# Patient Record
Sex: MALE | Race: WHITE | NOT HISPANIC OR LATINO | Employment: OTHER | URBAN - METROPOLITAN AREA
[De-identification: names, ages, dates, MRNs, and addresses within clinical notes are randomized per-mention and may not be internally consistent; named-entity substitution may affect disease eponyms.]

---

## 2017-01-03 ENCOUNTER — HOSPITAL ENCOUNTER (OUTPATIENT)
Dept: LAB | Facility: MEDICAL CENTER | Age: 67
End: 2017-01-03
Attending: NURSE PRACTITIONER
Payer: MEDICARE

## 2017-01-03 ENCOUNTER — OFFICE VISIT (OUTPATIENT)
Dept: MEDICAL GROUP | Facility: PHYSICIAN GROUP | Age: 67
End: 2017-01-03
Payer: MEDICARE

## 2017-01-03 VITALS
BODY MASS INDEX: 36.57 KG/M2 | RESPIRATION RATE: 16 BRPM | WEIGHT: 233 LBS | HEIGHT: 67 IN | TEMPERATURE: 99.3 F | HEART RATE: 60 BPM | OXYGEN SATURATION: 94 % | DIASTOLIC BLOOD PRESSURE: 78 MMHG | SYSTOLIC BLOOD PRESSURE: 132 MMHG

## 2017-01-03 DIAGNOSIS — C91.10 CLL (CHRONIC LYMPHOCYTIC LEUKEMIA) (HCC): ICD-10-CM

## 2017-01-03 DIAGNOSIS — E11.8 CONTROLLED TYPE 2 DIABETES MELLITUS WITH COMPLICATION, WITHOUT LONG-TERM CURRENT USE OF INSULIN (HCC): ICD-10-CM

## 2017-01-03 DIAGNOSIS — E87.6 HYPOKALEMIA: ICD-10-CM

## 2017-01-03 DIAGNOSIS — I15.2 HYPERTENSION SECONDARY TO ENDOCRINE DISORDERS: ICD-10-CM

## 2017-01-03 DIAGNOSIS — Z01.84 IMMUNITY STATUS TESTING: ICD-10-CM

## 2017-01-03 LAB — POTASSIUM SERPL-SCNC: 3.7 MMOL/L (ref 3.6–5.5)

## 2017-01-03 PROCEDURE — 99214 OFFICE O/P EST MOD 30 MIN: CPT | Performed by: NURSE PRACTITIONER

## 2017-01-03 PROCEDURE — 36415 COLL VENOUS BLD VENIPUNCTURE: CPT

## 2017-01-03 PROCEDURE — 86787 VARICELLA-ZOSTER ANTIBODY: CPT | Mod: 91

## 2017-01-03 PROCEDURE — 84132 ASSAY OF SERUM POTASSIUM: CPT

## 2017-01-03 RX ORDER — LOSARTAN POTASSIUM 25 MG/1
TABLET ORAL
Qty: 90 TAB | Refills: 1 | Status: SHIPPED | OUTPATIENT
Start: 2017-01-03 | End: 2017-06-28 | Stop reason: SDUPTHER

## 2017-01-03 NOTE — MR AVS SNAPSHOT
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   [384603]       Controlled type 2 diabetes mellitus with complication, without long-term current use of insulin (HCC)   [3485995]       CLL (chronic lymphocytic leukemia) (HCC)   [035201]       Immunity status testing   [781786]         Vital Signs     Blood Pressure Pulse Temperature Respirations Height Weight    132/78 mmHg 60 37.4 °C (99.3 °F) 16 1.702 m (5' 7.01\") 105.688 kg (233 lb)    Body Mass Index Oxygen Saturation Smoking Status             36.48 kg/m2 94% Never Smoker          Basic Information     Date Of Birth Sex Race Ethnicity Preferred Language    1950 Male White Non- English      Your appointments     2017 10:00 AM   ONCOLOGY EST PATIENT 30 MIN with Cris Glass M.D.   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 I15.2   2014 - Present    Iron overload E83.19   11/10/2014 - Present    Tobacco dependence in remission F17.201   2015 - Present    Skin lesion L98.9   2015 - Present    Seasonal allergies J30.2   2016 - Present    Hypokalemia E87.6   " 6/10/2016 - Present      Health Maintenance        Date Due Completion Dates    DIABETES MONOFILAMTENT / LE EXAM 5/26/1951 ---    RETINAL SCREENING 11/26/1968 ---    IMM ZOSTER VACCINE 11/26/2010 ---    IMM PNEUMOCOCCAL 65+ (ADULT) HIGH/HIGHEST RISK SERIES (2 of 2 - PPSV23) 12/9/2016 10/14/2016    A1C SCREENING 4/18/2017 10/18/2016, 4/22/2016, 11/23/2015, 9/10/2014    URINE ACR / MICROALBUMIN 4/22/2017 4/22/2016, 9/10/2014    FASTING LIPID PROFILE 10/18/2017 10/18/2016, 4/22/2016, 4/9/2014, 3/5/2014    SERUM CREATININE 10/18/2017 10/18/2016, 6/3/2016, 4/28/2016, 4/22/2016, 2/19/2016, 11/17/2015, 6/8/2015, 3/24/2015, 12/11/2014, 9/10/2014, 7/15/2014, 4/9/2014, 3/5/2014    COLONOSCOPY 9/23/2018 9/23/2008    IMM DTaP/Tdap/Td Vaccine (2 - Td) 1/28/2026 1/28/2016            Current Immunizations     13-VALENT PCV PREVNAR 10/14/2016    Influenza Vaccine Adult HD 10/14/2016    Influenza Vaccine Quad Inj (Preserved) 11/20/2015    Tdap Vaccine 1/28/2016      Below and/or attached are the medications your provider expects you to take. Review all of your home medications and newly ordered medications with your provider and/or pharmacist. Follow medication instructions as directed by your provider and/or pharmacist. Please keep your medication list with you and share with your provider. Update the information when medications are discontinued, doses are changed, or new medications (including over-the-counter products) are added; and carry medication information at all times in the event of emergency situations     Allergies:  No Known Allergies          Medications  Valid as of: January 03, 2017 - 10:44 AM    Generic Name Brand Name Tablet Size Instructions for use    AmLODIPine Besylate (Tab) NORVASC 5 MG TAKE ONE TABLET BY MOUTH DAILY        Cetirizine HCl   Take  by mouth.        Fluticasone Propionate (Suspension) FLONASE 50 MCG/ACT Spray 1 Spray in nose 2 times a day.        HydroCHLOROthiazide (Tab) HYDRODIURIL 12.5 MG  TAKE ONE TABLET BY MOUTH DAILY(GENERIC HYDRODIURIL)        Losartan Potassium (Tab) COZAAR 25 MG TAKE ONE TABLET BY MOUTH DAILY        MetFORMIN HCl (TABLET SR 24 HR) GLUCOPHAGE  MG TAKE TWO TABLETS BY MOUTH DAILY (GENERIC FOR GLUCOPHAGE XR)        Potassium Chloride (Tab CR) KLOR-CON 10 MEQ TAKE ONE TABLET BY MOUTH TWICE A DAY        .                 Medicines prescribed today were sent to:     [facility][id number] - [place] - [address] [place] [address]    Phone: [phone] Fax: [phone]    Open 24 Hours?: No      Medication refill instructions:       If your prescription bottle indicates you have medication refills left, it is not necessary to call your provider’s office. Please contact your pharmacy and they will refill your medication.    If your prescription bottle indicates you do not have any refills left, you may request refills at any time through one of the following ways: The online [employer] App system (except Urgent Care), by calling your provider’s office, or by asking your pharmacy to contact your provider’s office with a refill request. Medication refills are processed only during regular business hours and may not be available until the next business day. Your provider may request additional information or to have a follow-up visit with you prior to refilling your medication.   *Please Note: Medication refills are assigned a new Rx number when refilled electronically. Your pharmacy may indicate that no refills were authorized even though a new prescription for the same medication is available at the pharmacy. Please request the medicine by name with the pharmacy before contacting your provider for a refill.        Your To Do List     Future Labs/Procedures Complete By Expires    POTASSIUM SERUM (K)  As directed 1/4/2018    VARICELLA ZOSTER ANTIBODIES (IGG + IGM)  As directed 1/4/2018         [employer] App Access Code: Activation code not generated  Current [employer] App Status:  Active

## 2017-01-03 NOTE — PROGRESS NOTES
Subjective:     Chief Complaint   Patient presents with   • Hypertension   • Chronic Care Management        Titus Duvall is a 66 y.o. 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 BP decreasing with weight loss.  Is now monitoring at home - stopped Norvasc 5 mg after last visit. 140/82 then 128/79 pretty consistently   No symptoms low BP: light-headed, tunnel-vision, unusual fatigue.   No symptoms high BP: pounding headache, visual changes, palpitations, flushed face. No medicine adverse effects: unusual fatigue, slow heartbeat, cough.     Controlled diabetes mellitus type 2 with complications (HCC)  A1c 7 in April 2016 recently in October A1c down to 5.3. Has been limiting carbs and lost 16 pounds since June. Still walking 1/2 mile daily. Managed with metformin 500 mg xr bid with meals. Dinner and lunch, breakfast if he eats in the morning. Due have eye exam, requested.  Will decrease metformin to once daily now, has been taking one then two daily alternating.   FLP within range - LDL 57 no treatment necessary.      CLL (chronic lymphocytic leukemia) (HCC)  Diagnosed in June 2014, was treated by Dr. Lang now followed by Dr. Glass and has appointment scheduled for Monday. Sees her every 4 months.   Followed by oncology. Next appt early February 2017  No history of chicken pox in childhood, will check immunity status.        Hypokalemia  He has been stable on 10 mEq bid. Unfortunately his pharmacy declined to refill his prescription so he has been without supplement for the last two weeks. His previous potassium level was in range at 3.8. Review in Epic shows a six month supply was call to pharmacy on 10.3.16. He'll resume supplement and regular monitoring.            ROS  Denies HA, chest pain, shortness of breath, abdominal pain, bladder or bowel changes, lower extremity  "edema.    Current medicines (including changes today)  Current Outpatient Prescriptions   Medication Sig Dispense Refill   • losartan (COZAAR) 25 MG Tab TAKE ONE TABLET BY MOUTH DAILY 90 Tab 1   • potassium chloride ER (KLOR-CON) 10 MEQ tablet TAKE ONE TABLET BY MOUTH TWICE A  Tab 1   • hydrochlorothiazide (HYDRODIURIL) 12.5 MG tablet TAKE ONE TABLET BY MOUTH DAILY(GENERIC HYDRODIURIL) 90 Tab 1   • Cetirizine HCl (ZYRTEC ALLERGY PO) Take  by mouth.     • metformin ER (GLUCOPHAGE XR) 500 MG TABLET SR 24 HR TAKE TWO TABLETS BY MOUTH DAILY (GENERIC FOR GLUCOPHAGE XR) 180 Tab 1   • amlodipine (NORVASC) 5 MG Tab TAKE ONE TABLET BY MOUTH DAILY 30 Tab 2   • fluticasone (FLONASE ALLERGY RELIEF) 50 MCG/ACT nasal spray Spray 1 Spray in nose 2 times a day. 1 Bottle 0     No current facility-administered medications for this visit.       He  has a past medical history of Hypertension; Cancer ([facility]) (2004); CMV (cytomegalovirus infection) ([facility]) (1990); and CLL (chronic lymphocytic leukemia) ([facility]). He also has no past medical history of Hyperlipidemia, Heart attack ([facility]), Kidney disease, or Thyroid disease.    Allergies Review of patient's allergies indicates no known allergies.    Current medications, problem list, allergies, past medical history, and tobacco use history reviewed in [facility] today.    Health maintenance reviewed and updated. Declines pneumonia immunization today. He was told that he never had chickenpox in childhood and Therefore would not need shingles immunization. He plans to discuss this further with oncology at his next appointment.   Objective:   Blood pressure 132/78, pulse 60, temperature 37.4 °C (99.3 °F), resp. rate 16, height 1.702 m (5' 7.01\"), weight 105.688 kg (233 lb), SpO2 94 %. Body mass index is 36.48 kg/(m^2).     Physical Exam   Constitutional: Alert, no acute distress. Pleasant and cooperative with the examination.  Skin:   Warm, dry, no rashes in visible areas.    Eyes:   Pupils equal, " round. Conjunctiva and sclera clear,    Lids normal.  ENT:  Pinna normal.   Neck:   Supple, trachea midline.  Lungs:  Normal effort and respirations. Clear to auscultation bilaterally.  CV:  Regular rate and rhythm.  MS/Ext:  Steady gait, no edema.  Psych:  Eye contact is good, affect calm.    Assessment and Plan:   The following treatment plan was discussed    1. Hypokalemia  Currently untreated. He was advised to call this office whenever he has any difficulty obtaining prescription medications. We will follow up with pharmacy for more information. Meantime he'll resume treatment after visiting the lab today. Will continue to monitor.  - POTASSIUM SERUM (K); Future    2. 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 Continue current medications. We'll continue to monitor.    3. Controlled type 2 diabetes mellitus with complication, without long-term current use of insulin (HCC)  Chronic. He'll continue Metformin once daily. Will continue to monitor. Rated lifestyle modifications.    4. CLL (chronic lymphocytic leukemia) (HCC)  Chronic. Continue regular follow-up with oncology, Labs and other diagnostic testing is recommended.     - VARICELLA ZOSTER ANTIBODIES (IGG + IGM); Future    5. Immunity status testing  Well check for history of exposure today. He'll talk further with his oncologist regarding immunization as indicated.  - VARICELLA ZOSTER ANTIBODIES (IGG + IGM); Future    Followup: Return in about 3 months (around 4/3/2017).  As scheduled, sooner if symptoms don't resolve or with any new problems.    After visit: my assistant confirmed Potassium prescription is now available at the pharmacy, pharmacy stated patient requested refill too soon.           Reviewed side effects, risks, and benefits of medications prescribed today.  Advised to take all medications as instructed and report any side effects.   The patient voices understanding and agrees.  Report any new or worsening symptoms.  Have labs or  other diagnostic studies prior to follow up.  Keep all appointments for any referrals given.      Please note this dictation was created using voice recognition software. Every reasonable attempt has been made to correct obvious errors, however there may be errors of grammar and possibly content that were not discovered before finalizing the note.

## 2017-01-03 NOTE — ASSESSMENT & PLAN NOTE
Previously managed with hydrochlorothiazide 12.5 mg once a day, losartan 25 mg. BP decreasing with weight loss.  Is now monitoring at home - stopped Norvasc 5 mg after last visit. 140/82 then 128/79 pretty consistently   No symptoms low BP: light-headed, tunnel-vision, unusual fatigue.   No symptoms high BP: pounding headache, visual changes, palpitations, flushed face. No medicine adverse effects: unusual fatigue, slow heartbeat, cough.

## 2017-01-03 NOTE — ASSESSMENT & PLAN NOTE
Diagnosed in June 2014, was treated by Dr. Lang now followed by Dr. Glass and has appointment scheduled for Monday. Sees her every 4 months.   Followed by oncology. Next appt early February 2017  No history of chicken pox in childhood, will check immunity status.

## 2017-01-05 LAB
VZV IGG SER IA-ACNC: 1197 IV
VZV IGM SER IA-ACNC: 0 ISR

## 2017-01-06 NOTE — ASSESSMENT & PLAN NOTE
He has been stable on 10 mEq bid. Unfortunately his pharmacy declined to refill his prescription so he has been without supplement for the last two weeks. His previous potassium level was in range at 3.8. Review in Epic shows a six month supply was call to pharmacy on 10.3.16. He'll resume supplement and regular monitoring.

## 2017-02-21 ENCOUNTER — HOSPITAL ENCOUNTER (OUTPATIENT)
Dept: LAB | Facility: MEDICAL CENTER | Age: 67
End: 2017-02-21
Attending: INTERNAL MEDICINE
Payer: MEDICARE

## 2017-02-21 DIAGNOSIS — C91.10 CLL (CHRONIC LYMPHOCYTIC LEUKEMIA) (HCC): ICD-10-CM

## 2017-02-21 DIAGNOSIS — Z85.46 HISTORY OF PROSTATE CANCER: ICD-10-CM

## 2017-02-21 DIAGNOSIS — K70.30 ALCOHOLIC CIRRHOSIS OF LIVER WITHOUT ASCITES (HCC): ICD-10-CM

## 2017-02-21 LAB
ALBUMIN SERPL BCP-MCNC: 4.2 G/DL (ref 3.2–4.9)
ALBUMIN/GLOB SERPL: 1.4 G/DL
ALP SERPL-CCNC: 77 U/L (ref 30–99)
ALT SERPL-CCNC: 48 U/L (ref 2–50)
ANION GAP SERPL CALC-SCNC: 11 MMOL/L (ref 0–11.9)
ANISOCYTOSIS BLD QL SMEAR: ABNORMAL
AST SERPL-CCNC: 86 U/L (ref 12–45)
BASOPHILS # BLD AUTO: 0 K/UL (ref 0–0.12)
BASOPHILS NFR BLD AUTO: 0 % (ref 0–1.8)
BILIRUB SERPL-MCNC: 1.8 MG/DL (ref 0.1–1.5)
BUN SERPL-MCNC: 11 MG/DL (ref 8–22)
CALCIUM SERPL-MCNC: 9.8 MG/DL (ref 8.5–10.5)
CHLORIDE SERPL-SCNC: 103 MMOL/L (ref 96–112)
CO2 SERPL-SCNC: 25 MMOL/L (ref 20–33)
CREAT SERPL-MCNC: 0.63 MG/DL (ref 0.5–1.4)
EOSINOPHIL # BLD: 0 K/UL (ref 0–0.51)
EOSINOPHIL NFR BLD AUTO: 0 % (ref 0–6.9)
ERYTHROCYTE [DISTWIDTH] IN BLOOD BY AUTOMATED COUNT: 51.4 FL (ref 35.9–50)
GLOBULIN SER CALC-MCNC: 3 G/DL (ref 1.9–3.5)
GLUCOSE SERPL-MCNC: 119 MG/DL (ref 65–99)
HCT VFR BLD AUTO: 50 % (ref 42–52)
HGB BLD-MCNC: 16.7 G/DL (ref 14–18)
LDH SERPL L TO P-CCNC: 181 U/L (ref 107–266)
LYMPHOCYTES # BLD: 27.25 K/UL (ref 1–4.8)
LYMPHOCYTES NFR BLD AUTO: 95.6 % (ref 22–41)
MACROCYTES BLD QL SMEAR: ABNORMAL
MANUAL DIFF BLD: NORMAL
MCH RBC QN AUTO: 34.5 PG (ref 27–33)
MCHC RBC AUTO-ENTMCNC: 33.4 G/DL (ref 33.7–35.3)
MCV RBC AUTO: 103.3 FL (ref 81.4–97.8)
MONOCYTES # BLD: 0.26 K/UL (ref 0–0.85)
MONOCYTES NFR BLD AUTO: 0.9 % (ref 0–13.4)
MORPHOLOGY BLD-IMP: NORMAL
NEUTROPHILS # BLD: 1 K/UL (ref 1.82–7.42)
NEUTROPHILS NFR BLD AUTO: 3.5 % (ref 44–72)
NRBC # BLD AUTO: 0 K/UL
NRBC BLD-RTO: 0 /100 WBC
PLATELET # BLD AUTO: 86 K/UL (ref 164–446)
PLATELET BLD QL SMEAR: NORMAL
PMV BLD AUTO: 11 FL (ref 9–12.9)
POTASSIUM SERPL-SCNC: 4.1 MMOL/L (ref 3.6–5.5)
PROT SERPL-MCNC: 7.2 G/DL (ref 6–8.2)
PSA SERPL DL<=0.01 NG/ML-MCNC: 0.07 NG/ML (ref 0–4)
RBC # BLD AUTO: 4.84 M/UL (ref 4.7–6.1)
RBC BLD AUTO: PRESENT
SODIUM SERPL-SCNC: 139 MMOL/L (ref 135–145)
WBC # BLD AUTO: 28.5 K/UL (ref 4.8–10.8)

## 2017-02-21 PROCEDURE — 36415 COLL VENOUS BLD VENIPUNCTURE: CPT

## 2017-02-21 PROCEDURE — 83615 LACTATE (LD) (LDH) ENZYME: CPT

## 2017-02-21 PROCEDURE — 85027 COMPLETE CBC AUTOMATED: CPT

## 2017-02-21 PROCEDURE — 82105 ALPHA-FETOPROTEIN SERUM: CPT

## 2017-02-21 PROCEDURE — 80053 COMPREHEN METABOLIC PANEL: CPT

## 2017-02-21 PROCEDURE — 84153 ASSAY OF PSA TOTAL: CPT

## 2017-02-21 PROCEDURE — 85007 BL SMEAR W/DIFF WBC COUNT: CPT

## 2017-02-23 LAB — AFP-TM SERPL-MCNC: 9 NG/ML (ref 0–9)

## 2017-02-28 ENCOUNTER — OFFICE VISIT (OUTPATIENT)
Dept: HEMATOLOGY ONCOLOGY | Facility: MEDICAL CENTER | Age: 67
End: 2017-02-28
Payer: MEDICARE

## 2017-02-28 VITALS
HEART RATE: 69 BPM | SYSTOLIC BLOOD PRESSURE: 142 MMHG | TEMPERATURE: 99.1 F | OXYGEN SATURATION: 96 % | BODY MASS INDEX: 36.73 KG/M2 | WEIGHT: 234.6 LBS | RESPIRATION RATE: 15 BRPM | DIASTOLIC BLOOD PRESSURE: 98 MMHG

## 2017-02-28 DIAGNOSIS — C91.10 CLL (CHRONIC LYMPHOCYTIC LEUKEMIA) (HCC): ICD-10-CM

## 2017-02-28 PROCEDURE — 99214 OFFICE O/P EST MOD 30 MIN: CPT | Performed by: INTERNAL MEDICINE

## 2017-02-28 PROCEDURE — 0518F FALL PLAN OF CARE DOCD: CPT | Mod: 8P | Performed by: INTERNAL MEDICINE

## 2017-02-28 PROCEDURE — 3288F FALL RISK ASSESSMENT DOCD: CPT | Mod: 8P | Performed by: INTERNAL MEDICINE

## 2017-02-28 PROCEDURE — 1100F PTFALLS ASSESS-DOCD GE2>/YR: CPT | Performed by: INTERNAL MEDICINE

## 2017-02-28 PROCEDURE — 1036F TOBACCO NON-USER: CPT | Performed by: INTERNAL MEDICINE

## 2017-02-28 PROCEDURE — G8482 FLU IMMUNIZE ORDER/ADMIN: HCPCS | Performed by: INTERNAL MEDICINE

## 2017-02-28 PROCEDURE — G8432 DEP SCR NOT DOC, RNG: HCPCS | Performed by: INTERNAL MEDICINE

## 2017-02-28 PROCEDURE — G8419 CALC BMI OUT NRM PARAM NOF/U: HCPCS | Performed by: INTERNAL MEDICINE

## 2017-02-28 PROCEDURE — 3017F COLORECTAL CA SCREEN DOC REV: CPT | Performed by: INTERNAL MEDICINE

## 2017-02-28 PROCEDURE — 4040F PNEUMOC VAC/ADMIN/RCVD: CPT | Performed by: INTERNAL MEDICINE

## 2017-02-28 ASSESSMENT — PAIN SCALES - GENERAL: PAINLEVEL: NO PAIN

## 2017-02-28 NOTE — MR AVS SNAPSHOT
Titus Jadiel   2017 10:00 AM   Office Visit   MRN: [id number]    Department:  [facility]   Dept Phone:  [phone]    Description:  Male : 1950   Provider:  Cris Glass M.D.           Reason for Visit     Follow-Up 6m FV      Allergies as of 2017     No Known Allergies      You were diagnosed with     CLL (chronic lymphocytic leukemia) (CMS-HCC)   [944929]         Vital Signs     Blood Pressure Pulse Temperature Respirations Weight Oxygen Saturation    142/98 mmHg 69 37.3 °C (99.1 °F) 15 106.414 kg (234 lb 9.6 oz) 96%    Smoking Status                   Never Smoker            Basic Information     Date Of Birth Sex Race Ethnicity Preferred Language    1950 Male White Non- English      Your appointments     2017 10:15 AM   Established Patient with XAVI Segovia   [facility])    [address], [address]  [facility] [address] [phone]           You will be receiving a confirmation call a few days before your appointment from our automated call confirmation system.            Aug 29, 2017 10:20 AM   ONCOLOGY EST PATIENT 30 MIN with Cris Glass M.D.   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 I15.2   2014 - Present    Iron overload E83.19   11/10/2014 - Present    Tobacco dependence in remission F17.201   2015 - Present    Skin lesion L98.9   2015 - Present    Seasonal allergies  J30.2   5/4/2016 - Present    Hypokalemia E87.6   6/10/2016 - Present      Health Maintenance        Date Due Completion Dates    DIABETES MONOFILAMENT / LE EXAM 5/26/1951 ---    IMM ZOSTER VACCINE 11/26/2010 ---    IMM PNEUMOCOCCAL 65+ (ADULT) HIGH/HIGHEST RISK SERIES (2 of 2 - PPSV23) 12/9/2016 10/14/2016    A1C SCREENING 4/18/2017 10/18/2016, 4/22/2016, 11/23/2015, 9/10/2014    URINE ACR / MICROALBUMIN 4/22/2017 4/22/2016, 9/10/2014    FASTING LIPID PROFILE 10/18/2017 10/18/2016, 4/22/2016, 4/9/2014, 3/5/2014    RETINAL SCREENING 1/17/2018 1/17/2017, 1/17/2017    SERUM CREATININE 2/21/2018 2/21/2017, 10/18/2016, 6/3/2016, 4/28/2016, 4/22/2016, 2/19/2016, 11/17/2015, 6/8/2015, 3/24/2015, 12/11/2014, 9/10/2014, 7/15/2014, 4/9/2014, 3/5/2014    COLONOSCOPY 9/23/2018 9/23/2008    IMM DTaP/Tdap/Td Vaccine (2 - Td) 1/28/2026 1/28/2016            Current Immunizations     13-VALENT PCV PREVNAR 10/14/2016    Influenza Vaccine Adult HD 10/14/2016    Influenza Vaccine Quad Inj (Preserved) 11/20/2015    Tdap Vaccine 1/28/2016      Below and/or attached are the medications your provider expects you to take. Review all of your home medications and newly ordered medications with your provider and/or pharmacist. Follow medication instructions as directed by your provider and/or pharmacist. Please keep your medication list with you and share with your provider. Update the information when medications are discontinued, doses are changed, or new medications (including over-the-counter products) are added; and carry medication information at all times in the event of emergency situations     Allergies:  No Known Allergies          Medications  Valid as of: February 28, 2017 - 10:28 AM    Generic Name Brand Name Tablet Size Instructions for use    AmLODIPine Besylate (Tab) NORVASC 5 MG TAKE ONE TABLET BY MOUTH DAILY        Cetirizine HCl   Take  by mouth.        Fluticasone Propionate (Suspension) FLONASE 50 MCG/ACT Spray 1 Spray in  nose 2 times a day.        HydroCHLOROthiazide (Tab) HYDRODIURIL 12.5 MG TAKE ONE TABLET BY MOUTH DAILY(GENERIC HYDRODIURIL)        Losartan Potassium (Tab) COZAAR 25 MG TAKE ONE TABLET BY MOUTH DAILY        MetFORMIN HCl (TABLET SR 24 HR) GLUCOPHAGE  MG TAKE TWO TABLETS BY MOUTH DAILY (GENERIC FOR GLUCOPHAGE XR)        Potassium Chloride (Tab CR) KLOR-CON 10 MEQ TAKE ONE TABLET BY MOUTH TWICE A DAY        .                 Medicines prescribed today were sent to:     [facility] #[id number] - [place] [address] [place] [address]    Phone: [phone] Fax: [phone]    Open 24 Hours?: No      Medication refill instructions:       If your prescription bottle indicates you have medication refills left, it is not necessary to call your provider’s office. Please contact your pharmacy and they will refill your medication.    If your prescription bottle indicates you do not have any refills left, you may request refills at any time through one of the following ways: The online [employer] system (except Urgent Care), by calling your provider’s office, or by asking your pharmacy to contact your provider’s office with a refill request. Medication refills are processed only during regular business hours and may not be available until the next business day. Your provider may request additional information or to have a follow-up visit with you prior to refilling your medication.   *Please Note: Medication refills are assigned a new Rx number when refilled electronically. Your pharmacy may indicate that no refills were authorized even though a new prescription for the same medication is available at the pharmacy. Please request the medicine by name with the pharmacy before contacting your provider for a refill.        Your To Do List     Future Labs/Procedures Complete By Expires    [facility] COMPLETE SURVEY  As directed 2/28/2018    Standing Orders Interval Expires    CBC WITH DIFFERENTIAL  q 3 months  until 2/28/2018 2/28/2018    COMP METABOLIC PANEL  q 3 months until 2/28/2018 2/28/2018    LDH  q 3 months until 2/28/2018 2/28/2018      Referral     A referral request has been sent to our patient care coordination department. Please allow 3-5 business days for us to process this request and contact you either by phone or mail. If you do not hear from us by the 5th business day, please call us at [phone].           [employer] Access Code: Activation code not generated  Current [employer] Status: Active

## 2017-02-28 NOTE — PROGRESS NOTES
Follow Up Note: Oncology     Date: 2/28/17  Time: 10 am     Primary care physician: JOSE ANTONIO Garcias  Prior Oncologist: Dr. Lang   Radiation Oncology: Dr. Garcia  Urology: Dr. Ramírez    Diagnosis:   1. CLL  2. h/o prostate cancer     Chief compliant: He is here for follow up visit     History of presenting illness:  Mr. Duvall is a 66-year-old male with CLL and history of prostate cancer. 2004 he was diagnosed with prostate cancer secondary to abnormal exam and a PSA of 6. Prostate biopsy was positive for malignancy and he underwent brachytherapy. He had been on observation with low PSAs. Multiple at times were made to get records but records are still unavailable. He was diagnosed in CLL in 3/2014 due to elevated white count. A flow cytometry was consistent with CLL/SLL. He also had macrocytosis with elevated hemoglobin and check to mutation was negative. At diagnosis he had hepatosplenomegaly with enlarged lymph nodes in the subcarinal area in the right cardiophrenic angle. He also has cirrhosis and prolonged history of alcohol use. He also had thrombocytopenia which has been fairly stable. The hepatosplenomegaly and thrombocytopenia was felt to be secondary to liver changes. She had been continued on observation.    Interval History:   History of her follow-up visit and has been fairly stable since his last visit. She has stable energy, appetite and weight. He continues to drink approximately 3 drinks a day. He denies any headaches, dizziness, nausea, vomiting, abdominal pain, cough or shortness of breath. He denies any fevers, drenching night sweats.    Past Medical History:  1. CLL  2. 2004: Prostate cancer  3. H/o CMV infection 1990  4. Elevated hb  5. Elevated A1c  6. Cirrhosis   7. Elevated LFTs  8. ETOH abuse  9. Tobacco abuse       Allergies:  Review of patient's allergies indicates no known allergies.    Medications:  Current Outpatient Prescriptions on File Prior to Visit   Medication Sig  Dispense Refill   • losartan (COZAAR) 25 MG Tab TAKE ONE TABLET BY MOUTH DAILY 90 Tab 1   • potassium chloride ER (KLOR-CON) 10 MEQ tablet TAKE ONE TABLET BY MOUTH TWICE A  Tab 1   • hydrochlorothiazide (HYDRODIURIL) 12.5 MG tablet TAKE ONE TABLET BY MOUTH DAILY(GENERIC HYDRODIURIL) 90 Tab 1   • metformin ER (GLUCOPHAGE XR) 500 MG TABLET SR 24 HR TAKE TWO TABLETS BY MOUTH DAILY (GENERIC FOR GLUCOPHAGE XR) 180 Tab 1   • fluticasone (FLONASE ALLERGY RELIEF) 50 MCG/ACT nasal spray Spray 1 Spray in nose 2 times a day. 1 Bottle 0   • Cetirizine HCl (ZYRTEC ALLERGY PO) Take  by mouth.     • amlodipine (NORVASC) 5 MG Tab TAKE ONE TABLET BY MOUTH DAILY 30 Tab 2     No current facility-administered medications on file prior to visit.       Review of Systems:  All other review of systems are negative except what was mentioned above in the HPI.  Constitutional: Negative for fever and chills.  Negative for weight loss. Positive for mild malaise/fatigue stable.    HENT: Negative for ear pain and nosebleeds  Eyes: Negative for blurred vision.    Respiratory: Negative for cough, sputum production and shortness of breath.    Cardiovascular: Negative for chest pain and leg swelling.    Gastrointestinal: Negative for nausea, vomiting and abdominal pain.    Genitourinary: Negative for dysuria.     Musculoskeletal: Negative for myalgias.  Positive for chronic back pain which has been stable.    Skin: Negative for rash.    Neurological: Negative for dizziness, sensory change, and focal weakness.    Endo/Heme/Allergies: No bruise/bleed easily.    Psychiatric/Behavioral: Negative for depression and memory loss.      Physical Exam:  Vitals:     Filed Vitals:    02/28/17 0956   BP: 142/98   Pulse: 69   Temp: 37.3 °C (99.1 °F)   Resp: 15   Weight: 106.414 kg (234 lb 9.6 oz)   SpO2: 96%       General: Not in acute distress, alert and oriented x 3 and obese  HEENT: normalcephalic, atraumatic, extra ocular muscles intact, moist oral  mucus membranes, and oral cavity without any lesions.  Neck: Supple neck  Lymph nodes: No palpable bilateral cervical, supraclavicular, axillary and inguinal lymphadenopathy.    CVS: regular rate and rhythm  RESP: Clear to auscultate bilaterally.   ABD: Soft, non tender, positive bowel sounds, and obese abdomen    EXT: positive for lower extremity edema   CNS: Alert and oriented x3, cranial nerves grossly intact, and muscle strength grossly intact    Labs:   No visits with results within 1 Day(s) from this visit.  Latest known visit with results is:    Hospital Outpatient Visit on 02/21/2017   Component Date Value Ref Range Status   • Sodium 02/21/2017 139  135 - 145 mmol/L Final   • Potassium 02/21/2017 4.1  3.6 - 5.5 mmol/L Final   • Chloride 02/21/2017 103  96 - 112 mmol/L Final   • Co2 02/21/2017 25  20 - 33 mmol/L Final   • Anion Gap 02/21/2017 11.0  0.0 - 11.9 Final   • Glucose 02/21/2017 119* 65 - 99 mg/dL Final   • Bun 02/21/2017 11  8 - 22 mg/dL Final   • Creatinine 02/21/2017 0.63  0.50 - 1.40 mg/dL Final   • Calcium 02/21/2017 9.8  8.5 - 10.5 mg/dL Final   • AST(SGOT) 02/21/2017 86* 12 - 45 U/L Final   • ALT(SGPT) 02/21/2017 48  2 - 50 U/L Final   • Alkaline Phosphatase 02/21/2017 77  30 - 99 U/L Final   • Total Bilirubin 02/21/2017 1.8* 0.1 - 1.5 mg/dL Final   • Albumin 02/21/2017 4.2  3.2 - 4.9 g/dL Final   • Total Protein 02/21/2017 7.2  6.0 - 8.2 g/dL Final   • Globulin 02/21/2017 3.0  1.9 - 3.5 g/dL Final   • A-G Ratio 02/21/2017 1.4   Final   • Alpha Fetoprotein 02/21/2017 9  0 - 9 ng/mL Final    Comment: INTERPRETIVE INFORMATION: Alpha Fetoprotein Tumor Marker  The [employer] Access DxI AFP method is used. Results  obtained with different assay methods or kits cannot be used  interchangeably. AFP is a valuable aid in the management of  nonseminomatous testicular cancer patients when used in  conjunction with information available from the clinical  evaluation and other diagnostic procedures.  Increased AFP  concentrations have also been observed in ataxia telangiectasia,  hereditary tyrosinemia, primary hepatocellular carcinoma,  teratocarcinoma, gastrointestinal tract cancers with and without  liver metastases, and in benign hepatic conditions such as acute  viral hepatitis, chronic active hepatitis, and cirrhosis. The  result cannot be interpreted as absolute evidence of the presence  or absence of malignant disease. The result is not interpretable  as a tumor marker in pregnant females.  Access complete set of age- and/or gender-specific reference  intervals for this test in the                            [facility] Test Directory  ([email or web address]).  Performed by [facility],  [address] [place] [id number] [phone]  [email or web address], Abraham Munoz MD - Lab. Director     • Prostatic Specific Antigen Tot 02/21/2017 0.07  0.00 - 4.00 ng/mL Final    Comment: The Access Hybritech PSA assay is a paramagnetic particle,  chemiluminescent immunoassay for the quantitative determination  of total prostate specific antigen (PSA) levels using the  [employer]abera Immunoassay System. Values obtained with different  methods cannot be used interchangeably for patient monitoring.     • WBC 02/21/2017 28.5* 4.8 - 10.8 K/uL Final   • RBC 02/21/2017 4.84  4.70 - 6.10 M/uL Final   • Hemoglobin 02/21/2017 16.7  14.0 - 18.0 g/dL Final   • Hematocrit 02/21/2017 50.0  42.0 - 52.0 % Final   • MCV 02/21/2017 103.3* 81.4 - 97.8 fL Final   • MCH 02/21/2017 34.5* 27.0 - 33.0 pg Final   • MCHC 02/21/2017 33.4* 33.7 - 35.3 g/dL Final   • RDW 02/21/2017 51.4* 35.9 - 50.0 fL Final   • Platelet Count 02/21/2017 86* 164 - 446 K/uL Final   • MPV 02/21/2017 11.0  9.0 - 12.9 fL Final   • Nucleated RBC 02/21/2017 0.00   Final   • NRBC (Absolute) 02/21/2017 0.00   Final   • Neutrophils-Polys 02/21/2017 3.50* 44.00 - 72.00 % Final   • Lymphocytes 02/21/2017 95.60* 22.00 - 41.00 % Final   • Monocytes 02/21/2017 0.90  0.00 - 13.40 % Final    • Eosinophils 02/21/2017 0.00  0.00 - 6.90 % Final   • Basophils 02/21/2017 0.00  0.00 - 1.80 % Final   • Neutrophils (Absolute) 02/21/2017 1.00* 1.82 - 7.42 K/uL Final    Includes immature neutrophils, if present.   • Lymphs (Absolute) 02/21/2017 27.25* 1.00 - 4.80 K/uL Final   • Monos (Absolute) 02/21/2017 0.26  0.00 - 0.85 K/uL Final   • Eos (Absolute) 02/21/2017 0.00  0.00 - 0.51 K/uL Final   • Baso (Absolute) 02/21/2017 0.00  0.00 - 0.12 K/uL Final   • Anisocytosis 02/21/2017 1+   Final   • Macrocytosis 02/21/2017 1+   Final   • LDH Total 02/21/2017 181  107 - 266 U/L Final   • GFR If  02/21/2017 >60  >60 mL/min/1.73 m 2 Final   • GFR If Non  02/21/2017 >60  >60 mL/min/1.73 m 2 Final   • Manual Diff Status 02/21/2017 PERFORMED   Final   • Peripheral Smear Review 02/21/2017 see below   Final    Comment: Due to instrument suspect flags, further review of peripheral smear is  indicated on this patient sample. This review may or may not result in  abnormal findings.     • Plt Estimation 02/21/2017 Decreased   Final   • RBC Morphology 02/21/2017 Present   Final   ]    Assessment and Plan:  1. CLL Odom 0: Diagnosed in 3/2014. At diagnosis he had polycythemia, low platelets and an LDH of 197. He also has underlying cirrhosis hence the thrombocytopenia was attribute it to cirrhosis. He has hepatosplenomegaly and slightly enlarged subcarinal and right cardiophrenic angle lymph nodes which have been stable. Clinically he does not have any B symptoms. His repeat labs showed a slight elevation in his white count. He is continuing on observation.  2. History prostate cancer: Diagnosed in 2004 with a PSA of 6. Biopsy was positive for prostate malignancy and he underwent brachytherapy. He has been on observation. Repeat PSAs have been low.  3. Polycythemia: Macrocytic with elevated hemoglobin. LYLE 2 V617F mutation was negative.  He has elevated iron, percent saturation with normal iron binding  capacity and ferritin of 228. This was felt to be a secondary process.  He is continuing on observation.   4. Thrombocytopenia: Stable. He has significant alcohol use as well as cirrhosis. He has cut down on his alcohol use but continues to drink on a daily basis. He is strongly advised to discontinue alcohol use. Patient states that he is not ready to do that at this point.   5. Cirrhosis: Secondary to alcohol use. He has thrombocytopenia which has been stable. He continues to drink on a daily basis that has cut down to 3 drinks a day. His liver enzymes are persistently elevated but stable. His alpha-fetoprotein levels were low. He was recommended ultrasound every 6 months however has been refusing to do it on a regular basis. I will order an ultrasound of the abdomen which he states he will do prior to his next follow-up visit.  6. Oral tobacco use: Stopped since 2/2016 and continues to abstain from tobacco use.   7. He is to continue to get laboratory work up every 3 months. He is to follow-up again in 6 months or sooner as needed.    He agreed and verbalized his agreement and understanding with the current plan.  I answered all questions and concerns he has at this time and advised him to call at any time in the interim with questions or concerns in regards to his care.  Thank you for allowing me to participate in his care, I will continue to follow.    Please note that this dictation was created using voice recognition software. I have made every reasonable attempt to correct obvious errors, but I expect that there are errors of grammar and possibly content that I did not discover before finalizing the note.

## 2017-04-05 ENCOUNTER — OFFICE VISIT (OUTPATIENT)
Dept: MEDICAL GROUP | Facility: PHYSICIAN GROUP | Age: 67
End: 2017-04-05
Payer: MEDICARE

## 2017-04-05 VITALS
TEMPERATURE: 98.9 F | BODY MASS INDEX: 36.57 KG/M2 | SYSTOLIC BLOOD PRESSURE: 134 MMHG | WEIGHT: 233 LBS | HEIGHT: 67 IN | HEART RATE: 72 BPM | RESPIRATION RATE: 16 BRPM | DIASTOLIC BLOOD PRESSURE: 76 MMHG | OXYGEN SATURATION: 96 %

## 2017-04-05 DIAGNOSIS — E87.6 HYPOKALEMIA: ICD-10-CM

## 2017-04-05 DIAGNOSIS — L98.9 SKIN LESION: ICD-10-CM

## 2017-04-05 DIAGNOSIS — E11.8 CONTROLLED TYPE 2 DIABETES MELLITUS WITH COMPLICATION, WITHOUT LONG-TERM CURRENT USE OF INSULIN (HCC): ICD-10-CM

## 2017-04-05 DIAGNOSIS — I15.2 HYPERTENSION SECONDARY TO ENDOCRINE DISORDERS: ICD-10-CM

## 2017-04-05 DIAGNOSIS — C91.10 CLL (CHRONIC LYMPHOCYTIC LEUKEMIA) (HCC): ICD-10-CM

## 2017-04-05 LAB
HBA1C MFR BLD: 5.3 % (ref ?–5.8)
INT CON NEG: NEGATIVE
INT CON POS: POSITIVE

## 2017-04-05 PROCEDURE — 1036F TOBACCO NON-USER: CPT | Performed by: NURSE PRACTITIONER

## 2017-04-05 PROCEDURE — 3017F COLORECTAL CA SCREEN DOC REV: CPT | Performed by: NURSE PRACTITIONER

## 2017-04-05 PROCEDURE — 1100F PTFALLS ASSESS-DOCD GE2>/YR: CPT | Performed by: NURSE PRACTITIONER

## 2017-04-05 PROCEDURE — 4040F PNEUMOC VAC/ADMIN/RCVD: CPT | Performed by: NURSE PRACTITIONER

## 2017-04-05 PROCEDURE — 99214 OFFICE O/P EST MOD 30 MIN: CPT | Performed by: NURSE PRACTITIONER

## 2017-04-05 PROCEDURE — 0518F FALL PLAN OF CARE DOCD: CPT | Mod: 8P | Performed by: NURSE PRACTITIONER

## 2017-04-05 PROCEDURE — 3288F FALL RISK ASSESSMENT DOCD: CPT | Mod: 8P | Performed by: NURSE PRACTITIONER

## 2017-04-05 PROCEDURE — 3044F HG A1C LEVEL LT 7.0%: CPT | Performed by: NURSE PRACTITIONER

## 2017-04-05 PROCEDURE — G8417 CALC BMI ABV UP PARAM F/U: HCPCS | Performed by: NURSE PRACTITIONER

## 2017-04-05 PROCEDURE — 83036 HEMOGLOBIN GLYCOSYLATED A1C: CPT | Performed by: NURSE PRACTITIONER

## 2017-04-05 PROCEDURE — G8432 DEP SCR NOT DOC, RNG: HCPCS | Performed by: NURSE PRACTITIONER

## 2017-04-05 NOTE — MR AVS SNAPSHOT
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   [397297]       Hypokalemia   [903280]       Skin lesion   [239040]         Vital Signs     Blood Pressure Pulse Temperature Respirations Height Weight    134/76 mmHg 72 37.2 °C (98.9 °F) 16 1.702 m (5' 7.01\") 105.688 kg (233 lb)    Body Mass Index Oxygen Saturation Smoking Status             36.48 kg/m2 96% Never Smoker          Basic Information     Date Of Birth Sex Race Ethnicity Preferred Language    1950 Male White Non- English      Your appointments     2017 10:55 AM   Established Patient with XAVI Segovia   [facility]    [address], [address]  [place] [address] [phone]           You will be receiving a confirmation call a few days before your appointment from our automated call confirmation system.            Aug 29, 2017 10:20 AM   ONCOLOGY EST PATIENT 30 MIN with Cris Glass M.D.   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 I15.2   9/2/2014 - Present    Iron overload E83.19   11/10/2014 - Present    Tobacco dependence in remission F17.201   11/23/2015 - Present    Skin lesion L98.9   11/23/2015 - Present    Seasonal allergies J30.2   5/4/2016 - Present    Hypokalemia E87.6   6/10/2016 - Present      Health Maintenance        Date Due Completion Dates    DIABETES MONOFILAMENT / LE EXAM 5/26/1951 ---    IMM ZOSTER VACCINE 11/26/2010 ---    IMM PNEUMOCOCCAL 65+ (ADULT) HIGH/HIGHEST RISK SERIES (2 of 2 - PPSV23) 12/9/2016 10/14/2016    A1C SCREENING 4/18/2017 10/18/2016, 4/22/2016, 11/23/2015, 9/10/2014    URINE ACR / MICROALBUMIN 4/22/2017 4/22/2016, 9/10/2014    FASTING LIPID PROFILE 10/18/2017 10/18/2016, 4/22/2016, 4/9/2014, 3/5/2014    RETINAL SCREENING 1/17/2018 1/17/2017, 1/17/2017, 1/17/2017    SERUM CREATININE 2/21/2018 2/21/2017, 10/18/2016, 6/3/2016, 4/28/2016, 4/22/2016, 2/19/2016, 11/17/2015, 6/8/2015, 3/24/2015, 12/11/2014, 9/10/2014, 7/15/2014, 4/9/2014, 3/5/2014    COLONOSCOPY 9/23/2018 9/23/2008    IMM DTaP/Tdap/Td Vaccine (2 - Td) 1/28/2026 1/28/2016            Current Immunizations     13-VALENT PCV PREVNAR 10/14/2016    Influenza Vaccine Adult HD 10/14/2016    Influenza Vaccine Quad Inj (Preserved) 11/20/2015    Tdap Vaccine 1/28/2016      Below and/or attached are the medications your provider expects you to take. Review all of your home medications and newly ordered medications with your provider and/or pharmacist. Follow medication instructions as directed by your provider and/or pharmacist. Please keep your medication list with you and share with your provider. Update the information when medications are discontinued, doses are changed, or new medications (including over-the-counter products) are added; and carry medication information at all times in the event of emergency situations     Allergies:  No Known Allergies          Medications  Valid as of: April 05, 2017 -  11:22 AM    Generic Name Brand Name Tablet Size Instructions for use    Cetirizine HCl   Take  by mouth.        Fluticasone Propionate (Suspension) FLONASE 50 MCG/ACT Spray 1 Spray in nose 2 times a day.        HydroCHLOROthiazide (Tab) HYDRODIURIL 12.5 MG TAKE ONE TABLET BY MOUTH DAILY(GENERIC HYDRODIURIL)        Losartan Potassium (Tab) COZAAR 25 MG TAKE ONE TABLET BY MOUTH DAILY        MetFORMIN HCl (TABLET SR 24 HR) GLUCOPHAGE  MG TAKE TWO TABLETS BY MOUTH DAILY (GENERIC FOR GLUCOPHAGE XR)        Potassium Chloride (Tab CR) KLOR-CON 10 MEQ TAKE ONE TABLET BY MOUTH TWICE A DAY        .                 Medicines prescribed today were sent to:     [id number] - [place] - [address]    [address] [place] [address]    Phone: [phone] Fax: [phone]    Open 24 Hours?: No      Medication refill instructions:       If your prescription bottle indicates you have medication refills left, it is not necessary to call your provider’s office. Please contact your pharmacy and they will refill your medication.    If your prescription bottle indicates you do not have any refills left, you may request refills at any time through one of the following ways: The online [employer] system (except Urgent Care), by calling your provider’s office, or by asking your pharmacy to contact your provider’s office with a refill request. Medication refills are processed only during regular business hours and may not be available until the next business day. Your provider may request additional information or to have a follow-up visit with you prior to refilling your medication.   *Please Note: Medication refills are assigned a new Rx number when refilled electronically. Your pharmacy may indicate that no refills were authorized even though a new prescription for the same medication is available at the pharmacy. Please request the medicine by name with the pharmacy before contacting your provider for a refill.        Your  To Do List     Future Labs/Procedures Complete By Expires    POTASSIUM SERUM (K)  As directed 4/6/2018         [employer] Access Code: Activation code not generated  Current [employer]Sh[employer] Status: Active

## 2017-04-05 NOTE — Clinical Note
[facility]  JOSE ANTONIO Segovia.  [address]  [place] [address]  Fax: [phone]   Authorization for Release/Disclosure of   Protected Health Information   Name: VAUGHN KAYE : 1950 SSN: [id number]   Address: [address] [address] Phone:    [phone] (home)    I authorize the entity listed below to release/disclose the PHI below to:   [facility]/XAVI Segovia and XAVI Segovia   Provider or Entity Name:  Abiodun Howard M.D.       Address  [address], [place] [address]   Phone:      Fax:     Reason for request: continuity of care   Information to be released:    [  ] LAST COLONOSCOPY,  including any PATH REPORT and follow-up  [  ] LAST FIT/COLOGUARD RESULT [  ] LAST DEXA  [  ] LAST MAMMOGRAM  [  ] LAST PAP  [  ] LAST LABS [  ] RETINA EXAM REPORT  [  ] IMMUNIZATION RECORDS  [XXXX ] Release all info      [  ] Check here and initial the line next to each item to release ALL health information INCLUDING  _____ Care and treatment for drug and / or alcohol abuse  _____ HIV testing, infection status, or AIDS  _____ Genetic Testing    DATES OF SERVICE OR TIME PERIOD TO BE DISCLOSED: _____________  I understand and acknowledge that:  * This Authorization may be revoked at any time by you in writing, except if your health information has already been used or disclosed.  * Your health information that will be used or disclosed as a result of you signing this authorization could be re-disclosed by the recipient. If this occurs, your re-disclosed health information may no longer be protected by State or Federal laws.  * You may refuse to sign this Authorization. Your refusal will not affect your ability to obtain treatment.  * This Authorization becomes effective upon signing and will  on (date) __________.      If no date is indicated, this Authorization will  one (1) year from the signature date.     Name: Titus Duvall    Signature:   Date:     4/5/2017       PLEASE FAX REQUESTED RECORDS BACK TO: [phone]

## 2017-04-07 NOTE — ASSESSMENT & PLAN NOTE
A1c 7 in April 2016 recently in October A1c down to 5.3 and again today. Has been limiting carbs and lost 16 pounds since June. Still walking 1/2 mile daily. Managed with metformin 500 mg xr  with meals.

## 2017-04-07 NOTE — ASSESSMENT & PLAN NOTE
Followed by dermatology. Near left ear, treated with cryotherapy then topical (flouricil) . Yesterday had bx, basal cell sq cell  Rosacea treated with topical and oral clinda/doxy. Doxy PO continues indefinitely.

## 2017-04-07 NOTE — PROGRESS NOTES
Subjective:     Chief Complaint   Patient presents with   • Diabetes     follow up   • Hypertension        Titus Duvall is a 66 y.o. male here today for evaluation and management of:    Controlled diabetes mellitus type 2 with complications (CMS-HCC)  A1c 7 in April 2016 recently in October A1c down to 5.3 and again today. Has been limiting carbs and lost 16 pounds since June. Still walking 1/2 mile daily. Managed with metformin 500 mg xr  with meals.       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    Hypokalemia  He has been stable on 10 mEq bid.     CLL (chronic lymphocytic leukemia) (CMS-HCC)  Continues to follow with oncology.    Skin lesion  Followed by dermatology. Near left ear, treated with cryotherapy then topical (flouricil) . Yesterday had bx, basal cell sq cell  Rosacea treated with topical and oral clinda/doxy. Doxy PO continues indefinitely.           ROS   Denies HA, chest pain, shortness of breath, abdominal pain, bladder or bowel changes, lower extremity edema.    Current medicines (including changes today)  Current Outpatient Prescriptions   Medication Sig Dispense Refill   • losartan (COZAAR) 25 MG Tab TAKE ONE TABLET BY MOUTH DAILY 90 Tab 1   • potassium chloride ER (KLOR-CON) 10 MEQ tablet TAKE ONE TABLET BY MOUTH TWICE A  Tab 1   • hydrochlorothiazide (HYDRODIURIL) 12.5 MG tablet TAKE ONE TABLET BY MOUTH DAILY(GENERIC HYDRODIURIL) 90 Tab 1   • Cetirizine HCl (ZYRTEC ALLERGY PO) Take  by mouth.     • metformin ER (GLUCOPHAGE XR) 500 MG TABLET SR 24 HR TAKE TWO TABLETS BY MOUTH DAILY (GENERIC FOR GLUCOPHAGE XR) 180 Tab 1   • fluticasone (FLONASE ALLERGY RELIEF) 50 MCG/ACT nasal spray Spray 1 Spray in nose 2 times a day. 1 Bottle 0     No current facility-administered medications for this visit.       He  has a past medical history of Hypertension; Cancer (CMS-HCC) (2004); CMV (cytomegalovirus infection) (CMS-HCC) (1990); and CLL  "(chronic lymphocytic leukemia) (CMS-HCC). He also has no past medical history of Hyperlipidemia, Heart attack (CMS-HCC), Kidney disease, or Thyroid disease.    Allergies Review of patient's allergies indicates no known allergies.    Current medications, problem list, allergies, past medical history, and tobacco use history reviewed in [facility] today.    Health maintenance reviewed and updated.    Objective:   Blood pressure 134/76, pulse 72, temperature 37.2 °C (98.9 °F), resp. rate 16, height 1.702 m (5' 7.01\"), weight 105.688 kg (233 lb), SpO2 96 %. Body mass index is 36.48 kg/(m^2).     Physical Exam   Constitutional: Alert, no acute distress. Pleasant and cooperative with the examination.  Skin:   Warm, dry, no rashes in visible areas.    Eyes:   Pupils equal, round. Conjunctiva and sclera clear,    Lids normal.  ENT:  Pinna normal.   Neck:   Supple, trachea midline.  Lungs:  Normal effort and respirations. Clear to auscultation bilaterally.  CV:  Regular rate and rhythm.  MS/Ext:  Steady gait, no edema.  Psych:  Eye contact is good, affect calm.    Assessment and Plan:   The following treatment plan was discussed    1. Controlled type 2 diabetes mellitus with complication, without long-term current use of insulin (CMS-HCC)  Stable. Continue current medicines. Monitor labs regularly.      - POCT Hemoglobin A1C    2. 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 Continue current medicines. Monitor labs regularly.        3. Hypokalemia  Stable. Continue current medicines. Monitor labs regularly.      - POTASSIUM SERUM (K); Future    4. Skin lesion  Continue follow up with dermatology.    5. CLL  Continue follow up with oncology.      Followup: Return in about 3 months (around 7/5/2017).  As scheduled, sooner if symptoms don't resolve or with any new problems.       Reviewed side effects, risks, and benefits of medications prescribed today.  Advised to take all medications as instructed and report any side " effects.   The patient voices understanding and agrees.  Report any new or worsening symptoms.  Have labs or other diagnostic studies prior to follow up.  Keep all appointments for any referrals given.      Please note this dictation was created using voice recognition software. Every reasonable attempt has been made to correct obvious errors, however there may be errors of grammar and possibly content that were not discovered before finalizing the note.

## 2017-04-12 ENCOUNTER — HOSPITAL ENCOUNTER (OUTPATIENT)
Dept: LAB | Facility: MEDICAL CENTER | Age: 67
End: 2017-04-12
Attending: NURSE PRACTITIONER
Payer: MEDICARE

## 2017-04-12 DIAGNOSIS — E87.6 HYPOKALEMIA: ICD-10-CM

## 2017-04-12 LAB — POTASSIUM SERPL-SCNC: 3.8 MMOL/L (ref 3.6–5.5)

## 2017-04-12 PROCEDURE — 84132 ASSAY OF SERUM POTASSIUM: CPT

## 2017-04-12 PROCEDURE — 36415 COLL VENOUS BLD VENIPUNCTURE: CPT

## 2017-04-18 RX ORDER — HYDROCHLOROTHIAZIDE 12.5 MG/1
TABLET ORAL
Qty: 90 TAB | Refills: 1 | Status: SHIPPED | OUTPATIENT
Start: 2017-04-18 | End: 2017-09-12

## 2017-04-18 NOTE — TELEPHONE ENCOUNTER
Refill X 6 months, sent to pharmacy.Pt. Seen in the last 6 months per protocol.   Lab Results   Component Value Date/Time    SODIUM 139 02/21/2017 01:05 PM    POTASSIUM 3.8 04/12/2017 11:01 AM    CHLORIDE 103 02/21/2017 01:05 PM    CO2 25 02/21/2017 01:05 PM    GLUCOSE 119* 02/21/2017 01:05 PM    BUN 11 02/21/2017 01:05 PM    CREATININE 0.63 02/21/2017 01:05 PM

## 2017-04-18 NOTE — TELEPHONE ENCOUNTER
Was the patient seen in the last year in this department? Yes     Does patient have an active prescription for medications requested? No     Received Request Via: Pharmacy      Pt met protocol?: Yes, OV earlier this month   BP Readings from Last 1 Encounters:   04/05/17 134/76

## 2017-05-19 NOTE — TELEPHONE ENCOUNTER
Was the patient seen in the last year in this department? Yes     Does patient have an active prescription for medications requested? No     Received Request Via: Pharmacy      Pt met protocol?: Yes, LABS 2/17 OV 4/17 /76

## 2017-05-20 RX ORDER — POTASSIUM CHLORIDE 750 MG/1
TABLET, FILM COATED, EXTENDED RELEASE ORAL
Qty: 180 TAB | Refills: 1 | Status: SHIPPED | OUTPATIENT
Start: 2017-05-20 | End: 2017-11-27 | Stop reason: SDUPTHER

## 2017-06-12 RX ORDER — METFORMIN HYDROCHLORIDE 500 MG/1
TABLET, EXTENDED RELEASE ORAL
Qty: 180 TAB | Refills: 0 | Status: SHIPPED | OUTPATIENT
Start: 2017-06-12 | End: 2017-12-15 | Stop reason: SDUPTHER

## 2017-06-28 RX ORDER — LOSARTAN POTASSIUM 25 MG/1
TABLET ORAL
Qty: 90 TAB | Refills: 1 | Status: SHIPPED | OUTPATIENT
Start: 2017-06-28 | End: 2017-09-12

## 2017-06-28 NOTE — TELEPHONE ENCOUNTER
Was the patient seen in the last year in this department? Yes     Does patient have an active prescription for medications requested? No     Received Request Via: Pharmacy      Pt met protocol?: Yes    BP Readings from Last 1 Encounters:   04/05/17 134/76

## 2017-08-10 ENCOUNTER — HOSPITAL ENCOUNTER (OUTPATIENT)
Dept: LAB | Facility: MEDICAL CENTER | Age: 67
End: 2017-08-10
Attending: INTERNAL MEDICINE
Payer: MEDICARE

## 2017-08-10 LAB
ALBUMIN SERPL BCP-MCNC: 3.9 G/DL (ref 3.2–4.9)
ALBUMIN/GLOB SERPL: 1.6 G/DL
ALP SERPL-CCNC: 86 U/L (ref 30–99)
ALT SERPL-CCNC: 42 U/L (ref 2–50)
ANION GAP SERPL CALC-SCNC: 12 MMOL/L (ref 0–11.9)
ANISOCYTOSIS BLD QL SMEAR: ABNORMAL
AST SERPL-CCNC: 91 U/L (ref 12–45)
BASOPHILS # BLD AUTO: 0 % (ref 0–1.8)
BASOPHILS # BLD: 0 K/UL (ref 0–0.12)
BILIRUB SERPL-MCNC: 1.3 MG/DL (ref 0.1–1.5)
BUN SERPL-MCNC: 12 MG/DL (ref 8–22)
CALCIUM SERPL-MCNC: 9.2 MG/DL (ref 8.5–10.5)
CHLORIDE SERPL-SCNC: 105 MMOL/L (ref 96–112)
CO2 SERPL-SCNC: 22 MMOL/L (ref 20–33)
CREAT SERPL-MCNC: 0.62 MG/DL (ref 0.5–1.4)
EOSINOPHIL # BLD AUTO: 0 K/UL (ref 0–0.51)
EOSINOPHIL NFR BLD: 0 % (ref 0–6.9)
ERYTHROCYTE [DISTWIDTH] IN BLOOD BY AUTOMATED COUNT: 55.3 FL (ref 35.9–50)
GFR SERPL CREATININE-BSD FRML MDRD: >60 ML/MIN/1.73 M 2
GLOBULIN SER CALC-MCNC: 2.5 G/DL (ref 1.9–3.5)
GLUCOSE SERPL-MCNC: 154 MG/DL (ref 65–99)
HCT VFR BLD AUTO: 34 % (ref 42–52)
HGB BLD-MCNC: 9.9 G/DL (ref 14–18)
LDH SERPL-CCNC: 213 U/L (ref 107–266)
LYMPHOCYTES # BLD AUTO: 20.96 K/UL (ref 1–4.8)
LYMPHOCYTES NFR BLD: 84.5 % (ref 22–41)
MACROCYTES BLD QL SMEAR: ABNORMAL
MANUAL DIFF BLD: NORMAL
MCH RBC QN AUTO: 25.6 PG (ref 27–33)
MCHC RBC AUTO-ENTMCNC: 29.1 G/DL (ref 33.7–35.3)
MCV RBC AUTO: 87.9 FL (ref 81.4–97.8)
MONOCYTES # BLD AUTO: 0.84 K/UL (ref 0–0.85)
MONOCYTES NFR BLD AUTO: 3.4 % (ref 0–13.4)
MORPHOLOGY BLD-IMP: NORMAL
NEUTROPHILS # BLD AUTO: 3 K/UL (ref 1.82–7.42)
NEUTROPHILS NFR BLD: 12.1 % (ref 44–72)
NRBC # BLD AUTO: 0 K/UL
NRBC BLD AUTO-RTO: 0 /100 WBC
OVALOCYTES BLD QL SMEAR: NORMAL
PLATELET # BLD AUTO: 112 K/UL (ref 164–446)
PLATELET BLD QL SMEAR: NORMAL
PMV BLD AUTO: 12 FL (ref 9–12.9)
POIKILOCYTOSIS BLD QL SMEAR: NORMAL
POTASSIUM SERPL-SCNC: 3.6 MMOL/L (ref 3.6–5.5)
PROT SERPL-MCNC: 6.4 G/DL (ref 6–8.2)
RBC # BLD AUTO: 3.87 M/UL (ref 4.7–6.1)
RBC BLD AUTO: PRESENT
SMUDGE CELLS BLD QL SMEAR: NORMAL
SODIUM SERPL-SCNC: 139 MMOL/L (ref 135–145)
WBC # BLD AUTO: 24.8 K/UL (ref 4.8–10.8)

## 2017-08-10 PROCEDURE — 85007 BL SMEAR W/DIFF WBC COUNT: CPT

## 2017-08-10 PROCEDURE — 36415 COLL VENOUS BLD VENIPUNCTURE: CPT

## 2017-08-10 PROCEDURE — 83615 LACTATE (LD) (LDH) ENZYME: CPT

## 2017-08-10 PROCEDURE — 80053 COMPREHEN METABOLIC PANEL: CPT

## 2017-08-10 PROCEDURE — 85027 COMPLETE CBC AUTOMATED: CPT

## 2017-08-16 ENCOUNTER — OFFICE VISIT (OUTPATIENT)
Dept: MEDICAL GROUP | Facility: PHYSICIAN GROUP | Age: 67
End: 2017-08-16
Payer: MEDICARE

## 2017-08-16 VITALS
TEMPERATURE: 98.4 F | RESPIRATION RATE: 16 BRPM | OXYGEN SATURATION: 98 % | DIASTOLIC BLOOD PRESSURE: 82 MMHG | HEART RATE: 80 BPM | SYSTOLIC BLOOD PRESSURE: 130 MMHG | HEIGHT: 67 IN | BODY MASS INDEX: 36.1 KG/M2 | WEIGHT: 230 LBS

## 2017-08-16 DIAGNOSIS — I15.2 HYPERTENSION SECONDARY TO ENDOCRINE DISORDERS: ICD-10-CM

## 2017-08-16 DIAGNOSIS — D64.9 ANEMIA, UNSPECIFIED TYPE: ICD-10-CM

## 2017-08-16 DIAGNOSIS — Z12.11 SCREEN FOR COLON CANCER: ICD-10-CM

## 2017-08-16 DIAGNOSIS — C91.10 CLL (CHRONIC LYMPHOCYTIC LEUKEMIA) (HCC): ICD-10-CM

## 2017-08-16 DIAGNOSIS — E11.8 CONTROLLED TYPE 2 DIABETES MELLITUS WITH COMPLICATION, WITHOUT LONG-TERM CURRENT USE OF INSULIN (HCC): ICD-10-CM

## 2017-08-16 PROCEDURE — 99214 OFFICE O/P EST MOD 30 MIN: CPT | Performed by: NURSE PRACTITIONER

## 2017-08-16 NOTE — MR AVS SNAPSHOT
"        Titus Duvall   2017 10:15 AM   Office Visit   MRN: [id number]    Department:  [facility]   Dept Phone:  [phone]    Description:  Male : 1950   Provider:  XAVI Segovia           Reason for Visit     Chronic Care Management follow up     Diabetes           Allergies as of 2017     No Known Allergies      You were diagnosed with     Anemia, unspecified type   [1205317]       Controlled type 2 diabetes mellitus with complication, without long-term current use of insulin (CMS-[facility])   [8056023]       CLL (chronic lymphocytic leukemia) (CMS-[facility])   [934253]       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   [facility] (--)    [address] [place] [address], [address]  [facility] [id number] [phone]            Nov 15, 2017  9:55 AM   Established Patient with XAVI Segovia   [facility]    [address], [address]  [facility] [address] [phone]           You will be receiving a confirmation call a few days before your appointment from our automated call confirmation system.              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 I15.2   9/2/2014 - Present    Iron overload E83.19   11/10/2014 - Present    Tobacco dependence in remission F17.201   11/23/2015 - Present    Skin lesion L98.9   11/23/2015 - Present    Seasonal allergies J30.2   5/4/2016 - Present    Hypokalemia E87.6   6/10/2016 - Present      Health Maintenance        Date Due Completion Dates    DIABETES MONOFILAMENT / LE EXAM 5/26/1951 ---    IMM ZOSTER VACCINE 11/26/2010 ---    IMM PNEUMOCOCCAL 65+ (ADULT) HIGH/HIGHEST RISK SERIES (2 of 2 - PPSV23) 12/9/2016 10/14/2016    URINE ACR / MICROALBUMIN 4/22/2017 4/22/2016, 9/10/2014    IMM INFLUENZA (1) 9/1/2017 10/14/2016, 11/20/2015    A1C SCREENING 10/5/2017 4/5/2017, 10/18/2016, 4/22/2016, 11/23/2015, 9/10/2014    FASTING LIPID PROFILE 10/18/2017 10/18/2016, 4/22/2016, 4/9/2014, 3/5/2014    RETINAL SCREENING 1/17/2018 1/17/2017, 1/17/2017, 1/17/2017    SERUM CREATININE 8/10/2018 8/10/2017, 2/21/2017, 10/18/2016, 6/3/2016, 4/28/2016, 4/22/2016, 2/19/2016, 11/17/2015, 6/8/2015, 3/24/2015, 12/11/2014, 9/10/2014, 7/15/2014, 4/9/2014, 3/5/2014    COLONOSCOPY 9/23/2018 9/23/2008    IMM DTaP/Tdap/Td Vaccine (2 - Td) 1/28/2026 1/28/2016            Current Immunizations     13-VALENT PCV PREVNAR 10/14/2016    Influenza Vaccine Adult HD 10/14/2016    Influenza Vaccine Quad Inj (Preserved) 11/20/2015    Tdap Vaccine 1/28/2016      Below and/or attached are the medications your provider expects you to take. Review all of your home medications and newly ordered medications with your provider and/or pharmacist. Follow medication instructions as directed by your provider and/or pharmacist. Please keep your medication list with you and share with your provider. Update the information when medications are discontinued, doses are changed, or new medications (including over-the-counter products) are  added; and carry medication information at all times in the event of emergency situations     Allergies:  No Known Allergies          Medications  Valid as of: August 16, 2017 - 10:44 AM    Generic Name Brand Name Tablet Size Instructions for use    Cetirizine HCl   Take  by mouth.        Fluticasone Propionate (Suspension) FLONASE 50 MCG/ACT Spray 1 Spray in nose 2 times a day.        HydroCHLOROthiazide (Tab) HYDRODIURIL 12.5 MG TAKE ONE TABLET BY MOUTH DAILY        Losartan Potassium (Tab) COZAAR 25 MG TAKE ONE TABLET BY MOUTH DAILY        MetFORMIN HCl (TABLET SR 24 HR) GLUCOPHAGE  MG TAKE TWO TABLETS BY MOUTH DAILY        Potassium Chloride (Tab CR) KLOR-CON 10 MEQ TAKE ONE TABLET BY MOUTH TWICE A DAY        .                 Medicines prescribed today were sent to:     [facility][id number] - [place] - [address]    [address] [place] [address]    Phone: [phone] Fax: [phone]    Open 24 Hours?: No      Medication refill instructions:       If your prescription bottle indicates you have medication refills left, it is not necessary to call your provider’s office. Please contact your pharmacy and they will refill your medication.    If your prescription bottle indicates you do not have any refills left, you may request refills at any time through one of the following ways: The online [employer] system (except Urgent Care), by calling your provider’s office, or by asking your pharmacy to contact your provider’s office with a refill request. Medication refills are processed only during regular business hours and may not be available until the next business day. Your provider may request additional information or to have a follow-up visit with you prior to refilling your medication.   *Please Note: Medication refills are assigned a new Rx number when refilled electronically. Your pharmacy may indicate that no refills were authorized even though a new prescription for the same medication is available  at the pharmacy. Please request the medicine by name with the pharmacy before contacting your provider for a refill.        Your To Do List     Future Labs/Procedures Complete By Expires    CBC WITH DIFFERENTIAL  As directed 8/17/2018    FERRITIN  As directed 8/17/2018    HEMOGLOBIN A1C  As directed 8/17/2018    IRON/TOTAL IRON BIND  As directed 8/17/2018    MICROALBUMIN CREAT RATIO URINE  As directed 8/17/2018    URINALYSIS,CULTURE IF INDICATED  As directed 8/17/2018      Referral     A referral request has been sent to our patient care coordination department. Please allow 3-5 business days for us to process this request and contact you either by phone or mail. If you do not hear from us by the 5th business day, please call us at [phone].           [employer] Access Code: Activation code not generated  Current [employer] Status: Active

## 2017-08-16 NOTE — PROGRESS NOTES
Subjective:     Chief Complaint   Patient presents with   • Chronic Care Management     follow up    • Diabetes     Titus Duvall is a 66 y.o. male here today for evaluation and management of issues listed below.    Notified of recent CBC with new anemia. We reviewed results today.   Has noticed some dark stools lately 3-4 times in the last 2 months but normal lately. Thought it might be due to some constipation he was having. No pauline blood. Last colonoscopy done in 2008, was told he should f/u in 8 years, review of documents in EPIC did not indicate recommended follow up but one polyp removed was tubular adenoma from ascending colon.       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 Has been limiting carbs and losing weight. Still walking 1/2 mile daily. Managed with metformin 500 mg xr once per day.    CLL (chronic lymphocytic leukemia) (CMS-HCC)  Diagnosed in June 2014, was treated by Dr. Lang, then followed by Dr. Glass every 4 months.   Followed by oncology. Next appt to establish with new oncologist later this month.     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  Stopped both medications because blood pressure readings were low, /82 today and has been low at home since stopping medications about 5 days ago. Last reading at home 117/72      1. Controlled type 2 diabetes mellitus with complication, without long-term current use of insulin (CMS-HCC)    2. 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 Anemia, unspecified type    4. CLL (chronic lymphocytic leukemia) (CMS-HCC)    5. Screen for colon cancer        Allergies: Review of patient's allergies indicates no known allergies.  Current medicines (including changes today)  Current Outpatient Prescriptions   Medication Sig Dispense Refill   • metformin ER (GLUCOPHAGE XR) 500 MG TABLET SR 24 HR TAKE  "TWO TABLETS BY MOUTH DAILY 180 Tab 0   • potassium chloride ER (KLOR-CON) 10 MEQ tablet TAKE ONE TABLET BY MOUTH TWICE A  Tab 1   • Cetirizine HCl (ZYRTEC ALLERGY PO) Take  by mouth.     • fluticasone (FLONASE ALLERGY RELIEF) 50 MCG/ACT nasal spray Spray 1 Spray in nose 2 times a day. 1 Bottle 0   • losartan (COZAAR) 25 MG Tab TAKE ONE TABLET BY MOUTH DAILY 90 Tab 1   • hydrochlorothiazide (HYDRODIURIL) 12.5 MG tablet TAKE ONE TABLET BY MOUTH DAILY 90 Tab 1     No current facility-administered medications for this visit.       He  has a past medical history of Hypertension; Cancer (CMS-HCC) (2004); CMV (cytomegalovirus infection) (CMS-HCC) (1990); and CLL (chronic lymphocytic leukemia) (CMS-HCC). He also has no past medical history of Hyperlipidemia, Heart attack (CMS-HCC), Kidney disease, or Thyroid disease.    ROS  Denies HA, chest pain, shortness of breath, abdominal pain, bladder or bowel changes, lower extremity edema.    Health Maintenance: Reviewed and updated.      Objective:   Blood pressure 130/82, pulse 80, temperature 36.9 °C (98.4 °F), resp. rate 16, height 1.702 m (5' 7.01\"), weight 104.327 kg (230 lb), SpO2 98 %. Body mass index is 36.01 kg/(m^2).  Physical Exam   Alert, no acute distress. Pleasant and cooperative with the examination.  Eye contact is good, affect calm.  Skin: Warm, dry, no rashes in visible areas.    Eyes: Pupils equal, round. Conjunctiva and sclera clear, lids normal.  ENT: Pinna normal. Neck: Supple, trachea midline.  Lungs: Normal effort and respirations.CV: Regular rate  MS/Ext: Steady gait, no edema.    Results reviewed  Labs, colonoscopy report.   Assessment and Plan:   Treatment Changes:   Titus was seen today for chronic care management and diabetes.    Diagnoses and all orders for this visit:    Controlled type 2 diabetes mellitus with complication, without long-term current use of insulin (CMS-HCC)  Comments:  He'll continue current medications and have labs done " today.  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 He'll follow up with any additional concerns.    Anemia, unspecified type  Comments:  New. Additional testing ordered. He'll be notified of results and results will be sent to oncology.  Orders:  -     CBC WITH DIFFERENTIAL; Future  -     IRON/TOTAL IRON BIND; Future  -     FERRITIN; Future  -     REFERRAL TO GASTROENTEROLOGY  -     URINALYSIS,CULTURE IF INDICATED; Future    CLL (chronic lymphocytic leukemia) (CMS-HCC)  Comments:  He will follow-up as scheduled to establish with new oncologist.    Screen for colon cancer  Comments:  Reviewed colonoscopy from 2008. Referred for evaluation today.  Orders:  -     REFERRAL TO GASTROENTEROLOGY      Differential diagnosis, natural history, treatment options, supportive care, and indications for immediate follow-up discussed at length.     Followup: Return in about 3 months (around 11/16/2017). Sooner should new symptoms or problems arise, or as previously scheduled.         Reviewed side effects, risks, and benefits of medications prescribed today.  Advised to take all medications as instructed and report any side effects.   The patient voices understanding and agrees.  Report any new or worsening symptoms.  Have labs or other diagnostic studies prior to follow up.  Keep all appointments for any referrals given.    Please note this dictation was created using voice recognition software. Every reasonable attempt has been made to correct obvious errors, however there may be errors of grammar and possibly content that were not discovered before finalizing the note.

## 2017-08-17 ENCOUNTER — HOSPITAL ENCOUNTER (OUTPATIENT)
Dept: LAB | Facility: MEDICAL CENTER | Age: 67
End: 2017-08-17
Attending: NURSE PRACTITIONER
Payer: MEDICARE

## 2017-08-17 DIAGNOSIS — D64.9 ANEMIA, UNSPECIFIED TYPE: ICD-10-CM

## 2017-08-17 DIAGNOSIS — E11.8 CONTROLLED TYPE 2 DIABETES MELLITUS WITH COMPLICATION, WITHOUT LONG-TERM CURRENT USE OF INSULIN (HCC): ICD-10-CM

## 2017-08-17 LAB
AMORPH CRY #/AREA URNS HPF: PRESENT /HPF
ANISOCYTOSIS BLD QL SMEAR: ABNORMAL
APPEARANCE UR: CLEAR
BACTERIA #/AREA URNS HPF: ABNORMAL /HPF
BASOPHILS # BLD AUTO: 0.8 % (ref 0–1.8)
BASOPHILS # BLD: 0.24 K/UL (ref 0–0.12)
BILIRUB UR QL STRIP.AUTO: ABNORMAL
COLOR UR: YELLOW
CREAT UR-MCNC: 248.3 MG/DL
CULTURE IF INDICATED INDCX: YES UA CULTURE
EOSINOPHIL # BLD AUTO: 0 K/UL (ref 0–0.51)
EOSINOPHIL NFR BLD: 0 % (ref 0–6.9)
ERYTHROCYTE [DISTWIDTH] IN BLOOD BY AUTOMATED COUNT: 54.1 FL (ref 35.9–50)
EST. AVERAGE GLUCOSE BLD GHB EST-MCNC: 117 MG/DL
FERRITIN SERPL-MCNC: 7 NG/ML (ref 22–322)
GIANT PLATELETS BLD QL SMEAR: NORMAL
GLUCOSE UR STRIP.AUTO-MCNC: NEGATIVE MG/DL
HBA1C MFR BLD: 5.7 % (ref 0–5.6)
HCT VFR BLD AUTO: 34.6 % (ref 42–52)
HGB BLD-MCNC: 9.8 G/DL (ref 14–18)
IRON SATN MFR SERPL: 5 % (ref 15–55)
IRON SERPL-MCNC: 29 UG/DL (ref 50–180)
KETONES UR STRIP.AUTO-MCNC: ABNORMAL MG/DL
LEUKOCYTE ESTERASE UR QL STRIP.AUTO: NEGATIVE
LYMPHOCYTES # BLD AUTO: 28.82 K/UL (ref 1–4.8)
LYMPHOCYTES NFR BLD: 96.7 % (ref 22–41)
MACROCYTES BLD QL SMEAR: ABNORMAL
MANUAL DIFF BLD: NORMAL
MCH RBC QN AUTO: 24.7 PG (ref 27–33)
MCHC RBC AUTO-ENTMCNC: 28.3 G/DL (ref 33.7–35.3)
MCV RBC AUTO: 87.2 FL (ref 81.4–97.8)
MICRO URNS: ABNORMAL
MICROALBUMIN UR-MCNC: 2.5 MG/DL
MICROALBUMIN/CREAT UR: 10 MG/G (ref 0–30)
MICROCYTES BLD QL SMEAR: ABNORMAL
MONOCYTES # BLD AUTO: 0 K/UL (ref 0–0.85)
MONOCYTES NFR BLD AUTO: 0 % (ref 0–13.4)
MORPHOLOGY BLD-IMP: NORMAL
MUCOUS THREADS #/AREA URNS HPF: ABNORMAL /HPF
NEUTROPHILS # BLD AUTO: 0.75 K/UL (ref 1.82–7.42)
NEUTROPHILS NFR BLD: 2.5 % (ref 44–72)
NITRITE UR QL STRIP.AUTO: NEGATIVE
NRBC # BLD AUTO: 0 K/UL
NRBC BLD AUTO-RTO: 0 /100 WBC
OVALOCYTES BLD QL SMEAR: NORMAL
PH UR STRIP.AUTO: 8 [PH]
PLATELET # BLD AUTO: 91 K/UL (ref 164–446)
PLATELET BLD QL SMEAR: NORMAL
PMV BLD AUTO: 10.7 FL (ref 9–12.9)
PROT UR QL STRIP: 100 MG/DL
RBC # BLD AUTO: 3.97 M/UL (ref 4.7–6.1)
RBC # URNS HPF: ABNORMAL /HPF
RBC BLD AUTO: PRESENT
RBC UR QL AUTO: NEGATIVE
SP GR UR STRIP.AUTO: 1
TIBC SERPL-MCNC: 584 UG/DL (ref 250–450)
UROBILINOGEN UR STRIP.AUTO-MCNC: 4 MG/DL
VARIANT LYMPHS BLD QL SMEAR: NORMAL
WBC # BLD AUTO: 29.8 K/UL (ref 4.8–10.8)
WBC #/AREA URNS HPF: ABNORMAL /HPF

## 2017-08-17 PROCEDURE — 85027 COMPLETE CBC AUTOMATED: CPT

## 2017-08-17 PROCEDURE — 82570 ASSAY OF URINE CREATININE: CPT

## 2017-08-17 PROCEDURE — 82728 ASSAY OF FERRITIN: CPT

## 2017-08-17 PROCEDURE — 85007 BL SMEAR W/DIFF WBC COUNT: CPT

## 2017-08-17 PROCEDURE — 83540 ASSAY OF IRON: CPT

## 2017-08-17 PROCEDURE — 82043 UR ALBUMIN QUANTITATIVE: CPT

## 2017-08-17 PROCEDURE — 83550 IRON BINDING TEST: CPT

## 2017-08-17 PROCEDURE — 83036 HEMOGLOBIN GLYCOSYLATED A1C: CPT | Mod: GA

## 2017-08-17 PROCEDURE — 36415 COLL VENOUS BLD VENIPUNCTURE: CPT

## 2017-08-17 PROCEDURE — 81001 URINALYSIS AUTO W/SCOPE: CPT

## 2017-08-17 PROCEDURE — 87086 URINE CULTURE/COLONY COUNT: CPT

## 2017-08-20 LAB
BACTERIA UR CULT: NORMAL
SIGNIFICANT IND 70042: NORMAL
SOURCE SOURCE: NORMAL

## 2017-08-29 ENCOUNTER — TELEPHONE (OUTPATIENT)
Dept: HEMATOLOGY ONCOLOGY | Facility: MEDICAL CENTER | Age: 67
End: 2017-08-29

## 2017-08-29 ENCOUNTER — OFFICE VISIT (OUTPATIENT)
Dept: HEMATOLOGY ONCOLOGY | Facility: MEDICAL CENTER | Age: 67
End: 2017-08-29
Payer: MEDICARE

## 2017-08-29 VITALS
DIASTOLIC BLOOD PRESSURE: 74 MMHG | OXYGEN SATURATION: 97 % | HEART RATE: 77 BPM | TEMPERATURE: 98.5 F | WEIGHT: 230.16 LBS | BODY MASS INDEX: 34.88 KG/M2 | SYSTOLIC BLOOD PRESSURE: 130 MMHG | HEIGHT: 68 IN

## 2017-08-29 DIAGNOSIS — C91.10 CLL (CHRONIC LYMPHOCYTIC LEUKEMIA) (HCC): ICD-10-CM

## 2017-08-29 PROCEDURE — 99215 OFFICE O/P EST HI 40 MIN: CPT | Performed by: INTERNAL MEDICINE

## 2017-08-29 ASSESSMENT — PAIN SCALES - GENERAL: PAINLEVEL: NO PAIN

## 2017-08-29 NOTE — PROGRESS NOTES
Consult Note: Hematology    Date of consultation: 8/29/2017 11:09 AM      Reason for consultation:   CC: CLL    History of present illness   2004. Screening PSA elevated. Found to have prostate cancer Dr. Ramírez, urologist, treated with brachytherapy at [place]. Followed with observation and serial PSA  March, 2014. Noted to have elevated WBC count  July 15, 2014. Flow cytometry shows CD5 positive B-cell lymphoproliferative disorder with a phenotype most consistent with chronic lymphocytic leukemia or small lymphocytic lymphoma  July 26, 2014. CT chest, abdomen and pelvis showed Hepatosplenomegaly. Slightly enlarged lymph nodes in the subcarinal region and right cardiophrenic angle.  December 11, 2014. Negative for JAK2-V612 mutation  January 4, 2016 ultrasound of the abdomen showed Cirrhosis with portal hypertension.Associated dilated portal vein and splenomegaly.No evidence for hepatoma  February 21,2017. PSA is 0.07  August 10, 2017. Patient was seen for tarry stools and was found to have severe anemia  August 17, 2017. Ferritin 7  August 23, 2017 seen by gastroenterology, recommended for upper and lower endoscopy    Old records summarized as above.    Interval history  Titus Duvall  is a 66 y.o. year old male who is followed in the clinic for CLL, history of prostate cancer and polycythemia. The patient has been in usual state of health until July of this year when he noted black tarry stools for about a week. He had a labs done in August which showed anemia. He has been referred to gastroenterology and was seen on August 23. He has not had any further episodes of tarry stools and his hemoglobin has stabilized. Iron profile done shows severe iron deficiency, bilirubin was not elevated. In the anemia is thought to be secondary to GI bleed and not a consequence of CLL.    CLL  Location, blood   Severity, Odom stage 0  Timing, constant  Modifying factors, none  Quality, lymphocytic leukemia  Duration,  diagnosed March 2014  Context, discovered on routine labs showing elevated WBC count  Associated factors, not associated with any fatigue, weight loss or night sweats.      Past Medical History:    Past Medical History:   Diagnosis Date   • Cancer (CMS-HCC) 2004    Prostate - did brachytherapy at [place]   • CMV (cytomegalovirus infection) (CMS-HCC) 1990    Treated for 6 weeks   • CLL (chronic lymphocytic leukemia) (CMS-HCC)    • Hypertension        Past surgical history:  No past surgical history on file.    Allergies:  Review of patient's allergies indicates no known allergies.    Medications:    Current Outpatient Prescriptions   Medication Sig Dispense Refill   • metformin ER (GLUCOPHAGE XR) 500 MG TABLET SR 24 HR TAKE TWO TABLETS BY MOUTH DAILY 180 Tab 0   • potassium chloride ER (KLOR-CON) 10 MEQ tablet TAKE ONE TABLET BY MOUTH TWICE A  Tab 1   • Cetirizine HCl (ZYRTEC ALLERGY PO) Take  by mouth.     • fluticasone (FLONASE ALLERGY RELIEF) 50 MCG/ACT nasal spray Spray 1 Spray in nose 2 times a day. 1 Bottle 0   • losartan (COZAAR) 25 MG Tab TAKE ONE TABLET BY MOUTH DAILY 90 Tab 1   • hydrochlorothiazide (HYDRODIURIL) 12.5 MG tablet TAKE ONE TABLET BY MOUTH DAILY 90 Tab 1     No current facility-administered medications for this visit.        Social History:     Social History     Social History   • Marital status:      Spouse name: N/A   • Number of children: 1   • Years of education: N/A     Occupational History   • fertilizer sales      Social History Main Topics   • Smoking status: Never Smoker   • Smokeless tobacco: Former User     Types: Snuff     Quit date: 3/29/2015   • Alcohol use 3.0 oz/week     2 Cans of beer, 4 Shots of liquor per week      Comment: 4 oz daily and 1-2 beers   • Drug use: No   • Sexual activity: No     Other Topics Concern   • Not on file     Social History Narrative    No known exposure to asbestos, dyes, or chemicals, however he was exposed to pesticides.  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  1 child, alive and well.  He also has a step-child.        Family History:     Family History   Problem Relation Age of Onset   • Cancer Father 81     Bladder   • Cancer Brother      Prostate & CLL (s/p 8-10 years ago)   • Hyperlipidemia Sister    • Lung Disease Neg Hx    • Diabetes Neg Hx    • Heart Disease Neg Hx    • Hypertension Neg Hx    • Stroke Neg Hx    • Alcohol/Drug Neg Hx        Review of Systems:  Constitutional: No fever, chills, weight loss ,malaise/fatigue.      All other review of systems are negative except what was mentioned above in the HPI.    Physical Exam:  Vitals:   /74   Pulse 77   Temp 36.9 °C (98.5 °F)   Ht 1.727 m (5' 8\")   Wt 104.4 kg (230 lb 2.6 oz)   SpO2 97%   BMI 35.00 kg/m²     General: Not in acute distress, alert and oriented x 3  HEENT: No pallor, icterus. Oropharynx clear.   Neck: Supple, no palpable masses.  Lymph nodes: No palpable cervical, supraclavicular, axillary or inguinal lymphadenopathy.    CVS: regular rate and rhythm, no rubs or gallops  RESP: Clear to auscultate bilaterally, no wheezing or crackles.   ABD: Soft, non tender, non distended, positive bowel sounds, no palpable organomegaly  EXT: No edema or cyanosis  CNS: Alert and oriented x3, No focal deficits.  Skin- No rash      Labs:   Results for VAUGHN KAYE (MRN [id number]) as of 8/29/2017 16:46   8/10/2017 13:43 8/17/2017 10:38   WBC 24.8 (H) 29.8 (H)   RBC 3.87 (L) 3.97 (L)   Hemoglobin 9.9 (L) 9.8 (L)   Hematocrit 34.0 (L) 34.6 (L)   MCV 87.9 87.2   MCH 25.6 (L) 24.7 (L)   MCHC 29.1 (L) 28.3 (L)   RDW 55.3 (H) 54.1 (H)   Platelet Count 112 (L) 91 (L)   MPV 12.0 10.7   Neutrophils-Polys 12.10 (L) 2.50 (L)   Neutrophils (Absolute) 3.00 0.75 (L)   Lymphocytes 84.50 (H) 96.70 (H)   Lymphs (Absolute) 20.96 (H) 28.82 (H)   Monocytes 3.40 0.00   Monos (Absolute) 0.84 0.00   Eosinophils 0.00 0.00   Eos " (Absolute) 0.00 0.00   Basophils 0.00 0.80   Baso (Absolute) 0.00 0.24 (H)   Nucleated RBC 0.00 0.00   NRBC (Absolute) 0.00 0.00   Plt Estimation Decreased Decreased   Giant Platelets  1+   RBC Morphology Present Present   Anisocytosis 1+ 1+   Macrocytosis 1+ 1+   Microcytosis  1+   Poikilocytosis 1+    Ovalocytes 1+ 1+   Reactive Lymphocytes  Few   Smudge Cells Many    Peripheral Smear Review see below see below   Manual Diff Status PERFORMED PERFORMED         Assessment and Plan:  -CLL  -Odom Stage 0  -Diagnosed in 3/2014.   -He denies any B symptoms.   -His repeat labs showed a slight elevation in his white count.   -He is continuing on observation.    -Anemia  -Patient actually has a history of Polycythemia which is LYLE 2 V617F mutation negative  -Anemia is secondary to blood loss, most likely upper GI  -Bilirubin is stable, do not suspect it is due to hemolytic anemia of CLL  -Labs show iron deficiency, patient is not tolerating oral iron. We will schedule for IV iron infusion.  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 He continues to drink about a pint of sumit a day. Not interested in cutting down or quitting at this time    -Thrombocytopenia  -Stable, secondary to alcohol abuse and cirrhosis    -Prostate cancer  -Diagnosed in 2004  -Treated by brachytherapy.   -He has been on observation. Repeat PSAs have been low        He agreed and verbalized his agreement and understanding with the current plan.  I answered all questions and concerns he has at this time.      All labs and/or imaging studies mentioned in the note above were reviewed with and explained to the patient as a pertain to medical decision making.    Please note that this  dictation was created using voice recognition software. I have made every reasonable attempt to correct obvious errors, but I expect that there are errors of grammar and possibly content that I did not discover before finalizing the note.      SIGNATURES:  Doris Hooper    CC:  XAVI Segovia

## 2017-08-29 NOTE — TELEPHONE ENCOUNTER
Called patient regarding his iron dextran appointment that is scheduled for Tuesday the 12 th of September at 0800. Patient agreed with the appointment.

## 2017-08-30 ENCOUNTER — HOSPITAL ENCOUNTER (OUTPATIENT)
Dept: RADIOLOGY | Facility: MEDICAL CENTER | Age: 67
End: 2017-08-30
Attending: INTERNAL MEDICINE
Payer: MEDICARE

## 2017-08-30 DIAGNOSIS — K70.30 ALCOHOLIC CIRRHOSIS OF LIVER WITHOUT ASCITES (HCC): ICD-10-CM

## 2017-08-30 PROCEDURE — 76700 US EXAM ABDOM COMPLETE: CPT

## 2017-09-06 ENCOUNTER — HOSPITAL ENCOUNTER (OUTPATIENT)
Dept: LAB | Facility: MEDICAL CENTER | Age: 67
End: 2017-09-06
Attending: INTERNAL MEDICINE
Payer: MEDICARE

## 2017-09-08 ENCOUNTER — HOSPITAL ENCOUNTER (OUTPATIENT)
Dept: LAB | Facility: MEDICAL CENTER | Age: 67
End: 2017-09-08
Attending: INTERNAL MEDICINE
Payer: MEDICARE

## 2017-09-08 LAB
25(OH)D3 SERPL-MCNC: 10 NG/ML (ref 30–100)
AMMONIA PLAS-SCNC: 47 UMOL/L (ref 11–45)
FOLATE SERPL-MCNC: 12.1 NG/ML
HAV IGM SERPL QL IA: NEGATIVE
HBV CORE AB SERPL QL IA: NEGATIVE
HBV SURFACE AB SERPL IA-ACNC: <3.1 MIU/ML (ref 0–10)
HBV SURFACE AG SER QL: NEGATIVE
HCV AB SER QL: NEGATIVE
INR PPP: 1.33 (ref 0.87–1.13)
PROTHROMBIN TIME: 16.9 SEC (ref 12–14.6)
VIT B12 SERPL-MCNC: 486 PG/ML (ref 211–911)

## 2017-09-08 PROCEDURE — 82784 ASSAY IGA/IGD/IGG/IGM EACH: CPT

## 2017-09-08 PROCEDURE — 82105 ALPHA-FETOPROTEIN SERUM: CPT

## 2017-09-08 PROCEDURE — 85610 PROTHROMBIN TIME: CPT

## 2017-09-08 PROCEDURE — 84425 ASSAY OF VITAMIN B-1: CPT

## 2017-09-08 PROCEDURE — 82306 VITAMIN D 25 HYDROXY: CPT

## 2017-09-08 PROCEDURE — 36415 COLL VENOUS BLD VENIPUNCTURE: CPT

## 2017-09-08 PROCEDURE — 86803 HEPATITIS C AB TEST: CPT

## 2017-09-08 PROCEDURE — 84630 ASSAY OF ZINC: CPT

## 2017-09-08 PROCEDURE — 82140 ASSAY OF AMMONIA: CPT

## 2017-09-08 PROCEDURE — 86704 HEP B CORE ANTIBODY TOTAL: CPT

## 2017-09-08 PROCEDURE — 82746 ASSAY OF FOLIC ACID SERUM: CPT

## 2017-09-08 PROCEDURE — 83516 IMMUNOASSAY NONANTIBODY: CPT

## 2017-09-08 PROCEDURE — 82607 VITAMIN B-12: CPT

## 2017-09-08 PROCEDURE — 84590 ASSAY OF VITAMIN A: CPT

## 2017-09-08 PROCEDURE — 87340 HEPATITIS B SURFACE AG IA: CPT

## 2017-09-08 PROCEDURE — 86706 HEP B SURFACE ANTIBODY: CPT

## 2017-09-08 PROCEDURE — 86709 HEPATITIS A IGM ANTIBODY: CPT

## 2017-09-10 LAB
AFP-TM SERPL-MCNC: 7 NG/ML (ref 0–9)
IGA SERPL-MCNC: 191 MG/DL (ref 68–408)
TTG IGA SER IA-ACNC: 1 U/ML (ref 0–3)

## 2017-09-11 ENCOUNTER — TELEPHONE (OUTPATIENT)
Dept: MEDICAL GROUP | Facility: PHYSICIAN GROUP | Age: 67
End: 2017-09-11

## 2017-09-11 LAB
ANNOTATION COMMENT IMP: ABNORMAL
RETINYL PALMITATE SERPL-MCNC: <0.02 MG/L (ref 0–0.1)
VIT A SERPL-MCNC: 0.28 MG/L (ref 0.3–1.2)

## 2017-09-11 NOTE — TELEPHONE ENCOUNTER
Pt calls in wondering if the provider had received his [employer] message for her. Pt states that he is supposed to have some sort of iron transfusion tomorrow and had questions about that. I apologized for the delay and let the pt know I would resend his message.

## 2017-09-12 ENCOUNTER — OUTPATIENT INFUSION SERVICES (OUTPATIENT)
Dept: ONCOLOGY | Facility: MEDICAL CENTER | Age: 67
End: 2017-09-12
Attending: INTERNAL MEDICINE
Payer: MEDICARE

## 2017-09-12 VITALS
OXYGEN SATURATION: 99 % | WEIGHT: 227.96 LBS | TEMPERATURE: 98.1 F | SYSTOLIC BLOOD PRESSURE: 137 MMHG | HEIGHT: 68 IN | RESPIRATION RATE: 18 BRPM | BODY MASS INDEX: 34.55 KG/M2 | HEART RATE: 71 BPM | DIASTOLIC BLOOD PRESSURE: 61 MMHG

## 2017-09-12 LAB
HCT VFR BLD CALC: 36 % (ref 42–52)
HGB BLD-MCNC: 12.2 G/DL (ref 14–18)
VIT B1 BLD-MCNC: 80 NMOL/L (ref 70–180)

## 2017-09-12 PROCEDURE — A9270 NON-COVERED ITEM OR SERVICE: HCPCS | Performed by: INTERNAL MEDICINE

## 2017-09-12 PROCEDURE — 36415 COLL VENOUS BLD VENIPUNCTURE: CPT

## 2017-09-12 PROCEDURE — 96365 THER/PROPH/DIAG IV INF INIT: CPT

## 2017-09-12 PROCEDURE — 700105 HCHG RX REV CODE 258: Performed by: INTERNAL MEDICINE

## 2017-09-12 PROCEDURE — 96366 THER/PROPH/DIAG IV INF ADDON: CPT

## 2017-09-12 PROCEDURE — 306780 HCHG STAT FOR TRANSFUSION PER CASE

## 2017-09-12 PROCEDURE — 700111 HCHG RX REV CODE 636 W/ 250 OVERRIDE (IP): Performed by: INTERNAL MEDICINE

## 2017-09-12 PROCEDURE — 700102 HCHG RX REV CODE 250 W/ 637 OVERRIDE(OP): Performed by: INTERNAL MEDICINE

## 2017-09-12 PROCEDURE — 85014 HEMATOCRIT: CPT

## 2017-09-12 RX ORDER — DIPHENHYDRAMINE HCL 25 MG
25 TABLET ORAL ONCE
Status: COMPLETED | OUTPATIENT
Start: 2017-09-12 | End: 2017-09-12

## 2017-09-12 RX ORDER — ACETAMINOPHEN 325 MG/1
650 TABLET ORAL ONCE
Status: COMPLETED | OUTPATIENT
Start: 2017-09-12 | End: 2017-09-12

## 2017-09-12 RX ADMIN — ACETAMINOPHEN 650 MG: 325 TABLET, FILM COATED ORAL at 08:36

## 2017-09-12 RX ADMIN — IRON DEXTRAN 1150 MG: 50 INJECTION INTRAMUSCULAR; INTRAVENOUS at 10:55

## 2017-09-12 RX ADMIN — IRON DEXTRAN 25 MG: 50 INJECTION INTRAMUSCULAR; INTRAVENOUS at 08:53

## 2017-09-12 RX ADMIN — DIPHENHYDRAMINE HCL 25 MG: 25 TABLET ORAL at 08:36

## 2017-09-12 ASSESSMENT — PAIN SCALES - GENERAL: PAINLEVEL: NO PAIN

## 2017-09-12 NOTE — PROGRESS NOTES
IV Iron Per Pharmacy Note    Patient Lean Body Weight:  67 kg  Reason for Iron Replacement: Anemia is secondary to blood loss, most likely upper GI per MD      Istat Hgb = 12.2     Ref. Range 8/17/2017 10:38   Iron Latest Ref Range: 50 - 180 ug/dL 29 (L)   Total Iron Binding Latest Ref Range: 250 - 450 ug/dL 584 (H)   % Saturation Latest Ref Range: 15 - 55 % 5 (L)      Ref. Range 8/17/2017 10:38   Ferritin Latest Ref Range: 22.0 - 322.0 ng/mL 7.0 (L)             Assessment/Plan:  1. IV Iron Indicated.   2. Give Iron Dextran 25 mg IV test dose following diphenhydramine/acetaminophen premeds over 30 minutes per protocol.  3. If no reaction (Anaphylaxis, Hypotension/Hypertension, N/V/D, Chest pain/Back Pain, Urticaria/Pruritis) in the next hour, proceed to full dose. Nursing to call the [facility] at ext. [phone] for full dose.  4. Full dose: Iron Dextran 1150 mg IV over 4 hours. Continue to monitor for delayed ADR including: Arthralgia/myalgia, Headache/backache, chills/dizziness/malaise, moderate to high fever and n/v.      D. Zaina Pharm.D.

## 2017-09-12 NOTE — PROGRESS NOTES
Pt here for Iron, reviewed plan of care, answered all questions.  PIV started, labs drawn.  Premeds and test dose given without issue.  1 hr obs completed.  Full dose iron given over 4 hours.  No s/s of reaction noted.  PIV removed post infusion.  Pt left IC, no s/s of distress.  No further apts at this time.

## 2017-09-13 LAB — ZINC BLD-MCNC: 544.8 UG/DL (ref 440–860)

## 2017-09-13 NOTE — TELEPHONE ENCOUNTER
Called pt [phone] after 6 pm, LVM recommending he have infusion as recommended by oncology and to call in the morning to discuss further.  Requested office note from [facility].  JOSE ANTONIO Segovia.

## 2017-09-20 ENCOUNTER — HOSPITAL ENCOUNTER (OUTPATIENT)
Dept: RADIOLOGY | Facility: MEDICAL CENTER | Age: 67
End: 2017-09-20
Attending: INTERNAL MEDICINE
Payer: MEDICARE

## 2017-09-20 DIAGNOSIS — K92.1 BLOOD IN STOOL: ICD-10-CM

## 2017-09-20 DIAGNOSIS — K70.30 ALCOHOLIC CIRRHOSIS OF LIVER WITHOUT ASCITES (HCC): ICD-10-CM

## 2017-09-20 DIAGNOSIS — D50.9 IRON DEFICIENCY ANEMIA, UNSPECIFIED: ICD-10-CM

## 2017-09-20 PROCEDURE — 700117 HCHG RX CONTRAST REV CODE 255: Performed by: INTERNAL MEDICINE

## 2017-09-20 PROCEDURE — 74183 MRI ABD W/O CNTR FLWD CNTR: CPT

## 2017-09-20 PROCEDURE — A9581 GADOXETATE DISODIUM INJ: HCPCS | Performed by: INTERNAL MEDICINE

## 2017-09-20 RX ADMIN — GADOXETATE DISODIUM 10 ML: 181.43 INJECTION, SOLUTION INTRAVENOUS at 11:04

## 2017-10-28 NOTE — ASSESSMENT & PLAN NOTE
A1c 7 in April 2016 recently in October A1c down to 5.3 and again today. Has been limiting carbs and losing weight. Still walking 1/2 mile daily. Managed with metformin 500 mg xr once per day.   generalized weakness and lethargy, decreased PO intake

## 2017-11-28 RX ORDER — POTASSIUM CHLORIDE 750 MG/1
TABLET, FILM COATED, EXTENDED RELEASE ORAL
Qty: 180 TAB | Refills: 0 | Status: SHIPPED | OUTPATIENT
Start: 2017-11-28 | End: 2018-02-28 | Stop reason: SDUPTHER

## 2017-11-28 NOTE — TELEPHONE ENCOUNTER
Was the patient seen in the last year in this department? Yes     Does patient have an active prescription for medications requested? No     Received Request Via: Pharmacy      Pt met protocol?: Yes    LAST OV 08/16/2017    Lab Results   Component Value Date/Time    SODIUM 139 08/10/2017 01:43 PM    POTASSIUM 3.6 08/10/2017 01:43 PM    CHLORIDE 105 08/10/2017 01:43 PM    CO2 22 08/10/2017 01:43 PM    GLUCOSE 154 (H) 08/10/2017 01:43 PM    BUN 12 08/10/2017 01:43 PM    CREATININE 0.62 08/10/2017 01:43 PM

## 2017-12-04 ENCOUNTER — HOSPITAL ENCOUNTER (OUTPATIENT)
Dept: LAB | Facility: MEDICAL CENTER | Age: 67
End: 2017-12-04
Attending: INTERNAL MEDICINE
Payer: MEDICARE

## 2017-12-04 LAB
ANION GAP SERPL CALC-SCNC: 7 MMOL/L (ref 0–11.9)
ANISOCYTOSIS BLD QL SMEAR: ABNORMAL
BASOPHILS # BLD AUTO: 0.9 % (ref 0–1.8)
BASOPHILS # BLD: 0.33 K/UL (ref 0–0.12)
BUN SERPL-MCNC: 11 MG/DL (ref 8–22)
CALCIUM SERPL-MCNC: 9.7 MG/DL (ref 8.5–10.5)
CHLORIDE SERPL-SCNC: 107 MMOL/L (ref 96–112)
CO2 SERPL-SCNC: 27 MMOL/L (ref 20–33)
CREAT SERPL-MCNC: 0.62 MG/DL (ref 0.5–1.4)
EOSINOPHIL # BLD AUTO: 0.33 K/UL (ref 0–0.51)
EOSINOPHIL NFR BLD: 0.9 % (ref 0–6.9)
ERYTHROCYTE [DISTWIDTH] IN BLOOD BY AUTOMATED COUNT: 54.2 FL (ref 35.9–50)
FERRITIN SERPL-MCNC: 12.2 NG/ML (ref 22–322)
GFR SERPL CREATININE-BSD FRML MDRD: >60 ML/MIN/1.73 M 2
GLUCOSE SERPL-MCNC: 127 MG/DL (ref 65–99)
HCT VFR BLD AUTO: 42.3 % (ref 42–52)
HGB BLD-MCNC: 13.4 G/DL (ref 14–18)
IRON SATN MFR SERPL: 11 % (ref 15–55)
IRON SERPL-MCNC: 66 UG/DL (ref 50–180)
LYMPHOCYTES # BLD AUTO: 28.56 K/UL (ref 1–4.8)
LYMPHOCYTES NFR BLD: 77.2 % (ref 22–41)
MANUAL DIFF BLD: NORMAL
MCH RBC QN AUTO: 27.6 PG (ref 27–33)
MCHC RBC AUTO-ENTMCNC: 31.7 G/DL (ref 33.7–35.3)
MCV RBC AUTO: 87 FL (ref 81.4–97.8)
MICROCYTES BLD QL SMEAR: ABNORMAL
MONOCYTES # BLD AUTO: 0.67 K/UL (ref 0–0.85)
MONOCYTES NFR BLD AUTO: 1.8 % (ref 0–13.4)
MORPHOLOGY BLD-IMP: NORMAL
NEUTROPHILS # BLD AUTO: 3.55 K/UL (ref 1.82–7.42)
NEUTROPHILS NFR BLD: 9.6 % (ref 44–72)
NRBC # BLD AUTO: 0 K/UL
NRBC BLD AUTO-RTO: 0 /100 WBC
PLATELET # BLD AUTO: 81 K/UL (ref 164–446)
PLATELET BLD QL SMEAR: NORMAL
PMV BLD AUTO: 10.6 FL (ref 9–12.9)
POTASSIUM SERPL-SCNC: 4.1 MMOL/L (ref 3.6–5.5)
RBC # BLD AUTO: 4.86 M/UL (ref 4.7–6.1)
SODIUM SERPL-SCNC: 141 MMOL/L (ref 135–145)
TIBC SERPL-MCNC: 577 UG/DL (ref 250–450)
WBC # BLD AUTO: 37 K/UL (ref 4.8–10.8)
WBC OTHER NFR BLD MANUAL: 9.6 %

## 2017-12-04 PROCEDURE — 85007 BL SMEAR W/DIFF WBC COUNT: CPT

## 2017-12-04 PROCEDURE — 80500 HCHG CLINICAL PATH CONSULT-LIMITED: CPT

## 2017-12-04 PROCEDURE — 83540 ASSAY OF IRON: CPT

## 2017-12-04 PROCEDURE — 80048 BASIC METABOLIC PNL TOTAL CA: CPT

## 2017-12-04 PROCEDURE — 83550 IRON BINDING TEST: CPT

## 2017-12-04 PROCEDURE — 82728 ASSAY OF FERRITIN: CPT

## 2017-12-04 PROCEDURE — 36415 COLL VENOUS BLD VENIPUNCTURE: CPT

## 2017-12-04 PROCEDURE — 85027 COMPLETE CBC AUTOMATED: CPT

## 2017-12-04 PROCEDURE — 86708 HEPATITIS A ANTIBODY: CPT

## 2017-12-05 ENCOUNTER — TELEPHONE (OUTPATIENT)
Dept: HEMATOLOGY ONCOLOGY | Facility: MEDICAL CENTER | Age: 67
End: 2017-12-05

## 2017-12-05 LAB
PATH REV: NORMAL
PATH REV: NORMAL

## 2017-12-05 NOTE — TELEPHONE ENCOUNTER
Gil FUENTES: the patient would like to be seen by Dr. Servin. I transferred his care from Dr. Hooper and schedule the patient to be seen on Monday 12/11/2017. Patient agreed.

## 2017-12-06 ENCOUNTER — HOSPITAL ENCOUNTER (OUTPATIENT)
Dept: RADIOLOGY | Facility: MEDICAL CENTER | Age: 67
End: 2017-12-06
Attending: INTERNAL MEDICINE
Payer: MEDICARE

## 2017-12-06 DIAGNOSIS — K70.30 ALCOHOLIC CIRRHOSIS OF LIVER WITHOUT ASCITES (HCC): ICD-10-CM

## 2017-12-06 LAB — HAV AB SER QL IA: POSITIVE

## 2017-12-06 PROCEDURE — 700117 HCHG RX CONTRAST REV CODE 255: Performed by: INTERNAL MEDICINE

## 2017-12-06 PROCEDURE — 74170 CT ABD WO CNTRST FLWD CNTRST: CPT

## 2017-12-06 RX ADMIN — IOHEXOL 100 ML: 350 INJECTION, SOLUTION INTRAVENOUS at 10:00

## 2017-12-08 ENCOUNTER — HOSPITAL ENCOUNTER (OUTPATIENT)
Facility: MEDICAL CENTER | Age: 67
End: 2017-12-08
Attending: INTERNAL MEDICINE
Payer: MEDICARE

## 2017-12-08 LAB — HEMOCCULT STL QL: NEGATIVE

## 2017-12-08 PROCEDURE — 82272 OCCULT BLD FECES 1-3 TESTS: CPT

## 2017-12-11 ENCOUNTER — HOSPITAL ENCOUNTER (OUTPATIENT)
Facility: MEDICAL CENTER | Age: 67
End: 2017-12-11
Attending: INTERNAL MEDICINE
Payer: MEDICARE

## 2017-12-11 ENCOUNTER — OFFICE VISIT (OUTPATIENT)
Dept: HEMATOLOGY ONCOLOGY | Facility: MEDICAL CENTER | Age: 67
End: 2017-12-11
Payer: MEDICARE

## 2017-12-11 ENCOUNTER — APPOINTMENT (OUTPATIENT)
Dept: HEMATOLOGY ONCOLOGY | Facility: MEDICAL CENTER | Age: 67
End: 2017-12-11
Payer: MEDICARE

## 2017-12-11 VITALS
HEART RATE: 66 BPM | WEIGHT: 230.49 LBS | DIASTOLIC BLOOD PRESSURE: 82 MMHG | SYSTOLIC BLOOD PRESSURE: 104 MMHG | RESPIRATION RATE: 18 BRPM | OXYGEN SATURATION: 97 % | HEIGHT: 68 IN | TEMPERATURE: 98.6 F | BODY MASS INDEX: 34.93 KG/M2

## 2017-12-11 DIAGNOSIS — C91.10 CLL (CHRONIC LYMPHOCYTIC LEUKEMIA) (HCC): ICD-10-CM

## 2017-12-11 PROCEDURE — 88275 CYTOGENETICS 100-300: CPT | Mod: 91

## 2017-12-11 PROCEDURE — 99214 OFFICE O/P EST MOD 30 MIN: CPT | Performed by: INTERNAL MEDICINE

## 2017-12-11 PROCEDURE — 88291 CYTO/MOLECULAR REPORT: CPT

## 2017-12-11 PROCEDURE — 88271 CYTOGENETICS DNA PROBE: CPT | Mod: 91

## 2017-12-11 ASSESSMENT — PAIN SCALES - GENERAL: PAINLEVEL: NO PAIN

## 2017-12-11 NOTE — PROGRESS NOTES
Date of visit: 12/11/2017  10:18 AM      Chief Complaint- follow-up of CLL      History of present illness    2004. Screening PSA elevated. Found to have prostate cancer Dr. Ramírez, urologist, treated with brachytherapy at [place]. Followed with observation and serial PSA  March, 2014. Noted to have elevated WBC count  July 15, 2014. Flow cytometry shows CD5 positive B-cell lymphoproliferative disorder with a phenotype most consistent with chronic lymphocytic leukemia or small lymphocytic lymphoma  July 26, 2014. CT chest, abdomen and pelvis showed Hepatosplenomegaly. Slightly enlarged lymph nodes in the subcarinal region and right cardiophrenic angle.  December 11, 2014. Negative for JAK2-V612 mutation  January 4, 2016 ultrasound of the abdomen showed Cirrhosis with portal hypertension.Associated dilated portal vein and splenomegaly.No evidence for hepatoma  - 8/2017-he presented with significant iron deficiency anemia requiring iron infusion. Subsequently had upper and lower endoscopy , these were reportedly normal. He does not remember being informed that he has any esophageal viruses.    -12/6/17-CT abdomen- 1.  2.4 cm arterial enhancing mass in segment 4 of the liver demonstrates washout. LR-5.  1.3 cm arterial enhancing lesion in the hepatic dome is similar to the recent MRI. No definite washout or capsule appreciated. LR-3.  Morphologic characteristics of cirrhosis with evidence of portal hypertension including splenomegaly.      Recent CBC shows increase in WBC count to 37,000. Hemoglobin has recovered and is stable at 13.4. Platelet counts overall stable at 81. Iron percent saturation improved to 11%. He denies having any new B symptoms or lymphadenopathy. His anemia symptoms have improved. He has switched from drinking hard liquor to 5 bottles of beer daily.  Past Medical History:      Past Medical History:   Diagnosis Date   • Cancer (CMS-HCC) 2004    Prostate - did brachytherapy at [place]   •  CLL (chronic lymphocytic leukemia) (CMS-HCC)    • CMV (cytomegalovirus infection) (CMS-HCC) 1990    Treated for 6 weeks   • Hypertension        Past surgical history:     No past surgical history on file.    Allergies:       Patient has no known allergies.    Medications:         Current Outpatient Prescriptions   Medication Sig Dispense Refill   • potassium chloride ER (KLOR-CON) 10 MEQ tablet TAKE ONE TABLET BY MOUTH TWICE A DAY (GENERIC FOR KLOR-CON) 180 Tab 0   • metformin ER (GLUCOPHAGE XR) 500 MG TABLET SR 24 HR TAKE TWO TABLETS BY MOUTH DAILY 180 Tab 0   • Cetirizine HCl (ZYRTEC ALLERGY PO) Take  by mouth.     • fluticasone (FLONASE ALLERGY RELIEF) 50 MCG/ACT nasal spray Spray 1 Spray in nose 2 times a day. 1 Bottle 0     No current facility-administered medications for this visit.          Social History:     Social History     Social History   • Marital status:      Spouse name: N/A   • Number of children: 1   • Years of education: N/A     Occupational History   • fertilizer sales      Social History Main Topics   • Smoking status: Never Smoker   • Smokeless tobacco: Former User     Types: Snuff     Quit date: 3/29/2015   • Alcohol use 3.0 oz/week     2 Cans of beer, 4 Shots of liquor per week      Comment: 4 oz daily and 1-2 beers   • Drug use: No   • Sexual activity: No     Other Topics Concern   • Not on file     Social History Narrative    No known exposure to asbestos, dyes, or chemicals, however he was exposed to pesticides.  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  1 child, alive and well.  He also has a step-child.        Family History:      Family History   Problem Relation Age of Onset   • Cancer Father 81     Bladder   • Cancer Brother      Prostate & CLL (s/p 8-10 years ago)   • Hyperlipidemia Sister    • Lung Disease Neg Hx    • Diabetes Neg Hx    • Heart Disease Neg Hx    • Hypertension Neg Hx    • Stroke Neg Hx    •  "Alcohol/Drug Neg Hx        Review of Systems:  All other review of systems are negative except what was mentioned above in the HPI.    Constitutional: Negative for fever, chills, weight loss. Improved malaise/fatigue.    HEENT: No new auditory or visual complaints. No sore throat and neck pain.     Respiratory: Negative for cough, sputum production, shortness of breath and wheezing.    Cardiovascular: Negative for chest pain, palpitations, orthopnea and leg swelling.    Gastrointestinal: Negative for heartburn, nausea, vomiting and abdominal pain.    Genitourinary: Negative for dysuria, hematuria    Musculoskeletal: No new arthralgias or myalgias   Skin: Negative for rash and itching.    Neurological: Negative for focal weakness and headaches.    Endo/Heme/Allergies: No abnormal bleed/bruise.    Psychiatric/Behavioral: No new depression/anxiety.    Physical Exam:  Vitals: /82   Pulse 66   Temp 37 °C (98.6 °F)   Resp 18   Ht 1.715 m (5' 7.52\")   Wt 104.5 kg (230 lb 7.9 oz)   SpO2 97%   BMI 35.55 kg/m²     General: Not in acute distress, alert and oriented x 3, obese  HEENT: No pallor, icterus. Oropharynx clear.   Neck: Supple, no palpable masses.  Lymph nodes: No palpable cervical, supraclavicular, axillary or inguinal lymphadenopathy.    CVS: regular rate and rhythm, no rubs or gallops  RESP: Clear to auscultate bilaterally, no wheezing or crackles.   ABD: Soft, non tender, non distended, positive bowel sounds, no palpable organomegaly  EXT: No edema or cyanosis  CNS: Alert and oriented x3, No focal deficits.  Skin- No rash      Labs:   Hospital Outpatient Visit on 12/08/2017   Component Date Value Ref Range Status   • Occult Blood Feces 12/08/2017 Negative  Negative Final             Assessment and Plan:      CLL-Odom Stage 0, He is around 4 years out of his diagnosis. Flow cytometry showed CD38 negative phenotype indicating good prognosis. Long discussion with the patient. Reassured him that white " blood count elevation is expected in CLL and unless there is rapid lymphocyte doubling time of less than 6 months or so. This can be observed. Again, discussed the indication of treatment with him including, cytopenias, fatigue, by symptoms, worsening lymphadenopathy causing local symptoms, recurrent infection, hypogammaglobulinemia.    He has chronic, cytopenias, which appears to be secondary to cirrhosis that CLL. He had some fatigue symptoms from iron deficiency, which has improved. Also discussed with them about the option of risk stratification with CLL FISH panel and I will order this. We will hold off on immunoglobulin heavy chain mutation assay at this point. We will continue monitoring his CBC every 4 months and I will see him back in 4 months.      Cirrhosis-appears to be secondary to metabolic syndrome and alcohol use. We will get records from GI. Strongly counseled him to quit alcohol use and he voiced understanding.    Liver mass seen in the CT scan-he is being worked up by GI.    Iron deficiency anemia-probably secondary to occult GI bleed. This is resolved with iron replacement. If he develops recurrent iron deficiency anemia, he will need periodic replacement.    Thrombus cytopenia-this appears to be secondary to splenic sequestration from the cirrhosis than from CLL.    He agreed and verbalized his agreement and understanding with the current plan.  I answered all questions and concerns he has at this time         Please note that this dictation was created using voice recognition software. I have made every reasonable attempt to correct obvious errors, but I expect that there are errors of grammar and possibly content that I did not discover before finalizing the note.      SIGNATURES:  Alfonzo Servin    CC:  [email or web address].  No ref. provider found

## 2017-12-13 ENCOUNTER — OFFICE VISIT (OUTPATIENT)
Dept: MEDICAL GROUP | Facility: PHYSICIAN GROUP | Age: 67
End: 2017-12-13
Payer: MEDICARE

## 2017-12-13 VITALS
TEMPERATURE: 99.1 F | OXYGEN SATURATION: 98 % | HEART RATE: 72 BPM | DIASTOLIC BLOOD PRESSURE: 72 MMHG | SYSTOLIC BLOOD PRESSURE: 140 MMHG | WEIGHT: 229 LBS | BODY MASS INDEX: 34.71 KG/M2 | HEIGHT: 68 IN

## 2017-12-13 DIAGNOSIS — R74.8 ELEVATED LIVER ENZYMES: ICD-10-CM

## 2017-12-13 DIAGNOSIS — I15.2 HYPERTENSION SECONDARY TO ENDOCRINE DISORDERS: ICD-10-CM

## 2017-12-13 DIAGNOSIS — Z23 NEED FOR VACCINATION: ICD-10-CM

## 2017-12-13 DIAGNOSIS — E66.9 OBESITY (BMI 30-39.9): ICD-10-CM

## 2017-12-13 DIAGNOSIS — E11.8 CONTROLLED TYPE 2 DIABETES MELLITUS WITH COMPLICATION, WITHOUT LONG-TERM CURRENT USE OF INSULIN (HCC): ICD-10-CM

## 2017-12-13 DIAGNOSIS — C91.10 CLL (CHRONIC LYMPHOCYTIC LEUKEMIA) (HCC): ICD-10-CM

## 2017-12-13 DIAGNOSIS — E87.6 HYPOKALEMIA: ICD-10-CM

## 2017-12-13 PROCEDURE — 99214 OFFICE O/P EST MOD 30 MIN: CPT | Mod: 25 | Performed by: NURSE PRACTITIONER

## 2017-12-13 PROCEDURE — 90732 PPSV23 VACC 2 YRS+ SUBQ/IM: CPT | Performed by: NURSE PRACTITIONER

## 2017-12-13 PROCEDURE — G0009 ADMIN PNEUMOCOCCAL VACCINE: HCPCS | Performed by: NURSE PRACTITIONER

## 2017-12-13 RX ORDER — DOXYCYCLINE HYCLATE 100 MG
100 TABLET ORAL 2 TIMES DAILY
COMMUNITY
End: 2018-07-09

## 2017-12-13 ASSESSMENT — PATIENT HEALTH QUESTIONNAIRE - PHQ9: CLINICAL INTERPRETATION OF PHQ2 SCORE: 0

## 2017-12-13 NOTE — ASSESSMENT & PLAN NOTE
A1c down to 5.7 on 8.17. Has been limiting carbs and losing weight. Still walking 1/2 mile daily. Managed with metformin 500 mg xr once per day.   FLP within range - LDL 57 no treatment necessary. Due for labs.

## 2017-12-14 NOTE — PROGRESS NOTES
Subjective:     Chief Complaint   Patient presents with   • Immunizations     questions     Sohail Duvall is a 67 y.o. male here today for evaluation and management of issues listed below.    Controlled diabetes mellitus type 2 with complications (CMS-[facility])  A1c down to 5.7 on 8.17. Has been limiting carbs and losing weight. Still walking 1/2 mile daily. Managed with metformin 500 mg xr once per day.   FLP within range - LDL 57 no treatment necessary. Due for labs.    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   He continues to follow with oncology for CLL. He is been evaluated by gastroenterology for elevated liver enzymes and abnormal findings on imaging. There is a hepatic lesion and he continues regular follow-up.  1. Controlled type 2 diabetes mellitus with complication, without long-term current use of insulin (CMS-[facility])    2. 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 Hypokalemia    4. Obesity (BMI 30-39.9)    5. CLL (chronic lymphocytic leukemia) (CMS-[facility])    6. Elevated liver enzymes    7. Need for vaccination        ROS   Denies HA, chest pain, shortness of breath, abdominal pain, bladder or bowel changes, lower extremity edema. All other pertinent systems reviewed and are negative except as in HPI.    Allergies: Patient has no known allergies.  Current medicines (including changes today)  Current Outpatient Prescriptions   Medication Sig Dispense Refill   • vitamin A 8000 UNIT capsule Take 8,000 Units by mouth every day.     • doxycycline (VIBRAMYCIN) 100 MG Tab Take 100 mg by mouth 2 times a day.     • potassium chloride ER (KLOR-CON) 10 MEQ tablet TAKE ONE TABLET BY MOUTH TWICE A DAY (GENERIC FOR KLOR-CON) 180 Tab 0   • metformin ER (GLUCOPHAGE XR) 500 MG TABLET SR 24 HR TAKE TWO TABLETS BY MOUTH DAILY 180 Tab 0   • Cetirizine HCl (ZYRTEC ALLERGY PO) Take  by mouth.     • fluticasone  "(FLONASE ALLERGY RELIEF) 50 MCG/ACT nasal spray Spray 1 Spray in nose 2 times a day. 1 Bottle 0     No current facility-administered medications for this visit.        He  has a past medical history of Cancer (CMS-HCC) (2004); CLL (chronic lymphocytic leukemia) (CMS-HCC); CMV (cytomegalovirus infection) (CMS-HCC) (1990); and Hypertension. He also has no past medical history of Heart attack; Hyperlipidemia; Kidney disease; or Thyroid disease.      Health Maintenance: Reviewed and updated.      Objective:   Blood pressure 140/72, pulse 72, temperature 37.3 °C (99.1 °F), height 1.715 m (5' 7.5\"), weight 103.9 kg (229 lb), SpO2 98 %. Body mass index is 35.34 kg/m².  Physical Exam   Alert, no acute distress. Pleasant and cooperative with the examination.  Eye contact is good, affect calm.  Skin: Warm, dry, no rashes in visible areas.    Eyes: Pupils equal, round. Conjunctiva and sclera clear, lids normal.  ENT: Pinna normal.  Neck: Supple, trachea midline.  Lungs: Normal effort and respirations. Clear to auscultation bilaterally.   Abdomen: No CVAT   CV: Regular rate and rhythm.  MS/Ext: Steady gait, no edema.    Results reviewed  Labs, OV    Assessment and Plan:   Treatment:   Sohail was seen today for immunizations.    Diagnoses and all orders for this visit:    Controlled type 2 diabetes mellitus with complication, without long-term current use of insulin (CMS-HCC)  Comments:  Continue current medications and regular monitoring.  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    Hypokalemia  Comments:  Continue current medications. We'll continue to monitor area    Obesity (BMI 30-39.9)  -     Patient identified as having weight management issue.  Appropriate orders and counseling given.    CLL (chronic lymphocytic leukemia) (CMS-HCC)  Comments:  Continue follow-up with oncology.    Elevated liver " enzymes  Comments:  Counseled regarding alcohol use. Continue follow-up with gastroenterology.    Need for vaccination  -     PNEUMOCOCCAL POLYSACCHARIDE VACCINE 23-VALENT =>1YO SQ/IM    The patient was counseled on the requirements, possible side effects, and future requirements of immunizations for today.  Immunizations given by MA after completion of vaccine screening checklist, under the direction of Dr. Sharon Elliott.    Followup: Return in about 3 months (around 3/13/2018). Sooner should new symptoms or problems arise, or as previously scheduled.           Reviewed side effects, risks, and benefits of medications prescribed today.  Advised to take all medications as instructed and report any side effects.   The patient voices understanding and agrees.  Report any new or worsening symptoms.  Have labs or other diagnostic studies prior to follow up.  Keep all appointments for any referrals given.        Please note this dictation was created using voice recognition software. Every reasonable attempt has been made to correct obvious errors, however there may be errors of grammar and possibly content that were not discovered before finalizing the note.

## 2017-12-18 ENCOUNTER — TELEPHONE (OUTPATIENT)
Dept: HEMATOLOGY ONCOLOGY | Facility: MEDICAL CENTER | Age: 67
End: 2017-12-18

## 2017-12-18 LAB — TEST NAME 95000: NORMAL

## 2017-12-18 RX ORDER — METFORMIN HYDROCHLORIDE 500 MG/1
TABLET, EXTENDED RELEASE ORAL
Qty: 180 TAB | Refills: 1 | Status: SHIPPED | OUTPATIENT
Start: 2017-12-18 | End: 2018-07-09 | Stop reason: SDUPTHER

## 2017-12-18 NOTE — TELEPHONE ENCOUNTER
Was the patient seen in the last year in this department? Yes     Does patient have an active prescription for medications requested? No     Received Request Via: Pharmacy    Pt met protocol?: Yes     Last OV 12/2017  Lab Results   Component Value Date/Time    HBA1C 5.7 (H) 08/17/2017 10:38 AM        Lab Results  Component Value Date/Time   CHOLSTRLTOT 128 10/18/2016 1036   TRIGLYCERIDE 127 10/18/2016 1036   HDL 46 10/18/2016 1036   LDL 57 10/18/2016 1036

## 2017-12-18 NOTE — TELEPHONE ENCOUNTER
Patient has recently been seen by PCP within the last 6 months per protocol (12/17). Will refill medications for 6 months.  Lab Results   Component Value Date/Time    HBA1C 5.7 (H) 08/17/2017 10:38 AM      Lab Results   Component Value Date/Time    MALBCRT 10 08/17/2017 10:38 AM    MICROALBUR 2.5 08/17/2017 10:38 AM      Lab Results   Component Value Date/Time    ALKPHOSPHAT 86 08/10/2017 01:43 PM    ASTSGOT 91 (H) 08/10/2017 01:43 PM    ALTSGPT 42 08/10/2017 01:43 PM    TBILIRUBIN 1.3 08/10/2017 01:43 PM

## 2017-12-18 NOTE — TELEPHONE ENCOUNTER
"Per Dr. Servin called pt and notified him of FISH results. Per Dr. Servin informed him that \"good mutation indicating that his CLL should remain under control long term without needing treatment.\"  Pt was pleased and grateful for the call.      "

## 2018-02-09 ENCOUNTER — APPOINTMENT (OUTPATIENT)
Dept: LAB | Facility: MEDICAL CENTER | Age: 68
End: 2018-02-09
Payer: MEDICARE

## 2018-02-12 ENCOUNTER — HOSPITAL ENCOUNTER (OUTPATIENT)
Facility: MEDICAL CENTER | Age: 68
End: 2018-02-12
Attending: INTERNAL MEDICINE
Payer: MEDICARE

## 2018-02-12 PROCEDURE — 36415 COLL VENOUS BLD VENIPUNCTURE: CPT

## 2018-02-12 PROCEDURE — 369999 HCHG MISC LAB CHARGE

## 2018-02-12 PROCEDURE — 84126 ASSAY OF FECES PORPHYRINS: CPT

## 2018-02-15 LAB — TEST NAME 95000: ABNORMAL

## 2018-02-21 ENCOUNTER — HOSPITAL ENCOUNTER (OUTPATIENT)
Dept: LAB | Facility: MEDICAL CENTER | Age: 68
End: 2018-02-21
Attending: INTERNAL MEDICINE
Payer: MEDICARE

## 2018-02-21 ENCOUNTER — HOSPITAL ENCOUNTER (OUTPATIENT)
Dept: LAB | Facility: MEDICAL CENTER | Age: 68
End: 2018-02-21
Attending: NURSE PRACTITIONER
Payer: MEDICARE

## 2018-02-21 DIAGNOSIS — C91.10 CLL (CHRONIC LYMPHOCYTIC LEUKEMIA) (HCC): ICD-10-CM

## 2018-02-21 DIAGNOSIS — E11.8 CONTROLLED TYPE 2 DIABETES MELLITUS WITH COMPLICATION, WITHOUT LONG-TERM CURRENT USE OF INSULIN (HCC): ICD-10-CM

## 2018-02-21 LAB
ANISOCYTOSIS BLD QL SMEAR: ABNORMAL
BASOPHILS # BLD AUTO: 0 % (ref 0–1.8)
BASOPHILS # BLD: 0 K/UL (ref 0–0.12)
EOSINOPHIL # BLD AUTO: 0 K/UL (ref 0–0.51)
EOSINOPHIL NFR BLD: 0 % (ref 0–6.9)
ERYTHROCYTE [DISTWIDTH] IN BLOOD BY AUTOMATED COUNT: 55.7 FL (ref 35.9–50)
EST. AVERAGE GLUCOSE BLD GHB EST-MCNC: 151 MG/DL
FERRITIN SERPL-MCNC: 11.1 NG/ML (ref 22–322)
HBA1C MFR BLD: 6.9 % (ref 0–5.6)
HCT VFR BLD AUTO: 42.7 % (ref 42–52)
HGB BLD-MCNC: 13.8 G/DL (ref 14–18)
LYMPHOCYTES # BLD AUTO: 37.6 K/UL (ref 1–4.8)
LYMPHOCYTES NFR BLD: 97.4 % (ref 22–41)
MANUAL DIFF BLD: NORMAL
MCH RBC QN AUTO: 28 PG (ref 27–33)
MCHC RBC AUTO-ENTMCNC: 32.3 G/DL (ref 33.7–35.3)
MCV RBC AUTO: 86.6 FL (ref 81.4–97.8)
MONOCYTES # BLD AUTO: 0 K/UL (ref 0–0.85)
MONOCYTES NFR BLD AUTO: 0 % (ref 0–13.4)
MORPHOLOGY BLD-IMP: NORMAL
NEUTROPHILS # BLD AUTO: 1 K/UL (ref 1.82–7.42)
NEUTROPHILS NFR BLD: 2.6 % (ref 44–72)
NRBC # BLD AUTO: 0 K/UL
NRBC BLD-RTO: 0 /100 WBC
OVALOCYTES BLD QL SMEAR: NORMAL
PLATELET # BLD AUTO: 99 K/UL (ref 164–446)
PLATELET BLD QL SMEAR: NORMAL
PMV BLD AUTO: 12.3 FL (ref 9–12.9)
POIKILOCYTOSIS BLD QL SMEAR: NORMAL
RBC # BLD AUTO: 4.93 M/UL (ref 4.7–6.1)
RBC BLD AUTO: PRESENT
SMUDGE CELLS BLD QL SMEAR: NORMAL
WBC # BLD AUTO: 38.6 K/UL (ref 4.8–10.8)

## 2018-02-21 PROCEDURE — 83036 HEMOGLOBIN GLYCOSYLATED A1C: CPT | Mod: GA

## 2018-02-21 PROCEDURE — 85027 COMPLETE CBC AUTOMATED: CPT

## 2018-02-21 PROCEDURE — 82728 ASSAY OF FERRITIN: CPT

## 2018-02-21 PROCEDURE — 85007 BL SMEAR W/DIFF WBC COUNT: CPT

## 2018-02-21 PROCEDURE — 36415 COLL VENOUS BLD VENIPUNCTURE: CPT | Mod: GA

## 2018-03-01 RX ORDER — POTASSIUM CHLORIDE 750 MG/1
TABLET, FILM COATED, EXTENDED RELEASE ORAL
Qty: 180 TAB | Refills: 1 | Status: SHIPPED | OUTPATIENT
Start: 2018-03-01 | End: 2018-06-22

## 2018-03-01 NOTE — TELEPHONE ENCOUNTER
Refill X 6 months, sent to pharmacy.Pt. Seen in the last 6 months per protocol.   Lab Results   Component Value Date/Time    SODIUM 141 12/04/2017 10:27 AM    POTASSIUM 4.1 12/04/2017 10:27 AM    CHLORIDE 107 12/04/2017 10:27 AM    CO2 27 12/04/2017 10:27 AM    GLUCOSE 127 (H) 12/04/2017 10:27 AM    BUN 11 12/04/2017 10:27 AM    CREATININE 0.62 12/04/2017 10:27 AM

## 2018-04-02 DIAGNOSIS — Z01.812 PRE-OPERATIVE LABORATORY EXAMINATION: ICD-10-CM

## 2018-04-02 LAB
CREAT SERPL-MCNC: 0.83 MG/DL (ref 0.5–1.4)
INR PPP: 1.27 (ref 0.87–1.13)
PLATELET # BLD AUTO: 101 K/UL (ref 164–446)
PROTHROMBIN TIME: 15.6 SEC (ref 12–14.6)

## 2018-04-02 PROCEDURE — 36415 COLL VENOUS BLD VENIPUNCTURE: CPT

## 2018-04-02 PROCEDURE — 85610 PROTHROMBIN TIME: CPT

## 2018-04-02 PROCEDURE — 85049 AUTOMATED PLATELET COUNT: CPT

## 2018-04-02 PROCEDURE — 82565 ASSAY OF CREATININE: CPT

## 2018-04-04 ENCOUNTER — APPOINTMENT (OUTPATIENT)
Dept: RADIOLOGY | Facility: MEDICAL CENTER | Age: 68
End: 2018-04-04
Attending: SURGERY
Payer: MEDICARE

## 2018-04-04 ENCOUNTER — HOSPITAL ENCOUNTER (OUTPATIENT)
Facility: MEDICAL CENTER | Age: 68
End: 2018-04-04
Attending: SURGERY | Admitting: SURGERY
Payer: MEDICARE

## 2018-04-04 VITALS
WEIGHT: 225.31 LBS | TEMPERATURE: 96.8 F | BODY MASS INDEX: 34.15 KG/M2 | SYSTOLIC BLOOD PRESSURE: 127 MMHG | RESPIRATION RATE: 14 BRPM | HEART RATE: 50 BPM | OXYGEN SATURATION: 99 % | DIASTOLIC BLOOD PRESSURE: 82 MMHG | HEIGHT: 68 IN

## 2018-04-04 DIAGNOSIS — C22.9 MALIGNANT NEOPLASM OF LIVER, UNSPECIFIED LIVER MALIGNANCY TYPE (HCC): ICD-10-CM

## 2018-04-04 DIAGNOSIS — K70.30 ALCOHOLIC CIRRHOSIS OF LIVER WITHOUT ASCITES (HCC): ICD-10-CM

## 2018-04-04 PROCEDURE — 160002 HCHG RECOVERY MINUTES (STAT)

## 2018-04-04 PROCEDURE — 99153 MOD SED SAME PHYS/QHP EA: CPT

## 2018-04-04 PROCEDURE — 76937 US GUIDE VASCULAR ACCESS: CPT

## 2018-04-04 PROCEDURE — 700111 HCHG RX REV CODE 636 W/ 250 OVERRIDE (IP): Performed by: RADIOLOGY

## 2018-04-04 PROCEDURE — 36011 PLACE CATHETER IN VEIN: CPT

## 2018-04-04 PROCEDURE — 700117 HCHG RX CONTRAST REV CODE 255: Performed by: RADIOLOGY

## 2018-04-04 PROCEDURE — 700111 HCHG RX REV CODE 636 W/ 250 OVERRIDE (IP)

## 2018-04-04 PROCEDURE — 700101 HCHG RX REV CODE 250

## 2018-04-04 RX ORDER — ONDANSETRON 2 MG/ML
4 INJECTION INTRAMUSCULAR; INTRAVENOUS PRN
Status: ACTIVE | OUTPATIENT
Start: 2018-04-04 | End: 2018-04-04

## 2018-04-04 RX ORDER — MIDAZOLAM HYDROCHLORIDE 1 MG/ML
INJECTION INTRAMUSCULAR; INTRAVENOUS
Status: COMPLETED
Start: 2018-04-04 | End: 2018-04-04

## 2018-04-04 RX ORDER — SODIUM CHLORIDE 9 MG/ML
500 INJECTION, SOLUTION INTRAVENOUS
Status: DISPENSED | OUTPATIENT
Start: 2018-04-04 | End: 2018-04-04

## 2018-04-04 RX ORDER — OXYCODONE HYDROCHLORIDE 5 MG/1
2.5 TABLET ORAL
Status: DISCONTINUED | OUTPATIENT
Start: 2018-04-04 | End: 2018-04-04 | Stop reason: HOSPADM

## 2018-04-04 RX ORDER — LIDOCAINE HYDROCHLORIDE 10 MG/ML
INJECTION, SOLUTION INFILTRATION; PERINEURAL
Status: COMPLETED
Start: 2018-04-04 | End: 2018-04-04

## 2018-04-04 RX ORDER — SODIUM CHLORIDE 9 MG/ML
INJECTION, SOLUTION INTRAVENOUS
Status: DISCONTINUED | OUTPATIENT
Start: 2018-04-04 | End: 2018-04-04 | Stop reason: HOSPADM

## 2018-04-04 RX ORDER — MIDAZOLAM HYDROCHLORIDE 1 MG/ML
.5-2 INJECTION INTRAMUSCULAR; INTRAVENOUS PRN
Status: ACTIVE | OUTPATIENT
Start: 2018-04-04 | End: 2018-04-04

## 2018-04-04 RX ADMIN — MIDAZOLAM 2 MG: 1 INJECTION INTRAMUSCULAR; INTRAVENOUS at 13:10

## 2018-04-04 RX ADMIN — MIDAZOLAM 1 MG: 1 INJECTION INTRAMUSCULAR; INTRAVENOUS at 13:20

## 2018-04-04 RX ADMIN — IOHEXOL 16 ML: 300 INJECTION, SOLUTION INTRAVENOUS at 13:37

## 2018-04-04 RX ADMIN — LIDOCAINE HYDROCHLORIDE: 10 INJECTION, SOLUTION INFILTRATION; PERINEURAL at 13:10

## 2018-04-04 RX ADMIN — FENTANYL CITRATE 50 MCG: 50 INJECTION, SOLUTION INTRAMUSCULAR; INTRAVENOUS at 13:10

## 2018-04-04 RX ADMIN — SODIUM CHLORIDE: 9 INJECTION, SOLUTION INTRAVENOUS at 13:00

## 2018-04-04 RX ADMIN — FENTANYL CITRATE 25 MCG: 50 INJECTION, SOLUTION INTRAMUSCULAR; INTRAVENOUS at 13:19

## 2018-04-04 ASSESSMENT — PAIN SCALES - GENERAL
PAINLEVEL_OUTOF10: 0

## 2018-04-04 NOTE — OR SURGEON
Immediate Post- Operative Note        PostOp Diagnosis: CIRRHOSIS, PORTAL HTN      Procedure(s): PORTAL PRESSURE GRADIENTS, HEPATIC VENOGRAM    RIGHT CFV PUNCTURE      Estimated Blood Loss: Less than 5 ml (FLUSHES)      Complications: None

## 2018-04-04 NOTE — OR NURSING
1357 Pt report received from IR RN, pt brought over on a gurney, pt placed on monitor, VSS, no C/O pain at this. Right going site is clean, dry and soft, dressing intact, pedal pulses 1+. Family at beside, pt given water and a snack.     1415 Pt groin site clean, dry and soft, no C/O pain     1430 Pt groin site clean, dry and soft, no C/O pain     1445 Pt groin site clean, dry and soft, no C/O pain     1545 pt given D/C instructions, pt verbalized understanding. Pt was given information post angiogram care. Pt going home with family in wheelchair.

## 2018-04-04 NOTE — DISCHARGE INSTRUCTIONS
ACTIVITY: Rest and take it easy for the first 24 hours.  A responsible adult is recommended to remain with you during that time.  It is normal to feel sleepy.  We encourage you to not do anything that requires balance, judgment or coordination.    MILD FLU-LIKE SYMPTOMS ARE NORMAL. YOU MAY EXPERIENCE GENERALIZED MUSCLE ACHES, THROAT IRRITATION, HEADACHE AND/OR SOME NAUSEA.    FOR 24 HOURS DO NOT:  Drive, operate machinery or run household appliances.  Drink beer or alcoholic beverages.   Make important decisions or sign legal documents.    SPECIAL INSTRUCTIONS: keep incision dry for 24 hours    DIET: To avoid nausea, slowly advance diet as tolerated, avoiding spicy or greasy foods for the first day.  Add more substantial food to your diet according to your physician's instructions.  Babies can be fed formula or breast milk as soon as they are hungry.  INCREASE FLUIDS AND FIBER TO AVOID CONSTIPATION.    SURGICAL DRESSING/BATHING: do not shower for 24 hours, may shower tomorrow 4/5/2018 after 2pm    FOLLOW-UP APPOINTMENT:  A follow-up appointment should be arranged with your doctor Nadeen at [phone]; call to schedule.    You should CALL YOUR PHYSICIAN if you develop:  Fever greater than 101 degrees F.  Pain not relieved by medication, or persistent nausea or vomiting.  Excessive bleeding (blood soaking through dressing) or unexpected drainage from the wound.  Extreme redness or swelling around the incision site, drainage of pus or foul smelling drainage.  Inability to urinate or empty your bladder within 8 hours.  Problems with breathing or chest pain.    You should call 911 if you develop problems with breathing or chest pain.  If you are unable to contact your doctor or surgical center, you should go to the nearest emergency room or urgent care center.  Physician's telephone #: [phone]    If any questions arise, call your doctor.  If your doctor is not available, please feel free to call the  Surgical Center at [phone].  The Center is open Monday through Friday from 7AM to 7PM.  You can also call the HEALTH HOTLINE open 24 hours/day, 7 days/week and speak to a nurse at [phone], or toll free at [phone].    A registered nurse may call you a few days after your surgery to see how you are doing after your procedure.    MEDICATIONS: Resume taking daily medication.  Take prescribed pain medication with food.  If no medication is prescribed, you may take non-aspirin pain medication if needed.  PAIN MEDICATION CAN BE VERY CONSTIPATING.  Take a stool softener or laxative such as senokot, pericolace, or milk of magnesia if needed.    If your physician has prescribed pain medication that includes Acetaminophen (Tylenol), do not take additional Acetaminophen (Tylenol) while taking the prescribed medication.    Depression / Suicide Risk    As you are discharged from this [facility] facility, it is important to learn how to keep safe from harming yourself.    Recognize the warning signs:  · Abrupt changes in personality, positive or negative- including increase in energy   · Giving away possessions  · Change in eating patterns- significant weight changes-  positive or negative  · Change in sleeping patterns- unable to sleep or sleeping all the time   · Unwillingness or inability to communicate  · Depression  · Unusual sadness, discouragement and loneliness  · Talk of wanting to die  · Neglect of personal appearance   · Rebelliousness- reckless behavior  · Withdrawal from people/activities they love  · Confusion- inability to concentrate     If you or a loved one observes any of these behaviors or has concerns about self-harm, here's what you can do:  · Talk about it- your feelings and reasons for harming yourself  · Remove any means that you might use to hurt yourself (examples: pills, rope, extension cords, firearm)  · Get professional help from the community (Mental Health, Substance Abuse,  "psychological counseling)  · Do not be alone:Call your Safe Contact- someone whom you trust who will be there for you.  · Call your local CRISIS HOTLINE 174-9273 or 613-525-9831  · Call your local Children's Mobile Crisis Response Team [place] (517) 576-0842 or www.[facility]  · Call the toll free National Suicide Prevention Hotlines   · National Suicide Prevention Lifeline 925-071-PYSA (0825)  · [place] [facility] 800-SUICIDE (545-4581)    Post Angiogram Groin Care Instructions     INSTRUCTIONS  2. Examine (look and feel) the site of your incision site TODAY so you can recognize changes that should be called to your doctor (see below).  3. Avoid straining either by lifting or pulling objects for 4-5 days. Avoid lifting over 5 pounds.   4. For at least 72 hours, if you should sneeze or cough, please hold pressure over your groin area.  5. If you should begin to have oozing from the catheterization site, please hold firm pressure and call your doctor's office immediately.  6. If profuse bleeding occurs from the catheterization site, hold firm pressure and call \"[phone]\" immediately for assistance.  7. Remove bandage after 24 hours.     ACTIVITY  2. Limit activity as instructed by your doctor.  3. No driving or very limited driving with frequent stops for one week.   4. If you must take a long car ride, stop every hour and walk around the car.   5. Warm showers or baths are permitted after the bandage is removed. Avoid hot showers, baths, hot tubs, and swimming for one week.    PLEASE CALL YOUR DOCTOR IF:  1. Temperature elevation occurs.  2. Catheterization site becomes reddened or begins to drain.   3. Bruising appears to be new or not resolving. The bruise may move down your leg. This is normal.  4. The small round lump in the groin increases in size.  5. Any leg numbness, aching, or discomfort (immediately).  6. Increasing discomfort in the leg at the insertion site.  7. Chest pains, even if " relieved by Nitroglycerin.    MISCELLANEOUS INSTRUCTIONS  1. Bruising may occur as a result of heart catheterization. Some of the discoloration may travel down the leg, going from blue to green in color.  2. A small round lump under the catheterization site will remain for up to six weeks.  3. If any questions arise call your physician's office. You can also call the HEALTH HOTLINE open 24 hours/day, 7 days/week and speak to a nurse at [phone], or toll free at [phone].   4. You should call 911 if you develop problems with breathing or chest pain.    FOR PROBLEMS CALL JASS Senior AT: [phone]    I acknowledge receipt and understanding of these Home Care instructions.

## 2018-04-12 ENCOUNTER — TELEPHONE (OUTPATIENT)
Dept: HEMATOLOGY ONCOLOGY | Facility: MEDICAL CENTER | Age: 68
End: 2018-04-12

## 2018-04-12 ENCOUNTER — OFFICE VISIT (OUTPATIENT)
Dept: HEMATOLOGY ONCOLOGY | Facility: MEDICAL CENTER | Age: 68
End: 2018-04-12
Payer: MEDICARE

## 2018-04-12 ENCOUNTER — HOSPITAL ENCOUNTER (OUTPATIENT)
Facility: MEDICAL CENTER | Age: 68
End: 2018-04-12
Attending: INTERNAL MEDICINE
Payer: MEDICARE

## 2018-04-12 ENCOUNTER — NON-PROVIDER VISIT (OUTPATIENT)
Dept: HEMATOLOGY ONCOLOGY | Facility: MEDICAL CENTER | Age: 68
End: 2018-04-12
Payer: MEDICARE

## 2018-04-12 VITALS
RESPIRATION RATE: 18 BRPM | TEMPERATURE: 98.3 F | SYSTOLIC BLOOD PRESSURE: 124 MMHG | WEIGHT: 222.22 LBS | DIASTOLIC BLOOD PRESSURE: 72 MMHG | HEART RATE: 61 BPM | OXYGEN SATURATION: 98 % | HEIGHT: 68 IN | BODY MASS INDEX: 33.68 KG/M2

## 2018-04-12 VITALS
OXYGEN SATURATION: 98 % | HEART RATE: 61 BPM | SYSTOLIC BLOOD PRESSURE: 124 MMHG | DIASTOLIC BLOOD PRESSURE: 72 MMHG | HEIGHT: 68 IN | RESPIRATION RATE: 18 BRPM | WEIGHT: 222 LBS | TEMPERATURE: 98.3 F | BODY MASS INDEX: 33.65 KG/M2

## 2018-04-12 DIAGNOSIS — C91.10 CLL (CHRONIC LYMPHOCYTIC LEUKEMIA) (HCC): ICD-10-CM

## 2018-04-12 LAB
ALBUMIN SERPL BCP-MCNC: 4.3 G/DL (ref 3.2–4.9)
ALBUMIN/GLOB SERPL: 1.6 G/DL
ALP SERPL-CCNC: 81 U/L (ref 30–99)
ALT SERPL-CCNC: 21 U/L (ref 2–50)
ANION GAP SERPL CALC-SCNC: 9 MMOL/L (ref 0–11.9)
ANISOCYTOSIS BLD QL SMEAR: ABNORMAL
AST SERPL-CCNC: 36 U/L (ref 12–45)
BASOPHILS # BLD AUTO: 0 % (ref 0–1.8)
BASOPHILS # BLD: 0 K/UL (ref 0–0.12)
BILIRUB SERPL-MCNC: 0.8 MG/DL (ref 0.1–1.5)
BUN SERPL-MCNC: 12 MG/DL (ref 8–22)
CALCIUM SERPL-MCNC: 9.6 MG/DL (ref 8.5–10.5)
CHLORIDE SERPL-SCNC: 108 MMOL/L (ref 96–112)
CO2 SERPL-SCNC: 24 MMOL/L (ref 20–33)
CREAT SERPL-MCNC: 0.8 MG/DL (ref 0.5–1.4)
EOSINOPHIL # BLD AUTO: 0.37 K/UL (ref 0–0.51)
EOSINOPHIL NFR BLD: 0.9 % (ref 0–6.9)
ERYTHROCYTE [DISTWIDTH] IN BLOOD BY AUTOMATED COUNT: 59.7 FL (ref 35.9–50)
GLOBULIN SER CALC-MCNC: 2.7 G/DL (ref 1.9–3.5)
GLUCOSE SERPL-MCNC: 156 MG/DL (ref 65–99)
HCT VFR BLD AUTO: 47.8 % (ref 42–52)
HGB BLD-MCNC: 15.6 G/DL (ref 14–18)
LYMPHOCYTES # BLD AUTO: 38.16 K/UL (ref 1–4.8)
LYMPHOCYTES NFR BLD: 94 % (ref 22–41)
MANUAL DIFF BLD: NORMAL
MCH RBC QN AUTO: 29.2 PG (ref 27–33)
MCHC RBC AUTO-ENTMCNC: 32.6 G/DL (ref 33.7–35.3)
MCV RBC AUTO: 89.3 FL (ref 81.4–97.8)
MONOCYTES # BLD AUTO: 0.69 K/UL (ref 0–0.85)
MONOCYTES NFR BLD AUTO: 1.7 % (ref 0–13.4)
MORPHOLOGY BLD-IMP: NORMAL
NEUTROPHILS # BLD AUTO: 1.38 K/UL (ref 1.82–7.42)
NEUTROPHILS NFR BLD: 3.4 % (ref 44–72)
NRBC # BLD AUTO: 0 K/UL
NRBC BLD-RTO: 0 /100 WBC
OVALOCYTES BLD QL SMEAR: NORMAL
PLATELET # BLD AUTO: 97 K/UL (ref 164–446)
PLATELET BLD QL SMEAR: NORMAL
PMV BLD AUTO: 11.6 FL (ref 9–12.9)
POIKILOCYTOSIS BLD QL SMEAR: NORMAL
POTASSIUM SERPL-SCNC: 4.6 MMOL/L (ref 3.6–5.5)
PROT SERPL-MCNC: 7 G/DL (ref 6–8.2)
RBC # BLD AUTO: 5.35 M/UL (ref 4.7–6.1)
RBC BLD AUTO: PRESENT
SODIUM SERPL-SCNC: 141 MMOL/L (ref 135–145)
WBC # BLD AUTO: 40.6 K/UL (ref 4.8–10.8)

## 2018-04-12 PROCEDURE — 85007 BL SMEAR W/DIFF WBC COUNT: CPT

## 2018-04-12 PROCEDURE — 36415 COLL VENOUS BLD VENIPUNCTURE: CPT | Performed by: INTERNAL MEDICINE

## 2018-04-12 PROCEDURE — 99214 OFFICE O/P EST MOD 30 MIN: CPT | Performed by: INTERNAL MEDICINE

## 2018-04-12 PROCEDURE — 85027 COMPLETE CBC AUTOMATED: CPT

## 2018-04-12 PROCEDURE — 80053 COMPREHEN METABOLIC PANEL: CPT

## 2018-04-12 ASSESSMENT — PAIN SCALES - GENERAL
PAINLEVEL: NO PAIN
PAINLEVEL: NO PAIN

## 2018-04-12 NOTE — PROGRESS NOTES
Sohail Duvall is a 67 y.o. male here for a non-provider visit for: Lab Draws  on 4/12/2018 at 11:07 AM    Procedure Performed: Venipuncture     Anatomical site: Right Antecubital Area (AC)    Equipment used: 21g    Labs drawn: CBC w/diff and CMP    Ordering Provider: Dr. Alfonzo Ngo By: Eloise Lorenzo, Med Ass't  No complications

## 2018-04-12 NOTE — TELEPHONE ENCOUNTER
Nicole from [facility] lab called with critical WBC value at 40.6. Result read back to Nicole. Dr. Servin notified of critical lab. Pt with hx CLL, anticipated lab value.

## 2018-04-16 NOTE — PROGRESS NOTES
Date of visit: 4/12/2018  5:13 PM      Chief Complaint- follow-up of CLL   , 13q deleted      Identification/Prior relevant history:   2004. Screening PSA elevated. Found to have prostate cancer Dr. Ramírez, urologist, treated with brachytherapy at [place]. Followed with observation and serial PSA  March, 2014. Noted to have elevated WBC count  July 15, 2014. Flow cytometry shows CD5 positive B-cell lymphoproliferative disorder with a phenotype most consistent with chronic lymphocytic leukemia or small lymphocytic lymphoma  July 26, 2014. CT chest, abdomen and pelvis showed Hepatosplenomegaly. Slightly enlarged lymph nodes in the subcarinal region and right cardiophrenic angle.  December 11, 2014. Negative for JAK2-V612 mutation  January 4, 2016 ultrasound of the abdomen showed Cirrhosis with portal hypertension.Associated dilated portal vein and splenomegaly.No evidence for hepatoma  - 8/2017-he presented with significant iron deficiency anemia requiring iron infusion. Subsequently had upper and lower endoscopy , these were reportedly normal. He does not remember being informed that he has any esophageal viruses.     -12/6/17-CT abdomen- 1.  2.4 cm arterial enhancing mass in segment 4 of the liver demonstrates washout. LR-5.  1.3 cm arterial enhancing lesion in the hepatic dome is similar to the recent MRI. No definite washout or capsule appreciated. LR-3.  Morphologic characteristics of cirrhosis with evidence of portal hypertension including splenomegaly.  -12/2017-established care with me     Interim history- CLL FISH panel showed favorable prognostic marker of 13 qdeletion.    -He is following up with Dr. Senior. Unfortunately, the imaging of liver mass is suggestive of hepatoma.      -Hepatic venogram-/4/18-Hepatic venography showing a patent right hepatic vein.  2.  Free and wedged right hepatic vein wedge pressures rendering a mean Hepatic Venous Pressure Gradient (HVPG) of 9.67 mmHg. This is  "consistent with portal hypertension.(HVPG >= 10mmHg considered \"clinically significant\" portal HTN  3.  Tranjugular Liver biopsy was not performed/    Past Medical History:      Past Medical History:   Diagnosis Date   • Anemia    • Cancer (CMS-HCC) 2004    Prostate - did brachytherapy at [place]   • Cirrhosis (CMS-HCC)    • CLL (chronic lymphocytic leukemia) (CMS-HCC) 2014   • CMV (cytomegalovirus infection) (CMS-HCC) 1990    Treated for 6 weeks   • Diabetes (CMS-HCC)     oral medication   • Liver tumor        Past surgical history:     No past surgical history on file.    Allergies:       Patient has no known allergies.    Medications:         Current Outpatient Prescriptions   Medication Sig Dispense Refill   • IRON PO Take  by mouth every day.     • Cholecalciferol (VITAMIN D PO) Take  by mouth every day.     • potassium chloride ER (KLOR-CON) 10 MEQ tablet TAKE ONE TABLET BY MOUTH TWICE A DAY (GENERIC KLOR-CON) 180 Tab 1   • metformin ER (GLUCOPHAGE XR) 500 MG TABLET SR 24 HR TAKE TWO TABLETS BY MOUTH DAILY 180 Tab 1   • vitamin A 8000 UNIT capsule Take 8,000 Units by mouth every day.     • doxycycline (VIBRAMYCIN) 100 MG Tab Take 100 mg by mouth 2 times a day.     • Cetirizine HCl (ZYRTEC ALLERGY PO) Take  by mouth.     • fluticasone (FLONASE ALLERGY RELIEF) 50 MCG/ACT nasal spray Spray 1 Spray in nose 2 times a day. 1 Bottle 0     No current facility-administered medications for this visit.          Social History:     Social History     Social History   • Marital status:      Spouse name: N/A   • Number of children: 1   • Years of education: N/A     Occupational History   • fertilizer sales      Social History Main Topics   • Smoking status: Never Smoker   • Smokeless tobacco: Former User     Types: Snuff     Quit date: 3/29/2015   • Alcohol use No   • Drug use: No   • Sexual activity: No     Other Topics Concern   • Not on file     Social History Narrative    No known exposure to asbestos, dyes, " "or chemicals, however he was exposed to pesticides.  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  1 child, alive and well.  He also has a step-child.        Family History:      Family History   Problem Relation Age of Onset   • Cancer Father 81     Bladder   • Cancer Brother      Prostate & CLL (s/p 8-10 years ago)   • Hyperlipidemia Sister    • Lung Disease Neg Hx    • Diabetes Neg Hx    • Heart Disease Neg Hx    • Hypertension Neg Hx    • Stroke Neg Hx    • Alcohol/Drug Neg Hx        Review of Systems:  All other review of systems are negative except what was mentioned above in the HPI.    Constitutional: Negative for fever, chills, weight loss and malaise/fatigue.    HEENT: No new auditory or visual complaints. No sore throat and neck pain.     Respiratory: Negative for cough, sputum production, shortness of breath and wheezing.    Cardiovascular: Negative for chest pain, palpitations, orthopnea and leg swelling.    Gastrointestinal: Negative for heartburn, nausea, vomiting and abdominal pain.    Genitourinary: Negative for dysuria, hematuria    Musculoskeletal: No new arthralgias or myalgias   Skin: Negative for rash and itching.    Neurological: Negative for focal weakness and headaches.    Endo/Heme/Allergies: No abnormal bleed/bruise.    Psychiatric/Behavioral: No new depression/anxiety.    Physical Exam:  Vitals: /72   Pulse 61   Temp 36.8 °C (98.3 °F)   Resp 18   Ht 1.715 m (5' 7.52\")   Wt 100.8 kg (222 lb 3.6 oz)   SpO2 98%   BMI 34.27 kg/m²     General: Not in acute distress, alert and oriented x 3  HEENT: No pallor, icterus. Oropharynx clear.   Neck: Supple, no palpable masses.  Lymph nodes: No palpable cervical, supraclavicular, axillary or inguinal lymphadenopathy.    CVS: regular rate and rhythm, no rubs or gallops  RESP: Clear to auscultate bilaterally, no wheezing or crackles.   ABD: Soft, non tender, non distended, " positive bowel sounds, no palpable organomegaly  EXT: No edema or cyanosis  CNS: Alert and oriented x3, No focal deficits.  Skin- No rash      Labs:   Hospital Outpatient Visit on 04/12/2018   Component Date Value Ref Range Status   • Nucleated RBC 04/12/2018 0.00  /100 WBC Final   • NRBC (Absolute) 04/12/2018 0.00  K/uL Final   • WBC 04/12/2018 40.6* 4.8 - 10.8 K/uL Final    Comment: Results confirmed by repeat testing.. This result [place] [id number]  by Jesus Manuel Hope on 04 12 2018 at 1510, and has been read back.     • RBC 04/12/2018 5.35  4.70 - 6.10 M/uL Final   • Hemoglobin 04/12/2018 15.6  14.0 - 18.0 g/dL Final   • Hematocrit 04/12/2018 47.8  42.0 - 52.0 % Final   • MCV 04/12/2018 89.3  81.4 - 97.8 fL Final   • MCH 04/12/2018 29.2  27.0 - 33.0 pg Final   • MCHC 04/12/2018 32.6* 33.7 - 35.3 g/dL Final   • RDW 04/12/2018 59.7* 35.9 - 50.0 fL Final   • Platelet Count 04/12/2018 97* 164 - 446 K/uL Final   • MPV 04/12/2018 11.6  9.0 - 12.9 fL Final   • Neutrophils-Polys 04/12/2018 3.40* 44.00 - 72.00 % Final   • Lymphocytes 04/12/2018 94.00* 22.00 - 41.00 % Final   • Monocytes 04/12/2018 1.70  0.00 - 13.40 % Final   • Eosinophils 04/12/2018 0.90  0.00 - 6.90 % Final   • Basophils 04/12/2018 0.00  0.00 - 1.80 % Final   • Neutrophils (Absolute) 04/12/2018 1.38* 1.82 - 7.42 K/uL Final   • Lymphs (Absolute) 04/12/2018 38.16* 1.00 - 4.80 K/uL Final   • Monos (Absolute) 04/12/2018 0.69  0.00 - 0.85 K/uL Final   • Eos (Absolute) 04/12/2018 0.37  0.00 - 0.51 K/uL Final   • Baso (Absolute) 04/12/2018 0.00  0.00 - 0.12 K/uL Final   • Anisocytosis 04/12/2018 1+   Final   • Sodium 04/12/2018 141  135 - 145 mmol/L Final   • Potassium 04/12/2018 4.6  3.6 - 5.5 mmol/L Final   • Chloride 04/12/2018 108  96 - 112 mmol/L Final   • Co2 04/12/2018 24  20 - 33 mmol/L Final   • Anion Gap 04/12/2018 9.0  0.0 - 11.9 Final   • Glucose 04/12/2018 156* 65 - 99 mg/dL Final   • Bun 04/12/2018 12  8 - 22 mg/dL Final   • Creatinine  04/12/2018 0.80  0.50 - 1.40 mg/dL Final   • Calcium 04/12/2018 9.6  8.5 - 10.5 mg/dL Final   • AST(SGOT) 04/12/2018 36  12 - 45 U/L Final   • ALT(SGPT) 04/12/2018 21  2 - 50 U/L Final   • Alkaline Phosphatase 04/12/2018 81  30 - 99 U/L Final   • Total Bilirubin 04/12/2018 0.8  0.1 - 1.5 mg/dL Final   • Albumin 04/12/2018 4.3  3.2 - 4.9 g/dL Final   • Total Protein 04/12/2018 7.0  6.0 - 8.2 g/dL Final   • Globulin 04/12/2018 2.7  1.9 - 3.5 g/dL Final   • A-G Ratio 04/12/2018 1.6  g/dL Final   • GFR If  04/12/2018 >60  >60 mL/min/1.73 m 2 Final   • GFR If Non  04/12/2018 >60  >60 mL/min/1.73 m 2 Final   • Manual Diff Status 04/12/2018 PERFORMED   Final   • Peripheral Smear Review 04/12/2018 see below   Final    Comment: Due to instrument suspect flags, further review of peripheral smear is  indicated on this patient sample. This review may or may not result in  abnormal findings.     • Plt Estimation 04/12/2018 Decreased   Final   • RBC Morphology 04/12/2018 Present   Final   • Poikilocytosis 04/12/2018 1+   Final   • Ovalocytes 04/12/2018 1+   Final             Assessment and Plan:  CLL-Odom Stage 0, He is around 4 years out of his diagnosis. Flow cytometry showed CD38 negative phenotype indicating good prognosis. FISH panel testing showed 13, deletion, which is a favorable marker. 2. His white count is running on the higher side, however, there is no acute indication for treatment of his CLL.. He has some mild thrombocytopenia, which appears to be more from cirrhosis.    Liver mass-there is concern for hepatoma. He did not have AFP elevation. Consider testing for DCP.He has not had a biopsy yet. He is following up with Dr. Senior for evaluation regarding ablation versus resection versus liver transplant., Given his cirrhosis, transplant may not be a bad choice for him.  No Contraindication from CLL standpoint for any surgical intervention.    Return to clinic in 2 months.    He  agreed and verbalized  agreement and understanding with the current plan.  I answered all questions and concerns at this time         Please note that this dictation was created using voice recognition software. I have made every reasonable attempt to correct obvious errors, but I expect that there are errors of grammar and possibly content that I did not discover before finalizing the note.      SIGNATURES:  Alfonzo Servin    CC:  MAUREEN SegoviaRCesarioN.  No ref. provider found

## 2018-04-17 ENCOUNTER — HOSPITAL ENCOUNTER (OUTPATIENT)
Dept: RADIOLOGY | Facility: MEDICAL CENTER | Age: 68
End: 2018-04-17
Attending: SURGERY
Payer: MEDICARE

## 2018-04-17 DIAGNOSIS — K70.30 ALCOHOLIC CIRRHOSIS OF LIVER WITHOUT ASCITES (HCC): ICD-10-CM

## 2018-04-17 DIAGNOSIS — C22.9 MALIGNANT NEOPLASM OF LIVER, UNSPECIFIED LIVER MALIGNANCY TYPE (HCC): ICD-10-CM

## 2018-04-17 PROCEDURE — A9503 TC99M MEDRONATE: HCPCS

## 2018-04-17 PROCEDURE — 700117 HCHG RX CONTRAST REV CODE 255: Performed by: SURGERY

## 2018-04-17 PROCEDURE — 71260 CT THORAX DX C+: CPT

## 2018-04-17 RX ADMIN — IOHEXOL 75 ML: 350 INJECTION, SOLUTION INTRAVENOUS at 14:15

## 2018-05-23 ENCOUNTER — HOSPITAL ENCOUNTER (OUTPATIENT)
Dept: LAB | Facility: MEDICAL CENTER | Age: 68
End: 2018-05-23
Attending: INTERNAL MEDICINE
Payer: MEDICARE

## 2018-05-23 LAB
ALBUMIN SERPL BCP-MCNC: 4.4 G/DL (ref 3.2–4.9)
ALBUMIN/GLOB SERPL: 1.7 G/DL
ALP SERPL-CCNC: 73 U/L (ref 30–99)
ALT SERPL-CCNC: 19 U/L (ref 2–50)
ANION GAP SERPL CALC-SCNC: 10 MMOL/L (ref 0–11.9)
ANISOCYTOSIS BLD QL SMEAR: ABNORMAL
AST SERPL-CCNC: 30 U/L (ref 12–45)
BASOPHILS # BLD AUTO: 0 % (ref 0–1.8)
BASOPHILS # BLD: 0 K/UL (ref 0–0.12)
BILIRUB SERPL-MCNC: 1.4 MG/DL (ref 0.1–1.5)
BUN SERPL-MCNC: 11 MG/DL (ref 8–22)
CALCIUM SERPL-MCNC: 10.1 MG/DL (ref 8.5–10.5)
CHLORIDE SERPL-SCNC: 108 MMOL/L (ref 96–112)
CO2 SERPL-SCNC: 23 MMOL/L (ref 20–33)
CREAT SERPL-MCNC: 0.76 MG/DL (ref 0.5–1.4)
EOSINOPHIL # BLD AUTO: 0 K/UL (ref 0–0.51)
EOSINOPHIL NFR BLD: 0 % (ref 0–6.9)
ERYTHROCYTE [DISTWIDTH] IN BLOOD BY AUTOMATED COUNT: 56.7 FL (ref 35.9–50)
GLOBULIN SER CALC-MCNC: 2.6 G/DL (ref 1.9–3.5)
GLUCOSE SERPL-MCNC: 125 MG/DL (ref 65–99)
HCT VFR BLD AUTO: 48.9 % (ref 42–52)
HGB BLD-MCNC: 16.3 G/DL (ref 14–18)
INR PPP: 1.18 (ref 0.87–1.13)
LYMPHOCYTES # BLD AUTO: 48.7 K/UL (ref 1–4.8)
LYMPHOCYTES NFR BLD: 91.2 % (ref 22–41)
MANUAL DIFF BLD: NORMAL
MCH RBC QN AUTO: 30.4 PG (ref 27–33)
MCHC RBC AUTO-ENTMCNC: 33.3 G/DL (ref 33.7–35.3)
MCV RBC AUTO: 91.1 FL (ref 81.4–97.8)
MICROCYTES BLD QL SMEAR: ABNORMAL
MONOCYTES # BLD AUTO: 2.83 K/UL (ref 0–0.85)
MONOCYTES NFR BLD AUTO: 5.3 % (ref 0–13.4)
MORPHOLOGY BLD-IMP: NORMAL
NEUTROPHILS # BLD AUTO: 1.87 K/UL (ref 1.82–7.42)
NEUTROPHILS NFR BLD: 3.5 % (ref 44–72)
NRBC # BLD AUTO: 0 K/UL
NRBC BLD-RTO: 0 /100 WBC
OVALOCYTES BLD QL SMEAR: NORMAL
PLATELET # BLD AUTO: 111 K/UL (ref 164–446)
PLATELET BLD QL SMEAR: NORMAL
PMV BLD AUTO: 12.2 FL (ref 9–12.9)
POIKILOCYTOSIS BLD QL SMEAR: NORMAL
POTASSIUM SERPL-SCNC: 4.2 MMOL/L (ref 3.6–5.5)
PROT SERPL-MCNC: 7 G/DL (ref 6–8.2)
PROTHROMBIN TIME: 14.7 SEC (ref 12–14.6)
RBC # BLD AUTO: 5.37 M/UL (ref 4.7–6.1)
RBC BLD AUTO: PRESENT
SMUDGE CELLS BLD QL SMEAR: NORMAL
SODIUM SERPL-SCNC: 141 MMOL/L (ref 135–145)
WBC # BLD AUTO: 53.4 K/UL (ref 4.8–10.8)

## 2018-05-23 PROCEDURE — 82105 ALPHA-FETOPROTEIN SERUM: CPT

## 2018-05-23 PROCEDURE — 36415 COLL VENOUS BLD VENIPUNCTURE: CPT

## 2018-05-23 PROCEDURE — 85027 COMPLETE CBC AUTOMATED: CPT

## 2018-05-23 PROCEDURE — 85007 BL SMEAR W/DIFF WBC COUNT: CPT

## 2018-05-23 PROCEDURE — 85610 PROTHROMBIN TIME: CPT

## 2018-05-23 PROCEDURE — 80053 COMPREHEN METABOLIC PANEL: CPT

## 2018-05-25 LAB — AFP-TM SERPL-MCNC: 7 NG/ML (ref 0–9)

## 2018-05-31 ENCOUNTER — OFFICE VISIT (OUTPATIENT)
Dept: HEMATOLOGY ONCOLOGY | Facility: MEDICAL CENTER | Age: 68
End: 2018-05-31
Payer: MEDICARE

## 2018-05-31 VITALS
WEIGHT: 218.03 LBS | SYSTOLIC BLOOD PRESSURE: 110 MMHG | DIASTOLIC BLOOD PRESSURE: 72 MMHG | OXYGEN SATURATION: 97 % | HEIGHT: 68 IN | RESPIRATION RATE: 16 BRPM | HEART RATE: 63 BPM | TEMPERATURE: 99.1 F | BODY MASS INDEX: 33.04 KG/M2

## 2018-05-31 DIAGNOSIS — C91.10 CLL (CHRONIC LYMPHOCYTIC LEUKEMIA) (HCC): ICD-10-CM

## 2018-05-31 PROCEDURE — 99214 OFFICE O/P EST MOD 30 MIN: CPT | Performed by: INTERNAL MEDICINE

## 2018-05-31 ASSESSMENT — PAIN SCALES - GENERAL: PAINLEVEL: NO PAIN

## 2018-05-31 NOTE — LETTER
[facility]   [address] [place] [address], [address]  [place] [address]  Phone: [phone]  Fax: [phone]              Sohail Duvall  1950    Encounter Date: 5/31/2018    Alfonzo Servin M.D.          PROGRESS NOTE:  Date of visit: 5/31/2018  10:59 AM      Chief Complaint- follow-up of CLL , 13q deleted        Identification/Prior relevant history:   2004. Screening PSA elevated. Found to have prostate cancer Dr. Ramírez, urologist, treated with brachytherapy at [place]. Followed with observation and serial PSA  March, 2014. Noted to have elevated WBC count  July 15, 2014. Flow cytometry shows CD5 positive B-cell lymphoproliferative disorder with a phenotype most consistent with chronic lymphocytic leukemia or small lymphocytic lymphoma  July 26, 2014. CT chest, abdomen and pelvis showed Hepatosplenomegaly. Slightly enlarged lymph nodes in the subcarinal region and right cardiophrenic angle.  December 11, 2014. Negative for JAK2-V612 mutation  January 4, 2016 ultrasound of the abdomen showed Cirrhosis with portal hypertension.Associated dilated portal vein and splenomegaly.No evidence for hepatoma  - 8/2017-he presented with significant iron deficiency anemia requiring iron infusion. Subsequently had upper and lower endoscopy , these were reportedly normal. He does not remember being informed that he has any esophageal viruses.     -12/6/17-CT abdomen- 1.  2.4 cm arterial enhancing mass in segment 4 of the liver demonstrates washout. LR-5.  1.3 cm arterial enhancing lesion in the hepatic dome is similar to the recent MRI. No definite washout or capsule appreciated. LR-3.  Morphologic characteristics of cirrhosis with evidence of portal hypertension including splenomegaly.  -12/2017-established care with me     Interim history- CLL FISH panel showed favorable prognostic marker of 13 qdeletion.   -He is following up with Dr. Senior.imaging of liver mass is suggestive of  "hepatoma.      -Hepatic venogram-/4/18-Hepatic venography showing a patent right hepatic vein.  2.  Free and wedged right hepatic vein wedge pressures rendering a mean Hepatic Venous Pressure Gradient (HVPG) of 9.67 mmHg. This is consistent with portal hypertension.(HVPG >= 10mmHg considered \"clinically significant\" portal HTN  3.  Tranjugular Liver biopsy was not performed.    He is here for a 6 week follow-up. Apparently, he has not followed up with Dr. Senior.   CT of the chest-4/70/18-enlarged mediastinal lymph nodes with right paratracheal lymph nodes measuring up to 15 mm short axis and a subcarinal lymph node measuring 2.9 cm short axis. No hilar adenopathy identified. No axillary lymphadenopathy identified. Multiple nonenlarged axillary lymph nodes identified bilaterally.    . He denies having any acute respiratory symptoms. No hemoptysis or recent pneumonia.      Past Medical History:      Past Medical History:   Diagnosis Date   • Anemia    • Cancer (HCC) 2004    Prostate - did brachytherapy at [place]   • Cirrhosis (HCC)    • CLL (chronic lymphocytic leukemia) (HCC) 2014   • CMV (cytomegalovirus infection) (HCC) 1990    Treated for 6 weeks   • Diabetes (HCC)     oral medication   • Liver tumor        Past surgical history:     No past surgical history on file.    Allergies:       Patient has no known allergies.    Medications:         Current Outpatient Prescriptions   Medication Sig Dispense Refill   • IRON PO Take  by mouth every day.     • Cholecalciferol (VITAMIN D PO) Take  by mouth every day.     • potassium chloride ER (KLOR-CON) 10 MEQ tablet TAKE ONE TABLET BY MOUTH TWICE A DAY (GENERIC KLOR-CON) 180 Tab 1   • metformin ER (GLUCOPHAGE XR) 500 MG TABLET SR 24 HR TAKE TWO TABLETS BY MOUTH DAILY 180 Tab 1   • vitamin A 8000 UNIT capsule Take 8,000 Units by mouth every day.     • doxycycline (VIBRAMYCIN) 100 MG Tab Take 100 mg by mouth 2 times a day.     • Cetirizine HCl (ZYRTEC ALLERGY PO) " Take  by mouth.     • fluticasone (FLONASE ALLERGY RELIEF) 50 MCG/ACT nasal spray Spray 1 Spray in nose 2 times a day. 1 Bottle 0     No current facility-administered medications for this visit.          Social History:     Social History     Social History   • Marital status:      Spouse name: N/A   • Number of children: 1   • Years of education: N/A     Occupational History   • fertilizer sales      Social History Main Topics   • Smoking status: Never Smoker   • Smokeless tobacco: Former User     Types: Snuff     Quit date: 3/29/2015   • Alcohol use No   • Drug use: No   • Sexual activity: No     Other Topics Concern   • Not on file     Social History Narrative    No known exposure to asbestos, dyes, or chemicals, however he was exposed to pesticides.  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  1 child, alive and well.  He also has a step-child.        Family History:      Family History   Problem Relation Age of Onset   • Cancer Father 81     Bladder   • Cancer Brother      Prostate & CLL (s/p 8-10 years ago)   • Hyperlipidemia Sister    • Lung Disease Neg Hx    • Diabetes Neg Hx    • Heart Disease Neg Hx    • Hypertension Neg Hx    • Stroke Neg Hx    • Alcohol/Drug Neg Hx        Review of Systems:  All other review of systems are negative except what was mentioned above in the HPI.    Constitutional: Negative for fever, chills, weight loss and malaise/fatigue.    HEENT: No new auditory or visual complaints. No sore throat and neck pain.     Respiratory: Negative for cough, sputum production, shortness of breath and wheezing.    Cardiovascular: Negative for chest pain, palpitations, orthopnea and leg swelling.    Gastrointestinal: Negative for heartburn, nausea, vomiting and abdominal pain.    Genitourinary: Negative for dysuria, hematuria    Musculoskeletal: No new arthralgias or myalgias   Skin: Negative for rash and itching.   "  Neurological: Negative for focal weakness and headaches.    Endo/Heme/Allergies: No abnormal bleed/bruise.    Psychiatric/Behavioral: No new depression/anxiety.    Physical Exam:  Vitals: /72   Pulse 63   Temp 37.3 °C (99.1 °F)   Resp 16   Ht 1.715 m (5' 7.52\")   Wt 98.9 kg (218 lb 0.6 oz)   SpO2 97%   BMI 33.63 kg/m²      General: Not in acute distress, alert and oriented x 3  HEENT: No pallor, icterus. Oropharynx clear.   Neck: Supple, no palpable masses.  Lymph nodes: No palpable cervical, supraclavicular, axillary or inguinal lymphadenopathy.    CVS: regular rate and rhythm, no rubs or gallops  RESP: Clear to auscultate bilaterally, no wheezing or crackles.   ABD: Soft, non tender, non distended, positive bowel sounds, no palpable organomegaly  EXT: No edema or cyanosis  CNS: Alert and oriented x3, No focal deficits.  Skin- No rash      Labs:   No visits with results within 1 Week(s) from this visit.   Latest known visit with results is:   Hospital Outpatient Visit on 05/23/2018   Component Date Value Ref Range Status   • WBC 05/23/2018 53.4* 4.8 - 10.8 K/uL Final    Results confirmed by repeat testing.   • RBC 05/23/2018 5.37  4.70 - 6.10 M/uL Final   • Hemoglobin 05/23/2018 16.3  14.0 - 18.0 g/dL Final   • Hematocrit 05/23/2018 48.9  42.0 - 52.0 % Final   • MCV 05/23/2018 91.1  81.4 - 97.8 fL Final   • MCH 05/23/2018 30.4  27.0 - 33.0 pg Final   • MCHC 05/23/2018 33.3* 33.7 - 35.3 g/dL Final   • RDW 05/23/2018 56.7* 35.9 - 50.0 fL Final   • Platelet Count 05/23/2018 111* 164 - 446 K/uL Final   • MPV 05/23/2018 12.2  9.0 - 12.9 fL Final   • Nucleated RBC 05/23/2018 0.00  /100 WBC Final   • NRBC (Absolute) 05/23/2018 0.00  K/uL Final   • Neutrophils-Polys 05/23/2018 3.50* 44.00 - 72.00 % Final   • Lymphocytes 05/23/2018 91.20* 22.00 - 41.00 % Final   • Monocytes 05/23/2018 5.30  0.00 - 13.40 % Final   • Eosinophils 05/23/2018 0.00  0.00 - 6.90 % Final   • Basophils 05/23/2018 0.00  0.00 - 1.80 % " Final   • Neutrophils (Absolute) 05/23/2018 1.87  1.82 - 7.42 K/uL Final    Includes immature neutrophils, if present.   • Lymphs (Absolute) 05/23/2018 48.70* 1.00 - 4.80 K/uL Final   • Monos (Absolute) 05/23/2018 2.83* 0.00 - 0.85 K/uL Final   • Eos (Absolute) 05/23/2018 0.00  0.00 - 0.51 K/uL Final   • Baso (Absolute) 05/23/2018 0.00  0.00 - 0.12 K/uL Final   • Anisocytosis 05/23/2018 1+   Final   • Microcytosis 05/23/2018 1+   Final   • Sodium 05/23/2018 141  135 - 145 mmol/L Final   • Potassium 05/23/2018 4.2  3.6 - 5.5 mmol/L Final   • Chloride 05/23/2018 108  96 - 112 mmol/L Final   • Co2 05/23/2018 23  20 - 33 mmol/L Final   • Anion Gap 05/23/2018 10.0  0.0 - 11.9 Final   • Glucose 05/23/2018 125* 65 - 99 mg/dL Final   • Bun 05/23/2018 11  8 - 22 mg/dL Final   • Creatinine 05/23/2018 0.76  0.50 - 1.40 mg/dL Final   • Calcium 05/23/2018 10.1  8.5 - 10.5 mg/dL Final   • AST(SGOT) 05/23/2018 30  12 - 45 U/L Final   • ALT(SGPT) 05/23/2018 19  2 - 50 U/L Final   • Alkaline Phosphatase 05/23/2018 73  30 - 99 U/L Final   • Total Bilirubin 05/23/2018 1.4  0.1 - 1.5 mg/dL Final   • Albumin 05/23/2018 4.4  3.2 - 4.9 g/dL Final   • Total Protein 05/23/2018 7.0  6.0 - 8.2 g/dL Final   • Globulin 05/23/2018 2.6  1.9 - 3.5 g/dL Final   • A-G Ratio 05/23/2018 1.7  g/dL Final   • Alpha Fetoprotein 05/23/2018 7  0 - 9 ng/mL Final    Comment: INTERPRETIVE INFORMATION: Alpha Fetoprotein Tumor Marker  The [employer] Access DxI AFP method is used. Results  obtained with different assay methods or kits cannot be used  interchangeably. AFP is a valuable aid in the management of  nonseminomatous testicular cancer patients when used in  conjunction with information available from the clinical  evaluation and other diagnostic procedures. Increased AFP  concentrations have also been observed in ataxia telangiectasia,  hereditary tyrosinemia, primary hepatocellular carcinoma,  teratocarcinoma, gastrointestinal tract cancers with  and without  liver metastases, and in benign hepatic conditions such as acute  viral hepatitis, chronic active hepatitis, and cirrhosis. The  result cannot be interpreted as absolute evidence of the presence  or absence of malignant disease. The result is not interpretable  as a tumor marker in pregnant females.  Access complete set of age- and/or gender-specific reference  intervals for this test in the                            [facility] Test Directory  ([facility]).  Performed by [employer],  [facility] [place] [address] [phone]  www.[employer], Abraham Munoz MD - Lab. Director     • PT 05/23/2018 14.7* 12.0 - 14.6 sec Final   • INR 05/23/2018 1.18* 0.87 - 1.13 Final    Comment: INR - Non-therapeutic Reference Range: 0.87-1.13  INR - Therapeutic Reference Range: 2.0-4.0     • GFR If  05/23/2018 >60  >60 mL/min/1.73 m 2 Final   • GFR If Non  05/23/2018 >60  >60 mL/min/1.73 m 2 Final   • Manual Diff Status 05/23/2018 PERFORMED   Final   • Peripheral Smear Review 05/23/2018 see below   Final    Comment: Due to instrument suspect flags, further review of peripheral smear is  indicated on this patient sample. This review may or may not result in  abnormal findings.     • Plt Estimation 05/23/2018 Decreased   Final   • RBC Morphology 05/23/2018 Present   Final    WBC: See previous pathology report on 12;4;2017.   • Poikilocytosis 05/23/2018 1+   Final   • Ovalocytes 05/23/2018 1+   Final   • Smudge Cells 05/23/2018 Moderate   Final             Assessment and Plan:  CLL-Odom Stage 0, He is around 4 years out of his diagnosis. Flow cytometry showed CD38 negative phenotype indicating good prognosis. FISH panel testing showed 13, deletion, which is a favorable marker.He has some mild thrombocytopenia, which appears to be more from cirrhosis.. His absolute lymphocyte count has continued to trend up. Interestingly, a CT of the chest shows subcarinal lymph node which is  enlarged. He does not have any postobstructive pneumonia or other symptoms. Informed the patient that given the location, we do not want lymphadenopathy. 2. Worsened significantly and we may have to consider treatment at some point. I would favor holding off on any active treatment until his hepatoma workup is finished.    Liver mass-there is concern for hepatoma. He did not have AFP elevation. Consider testing for DCP.He has not had a biopsy yet. He apparently did not keep his follow-up visit with Dr. Senior. Encouraged him to reestablish care for further follow-up. for evaluation regarding ablation versus resection versus liver transplant., Given his cirrhosis, transplant may not be a bad choice for him.  No Contraindication from CLL standpoint for any surgical intervention.    He agreed and verbalized  agreement and understanding with the current plan.  I answered all questions and concerns at this time         Please note that this dictation was created using voice recognition software. I have made every reasonable attempt to correct obvious errors, but I expect that there are errors of grammar and possibly content that I did not discover before finalizing the note.      SIGNATURES:  Alfonzo Servin    CC:  Madeleine Oliveros, A.P.R.N.  No ref. provider found        No Recipients

## 2018-05-31 NOTE — LETTER
[facility]   [address] [place] [address], [address]  [place] [address]  Phone: [phone]  Fax: [phone]              Sohail Duvall  1950    Encounter Date: 5/31/2018    Alfonzo Servin M.D.          PROGRESS NOTE:  Date of visit: 5/31/2018  10:59 AM      Chief Complaint-       Identification/Prior relevant history: Sohail Duvall  is a 67 y.o. year old male who is here for follow-up of    Interim history    Past Medical History:      Past Medical History:   Diagnosis Date   • Anemia    • Cancer (HCC) 2004    Prostate - did brachytherapy at [place]   • Cirrhosis (HCC)    • CLL (chronic lymphocytic leukemia) (HCC) 2014   • CMV (cytomegalovirus infection) (HCC) 1990    Treated for 6 weeks   • Diabetes (HCC)     oral medication   • Liver tumor        Past surgical history:     No past surgical history on file.    Allergies:       Patient has no known allergies.    Medications:         Current Outpatient Prescriptions   Medication Sig Dispense Refill   • IRON PO Take  by mouth every day.     • Cholecalciferol (VITAMIN D PO) Take  by mouth every day.     • potassium chloride ER (KLOR-CON) 10 MEQ tablet TAKE ONE TABLET BY MOUTH TWICE A DAY (GENERIC KLOR-CON) 180 Tab 1   • metformin ER (GLUCOPHAGE XR) 500 MG TABLET SR 24 HR TAKE TWO TABLETS BY MOUTH DAILY 180 Tab 1   • vitamin A 8000 UNIT capsule Take 8,000 Units by mouth every day.     • doxycycline (VIBRAMYCIN) 100 MG Tab Take 100 mg by mouth 2 times a day.     • Cetirizine HCl (ZYRTEC ALLERGY PO) Take  by mouth.     • fluticasone (FLONASE ALLERGY RELIEF) 50 MCG/ACT nasal spray Spray 1 Spray in nose 2 times a day. 1 Bottle 0     No current facility-administered medications for this visit.          Social History:     Social History     Social History   • Marital status:      Spouse name: N/A   • Number of children: 1   • Years of education: N/A     Occupational History   • fertilizer sales      Social History Main Topics    "  • Smoking status: Never Smoker   • Smokeless tobacco: Former User     Types: Snuff     Quit date: 3/29/2015   • Alcohol use No   • Drug use: No   • Sexual activity: No     Other Topics Concern   • Not on file     Social History Narrative    No known exposure to asbestos, dyes, or chemicals, however he was exposed to pesticides.  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  1 child, alive and well.  He also has a step-child.        Family History:      Family History   Problem Relation Age of Onset   • Cancer Father 81     Bladder   • Cancer Brother      Prostate & CLL (s/p 8-10 years ago)   • Hyperlipidemia Sister    • Lung Disease Neg Hx    • Diabetes Neg Hx    • Heart Disease Neg Hx    • Hypertension Neg Hx    • Stroke Neg Hx    • Alcohol/Drug Neg Hx        Review of Systems:  All other review of systems are negative except what was mentioned above in the HPI.    Constitutional: Negative for fever, chills, weight loss and malaise/fatigue.    HEENT: No new auditory or visual complaints. No sore throat and neck pain.     Respiratory: Negative for cough, sputum production, shortness of breath and wheezing.    Cardiovascular: Negative for chest pain, palpitations, orthopnea and leg swelling.    Gastrointestinal: Negative for heartburn, nausea, vomiting and abdominal pain.    Genitourinary: Negative for dysuria, hematuria    Musculoskeletal: No new arthralgias or myalgias   Skin: Negative for rash and itching.    Neurological: Negative for focal weakness and headaches.    Endo/Heme/Allergies: No abnormal bleed/bruise.    Psychiatric/Behavioral: No new depression/anxiety.    Physical Exam:  Vitals: /72   Pulse 63   Temp 37.3 °C (99.1 °F)   Resp 16   Ht 1.715 m (5' 7.52\")   Wt 98.9 kg (218 lb 0.6 oz)   SpO2 97%   BMI 33.63 kg/m²      General: Not in acute distress, alert and oriented x 3  HEENT: No pallor, icterus. Oropharynx clear.   Neck: " Supple, no palpable masses.  Lymph nodes: No palpable cervical, supraclavicular, axillary or inguinal lymphadenopathy.    CVS: regular rate and rhythm, no rubs or gallops  RESP: Clear to auscultate bilaterally, no wheezing or crackles.   ABD: Soft, non tender, non distended, positive bowel sounds, no palpable organomegaly  EXT: No edema or cyanosis  CNS: Alert and oriented x3, No focal deficits.  Skin- No rash      Labs:   No visits with results within 1 Week(s) from this visit.   Latest known visit with results is:   Hospital Outpatient Visit on 05/23/2018   Component Date Value Ref Range Status   • WBC 05/23/2018 53.4* 4.8 - 10.8 K/uL Final    Results confirmed by repeat testing.   • RBC 05/23/2018 5.37  4.70 - 6.10 M/uL Final   • Hemoglobin 05/23/2018 16.3  14.0 - 18.0 g/dL Final   • Hematocrit 05/23/2018 48.9  42.0 - 52.0 % Final   • MCV 05/23/2018 91.1  81.4 - 97.8 fL Final   • MCH 05/23/2018 30.4  27.0 - 33.0 pg Final   • MCHC 05/23/2018 33.3* 33.7 - 35.3 g/dL Final   • RDW 05/23/2018 56.7* 35.9 - 50.0 fL Final   • Platelet Count 05/23/2018 111* 164 - 446 K/uL Final   • MPV 05/23/2018 12.2  9.0 - 12.9 fL Final   • Nucleated RBC 05/23/2018 0.00  /100 WBC Final   • NRBC (Absolute) 05/23/2018 0.00  K/uL Final   • Neutrophils-Polys 05/23/2018 3.50* 44.00 - 72.00 % Final   • Lymphocytes 05/23/2018 91.20* 22.00 - 41.00 % Final   • Monocytes 05/23/2018 5.30  0.00 - 13.40 % Final   • Eosinophils 05/23/2018 0.00  0.00 - 6.90 % Final   • Basophils 05/23/2018 0.00  0.00 - 1.80 % Final   • Neutrophils (Absolute) 05/23/2018 1.87  1.82 - 7.42 K/uL Final    Includes immature neutrophils, if present.   • Lymphs (Absolute) 05/23/2018 48.70* 1.00 - 4.80 K/uL Final   • Monos (Absolute) 05/23/2018 2.83* 0.00 - 0.85 K/uL Final   • Eos (Absolute) 05/23/2018 0.00  0.00 - 0.51 K/uL Final   • Baso (Absolute) 05/23/2018 0.00  0.00 - 0.12 K/uL Final   • Anisocytosis 05/23/2018 1+   Final   • Microcytosis 05/23/2018 1+   Final   • Sodium  05/23/2018 141  135 - 145 mmol/L Final   • Potassium 05/23/2018 4.2  3.6 - 5.5 mmol/L Final   • Chloride 05/23/2018 108  96 - 112 mmol/L Final   • Co2 05/23/2018 23  20 - 33 mmol/L Final   • Anion Gap 05/23/2018 10.0  0.0 - 11.9 Final   • Glucose 05/23/2018 125* 65 - 99 mg/dL Final   • Bun 05/23/2018 11  8 - 22 mg/dL Final   • Creatinine 05/23/2018 0.76  0.50 - 1.40 mg/dL Final   • Calcium 05/23/2018 10.1  8.5 - 10.5 mg/dL Final   • AST(SGOT) 05/23/2018 30  12 - 45 U/L Final   • ALT(SGPT) 05/23/2018 19  2 - 50 U/L Final   • Alkaline Phosphatase 05/23/2018 73  30 - 99 U/L Final   • Total Bilirubin 05/23/2018 1.4  0.1 - 1.5 mg/dL Final   • Albumin 05/23/2018 4.4  3.2 - 4.9 g/dL Final   • Total Protein 05/23/2018 7.0  6.0 - 8.2 g/dL Final   • Globulin 05/23/2018 2.6  1.9 - 3.5 g/dL Final   • A-G Ratio 05/23/2018 1.7  g/dL Final   • Alpha Fetoprotein 05/23/2018 7  0 - 9 ng/mL Final    Comment: INTERPRETIVE INFORMATION: Alpha Fetoprotein Tumor Marker  The [place] Access DxI AFP method is used. Results  obtained with different assay methods or kits cannot be used  interchangeably. AFP is a valuable aid in the management of  nonseminomatous testicular cancer patients when used in  conjunction with information available from the clinical  evaluation and other diagnostic procedures. Increased AFP  concentrations have also been observed in ataxia telangiectasia,  hereditary tyrosinemia, primary hepatocellular carcinoma,  teratocarcinoma, gastrointestinal tract cancers with and without  liver metastases, and in benign hepatic conditions such as acute  viral hepatitis, chronic active hepatitis, and cirrhosis. The  result cannot be interpreted as absolute evidence of the presence  or absence of malignant disease. The result is not interpretable  as a tumor marker in pregnant females.  Access complete set of age- and/or gender-specific reference  intervals for this test in the                            [facility] Laboratory  Test Directory  (aruplab.com).  Performed by [employer] [facility],  [address], [facility] [place] [address] [phone]  [email or web address], Abraham Munoz MD - Lab. Director     • PT 05/23/2018 14.7* 12.0 - 14.6 sec Final   • INR 05/23/2018 1.18* 0.87 - 1.13 Final    Comment: INR - Non-therapeutic Reference Range: 0.87-1.13  INR - Therapeutic Reference Range: 2.0-4.0     • GFR If  05/23/2018 >60  >60 mL/min/1.73 m 2 Final   • GFR If Non  05/23/2018 >60  >60 mL/min/1.73 m 2 Final   • Manual Diff Status 05/23/2018 PERFORMED   Final   • Peripheral Smear Review 05/23/2018 see below   Final    Comment: Due to instrument suspect flags, further review of peripheral smear is  indicated on this patient sample. This review may or may not result in  abnormal findings.     • Plt Estimation 05/23/2018 Decreased   Final   • RBC Morphology 05/23/2018 Present   Final    WBC: See previous pathology report on 12;4;2017.   • Poikilocytosis 05/23/2018 1+   Final   • Ovalocytes 05/23/2018 1+   Final   • Smudge Cells 05/23/2018 Moderate   Final             Assessment and Plan:      He agreed and verbalized  agreement and understanding with the current plan.  I answered all questions and concerns at this time         Please note that this dictation was created using voice recognition software. I have made every reasonable attempt to correct obvious errors, but I expect that there are errors of grammar and possibly content that I did not discover before finalizing the note.      SIGNATURES:  Alfonzo Servin    CC:  MARYCRUZ Segovia.P.R.N.  No ref. provider found           No Recipients

## 2018-05-31 NOTE — PROGRESS NOTES
Date of visit: 5/31/2018  10:59 AM      Chief Complaint- follow-up of CLL , 13q deleted        Identification/Prior relevant history:   2004. Screening PSA elevated. Found to have prostate cancer Dr. Ramírez, urologist, treated with brachytherapy at [place]. Followed with observation and serial PSA  March, 2014. Noted to have elevated WBC count  July 15, 2014. Flow cytometry shows CD5 positive B-cell lymphoproliferative disorder with a phenotype most consistent with chronic lymphocytic leukemia or small lymphocytic lymphoma  July 26, 2014. CT chest, abdomen and pelvis showed Hepatosplenomegaly. Slightly enlarged lymph nodes in the subcarinal region and right cardiophrenic angle.  December 11, 2014. Negative for [id number] mutation  January 4, 2016 ultrasound of the abdomen showed Cirrhosis with portal hypertension.Associated dilated portal vein and splenomegaly.No evidence for hepatoma  - 8/2017-he presented with significant iron deficiency anemia requiring iron infusion. Subsequently had upper and lower endoscopy , these were reportedly normal. He does not remember being informed that he has any esophageal viruses.     -12/6/17-CT abdomen- 1.  2.4 cm arterial enhancing mass in segment 4 of the liver demonstrates washout. LR-5.  1.3 cm arterial enhancing lesion in the hepatic dome is similar to the recent MRI. No definite washout or capsule appreciated. LR-3.  Morphologic characteristics of cirrhosis with evidence of portal hypertension including splenomegaly.  -12/2017-established care with me     Interim history- CLL FISH panel showed favorable prognostic marker of 13 qdeletion.   -He is following up with Dr. Senior.imaging of liver mass is suggestive of hepatoma.      -Hepatic venogram-/4/18-Hepatic venography showing a patent right hepatic vein.  2.  Free and wedged right hepatic vein wedge pressures rendering a mean Hepatic Venous Pressure Gradient (HVPG) of 9.67 mmHg. This is consistent with portal  "hypertension.(HVPG >= 10mmHg considered \"clinically significant\" portal HTN  3.  Tranjugular Liver biopsy was not performed.    He is here for a 6 week follow-up. Apparently, he has not followed up with Dr. Senior.   CT of the chest-4/70/18-enlarged mediastinal lymph nodes with right paratracheal lymph nodes measuring up to 15 mm short axis and a subcarinal lymph node measuring 2.9 cm short axis. No hilar adenopathy identified. No axillary lymphadenopathy identified. Multiple nonenlarged axillary lymph nodes identified bilaterally.    . He denies having any acute respiratory symptoms. No hemoptysis or recent pneumonia.      Past Medical History:      Past Medical History:   Diagnosis Date   • Anemia    • Cancer (HCC) 2004    Prostate - did brachytherapy at [place]   • Cirrhosis (HCC)    • CLL (chronic lymphocytic leukemia) (HCC) 2014   • CMV (cytomegalovirus infection) (HCC) 1990    Treated for 6 weeks   • Diabetes (HCC)     oral medication   • Liver tumor        Past surgical history:     No past surgical history on file.    Allergies:       Patient has no known allergies.    Medications:         Current Outpatient Prescriptions   Medication Sig Dispense Refill   • IRON PO Take  by mouth every day.     • Cholecalciferol (VITAMIN D PO) Take  by mouth every day.     • potassium chloride ER (KLOR-CON) 10 MEQ tablet TAKE ONE TABLET BY MOUTH TWICE A DAY (GENERIC KLOR-CON) 180 Tab 1   • metformin ER (GLUCOPHAGE XR) 500 MG TABLET SR 24 HR TAKE TWO TABLETS BY MOUTH DAILY 180 Tab 1   • vitamin A 8000 UNIT capsule Take 8,000 Units by mouth every day.     • doxycycline (VIBRAMYCIN) 100 MG Tab Take 100 mg by mouth 2 times a day.     • Cetirizine HCl (ZYRTEC ALLERGY PO) Take  by mouth.     • fluticasone (FLONASE ALLERGY RELIEF) 50 MCG/ACT nasal spray Spray 1 Spray in nose 2 times a day. 1 Bottle 0     No current facility-administered medications for this visit.          Social History:     Social History     Social " History   • Marital status:      Spouse name: N/A   • Number of children: 1   • Years of education: N/A     Occupational History   • fertilizer sales      Social History Main Topics   • Smoking status: Never Smoker   • Smokeless tobacco: Former User     Types: Snuff     Quit date: 3/29/2015   • Alcohol use No   • Drug use: No   • Sexual activity: No     Other Topics Concern   • Not on file     Social History Narrative    No known exposure to asbestos, dyes, or chemicals, however he was exposed to pesticides.  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  1 child, alive and well.  He also has a step-child.        Family History:      Family History   Problem Relation Age of Onset   • Cancer Father 81     Bladder   • Cancer Brother      Prostate & CLL (s/p 8-10 years ago)   • Hyperlipidemia Sister    • Lung Disease Neg Hx    • Diabetes Neg Hx    • Heart Disease Neg Hx    • Hypertension Neg Hx    • Stroke Neg Hx    • Alcohol/Drug Neg Hx        Review of Systems:  All other review of systems are negative except what was mentioned above in the HPI.    Constitutional: Negative for fever, chills, weight loss and malaise/fatigue.    HEENT: No new auditory or visual complaints. No sore throat and neck pain.     Respiratory: Negative for cough, sputum production, shortness of breath and wheezing.    Cardiovascular: Negative for chest pain, palpitations, orthopnea and leg swelling.    Gastrointestinal: Negative for heartburn, nausea, vomiting and abdominal pain.    Genitourinary: Negative for dysuria, hematuria    Musculoskeletal: No new arthralgias or myalgias   Skin: Negative for rash and itching.    Neurological: Negative for focal weakness and headaches.    Endo/Heme/Allergies: No abnormal bleed/bruise.    Psychiatric/Behavioral: No new depression/anxiety.    Physical Exam:  Vitals: /72   Pulse 63   Temp 37.3 °C (99.1 °F)   Resp 16   Ht 1.715  "m (5' 7.52\")   Wt 98.9 kg (218 lb 0.6 oz)   SpO2 97%   BMI 33.63 kg/m²     General: Not in acute distress, alert and oriented x 3  HEENT: No pallor, icterus. Oropharynx clear.   Neck: Supple, no palpable masses.  Lymph nodes: No palpable cervical, supraclavicular, axillary or inguinal lymphadenopathy.    CVS: regular rate and rhythm, no rubs or gallops  RESP: Clear to auscultate bilaterally, no wheezing or crackles.   ABD: Soft, non tender, non distended, positive bowel sounds, no palpable organomegaly  EXT: No edema or cyanosis  CNS: Alert and oriented x3, No focal deficits.  Skin- No rash      Labs:   No visits with results within 1 Week(s) from this visit.   Latest known visit with results is:   Hospital Outpatient Visit on 05/23/2018   Component Date Value Ref Range Status   • WBC 05/23/2018 53.4* 4.8 - 10.8 K/uL Final    Results confirmed by repeat testing.   • RBC 05/23/2018 5.37  4.70 - 6.10 M/uL Final   • Hemoglobin 05/23/2018 16.3  14.0 - 18.0 g/dL Final   • Hematocrit 05/23/2018 48.9  42.0 - 52.0 % Final   • MCV 05/23/2018 91.1  81.4 - 97.8 fL Final   • MCH 05/23/2018 30.4  27.0 - 33.0 pg Final   • MCHC 05/23/2018 33.3* 33.7 - 35.3 g/dL Final   • RDW 05/23/2018 56.7* 35.9 - 50.0 fL Final   • Platelet Count 05/23/2018 111* 164 - 446 K/uL Final   • MPV 05/23/2018 12.2  9.0 - 12.9 fL Final   • Nucleated RBC 05/23/2018 0.00  /100 WBC Final   • NRBC (Absolute) 05/23/2018 0.00  K/uL Final   • Neutrophils-Polys 05/23/2018 3.50* 44.00 - 72.00 % Final   • Lymphocytes 05/23/2018 91.20* 22.00 - 41.00 % Final   • Monocytes 05/23/2018 5.30  0.00 - 13.40 % Final   • Eosinophils 05/23/2018 0.00  0.00 - 6.90 % Final   • Basophils 05/23/2018 0.00  0.00 - 1.80 % Final   • Neutrophils (Absolute) 05/23/2018 1.87  1.82 - 7.42 K/uL Final    Includes immature neutrophils, if present.   • Lymphs (Absolute) 05/23/2018 48.70* 1.00 - 4.80 K/uL Final   • Monos (Absolute) 05/23/2018 2.83* 0.00 - 0.85 K/uL Final   • Eos (Absolute) " 05/23/2018 0.00  0.00 - 0.51 K/uL Final   • Baso (Absolute) 05/23/2018 0.00  0.00 - 0.12 K/uL Final   • Anisocytosis 05/23/2018 1+   Final   • Microcytosis 05/23/2018 1+   Final   • Sodium 05/23/2018 141  135 - 145 mmol/L Final   • Potassium 05/23/2018 4.2  3.6 - 5.5 mmol/L Final   • Chloride 05/23/2018 108  96 - 112 mmol/L Final   • Co2 05/23/2018 23  20 - 33 mmol/L Final   • Anion Gap 05/23/2018 10.0  0.0 - 11.9 Final   • Glucose 05/23/2018 125* 65 - 99 mg/dL Final   • Bun 05/23/2018 11  8 - 22 mg/dL Final   • Creatinine 05/23/2018 0.76  0.50 - 1.40 mg/dL Final   • Calcium 05/23/2018 10.1  8.5 - 10.5 mg/dL Final   • AST(SGOT) 05/23/2018 30  12 - 45 U/L Final   • ALT(SGPT) 05/23/2018 19  2 - 50 U/L Final   • Alkaline Phosphatase 05/23/2018 73  30 - 99 U/L Final   • Total Bilirubin 05/23/2018 1.4  0.1 - 1.5 mg/dL Final   • Albumin 05/23/2018 4.4  3.2 - 4.9 g/dL Final   • Total Protein 05/23/2018 7.0  6.0 - 8.2 g/dL Final   • Globulin 05/23/2018 2.6  1.9 - 3.5 g/dL Final   • A-G Ratio 05/23/2018 1.7  g/dL Final   • Alpha Fetoprotein 05/23/2018 7  0 - 9 ng/mL Final    Comment: INTERPRETIVE INFORMATION: Alpha Fetoprotein Tumor Marker  The [place] Access DxI AFP method is used. Results  obtained with different assay methods or kits cannot be used  interchangeably. AFP is a valuable aid in the management of  nonseminomatous testicular cancer patients when used in  conjunction with information available from the clinical  evaluation and other diagnostic procedures. Increased AFP  concentrations have also been observed in ataxia telangiectasia,  hereditary tyrosinemia, primary hepatocellular carcinoma,  teratocarcinoma, gastrointestinal tract cancers with and without  liver metastases, and in benign hepatic conditions such as acute  viral hepatitis, chronic active hepatitis, and cirrhosis. The  result cannot be interpreted as absolute evidence of the presence  or absence of malignant disease. The result is not  interpretable  as a tumor marker in pregnant females.  Access complete set of age- and/or gender-specific reference  intervals for this test in the                            [facility] Test Directory  (aruplab.com).  Performed by [facility],  [address], [facility] [place] [address] [phone]  [email or web address], Abraham Munoz MD - Lab. Director     • PT 05/23/2018 14.7* 12.0 - 14.6 sec Final   • INR 05/23/2018 1.18* 0.87 - 1.13 Final    Comment: INR - Non-therapeutic Reference Range: 0.87-1.13  INR - Therapeutic Reference Range: 2.0-4.0     • GFR If  05/23/2018 >60  >60 mL/min/1.73 m 2 Final   • GFR If Non  05/23/2018 >60  >60 mL/min/1.73 m 2 Final   • Manual Diff Status 05/23/2018 PERFORMED   Final   • Peripheral Smear Review 05/23/2018 see below   Final    Comment: Due to instrument suspect flags, further review of peripheral smear is  indicated on this patient sample. This review may or may not result in  abnormal findings.     • Plt Estimation 05/23/2018 Decreased   Final   • RBC Morphology 05/23/2018 Present   Final    WBC: See previous pathology report on 12;4;2017.   • Poikilocytosis 05/23/2018 1+   Final   • Ovalocytes 05/23/2018 1+   Final   • Smudge Cells 05/23/2018 Moderate   Final             Assessment and Plan:  CLL-Odom Stage 0, He is around 4 years out of his diagnosis. Flow cytometry showed CD38 negative phenotype indicating good prognosis. FISH panel testing showed 13, deletion, which is a favorable marker.He has some mild thrombocytopenia, which appears to be more from cirrhosis.. His absolute lymphocyte count has continued to trend up. Interestingly, a CT of the chest shows subcarinal lymph node which is enlarged. He does not have any postobstructive pneumonia or other symptoms. Informed the patient that given the location, we do not want lymphadenopathy. 2. Worsened significantly and we may have to consider treatment at some point. I would favor holding off on  any active treatment until his hepatoma workup is finished.    Liver mass-there is concern for hepatoma. He did not have AFP elevation. Consider testing for DCP.He has not had a biopsy yet. He apparently did not keep his follow-up visit with Dr. Senior. Encouraged him to reestablish care for further follow-up. for evaluation regarding ablation versus resection versus liver transplant., Given his cirrhosis, transplant may not be a bad choice for him.  No Contraindication from CLL standpoint for any surgical intervention.    He agreed and verbalized  agreement and understanding with the current plan.  I answered all questions and concerns at this time         Please note that this dictation was created using voice recognition software. I have made every reasonable attempt to correct obvious errors, but I expect that there are errors of grammar and possibly content that I did not discover before finalizing the note.      SIGNATURES:  Alfonzo Servin    CC:  Madeleine Oliveros A.P.R.NCesario  No ref. provider found

## 2018-06-05 ENCOUNTER — HOSPITAL ENCOUNTER (OUTPATIENT)
Dept: LAB | Facility: MEDICAL CENTER | Age: 68
End: 2018-06-05
Attending: INTERNAL MEDICINE
Payer: MEDICARE

## 2018-06-05 LAB
25(OH)D3 SERPL-MCNC: 33 NG/ML (ref 30–100)
FERRITIN SERPL-MCNC: 32.5 NG/ML (ref 22–322)
IRON SATN MFR SERPL: 27 % (ref 15–55)
IRON SERPL-MCNC: 116 UG/DL (ref 50–180)
TIBC SERPL-MCNC: 426 UG/DL (ref 250–450)

## 2018-06-05 PROCEDURE — 82306 VITAMIN D 25 HYDROXY: CPT

## 2018-06-05 PROCEDURE — 36415 COLL VENOUS BLD VENIPUNCTURE: CPT

## 2018-06-05 PROCEDURE — 84590 ASSAY OF VITAMIN A: CPT

## 2018-06-05 PROCEDURE — 83550 IRON BINDING TEST: CPT

## 2018-06-05 PROCEDURE — 83540 ASSAY OF IRON: CPT

## 2018-06-05 PROCEDURE — 82728 ASSAY OF FERRITIN: CPT

## 2018-06-08 ENCOUNTER — PATIENT OUTREACH (OUTPATIENT)
Dept: OTHER | Facility: MEDICAL CENTER | Age: 68
End: 2018-06-08

## 2018-06-08 ENCOUNTER — HOSPITAL ENCOUNTER (OUTPATIENT)
Dept: RADIOLOGY | Facility: MEDICAL CENTER | Age: 68
End: 2018-06-08
Attending: INTERNAL MEDICINE
Payer: MEDICARE

## 2018-06-08 DIAGNOSIS — D12.3 BENIGN NEOPLASM OF TRANSVERSE COLON: ICD-10-CM

## 2018-06-08 DIAGNOSIS — E55.9 VITAMIN D DEFICIENCY: ICD-10-CM

## 2018-06-08 DIAGNOSIS — E50.9 VITAMIN A DEFICIENCY: ICD-10-CM

## 2018-06-08 DIAGNOSIS — I95.89 OTHER HYPOTENSION: ICD-10-CM

## 2018-06-08 DIAGNOSIS — C91.90 LYMPHOID LEUKEMIA NOT HAVING ACHIEVED REMISSION, UNSPECIFIED LYMPHOID LEUKEMIA TYPE (HCC): ICD-10-CM

## 2018-06-08 DIAGNOSIS — C22.0 LIVER CARCINOMA (HCC): ICD-10-CM

## 2018-06-08 DIAGNOSIS — K51.40 PSEUDOPOLYP OF SIGMOID COLON WITHOUT COMPLICATION (HCC): ICD-10-CM

## 2018-06-08 LAB
ANNOTATION COMMENT IMP: NORMAL
RETINYL PALMITATE SERPL-MCNC: 0.02 MG/L (ref 0–0.1)
VIT A SERPL-MCNC: 0.34 MG/L (ref 0.3–1.2)

## 2018-06-08 PROCEDURE — 74183 MRI ABD W/O CNTR FLWD CNTR: CPT

## 2018-06-08 PROCEDURE — A9581 GADOXETATE DISODIUM INJ: HCPCS | Performed by: INTERNAL MEDICINE

## 2018-06-08 PROCEDURE — 700117 HCHG RX CONTRAST REV CODE 255: Performed by: INTERNAL MEDICINE

## 2018-06-08 RX ADMIN — GADOXETATE DISODIUM 10 ML: 181.43 INJECTION, SOLUTION INTRAVENOUS at 10:30

## 2018-06-08 NOTE — PROGRESS NOTES
"Nurse Navigation Needs Assessment: Met with pt and wife after MRI    Diagnosis: Hepatocellular carcinoma    Plan of Care:  Pt has recently completed staging work up and stated he has an appointment set up with Dr. Senior for 18 to review results and plan of care.    Learning needs:  Pt indicated that he prefers verbal education as he declined offers for written education.    Insurance/Financial:  Pt stated that he has no concerns about insurance coverage or co-pays at this time.    Psychosocial:  Pt is a salesman for plant chemicals.  He admits to being an alcoholic who has recently quit drinking.  He denies history of depression or other mental illness and stated that he previously attended AA and does not \"do groups.\" Support and counseling services were reviewed. He and his wife feel that they are adequate support for each other.    Family history of cancer: Pt stated that he has a hx of prostate cancer and CLL which recently came out of remission.  He also stated that his father  of bladder cancer, and his brother has also had prostate cancer and CLL.    Barriers:  Pt was originally referred to [facility] for lack of follow up care, but pt had verbalized good understanding of plan of care, and has appropriate appointments currently in place.    Clinical Trials: Pt stated he is not interested in participating in clinical trials.    Advanced Directives: Pt stated that he is aware, but his wife is aware of his wishes.    Internal/External Referrals:  Plan to inquire if pt is appropriate for genetic referral.    Treatment Goals: Pt would like to keep the cancer away as long as possible and stated that one of [facility] main [facility]    Distress Score: 0/10 Acuity: 2  "

## 2018-06-22 DIAGNOSIS — Z01.812 PRE-PROCEDURAL LABORATORY EXAMINATION: ICD-10-CM

## 2018-06-22 DIAGNOSIS — Z01.810 PRE-OPERATIVE CARDIOVASCULAR EXAMINATION: ICD-10-CM

## 2018-06-22 LAB
ALBUMIN SERPL BCP-MCNC: 4.2 G/DL (ref 3.2–4.9)
ALBUMIN/GLOB SERPL: 1.7 G/DL
ALP SERPL-CCNC: 71 U/L (ref 30–99)
ALT SERPL-CCNC: 20 U/L (ref 2–50)
ANION GAP SERPL CALC-SCNC: 9 MMOL/L (ref 0–11.9)
ANISOCYTOSIS BLD QL SMEAR: ABNORMAL
AST SERPL-CCNC: 24 U/L (ref 12–45)
BASOPHILS # BLD AUTO: 2.8 % (ref 0–1.8)
BASOPHILS # BLD: 1.51 K/UL (ref 0–0.12)
BILIRUB SERPL-MCNC: 1.2 MG/DL (ref 0.1–1.5)
BUN SERPL-MCNC: 13 MG/DL (ref 8–22)
CALCIUM SERPL-MCNC: 9.6 MG/DL (ref 8.5–10.5)
CHLORIDE SERPL-SCNC: 108 MMOL/L (ref 96–112)
CO2 SERPL-SCNC: 24 MMOL/L (ref 20–33)
CREAT SERPL-MCNC: 0.76 MG/DL (ref 0.5–1.4)
EOSINOPHIL # BLD AUTO: 0 K/UL (ref 0–0.51)
EOSINOPHIL NFR BLD: 0 % (ref 0–6.9)
ERYTHROCYTE [DISTWIDTH] IN BLOOD BY AUTOMATED COUNT: 54.1 FL (ref 35.9–50)
GLOBULIN SER CALC-MCNC: 2.5 G/DL (ref 1.9–3.5)
GLUCOSE SERPL-MCNC: 173 MG/DL (ref 65–99)
HCT VFR BLD AUTO: 46.5 % (ref 42–52)
HGB BLD-MCNC: 15.6 G/DL (ref 14–18)
INR PPP: 1.17 (ref 0.87–1.13)
LYMPHOCYTES # BLD AUTO: 49.9 K/UL (ref 1–4.8)
LYMPHOCYTES NFR BLD: 92.4 % (ref 22–41)
MANUAL DIFF BLD: NORMAL
MCH RBC QN AUTO: 31.2 PG (ref 27–33)
MCHC RBC AUTO-ENTMCNC: 33.5 G/DL (ref 33.7–35.3)
MCV RBC AUTO: 93 FL (ref 81.4–97.8)
MICROCYTES BLD QL SMEAR: ABNORMAL
MONOCYTES # BLD AUTO: 0 K/UL (ref 0–0.85)
MONOCYTES NFR BLD AUTO: 0 % (ref 0–13.4)
MORPHOLOGY BLD-IMP: NORMAL
NEUTROPHILS # BLD AUTO: 2.59 K/UL (ref 1.82–7.42)
NEUTROPHILS NFR BLD: 4.8 % (ref 44–72)
NRBC # BLD AUTO: 0 K/UL
NRBC BLD-RTO: 0 /100 WBC
OVALOCYTES BLD QL SMEAR: NORMAL
PLATELET # BLD AUTO: 106 K/UL (ref 164–446)
PLATELET BLD QL SMEAR: NORMAL
PMV BLD AUTO: 10.6 FL (ref 9–12.9)
POIKILOCYTOSIS BLD QL SMEAR: NORMAL
POTASSIUM SERPL-SCNC: 3.9 MMOL/L (ref 3.6–5.5)
PROT SERPL-MCNC: 6.7 G/DL (ref 6–8.2)
PROTHROMBIN TIME: 14.6 SEC (ref 12–14.6)
RBC # BLD AUTO: 5 M/UL (ref 4.7–6.1)
RBC BLD AUTO: PRESENT
SMUDGE CELLS BLD QL SMEAR: NORMAL
SODIUM SERPL-SCNC: 141 MMOL/L (ref 135–145)
WBC # BLD AUTO: 54 K/UL (ref 4.8–10.8)

## 2018-06-22 PROCEDURE — 85007 BL SMEAR W/DIFF WBC COUNT: CPT

## 2018-06-22 PROCEDURE — 36415 COLL VENOUS BLD VENIPUNCTURE: CPT

## 2018-06-22 PROCEDURE — 93005 ELECTROCARDIOGRAM TRACING: CPT

## 2018-06-22 PROCEDURE — 85027 COMPLETE CBC AUTOMATED: CPT

## 2018-06-22 PROCEDURE — 85610 PROTHROMBIN TIME: CPT

## 2018-06-22 PROCEDURE — 80053 COMPREHEN METABOLIC PANEL: CPT

## 2018-06-22 PROCEDURE — 93010 ELECTROCARDIOGRAM REPORT: CPT | Performed by: INTERNAL MEDICINE

## 2018-06-23 LAB — EKG IMPRESSION: NORMAL

## 2018-06-28 ENCOUNTER — HOSPITAL ENCOUNTER (OUTPATIENT)
Facility: MEDICAL CENTER | Age: 68
End: 2018-06-28
Attending: SURGERY | Admitting: SURGERY
Payer: MEDICARE

## 2018-06-28 VITALS
RESPIRATION RATE: 22 BRPM | BODY MASS INDEX: 33.08 KG/M2 | OXYGEN SATURATION: 93 % | HEIGHT: 68 IN | HEART RATE: 61 BPM | WEIGHT: 218.26 LBS | TEMPERATURE: 98.3 F

## 2018-06-28 DIAGNOSIS — G89.18 POSTOPERATIVE PAIN: ICD-10-CM

## 2018-06-28 DIAGNOSIS — C22.8 CANCER OF LIVER, PRIMARY (HCC): ICD-10-CM

## 2018-06-28 LAB — GLUCOSE BLD-MCNC: 115 MG/DL (ref 65–99)

## 2018-06-28 PROCEDURE — 160002 HCHG RECOVERY MINUTES (STAT): Performed by: SURGERY

## 2018-06-28 PROCEDURE — 160048 HCHG OR STATISTICAL LEVEL 1-5: Performed by: SURGERY

## 2018-06-28 PROCEDURE — 160009 HCHG ANES TIME/MIN: Performed by: SURGERY

## 2018-06-28 PROCEDURE — 700111 HCHG RX REV CODE 636 W/ 250 OVERRIDE (IP)

## 2018-06-28 PROCEDURE — A9270 NON-COVERED ITEM OR SERVICE: HCPCS

## 2018-06-28 PROCEDURE — 501445 HCHG STAPLER, SKIN DISP: Performed by: SURGERY

## 2018-06-28 PROCEDURE — 160029 HCHG SURGERY MINUTES - 1ST 30 MINS LEVEL 4: Performed by: SURGERY

## 2018-06-28 PROCEDURE — 700102 HCHG RX REV CODE 250 W/ 637 OVERRIDE(OP)

## 2018-06-28 PROCEDURE — 501570 HCHG TROCAR, SEPARATOR: Performed by: SURGERY

## 2018-06-28 PROCEDURE — 160035 HCHG PACU - 1ST 60 MINS PHASE I: Performed by: SURGERY

## 2018-06-28 PROCEDURE — 160041 HCHG SURGERY MINUTES - EA ADDL 1 MIN LEVEL 4: Performed by: SURGERY

## 2018-06-28 PROCEDURE — 502571 HCHG PACK, LAP CHOLE: Performed by: SURGERY

## 2018-06-28 PROCEDURE — A6402 STERILE GAUZE <= 16 SQ IN: HCPCS | Performed by: SURGERY

## 2018-06-28 PROCEDURE — 501577 HCHG TROCAR, STEP 11MM: Performed by: SURGERY

## 2018-06-28 PROCEDURE — 700101 HCHG RX REV CODE 250

## 2018-06-28 PROCEDURE — 82962 GLUCOSE BLOOD TEST: CPT

## 2018-06-28 PROCEDURE — 501838 HCHG SUTURE GENERAL: Performed by: SURGERY

## 2018-06-28 RX ORDER — SODIUM CHLORIDE 9 MG/ML
INJECTION, SOLUTION INTRAVENOUS CONTINUOUS
Status: DISCONTINUED | OUTPATIENT
Start: 2018-06-28 | End: 2018-06-28 | Stop reason: HOSPADM

## 2018-06-28 RX ORDER — LIDOCAINE HYDROCHLORIDE 10 MG/ML
0.5 INJECTION, SOLUTION INFILTRATION; PERINEURAL
Status: DISCONTINUED | OUTPATIENT
Start: 2018-06-28 | End: 2018-06-28 | Stop reason: HOSPADM

## 2018-06-28 RX ORDER — PROMETHAZINE HYDROCHLORIDE 25 MG/1
12.5 TABLET ORAL EVERY 6 HOURS PRN
Qty: 30 TAB | Refills: 0 | Status: SHIPPED | OUTPATIENT
Start: 2018-06-28 | End: 2018-07-09

## 2018-06-28 RX ORDER — OXYCODONE HCL 5 MG/5 ML
SOLUTION, ORAL ORAL
Status: COMPLETED
Start: 2018-06-28 | End: 2018-06-28

## 2018-06-28 RX ORDER — LIDOCAINE HYDROCHLORIDE 10 MG/ML
INJECTION, SOLUTION INFILTRATION; PERINEURAL
Status: COMPLETED
Start: 2018-06-28 | End: 2018-06-28

## 2018-06-28 RX ORDER — BUPIVACAINE HYDROCHLORIDE AND EPINEPHRINE 5; 5 MG/ML; UG/ML
INJECTION, SOLUTION EPIDURAL; INTRACAUDAL; PERINEURAL
Status: DISCONTINUED | OUTPATIENT
Start: 2018-06-28 | End: 2018-06-28 | Stop reason: HOSPADM

## 2018-06-28 RX ORDER — TRAMADOL HYDROCHLORIDE 50 MG/1
50-100 TABLET ORAL EVERY 4 HOURS PRN
Qty: 40 TAB | Refills: 1 | Status: SHIPPED | OUTPATIENT
Start: 2018-06-28 | End: 2018-07-09

## 2018-06-28 RX ADMIN — HYDROMORPHONE HYDROCHLORIDE 0.2 MG: 10 INJECTION, SOLUTION INTRAMUSCULAR; INTRAVENOUS; SUBCUTANEOUS at 08:47

## 2018-06-28 RX ADMIN — LIDOCAINE HYDROCHLORIDE 0.5 ML: 10 INJECTION, SOLUTION INFILTRATION; PERINEURAL at 06:25

## 2018-06-28 RX ADMIN — OXYCODONE HYDROCHLORIDE 10 MG: 5 SOLUTION ORAL at 08:25

## 2018-06-28 RX ADMIN — FENTANYL CITRATE 25 MCG: 50 INJECTION, SOLUTION INTRAMUSCULAR; INTRAVENOUS at 08:31

## 2018-06-28 RX ADMIN — HYDROMORPHONE HYDROCHLORIDE 0.2 MG: 10 INJECTION, SOLUTION INTRAMUSCULAR; INTRAVENOUS; SUBCUTANEOUS at 08:52

## 2018-06-28 RX ADMIN — SODIUM CHLORIDE: 9 INJECTION, SOLUTION INTRAVENOUS at 06:25

## 2018-06-28 ASSESSMENT — PAIN SCALES - GENERAL
PAINLEVEL_OUTOF10: 5
PAINLEVEL_OUTOF10: 0
PAINLEVEL_OUTOF10: 5
PAINLEVEL_OUTOF10: 3
PAINLEVEL_OUTOF10: 6
PAINLEVEL_OUTOF10: 3

## 2018-06-28 NOTE — OR NURSING
Patient is alert, oriented, vital signs stable, denies nausea, states his pain is tolerable, incision sites CDI, up to bathroom to void with steady gait. Discharge instructions reviewed with patient and spouse, copy of instructions and prescription for Tramadol given to patient. Discharged via wheelchair with RN.

## 2018-06-28 NOTE — OP REPORT
DATE OF SERVICE:  06/28/2018    INDICATIONS:  Patient is a 67-year-old male patient known to me who was found   to have 2 hepatomas questionable.  The patient at that point was scheduled for   laparoscopic ablation.    SURGEON:  Feliberto Senior MD    ASSISTANT SURGEON:  None.    ANESTHESIA:  General and local anesthetic.    ESTIMATED BLOOD LOSS:  Less than 5 mL.    COMPLICATIONS:  None.    FINDINGS:  Patient was found to have 2 tumors, one in segment 4B, which was   able to be ablated measuring approximately a centimeter in size, correlating   with what was found on MRI, the second lesion what appeared be 4A adjacent to   a large portion of the right portal pedicle within 0.5 cm, which was not   ablated due to its intimacy with the pedicle and the patient will be sent for   chemoembolization to manage that tumor.    PREOPERATIVE DIAGNOSES:  Cirrhosis, portal hypertension and hepatoma.    POSTOPERATIVE DIAGNOSES:  Cirrhosis, portal hypertension and hepatoma.    PROCEDURE DONE:  1.  Laparoscopic microwave thermal ablation of segment 4B lesion at 100 flowers   at 2 minutes giving us an ablation cavity of 3x3.7 with a 100 watt track   ablation of 7 seconds.  2.  Repair of incarcerated ventral hernia primarily with no mesh.    DESCRIPTION OF PROCEDURE:  The patient was brought to OR, placed under general   anesthetic, prepped with chlorhexidine prep and sterile drapes, given   preoperative antibiotics.  Time-out was done, which was adequate.  At that   point, he was given 5 mL of 0.5% Marcaine infraumbilically.  A curvilinear   incision was made with an 11 blade, and between 2 Kochers and Carol, the   abdomen was opened under direct vision atraumatically.  Upon entering the   abdomen, a Keenan trocar was inserted.  Abdomen was insufflated 15 mm of   pressure.  He was placed in reverse Trendelenburg exposing the right upper   quadrant.  At that point, a 5 mm port was placed percutaneously under ____    atraumatically in the right anterior axillary line as well as a secondary 11   spreading non-cutting trocar in the midline between the xiphoid and the   umbilicus because of the patient's length.  It was extremely long, so at that   point, we then removed our ultrasound up to the upper port.  I did ultrasound   survey of both the right and left lobe and identified 2 tumors, one what   appeared to be segment 4A within 0.5 cm of the main portal pedicle on the   right.  This measured approximately 2.5 cm.  Because of its size and location,   we elected not to proceed with ablation of this tumor because of the concern   for injury to the portal pedicle.  At that point, we found a secondary tumor   which was similar to the one found on MRI in segment 4B measuring   approximately a centimeter in size.  At that point, we elected to proceed with   ablation.  We placed a laparoscopic microwave thermal ablation probe through   a stab wound in the anterior abdominal wall after given local anesthetic and   under ultrasound guidance, we placed it adjacent to it slightly inside the   tumor.  We did a 100 watt at 2-minute ablation giving us an ablation cavity of   3x3.7 cm.  Reinspection showed no bleeding.  After track ablation and after   desufflating the abdomen, placed in supine position, and reinflated, 5 minutes   later there was no bleeding or bile leak.  We re-ultrasounded liver and could   not identify the tumor anymore.  At that point, the operation was completed.    We desufflated the abdomen.  As of note, the patient was found to have an   incarcerated ventral hernia just superior to our port.  At that point, we   extended our incision slightly, gave local anesthetic and then closed that   primarily with 0 Vicryl CTX pop offs in a figure-of-eight fashion x2.  No mesh   was placed due to the patient's cirrhosis and concern for future ascites.  At   that point, the operation was completed.  We gave a total of 30 mL of  0.5%   Marcaine with epinephrine throughout the area of the abdomen.  Staples were   used for skin, 2x2, Tegaderm, extubated, and transferred to recovery.       ____________________________________     MD LORI BRINK / NTS    DD:  06/28/2018 08:17:41  DT:  06/28/2018 08:59:50    D#:  [id number]  Job#:  [id number]

## 2018-06-28 NOTE — DISCHARGE INSTRUCTIONS
ACTIVITY: Rest and take it easy for the first 24 hours.  A responsible adult is recommended to remain with you during that time.  It is normal to feel sleepy.  We encourage you to not do anything that requires balance, judgment or coordination.    MILD FLU-LIKE SYMPTOMS ARE NORMAL. YOU MAY EXPERIENCE GENERALIZED MUSCLE ACHES, THROAT IRRITATION, HEADACHE AND/OR SOME NAUSEA.    FOR 24 HOURS DO NOT:  Drive, operate machinery or run household appliances.  Drink beer or alcoholic beverages.   Make important decisions or sign legal documents.    SPECIAL INSTRUCTIONS & SURGICAL DRESSING/BATHIN) OK to shower in AM with dressings on   2) Remove dressing in 6 days   3) Follow up with Dr. Senior in 4 weeks after repeat CT of liver   4) Follow up with office MA in 14 days for staple removal    DIET: To avoid nausea, slowly advance diet as tolerated, avoiding spicy or greasy foods for the first day.  Add more substantial food to your diet according to your physician's instructions.  Babies can be fed formula or breast milk as soon as they are hungry.  INCREASE FLUIDS AND FIBER TO AVOID CONSTIPATION.    FOLLOW-UP APPOINTMENT:  A follow-up appointment should be arranged with your doctor in *14 days for staple removal, 4 weeks with Dr. WALKER and repeat CT of liver*; call to schedule.    You should CALL YOUR PHYSICIAN if you develop:  Fever greater than 101 degrees F.  Pain not relieved by medication, or persistent nausea or vomiting.  Excessive bleeding (blood soaking through dressing) or unexpected drainage from the wound.  Extreme redness or swelling around the incision site, drainage of pus or foul smelling drainage.  Inability to urinate or empty your bladder within 8 hours.  Problems with breathing or chest pain.    You should call 911 if you develop problems with breathing or chest pain.  If you are unable to contact your doctor or surgical center, you should go to the nearest emergency room or urgent care center.   Physician's telephone #: *Dr. Senior [phone]*    If any questions arise, call your doctor.  If your doctor is not available, please feel free to call the Surgical Center at [phone].  The Center is open Monday through Friday from 7AM to 7PM.  You can also call the HEALTH HOTLINE open 24 hours/day, 7 days/week and speak to a nurse at [phone], or toll free at [phone].    A registered nurse may call you a few days after your surgery to see how you are doing after your procedure.    MEDICATIONS: Resume taking daily medication.  Take prescribed pain medication with food.  If no medication is prescribed, you may take non-aspirin pain medication if needed.  PAIN MEDICATION CAN BE VERY CONSTIPATING.  Take a stool softener or laxative such as senokot, pericolace, or milk of magnesia if needed.    Prescription given for *Phenergan and Tramadol*.  Last pain medication (Oxycodone) given at 0830.    If your physician has prescribed pain medication that includes Acetaminophen (Tylenol), do not take additional Acetaminophen (Tylenol) while taking the prescribed medication.    Depression / Suicide Risk    As you are discharged from this [facility] facility, it is important to learn how to keep safe from harming yourself.    Recognize the warning signs:  · Abrupt changes in personality, positive or negative- including increase in energy   · Giving away possessions  · Change in eating patterns- significant weight changes-  positive or negative  · Change in sleeping patterns- unable to sleep or sleeping all the time   · Unwillingness or inability to communicate  · Depression  · Unusual sadness, discouragement and loneliness  · Talk of wanting to die  · Neglect of personal appearance   · Rebelliousness- reckless behavior  · Withdrawal from people/activities they love  · Confusion- inability to concentrate     If you or a loved one observes any of these behaviors or has concerns about self-harm, here's  what you can do:  · Talk about it- your feelings and reasons for harming yourself  · Remove any means that you might use to hurt yourself (examples: pills, rope, extension cords, firearm)  · Get professional help from the community (Mental Health, Substance Abuse, psychological counseling)  · Do not be alone:Call your Safe Contact- someone whom you trust who will be there for you.  · Call your local CRISIS HOTLINE 934-9908 or 786-442-0791  · Call your local Children's Mobile Crisis Response Team [place] (233) 276-5395 or www.[employer]  · Call the toll free National Suicide Prevention Hotlines   · National Suicide Prevention Lifeline 618-901-PNFC (8709)  · National Hope Line Network 800-SUICIDE (497-2742)

## 2018-07-05 ENCOUNTER — TELEPHONE (OUTPATIENT)
Dept: MEDICAL GROUP | Facility: PHYSICIAN GROUP | Age: 68
End: 2018-07-05

## 2018-07-05 NOTE — TELEPHONE ENCOUNTER
Future Appointments       Provider Department Center    7/9/2018 11:25 AM Carmencita Hand P.A.-C. [facility]    8/7/2018 10:20 AM Alfonzo Servin M.D. [facility]     9/5/2018 10:05 AM Carmencita Hand P.A.-C. [facility]        ESTABLISHED PATIENT PRE-VISIT PLANNING     Note: Patient will not be contacted if there is no indication to call.     1.  Reviewed notes from the last few office visits within the medical group: Yes    2.  If any orders were placed at last visit or intended to be done for this visit (i.e. 6 mos follow-up), do we have Results/Consult Notes?        •  Labs - Labs ordered, completed on 06/22/18 and results are in chart.       •  Imaging - Imaging ordered, completed and results are in chart.       •  Referrals - No referrals were ordered at last office visit.    3. Is this appointment scheduled as a Hospital Follow-Up? No    4.  Immunizations were updated in [employer] using WebIZ?: Yes       •  Web Iz Recommendations: FLU, TDAP and ZOSTAVAX (Shingles)    5.  Patient is due for the following Health Maintenance Topics:   Health Maintenance Due   Topic Date Due   • Annual Wellness Visit  1950   • DIABETES MONOFILAMENT / LE EXAM  05/26/1951   • FASTING LIPID PROFILE  10/18/2017       6.  MDX printed for Provider? NO INS MCR     7.  Patient was informed to arrive 15 min prior to their scheduled appointment and bring in their medication bottles. JULIETTE

## 2018-07-09 ENCOUNTER — OFFICE VISIT (OUTPATIENT)
Dept: MEDICAL GROUP | Facility: PHYSICIAN GROUP | Age: 68
End: 2018-07-09
Payer: MEDICARE

## 2018-07-09 VITALS
HEART RATE: 78 BPM | SYSTOLIC BLOOD PRESSURE: 138 MMHG | WEIGHT: 218 LBS | BODY MASS INDEX: 33.04 KG/M2 | TEMPERATURE: 99.3 F | OXYGEN SATURATION: 98 % | DIASTOLIC BLOOD PRESSURE: 80 MMHG | HEIGHT: 68 IN

## 2018-07-09 DIAGNOSIS — C91.10 CLL (CHRONIC LYMPHOCYTIC LEUKEMIA) (HCC): ICD-10-CM

## 2018-07-09 DIAGNOSIS — F10.21 ALCOHOLISM IN REMISSION (HCC): ICD-10-CM

## 2018-07-09 DIAGNOSIS — C22.0 HEPATOMA (HCC): ICD-10-CM

## 2018-07-09 DIAGNOSIS — E11.8 CONTROLLED TYPE 2 DIABETES MELLITUS WITH COMPLICATION, WITHOUT LONG-TERM CURRENT USE OF INSULIN (HCC): ICD-10-CM

## 2018-07-09 PROCEDURE — 99214 OFFICE O/P EST MOD 30 MIN: CPT | Performed by: PHYSICIAN ASSISTANT

## 2018-07-09 RX ORDER — METFORMIN HYDROCHLORIDE 500 MG/1
1000 TABLET, EXTENDED RELEASE ORAL DAILY
Qty: 180 TAB | Refills: 1 | Status: SHIPPED | OUTPATIENT
Start: 2018-07-09 | End: 2018-09-28 | Stop reason: SDUPTHER

## 2018-07-09 NOTE — ASSESSMENT & PLAN NOTE
"Currently treated with metformin ER 1000 mg daily. Patient states that he dropped down to only one tablet per day at one point but then increased back up to 2 tablets daily after he started getting some \"tingling, stinging\" pains on his body wherever his bed sheets would touch him at night. He states that he attributed this to his diabetes. Since increasing the dose, this is not happening very often anymore. His last A1c was in 2/2018. He is needing refills of his metformin today. He states that he still maintains a pretty high sugar diet. He states that he has had a cut out smoking and drinking and is unable to cut back on his sugars as well. He endorses eating \"massive amounts of ice cream.\" He does not check his blood sugars at all at home and states he never well.  "

## 2018-07-09 NOTE — ASSESSMENT & PLAN NOTE
"History of liver cirrhosis and found to have hepatoma. Recently had surgery end of last month with Dr. Senior. Patient states was unable to remove a lesion close to blood vessel and will likely be getting \"seeds\" placed. Also follows with GI--Martha Bartlett at [facility].   "

## 2018-07-09 NOTE — LETTER
[employer]  XAVI Segovia  [address]  [place] [address]  Fax: [phone]   Authorization for Release/Disclosure of   Protected Health Information   Name: SOHAIL KAYE : 1950 SSN: [id number]   Address: [address] [place] [address]  [place] [address] Phone:    [phone] (home)    I authorize the entity listed below to release/disclose the PHI below to:   [employer] and Carmencita Hand P.A.-C.   Provider or Entity Name:  Dr. Thomas/ [facility]    Address   City, State, [facility]   Phone:      Fax:  [phone]   Reason for request: continuity of care   Information to be released:    [  ] LAST COLONOSCOPY,  including any PATH REPORT and follow-up  [  ] LAST FIT/COLOGUARD RESULT [  ] LAST DEXA  [  ] LAST MAMMOGRAM  [  ] LAST PAP  [  ] LAST LABS [  ] RETINA EXAM REPORT  [  ] IMMUNIZATION RECORDS  [  ] Release all info      [  ] Check here and initial the line next to each item to release ALL health information INCLUDING  _____ Care and treatment for drug and / or alcohol abuse  _____ HIV testing, infection status, or AIDS  _____ Genetic Testing    DATES OF SERVICE OR TIME PERIOD TO BE DISCLOSED: _____________  I understand and acknowledge that:  * This Authorization may be revoked at any time by you in writing, except if your health information has already been used or disclosed.  * Your health information that will be used or disclosed as a result of you signing this authorization could be re-disclosed by the recipient. If this occurs, your re-disclosed health information may no longer be protected by State or Federal laws.  * You may refuse to sign this Authorization. Your refusal will not affect your ability to obtain treatment.  * This Authorization becomes effective upon signing and will  on (date) __________.      If no date is indicated, this Authorization will  one (1) year from the signature date.    Name: Sohail Rees  Jadiel    Signature:   Date:     7/9/2018       PLEASE FAX REQUESTED RECORDS BACK TO: [phone]

## 2018-07-09 NOTE — PROGRESS NOTES
"Subjective:   Sohail Duvall is a 67 y.o. male here today for follow-up on diabetes, CLL, hepatoma. He is new to me, will be formally establishing care with me in 9/2018.    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 No current alcohol use per patient. Has been in remission for one year.    Controlled diabetes mellitus type 2 with complications (CMS-HCC) (HCC)  Currently treated with metformin ER 1000 mg daily. Patient states that he dropped down to only one tablet per day at one point but then increased back up to 2 tablets daily after he started getting some \"tingling, stinging\" pains on his body wherever his bed sheets would touch him at night. He states that he attributed this to his diabetes. Since increasing the dose, this is not happening very often anymore. His last A1c was in 2/2018. He is needing refills of his metformin today. He states that he still maintains a pretty high sugar diet. He states that he has had a cut out smoking and drinking and is unable to cut back on his sugars as well. He endorses eating \"massive amounts of ice cream.\" He does not check his blood sugars at all at home and states he never well.    CLL (chronic lymphocytic leukemia) (CMS-HCC)  Diagnosed in June 2014. Currently following with oncology, Dr. Servin.    Hepatoma (HCC)  History of liver cirrhosis and found to have hepatoma. Recently had surgery end of last month with Dr. Senior. Patient states was unable to remove a lesion close to blood vessel and will likely be getting \"seeds\" placed. Also follows with GI--Martha Bartlett at [facility].        Current medicines (including changes today)  Current Outpatient Prescriptions   Medication Sig Dispense Refill   • metFORMIN ER (GLUCOPHAGE XR) 500 MG TABLET SR 24 HR Take 2 Tabs by mouth every day. 180 Tab 1   • Multiple Vitamins-Minerals (MULTIVITAMIN PO) Take  by mouth every day.     • Polyethylene Glycol " "3350 (MIRALAX PO) Take 1 Cap by mouth every day.     • Cetirizine HCl (ZYRTEC ALLERGY PO) Take 1 Tab by mouth every day.     • fluticasone (FLONASE ALLERGY RELIEF) 50 MCG/ACT nasal spray Spray 1 Spray in nose 2 times a day. 1 Bottle 0     No current facility-administered medications for this visit.      He  has a past medical history of Anemia; Cancer (HCC) (2004); Cancer (HCC); Cirrhosis (HCC); CLL (chronic lymphocytic leukemia) (HCC) (2014); CMV (cytomegalovirus infection) (HCC) (1990); Diabetes (HCC); Liver cancer (HCC); Liver tumor; and Rosacea.    ROS  Pulmonary ROS: No shortness of breath  Cardiovascular ROS: No chest pain     Objective:     Blood pressure 138/80, pulse 78, temperature 37.4 °C (99.3 °F), height 1.715 m (5' 7.52\"), weight 98.9 kg (218 lb), SpO2 98 %. Body mass index is 33.62 kg/m².     Physical Exam:  Constitutional: Alert, no distress.  Skin: Warm, dry, good turgor, no rashes in visible areas.  Eye: Conjunctiva clear, lids normal.  ENMT: Lips without lesions, moist mucus membranes.        Assessment and Plan:   The following treatment plan was discussed    1. Controlled type 2 diabetes mellitus with complication, without long-term current use of insulin (HCC)  Chronic issue, well controlled per most recent A1c from 2/2018 of 6.9. Patient advised to continue 1000 mg of metformin ER daily. Refills ordered. Discussed low sugar diet which he states he is unable to adhere to. Discussed importance of checking daily fasting sugars which he also refuses to do. We'll plan on recheck of A1c at the end of August. Further recommendations pending those results.  - metFORMIN ER (GLUCOPHAGE XR) 500 MG TABLET SR 24 HR; Take 2 Tabs by mouth every day.  Dispense: 180 Tab; Refill: 1    2. Hepatoma (HCC)  Established problem, stable. Will continue to follow with Dr. Burgos for further treatment and monitoring.     3. CLL (chronic lymphocytic leukemia) (HCC)  Established problem, stable. We'll continue to " follow with oncology for further treatment and monitoring.    4. Alcoholism in remission (HCC)  Chronic issue, well controlled. Has been sober for one year now.      Followup: Return for establish care; Short.    Carmencita Hand P.A.-C.

## 2018-07-16 ENCOUNTER — HOSPITAL ENCOUNTER (OUTPATIENT)
Facility: MEDICAL CENTER | Age: 68
End: 2018-07-16
Payer: MEDICARE

## 2018-07-16 ENCOUNTER — HOSPITAL ENCOUNTER (OUTPATIENT)
Facility: MEDICAL CENTER | Age: 68
End: 2018-07-16
Attending: RADIOLOGY | Admitting: RADIOLOGY
Payer: MEDICARE

## 2018-07-16 ENCOUNTER — HOSPITAL ENCOUNTER (OUTPATIENT)
Dept: RADIOLOGY | Facility: MEDICAL CENTER | Age: 68
End: 2018-07-16
Attending: SURGERY

## 2018-07-16 DIAGNOSIS — C22.9 MALIGNANT NEOPLASM OF LIVER, UNSPECIFIED LIVER MALIGNANCY TYPE (HCC): ICD-10-CM

## 2018-07-16 ASSESSMENT — ENCOUNTER SYMPTOMS
NAUSEA: 0
BLOOD IN STOOL: 0
ABDOMINAL PAIN: 1
VOMITING: 0
FEVER: 0
WEIGHT LOSS: 0
ROS SKIN COMMENTS: NEGATIVE FOR JAUNDICE
WEAKNESS: 0
CHILLS: 0
HEARTBURN: 0

## 2018-07-16 NOTE — CONSULTS
Interventional Oncology Consultation      Re: Sohail Duvall     MRN: [id number]   : 1950    Sohail Duvall was referred by Feliberto Senior MD. He is a 67 y.o. male seen in clinic for evaluation and possible intervention of unresectable hepatomas. He is also under the care of Carmencita Hand PA-C, Alfonzo Servin MD, and Johanna Hooper MD.    History of Present Illness:   has a history of CLL and alcoholic cirrhosis. Recent imaging revealed hepatomas in 4A and 4B. On 2018 the lesion in 4B was ablated but the operative ablation of a 4A lesion was unsuccessful due to proximity to the portal pedicle. He has not had a liver mass biopsy.  has been referred to interventional radiology for evaluation and management. Review of the imaging reveals additional small LR-3 lesions that were not present on prior scans. Today, Mr. Duvall complains of residual mild post operative abdominal pain. He denies jaundice, bleeding, nausea and vomiting, and fatigue. He describes an isolated episode of blood in his stool several months ago for which he underwent a workup by his GI physician and has not recurred. His weight is stable and he denies unintentional weight loss. He reports His appetite is good. His energy level is normal. He is followed by Dr. Servin for CLL and an enlarging subcarinal lymph node with treatment deferred until the hepatomas are addressed. He works in sales and anticipates alf soon. He is accompanied by his wife to the clinic appointment today.    He is seen today for review of imaging studies and discussion of possible transarterial therapy with Y90 radioembolization or chemoembolization.     Past Medical History:   Diagnosis Date   • Anemia    • Cancer (HCC)     Prostate - did brachytherapy at [place]   • Cancer (HCC)     Liver   • Cirrhosis (HCC)    • CLL (chronic lymphocytic leukemia) (HCC)    • CMV (cytomegalovirus  "infection) (HCC) 1990    Treated for 6 weeks   • Diabetes (HCC)     oral medication   • Liver cancer (HCC)    • Liver tumor    • Rosacea      Past Surgical History:   Procedure Laterality Date   • HEPATIC ABLATION LAPAROSCOPIC  6/28/2018    Procedure: HEPATIC ABLATION LAPAROSCOPIC. SEGMENT 4B, HEPATOMA, REPAIR OF INCARCERATED UMBILICAL HERNIA;  Surgeon: Feliberto Burgos M.D.;  Location: SURGERY [facility];  Service: General   • BRACHY THERAPY       Operative note June 28,2018 at [facility] (Feliberto Senior MD):  PROCEDURE DONE:  1.  Laparoscopic microwave thermal ablation of segment 4B lesion at 100 flowers   at 2 minutes giving us an ablation cavity of 3x3.7 with a 100 watt track   ablation of 7 seconds.  2.  Repair of incarcerated ventral hernia primarily with no mesh.    Social History     Social History   • Marital status:      Spouse name: N/A   • Number of children: 1   • Years of education: N/A     Occupational History   • fertilizer sales      Social History Main Topics   • Smoking status: Never Smoker   • Smokeless tobacco: Former User     Types: Snuff     Quit date: 3/29/2015   • Alcohol use No   • Drug use: Yes     Types: Marijuana      Comment: \"Marijuana Use\"   • Sexual activity: No     Other Topics Concern   • Not on file     Social History Narrative    No known exposure to asbestos, dyes, or chemicals, however he was exposed to pesticides.  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  1 child, alive and well.  He also has a step-child.      Family History   Problem Relation Age of Onset   • Cancer Father 81     Bladder   • Cancer Brother      Prostate & CLL (s/p 8-10 years ago)   • Hyperlipidemia Sister    • Lung Disease Neg Hx    • Diabetes Neg Hx    • Heart Disease Neg Hx    • Hypertension Neg Hx    • Stroke Neg Hx    • Alcohol/Drug Neg Hx        Review of Systems   Constitutional: Negative for chills, fever, " malaise/fatigue and weight loss.   Gastrointestinal: Positive for abdominal pain. Negative for blood in stool, heartburn, melena, nausea and vomiting.   Skin:        Negative for jaundice   Neurological: Negative for weakness.     A comprehensive 14-point review of systems was negative except as described above.     Labs:      Ref. Range 4/12/2018 11:09 5/23/2018 12:10 6/5/2018 09:40 6/22/2018 14:23   WBC Latest Ref Range: 4.8 - 10.8 K/uL 40.6 (HH) 53.4 (HH)  54.0 (HH)   RBC Latest Ref Range: 4.70 - 6.10 M/uL 5.35 5.37  5.00   Hemoglobin Latest Ref Range: 14.0 - 18.0 g/dL 15.6 16.3  15.6   Hematocrit Latest Ref Range: 42.0 - 52.0 % 47.8 48.9  46.5   MCV Latest Ref Range: 81.4 - 97.8 fL 89.3 91.1  93.0   MCH Latest Ref Range: 27.0 - 33.0 pg 29.2 30.4  31.2   MCHC Latest Ref Range: 33.7 - 35.3 g/dL 32.6 (L) 33.3 (L)  33.5 (L)   RDW Latest Ref Range: 35.9 - 50.0 fL 59.7 (H) 56.7 (H)  54.1 (H)   Platelet Count Latest Ref Range: 164 - 446 K/uL 97 (L) 111 (L)  106 (L)   MPV Latest Ref Range: 9.0 - 12.9 fL 11.6 12.2  10.6   Neutrophils-Polys Latest Ref Range: 44.00 - 72.00 % 3.40 (L) 3.50 (L)  4.80 (L)   Neutrophils (Absolute) Latest Ref Range: 1.82 - 7.42 K/uL 1.38 (L) 1.87  2.59   Lymphocytes Latest Ref Range: 22.00 - 41.00 % 94.00 (H) 91.20 (H)  92.40 (H)   Lymphs (Absolute) Latest Ref Range: 1.00 - 4.80 K/uL 38.16 (H) 48.70 (H)  49.90 (H)   Monocytes Latest Ref Range: 0.00 - 13.40 % 1.70 5.30  0.00   Monos (Absolute) Latest Ref Range: 0.00 - 0.85 K/uL 0.69 2.83 (H)  0.00   Eosinophils Latest Ref Range: 0.00 - 6.90 % 0.90 0.00  0.00   Eos (Absolute) Latest Ref Range: 0.00 - 0.51 K/uL 0.37 0.00  0.00   Basophils Latest Ref Range: 0.00 - 1.80 % 0.00 0.00  2.80 (H)   Baso (Absolute) Latest Ref Range: 0.00 - 0.12 K/uL 0.00 0.00  1.51 (H)   Nucleated RBC Latest Units: /100 WBC 0.00 0.00  0.00   NRBC (Absolute) Latest Units: K/uL 0.00 0.00  0.00   Plt Estimation Unknown Decreased Decreased  Decreased   RBC Morphology Unknown  Present Present  Present   Anisocytosis Unknown 1+ 1+  1+   Microcytosis Unknown  1+  1+   Poikilocytosis Unknown 1+ 1+  1+   Ovalocytes Unknown 1+ 1+  1+   Smudge Cells Unknown  Moderate  Moderate   Peripheral Smear Review Unknown see below see below  see below   Manual Diff Status Unknown PERFORMED PERFORMED  PERFORMED   Sodium Latest Ref Range: 135 - 145 mmol/L 141 141  141   Potassium Latest Ref Range: 3.6 - 5.5 mmol/L 4.6 4.2  3.9   Chloride Latest Ref Range: 96 - 112 mmol/L 108 108  108   Co2 Latest Ref Range: 20 - 33 mmol/L 24 23  24   Anion Gap Latest Ref Range: 0.0 - 11.9  9.0 10.0  9.0   Glucose Latest Ref Range: 65 - 99 mg/dL 156 (H) 125 (H)  173 (H)   Bun Latest Ref Range: 8 - 22 mg/dL 12 11  13   Creatinine Latest Ref Range: 0.50 - 1.40 mg/dL 0.80 0.76  0.76   GFR If  Latest Ref Range: >60 mL/min/1.73 m 2 >60 >60  >60   GFR If Non  Latest Ref Range: >60 mL/min/1.73 m 2 >60 >60  >60   Calcium Latest Ref Range: 8.5 - 10.5 mg/dL 9.6 10.1  9.6   AST(SGOT) Latest Ref Range: 12 - 45 U/L 36 30  24   ALT(SGPT) Latest Ref Range: 2 - 50 U/L 21 19  20   Alkaline Phosphatase Latest Ref Range: 30 - 99 U/L 81 73  71   Total Bilirubin Latest Ref Range: 0.1 - 1.5 mg/dL 0.8 1.4  1.2   Albumin Latest Ref Range: 3.2 - 4.9 g/dL 4.3 4.4  4.2   Total Protein Latest Ref Range: 6.0 - 8.2 g/dL 7.0 7.0  6.7   Globulin Latest Ref Range: 1.9 - 3.5 g/dL 2.7 2.6  2.5   A-G Ratio Latest Units: g/dL 1.6 1.7  1.7      Ref. Range 4/2/2018 12:42 5/23/2018 12:10 6/22/2018 14:23   PT Latest Ref Range: 12.0 - 14.6 sec 15.6 (H) 14.7 (H) 14.6   INR Latest Ref Range: 0.87 - 1.13  1.27 (H) 1.18 (H) 1.17 (H)      Ref. Range 9/8/2017 12:02 12/4/2017 10:27 2/21/2018 12:25 5/23/2018 12:10 6/5/2018 09:40   Alpha Fetoprotein Latest Ref Range: 0 - 9 ng/mL 7   7      Child Gallow[place] Class A  JIM Grade A1    Pathology:  None available    Radiology:   MRI abdomen on June 8, 2018 at [facility]:  Examination is limited by patient  motion.  2.1 x 1.9 cm arterial hyperenhancing lesion within the left lobe of the liver appears compatible with hepatocellular carcinoma. LR-5    Hyperenhancing lesion within segment 4 of the liver bordering the caudate lobe measures 3 x 2 cm and is increased in size compared to prior. This lesion is compatible with hepatocellular carcinoma. LR-5    10 mm hyperenhancing lesion within segment 4 may be slightly decreased or unchanged in size compared to prior. LR-3    3 other small hyperenhancing lesions are seen within the right lobe of the liver measuring up to 1 cm. LR-3    Heterogeneous wedge-shaped area of delayed hypo-enhancement in the anterior liver is likely related to variable perfusion.    Findings of cirrhosis and portal hypertension.    Mildly prominent lymph nodes in the cardiophrenic fat are not significantly changed.    9 mm cystic lesion in the pancreatic tail could represent a small side branch IPMN.    Cholelithiasis. No biliary ductal dilatation is seen.    Subcarinal lymphadenopathy is partially imaged.    2 cm left adrenal nodule is unchanged and likely an adrenal adenoma.    Trace free fluid in the abdomen.    Findings discussed with Dr. Senior      CT thorax April 17, 2018 at [facility]:  1.  Mediastinal lymphadenopathy with markedly enlarged subcarinal lymph node could be due to metastatic disease or lymphoma. Reactive lymph nodes or lymphadenopathy due to granulomatous disease seems less likely.  2.  Coarse coronary artery calcifications.  3.  Cirrhosis with hypervascular hepatic lesions and splenomegaly.  4.  Cholelithiasis.    Nuclear medicine bone study April 17, 2018 at [facility]:  No scintigraphic evidence of bony metastatic disease    Portal pressure gradient April 4, 2018 at [facility]:  1.  Hepatic venography showing a patent right hepatic vein.  2.  Free and wedged right hepatic vein wedge pressures rendering a mean Hepatic Venous Pressure Gradient (HVPG) of 9.67 mmHg. This is consistent  "with portal hypertension.  (HVPG >= 10mmHg considered \"clinically significant\" portal HTN**  3.  Transjugular Liver biopsy was not performed at this time..  **Reference: Hepatic Venous Pressure Gradient in 2010:Optimal Measurement is Jose. Anya MCINTYRE, Wanda RODRIGUEZ. HEPATOLOGY, Vol. 51, No. 6, 2010    CT abdomen December 6, 2017 at [facility]:  1.  2.4 cm arterial enhancing mass in segment 4 of the liver demonstrates washout. LR-5.  2.  1.3 cm arterial enhancing lesion in the hepatic dome is similar to the recent MRI. No definite washout or capsule appreciated. LR-3  3.  Morphologic characteristics of cirrhosis with evidence of portal hypertension including splenomegaly.  4.  Cholelithiasis.  5.  Stable left adrenal nodule, likely a benign adenoma.      Current Outpatient Prescriptions   Medication Sig Dispense Refill   • metFORMIN ER (GLUCOPHAGE XR) 500 MG TABLET SR 24 HR Take 2 Tabs by mouth every day. 180 Tab 1   • Multiple Vitamins-Minerals (MULTIVITAMIN PO) Take  by mouth every day.     • Polyethylene Glycol 3350 (MIRALAX PO) Take 1 Cap by mouth every day.     • Cetirizine HCl (ZYRTEC ALLERGY PO) Take 1 Tab by mouth every day.     • fluticasone (FLONASE ALLERGY RELIEF) 50 MCG/ACT nasal spray Spray 1 Spray in nose 2 times a day. 1 Bottle 0     No current facility-administered medications for this encounter.        No Known Allergies    Physical Exam   Constitutional: He is oriented to person, place, and time and well-developed, well-nourished, and in no distress. No distress.   HENT:   Head: Normocephalic.   Eyes: Conjunctivae are normal. Pupils are equal, round, and reactive to light. No scleral icterus.   Cardiovascular:   LLE varicose veins   Pulmonary/Chest: Effort normal. No respiratory distress. He has no wheezes.   Abdominal: He exhibits no distension. There is no guarding.   Obese abdomen, no ascites noted. Well approximated, healing surgical incision sites noted with no erythema or exudate. "   Musculoskeletal: He exhibits no edema.   Neurological: He is alert and oriented to person, place, and time. No cranial nerve deficit. Gait normal. Coordination normal.   Skin: Skin is warm and dry. No rash noted. He is not diaphoretic. No erythema. No pallor.   Psychiatric: Mood, memory, affect and judgment normal.     ECOG Performance Status 0    Impression:   1. Unresectable multifocal hepatocellular carcinoma, amenable to radioembolization.  2. Cirrhosis.  3. Portal hypertension.  4. Thrombocytopenia.  5. Splenomegaly.  6. Chronic lymphocytic leukemia.   7. Diabetes mellitus.    Plan:   Joe Norris MD has reviewed 's history and imaging studies, examined the patient, and discussed treatment options.  is a candidate for palliative transarterial radioembolization of unresectable multifocal hepatocellular carcinoma. We discussed the method of the procedure at length including angiography and embolization as well as the expected clinical course with the possibility of post-embolization syndrome nausea and pain and hospitalization. We explained that this procedure is palliative and not curative. We discussed the possibility of tumor recurrence and development of future tumors. We additionally discussed the risks, including bleeding and infection, damage to the arteries, reaction to any medications given during the procedure, potential side effects of contrast administration including renal damage, non-target embolization, radiation-induced ulcers or liver disease, liver failure, and death. We discussed alternatives of the procedure including surveillance with no intervention, YUNIER TACE, and imaging guided ablation. Our recommendation is Y90 SIRT due to the multiple sites of tumor. The bulk of the disease is in the left lobe and we would start by addressing that lobe first, then re image at 2 months to check tumor response and proceed with the right lobe if indicated. The patient  verbalizes understanding of risks and alternatives and elects to proceed. All questions were answered. He is interested in updated prognoses based on his disease and we explained that the purpose of Y90 SIRT is to prolong time to tumor progression and since he has not had a biopsy it is difficult to predict how fast the tumors will grow. He has other chronic illnesses that also impact his life expectancy and we recommend that he discuss these with his other specialists. Written pre- and post- procedure care instructions were provided including radiation precautions.     The plan is as follows, pending insurance authorization:  · Start proton pump inhibitor.  · Mapping both hepatic lobes July 31.  · Y90 SIRT left lobe August 6. Consider dose reduction for underlying cirrhosis.  · Clinic evaluation 10-14 days post procedure.  · MRI abdomen after approximately 2 months.  · Plan Right lobe Y90 SIRT approximately 3 months post left lobe intervention.    GINA Grewal with Joe Norris MD  Interventional Radiology   [facility]   [id number])  [place] [address]  [phone]

## 2018-08-08 ENCOUNTER — HOSPITAL ENCOUNTER (OUTPATIENT)
Dept: RADIOLOGY | Facility: MEDICAL CENTER | Age: 68
End: 2018-08-08
Attending: SURGERY
Payer: MEDICARE

## 2018-08-08 ENCOUNTER — TELEPHONE (OUTPATIENT)
Dept: HEMATOLOGY ONCOLOGY | Facility: MEDICAL CENTER | Age: 68
End: 2018-08-08

## 2018-08-08 DIAGNOSIS — C22.9 MALIGNANT NEOPLASM OF LIVER, UNSPECIFIED LIVER MALIGNANCY TYPE (HCC): ICD-10-CM

## 2018-08-08 PROCEDURE — 74170 CT ABD WO CNTRST FLWD CNTRST: CPT

## 2018-08-08 PROCEDURE — 700117 HCHG RX CONTRAST REV CODE 255: Performed by: SURGERY

## 2018-08-08 RX ADMIN — IOHEXOL 100 ML: 350 INJECTION, SOLUTION INTRAVENOUS at 10:42

## 2018-08-08 NOTE — TELEPHONE ENCOUNTER
Patient called to confirm upcoming appointment with our office on 8/10/2018. Patient will give us a call if he has any further questions or concerns.

## 2018-08-09 ENCOUNTER — PATIENT OUTREACH (OUTPATIENT)
Dept: HEALTH INFORMATION MANAGEMENT | Facility: OTHER | Age: 68
End: 2018-08-09

## 2018-08-09 NOTE — PROGRESS NOTES
Outcome: Left Message    Please transfer to Patient Outreach Team at [phone] when patient returns call.    WebIZ Checked & Epic Updated:  yes    [facility] Verified: no    Attempt # 1

## 2018-08-10 ENCOUNTER — OFFICE VISIT (OUTPATIENT)
Dept: HEMATOLOGY ONCOLOGY | Facility: MEDICAL CENTER | Age: 68
End: 2018-08-10
Payer: MEDICARE

## 2018-08-10 VITALS
TEMPERATURE: 98.1 F | SYSTOLIC BLOOD PRESSURE: 150 MMHG | BODY MASS INDEX: 33.9 KG/M2 | OXYGEN SATURATION: 98 % | WEIGHT: 219.8 LBS | DIASTOLIC BLOOD PRESSURE: 70 MMHG | HEART RATE: 65 BPM

## 2018-08-10 DIAGNOSIS — C91.10 CLL (CHRONIC LYMPHOCYTIC LEUKEMIA) (HCC): ICD-10-CM

## 2018-08-10 PROCEDURE — 99214 OFFICE O/P EST MOD 30 MIN: CPT | Performed by: INTERNAL MEDICINE

## 2018-08-10 RX ORDER — FERROUS GLUCONATE 324(38)MG
TABLET ORAL
COMMUNITY
Start: 2018-06-05 | End: 2018-08-23

## 2018-08-10 RX ORDER — METFORMIN HYDROCHLORIDE 500 MG/1
TABLET, EXTENDED RELEASE ORAL
COMMUNITY
End: 2018-08-23

## 2018-08-10 RX ORDER — DOXYCYCLINE HYCLATE 100 MG
TABLET ORAL
COMMUNITY
End: 2018-08-23

## 2018-08-10 RX ORDER — ERGOCALCIFEROL 1.25 MG/1
CAPSULE ORAL
COMMUNITY
End: 2018-08-23

## 2018-08-10 RX ORDER — TRAMADOL HYDROCHLORIDE 50 MG/1
TABLET ORAL
COMMUNITY
End: 2018-08-23

## 2018-08-10 RX ORDER — POTASSIUM CHLORIDE 750 MG/1
TABLET, FILM COATED, EXTENDED RELEASE ORAL
COMMUNITY
End: 2018-08-23

## 2018-08-10 RX ORDER — PROMETHAZINE HYDROCHLORIDE 25 MG/1
TABLET ORAL
COMMUNITY
End: 2018-08-23

## 2018-08-10 RX ORDER — POLYETHYLENE GLYCOL 3350
GRANULES (GRAM) MISCELLANEOUS
COMMUNITY
End: 2018-08-23

## 2018-08-10 RX ORDER — FERROUS GLUCONATE 324(38)MG
TABLET ORAL
COMMUNITY
End: 2018-08-23

## 2018-08-10 ASSESSMENT — PAIN SCALES - GENERAL: PAINLEVEL: NO PAIN

## 2018-08-10 NOTE — PROGRESS NOTES
Date of visit: 8/10/2018  9:41 AM      Chief Complaint-  follow-up of CLL , 13q deleted  -Hepatocellular carcinoma        Identification/Prior relevant history:   2004. Screening PSA elevated. Found to have prostate cancer Dr. Ramírez, urologist, treated with brachytherapy at [place]. Followed with observation and serial PSA  March, 2014. Noted to have elevated WBC count  July 15, 2014. Flow cytometry shows CD5 positive B-cell lymphoproliferative disorder with a phenotype most consistent with chronic lymphocytic leukemia or small lymphocytic lymphoma  July 26, 2014. CT chest, abdomen and pelvis showed Hepatosplenomegaly. Slightly enlarged lymph nodes in the subcarinal region and right cardiophrenic angle.  December 11, 2014. Negative for [id number] mutation  January 4, 2016 ultrasound of the abdomen showed Cirrhosis with portal hypertension.Associated dilated portal vein and splenomegaly.No evidence for hepatoma  - 8/2017-he presented with significant iron deficiency anemia requiring iron infusion. Subsequently had upper and lower endoscopy , these were reportedly normal. He does not remember being informed that he has any esophageal viruses.     -12/6/17-CT abdomen- 1.  2.4 cm arterial enhancing mass in segment 4 of the liver demonstrates washout. LR-5.  1.3 cm arterial enhancing lesion in the hepatic dome is similar to the recent MRI. No definite washout or capsule appreciated. LR-3.  Morphologic characteristics of cirrhosis with evidence of portal hypertension including splenomegaly.  -12/2017-established care with me    - CLL FISH panel showed favorable prognostic marker of 13 qdeletion.   -He is following up with Dr. Senior.imaging of liver mass is suggestive of hepatoma.      CT of the chest-4/70/18-enlarged mediastinal lymph nodes with right paratracheal lymph nodes measuring up to 15 mm short axis and a subcarinal lymph node measuring 2.9 cm short axis. No hilar adenopathy identified. No axillary  lymphadenopathy identified. Multiple nonenlarged axillary lymph nodes identified bilaterally  Interim history  -He did not have any lymphoma related symptoms. Was decided not to do any intervention until his hepatoma issues are taken care of  -6/ 28/ 2018 the lesion in 4B was ablated but the operative ablation of a 4A lesion was unsuccessful due to proximity to the portal pedicle., No documented liver mass biopsy.  -He was evaluated for Y 90, however, Dr. Senior then decided to do TACE, probably related to close proximity with IVC.  -Grossly asymptomatic. No new B symptoms or respiratory symptoms to suggest any clinical progression of CLL      Past Medical History:      Past Medical History:   Diagnosis Date   • Anemia    • Cancer (HCC) 2004    Prostate - did brachytherapy at [place]   • Cancer (HCC)     Liver   • Cirrhosis (HCC)    • CLL (chronic lymphocytic leukemia) (HCC) 2014   • CMV (cytomegalovirus infection) (HCC) 1990    Treated for 6 weeks   • Diabetes (HCC)     oral medication   • Liver cancer (HCC)    • Liver tumor    • Rosacea        Past surgical history:       Past Surgical History:   Procedure Laterality Date   • HEPATIC ABLATION LAPAROSCOPIC  6/28/2018    Procedure: HEPATIC ABLATION LAPAROSCOPIC. SEGMENT 4B, HEPATOMA, REPAIR OF INCARCERATED UMBILICAL HERNIA;  Surgeon: Feliberto Burgos M.D.;  Location: SURGERY [facility];  Service: General   • BRACHY THERAPY         Allergies:       Patient has no known allergies.    Medications:         Current Outpatient Prescriptions   Medication Sig Dispense Refill   • Multiple Vitamins-Minerals (MULTIVITAMIN PO) Take  by mouth every day.     • Polyethylene Glycol 3350 (MIRALAX PO) Take 1 Cap by mouth every day.     • Cetirizine HCl (ZYRTEC ALLERGY PO) Take 1 Tab by mouth every day.     • fluticasone (FLONASE ALLERGY RELIEF) 50 MCG/ACT nasal spray Spray 1 Spray in nose 2 times a day. 1 Bottle 0   • doxycycline (VIBRAMYCIN) 100 MG Tab  "doxycycline hyclate 100 mg tablet     • ferrous gluconate (FERGON) 324 (38 Fe) MG Tab ferrous gluconate 324 mg (38 mg iron) tablet     • Polyethylene Glycol 3350 Granules polyethylene glycol 3350 17 gram/dose oral powder     • potassium chloride ER (KLOR-CON) 10 MEQ tablet potassium chloride ER 10 mEq tablet,extended release     • promethazine (PHENERGAN) 25 MG Tab promethazine 25 mg tablet     • tramadol (ULTRAM) 50 MG Tab tramadol 50 mg tablet     • vitamin D, Ergocalciferol, (DRISDOL) 32482 units Cap capsule Vitamin D2 50,000 unit capsule     • metFORMIN ER (GLUCOPHAGE XR) 500 MG TABLET SR 24 HR metformin  mg tablet,extended release 24 hr     • ferrous gluconate (FERGON) 324 (38 Fe) MG Tab      • metFORMIN ER (GLUCOPHAGE XR) 500 MG TABLET SR 24 HR Take 2 Tabs by mouth every day. 180 Tab 1     No current facility-administered medications for this visit.          Social History:     Social History     Social History   • Marital status:      Spouse name: N/A   • Number of children: 1   • Years of education: N/A     Occupational History   • fertilizer sales      Social History Main Topics   • Smoking status: Never Smoker   • Smokeless tobacco: Former User     Types: Snuff     Quit date: 3/29/2015   • Alcohol use No   • Drug use: Yes     Types: Marijuana      Comment: \"Marijuana Use\"   • Sexual activity: No     Other Topics Concern   • Not on file     Social History Narrative    No known exposure to asbestos, dyes, or chemicals, however he was exposed to pesticides.  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  1 child, alive and well.  He also has a step-child.        Family History:      Family History   Problem Relation Age of Onset   • Cancer Father 81        Bladder   • Cancer Brother         Prostate & CLL (s/p 8-10 years ago)   • Hyperlipidemia Sister    • Lung Disease Neg Hx    • Diabetes Neg Hx    • Heart Disease Neg Hx    • " Hypertension Neg Hx    • Stroke Neg Hx    • Alcohol/Drug Neg Hx        Review of Systems:  All other review of systems are negative except what was mentioned above in the HPI.    Constitutional: Negative for fever, chills, weight loss and positive malaise/fatigue.    HEENT: No new auditory or visual complaints. No sore throat and neck pain.     Respiratory: Negative for cough, sputum production, shortness of breath and wheezing.    Cardiovascular: Negative for chest pain, palpitations, orthopnea and leg swelling.    Gastrointestinal: Negative for heartburn, nausea, vomiting and abdominal pain.    Genitourinary: Negative for dysuria, hematuria    Musculoskeletal: No new arthralgias or myalgias   Skin: Negative for rash and itching.    Neurological: Negative for focal weakness and headaches.    Endo/Heme/Allergies: No abnormal bleed/bruise.    Psychiatric/Behavioral: No new depression/anxiety.    Physical Exam:  Vitals: /70   Pulse 65   Temp 36.7 °C (98.1 °F)   Wt 99.7 kg (219 lb 12.8 oz)   SpO2 98%   BMI 33.90 kg/m²     General: Not in acute distress, alert and oriented x 3  HEENT: No pallor, icterus. Oropharynx clear.   Neck: Supple, no palpable masses.  Lymph nodes: No palpable cervical, supraclavicular, axillary or inguinal lymphadenopathy.    CVS: regular rate and rhythm, no rubs or gallops  RESP: Clear to auscultate bilaterally, no wheezing or crackles.   ABD: Soft, non tender, non distended, positive bowel sounds, no palpable organomegaly  EXT: No edema or cyanosis  CNS: Alert and oriented x3, No focal deficits.  Skin- No rash      Labs:   No visits with results within 1 Week(s) from this visit.   Latest known visit with results is:   Admission on 06/28/2018, Discharged on 06/28/2018   Component Date Value Ref Range Status   • Glucose - Accu-Ck 06/28/2018 115* 65 - 99 mg/dL Final             Assessment and Plan:  CLL-Odom Stage 0, He is around 4 years out of his diagnosis. Flow cytometry showed CD38  negative phenotype indicating good prognosis. FISH panel testing showed 13, deletion, which is a favorable marker.He has some mild thrombocytopenia, which appears to be more from cirrhosis. His absolute lymphocyte count is always overall stable, even though they have trended up over the past year. He does have enlarged subcarinal lymph node. Given his ongoing issues with hepatoma management. We will hold off on any intervention. I will obtain a restaging CT of his chest and around 2 months prior to seeing him back. Given the location of the subcarinal lymph node, we will consider treating him.    Multiple liver masses-overall consistent with hepatoma. He is status post ablation of all of the lesion. Recent CT shows overall stable finding. However, there was some progression of another lesion. TACE has been recommended. He will follow up with interventional radiology. No Contraindication from CLL standpoint for any surgical intervention      He agreed and verbalized  agreement and understanding with the current plan.  I answered all questions and concerns at this time         Please note that this dictation was created using voice recognition software. I have made every reasonable attempt to correct obvious errors, but I expect that there are errors of grammar and possibly content that I did not discover before finalizing the note.      SIGNATURES:  Alfonzo Servin    CC:  Carmencita Hand P.A.-C.  No ref. provider found

## 2018-08-16 ENCOUNTER — HOSPITAL ENCOUNTER (OUTPATIENT)
Dept: RADIOLOGY | Facility: MEDICAL CENTER | Age: 68
End: 2018-08-16
Attending: NURSE PRACTITIONER

## 2018-08-16 DIAGNOSIS — C22.0 HEPATOMA (HCC): ICD-10-CM

## 2018-08-16 ASSESSMENT — ENCOUNTER SYMPTOMS
WEAKNESS: 0
WEIGHT LOSS: 0
NAUSEA: 0
FEVER: 0
ABDOMINAL PAIN: 0
VOMITING: 0
ROS SKIN COMMENTS: NEGATIVE FOR JAUNDICE
CHILLS: 0
CARDIOVASCULAR NEGATIVE: 1
DIAPHORESIS: 0
GASTROINTESTINAL NEGATIVE: 1

## 2018-08-16 NOTE — CONSULTS
Interventional Oncology Consultation      Re: Sohail Duvall     MRN: [id number]   : 1950    Sohail Duvall was referred by Feliberto Senior MD. He is a 67 y.o. male seen in clinic for evaluation and possible intervention of unresectable hepatomas. He is also under the care of Carmencita Hand PA-C, Alfonzo Servin MD, and Johanna Hooper MD.    History of Present Illness:   has a history of CLL and alcoholic cirrhosis. Recent imaging revealed hepatomas in 4A and 4B. On 2018 the lesion in 4B was ablated but the operative ablation of a 4A lesion was unsuccessful due to proximity to the portal pedicle. He has not had a liver mass biopsy.  was been referred to interventional radiology for evaluation and management and our recommendation on 2018 was Y90 SIRT to both hepatic lobes due to multiple small LR-3 lesions that were not present on prior scans, suspicious for multifocal disease. Mr. Duvall had second thoughts and canceled his procedure appointments and is seen today for further information and discussion of the recommended procedure. Today, Mr. Duvall denies complains. The mild mid abdomen post operative abdominal pain has resolved. He denies jaundice, bleeding, nausea and vomiting, and fatigue. His weight is stable and he denies unintentional weight loss. His appetite is good. His energy level is normal. He states he feels well overall and denies any changes in his health since he was in our clinic in July. He has had no additional imaging or lab testing since he was last evaluated. He is followed by Dr. Servin for CLL and an enlarging subcarinal lymph node with treatment deferred until the hepatomas are addressed. He works in [employer] and  is accompanied by his wife to the clinic appointment today.    He is seen today for review of imaging studies and discussion of possible transarterial therapy with Y90 radioembolization.     Past  "Medical History:   Diagnosis Date   • Anemia    • Cancer (HCC) 2004    Prostate - did brachytherapy at [place]   • Cancer (HCC)     Liver   • Cirrhosis (HCC)    • CLL (chronic lymphocytic leukemia) (HCC) 2014   • CMV (cytomegalovirus infection) (HCC) 1990    Treated for 6 weeks   • Diabetes (HCC)     oral medication   • Liver cancer (HCC)    • Liver tumor    • Rosacea      Past Surgical History:   Procedure Laterality Date   • HEPATIC ABLATION LAPAROSCOPIC  6/28/2018    Procedure: HEPATIC ABLATION LAPAROSCOPIC. SEGMENT 4B, HEPATOMA, REPAIR OF INCARCERATED UMBILICAL HERNIA;  Surgeon: Feliberto Burgos M.D.;  Location: SURGERY [facility];  Service: General   • BRACHY THERAPY       Operative note June 28,2018 at [facility] (Feliberto Senior MD):  PROCEDURE DONE:  1.  Laparoscopic microwave thermal ablation of segment 4B lesion at 100 flowers   at 2 minutes giving us an ablation cavity of 3x3.7 with a 100 watt track   ablation of 7 seconds.  2.  Repair of incarcerated ventral hernia primarily with no mesh.    Social History     Social History   • Marital status:      Spouse name: N/A   • Number of children: 1   • Years of education: N/A     Occupational History   • fertilizer sales      Social History Main Topics   • Smoking status: Never Smoker   • Smokeless tobacco: Former User     Types: Snuff     Quit date: 3/29/2015   • Alcohol use No   • Drug use: Yes     Types: Marijuana      Comment: \"Marijuana Use\"   • Sexual activity: No     Other Topics Concern   • Not on file     Social History Narrative    No known exposure to asbestos, dyes, or chemicals, however he was exposed to pesticides.  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  1 child, alive and well.  He also has a step-child.      Family History   Problem Relation Age of Onset   • Cancer Father 81        Bladder   • Cancer Brother         Prostate & CLL (s/p 8-10 years ago) "   • Hyperlipidemia Sister    • Lung Disease Neg Hx    • Diabetes Neg Hx    • Heart Disease Neg Hx    • Hypertension Neg Hx    • Stroke Neg Hx    • Alcohol/Drug Neg Hx        Review of Systems   Constitutional: Negative for chills, diaphoresis, fever, malaise/fatigue and weight loss.   Cardiovascular: Negative.    Gastrointestinal: Negative.  Negative for abdominal pain, nausea and vomiting.   Skin:        Negative for jaundice   Neurological: Negative for weakness.     A comprehensive 14-point review of systems was negative except as described above.      Labs:        Ref. Range 4/12/2018 11:09 5/23/2018 12:10 6/5/2018 09:40 6/22/2018 14:23   WBC Latest Ref Range: 4.8 - 10.8 K/uL 40.6 (HH) 53.4 (HH)   54.0 (HH)   RBC Latest Ref Range: 4.70 - 6.10 M/uL 5.35 5.37   5.00   Hemoglobin Latest Ref Range: 14.0 - 18.0 g/dL 15.6 16.3   15.6   Hematocrit Latest Ref Range: 42.0 - 52.0 % 47.8 48.9   46.5   MCV Latest Ref Range: 81.4 - 97.8 fL 89.3 91.1   93.0   MCH Latest Ref Range: 27.0 - 33.0 pg 29.2 30.4   31.2   MCHC Latest Ref Range: 33.7 - 35.3 g/dL 32.6 (L) 33.3 (L)   33.5 (L)   RDW Latest Ref Range: 35.9 - 50.0 fL 59.7 (H) 56.7 (H)   54.1 (H)   Platelet Count Latest Ref Range: 164 - 446 K/uL 97 (L) 111 (L)   106 (L)   MPV Latest Ref Range: 9.0 - 12.9 fL 11.6 12.2   10.6   Neutrophils-Polys Latest Ref Range: 44.00 - 72.00 % 3.40 (L) 3.50 (L)   4.80 (L)   Neutrophils (Absolute) Latest Ref Range: 1.82 - 7.42 K/uL 1.38 (L) 1.87   2.59   Lymphocytes Latest Ref Range: 22.00 - 41.00 % 94.00 (H) 91.20 (H)   92.40 (H)   Lymphs (Absolute) Latest Ref Range: 1.00 - 4.80 K/uL 38.16 (H) 48.70 (H)   49.90 (H)   Monocytes Latest Ref Range: 0.00 - 13.40 % 1.70 5.30   0.00   Monos (Absolute) Latest Ref Range: 0.00 - 0.85 K/uL 0.69 2.83 (H)   0.00   Eosinophils Latest Ref Range: 0.00 - 6.90 % 0.90 0.00   0.00   Eos (Absolute) Latest Ref Range: 0.00 - 0.51 K/uL 0.37 0.00   0.00   Basophils Latest Ref Range: 0.00 - 1.80 % 0.00 0.00   2.80  (H)   Baso (Absolute) Latest Ref Range: 0.00 - 0.12 K/uL 0.00 0.00   1.51 (H)   Nucleated RBC Latest Units: /100 WBC 0.00 0.00   0.00   NRBC (Absolute) Latest Units: K/uL 0.00 0.00   0.00   Plt Estimation Unknown Decreased Decreased   Decreased   RBC Morphology Unknown Present Present   Present   Anisocytosis Unknown 1+ 1+   1+   Microcytosis Unknown   1+   1+   Poikilocytosis Unknown 1+ 1+   1+   Ovalocytes Unknown 1+ 1+   1+   Smudge Cells Unknown   Moderate   Moderate   Peripheral Smear Review Unknown see below see below   see below   Manual Diff Status Unknown PERFORMED PERFORMED   PERFORMED   Sodium Latest Ref Range: 135 - 145 mmol/L 141 141   141   Potassium Latest Ref Range: 3.6 - 5.5 mmol/L 4.6 4.2   3.9   Chloride Latest Ref Range: 96 - 112 mmol/L 108 108   108   Co2 Latest Ref Range: 20 - 33 mmol/L 24 23   24   Anion Gap Latest Ref Range: 0.0 - 11.9  9.0 10.0   9.0   Glucose Latest Ref Range: 65 - 99 mg/dL 156 (H) 125 (H)   173 (H)   Bun Latest Ref Range: 8 - 22 mg/dL 12 11   13   Creatinine Latest Ref Range: 0.50 - 1.40 mg/dL 0.80 0.76   0.76   GFR If  Latest Ref Range: >60 mL/min/1.73 m 2 >60 >60   >60   GFR If Non  Latest Ref Range: >60 mL/min/1.73 m 2 >60 >60   >60   Calcium Latest Ref Range: 8.5 - 10.5 mg/dL 9.6 10.1   9.6   AST(SGOT) Latest Ref Range: 12 - 45 U/L 36 30   24   ALT(SGPT) Latest Ref Range: 2 - 50 U/L 21 19   20   Alkaline Phosphatase Latest Ref Range: 30 - 99 U/L 81 73   71   Total Bilirubin Latest Ref Range: 0.1 - 1.5 mg/dL 0.8 1.4   1.2   Albumin Latest Ref Range: 3.2 - 4.9 g/dL 4.3 4.4   4.2   Total Protein Latest Ref Range: 6.0 - 8.2 g/dL 7.0 7.0   6.7   Globulin Latest Ref Range: 1.9 - 3.5 g/dL 2.7 2.6   2.5   A-G Ratio Latest Units: g/dL 1.6 1.7   1.7        Ref. Range 4/2/2018 12:42 5/23/2018 12:10 6/22/2018 14:23   PT Latest Ref Range: 12.0 - 14.6 sec 15.6 (H) 14.7 (H) 14.6   INR Latest Ref Range: 0.87 - 1.13  1.27 (H) 1.18 (H) 1.17 (H)         Ref. Range 9/8/2017 12:02 12/4/2017 10:27 2/21/2018 12:25 5/23/2018 12:10 6/5/2018 09:40   Alpha Fetoprotein Latest Ref Range: 0 - 9 ng/mL 7     7        Child G[place] Class A  JIM Grade A1     Pathology:  None available     Radiology:   MRI abdomen on June 8, 2018 at [facility]:  Examination is limited by patient motion.  2.1 x 1.9 cm arterial hyperenhancing lesion within the left lobe of the liver appears compatible with hepatocellular carcinoma. LR-5    Hyperenhancing lesion within segment 4 of the liver bordering the caudate lobe measures 3 x 2 cm and is increased in size compared to prior. This lesion is compatible with hepatocellular carcinoma. LR-5    10 mm hyperenhancing lesion within segment 4 may be slightly decreased or unchanged in size compared to prior. LR-3    3 other small hyperenhancing lesions are seen within the right lobe of the liver measuring up to 1 cm. LR-3    Heterogeneous wedge-shaped area of delayed hypo-enhancement in the anterior liver is likely related to variable perfusion.    Findings of cirrhosis and portal hypertension.    Mildly prominent lymph nodes in the cardiophrenic fat are not significantly changed.    9 mm cystic lesion in the pancreatic tail could represent a small side branch IPMN.    Cholelithiasis. No biliary ductal dilatation is seen.    Subcarinal lymphadenopathy is partially imaged.    2 cm left adrenal nodule is unchanged and likely an adrenal adenoma.    Trace free fluid in the abdomen.    Findings discussed with Dr. Senior      CT thorax April 17, 2018 at [facility]:  1.  Mediastinal lymphadenopathy with markedly enlarged subcarinal lymph node could be due to metastatic disease or lymphoma. Reactive lymph nodes or lymphadenopathy due to granulomatous disease seems less likely.  2.  Coarse coronary artery calcifications.  3.  Cirrhosis with hypervascular hepatic lesions and splenomegaly.  4.  Cholelithiasis.     Nuclear medicine bone study April 17, 2018 at [facility]:  No  "scintigraphic evidence of bony metastatic disease     Portal pressure gradient April 4, 2018 at [facility]:  1.  Hepatic venography showing a patent right hepatic vein.  2.  Free and wedged right hepatic vein wedge pressures rendering a mean Hepatic Venous Pressure Gradient (HVPG) of 9.67 mmHg. This is consistent with portal hypertension.  (HVPG >= 10mmHg considered \"clinically significant\" portal HTN**  3.  Transjugular Liver biopsy was not performed at this time..  **Reference: Hepatic Venous Pressure Gradient in 2010:Optimal Measurement is Jose. Anya MCINTYRE, Wanda RODRIGUEZ. HEPATOLOGY, Vol. 51, No. 6, 2010     CT abdomen December 6, 2017 at [facility]:  1.  2.4 cm arterial enhancing mass in segment 4 of the liver demonstrates washout. LR-5.  2.  1.3 cm arterial enhancing lesion in the hepatic dome is similar to the recent MRI. No definite washout or capsule appreciated. LR-3  3.  Morphologic characteristics of cirrhosis with evidence of portal hypertension including splenomegaly.  4.  Cholelithiasis.  5.  Stable left adrenal nodule, likely a benign adenoma.       Current Outpatient Prescriptions   Medication Sig Dispense Refill   • doxycycline (VIBRAMYCIN) 100 MG Tab doxycycline hyclate 100 mg tablet     • ferrous gluconate (FERGON) 324 (38 Fe) MG Tab ferrous gluconate 324 mg (38 mg iron) tablet     • Polyethylene Glycol 3350 Granules polyethylene glycol 3350 17 gram/dose oral powder     • potassium chloride ER (KLOR-CON) 10 MEQ tablet potassium chloride ER 10 mEq tablet,extended release     • promethazine (PHENERGAN) 25 MG Tab promethazine 25 mg tablet     • tramadol (ULTRAM) 50 MG Tab tramadol 50 mg tablet     • vitamin D, Ergocalciferol, (DRISDOL) 42766 units Cap capsule Vitamin D2 50,000 unit capsule     • metFORMIN ER (GLUCOPHAGE XR) 500 MG TABLET SR 24 HR metformin  mg tablet,extended release 24 hr     • ferrous gluconate (FERGON) 324 (38 Fe) MG Tab      • metFORMIN ER (GLUCOPHAGE XR) 500 MG TABLET SR 24 HR " Take 2 Tabs by mouth every day. 180 Tab 1   • Multiple Vitamins-Minerals (MULTIVITAMIN PO) Take  by mouth every day.     • Polyethylene Glycol 3350 (MIRALAX PO) Take 1 Cap by mouth every day.     • Cetirizine HCl (ZYRTEC ALLERGY PO) Take 1 Tab by mouth every day.     • fluticasone (FLONASE ALLERGY RELIEF) 50 MCG/ACT nasal spray Spray 1 Spray in nose 2 times a day. 1 Bottle 0     No current facility-administered medications for this encounter.        No Known Allergies    Physical Exam   Constitutional: He is oriented to person, place, and time and well-developed, well-nourished, and in no distress. No distress.   Eyes: No scleral icterus.   Pulmonary/Chest: Effort normal. No respiratory distress.   Abdominal:   Obese abdomen, no ascites noted.   Musculoskeletal: He exhibits no edema.   Left lower extremity varicose veins noted   Neurological: He is alert and oriented to person, place, and time. Gait normal. Coordination normal.   Skin: Skin is warm and dry. No rash noted. He is not diaphoretic. No erythema. No pallor.   Psychiatric: Mood, memory, affect and judgment normal.     ECOG Performance Status 0    Impression:   1. Unresectable multifocal hepatocellular carcinoma, amenable to radioembolization.  2. Cirrhosis.  3. Portal hypertension.  4. Thrombocytopenia.  5. Splenomegaly.  6. Chronic lymphocytic leukemia.   7. Diabetes mellitus.     Plan:   Joe Norris MD has reviewed 's history and imaging studies, examined the patient, and discussed treatment options.  remains a candidate for palliative transarterial radioembolization of unresectable multifocal hepatocellular carcinoma. Our recommendation for Y90 SIRT is due to multifocal disease. This was also discussed with his liver surgeon, who is in agreement with our recommendation. We discussed the method of the procedure at length including angiography and embolization as well as the expected clinical course with the possibility of  post-embolization syndrome nausea and pain and hospitalization. We explained that this procedure is palliative and not curative. We discussed the possibility of tumor recurrence and development of future tumors. We additionally discussed the risks, including bleeding and infection, damage to the arteries, reaction to any medications given during the procedure, potential side effects of contrast administration including renal damage, non-target embolization, radiation-induced ulcers or liver disease, liver failure, and death. We discussed alternatives of the procedure including surveillance with no intervention, YUNIER TACE, and imaging guided ablation. The bulk of the disease is in the left lobe and we would start by addressing that lobe first, then re image at 2 months to check tumor response and proceed with the right lobe if indicated. The patient verbalizes understanding of risks and alternatives and elects to proceed with Y90 SIRT. All questions were answered. We explained that YUNIER TACE targets individual lesions and that with multiple lesions he would require several procedures, each with risk and potential side effects, and the Y90 procedures would be a better intervention as they would treat a larger number of lesions with fewer procedures and subsequent risks and side effects. He again asked about an updated prognoses based on his liver disease and we explained that the purpose of Y90 SIRT is to prolong time to tumor progression. He has other chronic illnesses that also impact his life expectancy and we again recommend that he discuss these with his other specialists. Written pre- and post- procedure care instructions were provided including radiation precautions.     The plan is as follows, pending insurance authorization:  · Start proton pump inhibitor.  · Mapping both hepatic lobes August 28.  · Y90 SIRT left lobe September 7. Consider dose reduction for underlying cirrhosis.  · Clinic evaluation 10-14 days  post procedure.  · MRI abdomen after approximately 2 months.  · Plan Right lobe Y90 SIRT approximately 3 months post left lobe intervention.  ·   GINA Grewal with Joe Norris MD  Interventional Radiology   [address] ([id number])  [place] [address]  [phone]

## 2018-08-21 DIAGNOSIS — C22.0 HEPATOMA (HCC): ICD-10-CM

## 2018-08-22 DIAGNOSIS — E11.8 CONTROLLED TYPE 2 DIABETES MELLITUS WITH COMPLICATION, WITHOUT LONG-TERM CURRENT USE OF INSULIN (HCC): ICD-10-CM

## 2018-08-22 DIAGNOSIS — E83.19 IRON OVERLOAD: ICD-10-CM

## 2018-08-23 DIAGNOSIS — Z01.812 PRE-OPERATIVE LABORATORY EXAMINATION: ICD-10-CM

## 2018-08-23 LAB
ALBUMIN SERPL BCP-MCNC: 4.2 G/DL (ref 3.2–4.9)
ALBUMIN/GLOB SERPL: 1.5 G/DL
ALP SERPL-CCNC: 77 U/L (ref 30–99)
ALT SERPL-CCNC: 19 U/L (ref 2–50)
ANION GAP SERPL CALC-SCNC: 7 MMOL/L (ref 0–11.9)
ANISOCYTOSIS BLD QL SMEAR: ABNORMAL
AST SERPL-CCNC: 29 U/L (ref 12–45)
BASOPHILS # BLD AUTO: 0.9 % (ref 0–1.8)
BASOPHILS # BLD: 0.51 K/UL (ref 0–0.12)
BILIRUB SERPL-MCNC: 1.2 MG/DL (ref 0.1–1.5)
BUN SERPL-MCNC: 11 MG/DL (ref 8–22)
CALCIUM SERPL-MCNC: 9.8 MG/DL (ref 8.5–10.5)
CHLORIDE SERPL-SCNC: 108 MMOL/L (ref 96–112)
CO2 SERPL-SCNC: 27 MMOL/L (ref 20–33)
CREAT SERPL-MCNC: 0.72 MG/DL (ref 0.5–1.4)
EOSINOPHIL # BLD AUTO: 0 K/UL (ref 0–0.51)
EOSINOPHIL NFR BLD: 0 % (ref 0–6.9)
ERYTHROCYTE [DISTWIDTH] IN BLOOD BY AUTOMATED COUNT: 49.8 FL (ref 35.9–50)
GLOBULIN SER CALC-MCNC: 2.8 G/DL (ref 1.9–3.5)
GLUCOSE SERPL-MCNC: 118 MG/DL (ref 65–99)
HCT VFR BLD AUTO: 48.8 % (ref 42–52)
HGB BLD-MCNC: 16 G/DL (ref 14–18)
INR PPP: 1.2 (ref 0.87–1.13)
LYMPHOCYTES # BLD AUTO: 53.39 K/UL (ref 1–4.8)
LYMPHOCYTES NFR BLD: 93.5 % (ref 22–41)
MACROCYTES BLD QL SMEAR: ABNORMAL
MANUAL DIFF BLD: NORMAL
MCH RBC QN AUTO: 31.1 PG (ref 27–33)
MCHC RBC AUTO-ENTMCNC: 32.8 G/DL (ref 33.7–35.3)
MCV RBC AUTO: 94.8 FL (ref 81.4–97.8)
MICROCYTES BLD QL SMEAR: ABNORMAL
MONOCYTES # BLD AUTO: 0 K/UL (ref 0–0.85)
MONOCYTES NFR BLD AUTO: 0 % (ref 0–13.4)
MORPHOLOGY BLD-IMP: NORMAL
NEUTROPHILS # BLD AUTO: 3.2 K/UL (ref 1.82–7.42)
NEUTROPHILS NFR BLD: 5.6 % (ref 44–72)
NRBC # BLD AUTO: 0 K/UL
NRBC BLD-RTO: 0 /100 WBC
OVALOCYTES BLD QL SMEAR: NORMAL
PLATELET # BLD AUTO: 103 K/UL (ref 164–446)
PLATELET BLD QL SMEAR: NORMAL
PMV BLD AUTO: 12.1 FL (ref 9–12.9)
POIKILOCYTOSIS BLD QL SMEAR: NORMAL
POTASSIUM SERPL-SCNC: 4 MMOL/L (ref 3.6–5.5)
PROT SERPL-MCNC: 7 G/DL (ref 6–8.2)
PROTHROMBIN TIME: 14.9 SEC (ref 12–14.6)
RBC # BLD AUTO: 5.15 M/UL (ref 4.7–6.1)
RBC BLD AUTO: PRESENT
SCHISTOCYTES BLD QL SMEAR: NORMAL
SMUDGE CELLS BLD QL SMEAR: NORMAL
SODIUM SERPL-SCNC: 142 MMOL/L (ref 135–145)
VARIANT LYMPHS BLD QL SMEAR: NORMAL
WBC # BLD AUTO: 57.1 K/UL (ref 4.8–10.8)

## 2018-08-23 PROCEDURE — 85610 PROTHROMBIN TIME: CPT

## 2018-08-23 PROCEDURE — 36415 COLL VENOUS BLD VENIPUNCTURE: CPT

## 2018-08-23 PROCEDURE — 85007 BL SMEAR W/DIFF WBC COUNT: CPT

## 2018-08-23 PROCEDURE — 80053 COMPREHEN METABOLIC PANEL: CPT

## 2018-08-23 PROCEDURE — 85027 COMPLETE CBC AUTOMATED: CPT

## 2018-08-28 ENCOUNTER — APPOINTMENT (OUTPATIENT)
Dept: RADIOLOGY | Facility: MEDICAL CENTER | Age: 68
End: 2018-08-28
Attending: RADIOLOGY
Payer: MEDICARE

## 2018-08-28 ENCOUNTER — HOSPITAL ENCOUNTER (OUTPATIENT)
Facility: MEDICAL CENTER | Age: 68
End: 2018-08-28
Attending: RADIOLOGY | Admitting: RADIOLOGY
Payer: MEDICARE

## 2018-08-28 VITALS
DIASTOLIC BLOOD PRESSURE: 84 MMHG | HEIGHT: 68 IN | HEART RATE: 50 BPM | BODY MASS INDEX: 33.04 KG/M2 | WEIGHT: 218 LBS | SYSTOLIC BLOOD PRESSURE: 151 MMHG | TEMPERATURE: 97 F | RESPIRATION RATE: 18 BRPM | OXYGEN SATURATION: 97 %

## 2018-08-28 DIAGNOSIS — C22.0 HEPATOMA (HCC): ICD-10-CM

## 2018-08-28 PROCEDURE — 700111 HCHG RX REV CODE 636 W/ 250 OVERRIDE (IP)

## 2018-08-28 PROCEDURE — 700117 HCHG RX CONTRAST REV CODE 255: Performed by: RADIOLOGY

## 2018-08-28 PROCEDURE — A9540 TC99M MAA: HCPCS

## 2018-08-28 PROCEDURE — 75774 ARTERY X-RAY EACH VESSEL: CPT

## 2018-08-28 PROCEDURE — 160002 HCHG RECOVERY MINUTES (STAT)

## 2018-08-28 PROCEDURE — 36245 INS CATH ABD/L-EXT ART 1ST: CPT | Mod: XU

## 2018-08-28 PROCEDURE — 36247 INS CATH ABD/L-EXT ART 3RD: CPT

## 2018-08-28 RX ORDER — ONDANSETRON 2 MG/ML
4 INJECTION INTRAMUSCULAR; INTRAVENOUS EVERY 8 HOURS PRN
Status: DISCONTINUED | OUTPATIENT
Start: 2018-08-28 | End: 2018-08-28 | Stop reason: HOSPADM

## 2018-08-28 RX ORDER — OXYCODONE HYDROCHLORIDE 10 MG/1
10 TABLET ORAL
Status: DISCONTINUED | OUTPATIENT
Start: 2018-08-28 | End: 2018-08-28 | Stop reason: HOSPADM

## 2018-08-28 RX ORDER — MIDAZOLAM HYDROCHLORIDE 1 MG/ML
INJECTION INTRAMUSCULAR; INTRAVENOUS
Status: COMPLETED
Start: 2018-08-28 | End: 2018-08-28

## 2018-08-28 RX ORDER — SODIUM CHLORIDE 9 MG/ML
500 INJECTION, SOLUTION INTRAVENOUS
Status: ACTIVE | OUTPATIENT
Start: 2018-08-28 | End: 2018-08-28

## 2018-08-28 RX ORDER — OXYCODONE HYDROCHLORIDE 5 MG/1
5 TABLET ORAL
Status: DISCONTINUED | OUTPATIENT
Start: 2018-08-28 | End: 2018-08-28 | Stop reason: HOSPADM

## 2018-08-28 RX ORDER — MIDAZOLAM HYDROCHLORIDE 1 MG/ML
.5-2 INJECTION INTRAMUSCULAR; INTRAVENOUS PRN
Status: ACTIVE | OUTPATIENT
Start: 2018-08-28 | End: 2018-08-28

## 2018-08-28 RX ORDER — VERAPAMIL HYDROCHLORIDE 2.5 MG/ML
INJECTION, SOLUTION INTRAVENOUS
Status: COMPLETED
Start: 2018-08-28 | End: 2018-08-28

## 2018-08-28 RX ORDER — ONDANSETRON 2 MG/ML
4 INJECTION INTRAMUSCULAR; INTRAVENOUS PRN
Status: DISCONTINUED | OUTPATIENT
Start: 2018-08-28 | End: 2018-08-28

## 2018-08-28 RX ORDER — MORPHINE SULFATE 4 MG/ML
4 INJECTION, SOLUTION INTRAMUSCULAR; INTRAVENOUS
Status: DISCONTINUED | OUTPATIENT
Start: 2018-08-28 | End: 2018-08-28 | Stop reason: HOSPADM

## 2018-08-28 RX ORDER — HEPARIN SODIUM,PORCINE 1000/ML
VIAL (ML) INJECTION
Status: COMPLETED
Start: 2018-08-28 | End: 2018-08-28

## 2018-08-28 RX ADMIN — MIDAZOLAM HYDROCHLORIDE 2 MG: 1 INJECTION INTRAMUSCULAR; INTRAVENOUS at 14:02

## 2018-08-28 RX ADMIN — IOHEXOL 95 ML: 300 INJECTION, SOLUTION INTRAVENOUS at 15:15

## 2018-08-28 RX ADMIN — HEPARIN SODIUM 5000 UNITS: 1000 INJECTION, SOLUTION INTRAVENOUS; SUBCUTANEOUS at 14:10

## 2018-08-28 RX ADMIN — MIDAZOLAM HYDROCHLORIDE 1 MG: 1 INJECTION INTRAMUSCULAR; INTRAVENOUS at 14:29

## 2018-08-28 RX ADMIN — NITROGLYCERIN 100 MCG: 20 INJECTION INTRAVENOUS at 14:30

## 2018-08-28 RX ADMIN — FENTANYL CITRATE 50 MCG: 50 INJECTION, SOLUTION INTRAMUSCULAR; INTRAVENOUS at 14:02

## 2018-08-28 RX ADMIN — MIDAZOLAM 2 MG: 1 INJECTION INTRAMUSCULAR; INTRAVENOUS at 14:02

## 2018-08-28 RX ADMIN — MIDAZOLAM HYDROCHLORIDE 1 MG: 1 INJECTION INTRAMUSCULAR; INTRAVENOUS at 14:08

## 2018-08-28 RX ADMIN — MIDAZOLAM HYDROCHLORIDE 1 MG: 1 INJECTION INTRAMUSCULAR; INTRAVENOUS at 14:49

## 2018-08-28 RX ADMIN — VERAPAMIL HYDROCHLORIDE 2.5 MG: 2.5 INJECTION, SOLUTION INTRAVENOUS at 14:30

## 2018-08-28 ASSESSMENT — PAIN SCALES - GENERAL
PAINLEVEL_OUTOF10: 0

## 2018-08-28 NOTE — DISCHARGE INSTRUCTIONS
ACTIVITY: Rest and take it easy for the first 24 hours.  A responsible adult is recommended to remain with you during that time.  It is normal to feel sleepy.  We encourage you to not do anything that requires balance, judgment or coordination.    MILD FLU-LIKE SYMPTOMS ARE NORMAL. YOU MAY EXPERIENCE GENERALIZED MUSCLE ACHES, THROAT IRRITATION, HEADACHE AND/OR SOME NAUSEA.    FOR 24 HOURS DO NOT:  Drive, operate machinery or run household appliances.  Drink beer or alcoholic beverages.   Make important decisions or sign legal documents.    DIET: To avoid nausea, slowly advance diet as tolerated, avoiding spicy or greasy foods for the first day.  Add more substantial food to your diet according to your physician's instructions.  Babies can be fed formula or breast milk as soon as they are hungry.  INCREASE FLUIDS AND FIBER TO AVOID CONSTIPATION.    SURGICAL DRESSING/BATHING: Keep dressing and incision dry for 24 hours, may remove dressing and shower after 3 PM on 8/29, do not need to replace dressing    FOLLOW-UP APPOINTMENT:  A follow-up appointment should be arranged with your doctor Myrna [phone]; call to schedule.    You should CALL YOUR PHYSICIAN if you develop:  Fever greater than 101 degrees F.  Pain not relieved by medication, or persistent nausea or vomiting.  Excessive bleeding (blood soaking through dressing) or unexpected drainage from the wound.  Extreme redness or swelling around the incision site, drainage of pus or foul smelling drainage.  Inability to urinate or empty your bladder within 8 hours.  Problems with breathing or chest pain.    You should call 911 if you develop problems with breathing or chest pain.  If you are unable to contact your doctor or surgical center, you should go to the nearest emergency room or urgent care center.  Physician's telephone #: [phone]    If any questions arise, call your doctor.  If your doctor is not available, please feel free to call the Surgical Center at  [phone].  The Center is open Monday through Friday from 7AM to 7PM.  You can also call the HEALTH HOTLINE open 24 hours/day, 7 days/week and speak to a nurse at [phone], or toll free at [phone].    A registered nurse may call you a few days after your surgery to see how you are doing after your procedure.    MEDICATIONS: Resume taking daily medication.  Take prescribed pain medication with food.  If no medication is prescribed, you may take non-aspirin pain medication if needed.  PAIN MEDICATION CAN BE VERY CONSTIPATING.  Take a stool softener or laxative such as senokot, pericolace, or milk of magnesia if needed.    If your physician has prescribed pain medication that includes Acetaminophen (Tylenol), do not take additional Acetaminophen (Tylenol) while taking the prescribed medication.    Depression / Suicide Risk    As you are discharged from this [facility] facility, it is important to learn how to keep safe from harming yourself.    Recognize the warning signs:  · Abrupt changes in personality, positive or negative- including increase in energy   · Giving away possessions  · Change in eating patterns- significant weight changes-  positive or negative  · Change in sleeping patterns- unable to sleep or sleeping all the time   · Unwillingness or inability to communicate  · Depression  · Unusual sadness, discouragement and loneliness  · Talk of wanting to die  · Neglect of personal appearance   · Rebelliousness- reckless behavior  · Withdrawal from people/activities they love  · Confusion- inability to concentrate     If you or a loved one observes any of these behaviors or has concerns about self-harm, here's what you can do:  · Talk about it- your feelings and reasons for harming yourself  · Remove any means that you might use to hurt yourself (examples: pills, rope, extension cords, firearm)  · Get professional help from the community (Mental Health, Substance Abuse, psychological  counseling)  · Do not be alone:Call your Safe Contact- someone whom you trust who will be there for you.  · Call your local CRISIS HOTLINE 139-2449 or 061-261-7098  · Call your local Children's Mobile Crisis Response Team [place] (405) 429-3254 or www.[employer]  · Call the toll free National Suicide Prevention Hotlines   · National Suicide Prevention Lifeline 175-108-AMTL (5670)  · [employer] Line Network 800-SUICIDE (019-5560)      Radial Catherization Discharge Instructions      · Do not subject hand/arm to any forceful movements for 24 hours    i.e. supporting weight when rising from the chair or bed.   · Do not drive a car for 24 hours  · You may remove the dressing tomorrow  · You may shower on the day following your procedure.  Do not take a tub bath or submerge the puncture site in water for 3 days following the procedure.  · No Lifting more than 3-5 pounds with affected wrist for 5 days  · Follow up with Dr. Norris  2-4 weeks.  · Increase fluids for 2 days post procedure.  · Continue all previous medications unless otherwise instructed.    If bleeding should occur following discharge:  · Sit down and apply firm pressure to site with your fingers for 10 minutes  · If the bleeding stops, continue to sit quietly, keeping your wrist straight for 2 hours.  Notify physician as soon as possible ( [phone])  · If bleeding does not stop after 10 minutes, or if there is a large amount of bleeding or spurting, call 911 immediately.  Do not drive yourself to the hospital.

## 2018-08-28 NOTE — OR NURSING
1604 Patient arrived to unit from IR, awake and alert, denies pain.  TR band to left wrist clean, dry and soft.  Patient and wife updated on plan of care. 2 ml air removed from TR band, no bleeding to site.  1625 Patient ambulated to bathroom, denies any discomfort.  1630 3 ml air removed from TR band, no bleeding to site.  Patient anxious to go home.  1655 Final 2 ml air removed from TR band, no bleeding noted, dressing placed.  Discharge orders received. All lines and monitors discontinued. Reviewed discharge paperwork with pt. Discussed diet, activity, medications, follow up care and worsening symptoms. No questions at this time.   1700 Pt discharged home with wife via walking by RN.

## 2018-08-28 NOTE — PROGRESS NOTES
IR Procedure RN's Note:    Patient consented in PPU by Dr. Norris; consent and H&P updated and signed and verified by Simona ORTIZ PPU RN.    Mapping of B/L hepatic lobe done by Dr. Norris; LEFT radial artery access site; pt assessed upon arrival, pt A/Ox4, pt aware of the procedure;    Coils placed into the Colonic/Splenic branch:  Appnique 2D Fibered IDC 2mm x 4cm REF#[id number] LOT#[id number]  Appnique VortX Hillary Coil 2mm x 3mm x 2.3cm REF#[id number] LOT#[id number]    Pt appears comfortable throughout the procedure with no signs of distress or discomfort; ETCO2 monitored throughout the procedure with baseline of 22 mmHg and ranged between 13-29 mmHg throughout the procedure; access site CDI, soft with no signs of hematoma or bleeding, TR Band applied at 1506 with 10mL of air; telephone report given to Amee; pt is awake and on 2L NC O2; pt transported to [facility] and to PPU after imaging.

## 2018-09-07 ENCOUNTER — HOSPITAL ENCOUNTER (OUTPATIENT)
Facility: MEDICAL CENTER | Age: 68
End: 2018-09-07
Attending: RADIOLOGY | Admitting: RADIOLOGY
Payer: MEDICARE

## 2018-09-07 ENCOUNTER — APPOINTMENT (OUTPATIENT)
Dept: RADIOLOGY | Facility: MEDICAL CENTER | Age: 68
End: 2018-09-07
Attending: RADIOLOGY
Payer: MEDICARE

## 2018-09-07 VITALS
HEIGHT: 68 IN | DIASTOLIC BLOOD PRESSURE: 75 MMHG | TEMPERATURE: 98.8 F | HEART RATE: 50 BPM | WEIGHT: 217.37 LBS | OXYGEN SATURATION: 97 % | BODY MASS INDEX: 32.94 KG/M2 | SYSTOLIC BLOOD PRESSURE: 150 MMHG | RESPIRATION RATE: 16 BRPM

## 2018-09-07 DIAGNOSIS — C22.0 HEPATOMA (HCC): ICD-10-CM

## 2018-09-07 PROCEDURE — 37247 TRLUML BALO ANGIOP ADDL ART: CPT

## 2018-09-07 PROCEDURE — 36247 INS CATH ABD/L-EXT ART 3RD: CPT

## 2018-09-07 PROCEDURE — 78215 LVR&SPLEEN IMG STATIC ONLY: CPT

## 2018-09-07 PROCEDURE — 700111 HCHG RX REV CODE 636 W/ 250 OVERRIDE (IP): Performed by: RADIOLOGY

## 2018-09-07 PROCEDURE — 160002 HCHG RECOVERY MINUTES (STAT)

## 2018-09-07 PROCEDURE — 99153 MOD SED SAME PHYS/QHP EA: CPT

## 2018-09-07 PROCEDURE — 700117 HCHG RX CONTRAST REV CODE 255: Performed by: RADIOLOGY

## 2018-09-07 PROCEDURE — 700111 HCHG RX REV CODE 636 W/ 250 OVERRIDE (IP)

## 2018-09-07 RX ORDER — OXYCODONE HYDROCHLORIDE 10 MG/1
10 TABLET ORAL
Status: DISCONTINUED | OUTPATIENT
Start: 2018-09-07 | End: 2018-09-07 | Stop reason: HOSPADM

## 2018-09-07 RX ORDER — SODIUM CHLORIDE 9 MG/ML
500 INJECTION, SOLUTION INTRAVENOUS
Status: ACTIVE | OUTPATIENT
Start: 2018-09-07 | End: 2018-09-07

## 2018-09-07 RX ORDER — ONDANSETRON 2 MG/ML
4 INJECTION INTRAMUSCULAR; INTRAVENOUS PRN
Status: ACTIVE | OUTPATIENT
Start: 2018-09-07 | End: 2018-09-07

## 2018-09-07 RX ORDER — VERAPAMIL HYDROCHLORIDE 2.5 MG/ML
INJECTION, SOLUTION INTRAVENOUS
Status: COMPLETED
Start: 2018-09-07 | End: 2018-09-07

## 2018-09-07 RX ORDER — MORPHINE SULFATE 10 MG/ML
4 INJECTION, SOLUTION INTRAMUSCULAR; INTRAVENOUS
Status: DISCONTINUED | OUTPATIENT
Start: 2018-09-08 | End: 2018-09-07 | Stop reason: HOSPADM

## 2018-09-07 RX ORDER — OXYCODONE HYDROCHLORIDE 5 MG/1
5 TABLET ORAL EVERY 4 HOURS PRN
Qty: 30 TAB | Refills: 0 | Status: SHIPPED | OUTPATIENT
Start: 2018-09-07 | End: 2018-09-12

## 2018-09-07 RX ORDER — MORPHINE SULFATE 4 MG/ML
4 INJECTION, SOLUTION INTRAMUSCULAR; INTRAVENOUS
Status: DISCONTINUED | OUTPATIENT
Start: 2018-09-07 | End: 2018-09-07

## 2018-09-07 RX ORDER — ONDANSETRON 2 MG/ML
4 INJECTION INTRAMUSCULAR; INTRAVENOUS EVERY 8 HOURS PRN
Status: DISCONTINUED | OUTPATIENT
Start: 2018-09-07 | End: 2018-09-07 | Stop reason: HOSPADM

## 2018-09-07 RX ORDER — MIDAZOLAM HYDROCHLORIDE 1 MG/ML
.5-2 INJECTION INTRAMUSCULAR; INTRAVENOUS PRN
Status: ACTIVE | OUTPATIENT
Start: 2018-09-07 | End: 2018-09-07

## 2018-09-07 RX ORDER — OXYCODONE HYDROCHLORIDE 5 MG/1
5 TABLET ORAL
Status: DISCONTINUED | OUTPATIENT
Start: 2018-09-07 | End: 2018-09-07 | Stop reason: HOSPADM

## 2018-09-07 RX ORDER — ONDANSETRON 4 MG/1
4 TABLET, FILM COATED ORAL EVERY 4 HOURS PRN
Qty: 20 TAB | Refills: 0 | Status: SHIPPED | OUTPATIENT
Start: 2018-09-07 | End: 2018-09-12

## 2018-09-07 RX ORDER — HEPARIN SODIUM,PORCINE 1000/ML
VIAL (ML) INJECTION
Status: COMPLETED
Start: 2018-09-07 | End: 2018-09-07

## 2018-09-07 RX ORDER — METHYLPREDNISOLONE 4 MG/1
TABLET ORAL
Qty: 1 KIT | Refills: 0 | Status: SHIPPED | OUTPATIENT
Start: 2018-09-07 | End: 2018-09-28

## 2018-09-07 RX ORDER — MIDAZOLAM HYDROCHLORIDE 1 MG/ML
INJECTION INTRAMUSCULAR; INTRAVENOUS
Status: COMPLETED
Start: 2018-09-07 | End: 2018-09-07

## 2018-09-07 RX ADMIN — ONDANSETRON HYDROCHLORIDE 16 MG: 2 SOLUTION INTRAMUSCULAR; INTRAVENOUS at 09:30

## 2018-09-07 RX ADMIN — MIDAZOLAM HYDROCHLORIDE 2 MG: 1 INJECTION INTRAMUSCULAR; INTRAVENOUS at 11:09

## 2018-09-07 RX ADMIN — MIDAZOLAM HYDROCHLORIDE 2 MG: 1 INJECTION INTRAMUSCULAR; INTRAVENOUS at 10:39

## 2018-09-07 RX ADMIN — FENTANYL CITRATE 50 MCG: 50 INJECTION, SOLUTION INTRAMUSCULAR; INTRAVENOUS at 10:39

## 2018-09-07 RX ADMIN — IOHEXOL 80 ML: 300 INJECTION, SOLUTION INTRAVENOUS at 11:51

## 2018-09-07 RX ADMIN — NITROGLYCERIN 0.5 ML: 20 INJECTION INTRAVENOUS at 11:07

## 2018-09-07 RX ADMIN — VERAPAMIL HYDROCHLORIDE 2.5 MG: 2.5 INJECTION, SOLUTION INTRAVENOUS at 11:03

## 2018-09-07 RX ADMIN — MIDAZOLAM 2 MG: 1 INJECTION INTRAMUSCULAR; INTRAVENOUS at 10:39

## 2018-09-07 RX ADMIN — HEPARIN SODIUM 5000 UNITS: 1000 INJECTION, SOLUTION INTRAVENOUS; SUBCUTANEOUS at 11:03

## 2018-09-07 ASSESSMENT — PAIN SCALES - GENERAL
PAINLEVEL_OUTOF10: 0

## 2018-09-07 NOTE — DISCHARGE INSTRUCTIONS
ACTIVITY: Rest and take it easy for the first 24 hours.  A responsible adult is recommended to remain with you during that time.  It is normal to feel sleepy.  We encourage you to not do anything that requires balance, judgment or coordination.    MILD FLU-LIKE SYMPTOMS ARE NORMAL. YOU MAY EXPERIENCE GENERALIZED MUSCLE ACHES, THROAT IRRITATION, HEADACHE AND/OR SOME NAUSEA.    FOR 24 HOURS DO NOT:  Drive, operate machinery or run household appliances.  Drink beer or alcoholic beverages.   Make important decisions or sign legal documents.    SPECIAL INSTRUCTIONS: keep incision dry for 24 hours    DIET: To avoid nausea, slowly advance diet as tolerated, avoiding spicy or greasy foods for the first day.  Add more substantial food to your diet according to your physician's instructions.  Babies can be fed formula or breast milk as soon as they are hungry.  INCREASE FLUIDS AND FIBER TO AVOID CONSTIPATION.    SURGICAL DRESSING/BATHING: do not shower for 24 hours, may shower tomorrow 9/8/2018 after 12pm    FOLLOW-UP APPOINTMENT:  A follow-up appointment should be arranged with your doctor Myrna at [phone]; call to schedule.    You should CALL YOUR PHYSICIAN if you develop:  Fever greater than 101 degrees F.  Pain not relieved by medication, or persistent nausea or vomiting.  Excessive bleeding (blood soaking through dressing) or unexpected drainage from the wound.  Extreme redness or swelling around the incision site, drainage of pus or foul smelling drainage.  Inability to urinate or empty your bladder within 8 hours.  Problems with breathing or chest pain.    You should call 911 if you develop problems with breathing or chest pain.  If you are unable to contact your doctor or surgical center, you should go to the nearest emergency room or urgent care center.  Physician's telephone #: [phone]    If any questions arise, call your doctor.  If your doctor is not available, please feel free to call the Surgical  Center at [phone].  The Center is open Monday through Friday from 7AM to 7PM.  You can also call the HEALTH HOTLINE open 24 hours/day, 7 days/week and speak to a nurse at [phone], or toll free at [phone].    A registered nurse may call you a few days after your surgery to see how you are doing after your procedure.    MEDICATIONS: Resume taking daily medication.  Take prescribed pain medication with food.  If no medication is prescribed, you may take non-aspirin pain medication if needed.  PAIN MEDICATION CAN BE VERY CONSTIPATING.  Take a stool softener or laxative such as senokot, pericolace, or milk of magnesia if needed.    If your physician has prescribed pain medication that includes Acetaminophen (Tylenol), do not take additional Acetaminophen (Tylenol) while taking the prescribed medication.    Depression / Suicide Risk    As you are discharged from this [facility] facility, it is important to learn how to keep safe from harming yourself.    Recognize the warning signs:  · Abrupt changes in personality, positive or negative- including increase in energy   · Giving away possessions  · Change in eating patterns- significant weight changes-  positive or negative  · Change in sleeping patterns- unable to sleep or sleeping all the time   · Unwillingness or inability to communicate  · Depression  · Unusual sadness, discouragement and loneliness  · Talk of wanting to die  · Neglect of personal appearance   · Rebelliousness- reckless behavior  · Withdrawal from people/activities they love  · Confusion- inability to concentrate     If you or a loved one observes any of these behaviors or has concerns about self-harm, here's what you can do:  · Talk about it- your feelings and reasons for harming yourself  · Remove any means that you might use to hurt yourself (examples: pills, rope, extension cords, firearm)  · Get professional help from the community (Mental Health, Substance Abuse,  psychological counseling)  · Do not be alone:Call your Safe Contact- someone whom you trust who will be there for you.  · Call your local CRISIS HOTLINE 782-0233 or 469-350-7203  · Call your local Children's Mobile Crisis Response Team [place] (236) 977-9483 or www.[employer]  · Call the toll free National Suicide Prevention Hotlines   · National Suicide Prevention Lifeline 690-182-OVOW (1499)  · [employer] [employer] Line Network 800-SUICIDE (670-4620)    Radial Catherization Discharge Instructions      · Do not subject hand/arm to any forceful movements for 24 hours    i.e. supporting weight when rising from the chair or bed.   · Do not drive a car for 24 hours  · You may remove the dressing tomorrow  · You may shower on the day following your procedure.  Do not take a tub bath or submerge the puncture site in water for 3 days following the procedure.  · No Lifting more than 3-5 pounds with affected wrist for 5 days  · Follow up with your doctor  2-4 weeks.  · Increase fluids for 2 days post procedure.  · Continue all previous medications unless otherwise instructed.    If bleeding should occur following discharge:  · Sit down and apply firm pressure to site with your fingers for 10 minutes  · If the bleeding stops, continue to sit quietly, keeping your wrist straight for 2 hours.  Notify physician as soon as possible ( [phone])  · If bleeding does not stop after 10 minutes, or if there is a large amount of bleeding or spurting, call 911 immediately.  Do not drive yourself to the hospital.

## 2018-09-07 NOTE — PROGRESS NOTES
IR RN note:    Left wrist Allens test performed pre-procedure with normal wave return   Site Confirmed with MD, patient and RN pre procedure   Yttrium 90 selective internal radiation therapy of left hepatic lobe by MD Norris assisted by RT Max,Left wrist access site;  TR band Left wrist starting 10 cc @ 1145  End Tidal CO2 range 33-39 during procedure   PT transport to [facility] on monitor paige RN, then to [facility]   Patient tolerated procedure, hemodynamically stable; pt awake and talking post procedure; report given to P[facility] ALEXEY Tse;   patient transported back to [facility] via IR RN monitored then transferred care to report RN

## 2018-09-07 NOTE — OR SURGEON
Immediate Post- Operative Note        PostOp Diagnosis: Multifocal HCC.       Procedure(s): Y90 SIRT L lobe.       Estimated Blood Loss: Less than 5 ml        Complications: None            9/7/2018    1140 AM     Joe Norris

## 2018-09-07 NOTE — OR NURSING
1218 Pt report given from IR RN, pt transferred over on a gurney, pt placed on monitor, B/P every 15 minutes per MD order. Left radial TR band in place, 5 ml of air left in TR band per RN report. Site clean, dry and soft. Pt was given water and a snack. Pt instructed to limit right wrist movement.     1230 3 ml of air was removed from TR band no S/S of bleeding    1245 3 ml of air was removed from TR band no S/S of bleeding    1317 pt given D/C instructions, pt verbalized understanding. Pt was given information post angiogram care. Pt going home with family, pt refused wheelchair.

## 2018-09-07 NOTE — PROGRESS NOTES
In prescribing controlled substances to this patient, I certify that I have obtained and reviewed the medical history of Sohail Duvall. I have also made a good betty effort to obtain applicable records from other providers who have treated the patient and records did not demonstrate any increased risk of substance abuse that would prevent me from prescribing controlled substances.     I have conducted a physical exam and documented it. I have reviewed Mr. Duvall’s prescription history as maintained by the [place] Prescription Monitoring Program.     I have assessed the patient’s risk for abuse, dependency, and addiction using the validated Opioid Risk Tool available at https://www.mdcalc.com/npzhns-vbma-lvqf-ort-narcotic-abuse.     Given the above, I believe the benefits of controlled substance therapy outweigh the risks. The reasons for prescribing controlled substances include non-narcotic, oral analgesic alternatives have been inadequate for pain control. Accordingly, I have discussed the risk and benefits, treatment plan, and alternative therapies with the patient.

## 2018-09-10 DIAGNOSIS — C22.0 HEPATOMA (HCC): ICD-10-CM

## 2018-09-12 ENCOUNTER — TELEPHONE (OUTPATIENT)
Dept: HEMATOLOGY ONCOLOGY | Facility: MEDICAL CENTER | Age: 68
End: 2018-09-12

## 2018-09-12 NOTE — TELEPHONE ENCOUNTER
Patient called and rescheduled his appointment with Dr. Servin from 10/10/18 to 09/25/18. Patient stated he could not come on 10/10 and stated that he is already here at [facility] on 09/25 for another appointment.

## 2018-09-18 ENCOUNTER — HOSPITAL ENCOUNTER (OUTPATIENT)
Dept: LAB | Facility: MEDICAL CENTER | Age: 68
End: 2018-09-18
Attending: INTERNAL MEDICINE
Payer: MEDICARE

## 2018-09-18 ENCOUNTER — HOSPITAL ENCOUNTER (OUTPATIENT)
Dept: LAB | Facility: MEDICAL CENTER | Age: 68
End: 2018-09-18
Attending: NURSE PRACTITIONER
Payer: MEDICARE

## 2018-09-18 ENCOUNTER — HOSPITAL ENCOUNTER (OUTPATIENT)
Dept: LAB | Facility: MEDICAL CENTER | Age: 68
End: 2018-09-18
Attending: PHYSICIAN ASSISTANT
Payer: MEDICARE

## 2018-09-18 DIAGNOSIS — C22.0 HEPATOMA (HCC): ICD-10-CM

## 2018-09-18 DIAGNOSIS — E83.19 IRON OVERLOAD: ICD-10-CM

## 2018-09-18 DIAGNOSIS — C91.10 CLL (CHRONIC LYMPHOCYTIC LEUKEMIA) (HCC): ICD-10-CM

## 2018-09-18 DIAGNOSIS — E11.8 CONTROLLED TYPE 2 DIABETES MELLITUS WITH COMPLICATION, WITHOUT LONG-TERM CURRENT USE OF INSULIN (HCC): ICD-10-CM

## 2018-09-18 LAB
ALBUMIN SERPL BCP-MCNC: 4.2 G/DL (ref 3.2–4.9)
ALBUMIN SERPL BCP-MCNC: 4.2 G/DL (ref 3.2–4.9)
ALBUMIN/GLOB SERPL: 1.7 G/DL
ALBUMIN/GLOB SERPL: 1.8 G/DL
ALP SERPL-CCNC: 68 U/L (ref 30–99)
ALP SERPL-CCNC: 68 U/L (ref 30–99)
ALT SERPL-CCNC: 21 U/L (ref 2–50)
ALT SERPL-CCNC: 22 U/L (ref 2–50)
ANION GAP SERPL CALC-SCNC: 8 MMOL/L (ref 0–11.9)
ANION GAP SERPL CALC-SCNC: 9 MMOL/L (ref 0–11.9)
AST SERPL-CCNC: 30 U/L (ref 12–45)
AST SERPL-CCNC: 32 U/L (ref 12–45)
BASOPHILS # BLD AUTO: 0 % (ref 0–1.8)
BASOPHILS # BLD AUTO: 0 % (ref 0–1.8)
BASOPHILS # BLD: 0 K/UL (ref 0–0.12)
BASOPHILS # BLD: 0 K/UL (ref 0–0.12)
BILIRUB SERPL-MCNC: 1.5 MG/DL (ref 0.1–1.5)
BILIRUB SERPL-MCNC: 1.5 MG/DL (ref 0.1–1.5)
BUN SERPL-MCNC: 9 MG/DL (ref 8–22)
BUN SERPL-MCNC: 9 MG/DL (ref 8–22)
BURR CELLS BLD QL SMEAR: NORMAL
BURR CELLS BLD QL SMEAR: NORMAL
CALCIUM SERPL-MCNC: 9.5 MG/DL (ref 8.5–10.5)
CALCIUM SERPL-MCNC: 9.5 MG/DL (ref 8.5–10.5)
CHLORIDE SERPL-SCNC: 107 MMOL/L (ref 96–112)
CHLORIDE SERPL-SCNC: 107 MMOL/L (ref 96–112)
CO2 SERPL-SCNC: 25 MMOL/L (ref 20–33)
CO2 SERPL-SCNC: 25 MMOL/L (ref 20–33)
CREAT SERPL-MCNC: 0.71 MG/DL (ref 0.5–1.4)
CREAT SERPL-MCNC: 0.72 MG/DL (ref 0.5–1.4)
EOSINOPHIL # BLD AUTO: 0 K/UL (ref 0–0.51)
EOSINOPHIL # BLD AUTO: 0.33 K/UL (ref 0–0.51)
EOSINOPHIL NFR BLD: 0 % (ref 0–6.9)
EOSINOPHIL NFR BLD: 0.9 % (ref 0–6.9)
ERYTHROCYTE [DISTWIDTH] IN BLOOD BY AUTOMATED COUNT: 51.9 FL (ref 35.9–50)
ERYTHROCYTE [DISTWIDTH] IN BLOOD BY AUTOMATED COUNT: 52.7 FL (ref 35.9–50)
EST. AVERAGE GLUCOSE BLD GHB EST-MCNC: 174 MG/DL
FERRITIN SERPL-MCNC: 35.8 NG/ML (ref 22–322)
GLOBULIN SER CALC-MCNC: 2.4 G/DL (ref 1.9–3.5)
GLOBULIN SER CALC-MCNC: 2.5 G/DL (ref 1.9–3.5)
GLUCOSE SERPL-MCNC: 125 MG/DL (ref 65–99)
GLUCOSE SERPL-MCNC: 126 MG/DL (ref 65–99)
HBA1C MFR BLD: 7.7 % (ref 0–5.6)
HCT VFR BLD AUTO: 47.3 % (ref 42–52)
HCT VFR BLD AUTO: 47.6 % (ref 42–52)
HGB BLD-MCNC: 15.7 G/DL (ref 14–18)
HGB BLD-MCNC: 15.8 G/DL (ref 14–18)
INR PPP: 1.18 (ref 0.87–1.13)
LDH SERPL L TO P-CCNC: 169 U/L (ref 107–266)
LYMPHOCYTES # BLD AUTO: 33.84 K/UL (ref 1–4.8)
LYMPHOCYTES # BLD AUTO: 35.06 K/UL (ref 1–4.8)
LYMPHOCYTES NFR BLD: 91.2 % (ref 22–41)
LYMPHOCYTES NFR BLD: 93 % (ref 22–41)
MANUAL DIFF BLD: NORMAL
MANUAL DIFF BLD: NORMAL
MCH RBC QN AUTO: 31.3 PG (ref 27–33)
MCH RBC QN AUTO: 31.5 PG (ref 27–33)
MCHC RBC AUTO-ENTMCNC: 33 G/DL (ref 33.7–35.3)
MCHC RBC AUTO-ENTMCNC: 33.4 G/DL (ref 33.7–35.3)
MCV RBC AUTO: 94.4 FL (ref 81.4–97.8)
MCV RBC AUTO: 94.8 FL (ref 81.4–97.8)
MONOCYTES # BLD AUTO: 0 K/UL (ref 0–0.85)
MONOCYTES # BLD AUTO: 0.33 K/UL (ref 0–0.85)
MONOCYTES NFR BLD AUTO: 0 % (ref 0–13.4)
MONOCYTES NFR BLD AUTO: 0.9 % (ref 0–13.4)
MORPHOLOGY BLD-IMP: NORMAL
MORPHOLOGY BLD-IMP: NORMAL
NEUTROPHILS # BLD AUTO: 2.6 K/UL (ref 1.82–7.42)
NEUTROPHILS # BLD AUTO: 2.64 K/UL (ref 1.82–7.42)
NEUTROPHILS NFR BLD: 7 % (ref 44–72)
NEUTROPHILS NFR BLD: 7 % (ref 44–72)
NRBC # BLD AUTO: 0 K/UL
NRBC # BLD AUTO: 0 K/UL
NRBC BLD-RTO: 0 /100 WBC
NRBC BLD-RTO: 0 /100 WBC
PLATELET # BLD AUTO: 84 K/UL (ref 164–446)
PLATELET # BLD AUTO: 94 K/UL (ref 164–446)
PLATELET BLD QL SMEAR: NORMAL
PLATELET BLD QL SMEAR: NORMAL
PMV BLD AUTO: 11.7 FL (ref 9–12.9)
PMV BLD AUTO: 11.9 FL (ref 9–12.9)
POIKILOCYTOSIS BLD QL SMEAR: NORMAL
POIKILOCYTOSIS BLD QL SMEAR: NORMAL
POTASSIUM SERPL-SCNC: 3.8 MMOL/L (ref 3.6–5.5)
POTASSIUM SERPL-SCNC: 3.9 MMOL/L (ref 3.6–5.5)
PROT SERPL-MCNC: 6.6 G/DL (ref 6–8.2)
PROT SERPL-MCNC: 6.7 G/DL (ref 6–8.2)
PROTHROMBIN TIME: 14.7 SEC (ref 12–14.6)
RBC # BLD AUTO: 5.01 M/UL (ref 4.7–6.1)
RBC # BLD AUTO: 5.02 M/UL (ref 4.7–6.1)
RBC BLD AUTO: PRESENT
RBC BLD AUTO: PRESENT
SMUDGE CELLS BLD QL SMEAR: NORMAL
SMUDGE CELLS BLD QL SMEAR: NORMAL
SODIUM SERPL-SCNC: 140 MMOL/L (ref 135–145)
SODIUM SERPL-SCNC: 141 MMOL/L (ref 135–145)
WBC # BLD AUTO: 37.1 K/UL (ref 4.8–10.8)
WBC # BLD AUTO: 37.7 K/UL (ref 4.8–10.8)

## 2018-09-18 PROCEDURE — 80053 COMPREHEN METABOLIC PANEL: CPT

## 2018-09-18 PROCEDURE — 36415 COLL VENOUS BLD VENIPUNCTURE: CPT

## 2018-09-18 PROCEDURE — 85027 COMPLETE CBC AUTOMATED: CPT | Mod: 91

## 2018-09-18 PROCEDURE — 85007 BL SMEAR W/DIFF WBC COUNT: CPT

## 2018-09-18 PROCEDURE — 82728 ASSAY OF FERRITIN: CPT

## 2018-09-18 PROCEDURE — 83036 HEMOGLOBIN GLYCOSYLATED A1C: CPT | Mod: GA

## 2018-09-18 PROCEDURE — 80053 COMPREHEN METABOLIC PANEL: CPT | Mod: 91

## 2018-09-18 PROCEDURE — 85007 BL SMEAR W/DIFF WBC COUNT: CPT | Mod: 91

## 2018-09-18 PROCEDURE — 82105 ALPHA-FETOPROTEIN SERUM: CPT

## 2018-09-18 PROCEDURE — 83615 LACTATE (LD) (LDH) ENZYME: CPT

## 2018-09-18 PROCEDURE — 85027 COMPLETE CBC AUTOMATED: CPT

## 2018-09-18 PROCEDURE — 85610 PROTHROMBIN TIME: CPT

## 2018-09-20 LAB — AFP-TM SERPL-MCNC: 7 NG/ML (ref 0–9)

## 2018-09-21 NOTE — ADDENDUM NOTE
Encounter addended by: Madeleine Da Silva on: 9/21/2018 12:29 PM<BR>    Actions taken: Charge Capture section accepted

## 2018-09-25 ENCOUNTER — OFFICE VISIT (OUTPATIENT)
Dept: HEMATOLOGY ONCOLOGY | Facility: MEDICAL CENTER | Age: 68
End: 2018-09-25
Payer: MEDICARE

## 2018-09-25 ENCOUNTER — HOSPITAL ENCOUNTER (OUTPATIENT)
Dept: RADIOLOGY | Facility: MEDICAL CENTER | Age: 68
End: 2018-09-25
Attending: NURSE PRACTITIONER
Payer: MEDICARE

## 2018-09-25 VITALS
SYSTOLIC BLOOD PRESSURE: 140 MMHG | BODY MASS INDEX: 33.56 KG/M2 | HEIGHT: 68 IN | DIASTOLIC BLOOD PRESSURE: 69 MMHG | WEIGHT: 221.45 LBS | HEART RATE: 67 BPM | TEMPERATURE: 97.9 F | RESPIRATION RATE: 16 BRPM | OXYGEN SATURATION: 98 %

## 2018-09-25 DIAGNOSIS — C22.0 HEPATOMA (HCC): ICD-10-CM

## 2018-09-25 DIAGNOSIS — C91.10 CLL (CHRONIC LYMPHOCYTIC LEUKEMIA) (HCC): ICD-10-CM

## 2018-09-25 PROCEDURE — 99214 OFFICE O/P EST MOD 30 MIN: CPT | Performed by: INTERNAL MEDICINE

## 2018-09-25 RX ORDER — 0.9 % SODIUM CHLORIDE 0.9 %
VIAL (ML) INJECTION PRN
Status: CANCELLED | OUTPATIENT
Start: 2018-11-01

## 2018-09-25 RX ORDER — SODIUM CHLORIDE 9 MG/ML
INJECTION, SOLUTION INTRAVENOUS CONTINUOUS
Status: CANCELLED | OUTPATIENT
Start: 2018-10-31

## 2018-09-25 RX ORDER — ONDANSETRON 2 MG/ML
4 INJECTION INTRAMUSCULAR; INTRAVENOUS
Status: CANCELLED | OUTPATIENT
Start: 2018-11-01

## 2018-09-25 RX ORDER — 0.9 % SODIUM CHLORIDE 0.9 %
VIAL (ML) INJECTION PRN
Status: CANCELLED | OUTPATIENT
Start: 2018-10-31

## 2018-09-25 RX ORDER — ONDANSETRON 8 MG/1
8 TABLET, ORALLY DISINTEGRATING ORAL
Status: CANCELLED | OUTPATIENT
Start: 2018-10-31

## 2018-09-25 RX ORDER — 0.9 % SODIUM CHLORIDE 0.9 %
5 VIAL (ML) INJECTION PRN
Status: CANCELLED | OUTPATIENT
Start: 2018-11-01

## 2018-09-25 RX ORDER — ONDANSETRON 8 MG/1
8 TABLET, ORALLY DISINTEGRATING ORAL
Status: CANCELLED | OUTPATIENT
Start: 2018-11-01

## 2018-09-25 RX ORDER — 0.9 % SODIUM CHLORIDE 0.9 %
5 VIAL (ML) INJECTION PRN
Status: CANCELLED | OUTPATIENT
Start: 2018-10-31

## 2018-09-25 RX ORDER — ACETAMINOPHEN 325 MG/1
650 TABLET ORAL ONCE
Status: CANCELLED | OUTPATIENT
Start: 2018-10-31 | End: 2018-09-25

## 2018-09-25 RX ORDER — DIPHENHYDRAMINE HYDROCHLORIDE 50 MG/ML
25 INJECTION INTRAMUSCULAR; INTRAVENOUS
Status: CANCELLED | OUTPATIENT
Start: 2018-10-31

## 2018-09-25 RX ORDER — PROCHLORPERAZINE MALEATE 10 MG
10 TABLET ORAL EVERY 6 HOURS PRN
Status: CANCELLED | OUTPATIENT
Start: 2018-11-01

## 2018-09-25 RX ORDER — PROCHLORPERAZINE MALEATE 10 MG
10 TABLET ORAL EVERY 6 HOURS PRN
Status: CANCELLED | OUTPATIENT
Start: 2018-10-31

## 2018-09-25 RX ORDER — SODIUM CHLORIDE 9 MG/ML
INJECTION, SOLUTION INTRAVENOUS CONTINUOUS
Status: CANCELLED | OUTPATIENT
Start: 2018-11-01

## 2018-09-25 RX ORDER — ACETAMINOPHEN 325 MG/1
650 TABLET ORAL
Status: CANCELLED | OUTPATIENT
Start: 2018-10-31

## 2018-09-25 RX ORDER — ONDANSETRON 2 MG/ML
4 INJECTION INTRAMUSCULAR; INTRAVENOUS
Status: CANCELLED | OUTPATIENT
Start: 2018-10-31

## 2018-09-25 RX ORDER — MEPERIDINE HYDROCHLORIDE 25 MG/ML
25 INJECTION INTRAMUSCULAR; INTRAVENOUS; SUBCUTANEOUS
Status: CANCELLED | OUTPATIENT
Start: 2018-10-31

## 2018-09-25 ASSESSMENT — LIFESTYLE VARIABLES: SUBSTANCE_ABUSE: 0

## 2018-09-25 ASSESSMENT — ENCOUNTER SYMPTOMS
VOMITING: 0
GASTROINTESTINAL NEGATIVE: 1
BLOOD IN STOOL: 0
ROS SKIN COMMENTS: NEGATIVE FOR JAUNDICE
DIAPHORESIS: 0
NAUSEA: 0
SENSORY CHANGE: 0
HEARTBURN: 0
WEAKNESS: 0
FOCAL WEAKNESS: 0
CHILLS: 0
RESPIRATORY NEGATIVE: 1
WEIGHT LOSS: 0
ROS GI COMMENTS: NEGATIVE FOR ASCITES
DIARRHEA: 0
CONSTIPATION: 0
CARDIOVASCULAR NEGATIVE: 1
BRUISES/BLEEDS EASILY: 0
FEVER: 0
ABDOMINAL PAIN: 0

## 2018-09-25 ASSESSMENT — PAIN SCALES - GENERAL: PAINLEVEL: NO PAIN

## 2018-09-25 NOTE — CONSULTS
Interventional Radiology Follow Up     Re: Sohail Duvall     MRN: [id number]   : 1950    Sohail Duvall was seen today in follow up after hepatic radioembolization performed by Joe Norris MD at [facility] on 2018.  He was referred to our service by Feliberto Senior MD and is also under the care of Carmencita Hand PA-C, Alfonzo Servin MD, and Johanna Hooper MD.    History of Present Illness:   has a history of CLL and alcoholic cirrhosis. Recent imaging revealed hepatomas in 4A and 4B. On 2018 the lesion in 4B was ablated but the operative ablation of a 4A lesion was unsuccessful due to proximity to the portal pedicle. He has not had a liver mass biopsy.  was been referred to interventional radiology for evaluation and management and our recommendation on 2018 was Y90 SIRT to both hepatic lobes due to multiple small LR-3 lesions that were not present on prior scans, suspicious for multifocal disease with left lobe dominant disease. He underwent mapping on  and Y90 SIRT of the left lobe on . He is seen in follow up after the procedure and is accompanied by his wife. Today, Mr. Duvall denies complains. He denies jaundice, bleeding, nausea and vomiting, and fatigue. He states he had a single day of nausea post procedure and was tired one day last week but overall is at his baseline. His weight is stable and appetite is good. His energy level is normal. He denies problems with he angio site. He is followed by Dr. Servin for CLL and an enlarging subcarinal lymph node and has an appointment this afternoon.    Past Medical History:   Diagnosis Date   • Anemia    • Cancer (HCC)     Prostate - did brachytherapy at [place]   • Cancer (HCC)     Liver   • Cirrhosis (HCC)    • CLL (chronic lymphocytic leukemia) (HCC)    • CMV (cytomegalovirus infection) (HCC)     Treated for 6 weeks   • Diabetes (HCC)   "   oral medication   • Liver cancer (HCC)    • Liver tumor    • Rosacea      Past Surgical History:   Procedure Laterality Date   • HEPATIC ABLATION LAPAROSCOPIC  6/28/2018    Procedure: HEPATIC ABLATION LAPAROSCOPIC. SEGMENT 4B, HEPATOMA, REPAIR OF INCARCERATED UMBILICAL HERNIA;  Surgeon: Feliberto Burgos M.D.;  Location: SURGERY [facility];  Service: General   • BRACHY THERAPY       Social History     Social History   • Marital status:      Spouse name: N/A   • Number of children: 1   • Years of education: N/A     Occupational History   • fertilizer sales      Social History Main Topics   • Smoking status: Never Smoker   • Smokeless tobacco: Former User     Types: Snuff     Quit date: 3/29/2015   • Alcohol use No   • Drug use: Yes     Types: Marijuana      Comment: \"Marijuana Use\"   • Sexual activity: No     Other Topics Concern   • Not on file     Social History Narrative    No known exposure to asbestos, dyes, or chemicals, however he was exposed to pesticides.  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  1 child, alive and well.  He also has a step-child.      Family History   Problem Relation Age of Onset   • Cancer Father 81        Bladder   • Cancer Brother         Prostate & CLL (s/p 8-10 years ago)   • Hyperlipidemia Sister    • Lung Disease Neg Hx    • Diabetes Neg Hx    • Heart Disease Neg Hx    • Hypertension Neg Hx    • Stroke Neg Hx    • Alcohol/Drug Neg Hx        Review of Systems   Constitutional: Negative for chills, diaphoresis, fever, malaise/fatigue and weight loss.   Eyes:        Negative for icterus   Respiratory: Negative.    Cardiovascular: Negative.    Gastrointestinal: Negative.  Negative for abdominal pain, blood in stool, constipation, diarrhea, heartburn, melena, nausea and vomiting.        Negative for ascites   Genitourinary: Negative for hematuria.   Skin: Negative.         Negative for jaundice "   Neurological: Negative for sensory change, focal weakness and weakness.   Endo/Heme/Allergies: Does not bruise/bleed easily.   Psychiatric/Behavioral: Negative for substance abuse (negative for alcohol and tobacco use).     A comprehensive 14-point review of systems was negative except as described above.     Labs:      Ref. Range 6/22/2018 14:23 6/28/2018 06:24 8/23/2018 10:21 9/18/2018 09:35 9/18/2018 09:39 9/18/2018 09:41   WBC Latest Ref Range: 4.8 - 10.8 K/uL 54.0 (HH)  57.1 (HH) 37.7 (HH) 37.1 (HH)    RBC Latest Ref Range: 4.70 - 6.10 M/uL 5.00  5.15 5.02 5.01    Hemoglobin Latest Ref Range: 14.0 - 18.0 g/dL 15.6  16.0 15.7 15.8    Hematocrit Latest Ref Range: 42.0 - 52.0 % 46.5  48.8 47.6 47.3    MCV Latest Ref Range: 81.4 - 97.8 fL 93.0  94.8 94.8 94.4    MCH Latest Ref Range: 27.0 - 33.0 pg 31.2  31.1 31.3 31.5    MCHC Latest Ref Range: 33.7 - 35.3 g/dL 33.5 (L)  32.8 (L) 33.0 (L) 33.4 (L)    RDW Latest Ref Range: 35.9 - 50.0 fL 54.1 (H)  49.8 52.7 (H) 51.9 (H)    Platelet Count Latest Ref Range: 164 - 446 K/uL 106 (L)  103 (L) 94 (L) 84 (L)    MPV Latest Ref Range: 9.0 - 12.9 fL 10.6  12.1 11.7 11.9    Neutrophils-Polys Latest Ref Range: 44.00 - 72.00 % 4.80 (L)  5.60 (L) 7.00 (L) 7.00 (L)    Neutrophils (Absolute) Latest Ref Range: 1.82 - 7.42 K/uL 2.59  3.20 2.64 2.60    Lymphocytes Latest Ref Range: 22.00 - 41.00 % 92.40 (H)  93.50 (H) 93.00 (H) 91.20 (H)    Lymphs (Absolute) Latest Ref Range: 1.00 - 4.80 K/uL 49.90 (H)  53.39 (H) 35.06 (H) 33.84 (H)    Monocytes Latest Ref Range: 0.00 - 13.40 % 0.00  0.00 0.00 0.90    Monos (Absolute) Latest Ref Range: 0.00 - 0.85 K/uL 0.00  0.00 0.00 0.33    Eosinophils Latest Ref Range: 0.00 - 6.90 % 0.00  0.00 0.00 0.90    Eos (Absolute) Latest Ref Range: 0.00 - 0.51 K/uL 0.00  0.00 0.00 0.33    Basophils Latest Ref Range: 0.00 - 1.80 % 2.80 (H)  0.90 0.00 0.00    Baso (Absolute) Latest Ref Range: 0.00 - 0.12 K/uL 1.51 (H)  0.51 (H) 0.00 0.00    Nucleated RBC  Latest Units: /100 WBC 0.00  0.00 0.00 0.00    NRBC (Absolute) Latest Units: K/uL 0.00  0.00 0.00 0.00    Plt Estimation Unknown Decreased  Decreased Decreased Decreased    RBC Morphology Unknown Present  Present Present Present    Anisocytosis Unknown 1+  1+      Macrocytosis Unknown   1+      Microcytosis Unknown 1+  1+      Poikilocytosis Unknown 1+  1+ 1+ 1+    Ovalocytes Unknown 1+  1+      Schistocytes Unknown   1+      Echinocytes Unknown    1+ 1+    Reactive Lymphocytes Unknown   Moderate      Smudge Cells Unknown Moderate  Few Moderate Moderate    Peripheral Smear Review Unknown see below  see below see below see below    Manual Diff Status Unknown PERFORMED  PERFORMED PERFORMED PERFORMED    Sodium Latest Ref Range: 135 - 145 mmol/L 141  142 141 140    Potassium Latest Ref Range: 3.6 - 5.5 mmol/L 3.9  4.0 3.9 3.8    Chloride Latest Ref Range: 96 - 112 mmol/L 108  108 107 107    Co2 Latest Ref Range: 20 - 33 mmol/L 24  27 25 25    Anion Gap Latest Ref Range: 0.0 - 11.9  9.0  7.0 9.0 8.0    Glucose Latest Ref Range: 65 - 99 mg/dL 173 (H)  118 (H) 126 (H) 125 (H)    Bun Latest Ref Range: 8 - 22 mg/dL 13  11 9 9    Creatinine Latest Ref Range: 0.50 - 1.40 mg/dL 0.76  0.72 0.71 0.72    GFR If  Latest Ref Range: >60 mL/min/1.73 m 2 >60  >60 >60 >60    GFR If Non  Latest Ref Range: >60 mL/min/1.73 m 2 >60  >60 >60 >60    Calcium Latest Ref Range: 8.5 - 10.5 mg/dL 9.6  9.8 9.5 9.5    AST(SGOT) Latest Ref Range: 12 - 45 U/L 24  29 30 32    ALT(SGPT) Latest Ref Range: 2 - 50 U/L 20  19 21 22    Alkaline Phosphatase Latest Ref Range: 30 - 99 U/L 71  77 68 68    Total Bilirubin Latest Ref Range: 0.1 - 1.5 mg/dL 1.2  1.2 1.5 1.5    Albumin Latest Ref Range: 3.2 - 4.9 g/dL 4.2  4.2 4.2 4.2    Total Protein Latest Ref Range: 6.0 - 8.2 g/dL 6.7  7.0 6.7 6.6    Globulin Latest Ref Range: 1.9 - 3.5 g/dL 2.5  2.8 2.5 2.4    A-G Ratio Latest Units: g/dL 1.7  1.5 1.7 1.8    LDH Total Latest Ref  Range: 107 - 266 U/L    169     Glycohemoglobin Latest Ref Range: 0.0 - 5.6 %      7.7 (H)   Estim. Avg Glu Latest Units: mg/dL      174   Glucose - Accu-Ck Latest Ref Range: 65 - 99 mg/dL  115 (H)       PT Latest Ref Range: 12.0 - 14.6 sec 14.6  14.9 (H)  14.7 (H)    INR Latest Ref Range: 0.87 - 1.13  1.17 (H)  1.20 (H)  1.18 (H)      Child [employer] Class A  JIM Grade A1    Pathology:    Radiology:   Interventional radiology procedure Y90 SIRT left lobe September 7, 2018, at [facility]:  1.  Technically successful Y90 radioembolization of the left hepatic lobe. The patient will followup in approximately 10 days for laboratory and clinical evaluation and 4-6 weeks for imaging evaluation.  2.  Immediately following the procedure, the patient was transported to nuclear medicine for SPECT imaging which demonstrates Bremsstrahlung emission from the left of the liver. There is no definite evidence of extrahepatic deposition of radioembolic   Material.    Nuclear medicine bremsstrahlung study on September 7, 2018 at [facility]:  The prescribed dose was  0.68 gb ( 18.4 mCi). The drawn dose was 0.78 gb (21mCi). Delivered dose after residual was measured at 0.7 gb ( 19 mCi). This represents  103% of the prescribed dose and  90% of the drawn dose. Bremsstrahlung images obtained   after the Y-90 radioembolization, confirm proper delivery to the targeted region. There is no uptake outside the planned treatment area. NOTE: The patient will be followed by interventional radiology and oncology.      Interventional radiology procedure August 8, 2018 at [facility]:  1.  Superior mesenteric arteriogram replaced common hepatic artery.  2.  Celiac arteriogram demonstrate no evidence of common hepatic arterial anatomy.  3.  Common hepatic arteriogram demonstrate no evidence of variant hepatic arterial anatomy.  4.  Selective catheterization and embolization of a small branch originating from the proximal aspect of the right hepatic artery which  appeared to supply either a small subsegmental region of the right hepatic lobe or a small amount of the hepatic   flexure of the colon.  5.  Proper hepatic arteriogram demonstrating 2 small branches originating from the proximal aspect of the right hepatic artery one of which appear to represent either a small subsegmental hepatic artery versus hepatic flexure colon branch and a small   cystic artery.  6.  Intra-arterial administration of 4.4 mCi technetium 99m labeled MAA into the hepatic artery demonstrated a hepatic pulmonary shunt fraction of 4%.    CT abdomen with and without August 8, 2018 at [facility]:  1.  Cirrhosis  2.  Interval ablation of a segment 4A hepatic nodule without evidence of residual tumor in that field  3.  Multiple additional hepatic nodules and masses as described above, mostly unchanged in size however the dominant central RIGHT hepatic mass has increased in size. These are likely hepatomas.  4.  Splenomegaly and enlarged portal vein, in keeping with the patient's known portal hypertension  5.  Cholelithiasis    Nuclear medicine quantitative lung study August 28, 2018 at [facility]:  Total hepatopulmonary shunt percentage was 4%. No evidence of extrahepatic radiotracer deposition is identified.    MRI abdomen on June 8, 2018 at [facility]:  Examination is limited by patient motion.  2.1 x 1.9 cm arterial hyperenhancing lesion within the left lobe of the liver appears compatible with hepatocellular carcinoma. LR-5    Hyperenhancing lesion within segment 4 of the liver bordering the caudate lobe measures 3 x 2 cm and is increased in size compared to prior. This lesion is compatible with hepatocellular carcinoma. LR-5    10 mm hyperenhancing lesion within segment 4 may be slightly decreased or unchanged in size compared to prior. LR-3    3 other small hyperenhancing lesions are seen within the right lobe of the liver measuring up to 1 cm. LR-3    Heterogeneous wedge-shaped area of delayed  "hypo-enhancement in the anterior liver is likely related to variable perfusion.    Findings of cirrhosis and portal hypertension.    Mildly prominent lymph nodes in the cardiophrenic fat are not significantly changed.    9 mm cystic lesion in the pancreatic tail could represent a small side branch IPMN.    Cholelithiasis. No biliary ductal dilatation is seen.    Subcarinal lymphadenopathy is partially imaged.    2 cm left adrenal nodule is unchanged and likely an adrenal adenoma.    Trace free fluid in the abdomen.    Findings discussed with Dr. Senior      CT thorax April 17, 2018 at [facility]:  1.  Mediastinal lymphadenopathy with markedly enlarged subcarinal lymph node could be due to metastatic disease or lymphoma. Reactive lymph nodes or lymphadenopathy due to granulomatous disease seems less likely.  2.  Coarse coronary artery calcifications.  3.  Cirrhosis with hypervascular hepatic lesions and splenomegaly.  4.  Cholelithiasis.     Nuclear medicine bone study April 17, 2018 at [facility]:  No scintigraphic evidence of bony metastatic disease     Portal pressure gradient April 4, 2018 at [facility]:  1.  Hepatic venography showing a patent right hepatic vein.  2.  Free and wedged right hepatic vein wedge pressures rendering a mean Hepatic Venous Pressure Gradient (HVPG) of 9.67 mmHg. This is consistent with portal hypertension.  (HVPG >= 10mmHg considered \"clinically significant\" portal HTN**  3.  Transjugular Liver biopsy was not performed at this time..  **Reference: Hepatic Venous Pressure Gradient in 2010:Optimal Measurement is Jose. Anya MCINTYRE, Wanda U. HEPATOLOGY, Vol. 51, No. 6, 2010     CT abdomen December 6, 2017 at [facility]:  1.  2.4 cm arterial enhancing mass in segment 4 of the liver demonstrates washout. LR-5.  2.  1.3 cm arterial enhancing lesion in the hepatic dome is similar to the recent MRI. No definite washout or capsule appreciated. LR-3  3.  Morphologic characteristics of cirrhosis with " evidence of portal hypertension including splenomegaly.  4.  Cholelithiasis.  5.  Stable left adrenal nodule, likely a benign adenoma.     Current Outpatient Prescriptions   Medication Sig Dispense Refill   • MethylPREDNISolone (MEDROL DOSEPAK) 4 MG Tablet Therapy Pack Take per included instructions. 1 Kit 0   • metFORMIN ER (GLUCOPHAGE XR) 500 MG TABLET SR 24 HR Take 2 Tabs by mouth every day. 180 Tab 1   • Multiple Vitamins-Minerals (MULTIVITAMIN PO) Take  by mouth every day.     • Polyethylene Glycol 3350 (MIRALAX PO) Take 1 Cap by mouth every day.     • Cetirizine HCl (ZYRTEC ALLERGY PO) Take 1 Tab by mouth every day.     • fluticasone (FLONASE ALLERGY RELIEF) 50 MCG/ACT nasal spray Spray 1 Spray in nose 2 times a day. 1 Bottle 0     No current facility-administered medications for this encounter.        No Known Allergies    Physical Exam   Constitutional: He is oriented to person, place, and time and well-developed, well-nourished, and in no distress. No distress.   HENT:   Head: Atraumatic.   Eyes: Pupils are equal, round, and reactive to light. No scleral icterus.   Cardiovascular: Normal rate, regular rhythm and normal heart sounds.    No murmur heard.  Pulmonary/Chest: Effort normal and breath sounds normal. No respiratory distress.   Abdominal: Soft. He exhibits no distension.   No ascites noted   Musculoskeletal: He exhibits no edema.   Neurological: He is alert and oriented to person, place, and time. Gait normal. Coordination normal.   Skin: Skin is warm and dry. No rash noted. He is not diaphoretic. No erythema. No pallor.   No jaundice noted   Psychiatric: Mood, memory, affect and judgment normal.       ECOG Performance Status 0    Impression:   1. Unresectable multifocal hepatocellular carcinoma status post left lobe radioembolization.  2. Cirrhosis.  3. Portal hypertension.  4. Thrombocytopenia.  5. Splenomegaly.  6. Chronic lymphocytic leukemia.   7. Diabetes mellitus.     Plan:   Joe Norris MD  has reviewed 's history and imaging studies, examined the patient, and discussed treatment options. He has recovered well without any significant side effects from the procedure. His post scan nuclear medicine imaging shows good concentration of yttrium to the left lobe lesions and we will wait two months to re scan and evaluate treatment effect prior to proceeding with right lobe intervention. We discussed the method of the procedure at length and reviewed imaging studies. All questions were answered. We discussed the possibility of tumor recurrence and the need to continue surveillance.  was instructed to continue to follow up with all the other treating physicians. We will obtain labs and an abdomen MRI in November plan intervention based on the findings.     GINA Grewal with Joe Norris MD  Interventional Radiology   [address] ([id number])  [place] [address]  [phone]

## 2018-09-25 NOTE — PROGRESS NOTES
Date of visit: 9/25/2018  2:04 PM      Chief Complaint- follow-up of CLL , 13q deleted  -Hepatocellular carcinoma      Identification/Prior relevant history:   -2004. Screening PSA elevated. Found to have prostate cancer Dr. Ramírez, urologist, treated with brachytherapy at [facility]. Followed with observation and serial PSA  March, 2014. Noted to have elevated WBC count  July 15, 2014. Flow cytometry shows CD5 positive B-cell lymphoproliferative disorder with a phenotype most consistent with chronic lymphocytic leukemia or small lymphocytic lymphoma  July 26, 2014. CT chest, abdomen and pelvis showed Hepatosplenomegaly. Slightly enlarged lymph nodes in the subcarinal region and right cardiophrenic angle.  December 11, 2014. Negative for [id number] mutation  January 4, 2016 ultrasound of the abdomen showed Cirrhosis with portal hypertension.Associated dilated portal vein and splenomegaly.No evidence for hepatoma  - 8/2017-he presented with significant iron deficiency anemia requiring iron infusion. Subsequently had upper and lower endoscopy , these were reportedly normal. He does not remember being informed that he has any esophageal viruses.     -12/6/17-CT abdomen- 1.  2.4 cm arterial enhancing mass in segment 4 of the liver demonstrates washout. LR-5.  1.3 cm arterial enhancing lesion in the hepatic dome is similar to the recent MRI. No definite washout or capsule appreciated. LR-3.  Morphologic characteristics of cirrhosis with evidence of portal hypertension including splenomegaly.  -12/2017-established care with me    - CLL FISH panel showed favorable prognostic marker of 13 qdeletion.   -He is following up with Dr. Senior.imaging of liver mass is suggestive of hepatoma.      CT of the chest-4/7/18-enlarged mediastinal lymph nodes with right paratracheal lymph nodes measuring up to 15 mm short axis and a subcarinal lymph node measuring 2.9 cm short axis. No hilar adenopathy identified. No axillary  lymphadenopathy identified. Multiple nonenlarged axillary lymph nodes identified bilaterally   decided not to do any intervention until his hepatoma issues are taken care of  -6/ 28/ 2018 the lesion in 4B was ablated but the operative ablation of a 4A lesion was unsuccessful due to proximity to the portal pedicle., No documented liver mass biopsy.    Interim history  -underwent mapping on August 28 and Y90 SIRT of the left lobe on September 7.  . He tolerated it well.  . CBC shows a worsening thrombocytopenia. WBC 10, hemoglobin is overall stable. He denies any new lymphadenopathy. He did have subcarinal lymphadenopathy in the CT scan from April. He has numerous other comorbidities for which he is following up with PCP.  Past Medical History:      Past Medical History:   Diagnosis Date   • Anemia    • Cancer (HCC) 2004    Prostate - did brachytherapy at [place]   • Cancer (HCC)     Liver   • Cirrhosis (HCC)    • CLL (chronic lymphocytic leukemia) (HCC) 2014   • CMV (cytomegalovirus infection) (HCC) 1990    Treated for 6 weeks   • Diabetes (HCC)     oral medication   • Liver cancer (HCC)    • Liver tumor    • Rosacea        Past surgical history:       Past Surgical History:   Procedure Laterality Date   • HEPATIC ABLATION LAPAROSCOPIC  6/28/2018    Procedure: HEPATIC ABLATION LAPAROSCOPIC. SEGMENT 4B, HEPATOMA, REPAIR OF INCARCERATED UMBILICAL HERNIA;  Surgeon: Feliberto Burgos M.D.;  Location[facility] SURGERY [facility];  Service: General   • BRACHY THERAPY         Allergies:       Patient has no known allergies.    Medications:         Current Outpatient Prescriptions   Medication Sig Dispense Refill   • MethylPREDNISolone (MEDROL DOSEPAK) 4 MG Tablet Therapy Pack Take per included instructions. 1 Kit 0   • metFORMIN ER (GLUCOPHAGE XR) 500 MG TABLET SR 24 HR Take 2 Tabs by mouth every day. 180 Tab 1   • Multiple Vitamins-Minerals (MULTIVITAMIN PO) Take  by mouth every day.     • Polyethylene Glycol 3350  "(MIRALAX PO) Take 1 Cap by mouth every day.     • Cetirizine HCl (ZYRTEC ALLERGY PO) Take 1 Tab by mouth every day.     • fluticasone (FLONASE ALLERGY RELIEF) 50 MCG/ACT nasal spray Spray 1 Spray in nose 2 times a day. 1 Bottle 0     No current facility-administered medications for this visit.          Social History:     Social History     Social History   • Marital status:      Spouse name: N/A   • Number of children: 1   • Years of education: N/A     Occupational History   • fertilizer sales      Social History Main Topics   • Smoking status: Never Smoker   • Smokeless tobacco: Former User     Types: Snuff     Quit date: 3/29/2015   • Alcohol use No   • Drug use: Yes     Types: Marijuana      Comment: \"Marijuana Use\"   • Sexual activity: No     Other Topics Concern   • Not on file     Social History Narrative    No known exposure to asbestos, dyes, or chemicals, however he was exposed to pesticides.  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  1 child, alive and well.  He also has a step-child.        Family History:      Family History   Problem Relation Age of Onset   • Cancer Father 81        Bladder   • Cancer Brother         Prostate & CLL (s/p 8-10 years ago)   • Hyperlipidemia Sister    • Lung Disease Neg Hx    • Diabetes Neg Hx    • Heart Disease Neg Hx    • Hypertension Neg Hx    • Stroke Neg Hx    • Alcohol/Drug Neg Hx        Review of Systems:  All other review of systems are negative except what was mentioned above in the HPI.    Constitutional: Negative for fever, chills, weight loss and malaise/fatigue.    HEENT: No new auditory or visual complaints. No sore throat and neck pain.     Respiratory: Negative for cough, sputum production, shortness of breath and wheezing.    Cardiovascular: Negative for chest pain, palpitations, orthopnea and leg swelling.    Gastrointestinal: Negative for heartburn, nausea, vomiting and abdominal " "pain.    Genitourinary: Negative for dysuria, hematuria    Musculoskeletal: No new arthralgias or myalgias   Skin: Negative for rash and itching.    Neurological: Negative for focal weakness and headaches.    Endo/Heme/Allergies: No abnormal bleed/bruise.    Psychiatric/Behavioral: No new depression/anxiety.    Physical Exam:  Vitals: /69 (BP Location: Left arm, Patient Position: Sitting, BP Cuff Size: Adult)   Pulse 67   Temp 36.6 °C (97.9 °F)   Resp 16   Ht 1.727 m (5' 8\")   Wt 100.4 kg (221 lb 7.2 oz)   SpO2 98%   BMI 33.67 kg/m²     General: Not in acute distress, alert and oriented x 3  HEENT: No pallor, icterus. Oropharynx clear.   Neck: Supple, no palpable masses.  Lymph nodes: No palpable cervical, supraclavicular, axillary or inguinal lymphadenopathy.    CVS: regular rate and rhythm, no rubs or gallops  RESP: Clear to auscultate bilaterally, no wheezing or crackles.   ABD: Soft, non tender, non distended, positive bowel sounds, no palpable organomegaly  EXT: No edema or cyanosis  CNS: Alert and oriented x3, No focal deficits.  Skin- No rash      Labs:   No visits with results within 1 Week(s) from this visit.   Latest known visit with results is:   Hospital Outpatient Visit on 09/18/2018   Component Date Value Ref Range Status   • Alpha Fetoprotein 09/18/2018 7  0 - 9 ng/mL Final    Comment: INTERPRETIVE INFORMATION: Alpha Fetoprotein Tumor Marker  The [employer] Access DxI AFP method is used. Results  obtained with different assay methods or kits cannot be used  interchangeably. AFP is a valuable aid in the management of  nonseminomatous testicular cancer patients when used in  conjunction with information available from the clinical  evaluation and other diagnostic procedures. Increased AFP  concentrations have also been observed in ataxia telangiectasia,  hereditary tyrosinemia, primary hepatocellular carcinoma,  teratocarcinoma, gastrointestinal tract cancers with and without  liver " metastases, and in benign hepatic conditions such as acute  viral hepatitis, chronic active hepatitis, and cirrhosis. The  result cannot be interpreted as absolute evidence of the presence  or absence of malignant disease. The result is not interpretable  as a tumor marker in pregnant females.  Access complete set of age- and/or gender-specific reference  intervals for this test in the                            [facility] Test Directory  ([email or web address]).  Performed by [facility],  [address] [place] [id number] [phone]  [email or web address], Abraham Munoz MD - Lab. Director     • PT 09/18/2018 14.7* 12.0 - 14.6 sec Final   • INR 09/18/2018 1.18* 0.87 - 1.13 Final    Comment: INR - Non-therapeutic Reference Range: 0.87-1.13  INR - Therapeutic Reference Range: 2.0-4.0     • Sodium 09/18/2018 140  135 - 145 mmol/L Final   • Potassium 09/18/2018 3.8  3.6 - 5.5 mmol/L Final   • Chloride 09/18/2018 107  96 - 112 mmol/L Final   • Co2 09/18/2018 25  20 - 33 mmol/L Final   • Anion Gap 09/18/2018 8.0  0.0 - 11.9 Final   • Glucose 09/18/2018 125* 65 - 99 mg/dL Final   • Bun 09/18/2018 9  8 - 22 mg/dL Final   • Creatinine 09/18/2018 0.72  0.50 - 1.40 mg/dL Final   • Calcium 09/18/2018 9.5  8.5 - 10.5 mg/dL Final   • AST(SGOT) 09/18/2018 32  12 - 45 U/L Final   • ALT(SGPT) 09/18/2018 22  2 - 50 U/L Final   • Alkaline Phosphatase 09/18/2018 68  30 - 99 U/L Final   • Total Bilirubin 09/18/2018 1.5  0.1 - 1.5 mg/dL Final   • Albumin 09/18/2018 4.2  3.2 - 4.9 g/dL Final   • Total Protein 09/18/2018 6.6  6.0 - 8.2 g/dL Final   • Globulin 09/18/2018 2.4  1.9 - 3.5 g/dL Final   • A-G Ratio 09/18/2018 1.8  g/dL Final   • WBC 09/18/2018 37.1* 4.8 - 10.8 K/uL Final   • RBC 09/18/2018 5.01  4.70 - 6.10 M/uL Final   • Hemoglobin 09/18/2018 15.8  14.0 - 18.0 g/dL Final   • Hematocrit 09/18/2018 47.3  42.0 - 52.0 % Final   • MCV 09/18/2018 94.4  81.4 - 97.8 fL Final   • MCH 09/18/2018 31.5  27.0 - 33.0 pg Final   • MCHC 09/18/2018  33.4* 33.7 - 35.3 g/dL Final   • RDW 09/18/2018 51.9* 35.9 - 50.0 fL Final   • Platelet Count 09/18/2018 84* 164 - 446 K/uL Final   • MPV 09/18/2018 11.9  9.0 - 12.9 fL Final   • Nucleated RBC 09/18/2018 0.00  /100 WBC Final   • NRBC (Absolute) 09/18/2018 0.00  K/uL Final   • Neutrophils-Polys 09/18/2018 7.00* 44.00 - 72.00 % Final   • Lymphocytes 09/18/2018 91.20* 22.00 - 41.00 % Final   • Monocytes 09/18/2018 0.90  0.00 - 13.40 % Final   • Eosinophils 09/18/2018 0.90  0.00 - 6.90 % Final   • Basophils 09/18/2018 0.00  0.00 - 1.80 % Final   • Neutrophils (Absolute) 09/18/2018 2.60  1.82 - 7.42 K/uL Final    Includes immature neutrophils, if present.   • Lymphs (Absolute) 09/18/2018 33.84* 1.00 - 4.80 K/uL Final   • Monos (Absolute) 09/18/2018 0.33  0.00 - 0.85 K/uL Final   • Eos (Absolute) 09/18/2018 0.33  0.00 - 0.51 K/uL Final   • Baso (Absolute) 09/18/2018 0.00  0.00 - 0.12 K/uL Final   • Manual Diff Status 09/18/2018 PERFORMED   Final   • Peripheral Smear Review 09/18/2018 see below   Final    Comment: Due to instrument suspect flags, further review of peripheral smear is  indicated on this patient sample. This review may or may not result in  abnormal findings.     • Plt Estimation 09/18/2018 Decreased   Final   • RBC Morphology 09/18/2018 Present   Final   • Poikilocytosis 09/18/2018 1+   Final   • Echinocytes 09/18/2018 1+   Final   • Smudge Cells 09/18/2018 Moderate   Final   • GFR If  09/18/2018 >60  >60 mL/min/1.73 m 2 Final   • GFR If Non  09/18/2018 >60  >60 mL/min/1.73 m 2 Final       Assessment and Plan:   CLL-Odom Stage 0, He is around 4 years out of his diagnosis. Flow cytometry showed CD38 negative phenotype indicating good prognosis. FISH panel testing showed 13, deletion, which is a favorable marker.He does have enlarged subcarinal lymph node seen in the CT scan from 4/2018 without any concomitant symptoms. He finished Y 90 for his hepatoma. Overall, his thrombus  cytopenia continues to worsen. Since his hepatoma has been addressed, discussed with him about the option of starting systemic treatment for his CLL before it causes more symptoms. There is also risk of his hepatoma relapsing and treatment can be difficult at that time. Discussed various options. He is not interested in continuing indefinite ibrutinib. Discussed the option of 4 doses for monthly chemotherapy employing rituximab and bendamustine at 70 mg/m² to control his CLL with the possible long-term remission.    Discussed adverse effects including, but not limited to fatigue, cytopenias, infections, shingles reactivation. He will require repeat hepatitis screen and acyclovir prophylaxis. I will arrange chemotherapy education for him.    Multiple liver masses-overall consistent with hepatoma. He is status post ablation of all of the lesion. Recent CT shows overall stable finding. However, there was some progression of another lesion. His status post Y 90. Follow-up with intervention radiology. No AFP elevation. , Consider DCP testing for surveillance in the future.    He agreed and verbalized  agreement and understanding with the current plan.  I answered all questions and concerns at this time         Please note that this dictation was created using voice recognition software. I have made every reasonable attempt to correct obvious errors, but I expect that there are errors of grammar and possibly content that I did not discover before finalizing the note.      SIGNATURES:  Alfonzo Servin    CC:  Carmencita Hand P.A.-C.  No ref. provider found

## 2018-09-27 ENCOUNTER — TELEPHONE (OUTPATIENT)
Dept: MEDICAL GROUP | Facility: PHYSICIAN GROUP | Age: 68
End: 2018-09-27

## 2018-09-27 NOTE — TELEPHONE ENCOUNTER
Future Appointments       Provider Department Center    9/28/2018 1:05 PM Carmencita Hand P.A.-C. [facility] [place] [facility] [place]    10/16/2018 10:00 AM [facility]     10/18/2018 9:30 AM RN 4 [facility]    10/19/2018 4:00 PM RN 3 [facility]fusion [facility]    11/8/2018 10:30 AM [address] [facility] - MRI - [address]    11/15/2018 9:30 AM RN 3 [facility]    11/16/2018 2:30 PM RN 7 [facility]        ESTABLISHED PATIENT PRE-VISIT PLANNING     Note: Patient will not be contacted if there is no indication to call.     1.  Reviewed notes from the last few office visits within the medical group: Yes    2.  If any orders were placed at last visit or intended to be done for this visit (i.e. 6 mos follow-up), do we have Results/Consult Notes?        •  Labs - Labs ordered, NOT completed. Patient advised to complete prior to next appointment.       •  Imaging - Imaging ordered, completed and results are in chart.       •  Referrals - No referrals were ordered at last office visit.    3. Is this appointment scheduled as a Hospital Follow-Up? No    4.  Immunizations were updated in [facility] using Web[employer]?: Yes       •  Web [employer] Recommendations: FLU, TDAP and ZOSTAVAX (Shingles)    5.  Patient is due for the following Health Maintenance Topics:   Health Maintenance Due   Topic Date Due   • Annual Wellness Visit  1950   • DIABETES MONOFILAMENT / LE EXAM  05/26/1951   • IMM HEP B VACCINE (1 of 3 - Risk 3-dose series) 11/26/1969   • IMM ZOSTER VACCINES (1 of 2) 11/26/2000   • FASTING LIPID PROFILE  10/18/2017   • URINE ACR / MICROALBUMIN  08/17/2018   • IMM INFLUENZA (1) 09/01/2018       6.  MDX printed for Provider? NO    7.  Patient was informed to arrive 15 min prior to their scheduled appointment and bring in their medication bottles. JULIETTE

## 2018-09-28 ENCOUNTER — OFFICE VISIT (OUTPATIENT)
Dept: MEDICAL GROUP | Facility: PHYSICIAN GROUP | Age: 68
End: 2018-09-28
Payer: MEDICARE

## 2018-09-28 VITALS
HEART RATE: 76 BPM | SYSTOLIC BLOOD PRESSURE: 114 MMHG | OXYGEN SATURATION: 97 % | HEIGHT: 68 IN | DIASTOLIC BLOOD PRESSURE: 70 MMHG | WEIGHT: 219 LBS | BODY MASS INDEX: 33.19 KG/M2 | TEMPERATURE: 97.5 F

## 2018-09-28 DIAGNOSIS — F10.21 ALCOHOLISM IN REMISSION (HCC): ICD-10-CM

## 2018-09-28 DIAGNOSIS — F17.201 TOBACCO DEPENDENCE IN REMISSION: ICD-10-CM

## 2018-09-28 DIAGNOSIS — Z85.828 HISTORY OF BASAL CELL CARCINOMA (BCC): ICD-10-CM

## 2018-09-28 DIAGNOSIS — E66.9 OBESITY (BMI 30-39.9): ICD-10-CM

## 2018-09-28 DIAGNOSIS — J30.2 SEASONAL ALLERGIC RHINITIS, UNSPECIFIED TRIGGER: ICD-10-CM

## 2018-09-28 DIAGNOSIS — C91.10 CLL (CHRONIC LYMPHOCYTIC LEUKEMIA) (HCC): ICD-10-CM

## 2018-09-28 DIAGNOSIS — I15.2 HYPERTENSION SECONDARY TO ENDOCRINE DISORDERS: ICD-10-CM

## 2018-09-28 DIAGNOSIS — C22.0 HEPATOMA (HCC): ICD-10-CM

## 2018-09-28 PROCEDURE — 99214 OFFICE O/P EST MOD 30 MIN: CPT | Performed by: PHYSICIAN ASSISTANT

## 2018-09-28 RX ORDER — METFORMIN HYDROCHLORIDE 500 MG/1
1000 TABLET, EXTENDED RELEASE ORAL 2 TIMES DAILY
Qty: 360 TAB | Refills: 1 | Status: SHIPPED | OUTPATIENT
Start: 2018-09-28 | End: 2019-04-17 | Stop reason: SDUPTHER

## 2018-09-28 ASSESSMENT — PATIENT HEALTH QUESTIONNAIRE - PHQ9: CLINICAL INTERPRETATION OF PHQ2 SCORE: 0

## 2018-09-28 NOTE — PROGRESS NOTES
Subjective:   Sohail Duvall is a 67 y.o. male here today for follow-up on diabetes and other chronic conditions. Is a new patient to me and is also establishing care today.    Previous PCP: GINA Charles    HPI: Patient has the following current medical problems:    Uncontrolled type 2 diabetes mellitus (HCC)  Currently treating diabetes with metformin 1000 mg daily. Patient admits that his diet has been very poor. He states that ever since he quit drinking a little over a year ago, he has compensated by eating large amounts of sugar. Particularly likes ice cream. He is not currently checking blood sugar readings. He does try to walk a half mile per day with his dog. Last A1c earlier this month.    Tobacco dependence in remission  Previous snuff user but no longer does this.    History of basal cell carcinoma (BCC)  Patient has history of BCC in front of his left ear which was excised. He continues to follow with dermatology annually.    Seasonal allergies  Endorses year-round allergy symptoms. Treated with daily cetirizine and Flonase which he feels work well.    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 He states that ever since he lost 30 pounds, his blood pressures were running much better so he was taken off the medication. He is currently checking home readings about once a month. He states that blood pressure tends to run right around 120/80.    Hepatoma (HCC)  Patient has known hepatoma. He follows regularly with Dr. Senior. He underwent hepatic ablation back in June. 3 weeks ago, Latia-90 radiation treatment. He states that he had another follow-up 2 days ago and his specialist was pleased with his progress and recommended another liver MRI this coming November.    CLL (chronic lymphocytic leukemia) (CMS-HCC)  Patient was diagnosed with CLL in June 2014. He is following with [facility]  "oncology, Dr. Servin. He states that given that he recently finished treatment for his hepatoma, it was recommended that he proceed with chemotherapy treatment for his CLL. He states that he starts pre--chemotherapy counseling next month and will be starting treatments shortly after that.    Alcoholism in remission (HCC)  Patient is recovering alcoholic. He has been completely sober for over a year now.       Current medicines (including changes today)  Current Outpatient Prescriptions   Medication Sig Dispense Refill   • metFORMIN ER (GLUCOPHAGE XR) 500 MG TABLET SR 24 HR Take 2 Tabs by mouth 2 times a day. 360 Tab 1   • Multiple Vitamins-Minerals (MULTIVITAMIN PO) Take  by mouth every day.     • Polyethylene Glycol 3350 (MIRALAX PO) Take 1 Cap by mouth every day.     • Cetirizine HCl (ZYRTEC ALLERGY PO) Take 1 Tab by mouth every day.     • fluticasone (FLONASE ALLERGY RELIEF) 50 MCG/ACT nasal spray Spray 1 Spray in nose 2 times a day. 1 Bottle 0     No current facility-administered medications for this visit.      He  has a past medical history of Anemia; Cancer (HCC) (2004); Cancer (HCC); Cirrhosis (HCC); CLL (chronic lymphocytic leukemia) (HCC) (2014); CMV (cytomegalovirus infection) (HCC) (1990); Diabetes (HCC); Liver cancer (HCC); Liver tumor; and Rosacea.    ROS  Pulmonary ROS: No shortness of breath  Cardiovascular ROS: No chest pain       Objective:     Blood pressure 114/70, pulse 76, temperature 36.4 °C (97.5 °F), height 1.727 m (5' 8\"), weight 99.3 kg (219 lb), SpO2 97 %. Body mass index is 33.3 kg/m².     Physical Exam:  Constitutional: Alert, obese but otherwise well-appearing, no distress.  Skin: No rashes in visible areas.  Eye: Pupils are equal and round, conjunctiva clear, lids normal.  ENMT: Lips without lesions, moist mucus membranes.      Assessment and Plan:   The following treatment plan was discussed    1. Uncontrolled type 2 diabetes mellitus without complication, without long-term current " use of insulin (HCC)  Chronic issue which is currently uncontrolled. Most recent A1c 7.7. Patient counseled that his poor diet/heavy sugar consumption is likely to blame. Needs to significantly cut back on sugar intake. Recommend that he increase his metformin 2000 mg twice daily. We'll reassess in 3 months. A1c should be repeated prior to that appointment.  - metFORMIN ER (GLUCOPHAGE XR) 500 MG TABLET SR 24 HR; Take 2 Tabs by mouth 2 times a day.  Dispense: 360 Tab; Refill: 1  - HEMOGLOBIN A1C; Future    2. Hepatoma (HCC)  Established problem, improved since recent radiation treatment. Will follow up for recommended liver MRI in November and we'll continue to follow with his liver specialist.    3. CLL (chronic lymphocytic leukemia) (HCC)  Chronic issue which is uncontrolled. Chemotherapy has been on hold given recent hepatoma treatment but now that that has been done, his oncologist would like to proceed with chemotherapy. He will follow up as scheduled for his counseling and chemotherapy treatments.    4. Alcoholism in remission (HCC)  Chronic issue which is currently well controlled given that he is completely abstinent of alcohol.    5. Seasonal allergic rhinitis, unspecified trigger  Chronic issue which is well controlled with daily antihistamine and nasal steroid. Continue current management.    6. Tobacco dependence in remission  Chronic issue which is well controlled given that he is no longer using snuff.    7. History of basal cell carcinoma (BCC)  Previous BCC on the face. Should continue to follow annually with dermatology for skin checks.    8. 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 Blood pressure today in the office is 114/70. Has had a couple of high readings recently. I would like him to start checking his blood pressure on a daily basis and bring log to next office visit in 3 months.    9. Obesity (BMI 30-39.9)  Patient  counseled on the importance of continued weight loss. Discussed dietary improvement and regular exercise.  - Patient identified as having weight management issue.  Appropriate orders and counseling given.      Followup: Return in about 3 months (around 12/28/2018) for f/u DM; Short.    Carmencita Hand P.A.-C.

## 2018-09-28 NOTE — ASSESSMENT & PLAN NOTE
Patient was diagnosed with CLL in June 2014. He is following with [facility] oncology, Dr. Servin. He states that given that he recently finished treatment for his hepatoma, it was recommended that he proceed with chemotherapy treatment for his CLL. He states that he starts pre--chemotherapy counseling next month and will be starting treatments shortly after that.

## 2018-09-28 NOTE — ADDENDUM NOTE
Encounter addended by: Madeleine Da Silva on: 9/28/2018  6:20 AM<BR>    Actions taken: Charge Capture section accepted

## 2018-09-28 NOTE — ASSESSMENT & PLAN NOTE
Patient has history of BCC in front of his left ear which was excised. He continues to follow with dermatology annually.

## 2018-09-28 NOTE — ASSESSMENT & PLAN NOTE
Currently treating diabetes with metformin 1000 mg daily. Patient admits that his diet has been very poor. He states that ever since he quit drinking a little over a year ago, he has compensated by eating large amounts of sugar. Particularly likes ice cream. He is not currently checking blood sugar readings. He does try to walk a half mile per day with his dog. Last A1c earlier this month.

## 2018-09-28 NOTE — ASSESSMENT & PLAN NOTE
Patient has known hepatoma. He follows regularly with Dr. Senior. He underwent hepatic ablation back in June. 3 weeks ago, Latia-90 radiation treatment. He states that he had another follow-up 2 days ago and his specialist was pleased with his progress and recommended another liver MRI this coming November.

## 2018-09-28 NOTE — ASSESSMENT & PLAN NOTE
Endorses year-round allergy symptoms. Treated with daily cetirizine and Flonase which he feels work well.

## 2018-10-01 ENCOUNTER — TELEPHONE (OUTPATIENT)
Dept: HEMATOLOGY ONCOLOGY | Facility: MEDICAL CENTER | Age: 68
End: 2018-10-01

## 2018-10-01 NOTE — TELEPHONE ENCOUNTER
New Oncology Patient 48 hour follow up:    Called to welcome patient to [facility] and to follow up on initial consult with Dr. Servin on 9/25/2018. Reviewed next steps in the patient’s plan of care. In addition, patient is scheduled for chemotherapy education class with our nurse practitioner, GINA Avila on 10/16/2018. Advised patient to expect this visit to be 60-90 minutes and the APRN would be educating patient on managing side effects of treatment at home and reviewing their treatment schedule. Encouraged patient to bring a family or friend to that appointment. Patient confirmed, answered all patients questions and thanked them for their time. Provided our phone number, [phone], for patient should they have any further questions or concerns.

## 2018-10-16 ENCOUNTER — OFFICE VISIT (OUTPATIENT)
Dept: HEMATOLOGY ONCOLOGY | Facility: MEDICAL CENTER | Age: 68
End: 2018-10-16
Payer: MEDICARE

## 2018-10-16 VITALS
RESPIRATION RATE: 16 BRPM | WEIGHT: 221.34 LBS | OXYGEN SATURATION: 98 % | HEART RATE: 59 BPM | BODY MASS INDEX: 33.55 KG/M2 | SYSTOLIC BLOOD PRESSURE: 149 MMHG | HEIGHT: 68 IN | TEMPERATURE: 98.2 F | DIASTOLIC BLOOD PRESSURE: 92 MMHG

## 2018-10-16 DIAGNOSIS — Z71.89 ENCOUNTER FOR MEDICATION COUNSELING: ICD-10-CM

## 2018-10-16 DIAGNOSIS — C91.10 CLL (CHRONIC LYMPHOCYTIC LEUKEMIA) (HCC): ICD-10-CM

## 2018-10-16 DIAGNOSIS — C22.0 HEPATOMA (HCC): ICD-10-CM

## 2018-10-16 PROCEDURE — 99214 OFFICE O/P EST MOD 30 MIN: CPT | Performed by: NURSE PRACTITIONER

## 2018-10-16 RX ORDER — ONDANSETRON 4 MG/1
4 TABLET, FILM COATED ORAL EVERY 4 HOURS PRN
Qty: 30 TAB | Refills: 6 | Status: SHIPPED | OUTPATIENT
Start: 2018-10-16 | End: 2019-04-15 | Stop reason: SDUPTHER

## 2018-10-16 RX ORDER — PROCHLORPERAZINE MALEATE 10 MG
10 TABLET ORAL EVERY 6 HOURS PRN
Qty: 30 TAB | Refills: 6 | Status: SHIPPED | OUTPATIENT
Start: 2018-10-16 | End: 2019-08-12

## 2018-10-16 ASSESSMENT — ENCOUNTER SYMPTOMS
VOMITING: 0
CONSTIPATION: 1
TINGLING: 1
SENSORY CHANGE: 1
NAUSEA: 0
CHILLS: 0
FEVER: 0
ABDOMINAL PAIN: 0
HEARTBURN: 1

## 2018-10-16 ASSESSMENT — PAIN SCALES - GENERAL: PAINLEVEL: NO PAIN

## 2018-10-16 NOTE — PROGRESS NOTES
Subjective:      Sohail Duvall is a 67 y.o. male who presents for Chemo Education (rituxan/treanda (lazaro))      HPI  Patient is established with Dr. Servin for treatment of CLL, initially diagnosed in July 2014 CD5 positive B-cell lymphoproliferative disorder, JAK2-V612 mutation negative, 13q deletion. He will be starting every 28 day Rituxan/Treanda for an anticipated 4 cycles on 10/31/18 and presents today for prechemotherapy teaching appointment.  Patient is accompanied by his wife, Concetta.    Patient has history of prostate cancer in 2004 and completed brachytherapy. He is also diagnosed with hepatoma in 12/2017 for which he underwent ablation in 6/2018 and completed Y 90 SIRT of left liver lobe on 9/7/18 per Dr. Norris. Following up with Liv Norris and Rupal accordingly.      No Known Allergies    Current Outpatient Prescriptions on File Prior to Visit   Medication Sig Dispense Refill   • metFORMIN ER (GLUCOPHAGE XR) 500 MG TABLET SR 24 HR Take 2 Tabs by mouth 2 times a day. 360 Tab 1   • Multiple Vitamins-Minerals (MULTIVITAMIN PO) Take  by mouth every day.     • Polyethylene Glycol 3350 (MIRALAX PO) Take 1 Cap by mouth every day.     • Cetirizine HCl (ZYRTEC ALLERGY PO) Take 1 Tab by mouth every day.     • fluticasone (FLONASE ALLERGY RELIEF) 50 MCG/ACT nasal spray Spray 1 Spray in nose 2 times a day. 1 Bottle 0     No current facility-administered medications on file prior to visit.          Review of Systems   Constitutional: Negative for chills, fever and malaise/fatigue.   Gastrointestinal: Positive for constipation and heartburn (Nexium daily). Negative for abdominal pain, nausea and vomiting. Melena: baseline - miralax at least daily - sometimes BID; follow by GI - Dr. Connelly.   Neurological: Positive for tingling and sensory change (itchy sites on back with hyperglycemia).          Objective:     /92 (BP Location: Left arm, Patient Position: Sitting, BP Cuff Size: Large adult)    "Pulse (!) 59   Temp 36.8 °C (98.2 °F) (Temporal)   Resp 16   Ht 1.727 m (5' 8\")   Wt 100.4 kg (221 lb 5.5 oz)   SpO2 98%   BMI 33.65 kg/m²      Physical Exam   Constitutional: He is oriented to person, place, and time. He appears well-developed.   Pulmonary/Chest: Effort normal.   Neurological: He is alert and oriented to person, place, and time.   Skin: Skin is warm and dry.   Psychiatric: He has a normal mood and affect.   Vitals reviewed.       Assessment/Plan:     1. CLL (chronic lymphocytic leukemia) (HCC)  ondansetron (ZOFRAN) 4 MG Tab tablet    prochlorperazine (COMPAZINE) 10 MG Tab    CBC WITH DIFFERENTIAL    COMP METABOLIC PANEL    URIC ACID    HEPATITIS PANEL ACUTE(4 COMPONENTS)    REFERRAL TO ONCOLOGY NURSE NAVIGATOR    REFERRAL TO ONCOLOGY NUTRITION SERVICES   2. Encounter for medication counseling     3. Hepatoma (HCC)  REFERRAL TO ONCOLOGY NUTRITION SERVICES         Patient has CLL, initially diagnosed in July 2014 CD5 positive B-cell lymphoproliferative disorder, JAK2-V612 mutation negative, 13q deletion and will be starting every 28-day Rituxan/Treanda on 10/31/18.    Patient was educated on chemotherapy including but not limited to: Infusion Policies and procedures, cycling of chemotherapy, length of treatment, alopecia, myelosuppression, infection prevention, fatigue, GI distress, use of specific nausea medications, avoidance of alcoholic beverages and supplement medications, use of sunscreen, chemotherapy precautions at home, and invasive procedures. Patient was provided with drug specific handouts, [facility] chemotherapy and you book, [facility] eating hints book, [facility] side effects sheet. Patient was not  given tour of Infusion Services as he is familiar with the department from previous iron infusion.      The following appointments, labs, and medications have been provided to the patient:  Line: n/a  Labs: cbc, cmp, uric acid (standing outpatient labs in [facility])  OnPro: n/a  Lab Appointments: " outpatient standing orders in place to be completed the day prior to each visit  Follow up appts: tox check 11/7  Chemotherapy class: completed today  Nausea medication: zofran and compazine  Pain medication: n/a  Nurse ashlee: Will refer  Dietician:  Referred per policy  SW: n/a      Spent 70 minutes of continuous, non-interrupted, face-to-face patient contact in which greater than 50% of the visit was spent counseling and coordinating of care.

## 2018-10-18 ENCOUNTER — DOCUMENTATION (OUTPATIENT)
Dept: HEMATOLOGY ONCOLOGY | Facility: MEDICAL CENTER | Age: 68
End: 2018-10-18

## 2018-10-18 NOTE — PROGRESS NOTES
[facility]  RD to provide full nutrition assessment on chemo start date 10/31. RD to follow and make recommendations as indicated. Thank you [phone].

## 2018-10-25 ENCOUNTER — APPOINTMENT (OUTPATIENT)
Dept: HEMATOLOGY ONCOLOGY | Facility: MEDICAL CENTER | Age: 68
End: 2018-10-25
Payer: MEDICARE

## 2018-10-27 ENCOUNTER — HOSPITAL ENCOUNTER (OUTPATIENT)
Dept: LAB | Facility: MEDICAL CENTER | Age: 68
End: 2018-10-27
Attending: NURSE PRACTITIONER
Payer: MEDICARE

## 2018-10-27 DIAGNOSIS — C91.10 CLL (CHRONIC LYMPHOCYTIC LEUKEMIA) (HCC): ICD-10-CM

## 2018-10-27 LAB
ALBUMIN SERPL BCP-MCNC: 4.1 G/DL (ref 3.2–4.9)
ALBUMIN/GLOB SERPL: 1.6 G/DL
ALP SERPL-CCNC: 74 U/L (ref 30–99)
ALT SERPL-CCNC: 19 U/L (ref 2–50)
ANION GAP SERPL CALC-SCNC: 8 MMOL/L (ref 0–11.9)
AST SERPL-CCNC: 26 U/L (ref 12–45)
BASOPHILS # BLD AUTO: 0 % (ref 0–1.8)
BASOPHILS # BLD: 0 K/UL (ref 0–0.12)
BILIRUB SERPL-MCNC: 1.4 MG/DL (ref 0.1–1.5)
BUN SERPL-MCNC: 10 MG/DL (ref 8–22)
CALCIUM SERPL-MCNC: 9.8 MG/DL (ref 8.5–10.5)
CHLORIDE SERPL-SCNC: 107 MMOL/L (ref 96–112)
CO2 SERPL-SCNC: 27 MMOL/L (ref 20–33)
CREAT SERPL-MCNC: 0.84 MG/DL (ref 0.5–1.4)
EOSINOPHIL # BLD AUTO: 0 K/UL (ref 0–0.51)
EOSINOPHIL NFR BLD: 0 % (ref 0–6.9)
ERYTHROCYTE [DISTWIDTH] IN BLOOD BY AUTOMATED COUNT: 50.8 FL (ref 35.9–50)
FASTING STATUS PATIENT QL REPORTED: NORMAL
GLOBULIN SER CALC-MCNC: 2.6 G/DL (ref 1.9–3.5)
GLUCOSE SERPL-MCNC: 173 MG/DL (ref 65–99)
HCT VFR BLD AUTO: 46.5 % (ref 42–52)
HGB BLD-MCNC: 15.4 G/DL (ref 14–18)
LYMPHOCYTES # BLD AUTO: 28.63 K/UL (ref 1–4.8)
LYMPHOCYTES NFR BLD: 96.4 % (ref 22–41)
MANUAL DIFF BLD: NORMAL
MCH RBC QN AUTO: 30.9 PG (ref 27–33)
MCHC RBC AUTO-ENTMCNC: 33.1 G/DL (ref 33.7–35.3)
MCV RBC AUTO: 93.4 FL (ref 81.4–97.8)
MONOCYTES # BLD AUTO: 0 K/UL (ref 0–0.85)
MONOCYTES NFR BLD AUTO: 0 % (ref 0–13.4)
MORPHOLOGY BLD-IMP: NORMAL
NEUTROPHILS # BLD AUTO: 1.07 K/UL (ref 1.82–7.42)
NEUTROPHILS NFR BLD: 3.6 % (ref 44–72)
NRBC # BLD AUTO: 0 K/UL
NRBC BLD-RTO: 0 /100 WBC
OVALOCYTES BLD QL SMEAR: NORMAL
PLATELET # BLD AUTO: 87 K/UL (ref 164–446)
PLATELET BLD QL SMEAR: NORMAL
PMV BLD AUTO: 11.7 FL (ref 9–12.9)
POIKILOCYTOSIS BLD QL SMEAR: NORMAL
POTASSIUM SERPL-SCNC: 3.9 MMOL/L (ref 3.6–5.5)
PROT SERPL-MCNC: 6.7 G/DL (ref 6–8.2)
RBC # BLD AUTO: 4.98 M/UL (ref 4.7–6.1)
RBC BLD AUTO: PRESENT
SMUDGE CELLS BLD QL SMEAR: NORMAL
SODIUM SERPL-SCNC: 142 MMOL/L (ref 135–145)
WBC # BLD AUTO: 29.7 K/UL (ref 4.8–10.8)

## 2018-10-27 PROCEDURE — 80053 COMPREHEN METABOLIC PANEL: CPT

## 2018-10-27 PROCEDURE — 36415 COLL VENOUS BLD VENIPUNCTURE: CPT

## 2018-10-27 PROCEDURE — 85027 COMPLETE CBC AUTOMATED: CPT

## 2018-10-27 PROCEDURE — 85007 BL SMEAR W/DIFF WBC COUNT: CPT

## 2018-10-30 ENCOUNTER — HOSPITAL ENCOUNTER (OUTPATIENT)
Dept: LAB | Facility: MEDICAL CENTER | Age: 68
End: 2018-10-30
Attending: NURSE PRACTITIONER
Payer: MEDICARE

## 2018-10-30 DIAGNOSIS — C91.10 CLL (CHRONIC LYMPHOCYTIC LEUKEMIA) (HCC): ICD-10-CM

## 2018-10-30 LAB
ALBUMIN SERPL BCP-MCNC: 4.1 G/DL (ref 3.2–4.9)
ALBUMIN/GLOB SERPL: 1.6 G/DL
ALP SERPL-CCNC: 76 U/L (ref 30–99)
ALT SERPL-CCNC: 19 U/L (ref 2–50)
ANION GAP SERPL CALC-SCNC: 10 MMOL/L (ref 0–11.9)
AST SERPL-CCNC: 27 U/L (ref 12–45)
BASOPHILS # BLD AUTO: 0 % (ref 0–1.8)
BASOPHILS # BLD: 0 K/UL (ref 0–0.12)
BILIRUB SERPL-MCNC: 1.2 MG/DL (ref 0.1–1.5)
BUN SERPL-MCNC: 10 MG/DL (ref 8–22)
CALCIUM SERPL-MCNC: 9.7 MG/DL (ref 8.5–10.5)
CHLORIDE SERPL-SCNC: 109 MMOL/L (ref 96–112)
CO2 SERPL-SCNC: 25 MMOL/L (ref 20–33)
CREAT SERPL-MCNC: 0.76 MG/DL (ref 0.5–1.4)
EOSINOPHIL # BLD AUTO: 0 K/UL (ref 0–0.51)
EOSINOPHIL NFR BLD: 0 % (ref 0–6.9)
ERYTHROCYTE [DISTWIDTH] IN BLOOD BY AUTOMATED COUNT: 51.8 FL (ref 35.9–50)
GLOBULIN SER CALC-MCNC: 2.6 G/DL (ref 1.9–3.5)
GLUCOSE SERPL-MCNC: 192 MG/DL (ref 65–99)
HCT VFR BLD AUTO: 46.6 % (ref 42–52)
HGB BLD-MCNC: 15.8 G/DL (ref 14–18)
LYMPHOCYTES # BLD AUTO: 21.57 K/UL (ref 1–4.8)
LYMPHOCYTES NFR BLD: 91.4 % (ref 22–41)
MANUAL DIFF BLD: NORMAL
MCH RBC QN AUTO: 32 PG (ref 27–33)
MCHC RBC AUTO-ENTMCNC: 33.9 G/DL (ref 33.7–35.3)
MCV RBC AUTO: 94.5 FL (ref 81.4–97.8)
MONOCYTES # BLD AUTO: 0.21 K/UL (ref 0–0.85)
MONOCYTES NFR BLD AUTO: 0.9 % (ref 0–13.4)
MORPHOLOGY BLD-IMP: NORMAL
NEUTROPHILS # BLD AUTO: 1.82 K/UL (ref 1.82–7.42)
NEUTROPHILS NFR BLD: 7.7 % (ref 44–72)
NRBC # BLD AUTO: 0 K/UL
NRBC BLD-RTO: 0 /100 WBC
OVALOCYTES BLD QL SMEAR: NORMAL
PLATELET # BLD AUTO: 77 K/UL (ref 164–446)
PLATELET BLD QL SMEAR: NORMAL
PMV BLD AUTO: 12.1 FL (ref 9–12.9)
POIKILOCYTOSIS BLD QL SMEAR: NORMAL
POTASSIUM SERPL-SCNC: 3.7 MMOL/L (ref 3.6–5.5)
PROT SERPL-MCNC: 6.7 G/DL (ref 6–8.2)
RBC # BLD AUTO: 4.93 M/UL (ref 4.7–6.1)
RBC BLD AUTO: PRESENT
SODIUM SERPL-SCNC: 144 MMOL/L (ref 135–145)
URATE SERPL-MCNC: 5.2 MG/DL (ref 2.5–8.3)
VARIANT LYMPHS BLD QL SMEAR: NORMAL
WBC # BLD AUTO: 23.6 K/UL (ref 4.8–10.8)

## 2018-10-30 PROCEDURE — 84550 ASSAY OF BLOOD/URIC ACID: CPT

## 2018-10-30 PROCEDURE — 80053 COMPREHEN METABOLIC PANEL: CPT

## 2018-10-30 PROCEDURE — 85007 BL SMEAR W/DIFF WBC COUNT: CPT

## 2018-10-30 PROCEDURE — 85027 COMPLETE CBC AUTOMATED: CPT

## 2018-10-30 PROCEDURE — 36415 COLL VENOUS BLD VENIPUNCTURE: CPT

## 2018-10-30 NOTE — PROGRESS NOTES
"Pharmacy Chemotherapy calculation:    DX: CLL(h/o Hepatocellular carcinoma and prostate cancer)    Cycle 1 Days 1-2  Previous treatment = brachytherapy for prostate cancer 2004, Y90 SIRT of the left lobe on 9/2018.    Regimen: bendamustine + rituximab (>66y/o)  Chemotherapy  · Bendamustine 70mg/m2 IV once per day on days 1 & 2  · Rituximab (Rituxan) as follows:  ? Cycle 1: 375 mg/m2 IV once on day 1  ? Subsequent cycles: 500 mg/m2 IV once on day 1  28-day cycle for up to 6 cycles based on response and toxicity   pennie Wheeler - J Clin Oncol. 2012 Sep 10;30(26):3209-16. Epub 2012 Aug 6  Ariella GASTELUM et al - J Clin Oncol. 2011 Sep 10;29(26):2749-66. doi: 10.1200/JCO.2010.33.8061. Epub 2011 Aug 15  pennie Sharpe - Lancet Oncol. 2016 Jul;17(7):928-942. doi: 10.1016/-1587(16)51137-1. Epub 2016 May 20.     /71   Pulse 60   Temp 36.7 °C (98.1 °F)   Resp 18   Ht 1.72 m (5' 7.72\")   Wt 101.4 kg (223 lb 8.7 oz)   SpO2 98%   BMI 34.27 kg/m²   Body surface area is 2.2 meters squared.     10/30/18-  ANC~ 1820 Plt = 77k   Hgb = 15.8     SCr = 0.76mg/dL CrCl  >125mL/min   LFT's = WNL TBili = 1.2      9/8/2017 12:02   Hepatitis B Surface Antigen Negative   Hepatitis B Ccore Ab, Total Negative     MD aware of all current lab results. Orders received to proceed with treatment.     Bendamustine 70mg/m2 x 2.2m2 = 154mg    <5% difference, ok to treat with final dose = 154mg IV on day 1 and 2    Rituximab (Rituxan) 375 mg/m2 x 2.2m2 = 825mg    Rounded to vial size(within 10%) per dose rounding protocol.    ok to treat with final dose = 800mg IV    NOTE: 11/1/18 Clarified with Dr. Servin/Meenakshi that patient is being treated as CLL and they will update Treatment Plan for cycle 2 to reflect that and dose escalation of Rituxan Cecy ReneeD.      "

## 2018-10-30 NOTE — PROGRESS NOTES
"Patient Name: Sohail Duvall     Protocol: Bendamustine + Rituximab       *Dosing Reference*  Rituximab 375 mg/m2 IV on Day 1 of Cycle 1 followed by  Rituximab 500 mg/m2 IV on Day 1 of Cycles 2-6  Bendamustine 70 mg/m2 IV over 10 minutes daily on Days 1-2 of Cycles 1-6  28-day cycle for 6 cycles (Relapsed/Refractory: age > 65 years and younger patients with significant comorbidities)  NCCN Guidelines for Chronic Lymphocytic Leukemia/Small Lymphocytic Lymphoma V.5.2018  Ariella GASTELUM et al. J Clin Oncol. 2012;30(26):3209-16  pennie Wheeler. J Clin Oncol. 2011;29(26):3559-66  Chele B, et al. Lancet Oncol. 2016;17(7):928-942    Dx: CLL         Allergies:  Patient has no known allergies.       /71   Pulse 60   Temp 36.7 °C (98.1 °F)   Resp 18   Ht 1.72 m (5' 7.72\")   Wt 101.4 kg (223 lb 8.7 oz)   SpO2 98%   BMI 34.27 kg/m²  Body surface area is 2.2 meters squared.     9/8/2017 12:02   Hep B Surface Antibody Quant <3.10   Hepatitis B Surface Antigen Negative   Hepatitis B Ccore Ab, Total Negative     Labs 10/30/18  ANC~ 1820 Plt = 77k (Conner FUENTES aware, okay to proceed with treatment today)   Hgb = 15.8   SCr = 0.76 mg/dL CrCl ~ >125 mL/min   LFT's = WNLs  TBili = 1.2      Drug Order   (Drug name, dose, route, IV Fluid & volume, frequency, number of doses) Cycle: C1D1     Previous treatment: N/a     Medication = Rituximab  Base Dose= 375 mg/m2  Calc Dose: Base Dose x 2.2 m2 = 825 mg  Final Dose = 800 mg  Route = IV  Fluid & Volume =  mL  Admin Duration = Follow rate sheet    Day 1      <10% difference, okay to round to nearest vial size per P&T MAB protocol   Medication = Bendamustine  Base Dose= 70 mg/m2  Calc Dose: Base Dose x 2.2 m2 = 154 mg  Final Dose = 154 mg  Route = IV  Fluid & Volume = NS 50 mL  Admin Duration = Over 10 minutes    Days 1-2      <5% difference, okay to treat with final dose     By my signature below, I confirm this process was performed independently with the BSA " and all final chemotherapy dosing calculations congruent. I have reviewed the above chemotherapy order and that my calculation of the final dose and BSA (when applicable) corroborate those calculations of the  pharmacist. Discrepancies of 5% or greater in the written dose have been addressed and documented within the EPIC Progress notes.    Andrew Harrington, PharmD

## 2018-10-31 ENCOUNTER — OUTPATIENT INFUSION SERVICES (OUTPATIENT)
Dept: ONCOLOGY | Facility: MEDICAL CENTER | Age: 68
End: 2018-10-31
Attending: INTERNAL MEDICINE
Payer: MEDICARE

## 2018-10-31 ENCOUNTER — DOCUMENTATION (OUTPATIENT)
Dept: HEMATOLOGY ONCOLOGY | Facility: MEDICAL CENTER | Age: 68
End: 2018-10-31

## 2018-10-31 VITALS
SYSTOLIC BLOOD PRESSURE: 145 MMHG | HEIGHT: 68 IN | RESPIRATION RATE: 18 BRPM | HEART RATE: 60 BPM | BODY MASS INDEX: 33.88 KG/M2 | WEIGHT: 223.55 LBS | OXYGEN SATURATION: 98 % | DIASTOLIC BLOOD PRESSURE: 71 MMHG | TEMPERATURE: 98.1 F

## 2018-10-31 DIAGNOSIS — C91.10 CLL (CHRONIC LYMPHOCYTIC LEUKEMIA) (HCC): ICD-10-CM

## 2018-10-31 PROCEDURE — 700111 HCHG RX REV CODE 636 W/ 250 OVERRIDE (IP): Mod: JG

## 2018-10-31 PROCEDURE — 700111 HCHG RX REV CODE 636 W/ 250 OVERRIDE (IP): Performed by: INTERNAL MEDICINE

## 2018-10-31 PROCEDURE — A9270 NON-COVERED ITEM OR SERVICE: HCPCS | Performed by: INTERNAL MEDICINE

## 2018-10-31 PROCEDURE — 96415 CHEMO IV INFUSION ADDL HR: CPT

## 2018-10-31 PROCEDURE — 96375 TX/PRO/DX INJ NEW DRUG ADDON: CPT

## 2018-10-31 PROCEDURE — 96413 CHEMO IV INFUSION 1 HR: CPT

## 2018-10-31 PROCEDURE — 96411 CHEMO IV PUSH ADDL DRUG: CPT

## 2018-10-31 PROCEDURE — 700105 HCHG RX REV CODE 258: Performed by: INTERNAL MEDICINE

## 2018-10-31 PROCEDURE — 700105 HCHG RX REV CODE 258

## 2018-10-31 PROCEDURE — 700102 HCHG RX REV CODE 250 W/ 637 OVERRIDE(OP): Performed by: INTERNAL MEDICINE

## 2018-10-31 RX ORDER — ACETAMINOPHEN 325 MG/1
650 TABLET ORAL ONCE
Status: COMPLETED | OUTPATIENT
Start: 2018-10-31 | End: 2018-10-31

## 2018-10-31 RX ORDER — DIPHENHYDRAMINE HYDROCHLORIDE 50 MG/ML
25 INJECTION INTRAMUSCULAR; INTRAVENOUS
Status: DISCONTINUED | OUTPATIENT
Start: 2018-10-31 | End: 2018-11-01 | Stop reason: HOSPADM

## 2018-10-31 RX ORDER — MEPERIDINE HYDROCHLORIDE 25 MG/ML
25 INJECTION INTRAMUSCULAR; INTRAVENOUS; SUBCUTANEOUS
Status: DISCONTINUED | OUTPATIENT
Start: 2018-10-31 | End: 2018-11-01 | Stop reason: HOSPADM

## 2018-10-31 RX ADMIN — ACETAMINOPHEN 650 MG: 325 TABLET, FILM COATED ORAL at 11:30

## 2018-10-31 RX ADMIN — DEXAMETHASONE SODIUM PHOSPHATE 12 MG: 4 INJECTION, SOLUTION INTRAMUSCULAR; INTRAVENOUS at 11:45

## 2018-10-31 RX ADMIN — SODIUM CHLORIDE 800 MG: 9 INJECTION, SOLUTION INTRAVENOUS at 12:45

## 2018-10-31 RX ADMIN — DIPHENHYDRAMINE HYDROCHLORIDE 50 MG: 50 INJECTION INTRAMUSCULAR; INTRAVENOUS at 11:30

## 2018-10-31 RX ADMIN — ONDANSETRON HYDROCHLORIDE 16 MG: 2 SOLUTION INTRAMUSCULAR; INTRAVENOUS at 12:00

## 2018-10-31 RX ADMIN — BENDAMUSTINE HYDROCHLORIDE 154 MG: 25 INJECTION, SOLUTION INTRAVENOUS at 12:25

## 2018-10-31 ASSESSMENT — PAIN SCALES - GENERAL: PAINLEVEL_OUTOF10: 0

## 2018-10-31 NOTE — PROGRESS NOTES
Chemotherapy Verification - PRIMARY RN      Height = 67.72 in  Weight = 223 lb  BSA = 2.2 m2       Medication: Bendamustine  Dose: 70 mg/m2  Calculated Dose: 154 mg                             (In mg/m2, AUC, mg/kg)     Medication: Rituxan  Dose: 375 mg/m2  Calculated Dose: 825 mg                             (In mg/m2, AUC, mg/kg)        I confirm this process was performed independently with the BSA and all final chemotherapy dosing calculations congruent.  Any discrepancies of 5% or greater have been addressed with the chemotherapy pharmacist. The resolution of the discrepancy has been documented in the EPIC progress notes.

## 2018-10-31 NOTE — PROGRESS NOTES
Mr Ogmary into Infusions Services for Day 1 / Cycle 1 of Rituxan / Treanda for CLL. Pt denied having any new or acute complaints today. (PIV started, had + blood return flushed briskly.) Pt given Chemotherapy as prescribed, tolerated well, denied having any complaints during or after infusion. (PIV / SL left in place, for day 2 of treatment.) Discharge home with wife to self care in NAD. Appointment confirm for next treatment.

## 2018-10-31 NOTE — PROGRESS NOTES
Chemotherapy Verification - SECONDARY RN       Height = 172 cm  Weight = 101.4 kg  BSA = 2.2 m2       Medication: Rituxan  Dose: 375 mg/m2  Calculated Dose: 825 mg                             (In mg/m2, AUC, mg/kg)     Medication: Bendeka  Dose: 70 mg/m2  Calculated Dose: 154 mg                             (In mg/m2, AUC, mg/kg)        I confirm that this process was performed independently.

## 2018-11-01 ENCOUNTER — OUTPATIENT INFUSION SERVICES (OUTPATIENT)
Dept: ONCOLOGY | Facility: MEDICAL CENTER | Age: 68
End: 2018-11-01
Attending: INTERNAL MEDICINE
Payer: MEDICARE

## 2018-11-01 ENCOUNTER — PATIENT OUTREACH (OUTPATIENT)
Dept: OTHER | Facility: MEDICAL CENTER | Age: 68
End: 2018-11-01

## 2018-11-01 VITALS
OXYGEN SATURATION: 98 % | BODY MASS INDEX: 33.95 KG/M2 | WEIGHT: 223.99 LBS | HEIGHT: 68 IN | SYSTOLIC BLOOD PRESSURE: 135 MMHG | RESPIRATION RATE: 18 BRPM | HEART RATE: 70 BPM | DIASTOLIC BLOOD PRESSURE: 72 MMHG | TEMPERATURE: 98.6 F

## 2018-11-01 DIAGNOSIS — C91.10 CLL (CHRONIC LYMPHOCYTIC LEUKEMIA) (HCC): ICD-10-CM

## 2018-11-01 PROCEDURE — 700105 HCHG RX REV CODE 258: Performed by: INTERNAL MEDICINE

## 2018-11-01 PROCEDURE — 96409 CHEMO IV PUSH SNGL DRUG: CPT

## 2018-11-01 PROCEDURE — 700111 HCHG RX REV CODE 636 W/ 250 OVERRIDE (IP): Performed by: INTERNAL MEDICINE

## 2018-11-01 PROCEDURE — 96375 TX/PRO/DX INJ NEW DRUG ADDON: CPT

## 2018-11-01 RX ADMIN — BENDAMUSTINE HYDROCHLORIDE 154 MG: 25 INJECTION, SOLUTION INTRAVENOUS at 16:49

## 2018-11-01 RX ADMIN — ONDANSETRON HYDROCHLORIDE 16 MG: 2 SOLUTION INTRAMUSCULAR; INTRAVENOUS at 16:19

## 2018-11-01 ASSESSMENT — PAIN SCALES - GENERAL: PAINLEVEL_OUTOF10: 0

## 2018-11-01 NOTE — PROGRESS NOTES
"Patient Name: Sohail Duvall     Protocol: Bendamustine + Rituximab       *Dosing Reference*  Rituximab 375 mg/m2 IV on Day 1 of Cycle 1 followed by  Rituximab 500 mg/m2 IV on Day 1 of Cycles 2-6  Bendamustine 70 mg/m2 IV over 10 minutes daily on Days 1-2 of Cycles 1-6  28-day cycle for 6 cycles (Relapsed/Refractory: age > 65 years and younger patients with significant comorbidities)  NCCN Guidelines for Chronic Lymphocytic Leukemia/Small Lymphocytic Lymphoma V.5.2018  Ariella GASTELUM et al. J Clin Oncol. 2012;30(26):3209-16  pennie Wheeler. J Clin Oncol. 2011;29(26):3559-66  Chele B, et al. Lancet Oncol. 2016;17(7):928-942      NOTE: 11/1/18 Clarified with Dr. Servin/Meenakshi that patient is being treated as CLL and they will update Treatment Plan for cycle 2 to reflect that and dose escalation of Rituxan JCoombs    Dx: CLL         Allergies:  Patient has no known allergies.       /72   Pulse 70   Temp 37 °C (98.6 °F)   Resp 18   Ht 1.72 m (5' 7.72\")   Wt 101.6 kg (223 lb 15.8 oz)   SpO2 98%   BMI 34.34 kg/m²  Body surface area is 2.2 meters squared.     9/8/2017 12:02   Hep B Surface Antibody Quant <3.10   Hepatitis B Surface Antigen Negative   Hepatitis B Ccore Ab, Total Negative     Labs 10/30/18  ANC~ 1820 Plt = 77k (Conner FUENTES aware, okay to proceed with treatment today)   Hgb = 15.8   SCr = 0.76 mg/dL CrCl ~ >125 mL/min   LFT's = WNLs  TBili = 1.2     Drug Order   (Drug name, dose, route, IV Fluid & volume, frequency, number of doses) Cycle: C1D 2 of 2     Previous treatment: N/a     Medication = Bendamustine  Base Dose= 70 mg/m2  Calc Dose: Base Dose x 2.2 m2 = 154 mg  Final Dose = 154 mg  Route = IV  Fluid & Volume = NS 50 mL  Admin Duration = Over 10 minutes    Days 1-2      <5% difference, okay to treat with final dose     By my signature below, I confirm this process was performed independently with the BSA and all final chemotherapy dosing calculations congruent. I have reviewed " the above chemotherapy order and that my calculation of the final dose and BSA (when applicable) corroborate those calculations of the  pharmacist. Discrepancies of 5% or greater in the written dose have been addressed and documented within the EPIC Progress notes.    Barbara Harper, PharmD

## 2018-11-01 NOTE — PROGRESS NOTES
Chemotherapy Verification - PRIMARY RN      Height = 172cm    Weight = 101.6kg    'BSA = 2.2m^2    Medication: Bendeka    Dose: 70mg/m^2    Calculated Dose: 154.2mg                           I confirm this process was performed independently with the BSA and all final chemotherapy dosing calculations congruent.  Any discrepancies of 5% or greater have been addressed with the chemotherapy pharmacist. The resolution of the discrepancy has been documented in the EPIC progress notes.

## 2018-11-01 NOTE — PROGRESS NOTES
Nurse Navigation met with patient yesterday October 10/31/2018 during his first treatment for his once observed and now advanced CLL.  Patient reports being very familiar with navigation services stating that he had a nurse navigation during his prior treatment with his liver.  He reported that he had no need of our services at this time and was not interested in any of our classes at this time.  The patient did re-review some of the information on his own time and said that he would reach out to me if he had any questions about any of the services.  The patient was very pleasant and in a very good mood using humor often.  Nurse navigation reviewed with patient any barriers at this time that he may have in regards to his treatment.  Patient reported none at this time.  In addition we also reviewed neutropenic precautions and what to do in case of a fever of 100.4 or greater at anytime during his diagnosis and treatment.  We also reviewed the neutropenia card and what to do if he should have to go to the emergency room.  Patient was appreciative and grateful for the information.  Nurse Navigation answered any other questions and or concerns that the patient had and will continue to follow the patient.

## 2018-11-02 NOTE — PROGRESS NOTES
Pt arrived ambulatory to [facility] for scheduled Bendeka infusion. States no new complaints upon arrival. PIV assessed upon arrival, noted to flush appropriately and have visible blood return. Medications infused per orders. No s/s adverse effects noted. After infusion completed, PIV flushed and removed. Gauze and coban dressing applied. Pt left ambulatory in no apparent distress with confirmation of next appointment.

## 2018-11-06 NOTE — PROGRESS NOTES
"Nutrition Services: RD Consultation/ New Chemo Start  Met with pt during chemo infusion to assess current intake, appetite, and nutritional status. Pt appears well nourished.  Pt reports good appetite and intake. Pt denied any recent weight changes. Pt also denied any GI distress nor taste changes.. Pt did not express any further nutrition-related questions or concerns at this time.     Assessment:  - Primary Dx: Chronic Lymphocytic Leukemia  - PMHx: Type 2 Dm, Alcoholism in remission, Hx Basal Cell CA  - Height: 5' 8\"   -   Weight: 224#   -   BMI: 34.4 (Obese Class I)    Plan/Recommendations:  1. Provided and discussed handout regarding general nutrition guidelines as well as nutritional recommendations for patient's with Cancer, including tips/tricks should side effects of tx occur.   2. Discussed importance if PO intake/ nutrition with increased protein/kcal needs 2' to the hypermetabolic effects of cancer in order to maintain weight and preserve lean body mass.   3. Provided and went over food lists including healthy snack ideas as well as foods high in protein.   4. Encouraged Hydration    Pt reports understanding and was receptive. RD following and to make further recommendations as indicated.     "

## 2018-11-08 ENCOUNTER — OFFICE VISIT (OUTPATIENT)
Dept: HEMATOLOGY ONCOLOGY | Facility: MEDICAL CENTER | Age: 68
End: 2018-11-08
Payer: MEDICARE

## 2018-11-08 VITALS
OXYGEN SATURATION: 98 % | SYSTOLIC BLOOD PRESSURE: 122 MMHG | HEIGHT: 68 IN | HEART RATE: 72 BPM | RESPIRATION RATE: 20 BRPM | DIASTOLIC BLOOD PRESSURE: 80 MMHG | BODY MASS INDEX: 33.23 KG/M2 | TEMPERATURE: 98.2 F | WEIGHT: 219.25 LBS

## 2018-11-08 DIAGNOSIS — C91.10 CLL (CHRONIC LYMPHOCYTIC LEUKEMIA) (HCC): ICD-10-CM

## 2018-11-08 PROCEDURE — 99213 OFFICE O/P EST LOW 20 MIN: CPT | Performed by: NURSE PRACTITIONER

## 2018-11-08 ASSESSMENT — ENCOUNTER SYMPTOMS
WHEEZING: 0
WEIGHT LOSS: 0
CONSTIPATION: 0
FEVER: 0
COUGH: 0
DIARRHEA: 0
CHILLS: 0
VOMITING: 0
SHORTNESS OF BREATH: 0
NAUSEA: 0
MYALGIAS: 0
PALPITATIONS: 0

## 2018-11-08 ASSESSMENT — PAIN SCALES - GENERAL: PAINLEVEL: NO PAIN

## 2018-11-08 NOTE — PROGRESS NOTES
Subjective:      Sohail Duvall is a 67 y.o. male who presents for Toxicity Check (tox check (Malathi)) for CLL.         HPI    Patient seen today in follow-up for CLL.  He presents accompanied by his wife for today's visit.  Patient is status post cycle #1 of Rituxan and Treanda and is here for toxicity check.    Patient tolerated the treatment very well.  He did have some fatigue and lethargy for approximately 2 days after treatment but that has resolved.  He denies any fevers, chills, weight loss.  He denies any coughing, wheezing or shortness of breath.  He denies any chest pain or heart palpitations.  He denies any nausea, vomiting, diarrhea or constipation.  He takes MiraLAX daily to keep him regular.  His last bowel movement was yesterday.  He is voiding without difficulty but states he does have nocturia frequently.  He states that he drinks approximately 16 ounces of water between 5 and 9 PM at night.  He denies any pain.    No Known Allergies  Current Outpatient Prescriptions on File Prior to Visit   Medication Sig Dispense Refill   • ondansetron (ZOFRAN) 4 MG Tab tablet Take 1 Tab by mouth every four hours as needed for Nausea/Vomiting (for nausea, vomiting). 30 Tab 6   • prochlorperazine (COMPAZINE) 10 MG Tab Take 1 Tab by mouth every 6 hours as needed (for nausea, vomiting). 30 Tab 6   • metFORMIN ER (GLUCOPHAGE XR) 500 MG TABLET SR 24 HR Take 2 Tabs by mouth 2 times a day. 360 Tab 1   • Multiple Vitamins-Minerals (MULTIVITAMIN PO) Take  by mouth every day.     • Polyethylene Glycol 3350 (MIRALAX PO) Take 1 Cap by mouth every day.     • Cetirizine HCl (ZYRTEC ALLERGY PO) Take 1 Tab by mouth every day.     • fluticasone (FLONASE ALLERGY RELIEF) 50 MCG/ACT nasal spray Spray 1 Spray in nose 2 times a day. 1 Bottle 0     No current facility-administered medications on file prior to visit.          Review of Systems   Constitutional: Positive for malaise/fatigue (lethargic for about 2 days after tx).  "Negative for chills, fever and weight loss.   Respiratory: Negative for cough, shortness of breath and wheezing.    Cardiovascular: Negative for chest pain and palpitations.   Gastrointestinal: Negative for constipation, diarrhea, nausea and vomiting.   Genitourinary: Negative for dysuria.        Nocturia and frequency   Musculoskeletal: Negative for myalgias.          Objective:     /80 (BP Location: Right arm, Patient Position: Sitting, BP Cuff Size: Adult)   Pulse 72   Temp 36.8 °C (98.2 °F) (Temporal)   Resp 20   Ht 1.727 m (5' 8\")   Wt 99.5 kg (219 lb 4 oz)   SpO2 98%   BMI 33.34 kg/m²       Physical Exam   Constitutional: He is oriented to person, place, and time. He appears well-developed and well-nourished. No distress.   HENT:   Head: Normocephalic and atraumatic.   Mouth/Throat: Oropharynx is clear and moist. No oropharyngeal exudate.   Cardiovascular: Normal rate, regular rhythm and normal heart sounds.  Exam reveals no gallop and no friction rub.    No murmur heard.  Pulmonary/Chest: Effort normal and breath sounds normal. No respiratory distress. He has no wheezes.   Abdominal: Soft. Bowel sounds are normal. He exhibits no distension. There is no tenderness.   Musculoskeletal: Normal range of motion. He exhibits no edema or tenderness.   Neurological: He is alert and oriented to person, place, and time.   Skin: Skin is warm and dry. No rash noted. He is not diaphoretic. No erythema. No pallor.   Psychiatric: He has a normal mood and affect. His behavior is normal.          Assessment/Plan:     1. CLL (chronic lymphocytic leukemia) (HCC)  COMP METABOLIC PANEL    CBC WITH DIFFERENTIAL     Plan  1.  Patient currently receiving Rituxan and bendamustine and completed his first cycle of treatment and tolerated it well.  Overall clinically he is stable and happy with how he tolerated treatment.  Patient to continue with treatment as planned every 28 days.  I will asked that patient complete CBC " and [facility] for follow-up after his first treatment.    2.  Patient is experiencing nocturia but stated he is drinking water up until right before bedtime.  I have encouraged patient to stop drinking fluid earlier in the evening to see if that may help with his nocturia.    3.  Patient will follow-up in the clinic in 3 weeks or sooner if needed.

## 2018-11-10 ENCOUNTER — HOSPITAL ENCOUNTER (OUTPATIENT)
Dept: RADIOLOGY | Facility: MEDICAL CENTER | Age: 68
End: 2018-11-10
Attending: NURSE PRACTITIONER
Payer: MEDICARE

## 2018-11-10 DIAGNOSIS — C22.0 HEPATOMA (HCC): ICD-10-CM

## 2018-11-10 PROCEDURE — A9585 GADOBUTROL INJECTION: HCPCS | Performed by: NURSE PRACTITIONER

## 2018-11-10 PROCEDURE — 74183 MRI ABD W/O CNTR FLWD CNTR: CPT

## 2018-11-10 PROCEDURE — 700117 HCHG RX CONTRAST REV CODE 255: Performed by: NURSE PRACTITIONER

## 2018-11-10 RX ORDER — GADOBUTROL 604.72 MG/ML
10 INJECTION INTRAVENOUS ONCE
Status: COMPLETED | OUTPATIENT
Start: 2018-11-10 | End: 2018-11-10

## 2018-11-10 RX ADMIN — GADOBUTROL 10 ML: 604.72 INJECTION INTRAVENOUS at 14:01

## 2018-11-12 ENCOUNTER — HOSPITAL ENCOUNTER (OUTPATIENT)
Dept: LAB | Facility: MEDICAL CENTER | Age: 68
End: 2018-11-12
Attending: NURSE PRACTITIONER
Payer: MEDICARE

## 2018-11-12 ENCOUNTER — TELEPHONE (OUTPATIENT)
Dept: HEMATOLOGY ONCOLOGY | Facility: MEDICAL CENTER | Age: 68
End: 2018-11-12

## 2018-11-12 DIAGNOSIS — C91.10 CLL (CHRONIC LYMPHOCYTIC LEUKEMIA) (HCC): ICD-10-CM

## 2018-11-12 LAB
ALBUMIN SERPL BCP-MCNC: 4.1 G/DL (ref 3.2–4.9)
ALBUMIN/GLOB SERPL: 2 G/DL
ALP SERPL-CCNC: 77 U/L (ref 30–99)
ALT SERPL-CCNC: 23 U/L (ref 2–50)
ANION GAP SERPL CALC-SCNC: 10 MMOL/L (ref 0–11.9)
AST SERPL-CCNC: 31 U/L (ref 12–45)
BASOPHILS # BLD AUTO: 1.1 % (ref 0–1.8)
BASOPHILS # BLD: 0.07 K/UL (ref 0–0.12)
BILIRUB SERPL-MCNC: 1.6 MG/DL (ref 0.1–1.5)
BUN SERPL-MCNC: 8 MG/DL (ref 8–22)
CALCIUM SERPL-MCNC: 9.2 MG/DL (ref 8.5–10.5)
CHLORIDE SERPL-SCNC: 107 MMOL/L (ref 96–112)
CO2 SERPL-SCNC: 23 MMOL/L (ref 20–33)
CREAT SERPL-MCNC: 0.7 MG/DL (ref 0.5–1.4)
EOSINOPHIL # BLD AUTO: 0.05 K/UL (ref 0–0.51)
EOSINOPHIL NFR BLD: 0.8 % (ref 0–6.9)
ERYTHROCYTE [DISTWIDTH] IN BLOOD BY AUTOMATED COUNT: 50.8 FL (ref 35.9–50)
GLOBULIN SER CALC-MCNC: 2.1 G/DL (ref 1.9–3.5)
GLUCOSE SERPL-MCNC: 198 MG/DL (ref 65–99)
HCT VFR BLD AUTO: 43.5 % (ref 42–52)
HGB BLD-MCNC: 15 G/DL (ref 14–18)
IMM GRANULOCYTES # BLD AUTO: 0.02 K/UL (ref 0–0.11)
IMM GRANULOCYTES NFR BLD AUTO: 0.3 % (ref 0–0.9)
LYMPHOCYTES # BLD AUTO: 2.84 K/UL (ref 1–4.8)
LYMPHOCYTES NFR BLD: 44.2 % (ref 22–41)
MCH RBC QN AUTO: 31.6 PG (ref 27–33)
MCHC RBC AUTO-ENTMCNC: 34.5 G/DL (ref 33.7–35.3)
MCV RBC AUTO: 91.6 FL (ref 81.4–97.8)
MONOCYTES # BLD AUTO: 0.45 K/UL (ref 0–0.85)
MONOCYTES NFR BLD AUTO: 7 % (ref 0–13.4)
NEUTROPHILS # BLD AUTO: 2.99 K/UL (ref 1.82–7.42)
NEUTROPHILS NFR BLD: 46.6 % (ref 44–72)
NRBC # BLD AUTO: 0 K/UL
NRBC BLD-RTO: 0 /100 WBC
PLATELET # BLD AUTO: 95 K/UL (ref 164–446)
PMV BLD AUTO: 11.1 FL (ref 9–12.9)
POTASSIUM SERPL-SCNC: 3.7 MMOL/L (ref 3.6–5.5)
PROT SERPL-MCNC: 6.2 G/DL (ref 6–8.2)
RBC # BLD AUTO: 4.75 M/UL (ref 4.7–6.1)
SODIUM SERPL-SCNC: 140 MMOL/L (ref 135–145)
WBC # BLD AUTO: 6.4 K/UL (ref 4.8–10.8)

## 2018-11-12 PROCEDURE — 36415 COLL VENOUS BLD VENIPUNCTURE: CPT

## 2018-11-12 PROCEDURE — 85025 COMPLETE CBC W/AUTO DIFF WBC: CPT

## 2018-11-12 PROCEDURE — 80053 COMPREHEN METABOLIC PANEL: CPT

## 2018-11-12 NOTE — TELEPHONE ENCOUNTER
----- Message from MYRTLE StonePXIOMARA sent at 11/11/2018 11:00 AM PST -----  Patient was seen for toxicity check on Thursday last week but did not have labs done.  I have ordered a CBC and CMP and please asked the patient to complete that early this week just for evaluation.

## 2018-11-12 NOTE — TELEPHONE ENCOUNTER
Left a message for patient to return our call in regards to the message from GINA Stone. Please relay information to patient when he returns our call.

## 2018-11-13 ENCOUNTER — HOSPITAL ENCOUNTER (OUTPATIENT)
Dept: RADIOLOGY | Facility: MEDICAL CENTER | Age: 68
End: 2018-11-13
Attending: NURSE PRACTITIONER

## 2018-11-13 DIAGNOSIS — C22.0 HEPATOMA (HCC): ICD-10-CM

## 2018-11-13 ASSESSMENT — ENCOUNTER SYMPTOMS
RESPIRATORY NEGATIVE: 1
ROS GI COMMENTS: NEGATIVE FOR ASCITES
CHILLS: 0
DIAPHORESIS: 0
FEVER: 0
CARDIOVASCULAR NEGATIVE: 1
WEIGHT LOSS: 0
GASTROINTESTINAL NEGATIVE: 1
WEAKNESS: 0
ROS SKIN COMMENTS: NEGATIVE FOR JAUNDICE

## 2018-11-13 ASSESSMENT — LIFESTYLE VARIABLES: SUBSTANCE_ABUSE: 0

## 2018-11-13 NOTE — CONSULTS
Interventional Oncology Consultation      Re: Sohail Duvall     MRN: [id number]   : 1950    Sohail Duvall was seen today for review of surveillance imaging after hepatic radioembolization performed by Joe Norris MD at [facility] on 2018.  He was referred to our service by Feliberto Senior MD and is also under the care of Carmencita Hand PA-C, Alfonzo Servin MD, and Johanna Hooper MD.     History of Present Illness:   has a history of CLL and alcoholic cirrhosis. Recent imaging revealed hepatomas in 4A and 4B. On 2018 the lesion in 4B was ablated but the operative ablation of a 4A lesion was unsuccessful due to proximity to the portal pedicle. He has not had a liver mass biopsy.  was been referred to interventional radiology for evaluation and management and our recommendation on 2018 was Y90 SIRT to both hepatic lobes due to multiple small LR-3 lesions that were not present on prior scans, suspicious for multifocal disease with left lobe dominant disease. He underwent mapping on  and Y90 SIRT of the left lobe on . He tolerated the procedure well and denied complaints at his follow up appointment. He is seen today for review of surveillance images and discussion of right lobe intervention. Today, Mr. Duvall denies complaints. He denies jaundice, bleeding, nausea and vomiting, and fatigue. His weight is stable and appetite is good. His energy level is normal. He is followed by Dr. Servin for CLL and has started bendamustine and rituximab monthly infusions and tolerated his first infusion without complaints. His next infusion is scheduled . Unfortunately the AFP and AFP L3% ordered by our group were not drawn when he presented to the lab. 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    He is seen today for review of imaging studies and discussion of possible right lobe intervention.     Past  "Medical History:   Diagnosis Date   • Anemia    • Cancer (HCC) 2004    Prostate - did brachytherapy at [facility]   • Cancer (HCC)     Liver   • Cirrhosis (HCC)    • CLL (chronic lymphocytic leukemia) (HCC) 2014   • CMV (cytomegalovirus infection) (HCC) 1990    Treated for 6 weeks   • Diabetes (HCC)     oral medication   • Liver cancer (HCC)    • Liver tumor    • Rosacea      Past Surgical History:   Procedure Laterality Date   • HEPATIC ABLATION LAPAROSCOPIC  6/28/2018    Procedure: HEPATIC ABLATION LAPAROSCOPIC. SEGMENT 4B, HEPATOMA, REPAIR OF INCARCERATED UMBILICAL HERNIA;  Surgeon: Feliberto Burgos M.D.;  Location: SURGERY [facility];  Service: General   • BRACHY THERAPY       Social History     Social History   • Marital status:      Spouse name: N/A   • Number of children: 1   • Years of education: N/A     Occupational History   • fertilizer sales      Social History Main Topics   • Smoking status: Never Smoker   • Smokeless tobacco: Former User     Types: Snuff     Quit date: 3/29/2015   • Alcohol use No   • Drug use: Yes     Types: Marijuana      Comment: \"Marijuana Use\"   • Sexual activity: No     Other Topics Concern   • Not on file     Social History Narrative    No known exposure to asbestos, dyes, or chemicals, however he was exposed to pesticides.  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  1 child, alive and well.  He also has a step-child.      Family History   Problem Relation Age of Onset   • Cancer Father 81        Bladder   • Cancer Brother         Prostate & CLL (s/p 8-10 years ago)   • Hyperlipidemia Sister    • Lung Disease Neg Hx    • Diabetes Neg Hx    • Heart Disease Neg Hx    • Hypertension Neg Hx    • Stroke Neg Hx    • Alcohol/Drug Neg Hx        Review of Systems   Constitutional: Negative for chills, diaphoresis, fever, malaise/fatigue and weight loss.   Eyes:        Negative for icterus "   Respiratory: Negative.    Cardiovascular: Negative.    Gastrointestinal: Negative.         Negative for ascites   Skin: Negative.         Negative for jaundice   Neurological: Negative for weakness.   Psychiatric/Behavioral: Negative for substance abuse (negative for alcohol and tobacco use).     A comprehensive 14-point review of systems was negative except as described above.     Labs:      Ref. Range 9/18/2018 09:35 9/18/2018 09:39 10/27/2018 11:16 10/30/2018 08:41   WBC Latest Ref Range: 4.8 - 10.8 K/uL 37.7 (HH) 37.1 (HH) 29.7 (H) 23.6 (H)   RBC Latest Ref Range: 4.70 - 6.10 M/uL 5.02 5.01 4.98 4.93   Hemoglobin Latest Ref Range: 14.0 - 18.0 g/dL 15.7 15.8 15.4 15.8   Hematocrit Latest Ref Range: 42.0 - 52.0 % 47.6 47.3 46.5 46.6   MCV Latest Ref Range: 81.4 - 97.8 fL 94.8 94.4 93.4 94.5   MCH Latest Ref Range: 27.0 - 33.0 pg 31.3 31.5 30.9 32.0   MCHC Latest Ref Range: 33.7 - 35.3 g/dL 33.0 (L) 33.4 (L) 33.1 (L) 33.9   RDW Latest Ref Range: 35.9 - 50.0 fL 52.7 (H) 51.9 (H) 50.8 (H) 51.8 (H)   Platelet Count Latest Ref Range: 164 - 446 K/uL 94 (L) 84 (L) 87 (L) 77 (L)   MPV Latest Ref Range: 9.0 - 12.9 fL 11.7 11.9 11.7 12.1   Neutrophils-Polys Latest Ref Range: 44.00 - 72.00 % 7.00 (L) 7.00 (L) 3.60 (L) 7.70 (L)   Neutrophils (Absolute) Latest Ref Range: 1.82 - 7.42 K/uL 2.64 2.60 1.07 (L) 1.82   Lymphocytes Latest Ref Range: 22.00 - 41.00 % 93.00 (H) 91.20 (H) 96.40 (H) 91.40 (H)   Lymphs (Absolute) Latest Ref Range: 1.00 - 4.80 K/uL 35.06 (H) 33.84 (H) 28.63 (H) 21.57 (H)   Monocytes Latest Ref Range: 0.00 - 13.40 % 0.00 0.90 0.00 0.90   Monos (Absolute) Latest Ref Range: 0.00 - 0.85 K/uL 0.00 0.33 0.00 0.21   Eosinophils Latest Ref Range: 0.00 - 6.90 % 0.00 0.90 0.00 0.00   Eos (Absolute) Latest Ref Range: 0.00 - 0.51 K/uL 0.00 0.33 0.00 0.00   Basophils Latest Ref Range: 0.00 - 1.80 % 0.00 0.00 0.00 0.00   Baso (Absolute) Latest Ref Range: 0.00 - 0.12 K/uL 0.00 0.00 0.00 0.00   Nucleated RBC Latest  Units: /100 WBC 0.00 0.00 0.00 0.00   NRBC (Absolute) Latest Units: K/uL 0.00 0.00 0.00 0.00   Plt Estimation Unknown Decreased Decreased Decreased Decreased   RBC Morphology Unknown Present Present Present Present   Poikilocytosis Unknown 1+ 1+ 1+ 1+   Ovalocytes Unknown   1+ 1+   Echinocytes Unknown 1+ 1+     Reactive Lymphocytes Unknown    Few   Smudge Cells Unknown Moderate Moderate Few    Peripheral Smear Review Unknown see below see below see below see below   Manual Diff Status Unknown PERFORMED PERFORMED PERFORMED PERFORMED      Ref. Range 9/18/2018 09:35 9/18/2018 09:39 9/18/2018 09:41 10/27/2018 11:16 10/30/2018 08:41   Sodium Latest Ref Range: 135 - 145 mmol/L 141 140  142 144   Potassium Latest Ref Range: 3.6 - 5.5 mmol/L 3.9 3.8  3.9 3.7   Chloride Latest Ref Range: 96 - 112 mmol/L 107 107  107 109   Co2 Latest Ref Range: 20 - 33 mmol/L 25 25  27 25   Anion Gap Latest Ref Range: 0.0 - 11.9  9.0 8.0  8.0 10.0   Glucose Latest Ref Range: 65 - 99 mg/dL 126 (H) 125 (H)  173 (H) 192 (H)   Bun Latest Ref Range: 8 - 22 mg/dL 9 9  10 10   Creatinine Latest Ref Range: 0.50 - 1.40 mg/dL 0.71 0.72  0.84 0.76   GFR If  Latest Ref Range: >60 mL/min/1.73 m 2 >60 >60  >60 >60   GFR If Non  Latest Ref Range: >60 mL/min/1.73 m 2 >60 >60  >60 >60   Calcium Latest Ref Range: 8.5 - 10.5 mg/dL 9.5 9.5  9.8 9.7   AST(SGOT) Latest Ref Range: 12 - 45 U/L 30 32  26 27   ALT(SGPT) Latest Ref Range: 2 - 50 U/L 21 22  19 19   Alkaline Phosphatase Latest Ref Range: 30 - 99 U/L 68 68  74 76   Total Bilirubin Latest Ref Range: 0.1 - 1.5 mg/dL 1.5 1.5  1.4 1.2   Albumin Latest Ref Range: 3.2 - 4.9 g/dL 4.2 4.2  4.1 4.1   Total Protein Latest Ref Range: 6.0 - 8.2 g/dL 6.7 6.6  6.7 6.7   Globulin Latest Ref Range: 1.9 - 3.5 g/dL 2.5 2.4  2.6 2.6   A-G Ratio Latest Units: g/dL 1.7 1.8  1.6 1.6   Uric Acid Latest Ref Range: 2.5 - 8.3 mg/dL     5.2   LDH Total Latest Ref Range: 107 - 266 U/L 169        Glycohemoglobin Latest Ref Range: 0.0 - 5.6 %   7.7 (H)     Estim. Avg Glu Latest Units: mg/dL   174     Fasting Status Unknown    Non-Fasting       Ref. Range 9/18/2018 09:39   PT Latest Ref Range: 12.0 - 14.6 sec 14.7 (H)   INR Latest Ref Range: 0.87 - 1.13  1.18 (H)      Ref. Range 2/21/2018 12:25 5/23/2018 12:10 6/5/2018 09:40 9/18/2018 09:39 9/18/2018 09:41   Alpha Fetoprotein Latest Ref Range: 0 - 9 ng/mL  7  7      Child Galloway Class A  JIM Grade A2    Pathology:  None available at time of consultation    Radiology:   [facility] abdomen [facility] 10, [facility]:  1.  Morphologic characteristics of cirrhosis with evidence of portal hypertension including trace ascites, splenomegaly, and portal venous dilatation.  2.  Interval enlargement in the mass in segment 4 adjacent to the caudate lobe. LR-5  3.  Previously described hyper enhancing masses in the left hepatic lobe demonstrate rim enhancement, consistent with recent Y 90 treatment. LR-treated.  4.  Ablation cavity in the left hepatic lobe is a vascular  5.  8 mm arterial enhancing focus in segment 4A is better defined than on the prior exam secondary to decreased motion. Size is not significantly changed.LR-3  6.  Stable 9 mm cystic mass in the pancreatic tail.  7.  Stable benign left adrenal adenoma  8.  Cholelithiasis. Dependent hypointense foci in the common bile duct may represent tiny nonobstructing calculi.      Interventional radiology procedure Y90 SIRT left lobe September 7, 2018, at [facility]:  1.  Technically successful Y90 radioembolization of the left hepatic lobe. The patient will followup in approximately 10 days for laboratory and clinical evaluation and 4-6 weeks for imaging evaluation.  2.  Immediately following the procedure, the patient was transported to nuclear medicine for SPECT imaging which demonstrates Bremsstrahlung emission from the left of the liver. There is no definite evidence of extrahepatic deposition of radioembolic    Material.     Nuclear medicine bremsstrahlung study on September 7, 2018 at [facility]:  The prescribed dose was  0.68 gb ( 18.4 mCi). The drawn dose was 0.78 gb (21mCi). Delivered dose after residual was measured at 0.7 gb ( 19 mCi). This represents  103% of the prescribed dose and  90% of the drawn dose. Bremsstrahlung images obtained   after the Y-90 radioembolization, confirm proper delivery to the targeted region. There is no uptake outside the planned treatment area. NOTE: The patient will be followed by interventional radiology and oncology.       Interventional radiology procedure August 8, 2018 at [facility]:  1.  Superior mesenteric arteriogram replaced common hepatic artery.  2.  Celiac arteriogram demonstrate no evidence of common hepatic arterial anatomy.  3.  Common hepatic arteriogram demonstrate no evidence of variant hepatic arterial anatomy.  4.  Selective catheterization and embolization of a small branch originating from the proximal aspect of the right hepatic artery which appeared to supply either a small subsegmental region of the right hepatic lobe or a small amount of the hepatic   flexure of the colon.  5.  Proper hepatic arteriogram demonstrating 2 small branches originating from the proximal aspect of the right hepatic artery one of which appear to represent either a small subsegmental hepatic artery versus hepatic flexure colon branch and a small   cystic artery.  6.  Intra-arterial administration of 4.4 mCi technetium 99m labeled MAA into the hepatic artery demonstrated a hepatic pulmonary shunt fraction of 4%.     CT abdomen with and without August 8, 2018 at [facility]:  1.  Cirrhosis  2.  Interval ablation of a segment 4A hepatic nodule without evidence of residual tumor in that field  3.  Multiple additional hepatic nodules and masses as described above, mostly unchanged in size however the dominant central RIGHT hepatic mass has increased in size. These are likely hepatomas.  4.   Splenomegaly and enlarged portal vein, in keeping with the patient's known portal hypertension  5.  Cholelithiasis     Nuclear medicine quantitative lung study August 28, 2018 at [facility]:  Total hepatopulmonary shunt percentage was 4%. No evidence of extrahepatic radiotracer deposition is identified.     MRI abdomen on June 8, 2018 at [facility]:  Examination is limited by patient motion.  2.1 x 1.9 cm arterial hyperenhancing lesion within the left lobe of the liver appears compatible with hepatocellular carcinoma. LR-5    Hyperenhancing lesion within segment 4 of the liver bordering the caudate lobe measures 3 x 2 cm and is increased in size compared to prior. This lesion is compatible with hepatocellular carcinoma. LR-5    10 mm hyperenhancing lesion within segment 4 may be slightly decreased or unchanged in size compared to prior. LR-3    3 other small hyperenhancing lesions are seen within the right lobe of the liver measuring up to 1 cm. LR-3    Heterogeneous wedge-shaped area of delayed hypo-enhancement in the anterior liver is likely related to variable perfusion.    Findings of cirrhosis and portal hypertension.    Mildly prominent lymph nodes in the cardiophrenic fat are not significantly changed.    9 mm cystic lesion in the pancreatic tail could represent a small side branch IPMN.    Cholelithiasis. No biliary ductal dilatation is seen.    Subcarinal lymphadenopathy is partially imaged.    2 cm left adrenal nodule is unchanged and likely an adrenal adenoma.    Trace free fluid in the abdomen.    Findings discussed with Dr. Senior        CT thorax April 17, 2018 at [facility]:  1.  Mediastinal lymphadenopathy with markedly enlarged subcarinal lymph node could be due to metastatic disease or lymphoma. Reactive lymph nodes or lymphadenopathy due to granulomatous disease seems less likely.  2.  Coarse coronary artery calcifications.  3.  Cirrhosis with hypervascular hepatic lesions and splenomegaly.  4.   "Cholelithiasis.     Nuclear medicine bone study April 17, 2018 at [facility]:  No scintigraphic evidence of bony metastatic disease     Portal pressure gradient April 4, 2018 at [facility]:  1.  Hepatic venography showing a patent right hepatic vein.  2.  Free and wedged right hepatic vein wedge pressures rendering a mean Hepatic Venous Pressure Gradient (HVPG) of 9.67 mmHg. This is consistent with portal hypertension.  (HVPG >= 10mmHg considered \"clinically significant\" portal HTN**  3.  Transjugular Liver biopsy was not performed at this time..  **Reference: Hepatic Venous Pressure Gradient in 2010:Optimal Measurement is Jose. Anya MCINTYRE, Wanda RODRIGUEZ. HEPATOLOGY, Vol. 51, No. 6, 2010     CT abdomen December 6, 2017 at [facility]:  1.  2.4 cm arterial enhancing mass in segment 4 of the liver demonstrates washout. LR-5.  2.  1.3 cm arterial enhancing lesion in the hepatic dome is similar to the recent MRI. No definite washout or capsule appreciated. LR-3  3.  Morphologic characteristics of cirrhosis with evidence of portal hypertension including splenomegaly.  4.  Cholelithiasis.  5.  Stable left adrenal nodule, likely a benign adenoma.      Current Outpatient Prescriptions   Medication Sig Dispense Refill   • ondansetron (ZOFRAN) 4 MG Tab tablet Take 1 Tab by mouth every four hours as needed for Nausea/Vomiting (for nausea, vomiting). 30 Tab 6   • prochlorperazine (COMPAZINE) 10 MG Tab Take 1 Tab by mouth every 6 hours as needed (for nausea, vomiting). 30 Tab 6   • metFORMIN ER (GLUCOPHAGE XR) 500 MG TABLET SR 24 HR Take 2 Tabs by mouth 2 times a day. 360 Tab 1   • Multiple Vitamins-Minerals (MULTIVITAMIN PO) Take  by mouth every day.     • Polyethylene Glycol 3350 (MIRALAX PO) Take 1 Cap by mouth every day.     • Cetirizine HCl (ZYRTEC ALLERGY PO) Take 1 Tab by mouth every day.     • fluticasone (FLONASE ALLERGY RELIEF) 50 MCG/ACT nasal spray Spray 1 Spray in nose 2 times a day. 1 Bottle 0     No current " facility-administered medications for this encounter.        No Known Allergies    Physical Exam   Constitutional: He is oriented to person, place, and time and well-developed, well-nourished, and in no distress. No distress.   HENT:   Head: Atraumatic.   Eyes: Pupils are equal, round, and reactive to light. No scleral icterus.   Cardiovascular:   No murmur heard.  Pulmonary/Chest: Effort normal. No respiratory distress.   Abdominal: Soft. He exhibits no distension.   No ascites noted   Musculoskeletal: He exhibits no edema.   Neurological: He is alert and oriented to person, place, and time. Gait normal. Coordination normal.   Skin: Skin is warm and dry. No rash noted. He is not diaphoretic. No erythema. No pallor.   No jaundice noted   Psychiatric: Mood, memory, affect and judgment normal.     ECOG Performance Status 0    Impression:   1. Unresectable multifocal hepatocellular carcinoma status post left lobe radioembolization and disease progression of the right lobe hepatoma.  2. Cirrhosis.  3. Portal hypertension.  4. Thrombocytopenia.  5. Splenomegaly.  6. Chronic lymphocytic leukemia.   7. Diabetes mellitus.    Plan:   Joe Norris MD has reviewed 's history and imaging studies, examined the patient, and discussed treatment options.  has residual inflammatory changes in the left lobe and progression of the caudate hepatoma that is fed from the right hepatic arterial supply. Our recommendation is selective YUNIER TACE of the single lesion and the patient is in agreement. We discussed the method of the procedure at length including angiography and embolization as well as the expected clinical course with the possibility of post-embolization syndrome nausea and pain and hospitalization. We explained that this procedure is palliative and not curative. We discussed the possibility of tumor recurrence and development of future tumors. We additionally discussed the risks, including bleeding  and infection, damage to the arteries, reaction to any medications given during the procedure, potential side effects of contrast administration including renal damage, non-target embolization, liver failure, and death. We discussed alternatives of the procedure including surveillance with no intervention and Y90 of the right lobe, which we recommend deferring at this time. The patient verbalizes understanding of risks and alternatives and elects to proceed. All questions were answered. Written pre- and post- procedure care instructions were provided. He has been scheduled for December 12.     GINA Grewal with Joe Norris MD  Interventional Radiology   [address] ([id number])   [place] [address]  [phone]

## 2018-11-14 ENCOUNTER — TELEPHONE (OUTPATIENT)
Dept: HEMATOLOGY ONCOLOGY | Facility: MEDICAL CENTER | Age: 68
End: 2018-11-14

## 2018-11-14 NOTE — TELEPHONE ENCOUNTER
I spoke with Dr. Servin's regarding patient's upcoming TACE procedure for hepatoma. He is scheduled for 12/12/18.  Patient is also currently on Rituxan and Treanda for CLL and completed his first cycle on October 31 tolerating it very well.  He will be due for cycle #2 on November 28, however due to upcoming TACE procedure Dr. Servin would like to hold treatment until approximately 3 weeks after completion of procedure.  We will hold cycle #2 and postpone treatment until approximately 3 weeks after December 12, 2018.  I also spoke with interventional radiologist nurse practitioner, Francisca Smith with this information as well.    I have left patient a message to call him back to let him know this information as well.  I have asked patient to call the office back.

## 2018-11-15 ENCOUNTER — TELEPHONE (OUTPATIENT)
Dept: HEMATOLOGY ONCOLOGY | Facility: MEDICAL CENTER | Age: 68
End: 2018-11-15

## 2018-11-15 NOTE — TELEPHONE ENCOUNTER
Informed patient of chemo schedule changes secondary to him needing a TACE procedure. Pt aware that chemo moved to the begining of the new year. He was grateful for the call and denies any questions or concerns.

## 2018-11-27 ENCOUNTER — APPOINTMENT (OUTPATIENT)
Dept: HEMATOLOGY ONCOLOGY | Facility: MEDICAL CENTER | Age: 68
End: 2018-11-27
Payer: MEDICARE

## 2018-11-28 ENCOUNTER — APPOINTMENT (OUTPATIENT)
Dept: ONCOLOGY | Facility: MEDICAL CENTER | Age: 68
End: 2018-11-28
Attending: INTERNAL MEDICINE
Payer: MEDICARE

## 2018-11-30 ENCOUNTER — HOSPITAL ENCOUNTER (OUTPATIENT)
Dept: LAB | Facility: MEDICAL CENTER | Age: 68
End: 2018-11-30
Attending: INTERNAL MEDICINE
Payer: MEDICARE

## 2018-11-30 LAB
ALBUMIN SERPL BCP-MCNC: 3.9 G/DL (ref 3.2–4.9)
ALBUMIN/GLOB SERPL: 1.7 G/DL
ALP SERPL-CCNC: 75 U/L (ref 30–99)
ALT SERPL-CCNC: 21 U/L (ref 2–50)
ANION GAP SERPL CALC-SCNC: 7 MMOL/L (ref 0–11.9)
AST SERPL-CCNC: 29 U/L (ref 12–45)
BASOPHILS # BLD AUTO: 1.4 % (ref 0–1.8)
BASOPHILS # BLD: 0.05 K/UL (ref 0–0.12)
BILIRUB SERPL-MCNC: 1.9 MG/DL (ref 0.1–1.5)
BUN SERPL-MCNC: 9 MG/DL (ref 8–22)
CALCIUM SERPL-MCNC: 9.3 MG/DL (ref 8.5–10.5)
CHLORIDE SERPL-SCNC: 109 MMOL/L (ref 96–112)
CO2 SERPL-SCNC: 25 MMOL/L (ref 20–33)
CREAT SERPL-MCNC: 0.6 MG/DL (ref 0.5–1.4)
EOSINOPHIL # BLD AUTO: 0.08 K/UL (ref 0–0.51)
EOSINOPHIL NFR BLD: 2.3 % (ref 0–6.9)
ERYTHROCYTE [DISTWIDTH] IN BLOOD BY AUTOMATED COUNT: 50.7 FL (ref 35.9–50)
FERRITIN SERPL-MCNC: 19.7 NG/ML (ref 22–322)
GLOBULIN SER CALC-MCNC: 2.3 G/DL (ref 1.9–3.5)
GLUCOSE SERPL-MCNC: 158 MG/DL (ref 65–99)
HCT VFR BLD AUTO: 44.8 % (ref 42–52)
HGB BLD-MCNC: 15 G/DL (ref 14–18)
IMM GRANULOCYTES # BLD AUTO: 0.01 K/UL (ref 0–0.11)
IMM GRANULOCYTES NFR BLD AUTO: 0.3 % (ref 0–0.9)
INR PPP: 1.28 (ref 0.87–1.13)
IRON SATN MFR SERPL: 29 % (ref 15–55)
IRON SERPL-MCNC: 130 UG/DL (ref 50–180)
LYMPHOCYTES # BLD AUTO: 1.23 K/UL (ref 1–4.8)
LYMPHOCYTES NFR BLD: 34.7 % (ref 22–41)
MCH RBC QN AUTO: 30.7 PG (ref 27–33)
MCHC RBC AUTO-ENTMCNC: 33.5 G/DL (ref 33.7–35.3)
MCV RBC AUTO: 91.8 FL (ref 81.4–97.8)
MONOCYTES # BLD AUTO: 0.44 K/UL (ref 0–0.85)
MONOCYTES NFR BLD AUTO: 12.4 % (ref 0–13.4)
NEUTROPHILS # BLD AUTO: 1.73 K/UL (ref 1.82–7.42)
NEUTROPHILS NFR BLD: 48.9 % (ref 44–72)
NRBC # BLD AUTO: 0 K/UL
NRBC BLD-RTO: 0 /100 WBC
PLATELET # BLD AUTO: 80 K/UL (ref 164–446)
PMV BLD AUTO: 11.5 FL (ref 9–12.9)
POTASSIUM SERPL-SCNC: 3.6 MMOL/L (ref 3.6–5.5)
PROT SERPL-MCNC: 6.2 G/DL (ref 6–8.2)
PROTHROMBIN TIME: 16.1 SEC (ref 12–14.6)
RBC # BLD AUTO: 4.88 M/UL (ref 4.7–6.1)
SODIUM SERPL-SCNC: 141 MMOL/L (ref 135–145)
TIBC SERPL-MCNC: 449 UG/DL (ref 250–450)
WBC # BLD AUTO: 3.5 K/UL (ref 4.8–10.8)

## 2018-11-30 PROCEDURE — 82728 ASSAY OF FERRITIN: CPT

## 2018-11-30 PROCEDURE — 80053 COMPREHEN METABOLIC PANEL: CPT

## 2018-11-30 PROCEDURE — 85025 COMPLETE CBC W/AUTO DIFF WBC: CPT

## 2018-11-30 PROCEDURE — 83540 ASSAY OF IRON: CPT

## 2018-11-30 PROCEDURE — 83550 IRON BINDING TEST: CPT

## 2018-11-30 PROCEDURE — 85610 PROTHROMBIN TIME: CPT

## 2018-11-30 PROCEDURE — 82107 ALPHA-FETOPROTEIN L3: CPT

## 2018-11-30 PROCEDURE — 36415 COLL VENOUS BLD VENIPUNCTURE: CPT

## 2018-12-03 LAB
AFP L3 MFR SERPL: 7.9 % (ref 0–9.9)
AFP SERPL-MCNC: 9 NG/ML (ref 0–15)

## 2018-12-08 NOTE — PROGRESS NOTES
Pharmacy Chemotherapy Verification  Patient Name: Sohail Duvall  **APPOINTMENT RESCHEDULED TO 12/14/18**  Dx: HCC  Protocol: TACE with doxorubicin loaded Oncozene microspheres    *Dosing Reference*  DOXOrubicin 100-150 mg, loaded in 100 micron microspheres   Or  Irinotecan 100 mg, loaded in 100 micron microspheres    Annie GASTELUM et al. Transarterial chemoembolization of unresectable hepatocellular carcinoma with drug eluting beads: results of an open-label study of 62 patients. 2008. Cardiovasc Intervent Radiol. Mar-Apr;31(2):269-80.   Narendra MASTERS et al. Chemoembolization clinic: tumor response to a new, small diameter drug-eluting embolic in hepatocellular carcinoma (HCC). Abstract #143 SIR March 2015: Presentation March 2, 2015.   Stew JOEL et al. Continuation of: First experiences with superselective TANDEM® TACE in [place]. Abstract #333. (JVIR. 2014. 25:[id number]).     Allergies:  Patient has no known allergies.     Wt 99.5 kg (219 lb 5.7 oz)   BMI 33.35 kg/m²  Body surface area is 2.18 meters squared.    Labs not required    Drug Order   (Drug name, dose, route, IV Fluid & volume, frequency, number of doses)      Previous treatment: N/A       By my signature below, I confirm this process was performed independently with the BSA and all final chemotherapy dosing calculations congruent. I have reviewed the above chemotherapy order and that my calculation of the final dose and BSA (when applicable) corroborate those calculations of the  pharmacist. Discrepancies of 5% or greater in the written dose have been addressed and documented within the [facility] Progress notes.    Yi Hanks, PharmD, BCOP

## 2018-12-11 ENCOUNTER — HOSPITAL ENCOUNTER (OUTPATIENT)
Dept: LAB | Facility: MEDICAL CENTER | Age: 68
End: 2018-12-11
Attending: NURSE PRACTITIONER
Payer: MEDICARE

## 2018-12-11 DIAGNOSIS — Z01.812 PRE-OPERATIVE LABORATORY EXAMINATION: ICD-10-CM

## 2018-12-11 DIAGNOSIS — C91.10 CLL (CHRONIC LYMPHOCYTIC LEUKEMIA) (HCC): ICD-10-CM

## 2018-12-11 LAB
ALBUMIN SERPL BCP-MCNC: 3.9 G/DL (ref 3.2–4.9)
ALBUMIN/GLOB SERPL: 1.7 G/DL
ALP SERPL-CCNC: 78 U/L (ref 30–99)
ALT SERPL-CCNC: 20 U/L (ref 2–50)
ANION GAP SERPL CALC-SCNC: 7 MMOL/L (ref 0–11.9)
AST SERPL-CCNC: 27 U/L (ref 12–45)
BASOPHILS # BLD AUTO: 0.9 % (ref 0–1.8)
BASOPHILS # BLD: 0.04 K/UL (ref 0–0.12)
BILIRUB SERPL-MCNC: 1.4 MG/DL (ref 0.1–1.5)
BUN SERPL-MCNC: 8 MG/DL (ref 8–22)
CALCIUM SERPL-MCNC: 9.9 MG/DL (ref 8.5–10.5)
CHLORIDE SERPL-SCNC: 107 MMOL/L (ref 96–112)
CO2 SERPL-SCNC: 27 MMOL/L (ref 20–33)
CREAT SERPL-MCNC: 0.63 MG/DL (ref 0.5–1.4)
EOSINOPHIL # BLD AUTO: 0.11 K/UL (ref 0–0.51)
EOSINOPHIL NFR BLD: 2.5 % (ref 0–6.9)
ERYTHROCYTE [DISTWIDTH] IN BLOOD BY AUTOMATED COUNT: 48.5 FL (ref 35.9–50)
GLOBULIN SER CALC-MCNC: 2.3 G/DL (ref 1.9–3.5)
GLUCOSE SERPL-MCNC: 261 MG/DL (ref 65–99)
HCT VFR BLD AUTO: 44.1 % (ref 42–52)
HGB BLD-MCNC: 15.3 G/DL (ref 14–18)
IMM GRANULOCYTES # BLD AUTO: 0.01 K/UL (ref 0–0.11)
IMM GRANULOCYTES NFR BLD AUTO: 0.2 % (ref 0–0.9)
INR PPP: 1.25 (ref 0.87–1.13)
LYMPHOCYTES # BLD AUTO: 1.76 K/UL (ref 1–4.8)
LYMPHOCYTES NFR BLD: 40.4 % (ref 22–41)
MCH RBC QN AUTO: 31.6 PG (ref 27–33)
MCHC RBC AUTO-ENTMCNC: 34.7 G/DL (ref 33.7–35.3)
MCV RBC AUTO: 91.1 FL (ref 81.4–97.8)
MONOCYTES # BLD AUTO: 0.48 K/UL (ref 0–0.85)
MONOCYTES NFR BLD AUTO: 11 % (ref 0–13.4)
NEUTROPHILS # BLD AUTO: 1.96 K/UL (ref 1.82–7.42)
NEUTROPHILS NFR BLD: 45 % (ref 44–72)
NRBC # BLD AUTO: 0 K/UL
NRBC BLD-RTO: 0 /100 WBC
PLATELET # BLD AUTO: 75 K/UL (ref 164–446)
PMV BLD AUTO: 11.4 FL (ref 9–12.9)
POTASSIUM SERPL-SCNC: 4 MMOL/L (ref 3.6–5.5)
PROT SERPL-MCNC: 6.2 G/DL (ref 6–8.2)
PROTHROMBIN TIME: 15.9 SEC (ref 12–14.6)
RBC # BLD AUTO: 4.84 M/UL (ref 4.7–6.1)
SODIUM SERPL-SCNC: 141 MMOL/L (ref 135–145)
URATE SERPL-MCNC: 4.2 MG/DL (ref 2.5–8.3)
WBC # BLD AUTO: 4.4 K/UL (ref 4.8–10.8)

## 2018-12-11 PROCEDURE — 82107 ALPHA-FETOPROTEIN L3: CPT

## 2018-12-11 PROCEDURE — 36415 COLL VENOUS BLD VENIPUNCTURE: CPT

## 2018-12-11 PROCEDURE — 85610 PROTHROMBIN TIME: CPT

## 2018-12-11 PROCEDURE — 84550 ASSAY OF BLOOD/URIC ACID: CPT

## 2018-12-11 PROCEDURE — 80053 COMPREHEN METABOLIC PANEL: CPT

## 2018-12-11 PROCEDURE — 85025 COMPLETE CBC W/AUTO DIFF WBC: CPT

## 2018-12-11 RX ORDER — CETIRIZINE HYDROCHLORIDE 10 MG/1
10 TABLET ORAL EVERY MORNING
COMMUNITY
End: 2022-05-19

## 2018-12-11 RX ORDER — SODIUM CHLORIDE 9 MG/ML
INJECTION, SOLUTION INTRAVENOUS CONTINUOUS
Status: DISCONTINUED | OUTPATIENT
Start: 2018-12-12 | End: 2018-12-12 | Stop reason: HOSPADM

## 2018-12-11 NOTE — PROGRESS NOTES
Pharmacy Chemotherapy calculation:    Patient Name: Sohail Duvall   Protocol: TACE with doxorubicin loaded Oncozene microspheres  Dx: HCC    *Dosing Reference*  Doxorubicin 100-150 mg, loaded in 100 micron microspheres   Or  Irinotecan 100 mg, loaded in 100 micron microspheres   Annie GASTELUM et al. Transarterial chemoembolization of unresectable hepatocellular carcinoma with drug eluting beads: results of an open-label study of 62 patients. 2008. Cardiovasc Intervent Radiol. Mar-Apr;31(2):269-80.   Narendra MASTERS et al. Chemoembolization clinic: tumor response to a new, small diameter drug-eluting embolic in hepatocellular carcinoma (HCC). Abstract #143 SIR March 2015: Presentation March 2, 2015.   Stew JOEL, et al. Continuation of: First experiences with superselective TANDEM® TACE in [place]. Abstract #333. (JVIR. 2014. 25:[id number]).     Allergies:  Patient has no known allergies.       Ht = 68 in Wt = 99.5 kg  BSA = 2.18m2   Labs not required       Doxorubicin (Adriamycin) 150 mg in Oncozene microspheres 100 micron              No Calc required, ok with dose as ordered   TACE procedure in radiology to be administered by Radiologist on 12/12/18.      Simi Kahn, PharmD, BCOP

## 2018-12-12 ENCOUNTER — HOSPITAL ENCOUNTER (OUTPATIENT)
Facility: MEDICAL CENTER | Age: 68
End: 2018-12-12
Attending: RADIOLOGY | Admitting: RADIOLOGY
Payer: MEDICARE

## 2018-12-12 VITALS
DIASTOLIC BLOOD PRESSURE: 78 MMHG | TEMPERATURE: 98.4 F | WEIGHT: 219 LBS | OXYGEN SATURATION: 97 % | HEIGHT: 68 IN | SYSTOLIC BLOOD PRESSURE: 138 MMHG | RESPIRATION RATE: 18 BRPM | HEART RATE: 59 BPM | BODY MASS INDEX: 33.19 KG/M2

## 2018-12-12 DIAGNOSIS — C22.0 HEPATOMA (HCC): ICD-10-CM

## 2018-12-12 LAB
AFP L3 MFR SERPL: 8.3 % (ref 0–9.9)
AFP SERPL-MCNC: 9 NG/ML (ref 0–15)

## 2018-12-12 RX ADMIN — SODIUM CHLORIDE: 9 INJECTION, SOLUTION INTRAVENOUS at 09:00

## 2018-12-12 NOTE — PROGRESS NOTES
"Pharmacy Chemotherapy Verification  Patient Name: Sohail Duvall  Dx: HCC  Protocol: TACE with DOXOrubicin loaded Oncozene microspheres    *Dosing Reference*  DOXOrubicin 100-150 mg, loaded in 100 micron microspheres   Or  Irinotecan 100 mg, loaded in 100 micron microspheres    Annie GASTELUM et al. Transarterial chemoembolization of unresectable hepatocellular carcinoma with drug eluting beads: results of an open-label study of 62 patients. 2008. Cardiovasc Intervent Radiol. Mar-Apr;31(2):269-80.   Narendra MASTERS et al. Chemoembolization clinic: tumor response to a new, small diameter drug-eluting embolic in hepatocellular carcinoma (HCC). Abstract #143 SIR March 2015: Presentation March 2, 2015.   Stew JOEL et al. Continuation of: First experiences with superselective TANDEM® TACE in [place]. Abstract #333. (JVIR. 2014. 25:[id number]).     Allergies:  Patient has no known allergies.     Ht 1.727 m (5' 8\")   Wt 96.2 kg (212 lb)   BMI 32.23 kg/m²  Body surface area is 2.15 meters squared.    Labs not required    Drug Order   (Drug name, dose, route, IV Fluid & volume, frequency, number of doses)      Previous treatment: N/A     Medication = DOXOrubicin  Base Dose = 150 mg   Size = 100 micron Oncozene microspheres  Calc Dose: Fixed dose  Final Dose = 150 mg  Route = Intrahepatic  Fluid & Volume = in 60 mL syringe  Admin Duration = to be administered during procedure by interventional radiologist   To be given 12/14/18       <5% difference, OK to treat with final written dose     By my signature below, I confirm this process was performed independently with the BSA and all final chemotherapy dosing calculations congruent. I have reviewed the above chemotherapy order and that my calculation of the final dose and BSA (when applicable) corroborate those calculations of the  pharmacist. Discrepancies of 5% or greater in the written dose have been addressed and documented within the EPIC Progress " notes.    Yi Hanks, PharmD, BCOP

## 2018-12-13 PROCEDURE — 700111 HCHG RX REV CODE 636 W/ 250 OVERRIDE (IP): Mod: JG | Performed by: RADIOLOGY

## 2018-12-13 NOTE — PROGRESS NOTES
"Pharmacy Chemotherapy calculation:  Patient Name: Sohail Duvall  Dx: HCC    Protocol: TACE with doxorubicin loaded Oncozene microspheres    *Dosing Reference*  Doxorubicin 100-150 mg, loaded in 100 micron microspheres   Or  Irinotecan 100 mg, loaded in 100 micron microspheres   Annie GASTELUM et al. Transarterial chemoembolization of unresectable hepatocellular carcinoma with drug eluting beads: results of an open-label study of 62 patients. 2008. Cardiovasc Intervent Radiol. Mar-Apr;31(2):269-80.   Narendra MASTERS et al. Chemoembolization clinic: tumor response to a new, small diameter drug-eluting embolic in hepatocellular carcinoma (HCC). Abstract #143 SIR March 2015: Presentation March 2, 2015.   Stew JOEL, et al. Continuation of: First experiences with superselective TANDEM® TACE in [place]. Abstract #333. (JVIR. 2014. 25:[id number]).     Dx: HCC     Allergies:  Patient has no known allergies.     Ht 1.727 m (5' 8\")   Wt 96.2 kg (212 lb)   BMI 32.23 kg/m²  Body surface area is 2.15 meters squared.    Labs not required    Fixed dose - no calculations required     1.  Doxorubicin (Adriamycin) 150 mg in Oncozene microspheres 100 micron   No calc required, ok with dose as ordered.              For TACE procedure in radiology to be administered  by Radiologist on 12/14/18    Andrew Harrington, PharmD        "

## 2018-12-14 ENCOUNTER — HOSPITAL ENCOUNTER (OUTPATIENT)
Facility: MEDICAL CENTER | Age: 68
End: 2018-12-14
Attending: RADIOLOGY | Admitting: RADIOLOGY
Payer: MEDICARE

## 2018-12-14 ENCOUNTER — APPOINTMENT (OUTPATIENT)
Dept: RADIOLOGY | Facility: MEDICAL CENTER | Age: 68
End: 2018-12-14
Attending: RADIOLOGY
Payer: MEDICARE

## 2018-12-14 VITALS
HEART RATE: 53 BPM | DIASTOLIC BLOOD PRESSURE: 77 MMHG | SYSTOLIC BLOOD PRESSURE: 138 MMHG | OXYGEN SATURATION: 95 % | HEIGHT: 68 IN | BODY MASS INDEX: 34.15 KG/M2 | RESPIRATION RATE: 16 BRPM | WEIGHT: 225.31 LBS | TEMPERATURE: 98 F

## 2018-12-14 DIAGNOSIS — C22.0 HEPATOMA (HCC): Primary | ICD-10-CM

## 2018-12-14 PROCEDURE — 36247 INS CATH ABD/L-EXT ART 3RD: CPT

## 2018-12-14 PROCEDURE — 160002 HCHG RECOVERY MINUTES (STAT)

## 2018-12-14 PROCEDURE — 700111 HCHG RX REV CODE 636 W/ 250 OVERRIDE (IP): Performed by: RADIOLOGY

## 2018-12-14 PROCEDURE — 99153 MOD SED SAME PHYS/QHP EA: CPT

## 2018-12-14 PROCEDURE — 700117 HCHG RX CONTRAST REV CODE 255: Performed by: RADIOLOGY

## 2018-12-14 PROCEDURE — 700105 HCHG RX REV CODE 258: Performed by: RADIOLOGY

## 2018-12-14 PROCEDURE — 700111 HCHG RX REV CODE 636 W/ 250 OVERRIDE (IP)

## 2018-12-14 RX ORDER — MIDAZOLAM HYDROCHLORIDE 1 MG/ML
.5-2 INJECTION INTRAMUSCULAR; INTRAVENOUS PRN
Status: DISCONTINUED | OUTPATIENT
Start: 2018-12-14 | End: 2018-12-14 | Stop reason: HOSPADM

## 2018-12-14 RX ORDER — ONDANSETRON 2 MG/ML
4 INJECTION INTRAMUSCULAR; INTRAVENOUS EVERY 8 HOURS PRN
Status: DISCONTINUED | OUTPATIENT
Start: 2018-12-14 | End: 2018-12-14 | Stop reason: HOSPADM

## 2018-12-14 RX ORDER — SODIUM CHLORIDE 9 MG/ML
500 INJECTION, SOLUTION INTRAVENOUS
Status: DISCONTINUED | OUTPATIENT
Start: 2018-12-14 | End: 2018-12-14 | Stop reason: HOSPADM

## 2018-12-14 RX ORDER — ONDANSETRON 4 MG/1
4 TABLET, FILM COATED ORAL EVERY 4 HOURS PRN
Qty: 20 TAB | Refills: 0 | Status: SHIPPED | OUTPATIENT
Start: 2018-12-14 | End: 2018-12-19

## 2018-12-14 RX ORDER — MIDAZOLAM HYDROCHLORIDE 1 MG/ML
INJECTION INTRAMUSCULAR; INTRAVENOUS
Status: COMPLETED
Start: 2018-12-14 | End: 2018-12-14

## 2018-12-14 RX ORDER — VERAPAMIL HYDROCHLORIDE 2.5 MG/ML
INJECTION, SOLUTION INTRAVENOUS
Status: COMPLETED
Start: 2018-12-14 | End: 2018-12-14

## 2018-12-14 RX ORDER — ONDANSETRON 2 MG/ML
4 INJECTION INTRAMUSCULAR; INTRAVENOUS PRN
Status: DISCONTINUED | OUTPATIENT
Start: 2018-12-14 | End: 2018-12-14 | Stop reason: HOSPADM

## 2018-12-14 RX ORDER — OXYCODONE HYDROCHLORIDE 5 MG/1
5 TABLET ORAL
Status: DISCONTINUED | OUTPATIENT
Start: 2018-12-14 | End: 2018-12-14 | Stop reason: HOSPADM

## 2018-12-14 RX ORDER — MORPHINE SULFATE 10 MG/ML
4 INJECTION, SOLUTION INTRAMUSCULAR; INTRAVENOUS
Status: DISCONTINUED | OUTPATIENT
Start: 2018-12-14 | End: 2018-12-14 | Stop reason: HOSPADM

## 2018-12-14 RX ORDER — HEPARIN SODIUM,PORCINE 1000/ML
VIAL (ML) INJECTION
Status: COMPLETED
Start: 2018-12-14 | End: 2018-12-14

## 2018-12-14 RX ORDER — SODIUM CHLORIDE 9 MG/ML
INJECTION, SOLUTION INTRAVENOUS
Status: DISCONTINUED | OUTPATIENT
Start: 2018-12-14 | End: 2018-12-14 | Stop reason: HOSPADM

## 2018-12-14 RX ORDER — OXYCODONE HYDROCHLORIDE 10 MG/1
10 TABLET ORAL
Status: DISCONTINUED | OUTPATIENT
Start: 2018-12-14 | End: 2018-12-14 | Stop reason: HOSPADM

## 2018-12-14 RX ORDER — OXYCODONE HYDROCHLORIDE 5 MG/1
5-10 CAPSULE ORAL EVERY 4 HOURS PRN
Qty: 20 CAP | Refills: 0 | Status: SHIPPED | OUTPATIENT
Start: 2018-12-14 | End: 2018-12-19

## 2018-12-14 RX ADMIN — NITROGLYCERIN 100 MCG: 20 INJECTION INTRAVENOUS at 11:18

## 2018-12-14 RX ADMIN — IOHEXOL 70 ML: 300 INJECTION, SOLUTION INTRAVENOUS at 11:51

## 2018-12-14 RX ADMIN — FENTANYL CITRATE 50 MCG: 50 INJECTION, SOLUTION INTRAMUSCULAR; INTRAVENOUS at 11:12

## 2018-12-14 RX ADMIN — VERAPAMIL HYDROCHLORIDE 2.5 MG: 2.5 INJECTION, SOLUTION INTRAVENOUS at 11:18

## 2018-12-14 RX ADMIN — DOXORUBICIN HYDROCHLORIDE 150 MG: 2 INJECTION, POWDER, LYOPHILIZED, FOR SOLUTION INTRAVENOUS at 11:36

## 2018-12-14 RX ADMIN — SODIUM CHLORIDE: 9 INJECTION, SOLUTION INTRAVENOUS at 08:41

## 2018-12-14 RX ADMIN — CEFTRIAXONE SODIUM 1 G: 1 INJECTION, POWDER, FOR SOLUTION INTRAMUSCULAR; INTRAVENOUS at 09:11

## 2018-12-14 RX ADMIN — MIDAZOLAM HYDROCHLORIDE 2 MG: 1 INJECTION INTRAMUSCULAR; INTRAVENOUS at 11:12

## 2018-12-14 RX ADMIN — FENTANYL CITRATE 50 MCG: 50 INJECTION, SOLUTION INTRAMUSCULAR; INTRAVENOUS at 11:37

## 2018-12-14 RX ADMIN — HEPARIN SODIUM 5000 UNITS: 1000 INJECTION, SOLUTION INTRAVENOUS; SUBCUTANEOUS at 11:18

## 2018-12-14 RX ADMIN — MIDAZOLAM HYDROCHLORIDE 2 MG: 1 INJECTION, SOLUTION INTRAMUSCULAR; INTRAVENOUS at 11:12

## 2018-12-14 RX ADMIN — ONDANSETRON HYDROCHLORIDE 16 MG: 2 SOLUTION INTRAMUSCULAR; INTRAVENOUS at 08:44

## 2018-12-14 ASSESSMENT — PAIN SCALES - GENERAL
PAINLEVEL_OUTOF10: 0

## 2018-12-14 NOTE — OR SURGEON
Immediate Post- Operative Note        PostOp Diagnosis: HCC.       Procedure(s): Caudate lobe TACE.      Estimated Blood Loss: Less than 25 ml        Complications: None            12/14/2018     1145 AM     Joe Norris

## 2018-12-14 NOTE — DISCHARGE INSTRUCTIONS
ACTIVITY: Rest and take it easy for the first 24 hours.  A responsible adult is recommended to remain with you during that time.  It is normal to feel sleepy.  We encourage you to not do anything that requires balance, judgment or coordination.    MILD FLU-LIKE SYMPTOMS ARE NORMAL. YOU MAY EXPERIENCE GENERALIZED MUSCLE ACHES, THROAT IRRITATION, HEADACHE AND/OR SOME NAUSEA.    FOR 24 HOURS DO NOT:  Drive, operate machinery or run household appliances.  Drink beer or alcoholic beverages.   Make important decisions or sign legal documents.      DIET: To avoid nausea, slowly advance diet as tolerated, avoiding spicy or greasy foods for the first day.  Add more substantial food to your diet according to your physician's instructions.  Babies can be fed formula or breast milk as soon as they are hungry.  INCREASE FLUIDS AND FIBER TO AVOID CONSTIPATION.    SURGICAL DRESSING/BATHING:     Keep the dressing clean and dry for 24 hours.  May remove dressing tomorrow  and can shower.  Do not submerge site under water for 1 week.  No heavy lifting and limit movement for 2 days.      FOLLOW-UP APPOINTMENT:  A follow-up appointment should be arranged with your doctor ; call to schedule.    You should CALL YOUR PHYSICIAN if you develop:  Fever greater than 101 degrees F.  Pain not relieved by medication, or persistent nausea or vomiting.  Excessive bleeding (blood soaking through dressing) or unexpected drainage from the wound.  Extreme redness or swelling around the incision site, drainage of pus or foul smelling drainage.  Inability to urinate or empty your bladder within 8 hours.  Problems with breathing or chest pain.    You should call 911 if you develop problems with breathing or chest pain.  If you are unable to contact your doctor or surgical center, you should go to the nearest emergency room or urgent care center.      Physician's telephone #: [phone]    If any questions arise, call your doctor.  If your doctor is not  available, please feel free to call the Surgical Center at [phone].  The Center is open Monday through Friday from 7AM to 7PM.  You can also call the HEALTH HOTLINE open 24 hours/day, 7 days/week and speak to a nurse at [phone], or toll free at [phone].    A registered nurse may call you a few days after your surgery to see how you are doing after your procedure.    MEDICATIONS: Resume taking daily medication.  Take prescribed pain medication with food.  If no medication is prescribed, you may take non-aspirin pain medication if needed.  PAIN MEDICATION CAN BE VERY CONSTIPATING.  Take a stool softener or laxative such as senokot, pericolace, or milk of magnesia if needed.      If your physician has prescribed pain medication that includes Acetaminophen (Tylenol), do not take additional Acetaminophen (Tylenol) while taking the prescribed medication.    Depression / Suicide Risk    As you are discharged from this [facility] facility, it is important to learn how to keep safe from harming yourself.    Recognize the warning signs:  · Abrupt changes in personality, positive or negative- including increase in energy   · Giving away possessions  · Change in eating patterns- significant weight changes-  positive or negative  · Change in sleeping patterns- unable to sleep or sleeping all the time   · Unwillingness or inability to communicate  · Depression  · Unusual sadness, discouragement and loneliness  · Talk of wanting to die  · Neglect of personal appearance   · Rebelliousness- reckless behavior  · Withdrawal from people/activities they love  · Confusion- inability to concentrate     If you or a loved one observes any of these behaviors or has concerns about self-harm, here's what you can do:  · Talk about it- your feelings and reasons for harming yourself  · Remove any means that you might use to hurt yourself (examples: pills, rope, extension cords, firearm)  · Get professional help from the  community (Mental Health, Substance Abuse, psychological counseling)  · Do not be alone:Call your Safe Contact- someone whom you trust who will be there for you.  · Call your local CRISIS HOTLINE 427-6049 or 195-277-4538  · Call your local Children's Mobile Crisis Response Team [place] (456) 787-1416 or www.[employer]  · Call the toll free National Suicide Prevention Hotlines   · National Suicide Prevention Lifeline 312-677-QPPD (5026)  · [place] [employer] Line Network 800-SUICIDE (423-4013)              Radial Catherization Discharge Instructions      · Do not subject hand/arm to any forceful movements for 24 hours    i.e. supporting weight when rising from the chair or bed.   · Do not drive a car for 24 hours  · You may remove the dressing tomorrow  · You may shower on the day following your procedure.  Do not take a tub bath or submerge the puncture site in water for 3 days following the procedure.  · No Lifting more than 3-5 pounds with affected wrist for 5 days  · Follow up with in 2-4 weeks.  · Increase fluids for 2 days post procedure.  · Continue all previous medications unless otherwise instructed.    If bleeding should occur following discharge:  · Sit down and apply firm pressure to site with your fingers for 10 minutes  · If the bleeding stops, continue to sit quietly, keeping your wrist straight for 2 hours.  Notify physician as soon as possible ( [phone])  · If bleeding does not stop after 10 minutes, or if there is a large amount of bleeding or spurting, call 911 immediately.  Do not drive yourself to the hospital.

## 2018-12-14 NOTE — OR NURSING
1207: Patient from radiology to PPU via rney s/p Tace. Patient is awake. VSS LUE CMS intact. L TR band intact No c/o pain or nausea at this time. Will monitor closely. Vital signs per MD order.   1230: Patient tolerating PO well.   1240: Wife at bedside.   1300: Air completely off hemoband per MD order. Gauze and tegaderm dressing to L radial site.  LUE circulation, movement and sensation intact.   1345: DC instructions given. Questions answered. Family at bedside.L radial site soft,CDI. No c/o pain or nausea. Patient wide awake. VSS. Patient met criteria for discharge.

## 2018-12-19 DIAGNOSIS — C22.0 HEPATOMA (HCC): ICD-10-CM

## 2018-12-28 ENCOUNTER — HOSPITAL ENCOUNTER (OUTPATIENT)
Dept: LAB | Facility: MEDICAL CENTER | Age: 68
End: 2018-12-28
Attending: NURSE PRACTITIONER
Payer: MEDICARE

## 2018-12-28 DIAGNOSIS — C91.10 CLL (CHRONIC LYMPHOCYTIC LEUKEMIA) (HCC): ICD-10-CM

## 2018-12-28 LAB
ALBUMIN SERPL BCP-MCNC: 4 G/DL (ref 3.2–4.9)
ALBUMIN/GLOB SERPL: 1.7 G/DL
ALP SERPL-CCNC: 86 U/L (ref 30–99)
ALT SERPL-CCNC: 19 U/L (ref 2–50)
ANION GAP SERPL CALC-SCNC: 8 MMOL/L (ref 0–11.9)
AST SERPL-CCNC: 26 U/L (ref 12–45)
BASOPHILS # BLD AUTO: 1.3 % (ref 0–1.8)
BASOPHILS # BLD: 0.06 K/UL (ref 0–0.12)
BILIRUB SERPL-MCNC: 1.4 MG/DL (ref 0.1–1.5)
BUN SERPL-MCNC: 10 MG/DL (ref 8–22)
CALCIUM SERPL-MCNC: 9.5 MG/DL (ref 8.5–10.5)
CHLORIDE SERPL-SCNC: 107 MMOL/L (ref 96–112)
CO2 SERPL-SCNC: 27 MMOL/L (ref 20–33)
CREAT SERPL-MCNC: 0.73 MG/DL (ref 0.5–1.4)
EOSINOPHIL # BLD AUTO: 0.2 K/UL (ref 0–0.51)
EOSINOPHIL NFR BLD: 4.3 % (ref 0–6.9)
ERYTHROCYTE [DISTWIDTH] IN BLOOD BY AUTOMATED COUNT: 46.5 FL (ref 35.9–50)
GLOBULIN SER CALC-MCNC: 2.4 G/DL (ref 1.9–3.5)
GLUCOSE SERPL-MCNC: 213 MG/DL (ref 65–99)
HCT VFR BLD AUTO: 46.1 % (ref 42–52)
HGB BLD-MCNC: 15.6 G/DL (ref 14–18)
IMM GRANULOCYTES # BLD AUTO: 0.02 K/UL (ref 0–0.11)
IMM GRANULOCYTES NFR BLD AUTO: 0.4 % (ref 0–0.9)
LYMPHOCYTES # BLD AUTO: 1.44 K/UL (ref 1–4.8)
LYMPHOCYTES NFR BLD: 30.8 % (ref 22–41)
MCH RBC QN AUTO: 30.7 PG (ref 27–33)
MCHC RBC AUTO-ENTMCNC: 33.8 G/DL (ref 33.7–35.3)
MCV RBC AUTO: 90.7 FL (ref 81.4–97.8)
MONOCYTES # BLD AUTO: 0.48 K/UL (ref 0–0.85)
MONOCYTES NFR BLD AUTO: 10.3 % (ref 0–13.4)
NEUTROPHILS # BLD AUTO: 2.48 K/UL (ref 1.82–7.42)
NEUTROPHILS NFR BLD: 52.9 % (ref 44–72)
NRBC # BLD AUTO: 0 K/UL
NRBC BLD-RTO: 0 /100 WBC
PLATELET # BLD AUTO: 103 K/UL (ref 164–446)
PMV BLD AUTO: 10.6 FL (ref 9–12.9)
POTASSIUM SERPL-SCNC: 4.4 MMOL/L (ref 3.6–5.5)
PROT SERPL-MCNC: 6.4 G/DL (ref 6–8.2)
RBC # BLD AUTO: 5.08 M/UL (ref 4.7–6.1)
SODIUM SERPL-SCNC: 142 MMOL/L (ref 135–145)
URATE SERPL-MCNC: 4.3 MG/DL (ref 2.5–8.3)
WBC # BLD AUTO: 4.7 K/UL (ref 4.8–10.8)

## 2018-12-28 PROCEDURE — 80053 COMPREHEN METABOLIC PANEL: CPT

## 2018-12-28 PROCEDURE — 36415 COLL VENOUS BLD VENIPUNCTURE: CPT

## 2018-12-28 PROCEDURE — 84550 ASSAY OF BLOOD/URIC ACID: CPT

## 2018-12-28 PROCEDURE — 85025 COMPLETE CBC W/AUTO DIFF WBC: CPT

## 2018-12-31 ENCOUNTER — OFFICE VISIT (OUTPATIENT)
Dept: HEMATOLOGY ONCOLOGY | Facility: MEDICAL CENTER | Age: 68
End: 2018-12-31
Payer: MEDICARE

## 2018-12-31 VITALS
HEIGHT: 68 IN | WEIGHT: 222.88 LBS | RESPIRATION RATE: 16 BRPM | TEMPERATURE: 97 F | HEART RATE: 61 BPM | OXYGEN SATURATION: 98 % | DIASTOLIC BLOOD PRESSURE: 80 MMHG | SYSTOLIC BLOOD PRESSURE: 122 MMHG | BODY MASS INDEX: 33.78 KG/M2

## 2018-12-31 DIAGNOSIS — C22.0 HEPATOMA (HCC): ICD-10-CM

## 2018-12-31 DIAGNOSIS — C91.10 CLL (CHRONIC LYMPHOCYTIC LEUKEMIA) (HCC): ICD-10-CM

## 2018-12-31 PROCEDURE — 99214 OFFICE O/P EST MOD 30 MIN: CPT | Performed by: INTERNAL MEDICINE

## 2018-12-31 RX ORDER — ONDANSETRON 2 MG/ML
4 INJECTION INTRAMUSCULAR; INTRAVENOUS
Status: CANCELLED | OUTPATIENT
Start: 2019-01-02

## 2018-12-31 RX ORDER — 0.9 % SODIUM CHLORIDE 0.9 %
VIAL (ML) INJECTION PRN
Status: CANCELLED | OUTPATIENT
Start: 2019-01-03

## 2018-12-31 RX ORDER — SODIUM CHLORIDE 9 MG/ML
INJECTION, SOLUTION INTRAVENOUS CONTINUOUS
Status: CANCELLED | OUTPATIENT
Start: 2019-01-02

## 2018-12-31 RX ORDER — 0.9 % SODIUM CHLORIDE 0.9 %
VIAL (ML) INJECTION PRN
Status: CANCELLED | OUTPATIENT
Start: 2019-01-02

## 2018-12-31 RX ORDER — ACETAMINOPHEN 325 MG/1
650 TABLET ORAL
Status: CANCELLED | OUTPATIENT
Start: 2019-01-02

## 2018-12-31 RX ORDER — PROCHLORPERAZINE MALEATE 10 MG
10 TABLET ORAL EVERY 6 HOURS PRN
Status: CANCELLED | OUTPATIENT
Start: 2019-01-03

## 2018-12-31 RX ORDER — ONDANSETRON 8 MG/1
8 TABLET, ORALLY DISINTEGRATING ORAL
Status: CANCELLED | OUTPATIENT
Start: 2019-01-03

## 2018-12-31 RX ORDER — DIPHENHYDRAMINE HYDROCHLORIDE 50 MG/ML
25 INJECTION INTRAMUSCULAR; INTRAVENOUS
Status: CANCELLED | OUTPATIENT
Start: 2019-01-02

## 2018-12-31 RX ORDER — ACETAMINOPHEN 325 MG/1
650 TABLET ORAL ONCE
Status: CANCELLED | OUTPATIENT
Start: 2019-01-02 | End: 2018-12-31

## 2018-12-31 RX ORDER — 0.9 % SODIUM CHLORIDE 0.9 %
5 VIAL (ML) INJECTION PRN
Status: CANCELLED | OUTPATIENT
Start: 2019-01-02

## 2018-12-31 RX ORDER — ONDANSETRON 2 MG/ML
4 INJECTION INTRAMUSCULAR; INTRAVENOUS
Status: CANCELLED | OUTPATIENT
Start: 2019-01-03

## 2018-12-31 RX ORDER — MEPERIDINE HYDROCHLORIDE 25 MG/ML
25 INJECTION INTRAMUSCULAR; INTRAVENOUS; SUBCUTANEOUS
Status: CANCELLED | OUTPATIENT
Start: 2019-01-02

## 2018-12-31 RX ORDER — PROCHLORPERAZINE MALEATE 10 MG
10 TABLET ORAL EVERY 6 HOURS PRN
Status: CANCELLED | OUTPATIENT
Start: 2019-01-02

## 2018-12-31 RX ORDER — SODIUM CHLORIDE 9 MG/ML
INJECTION, SOLUTION INTRAVENOUS CONTINUOUS
Status: CANCELLED | OUTPATIENT
Start: 2019-01-03

## 2018-12-31 RX ORDER — 0.9 % SODIUM CHLORIDE 0.9 %
5 VIAL (ML) INJECTION PRN
Status: CANCELLED | OUTPATIENT
Start: 2019-01-03

## 2018-12-31 RX ORDER — ONDANSETRON 8 MG/1
8 TABLET, ORALLY DISINTEGRATING ORAL
Status: CANCELLED | OUTPATIENT
Start: 2019-01-02

## 2018-12-31 ASSESSMENT — PAIN SCALES - GENERAL: PAINLEVEL: NO PAIN

## 2018-12-31 NOTE — PROGRESS NOTES
Date of visit: 12/31/2018  10:47 AM      Chief Complaint- follow-up of CLL , 13q deleted  -Hepatocellular carcinoma        Identification/Prior relevant history:   -2004. Screening PSA elevated. Found to have prostate cancer Dr. Ramírez, urologist, treated with brachytherapy at [place]. Followed with observation and serial PSA  March, 2014. Noted to have elevated WBC count  July 15, 2014. Flow cytometry shows CD5 positive B-cell lymphoproliferative disorder with a phenotype most consistent with chronic lymphocytic leukemia or small lymphocytic lymphoma  July 26, 2014. CT chest, abdomen and pelvis showed Hepatosplenomegaly. Slightly enlarged lymph nodes in the subcarinal region and right cardiophrenic angle.  December 11, 2014. Negative for [id number] mutation  January 4, 2016 ultrasound of the abdomen showed Cirrhosis with portal hypertension.Associated dilated portal vein and splenomegaly.No evidence for hepatoma  - 8/2017-he presented with significant iron deficiency anemia requiring iron infusion. Subsequently had upper and lower endoscopy , these were reportedly normal. He does not remember being informed that he has any esophageal viruses.     -12/6/17-CT abdomen- 1.  2.4 cm arterial enhancing mass in segment 4 of the liver demonstrates washout. LR-5.  1.3 cm arterial enhancing lesion in the hepatic dome is similar to the recent MRI. No definite washout or capsule appreciated. LR-3.  Morphologic characteristics of cirrhosis with evidence of portal hypertension including splenomegaly.  -12/2017-established care with me    - CLL FISH panel showed favorable prognostic marker of 13 qdeletion.   -He is following up with Dr. Senior.imaging of liver mass is suggestive of hepatoma.      CT of the chest-4/7/18-enlarged mediastinal lymph nodes with right paratracheal lymph nodes measuring up to 15 mm short axis and a subcarinal lymph node measuring 2.9 cm short axis. No hilar adenopathy identified. No axillary  lymphadenopathy identified. Multiple nonenlarged axillary lymph nodes identified bilaterally   decided not to do any intervention until his hepatoma issues are taken care of  -6/ 28/ 2018 the lesion in 4B was ablated but the operative ablation of a 4A lesion was unsuccessful due to proximity to the portal pedicle., No documented liver mass biopsy.     -underwent mapping on August 28 and Y90 SIRT of the left lobe on September 7.  Interim history  -10/2018-started Rituxan with bendamustine at a dose of 70 mg/m square.  Excellent tolerance with resolution of his lymphocytosis.  Second cycle delayed due to TACE which was done on 12/15/18  Currently feeling much better    Past Medical History:      Past Medical History:   Diagnosis Date   • Anemia    • Cancer (HCC) 2004    Prostate - did brachytherapy at [place]   • Cancer (HCC) 2018    Liver   • Cirrhosis (HCC)    • CLL (chronic lymphocytic leukemia) (HCC) 2014   • CMV (cytomegalovirus infection) (HCC) 1990    Treated for 6 weeks   • Diabetes (HCC)     oral medication   • Heart burn    • Liver cancer (HCC)    • Liver tumor    • Rosacea        Past surgical history:       Past Surgical History:   Procedure Laterality Date   • HEPATIC ABLATION LAPAROSCOPIC  6/28/2018    Procedure: HEPATIC ABLATION LAPAROSCOPIC. SEGMENT 4B, HEPATOMA, REPAIR OF INCARCERATED UMBILICAL HERNIA;  Surgeon: Feliberto Burgos M.D.;  Location: SURGERY [facility];  Service: General   • BRACHY THERAPY         Allergies:       Patient has no known allergies.    Medications:         Current Outpatient Prescriptions   Medication Sig Dispense Refill   • cetirizine (ZYRTEC) 10 MG Tab Take 10 mg by mouth every morning.     • ondansetron (ZOFRAN) 4 MG Tab tablet Take 1 Tab by mouth every four hours as needed for Nausea/Vomiting (for nausea, vomiting). 30 Tab 6   • prochlorperazine (COMPAZINE) 10 MG Tab Take 1 Tab by mouth every 6 hours as needed (for nausea, vomiting). 30 Tab 6   • metFORMIN  "ER (GLUCOPHAGE XR) 500 MG TABLET SR 24 HR Take 2 Tabs by mouth 2 times a day. 360 Tab 1   • Multiple Vitamins-Minerals (MULTIVITAMIN PO) Take 1 Tab by mouth every morning.     • Polyethylene Glycol 3350 (MIRALAX PO) Take 1 Cap by mouth every morning.     • fluticasone (FLONASE ALLERGY RELIEF) 50 MCG/ACT nasal spray Spray 1 Spray in nose 2 times a day. 1 Bottle 0     No current facility-administered medications for this visit.          Social History:     Social History     Social History   • Marital status:      Spouse name: N/A   • Number of children: 1   • Years of education: N/A     Occupational History   • fertilizer sales      Social History Main Topics   • Smoking status: Never Smoker   • Smokeless tobacco: Former User     Types: Snuff     Quit date: 3/29/2015   • Alcohol use No   • Drug use: Yes     Types: Marijuana      Comment: \"Marijuana Use\" daily    • Sexual activity: No     Other Topics Concern   • Not on file     Social History Narrative    No known exposure to asbestos, dyes, or chemicals, however he was exposed to pesticides.  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  1 child, alive and well.  He also has a step-child.        Family History:      Family History   Problem Relation Age of Onset   • Cancer Father 81        Bladder   • Cancer Brother         Prostate & CLL (s/p 8-10 years ago)   • Hyperlipidemia Sister    • Lung Disease Neg Hx    • Diabetes Neg Hx    • Heart Disease Neg Hx    • Hypertension Neg Hx    • Stroke Neg Hx    • Alcohol/Drug Neg Hx        Review of Systems:  All other review of systems are negative except what was mentioned above in the HPI.    Constitutional: Negative for fever, chills, weight loss and malaise/fatigue.    HEENT: No new auditory or visual complaints. No sore throat and neck pain.     Respiratory: Negative for cough, sputum production, shortness of breath and wheezing.    Cardiovascular: " "Negative for chest pain, palpitations, orthopnea and leg swelling.    Gastrointestinal: Negative for heartburn, nausea, vomiting and abdominal pain.    Genitourinary: Negative for dysuria, hematuria    Musculoskeletal: No new arthralgias or myalgias   Skin: Negative for rash and itching.    Neurological: Negative for focal weakness and headaches.    Endo/Heme/Allergies: No abnormal bleed/bruise.    Psychiatric/Behavioral: No new depression/anxiety.    Physical Exam:  Vitals: /80   Pulse 61   Temp 36.1 °C (97 °F)   Resp 16   Ht 1.727 m (5' 8\")   Wt 101.1 kg (222 lb 14.2 oz)   SpO2 98%   BMI 33.89 kg/m²     General: Not in acute distress, alert and oriented x 3  HEENT: No pallor, icterus. Oropharynx clear.   Neck: Supple, no palpable masses.  Lymph nodes: No palpable cervical, supraclavicular, axillary or inguinal lymphadenopathy.    CVS: regular rate and rhythm, no rubs or gallops  RESP: Clear to auscultate bilaterally, no wheezing or crackles.   ABD: Soft, non tender, non distended, positive bowel sounds, no palpable organomegaly  EXT: No edema or cyanosis  CNS: Alert and oriented x3, No focal deficits.  Skin- No rash      Labs:   Hospital Outpatient Visit on 12/28/2018   Component Date Value Ref Range Status   • WBC 12/28/2018 4.7* 4.8 - 10.8 K/uL Final   • RBC 12/28/2018 5.08  4.70 - 6.10 M/uL Final   • Hemoglobin 12/28/2018 15.6  14.0 - 18.0 g/dL Final   • Hematocrit 12/28/2018 46.1  42.0 - 52.0 % Final   • MCV 12/28/2018 90.7  81.4 - 97.8 fL Final   • MCH 12/28/2018 30.7  27.0 - 33.0 pg Final   • MCHC 12/28/2018 33.8  33.7 - 35.3 g/dL Final   • RDW 12/28/2018 46.5  35.9 - 50.0 fL Final   • Platelet Count 12/28/2018 103* 164 - 446 K/uL Final   • MPV 12/28/2018 10.6  9.0 - 12.9 fL Final   • Neutrophils-Polys 12/28/2018 52.90  44.00 - 72.00 % Final   • Lymphocytes 12/28/2018 30.80  22.00 - 41.00 % Final   • Monocytes 12/28/2018 10.30  0.00 - 13.40 % Final   • Eosinophils 12/28/2018 4.30  0.00 - 6.90 % " Final   • Basophils 12/28/2018 1.30  0.00 - 1.80 % Final   • Immature Granulocytes 12/28/2018 0.40  0.00 - 0.90 % Final   • Nucleated RBC 12/28/2018 0.00  /100 WBC Final   • Neutrophils (Absolute) 12/28/2018 2.48  1.82 - 7.42 K/uL Final    Includes immature neutrophils, if present.   • Lymphs (Absolute) 12/28/2018 1.44  1.00 - 4.80 K/uL Final   • Monos (Absolute) 12/28/2018 0.48  0.00 - 0.85 K/uL Final   • Eos (Absolute) 12/28/2018 0.20  0.00 - 0.51 K/uL Final   • Baso (Absolute) 12/28/2018 0.06  0.00 - 0.12 K/uL Final   • Immature Granulocytes (abs) 12/28/2018 0.02  0.00 - 0.11 K/uL Final   • NRBC (Absolute) 12/28/2018 0.00  K/uL Final   • Sodium 12/28/2018 142  135 - 145 mmol/L Final   • Potassium 12/28/2018 4.4  3.6 - 5.5 mmol/L Final   • Chloride 12/28/2018 107  96 - 112 mmol/L Final   • Co2 12/28/2018 27  20 - 33 mmol/L Final   • Anion Gap 12/28/2018 8.0  0.0 - 11.9 Final   • Glucose 12/28/2018 213* 65 - 99 mg/dL Final   • Bun 12/28/2018 10  8 - 22 mg/dL Final   • Creatinine 12/28/2018 0.73  0.50 - 1.40 mg/dL Final   • Calcium 12/28/2018 9.5  8.5 - 10.5 mg/dL Final   • AST(SGOT) 12/28/2018 26  12 - 45 U/L Final   • ALT(SGPT) 12/28/2018 19  2 - 50 U/L Final   • Alkaline Phosphatase 12/28/2018 86  30 - 99 U/L Final   • Total Bilirubin 12/28/2018 1.4  0.1 - 1.5 mg/dL Final   • Albumin 12/28/2018 4.0  3.2 - 4.9 g/dL Final   • Total Protein 12/28/2018 6.4  6.0 - 8.2 g/dL Final   • Globulin 12/28/2018 2.4  1.9 - 3.5 g/dL Final   • A-G Ratio 12/28/2018 1.7  g/dL Final   • Uric Acid 12/28/2018 4.3  2.5 - 8.3 mg/dL Final   • GFR If  12/28/2018 >60  >60 mL/min/1.73 m 2 Final   • GFR If Non  12/28/2018 >60  >60 mL/min/1.73 m 2 Final             Assessment and Plan:    CLL-Odom Stage 0, 13 q. Deleted.    He was on observation for more than 4years and developed progression with worsening leukocytosis and significant enlarged subcarinal lymph node seen on CT scan 4/2018.  His systemic treatment  was delayed due to simultaneously diagnosed hepatocellular carcinoma.  Started Rituxan and bendamustine on 10/2018 with good tolerance and improvement in his lymphocytosis.  Second cycle was delayed due to T is and he will get the second dose tomorrow.  Bendamustine is dosed at 70 mg/m².  Labs are adequate to proceed.  Return to clinic in 4 weeks for next dose       HCC-6/2018-ablation of the lesion in 4 B  Y 90 lesion 4A.  progression of the caudate hepatoma -status post YUNIER TACE -12/15.  No major AFP elevation.  He has an upcoming MRI ordered by interventional radiology.    Leukopenia and thrombocytopenia-this appears to be secondary to underlying cirrhosis.  His lymphocytosis has resolved.        He agreed and verbalized  agreement and understanding with the current plan.  I answered all questions and concerns at this time         Please note that this dictation was created using voice recognition software. I have made every reasonable attempt to correct obvious errors, but I expect that there are errors of grammar and possibly content that I did not discover before finalizing the note.      SIGNATURES:  Alfonzo Servin    CC:  ANGEL TangABEVERLY.  No ref. provider found

## 2019-01-01 NOTE — PROGRESS NOTES
"Pharmacy Chemotherapy calculation:    Dx: CLL (h/o Hepatocellular carcinoma and prostate cancer)  NOTE: 11/1/18 Clarified with Dr. Servin/Meenakshi that patient is being treated as CLL and they will update Treatment Plan for cycle 2 to reflect that and dose escalation of Rituxan. Bertin Ceballos    Cycle 2, Days 1-2 - delayed for TACE procedure done 12/14/18  Previous treatment = brachytherapy for prostate cancer 2004, Y90 SIRT of the left lobe on 9/2018.    Regimen: bendamustine + rituximab (>66y/o)  *Dosing Reference*  · Bendamustine 70 mg/m2 IV once per day on days 1 & 2  · Rituximab (Rituxan) as follows:  ? Cycle 1: 375 mg/m2 IV once on day 1 (completed)  ? Subsequent cycles: 500 mg/m2 IV once on day 1  28-day cycle for up to 6 cycles based on response and toxicity   pennie Wheeler - J Clin Oncol. 2012 Sep 10;30(26):3209-16. Epub 2012 Aug 6  pennie Wheeler - J Clin Oncol. 2011 Sep 10;29(26):3559-66. doi: 10.1200/JCO.2010.33.8061. Epub 2011 Aug 15  Chele B, et al - Lancet Oncol. 2016 Jul;17(7):928-942. doi: 10.1016/-4818(16)02639-3. Epub 2016 May 20.     /76   Pulse (!) 56   Temp 36.1 °C (97 °F) (Temporal)   Resp 18   Ht 1.72 m (5' 7.72\")   Wt 101.2 kg (223 lb 1.7 oz)   BMI 34.21 kg/m²   Body surface area is 2.2 meters squared.      9/8/2017 12:02   Hepatitis B Surface Antigen Negative   Hepatitis B Ccore Ab, Total Negative     Labs 1/2/19:  ANC~ 2110   Plt = 93 k     Hgb = 15  SCr = 0.68 mg/dL  CrCl > 125 mL/min (min SCr 0.7 used)  AST/ALT/AP = 23/14/82 TBili = 1.1   **Okay to proceed with treatment 1/2/19 per MD.**      Bendamustine (Bendeka) 70 mg/m2 x 2.2 m2 = 154 mg    <5% difference, ok to treat with final dose = 154 mg IV on Days 1 and 2    Rituximab (Rituxan) 500 mg/m2 x 2.2 m2 = 1100 mg    Rounded to vial size (within 10%) per dose rounding protocol.    ok to treat with final dose = 1100 mg IV on Day 1 only      Jade Gilbert, PharmD  "

## 2019-01-01 NOTE — PROGRESS NOTES
"Pharmacy chemotherapy verification    Patient Name: Sohail Duvall   Dx: CLL      Protocol: Bendamustine + Rituximab     *Dosing Reference*  Rituximab 375 mg/m2 IV on Day 1 of Cycle 1 - complete   followed by   Rituximab 500 mg/m2 IV on Day 1 of Cycles 2-6  Bendamustine 70 mg/m2 IV over 10 minutes daily on Days 1-2 of Cycles 1-6  28-day cycle for 6 cycles (Relapsed/Refractory: age > 65 years and younger patients with significant comorbidities)  NCCN Guidelines for Chronic Lymphocytic Leukemia/Small Lymphocytic Lymphoma V.5.2018  Ariella GASTELUM et al. J Clin Oncol. 2012;30(26):3209-16  Ariella GASTELUM et al. J Clin Oncol. 2011;29(26):3559-66  Chele DESAI et al. Lancet Oncol. 2016;17(7):928-942       Allergies:  Patient has no known allergies.       /76   Pulse (!) 56   Temp 36.1 °C (97 °F) (Temporal)   Resp 18   Ht 1.72 m (5' 7.72\")   Wt 101.2 kg (223 lb 1.7 oz)   BMI 34.21 kg/m²  Body surface area is 2.2 meters squared.     9/8/2017 12:02   Hep B Surface Antibody Quant <3.10   Hepatitis B Surface Antigen Negative   Hepatitis B Ccore Ab, Total Negative     ANC~ 2110 Plt = 93k   Hgb = 15     SCr = 0.68mg/dL CrCl ~ 93mL/min (min Scr = 0.7)   LFT's = WNL TBili = 1.1    MD aware of all current lab results. Orders received to proceed with treatment.      Drug Order   (Drug name, dose, route, IV Fluid & volume, frequency, number of doses) Cycle: C2 D1- delayed for TACE procedure   Previous treatment: Tace/doxorubicin 12/12/18  C1 D1-2 - Oct 31-NOv 1, 2018   Medication = Rituximab  Base Dose= 500mg/m2  Calc Dose: Base Dose x 2.2m2 = 1100mg  Final Dose = 1100mg  Route = IV  Fluid & Volume =  mL  Admin Duration = Follow rate sheet  Day 1    <10% difference, okay to round to nearest vial size per P&T MAB protocol   Medication = Bendamustine  Base Dose= 70 mg/m2  Calc Dose: Base Dose x 2.2m2 = 154mg  Final Dose = 154 mg  Route = IV  Fluid & Volume = NS 50 mL  Admin Duration = Over 10 minutes    Days 1-2      " <5% difference, okay to treat with final dose     By my signature below, I confirm this process was performed independently with the BSA and all final chemotherapy dosing calculations congruent. I have reviewed the above chemotherapy order and that my calculation of the final dose and BSA (when applicable) corroborate those calculations of the  pharmacist. Discrepancies of 5% or greater in the written dose have been addressed and documented within the EPIC Progress notes.    Dany Mahajan, IsaiahD

## 2019-01-02 ENCOUNTER — OUTPATIENT INFUSION SERVICES (OUTPATIENT)
Dept: ONCOLOGY | Facility: MEDICAL CENTER | Age: 69
End: 2019-01-02
Attending: INTERNAL MEDICINE
Payer: MEDICARE

## 2019-01-02 VITALS
WEIGHT: 223.11 LBS | BODY MASS INDEX: 33.81 KG/M2 | TEMPERATURE: 97 F | SYSTOLIC BLOOD PRESSURE: 127 MMHG | HEART RATE: 56 BPM | DIASTOLIC BLOOD PRESSURE: 76 MMHG | RESPIRATION RATE: 18 BRPM | HEIGHT: 68 IN

## 2019-01-02 DIAGNOSIS — C91.10 CLL (CHRONIC LYMPHOCYTIC LEUKEMIA) (HCC): ICD-10-CM

## 2019-01-02 LAB
ALBUMIN SERPL BCP-MCNC: 3.9 G/DL (ref 3.2–4.9)
ALBUMIN/GLOB SERPL: 1.8 G/DL
ALP SERPL-CCNC: 82 U/L (ref 30–99)
ALT SERPL-CCNC: 14 U/L (ref 2–50)
ANION GAP SERPL CALC-SCNC: 7 MMOL/L (ref 0–11.9)
AST SERPL-CCNC: 23 U/L (ref 12–45)
BASOPHILS # BLD AUTO: 1 % (ref 0–1.8)
BASOPHILS # BLD: 0.04 K/UL (ref 0–0.12)
BILIRUB SERPL-MCNC: 1.1 MG/DL (ref 0.1–1.5)
BUN SERPL-MCNC: 9 MG/DL (ref 8–22)
CALCIUM SERPL-MCNC: 9.4 MG/DL (ref 8.5–10.5)
CHLORIDE SERPL-SCNC: 110 MMOL/L (ref 96–112)
CO2 SERPL-SCNC: 24 MMOL/L (ref 20–33)
CREAT SERPL-MCNC: 0.68 MG/DL (ref 0.5–1.4)
EOSINOPHIL # BLD AUTO: 0.17 K/UL (ref 0–0.51)
EOSINOPHIL NFR BLD: 4.3 % (ref 0–6.9)
ERYTHROCYTE [DISTWIDTH] IN BLOOD BY AUTOMATED COUNT: 44.6 FL (ref 35.9–50)
GLOBULIN SER CALC-MCNC: 2.2 G/DL (ref 1.9–3.5)
GLUCOSE SERPL-MCNC: 232 MG/DL (ref 65–99)
HCT VFR BLD AUTO: 43.1 % (ref 42–52)
HGB BLD-MCNC: 15 G/DL (ref 14–18)
IMM GRANULOCYTES # BLD AUTO: 0.01 K/UL (ref 0–0.11)
IMM GRANULOCYTES NFR BLD AUTO: 0.3 % (ref 0–0.9)
LYMPHOCYTES # BLD AUTO: 1.17 K/UL (ref 1–4.8)
LYMPHOCYTES NFR BLD: 29.8 % (ref 22–41)
MCH RBC QN AUTO: 31.1 PG (ref 27–33)
MCHC RBC AUTO-ENTMCNC: 34.8 G/DL (ref 33.7–35.3)
MCV RBC AUTO: 89.2 FL (ref 81.4–97.8)
MONOCYTES # BLD AUTO: 0.42 K/UL (ref 0–0.85)
MONOCYTES NFR BLD AUTO: 10.7 % (ref 0–13.4)
NEUTROPHILS # BLD AUTO: 2.11 K/UL (ref 1.82–7.42)
NEUTROPHILS NFR BLD: 53.9 % (ref 44–72)
NRBC # BLD AUTO: 0 K/UL
NRBC BLD-RTO: 0 /100 WBC
PLATELET # BLD AUTO: 93 K/UL (ref 164–446)
PMV BLD AUTO: 10.5 FL (ref 9–12.9)
POTASSIUM SERPL-SCNC: 3.9 MMOL/L (ref 3.6–5.5)
PROT SERPL-MCNC: 6.1 G/DL (ref 6–8.2)
RBC # BLD AUTO: 4.83 M/UL (ref 4.7–6.1)
SODIUM SERPL-SCNC: 141 MMOL/L (ref 135–145)
WBC # BLD AUTO: 3.9 K/UL (ref 4.8–10.8)

## 2019-01-02 PROCEDURE — 700111 HCHG RX REV CODE 636 W/ 250 OVERRIDE (IP): Performed by: INTERNAL MEDICINE

## 2019-01-02 PROCEDURE — A9270 NON-COVERED ITEM OR SERVICE: HCPCS | Performed by: INTERNAL MEDICINE

## 2019-01-02 PROCEDURE — 700105 HCHG RX REV CODE 258

## 2019-01-02 PROCEDURE — 700102 HCHG RX REV CODE 250 W/ 637 OVERRIDE(OP): Performed by: INTERNAL MEDICINE

## 2019-01-02 PROCEDURE — 96415 CHEMO IV INFUSION ADDL HR: CPT

## 2019-01-02 PROCEDURE — 80053 COMPREHEN METABOLIC PANEL: CPT

## 2019-01-02 PROCEDURE — 96409 CHEMO IV PUSH SNGL DRUG: CPT

## 2019-01-02 PROCEDURE — 700111 HCHG RX REV CODE 636 W/ 250 OVERRIDE (IP): Mod: JG

## 2019-01-02 PROCEDURE — 96413 CHEMO IV INFUSION 1 HR: CPT

## 2019-01-02 PROCEDURE — 700105 HCHG RX REV CODE 258: Performed by: INTERNAL MEDICINE

## 2019-01-02 PROCEDURE — 96375 TX/PRO/DX INJ NEW DRUG ADDON: CPT

## 2019-01-02 PROCEDURE — 96411 CHEMO IV PUSH ADDL DRUG: CPT

## 2019-01-02 PROCEDURE — 85025 COMPLETE CBC W/AUTO DIFF WBC: CPT

## 2019-01-02 RX ORDER — ACETAMINOPHEN 325 MG/1
650 TABLET ORAL ONCE
Status: COMPLETED | OUTPATIENT
Start: 2019-01-02 | End: 2019-01-02

## 2019-01-02 RX ORDER — SODIUM CHLORIDE 9 MG/ML
INJECTION, SOLUTION INTRAVENOUS CONTINUOUS
Status: DISCONTINUED | OUTPATIENT
Start: 2019-01-02 | End: 2019-01-02 | Stop reason: HOSPADM

## 2019-01-02 RX ADMIN — DEXAMETHASONE SODIUM PHOSPHATE 12 MG: 4 INJECTION, SOLUTION INTRAMUSCULAR; INTRAVENOUS at 11:25

## 2019-01-02 RX ADMIN — BENDAMUSTINE HYDROCHLORIDE 154 MG: 25 INJECTION, SOLUTION INTRAVENOUS at 12:06

## 2019-01-02 RX ADMIN — DIPHENHYDRAMINE HYDROCHLORIDE 50 MG: 50 INJECTION INTRAMUSCULAR; INTRAVENOUS at 11:10

## 2019-01-02 RX ADMIN — ACETAMINOPHEN 650 MG: 325 TABLET ORAL at 11:08

## 2019-01-02 RX ADMIN — ONDANSETRON HYDROCHLORIDE 16 MG: 2 SOLUTION INTRAMUSCULAR; INTRAVENOUS at 11:40

## 2019-01-02 RX ADMIN — SODIUM CHLORIDE: 9 INJECTION, SOLUTION INTRAVENOUS at 11:00

## 2019-01-02 RX ADMIN — SODIUM CHLORIDE 1100 MG: 9 INJECTION, SOLUTION INTRAVENOUS at 12:27

## 2019-01-02 ASSESSMENT — PAIN SCALES - GENERAL: PAINLEVEL_OUTOF10: 0

## 2019-01-02 NOTE — PROGRESS NOTES
Returns for Rituxan, Treanda.  States did well with last tx, occasional nausea.  Reports has meds.  Labs redrawn as one day beyond parameters.  Platelets below parameters.  MD called and ok to tx received.  Premeds and Treanda given without issue.  Rituxan up and ramped at second infusion rate.  Report to EULA Keene RN

## 2019-01-02 NOTE — PROGRESS NOTES
Chemotherapy Verification - SECONDARY RN       Height = 67.72 in  Weight = 101.2 kg  BSA = 2.2 m2       Medication: Rituxan  Dose: 500 mg/m2  Calculated Dose: 1100 mg                             (In mg/m2, AUC, mg/kg)     Medication: Bendamustine  Dose: 70 mg/m2  Calculated Dose: 154 mg                             (In mg/m2, AUC, mg/kg)    I confirm that this process was performed independently.

## 2019-01-02 NOTE — PROGRESS NOTES
Chemotherapy Verification - PRIMARY RN      Height = 172 cm  Weight = 101.2 kg  BSA = 2.198 m2       Medication: Rituxan  Dose: 500 mg/m2  Calculated Dose: 1099.4 mg                             (In mg/m2, AUC, mg/kg)     Medication: Treanda  Dose: 70 mg/m2  Calculated Dose: 153.86 mg                             (In mg/m2, AUC, mg/kg)    I confirm this process was performed independently with the BSA and all final chemotherapy dosing calculations congruent.  Any discrepancies of 5% or greater have been addressed with the chemotherapy pharmacist. The resolution of the discrepancy has been documented in the EPIC progress notes.

## 2019-01-03 ENCOUNTER — OUTPATIENT INFUSION SERVICES (OUTPATIENT)
Dept: ONCOLOGY | Facility: MEDICAL CENTER | Age: 69
End: 2019-01-03
Attending: INTERNAL MEDICINE
Payer: MEDICARE

## 2019-01-03 VITALS
WEIGHT: 228.62 LBS | OXYGEN SATURATION: 95 % | HEIGHT: 68 IN | TEMPERATURE: 98.8 F | BODY MASS INDEX: 34.65 KG/M2 | RESPIRATION RATE: 18 BRPM | DIASTOLIC BLOOD PRESSURE: 63 MMHG | SYSTOLIC BLOOD PRESSURE: 118 MMHG | HEART RATE: 70 BPM

## 2019-01-03 DIAGNOSIS — C91.10 CLL (CHRONIC LYMPHOCYTIC LEUKEMIA) (HCC): ICD-10-CM

## 2019-01-03 PROCEDURE — 700105 HCHG RX REV CODE 258: Performed by: INTERNAL MEDICINE

## 2019-01-03 PROCEDURE — 96375 TX/PRO/DX INJ NEW DRUG ADDON: CPT

## 2019-01-03 PROCEDURE — 700111 HCHG RX REV CODE 636 W/ 250 OVERRIDE (IP): Performed by: INTERNAL MEDICINE

## 2019-01-03 PROCEDURE — 96409 CHEMO IV PUSH SNGL DRUG: CPT

## 2019-01-03 RX ADMIN — ONDANSETRON HYDROCHLORIDE 16 MG: 2 SOLUTION INTRAMUSCULAR; INTRAVENOUS at 15:46

## 2019-01-03 RX ADMIN — BENDAMUSTINE HYDROCHLORIDE 156.1 MG: 25 INJECTION, SOLUTION INTRAVENOUS at 16:27

## 2019-01-03 ASSESSMENT — PAIN SCALES - GENERAL: PAINLEVEL_OUTOF10: 0

## 2019-01-03 NOTE — PROGRESS NOTES
Report received, care assumed.  Pt finished remaining infusion without incident; max rate of 400mg/hr.  PIV flushed with saline per policy, wrapped and kept in place for tomorrows infusion.  Pt left infusion center ambulatory and in good condition.

## 2019-01-03 NOTE — PROGRESS NOTES
Chemotherapy Verification - SECONDARY RN      Height = 67.72 in  Weight = 228 lb  BSA = 2.23m2       Medication: Bendamustine  Dose: 70 mg/m2  Calculated Dose: 156.1 mg                             (In mg/m2, AUC, mg/kg)     I confirm this process was performed independently with the BSA and all final chemotherapy dosing calculations congruent.  Any discrepancies of 5% or greater have been addressed with the chemotherapy pharmacist. The resolution of the discrepancy has been documented in the EPIC progress notes.

## 2019-01-03 NOTE — PROGRESS NOTES
"Pharmacy chemotherapy verification    Dx: CLL      Protocol: Bendamustine + Rituximab     *Dosing Reference*  Rituximab 375 mg/m2 IV on Day 1 of Cycle 1 - complete  followed by   Rituximab 500 mg/m2 IV on Day 1 of Cycles 2-6  Bendamustine 70 mg/m2 IV over 10 minutes daily on Days 1-2 of Cycles 1-6  28-day cycle for 6 cycles (Relapsed/Refractory: age > 65 years and younger patients with significant comorbidities)  NCCN Guidelines for Chronic Lymphocytic Leukemia/Small Lymphocytic Lymphoma V.5.2018  Ariella GASTELUM et al. J Clin Oncol. 2012;30(26):3209-16  Ariella GASTELUM et al. J Clin Oncol. 2011;29(26):3559-66  Chele B, et al. Lancet Oncol. 2016;17(7):928-942       Allergies:  Patient has no known allergies.   /63   Pulse 70   Temp 37.1 °C (98.8 °F) (Temporal)   Resp 18   Ht 1.72 m (5' 7.72\")   Wt 103.7 kg (228 lb 9.9 oz)   SpO2 95%   BMI 35.05 kg/m²  Body surface area is 2.23 meters squared.     9/8/2017 12:02   Hep B Surface Antibody Quant <3.10   Hepatitis B Surface Antigen Negative   Hepatitis B Ccore Ab, Total Negative     ANC~ 2110 Plt = 93k   Hgb = 15     SCr = 0.68mg/dL CrCl ~ 93mL/min (min Scr = 0.7)   LFT's = WNL TBili = 1.1    MD aware of all current lab results. Orders received to proceed with treatment.      Drug Order   (Drug name, dose, route, IV Fluid & volume, frequency, number of doses) Cycle 2, Day 2 of 2 - delayed for TACE procedure   Previous treatment: Tace/doxorubicin, 12/12/18;  C1D1-2, 10/31-11/1/18   Medication = Bendamustine (Bendeka)  Base Dose= 70 mg/m2  Calc Dose: Base Dose x 2.23 m2 = 156.1 mg  Final Dose = 156.1 mg  Route = IV  Fluid & Volume = NS 50 mL  Admin Duration = Over 10 minutes    Days 1-2      <5% difference, okay to treat with final dose     By my signature below, I confirm this process was performed independently with the BSA and all final chemotherapy dosing calculations congruent. I have reviewed the above chemotherapy order and that my calculation of the final " dose and BSA (when applicable) corroborate those calculations of the  pharmacist. Discrepancies of 5% or greater in the written dose have been addressed and documented within the EPIC Progress notes.      Jade Gilbert, PharmD

## 2019-01-04 NOTE — PROGRESS NOTES
Chemotherapy Verification - PRIMARY RN      Height = 172cm  Weight = 103.7kg  BSA = 2.23m2       Medication: Bendamustine  Dose: 70mg/m2  Calculated Dose: 156.1mg                             (In mg/m2, AUC, mg/kg)         I confirm this process was performed independently with the BSA and all final chemotherapy dosing calculations congruent.  Any discrepancies of 5% or greater have been addressed with the chemotherapy pharmacist. The resolution of the discrepancy has been documented in the EPIC progress notes.

## 2019-01-04 NOTE — PROGRESS NOTES
Patient ambulatory to [facility] for C2D2 Bendamustine today. Denies any new complaints. PIV in place from yesterday, positive blood return and flushed well. Pre medications given with no reaction. Bendamustine infused per orders with no complications. PIV flushed, removed with tip intact. Covered with pressure dressing. Discharged home to self care, next appointment confirmed.

## 2019-01-29 ENCOUNTER — HOSPITAL ENCOUNTER (OUTPATIENT)
Dept: LAB | Facility: MEDICAL CENTER | Age: 69
End: 2019-01-29
Attending: NURSE PRACTITIONER
Payer: MEDICARE

## 2019-01-29 ENCOUNTER — HOSPITAL ENCOUNTER (OUTPATIENT)
Dept: RADIOLOGY | Facility: MEDICAL CENTER | Age: 69
End: 2019-01-29
Attending: NURSE PRACTITIONER
Payer: MEDICARE

## 2019-01-29 ENCOUNTER — OFFICE VISIT (OUTPATIENT)
Dept: HEMATOLOGY ONCOLOGY | Facility: MEDICAL CENTER | Age: 69
End: 2019-01-29
Payer: MEDICARE

## 2019-01-29 VITALS
RESPIRATION RATE: 16 BRPM | DIASTOLIC BLOOD PRESSURE: 80 MMHG | OXYGEN SATURATION: 97 % | WEIGHT: 220.35 LBS | BODY MASS INDEX: 33.4 KG/M2 | TEMPERATURE: 98.2 F | SYSTOLIC BLOOD PRESSURE: 114 MMHG | HEART RATE: 65 BPM | HEIGHT: 68 IN

## 2019-01-29 DIAGNOSIS — C22.0 HEPATOMA (HCC): ICD-10-CM

## 2019-01-29 DIAGNOSIS — C91.10 CLL (CHRONIC LYMPHOCYTIC LEUKEMIA) (HCC): ICD-10-CM

## 2019-01-29 LAB
ALBUMIN SERPL BCP-MCNC: 3.9 G/DL (ref 3.2–4.9)
ALBUMIN/GLOB SERPL: 1.7 G/DL
ALP SERPL-CCNC: 70 U/L (ref 30–99)
ALT SERPL-CCNC: 20 U/L (ref 2–50)
ANION GAP SERPL CALC-SCNC: 8 MMOL/L (ref 0–11.9)
AST SERPL-CCNC: 35 U/L (ref 12–45)
BASOPHILS # BLD AUTO: 1 % (ref 0–1.8)
BASOPHILS # BLD: 0.03 K/UL (ref 0–0.12)
BILIRUB SERPL-MCNC: 1.7 MG/DL (ref 0.1–1.5)
BUN SERPL-MCNC: 9 MG/DL (ref 8–22)
CALCIUM SERPL-MCNC: 8.9 MG/DL (ref 8.5–10.5)
CHLORIDE SERPL-SCNC: 110 MMOL/L (ref 96–112)
CO2 SERPL-SCNC: 24 MMOL/L (ref 20–33)
CREAT SERPL-MCNC: 0.67 MG/DL (ref 0.5–1.4)
EOSINOPHIL # BLD AUTO: 0.07 K/UL (ref 0–0.51)
EOSINOPHIL NFR BLD: 2.4 % (ref 0–6.9)
ERYTHROCYTE [DISTWIDTH] IN BLOOD BY AUTOMATED COUNT: 47.8 FL (ref 35.9–50)
GLOBULIN SER CALC-MCNC: 2.3 G/DL (ref 1.9–3.5)
GLUCOSE SERPL-MCNC: 125 MG/DL (ref 65–99)
HCT VFR BLD AUTO: 43 % (ref 42–52)
HGB BLD-MCNC: 14.9 G/DL (ref 14–18)
IMM GRANULOCYTES # BLD AUTO: 0.01 K/UL (ref 0–0.11)
IMM GRANULOCYTES NFR BLD AUTO: 0.3 % (ref 0–0.9)
LYMPHOCYTES # BLD AUTO: 0.37 K/UL (ref 1–4.8)
LYMPHOCYTES NFR BLD: 12.6 % (ref 22–41)
MCH RBC QN AUTO: 31 PG (ref 27–33)
MCHC RBC AUTO-ENTMCNC: 34.7 G/DL (ref 33.7–35.3)
MCV RBC AUTO: 89.4 FL (ref 81.4–97.8)
MONOCYTES # BLD AUTO: 0.51 K/UL (ref 0–0.85)
MONOCYTES NFR BLD AUTO: 17.4 % (ref 0–13.4)
NEUTROPHILS # BLD AUTO: 1.94 K/UL (ref 1.82–7.42)
NEUTROPHILS NFR BLD: 66.3 % (ref 44–72)
NRBC # BLD AUTO: 0 K/UL
NRBC BLD-RTO: 0 /100 WBC
PLATELET # BLD AUTO: 83 K/UL (ref 164–446)
PMV BLD AUTO: 10.9 FL (ref 9–12.9)
POTASSIUM SERPL-SCNC: 3.7 MMOL/L (ref 3.6–5.5)
PROT SERPL-MCNC: 6.2 G/DL (ref 6–8.2)
RBC # BLD AUTO: 4.81 M/UL (ref 4.7–6.1)
SODIUM SERPL-SCNC: 142 MMOL/L (ref 135–145)
URATE SERPL-MCNC: 4.7 MG/DL (ref 2.5–8.3)
WBC # BLD AUTO: 2.9 K/UL (ref 4.8–10.8)

## 2019-01-29 PROCEDURE — 74183 MRI ABD W/O CNTR FLWD CNTR: CPT

## 2019-01-29 PROCEDURE — 99213 OFFICE O/P EST LOW 20 MIN: CPT | Performed by: NURSE PRACTITIONER

## 2019-01-29 PROCEDURE — 700117 HCHG RX CONTRAST REV CODE 255: Performed by: NURSE PRACTITIONER

## 2019-01-29 PROCEDURE — 80053 COMPREHEN METABOLIC PANEL: CPT

## 2019-01-29 PROCEDURE — 36415 COLL VENOUS BLD VENIPUNCTURE: CPT

## 2019-01-29 PROCEDURE — 84550 ASSAY OF BLOOD/URIC ACID: CPT

## 2019-01-29 PROCEDURE — A9585 GADOBUTROL INJECTION: HCPCS | Performed by: NURSE PRACTITIONER

## 2019-01-29 PROCEDURE — 85025 COMPLETE CBC W/AUTO DIFF WBC: CPT

## 2019-01-29 RX ORDER — GADOBUTROL 604.72 MG/ML
10 INJECTION INTRAVENOUS ONCE
Status: COMPLETED | OUTPATIENT
Start: 2019-01-29 | End: 2019-01-29

## 2019-01-29 RX ADMIN — GADOBUTROL 10 ML: 604.72 INJECTION INTRAVENOUS at 10:46

## 2019-01-29 ASSESSMENT — ENCOUNTER SYMPTOMS
WHEEZING: 0
MYALGIAS: 1
CHILLS: 0
SHORTNESS OF BREATH: 0
VOMITING: 0
COUGH: 0
HEADACHES: 0
FEVER: 0
CONSTIPATION: 0
NAUSEA: 1
PALPITATIONS: 0
ABDOMINAL PAIN: 0
DIZZINESS: 0
DIARRHEA: 0
WEIGHT LOSS: 1

## 2019-01-29 ASSESSMENT — PAIN SCALES - GENERAL: PAINLEVEL: NO PAIN

## 2019-01-29 NOTE — PROGRESS NOTES
Subjective:      Sohail Duvall is a 68 y.o. male who presents with Follow-Up (PreChemo) for CLL and hepatocellular carcinoma.        HPI    Patient seen today in follow-up for CLL and hepatocellular carcinoma.  He presents accompanied by his wife for today's visit.  Patient is scheduled to receive cycle #3 of Rituxan and Bendamustine tomorrow.     Patient is doing very well and tolerating treatment well.  He did experience some weight loss but is eating very well.  He denies any fevers or chills.  He states that his fatigue is very stable which is very mild.  He was out gardening over the weekend due to good weather and has experienced some myalgias following the gardening other than that denies any generalized pain.  He is on MiraLAX daily which works well for constipation.  He is having daily bowel movements.  Patient stated that he experiences some mild nausea initially after chemotherapy and throughout the weeks but stated he typically tends to have nausea which is been a chronic thing for patient.  He does have antiemetics and takes them as needed.  Otherwise patient is doing very well.    Patient did an MRI today that was ordered by interventional radiology status post TACE treatment.  He is scheduled to follow-up with interventional radiology on Tuesday, February 5.    No Known Allergies  Current Outpatient Prescriptions on File Prior to Visit   Medication Sig Dispense Refill   • cetirizine (ZYRTEC) 10 MG Tab Take 10 mg by mouth every morning.     • metFORMIN ER (GLUCOPHAGE XR) 500 MG TABLET SR 24 HR Take 2 Tabs by mouth 2 times a day. 360 Tab 1   • Multiple Vitamins-Minerals (MULTIVITAMIN PO) Take 1 Tab by mouth every morning.     • Polyethylene Glycol 3350 (MIRALAX PO) Take 1 Cap by mouth every morning.     • fluticasone (FLONASE ALLERGY RELIEF) 50 MCG/ACT nasal spray Spray 1 Spray in nose 2 times a day. 1 Bottle 0   • ondansetron (ZOFRAN) 4 MG Tab tablet Take 1 Tab by mouth every four hours as  "needed for Nausea/Vomiting (for nausea, vomiting). 30 Tab 6   • prochlorperazine (COMPAZINE) 10 MG Tab Take 1 Tab by mouth every 6 hours as needed (for nausea, vomiting). 30 Tab 6     No current facility-administered medications on file prior to visit.        Review of Systems   Constitutional: Positive for weight loss (eating well). Negative for chills, fever and malaise/fatigue (stable).   Respiratory: Negative for cough, shortness of breath and wheezing.    Cardiovascular: Negative for chest pain, palpitations and leg swelling.   Gastrointestinal: Positive for nausea (mild nausea after chemo and mild since treatment as well). Negative for abdominal pain, constipation, diarrhea and vomiting.        On Miralax daily which works for constipation - daily BMs   Genitourinary: Negative for dysuria and hematuria.   Musculoskeletal: Positive for myalgias (after gardening noted some increase pain).   Neurological: Negative for dizziness and headaches.          Objective:     /80 (BP Location: Right arm)   Pulse 65   Temp 36.8 °C (98.2 °F) (Temporal)   Resp 16   Ht 1.72 m (5' 7.72\")   Wt 100 kg (220 lb 5.6 oz)   SpO2 97%   BMI 33.78 kg/m²      Physical Exam   Constitutional: He is oriented to person, place, and time. He appears well-developed and well-nourished. No distress.   HENT:   Head: Normocephalic and atraumatic.   Mouth/Throat: Oropharynx is clear and moist. No oropharyngeal exudate.   Cardiovascular: Normal rate, regular rhythm, normal heart sounds and intact distal pulses.  Exam reveals no gallop and no friction rub.    No murmur heard.  Pulmonary/Chest: Effort normal and breath sounds normal. No respiratory distress. He has no wheezes.   Abdominal: Soft. Bowel sounds are normal. He exhibits no distension. There is no tenderness.   Musculoskeletal: Normal range of motion. He exhibits no edema or tenderness.   Neurological: He is alert and oriented to person, place, and time.   Skin: Skin is warm and " dry. No rash noted. He is not diaphoretic. No erythema. No pallor.   Psychiatric: He has a normal mood and affect. His behavior is normal.   Vitals reviewed.            Assessment/Plan:     1. CLL (chronic lymphocytic leukemia) (HCC)     2. Hepatoma (HCC)       Plan  1.  Patient with CLL currently scheduled to receive cycle #3 of Rituxan with Bendamustine.  Bendamustine is dosed at 70 mg/m².  Cycle #2 was delayed due to TACE procedure following diagnosis of hepatoma and has been restarted on treatment.  According to Dr. Servin he did have good response to his first treatment following cycle #1 with resolution of lymphocytosis.  He is doing very well and tolerating the treatment well.  Clinically he is stable.  Patient has standing lab orders and will complete CBC, CMP and uric acid today.  If labs meet parameters he is okay to proceed with cycle #3 as planned.    2.  Regards to patient's hepatoma he recently completed TACE procedure with interventional radiology.  He had a follow-up MRI and is due to follow-up with them next week on February 5, 2019.    3.  Overall patient is doing very well and tolerating treatment well.  He will follow-up in the clinic in a proximally 4 weeks prior to cycle #4 or sooner if needed.

## 2019-01-30 ENCOUNTER — APPOINTMENT (OUTPATIENT)
Dept: ONCOLOGY | Facility: MEDICAL CENTER | Age: 69
End: 2019-01-30
Attending: INTERNAL MEDICINE
Payer: MEDICARE

## 2019-01-30 ENCOUNTER — TELEPHONE (OUTPATIENT)
Dept: HEMATOLOGY ONCOLOGY | Facility: MEDICAL CENTER | Age: 69
End: 2019-01-30

## 2019-01-30 DIAGNOSIS — C91.10 CLL (CHRONIC LYMPHOCYTIC LEUKEMIA) (HCC): ICD-10-CM

## 2019-01-30 RX ORDER — DIPHENHYDRAMINE HYDROCHLORIDE 50 MG/ML
25 INJECTION INTRAMUSCULAR; INTRAVENOUS
Status: CANCELLED | OUTPATIENT
Start: 2019-02-05

## 2019-01-30 RX ORDER — 0.9 % SODIUM CHLORIDE 0.9 %
VIAL (ML) INJECTION PRN
Status: CANCELLED | OUTPATIENT
Start: 2019-02-06

## 2019-01-30 RX ORDER — ACETAMINOPHEN 325 MG/1
650 TABLET ORAL
Status: CANCELLED | OUTPATIENT
Start: 2019-02-05

## 2019-01-30 RX ORDER — 0.9 % SODIUM CHLORIDE 0.9 %
VIAL (ML) INJECTION PRN
Status: CANCELLED | OUTPATIENT
Start: 2019-02-05

## 2019-01-30 RX ORDER — ONDANSETRON 8 MG/1
8 TABLET, ORALLY DISINTEGRATING ORAL
Status: CANCELLED | OUTPATIENT
Start: 2019-02-05

## 2019-01-30 RX ORDER — SODIUM CHLORIDE 9 MG/ML
INJECTION, SOLUTION INTRAVENOUS CONTINUOUS
Status: CANCELLED | OUTPATIENT
Start: 2019-02-05

## 2019-01-30 RX ORDER — MEPERIDINE HYDROCHLORIDE 25 MG/ML
25 INJECTION INTRAMUSCULAR; INTRAVENOUS; SUBCUTANEOUS
Status: CANCELLED | OUTPATIENT
Start: 2019-02-05

## 2019-01-30 RX ORDER — PROCHLORPERAZINE MALEATE 10 MG
10 TABLET ORAL EVERY 6 HOURS PRN
Status: CANCELLED | OUTPATIENT
Start: 2019-02-06

## 2019-01-30 RX ORDER — PROCHLORPERAZINE MALEATE 10 MG
10 TABLET ORAL EVERY 6 HOURS PRN
Status: CANCELLED | OUTPATIENT
Start: 2019-02-05

## 2019-01-30 RX ORDER — 0.9 % SODIUM CHLORIDE 0.9 %
5 VIAL (ML) INJECTION PRN
Status: CANCELLED | OUTPATIENT
Start: 2019-02-06

## 2019-01-30 RX ORDER — SODIUM CHLORIDE 9 MG/ML
INJECTION, SOLUTION INTRAVENOUS CONTINUOUS
Status: CANCELLED | OUTPATIENT
Start: 2019-02-06

## 2019-01-30 RX ORDER — ONDANSETRON 8 MG/1
8 TABLET, ORALLY DISINTEGRATING ORAL
Status: CANCELLED | OUTPATIENT
Start: 2019-02-06

## 2019-01-30 RX ORDER — 0.9 % SODIUM CHLORIDE 0.9 %
5 VIAL (ML) INJECTION PRN
Status: CANCELLED | OUTPATIENT
Start: 2019-02-05

## 2019-01-30 RX ORDER — ONDANSETRON 2 MG/ML
4 INJECTION INTRAMUSCULAR; INTRAVENOUS
Status: CANCELLED | OUTPATIENT
Start: 2019-02-06

## 2019-01-30 RX ORDER — ACETAMINOPHEN 325 MG/1
650 TABLET ORAL ONCE
Status: CANCELLED | OUTPATIENT
Start: 2019-02-05 | End: 2019-01-30

## 2019-01-30 RX ORDER — ONDANSETRON 2 MG/ML
4 INJECTION INTRAMUSCULAR; INTRAVENOUS
Status: CANCELLED | OUTPATIENT
Start: 2019-02-05

## 2019-01-30 NOTE — TELEPHONE ENCOUNTER
Reviewed patient's labs and he is slightly low with thrombocytopenia 83,000.  Also noted to have slightly elevated total bilirubin at 1.7.  He is status post TACE procedure for hepatoma and is due to follow-up with interventional radiology next week with MRIs.  Discussed with Dr. Servin and will hold treatment times 1 week and repeat labs prior to treatment.

## 2019-01-30 NOTE — TELEPHONE ENCOUNTER
Left message for patient to return call inregrds to his treatment being rescheduled. Please give patient his next infusion appointments on 2/5/19 and 2/6/19 as well as his rescheduled follow up with Dr. Servin when he returns the call.

## 2019-01-31 ENCOUNTER — APPOINTMENT (OUTPATIENT)
Dept: ONCOLOGY | Facility: MEDICAL CENTER | Age: 69
End: 2019-01-31
Attending: INTERNAL MEDICINE
Payer: MEDICARE

## 2019-01-31 NOTE — TELEPHONE ENCOUNTER
2nd attempt    Left message for patient to return call in regards to his rescheduled appointments.

## 2019-02-01 ENCOUNTER — TELEPHONE (OUTPATIENT)
Dept: HEMATOLOGY ONCOLOGY | Facility: MEDICAL CENTER | Age: 69
End: 2019-02-01

## 2019-02-04 ENCOUNTER — HOSPITAL ENCOUNTER (OUTPATIENT)
Dept: LAB | Facility: MEDICAL CENTER | Age: 69
End: 2019-02-04
Attending: NURSE PRACTITIONER
Payer: MEDICARE

## 2019-02-04 LAB
ALBUMIN SERPL BCP-MCNC: 4.1 G/DL (ref 3.2–4.9)
ALBUMIN/GLOB SERPL: 2 G/DL
ALP SERPL-CCNC: 74 U/L (ref 30–99)
ALT SERPL-CCNC: 22 U/L (ref 2–50)
ANION GAP SERPL CALC-SCNC: 9 MMOL/L (ref 0–11.9)
AST SERPL-CCNC: 36 U/L (ref 12–45)
BASOPHILS # BLD AUTO: 1 % (ref 0–1.8)
BASOPHILS # BLD: 0.03 K/UL (ref 0–0.12)
BILIRUB SERPL-MCNC: 1.4 MG/DL (ref 0.1–1.5)
BUN SERPL-MCNC: 10 MG/DL (ref 8–22)
CALCIUM SERPL-MCNC: 9.1 MG/DL (ref 8.5–10.5)
CHLORIDE SERPL-SCNC: 107 MMOL/L (ref 96–112)
CO2 SERPL-SCNC: 27 MMOL/L (ref 20–33)
CREAT SERPL-MCNC: 0.69 MG/DL (ref 0.5–1.4)
EOSINOPHIL # BLD AUTO: 0.21 K/UL (ref 0–0.51)
EOSINOPHIL NFR BLD: 7.1 % (ref 0–6.9)
ERYTHROCYTE [DISTWIDTH] IN BLOOD BY AUTOMATED COUNT: 50.1 FL (ref 35.9–50)
GLOBULIN SER CALC-MCNC: 2.1 G/DL (ref 1.9–3.5)
GLUCOSE SERPL-MCNC: 194 MG/DL (ref 65–99)
HCT VFR BLD AUTO: 43.2 % (ref 42–52)
HGB BLD-MCNC: 15.3 G/DL (ref 14–18)
IMM GRANULOCYTES # BLD AUTO: 0.01 K/UL (ref 0–0.11)
IMM GRANULOCYTES NFR BLD AUTO: 0.3 % (ref 0–0.9)
LYMPHOCYTES # BLD AUTO: 0.47 K/UL (ref 1–4.8)
LYMPHOCYTES NFR BLD: 16 % (ref 22–41)
MCH RBC QN AUTO: 31.8 PG (ref 27–33)
MCHC RBC AUTO-ENTMCNC: 35.4 G/DL (ref 33.7–35.3)
MCV RBC AUTO: 89.8 FL (ref 81.4–97.8)
MONOCYTES # BLD AUTO: 0.46 K/UL (ref 0–0.85)
MONOCYTES NFR BLD AUTO: 15.6 % (ref 0–13.4)
NEUTROPHILS # BLD AUTO: 1.76 K/UL (ref 1.82–7.42)
NEUTROPHILS NFR BLD: 60 % (ref 44–72)
NRBC # BLD AUTO: 0 K/UL
NRBC BLD-RTO: 0 /100 WBC
PLATELET # BLD AUTO: 73 K/UL (ref 164–446)
PMV BLD AUTO: 11.1 FL (ref 9–12.9)
POTASSIUM SERPL-SCNC: 3.5 MMOL/L (ref 3.6–5.5)
PROT SERPL-MCNC: 6.2 G/DL (ref 6–8.2)
RBC # BLD AUTO: 4.81 M/UL (ref 4.7–6.1)
SODIUM SERPL-SCNC: 143 MMOL/L (ref 135–145)
URATE SERPL-MCNC: 4.3 MG/DL (ref 2.5–8.3)
WBC # BLD AUTO: 2.9 K/UL (ref 4.8–10.8)

## 2019-02-04 PROCEDURE — 85025 COMPLETE CBC W/AUTO DIFF WBC: CPT

## 2019-02-04 PROCEDURE — 80053 COMPREHEN METABOLIC PANEL: CPT

## 2019-02-04 PROCEDURE — 84550 ASSAY OF BLOOD/URIC ACID: CPT

## 2019-02-04 PROCEDURE — 36415 COLL VENOUS BLD VENIPUNCTURE: CPT

## 2019-02-04 NOTE — CONSULTS
Interventional Radiology Follow Up     Re: Sohail Duvall     MRN: [id number]   : 1950    Sohail Duvall was seen today for review of surveillance imaging after hepatic chemoembolization performed by Joe Norris MD at [facility] on 2018.  He was referred to our service by Feliberto Senior MD and is also under the care of Carmencita Hand PA-C, Alfonzo Servin MD, and Johanna Hooper MD.    History of Present Illness:   has a history of CLL and alcoholic cirrhosis. Recent imaging revealed hepatomas in 4A and 4B. On 2018 the lesion in 4B was ablated but the operative ablation of a 4A lesion was unsuccessful due to proximity to the portal pedicle. He has not had a liver mass biopsy.  was been referred to interventional radiology for evaluation and management and our recommendation on 2018 was Y90 SIRT to both hepatic lobes due to multiple small LR-3 lesions that were not present on prior scans, suspicious for multifocal disease with left lobe dominant disease. He underwent mapping on  and Y90 SIRT of the left lobe on . He tolerated the procedure well and denied complaints at his follow up appointment. Post intervention imaging showed right lobe hepatoma progression and he underwent caudate lobe chemoembolization on 2018. He is seen today for review of labs and imaging studies and the embolization procedure and to plan follow up. Unfortunately, the tumor markers ordered by our group were not drawn when the patient presented to the lab. Today, the patient reports fatigue and abdominal in the post TACE period but it recovered now. He is back to working his usual hours at his job. He denies nausea, constipation, or diarrhea. He is accompanied by his wife to the follow up.     Past Medical History:   Diagnosis Date   • Anemia    • Cancer (HCC)     Prostate - did brachytherapy at [place]   • Cancer (HCC)  "2018    Liver   • Cirrhosis (HCC)    • CLL (chronic lymphocytic leukemia) (HCC) 2014   • CMV (cytomegalovirus infection) (HCC) 1990    Treated for 6 weeks   • Diabetes (HCC)     oral medication   • Heart burn    • Liver cancer (HCC)    • Liver tumor    • Rosacea      Past Surgical History:   Procedure Laterality Date   • HEPATIC ABLATION LAPAROSCOPIC  6/28/2018    Procedure: HEPATIC ABLATION LAPAROSCOPIC. SEGMENT 4B, HEPATOMA, REPAIR OF INCARCERATED UMBILICAL HERNIA;  Surgeon: Feliberto Burgos M.D.;  Location: SURGERY [facility];  Service: General   • BRACHY THERAPY       Social History     Social History   • Marital status:      Spouse name: N/A   • Number of children: 1   • Years of education: N/A     Occupational History   • fertilizer sales      Social History Main Topics   • Smoking status: Never Smoker   • Smokeless tobacco: Former User     Types: Snuff     Quit date: 3/29/2015   • Alcohol use No   • Drug use: Yes     Types: Marijuana      Comment: \"Marijuana Use\" daily    • Sexual activity: No     Other Topics Concern   • Not on file     Social History Narrative    No known exposure to asbestos, dyes, or chemicals, however he was exposed to pesticides.  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  1 child, alive and well.  He also has a step-child.      Family History   Problem Relation Age of Onset   • Cancer Father 81        Bladder   • Cancer Brother         Prostate & CLL (s/p 8-10 years ago)   • Hyperlipidemia Sister    • Lung Disease Neg Hx    • Diabetes Neg Hx    • Heart Disease Neg Hx    • Hypertension Neg Hx    • Stroke Neg Hx    • Alcohol/Drug Neg Hx        Review of Systems   Constitutional: Negative for chills, diaphoresis, fever, malaise/fatigue and weight loss.   Eyes:        Negative for icterus   Gastrointestinal: Negative.  Negative for abdominal pain, blood in stool, constipation, diarrhea, heartburn, " melena, nausea and vomiting.        Negative for ascites   Skin: Negative.         Negative for jaundice   Neurological: Negative for weakness.   Endo/Heme/Allergies: Does not bruise/bleed easily.   Psychiatric/Behavioral: Negative for substance abuse (negative for alcohol and tobacco use).     A comprehensive 14-point review of systems was negative except as described above.     Labs:      Ref. Range 12/28/2018 10:31 1/2/2019 09:45 1/29/2019 10:45 1/29/2019 11:59   WBC Latest Ref Range: 4.8 - 10.8 K/uL 4.7 (L) 3.9 (L)  2.9 (L)   RBC Latest Ref Range: 4.70 - 6.10 M/uL 5.08 4.83  4.81   Hemoglobin Latest Ref Range: 14.0 - 18.0 g/dL 15.6 15.0  14.9   Hematocrit Latest Ref Range: 42.0 - 52.0 % 46.1 43.1  43.0   MCV Latest Ref Range: 81.4 - 97.8 fL 90.7 89.2  89.4   MCH Latest Ref Range: 27.0 - 33.0 pg 30.7 31.1  31.0   MCHC Latest Ref Range: 33.7 - 35.3 g/dL 33.8 34.8  34.7   RDW Latest Ref Range: 35.9 - 50.0 fL 46.5 44.6  47.8   Platelet Count Latest Ref Range: 164 - 446 K/uL 103 (L) 93 (L)  83 (L)   MPV Latest Ref Range: 9.0 - 12.9 fL 10.6 10.5  10.9   Neutrophils-Polys Latest Ref Range: 44.00 - 72.00 % 52.90 53.90  66.30   Neutrophils (Absolute) Latest Ref Range: 1.82 - 7.42 K/uL 2.48 2.11  1.94   Lymphocytes Latest Ref Range: 22.00 - 41.00 % 30.80 29.80  12.60 (L)   Lymphs (Absolute) Latest Ref Range: 1.00 - 4.80 K/uL 1.44 1.17  0.37 (L)   Monocytes Latest Ref Range: 0.00 - 13.40 % 10.30 10.70  17.40 (H)   Monos (Absolute) Latest Ref Range: 0.00 - 0.85 K/uL 0.48 0.42  0.51   Eosinophils Latest Ref Range: 0.00 - 6.90 % 4.30 4.30  2.40   Eos (Absolute) Latest Ref Range: 0.00 - 0.51 K/uL 0.20 0.17  0.07   Basophils Latest Ref Range: 0.00 - 1.80 % 1.30 1.00  1.00   Baso (Absolute) Latest Ref Range: 0.00 - 0.12 K/uL 0.06 0.04  0.03   Immature Granulocytes Latest Ref Range: 0.00 - 0.90 % 0.40 0.30  0.30   Immature Granulocytes (abs) Latest Ref Range: 0.00 - 0.11 K/uL 0.02 0.01  0.01   Nucleated RBC Latest Units: /100 WBC  0.00 0.00  0.00   NRBC (Absolute) Latest Units: K/uL 0.00 0.00  0.00   Sodium Latest Ref Range: 135 - 145 mmol/L 142 141  142   Potassium Latest Ref Range: 3.6 - 5.5 mmol/L 4.4 3.9  3.7   Chloride Latest Ref Range: 96 - 112 mmol/L 107 110  110   Co2 Latest Ref Range: 20 - 33 mmol/L 27 24  24   Anion Gap Latest Ref Range: 0.0 - 11.9  8.0 7.0  8.0   Glucose Latest Ref Range: 65 - 99 mg/dL 213 (H) 232 (H)  125 (H)   Bun Latest Ref Range: 8 - 22 mg/dL 10 9  9   Creatinine Latest Ref Range: 0.50 - 1.40 mg/dL 0.73 0.68  0.67   GFR If  Latest Ref Range: >60 mL/min/1.73 m 2 >60 >60  >60   GFR If Non  Latest Ref Range: >60 mL/min/1.73 m 2 >60 >60  >60   Calcium Latest Ref Range: 8.5 - 10.5 mg/dL 9.5 9.4  8.9   AST(SGOT) Latest Ref Range: 12 - 45 U/L 26 23  35   ALT(SGPT) Latest Ref Range: 2 - 50 U/L 19 14  20   Alkaline Phosphatase Latest Ref Range: 30 - 99 U/L 86 82  70   Total Bilirubin Latest Ref Range: 0.1 - 1.5 mg/dL 1.4 1.1  1.7 (H)   Albumin Latest Ref Range: 3.2 - 4.9 g/dL 4.0 3.9  3.9   Total Protein Latest Ref Range: 6.0 - 8.2 g/dL 6.4 6.1  6.2   Globulin Latest Ref Range: 1.9 - 3.5 g/dL 2.4 2.2  2.3   A-G Ratio Latest Units: g/dL 1.7 1.8  1.7   Uric Acid Latest Ref Range: 2.5 - 8.3 mg/dL 4.3   4.7      Ref. Range 12/11/2018 09:52   PT Latest Ref Range: 12.0 - 14.6 sec 15.9 (H)   INR Latest Ref Range: 0.87 - 1.13  1.25 (H)      Ref. Range 9/18/2018 09:39 9/18/2018 09:41 11/30/2018 08:14 12/11/2018 09:52   Alpha Fetoprotein Latest Ref Range: 0 - 15 ng/mL 7  9 9   AFP L3% Latest Ref Range: 0.0 - 9.9 %   7.9 8.3     Child [place] Class A  JIM Grade A2    Pathology:  None available at time of consultation    Radiology:   MRI abdomen on January 29, 2019 at [facility]:  1.  No new arterial enhancing mass is identified.  2.  Previously described mass in the caudate lobe is now avascular with peripheral enhancement, consistent with recent chemoembolization. LR-treated  3.  Ablation cavity in the  left hepatic lobe remains avascular.  4.  8 mm arterial enhancing observation in segment 4A is not as pronounced as on the prior exam and grossly stable. LR-3  5.  Stable peripheral enhancing lesion in segment 2. LR-treated  6.  Previously described rim-enhancing observation in the superior aspect of segment 2 is not visualized on the current exam, likely related to differences in technique.  7.  Morphologic characteristics of cirrhosis and evidence of portal hypertension including splenomegaly  8.  Cholelithiasis.  9.  Left adrenal adenoma  10.  Stable 8 mm pancreatic tail cyst        Interventional radiology procedure December 14, 2018, at [facility]:  1.  DEBTACE procedure with total dose doxorubicin 100 mg administered as 100 mu Oncozene beads. (Caudate lobe)  2.  The patient is returned to the same day surgery reese for post procedure observation.      MRI abdomen November 10, 2018 at [facility]:  1.  Morphologic characteristics of cirrhosis with evidence of portal hypertension including trace ascites, splenomegaly, and portal venous dilatation.  2.  Interval enlargement in the mass in segment 4 adjacent to the caudate lobe. LR-5  3.  Previously described hyper enhancing masses in the left hepatic lobe demonstrate rim enhancement, consistent with recent Y 90 treatment. LR-treated.  4.  Ablation cavity in the left hepatic lobe is a vascular  5.  8 mm arterial enhancing focus in segment 4A is better defined than on the prior exam secondary to decreased motion. Size is not significantly changed.LR-3  6.  Stable 9 mm cystic mass in the pancreatic tail.  7.  Stable benign left adrenal adenoma  8.  Cholelithiasis. Dependent hypointense foci in the common bile duct may represent tiny nonobstructing calculi.       Interventional radiology procedure Y90 SIRT left lobe September 7, 2018, at [facility]:  1.  Technically successful Y90 radioembolization of the left hepatic lobe. The patient will followup in approximately 10 days for  laboratory and clinical evaluation and 4-6 weeks for imaging evaluation.  2.  Immediately following the procedure, the patient was transported to nuclear medicine for SPECT imaging which demonstrates Bremsstrahlung emission from the left of the liver. There is no definite evidence of extrahepatic deposition of radioembolic   Material.     Nuclear medicine bremsstrahlung study on September 7, 2018 at [facility]:  The prescribed dose was  0.68 gb ( 18.4 mCi). The drawn dose was 0.78 gb (21mCi). Delivered dose after residual was measured at 0.7 gb ( 19 mCi). This represents  103% of the prescribed dose and  90% of the drawn dose. Bremsstrahlung images obtained   after the Y-90 radioembolization, confirm proper delivery to the targeted region. There is no uptake outside the planned treatment area. NOTE: The patient will be followed by interventional radiology and oncology.       Interventional radiology procedure August 8, 2018 at [facility]:  1.  Superior mesenteric arteriogram replaced common hepatic artery.  2.  Celiac arteriogram demonstrate no evidence of common hepatic arterial anatomy.  3.  Common hepatic arteriogram demonstrate no evidence of variant hepatic arterial anatomy.  4.  Selective catheterization and embolization of a small branch originating from the proximal aspect of the right hepatic artery which appeared to supply either a small subsegmental region of the right hepatic lobe or a small amount of the hepatic   flexure of the colon.  5.  Proper hepatic arteriogram demonstrating 2 small branches originating from the proximal aspect of the right hepatic artery one of which appear to represent either a small subsegmental hepatic artery versus hepatic flexure colon branch and a small   cystic artery.  6.  Intra-arterial administration of 4.4 mCi technetium 99m labeled MAA into the hepatic artery demonstrated a hepatic pulmonary shunt fraction of 4%.       CT abdomen with and without August 8, 2018 at [facility]:  1.   Cirrhosis  2.  Interval ablation of a segment 4A hepatic nodule without evidence of residual tumor in that field  3.  Multiple additional hepatic nodules and masses as described above, mostly unchanged in size however the dominant central RIGHT hepatic mass has increased in size. These are likely hepatomas.  4.  Splenomegaly and enlarged portal vein, in keeping with the patient's known portal hypertension  5.  Cholelithiasis     Nuclear medicine quantitative lung study August 28, 2018 at [facility]:  Total hepatopulmonary shunt percentage was 4%. No evidence of extrahepatic radiotracer deposition is identified.     MRI abdomen on June 8, 2018 at [facility]:  Examination is limited by patient motion.  2.1 x 1.9 cm arterial hyperenhancing lesion within the left lobe of the liver appears compatible with hepatocellular carcinoma. LR-5    Hyperenhancing lesion within segment 4 of the liver bordering the caudate lobe measures 3 x 2 cm and is increased in size compared to prior. This lesion is compatible with hepatocellular carcinoma. LR-5    10 mm hyperenhancing lesion within segment 4 may be slightly decreased or unchanged in size compared to prior. LR-3    3 other small hyperenhancing lesions are seen within the right lobe of the liver measuring up to 1 cm. LR-3    Heterogeneous wedge-shaped area of delayed hypo-enhancement in the anterior liver is likely related to variable perfusion.    Findings of cirrhosis and portal hypertension.    Mildly prominent lymph nodes in the cardiophrenic fat are not significantly changed.    9 mm cystic lesion in the pancreatic tail could represent a small side branch IPMN.    Cholelithiasis. No biliary ductal dilatation is seen.    Subcarinal lymphadenopathy is partially imaged.    2 cm left adrenal nodule is unchanged and likely an adrenal adenoma.    Trace free fluid in the abdomen.    Findings discussed with Dr. Senior         CT thorax April 17, 2018 at [facility]:  1.  Mediastinal  "lymphadenopathy with markedly enlarged subcarinal lymph node could be due to metastatic disease or lymphoma. Reactive lymph nodes or lymphadenopathy due to granulomatous disease seems less likely.  2.  Coarse coronary artery calcifications.  3.  Cirrhosis with hypervascular hepatic lesions and splenomegaly.  4.  Cholelithiasis.     Nuclear medicine bone study April 17, 2018 at [facility]:  No scintigraphic evidence of bony metastatic disease     Portal pressure gradient April 4, 2018 at [facility]:  1.  Hepatic venography showing a patent right hepatic vein.  2.  Free and wedged right hepatic vein wedge pressures rendering a mean Hepatic Venous Pressure Gradient (HVPG) of 9.67 mmHg. This is consistent with portal hypertension.  (HVPG >= 10mmHg considered \"clinically significant\" portal HTN**  3.  Transjugular Liver biopsy was not performed at this time..  **Reference: Hepatic Venous Pressure Gradient in 2010:Optimal Measurement is Jose. Anya MCINTYRE, Wanda RODRIGUEZ. HEPATOLOGY, Vol. 51, No. 6, 2010     CT abdomen December 6, 2017 at [facility]:  1.  2.4 cm arterial enhancing mass in segment 4 of the liver demonstrates washout. LR-5.  2.  1.3 cm arterial enhancing lesion in the hepatic dome is similar to the recent MRI. No definite washout or capsule appreciated. LR-3  3.  Morphologic characteristics of cirrhosis with evidence of portal hypertension including splenomegaly.  4.  Cholelithiasis.  5.  Stable left adrenal nodule, likely a benign adenoma.    Current Outpatient Prescriptions   Medication Sig Dispense Refill   • cetirizine (ZYRTEC) 10 MG Tab Take 10 mg by mouth every morning.     • ondansetron (ZOFRAN) 4 MG Tab tablet Take 1 Tab by mouth every four hours as needed for Nausea/Vomiting (for nausea, vomiting). 30 Tab 6   • prochlorperazine (COMPAZINE) 10 MG Tab Take 1 Tab by mouth every 6 hours as needed (for nausea, vomiting). 30 Tab 6   • metFORMIN ER (GLUCOPHAGE XR) 500 MG TABLET SR 24 HR Take 2 Tabs by mouth 2 times a " day. 360 Tab 1   • Multiple Vitamins-Minerals (MULTIVITAMIN PO) Take 1 Tab by mouth every morning.     • Polyethylene Glycol 3350 (MIRALAX PO) Take 1 Cap by mouth every morning.     • fluticasone (FLONASE ALLERGY RELIEF) 50 MCG/ACT nasal spray Spray 1 Spray in nose 2 times a day. 1 Bottle 0     No current facility-administered medications for this encounter.        No Known Allergies    Physical Exam   Constitutional: He is oriented to person, place, and time and well-developed, well-nourished, and in no distress. No distress.   HENT:   Head: Atraumatic.   Eyes: Pupils are equal, round, and reactive to light. No scleral icterus.   Pulmonary/Chest: Effort normal. No respiratory distress.   Abdominal: Soft. He exhibits no distension.   No ascites noted   Musculoskeletal: He exhibits no edema.   Neurological: He is alert and oriented to person, place, and time. Gait normal. Coordination normal.   Skin: Skin is warm and dry. No rash noted. He is not diaphoretic. No erythema. No pallor.   No jaundice noted   Psychiatric: Mood, memory, affect and judgment normal.     ECOG Performance Status 0    Impression:   1. Unresectable multifocal hepatocellular carcinoma status post left lobe radioembolization and chemoembolization of right hepatoma.  2. Cirrhosis.  3. Portal hypertension.  4. Thrombocytopenia.  5. Splenomegaly.  6. Chronic lymphocytic leukemia.   7. Diabetes mellitus.    Plan:   Joe Norris MD has reviewed 's history and imaging studies, examined the patient, and discussed treatment options. There is good treatment effect of the caudate lesion targeted with the TACE. We will continue to observe the segment 2 lesion that has remained stable. We discussed the method of the procedure at length and reviewed imaging studies. All questions were answered. We discussed the possibility of tumor recurrence and the need to continue surveillance.  was instructed to continue to follow up with all  his other treating physicians. We will obtain tumor markers in one month and contact the patient with our recommendations.       GINA Grewal with Joe Norris MD  Interventional Radiology   [address] ([id number])  [place] [address]  [phone]

## 2019-02-05 ENCOUNTER — DOCUMENTATION (OUTPATIENT)
Dept: HEMATOLOGY ONCOLOGY | Facility: MEDICAL CENTER | Age: 69
End: 2019-02-05

## 2019-02-05 ENCOUNTER — HOSPITAL ENCOUNTER (OUTPATIENT)
Dept: RADIOLOGY | Facility: MEDICAL CENTER | Age: 69
End: 2019-02-05
Attending: NURSE PRACTITIONER

## 2019-02-05 ENCOUNTER — OUTPATIENT INFUSION SERVICES (OUTPATIENT)
Dept: ONCOLOGY | Facility: MEDICAL CENTER | Age: 69
End: 2019-02-05
Attending: INTERNAL MEDICINE
Payer: MEDICARE

## 2019-02-05 VITALS
HEIGHT: 66 IN | OXYGEN SATURATION: 99 % | WEIGHT: 222.22 LBS | DIASTOLIC BLOOD PRESSURE: 77 MMHG | SYSTOLIC BLOOD PRESSURE: 138 MMHG | RESPIRATION RATE: 18 BRPM | TEMPERATURE: 96.5 F | BODY MASS INDEX: 35.71 KG/M2 | HEART RATE: 62 BPM

## 2019-02-05 DIAGNOSIS — C91.10 CLL (CHRONIC LYMPHOCYTIC LEUKEMIA) (HCC): ICD-10-CM

## 2019-02-05 DIAGNOSIS — C22.0 HEPATOMA (HCC): ICD-10-CM

## 2019-02-05 PROCEDURE — 700102 HCHG RX REV CODE 250 W/ 637 OVERRIDE(OP): Performed by: NURSE PRACTITIONER

## 2019-02-05 PROCEDURE — 700111 HCHG RX REV CODE 636 W/ 250 OVERRIDE (IP): Mod: JG

## 2019-02-05 PROCEDURE — 700111 HCHG RX REV CODE 636 W/ 250 OVERRIDE (IP): Performed by: NURSE PRACTITIONER

## 2019-02-05 PROCEDURE — A9270 NON-COVERED ITEM OR SERVICE: HCPCS | Performed by: NURSE PRACTITIONER

## 2019-02-05 PROCEDURE — 96375 TX/PRO/DX INJ NEW DRUG ADDON: CPT

## 2019-02-05 PROCEDURE — 96415 CHEMO IV INFUSION ADDL HR: CPT

## 2019-02-05 PROCEDURE — 96411 CHEMO IV PUSH ADDL DRUG: CPT

## 2019-02-05 PROCEDURE — 96413 CHEMO IV INFUSION 1 HR: CPT

## 2019-02-05 PROCEDURE — 700105 HCHG RX REV CODE 258: Performed by: NURSE PRACTITIONER

## 2019-02-05 PROCEDURE — 700105 HCHG RX REV CODE 258

## 2019-02-05 RX ORDER — ACETAMINOPHEN 325 MG/1
650 TABLET ORAL ONCE
Status: COMPLETED | OUTPATIENT
Start: 2019-02-05 | End: 2019-02-05

## 2019-02-05 RX ORDER — POTASSIUM CHLORIDE 20 MEQ/1
40 TABLET, EXTENDED RELEASE ORAL ONCE
Status: COMPLETED | OUTPATIENT
Start: 2019-02-05 | End: 2019-02-05

## 2019-02-05 RX ADMIN — DIPHENHYDRAMINE HYDROCHLORIDE 50 MG: 50 INJECTION INTRAMUSCULAR; INTRAVENOUS at 11:55

## 2019-02-05 RX ADMIN — ACETAMINOPHEN 650 MG: 325 TABLET ORAL at 11:25

## 2019-02-05 RX ADMIN — DEXAMETHASONE SODIUM PHOSPHATE 12 MG: 4 INJECTION, SOLUTION INTRAMUSCULAR; INTRAVENOUS at 11:25

## 2019-02-05 RX ADMIN — ONDANSETRON HYDROCHLORIDE 16 MG: 2 SOLUTION INTRAMUSCULAR; INTRAVENOUS at 11:40

## 2019-02-05 RX ADMIN — POTASSIUM CHLORIDE 40 MEQ: 1500 TABLET, EXTENDED RELEASE ORAL at 13:41

## 2019-02-05 RX ADMIN — BENDAMUSTINE HYDROCHLORIDE 151.9 MG: 25 INJECTION, SOLUTION INTRAVENOUS at 12:20

## 2019-02-05 RX ADMIN — SODIUM CHLORIDE 1100 MG: 9 INJECTION, SOLUTION INTRAVENOUS at 12:38

## 2019-02-05 ASSESSMENT — ENCOUNTER SYMPTOMS
ROS GI COMMENTS: NEGATIVE FOR ASCITES
VOMITING: 0
BRUISES/BLEEDS EASILY: 0
NAUSEA: 0
DIARRHEA: 0
ROS SKIN COMMENTS: NEGATIVE FOR JAUNDICE
WEIGHT LOSS: 0
GASTROINTESTINAL NEGATIVE: 1
CONSTIPATION: 0
HEARTBURN: 0
BLOOD IN STOOL: 0
FEVER: 0
ABDOMINAL PAIN: 0
WEAKNESS: 0
DIAPHORESIS: 0
CHILLS: 0

## 2019-02-05 ASSESSMENT — LIFESTYLE VARIABLES: SUBSTANCE_ABUSE: 0

## 2019-02-05 NOTE — PROGRESS NOTES
Chemotherapy Verification - PRIMARY RN      Height = 167.6 cm  Weight = 100.8 kg  BSA = 2.17 m2       Medication: bendamustine  Dose: 70 mg/m2  Calculated Dose: 151.9 mg                             (In mg/m2, AUC, mg/kg)     Medication: rituximab  Dose: 500 mg/m2  Calculated Dose: 1085 mg                             (In mg/m2, AUC, mg/kg)      I confirm this process was performed independently with the BSA and all final chemotherapy dosing calculations congruent.  Any discrepancies of 5% or greater have been addressed with the chemotherapy pharmacist. The resolution of the discrepancy has been documented in the EPIC progress notes.

## 2019-02-05 NOTE — PROGRESS NOTES
Patient scheduled to receive cycle #3 of Rituxan and bendamustine today.  We had delayed cycle number 3 x 1 week due to labs.  Patient does have slow trend down of his platelet counts to 73,000 however his total bilirubin has normalized.  I did discuss with Dr. Chavarria today as Dr. Servin is currently unavailable and we will go ahead and proceed with cycle #3 treatment.  I will asked patient to proceed with a CBC in 1 week for continued monitoring.  Also asked infusion nurse to educate patient on thrombocytopenic precautions.      No visits with results within 1 Day(s) from this visit.   Latest known visit with results is:   Hospital Outpatient Visit on 02/04/2019   Component Date Value Ref Range Status   • WBC 02/04/2019 2.9* 4.8 - 10.8 K/uL Final   • RBC 02/04/2019 4.81  4.70 - 6.10 M/uL Final   • Hemoglobin 02/04/2019 15.3  14.0 - 18.0 g/dL Final   • Hematocrit 02/04/2019 43.2  42.0 - 52.0 % Final   • MCV 02/04/2019 89.8  81.4 - 97.8 fL Final   • MCH 02/04/2019 31.8  27.0 - 33.0 pg Final   • MCHC 02/04/2019 35.4* 33.7 - 35.3 g/dL Final   • RDW 02/04/2019 50.1* 35.9 - 50.0 fL Final   • Platelet Count 02/04/2019 73* 164 - 446 K/uL Final   • MPV 02/04/2019 11.1  9.0 - 12.9 fL Final   • Neutrophils-Polys 02/04/2019 60.00  44.00 - 72.00 % Final   • Lymphocytes 02/04/2019 16.00* 22.00 - 41.00 % Final   • Monocytes 02/04/2019 15.60* 0.00 - 13.40 % Final   • Eosinophils 02/04/2019 7.10* 0.00 - 6.90 % Final   • Basophils 02/04/2019 1.00  0.00 - 1.80 % Final   • Immature Granulocytes 02/04/2019 0.30  0.00 - 0.90 % Final   • Nucleated RBC 02/04/2019 0.00  /100 WBC Final   • Neutrophils (Absolute) 02/04/2019 1.76* 1.82 - 7.42 K/uL Final    Includes immature neutrophils, if present.   • Lymphs (Absolute) 02/04/2019 0.47* 1.00 - 4.80 K/uL Final   • Monos (Absolute) 02/04/2019 0.46  0.00 - 0.85 K/uL Final   • Eos (Absolute) 02/04/2019 0.21  0.00 - 0.51 K/uL Final   • Baso (Absolute) 02/04/2019 0.03  0.00 - 0.12 K/uL Final   •  Immature Granulocytes (abs) 02/04/2019 0.01  0.00 - 0.11 K/uL Final   • NRBC (Absolute) 02/04/2019 0.00  K/uL Final   • Sodium 02/04/2019 143  135 - 145 mmol/L Final   • Potassium 02/04/2019 3.5* 3.6 - 5.5 mmol/L Final   • Chloride 02/04/2019 107  96 - 112 mmol/L Final   • Co2 02/04/2019 27  20 - 33 mmol/L Final   • Anion Gap 02/04/2019 9.0  0.0 - 11.9 Final   • Glucose 02/04/2019 194* 65 - 99 mg/dL Final   • Bun 02/04/2019 10  8 - 22 mg/dL Final   • Creatinine 02/04/2019 0.69  0.50 - 1.40 mg/dL Final   • Calcium 02/04/2019 9.1  8.5 - 10.5 mg/dL Final   • AST(SGOT) 02/04/2019 36  12 - 45 U/L Final   • ALT(SGPT) 02/04/2019 22  2 - 50 U/L Final   • Alkaline Phosphatase 02/04/2019 74  30 - 99 U/L Final   • Total Bilirubin 02/04/2019 1.4  0.1 - 1.5 mg/dL Final   • Albumin 02/04/2019 4.1  3.2 - 4.9 g/dL Final   • Total Protein 02/04/2019 6.2  6.0 - 8.2 g/dL Final   • Globulin 02/04/2019 2.1  1.9 - 3.5 g/dL Final   • A-G Ratio 02/04/2019 2.0  g/dL Final   • Uric Acid 02/04/2019 4.3  2.5 - 8.3 mg/dL Final   • GFR If  02/04/2019 >60  >60 mL/min/1.73 m 2 Final   • GFR If Non  02/04/2019 >60  >60 mL/min/1.73 m 2 Final

## 2019-02-05 NOTE — PROGRESS NOTES
"Pharmacy Chemotherapy calculation:    Dx: CLL (h/o Hepatocellular carcinoma and prostate cancer)  NOTE: 11/1/18 Clarified with Dr. Servin/Meenakshi that patient is being treated as CLL and they will update Treatment Plan for cycle 2 to reflect that and dose escalation of Rituxan. Bertin Ceballos    Cycle 3, Days 1-2   Previous treatment = 2 Days 1-2 (1/2/2019-1/3/2019) (brachytherapy for prostate cancer 2004, Y90 SIRT of the left lobe on 9/2018).    Regimen: bendamustine + rituximab (>66y/o)  *Dosing Reference*  · Bendamustine 70 mg/m2 IV once per day on days 1 & 2  · Rituximab (Rituxan) as follows:  ? Cycle 1: 375 mg/m2 IV once on day 1 (completed)  ? Subsequent cycles: 500 mg/m2 IV once on day 1  28-day cycle for up to 6 cycles based on response and toxicity   Ariella GASTELUM et al - J Clin Oncol. 2012 Sep 10;30(26):3209-16. Epub 2012 Aug 6  Ariella GASTELUM et al - J Clin Oncol. 2011 Sep 10;29(26):3559-66. doi: 10.1200/JCO.2010.33.8061. Epub 2011 Aug 15  Chele DESAI et al - Lancet Oncol. 2016 Jul;17(7):928-942. doi: 10.1016/-[id number])[id number]. Epub 2016 May 20.     /77   Pulse 62   Temp 35.8 °C (96.5 °F) (Temporal)   Resp 18   Ht 1.676 m (5' 6\")   Wt 100.8 kg (222 lb 3.6 oz)   SpO2 99%   BMI 35.87 kg/m²   Body surface area is 2.17 meters squared.      9/8/2017 12:02   Hepatitis B Surface Antigen Negative   Hepatitis B Ccore Ab, Total Negative     LABS 2/4/2019:  ANC: 1760      WBC: 2.9     Plt: 73k   Hgb/Hct: 15.3     SCr: 0.69 mg/dL CrCl: >125 mL/min     K: 3.5  Na: 143          Glu: 194  LFT's: WNL TBili = 1.4     **Per Dr. Chavarria, OK to treat**    Bendamustine (Bendeka) 70 mg/m2 x Body surface area is 2.17 meters squared. m2 = 151.9 mg    <5% difference, ok to treat with final dose = 151.9 mg IV on Days 1 and 2    Rituximab (Rituxan) 500 mg/m2 x Body surface area is 2.17 meters squared. m2 = 1085 mg    Rounded to vial size (within 10%) per dose rounding protocol.    ok to treat with final dose = 1100 mg IV " on Day 1 only      REMA Calderon, PharmD

## 2019-02-05 NOTE — PROGRESS NOTES
Chemotherapy Verification - SECONDARY RN     D1C3    Height = 167.6 cm  Weight = 100.8 kg  BSA = 2.17 m2       Medication: Bendamustine HCl (Bendeka)  Dose: 70 mg/m2  Calculated Dose: 151.9 mg                                Medication: Rituximab (Rituxn)  Dose: 500 mg/m2  Calculated Dose: 1085 mg                                 I confirm that this process was performed independently.

## 2019-02-05 NOTE — PROGRESS NOTES
"Pharmacy chemotherapy calculations verification    Patient Name: Sohail Duvall   Dx: CLL      Protocol: Bendamustine + Rituximab       *Dosing Reference*  Rituximab 375 mg/m2 IV on Day 1 of Cycle 1 - complete   followed by   Rituximab 500 mg/m2 IV on Day 1 of Cycles 2-6  Bendamustine 70 mg/m2 IV over 10 minutes daily on Days 1-2 of Cycles 1-6  28-day cycle for 6 cycles (Relapsed/Refractory: age > 65 years and younger patients with significant comorbidities)  NCCN Guidelines for Chronic Lymphocytic Leukemia/Small Lymphocytic Lymphoma V.5.2018  Ariella GASTELUM et al. J Clin Oncol. 2012;30(26):3209-16  Ariella GASTELUM et al. J Clin Oncol. 2011;29(26):3559-66  Chele DESAI et al. Lancet Oncol. 2016;17(7):928-942       Allergies:  Patient has no known allergies.       /77   Pulse 62   Temp 35.8 °C (96.5 °F) (Temporal)   Resp 18   Ht 1.676 m (5' 6\")   Wt 100.8 kg (222 lb 3.6 oz)   SpO2 99%   BMI 35.87 kg/m²  Body surface area is 2.17 meters squared.     9/8/2017 12:02   Hep B Surface Antibody Quant <3.10   Hepatitis B Surface Antigen Negative   Hepatitis B Ccore Ab, Total Negative       Labs 02/04/19  ANC~ 1800 Plt = 73k (ok to treat per MD)   Hgb = 15.3   SCr = 0.67mg/dL CrCl ~ 93mL/min (min Scr = 0.7)  LFT's = WNL  TBili = 1.7 (MD is aware, ok to proceed)      Drug Order   (Drug name, dose, route, IV Fluid & volume, frequency, number of doses) Cycle 3  Previous treatment: C2 = 01/03/19   Medication = Rituximab (Rituxan)  Base Dose= 500mg/m2  Calc Dose: Base Dose x 2.17m2 = 1082 mg  Final Dose = 1100mg  Route = IV  Fluid & Volume =  mL  Admin Duration = Follow rate sheet  Day 1    <10% difference, okay to round to nearest vial size per P&T MAB protocol   Medication = Bendamustine (Bendeka)  Base Dose= 70 mg/m2  Calc Dose: Base Dose x 2.17m2 = 151.9 mg  Final Dose = 151.9 mg  Route = IV  Fluid & Volume = NS 50 mL  Admin Duration = Over 10 minutes    Days 1-2      <5% difference, okay to treat with final " dose     By my signature below, I confirm this process was performed independently with the BSA and all final chemotherapy dosing calculations congruent. I have reviewed the above chemotherapy order and that my calculation of the final dose and BSA (when applicable) corroborate those calculations of the  pharmacist. Discrepancies of 5% or greater in the written dose have been addressed and documented within the EPIC Progress notes.    Simi Kahn, PharmD, BCOP

## 2019-02-05 NOTE — PROGRESS NOTES
"              After Visit Summary   8/14/2018    Scooter Patterson    MRN: [id number]           Patient Information     Date Of Birth          2015        Visit Information        Provider Department      8/14/2018 10:20 AM Sandy Stanley MD [facility]        Today's Diagnoses     Encounter for routine child health examination w/o abnormal findings    -  1      Care Instructions      Preventive Care at the 3 Year Visit    Growth Measurements & Percentiles                        Weight: 30 lbs 9.6 oz / 13.9 kg (actual weight)  39 %ile based on CDC 2-20 Years weight-for-age data using vitals from 8/14/2018.                         Length: 3' 0\" / 91.4 cm  17 %ile based on CDC 2-20 Years stature-for-age data using vitals from 8/14/2018.                              BMI: Body mass index is 16.6 kg/(m^2).  68 %ile based on CDC 2-20 Years BMI-for-age data using vitals from 8/14/2018.           Blood Pressure: Blood pressure percentiles are 22.0 % systolic and 65.0 % diastolic based on the August 2017 AAP Clinical Practice Guideline.     Your child s next Preventive Check-up will be at 4 years of age    Development  At this age, your child may:    jump forward    balance and stand on one foot briefly    pedal a tricycle    change feet when going up stairs    build a tower of nine cubes and make a bridge out of three cubes    speak clearly, speak sentences of four to six words and use pronouns and plurals correctly    ask  how,   what,   why  and  when\"    like silly words and rhymes    know his age, name and gender    understand  cold,   tired,   hungry,   on  and  under     compare things using words like bigger or shorter    draw a [place]    know names of colors    tell you a story from a book or TV    put on clothing and shoes    eat independently    learning to sing, count, and say ABC s    Diet    Avoid junk foods and unhealthy snacks and soft drinks.    Your child may be a picky eater, offer a " Pt scheduled for bendamustine/rituximab D1 C3. Arrived ambulatory, independent; accompanied by wife. Pt denied pain/discomfort, s/sx infection, or other health changes. Labs completed 2/4/19; PLT 73k, K 3.5. Courtesy call placed to NAZIA Fitzgerald, on behalf of Dr. Servin to notify of PLT; TORB ok to treat this cycle & pt to complete repeat CBC in one (1) week; ok to replace electrolytes per protocol. POC discussed w/ pt & wife; bleeding precautions reviewed; both verbalized understanding & agreement. PIV placed to R-FA; easily flushed, brisk blood return. Premeds given per orders. Infusions completed w/o adverse events. PIV flushed, brisk blood return; left in place for use on 2/6/19, wrapped w/ gauze & coban. Treatment plan reviewed; pt verbalized knowledge of next appt; denied any unmet needs. Pt discharged ambulatory, independent, NAD; accompanied by wife.   range of healthy foods.  Your job is to provide the food, your child s job is to choose what and how much to eat.    Do not let your child run around while eating.  Make him sit and eat.  This will help prevent choking.    Sleep    Your child may stop taking regular naps.  If your child does not nap, you may want to start a  quiet time.       Continue your regular nighttime routine.    Safety    Use an approved toddler car seat every time your child rides in the car.      Any child, 2 years or older, who has outgrown the rear-facing weight or height limit for their car seat, should use a forward-facing car seat with a harness.    Every child needs to be in the back seat through age 12.    Adults should model car safety by always using seatbelts.    Keep all medicines, cleaning supplies and poisons out of your child s reach.  Call the poison control center or your health care provider for directions in case your child swallows poison.    Put the poison control number on all phones:  1-441.323.4688.    Keep all knives, guns or other weapons out of your child s reach.  Store guns and ammunition locked up in separate parts of your house.    Teach your child the dangers of running into the street.  You will have to remind him or her often.    Teach your child to be careful around all dogs, especially when the dogs are eating.    Use sunscreen with a SPF > 15 every 2 hours.    Always watch your child near water.   Knowing how to swim  does not make him safe in the water.  Have your child wear a life jacket near any open water.    Talk to your child about not talking to or following strangers.  Also, talk about  good touch  and  bad touch.     Keep windows closed, or be sure they have screens that cannot be pushed out.      What Your Child Needs    Your child may throw temper tantrums.  Make sure he is safe and ignore the tantrums.  If you give in, your child will throw more tantrums.    Offer your child choices (such as  clothes, stories or breakfast foods).  This will encourage decision-making.    Your child can understand the consequences of unacceptable behavior.  Follow through with the consequences you talk about.  This will help your child gain self-control.    If you choose to use  time-out,  calmly but firmly tell your child why they are in time-out.  Time-out should be immediate.  The time-out spot should be non-threatening (for example - sit on a step).  You can use a timer that beeps at one minute, or ask your child to  come back when you are ready to say sorry.   Treat your child normally when the time-out is over.    If you do not use day care, consider enrolling your child in nursery school, classes, library story times, early childhood family education (ECFE) or play groups.    You may be asked where babies come from and the differences between boys and girls.  Answer these questions honestly and briefly.  Use correct terms for body parts.    Praise and hug your child when he uses the potty chair.  If he has an accident, offer gentle encouragement for next time.  Teach your child good hygiene and how to wash his hands.  Teach your girl to wipe from the front to the back.    Limit screen time (TV, computer, video games) to no more than 1 hour per day of high quality programming watched with a caregiver.    Dental Care    Brush your child s teeth two times each day with a soft-bristled toothbrush.    Use a pea-sized amount of fluoride toothpaste two times daily.  (If your child is unable to spit it out, use a smear no larger than a grain of rice.)    Bring your child to a dentist regularly.    Discuss the need for fluoride supplements if you have well water.            Follow-ups after your visit        Who to contact     If you have questions or need follow up information about today's clinic visit or your schedule please contact [facility] directly at [phone].  Normal or non-critical lab and imaging  results will be communicated to you by [employer], letter or phone within 4 business days after the clinic has received the results. If you do not hear from us within 7 days, please contact the clinic through [employer] or phone. If you have a critical or abnormal lab result, we will notify you by phone as soon as possible.  Submit refill requests through [employer] or call your pharmacy and they will forward the refill request to us. Please allow 3 business days for your refill to be completed.          Additional Information About Your Visit        [employer]icipate Information     [employer]icipate lets you send messages to your doctor, view your test results, renew your prescriptions, schedule appointments and more. To sign up, go to [email or web address], contact your [place] clinic or call [phone] during business hours.            Care EveryWhere ID     This is your Care EveryWhere ID. This could be used by other organizations to access your [place] medical records  [id number]        Your Vitals Were     Pulse Temperature Height BMI (Body Mass Index)          103 97.5  F (36.4  C) (Tympanic) 3' (0.914 m) 16.6 kg/m2         Blood Pressure from Last 3 Encounters:   08/14/18 (!) 81/49   08/23/17 99/65   07/11/16 (!) 82/67    Weight from Last 3 Encounters:   08/14/18 30 lb 9.6 oz (13.9 kg) (39 %)*   06/20/18 30 lb (13.6 kg) (38 %)*   02/26/18 31 lb (14.1 kg) (63 %)*     * Growth percentiles are based on CDC 2-20 Years data.              Today, you had the following     No orders found for display       Primary Care Provider Office Phone # Fax #    JORDI Julien [phone] [address] [facility] [address]        Equal Access to Services     SHI BEE : Sinan Calderon, kaity dunn, qaimtiaz holliday, dennis brian. So [facility] [phone].    ATENCIÓN: Si habla español, tiene a champagne disposición servicios gratuitos de asistencia lingüística.  Neto breaux [phone].    We comply with applicable federal civil rights laws and [place] laws. We do not discriminate on the basis of race, color, national origin, age, disability, sex, sexual orientation, or gender identity.            Thank you!     Thank you for choosing [facility]  for your care. Our goal is always to provide you with excellent care. Hearing back from our patients is one way we can continue to improve our services. Please take a few minutes to complete the written survey that you may receive in the mail after your visit with us. Thank you!             Your Updated Medication List - Protect others around you: Learn how to safely use, store and throw away your medicines at www.disposemymeds.org.          This list is accurate as of 8/14/18 10:43 AM.  Always use your most recent med list.                   Brand Name Dispense Instructions for use Diagnosis    MULTIVITAMIN GUMMIES CHILDRENS Chew      Take 1 chew tab by mouth daily

## 2019-02-06 ENCOUNTER — OUTPATIENT INFUSION SERVICES (OUTPATIENT)
Dept: ONCOLOGY | Facility: MEDICAL CENTER | Age: 69
End: 2019-02-06
Attending: INTERNAL MEDICINE
Payer: MEDICARE

## 2019-02-06 VITALS
BODY MASS INDEX: 36.49 KG/M2 | WEIGHT: 227.07 LBS | SYSTOLIC BLOOD PRESSURE: 152 MMHG | TEMPERATURE: 97.4 F | DIASTOLIC BLOOD PRESSURE: 80 MMHG | HEIGHT: 66 IN | RESPIRATION RATE: 18 BRPM | HEART RATE: 70 BPM | OXYGEN SATURATION: 98 %

## 2019-02-06 DIAGNOSIS — C91.10 CLL (CHRONIC LYMPHOCYTIC LEUKEMIA) (HCC): ICD-10-CM

## 2019-02-06 PROCEDURE — 96409 CHEMO IV PUSH SNGL DRUG: CPT

## 2019-02-06 PROCEDURE — 700111 HCHG RX REV CODE 636 W/ 250 OVERRIDE (IP): Performed by: NURSE PRACTITIONER

## 2019-02-06 PROCEDURE — 96375 TX/PRO/DX INJ NEW DRUG ADDON: CPT

## 2019-02-06 PROCEDURE — 700105 HCHG RX REV CODE 258: Performed by: NURSE PRACTITIONER

## 2019-02-06 RX ADMIN — ONDANSETRON HYDROCHLORIDE 16 MG: 2 SOLUTION INTRAMUSCULAR; INTRAVENOUS at 17:24

## 2019-02-06 RX ADMIN — BENDAMUSTINE HYDROCHLORIDE 153.3 MG: 25 INJECTION, SOLUTION INTRAVENOUS at 17:43

## 2019-02-06 NOTE — PROGRESS NOTES
Nutrition Services: Update   Met with pt during chemo infusion to follow-up on current nutrition status. Weight is stable at 222#. Pt reports good continual good appetite and intake. Pt denied any GI distress. Pt did not express any questions ro concerns. Per pt nutritionally stable, RD to sign off. Available Prn [phone].

## 2019-02-06 NOTE — PROGRESS NOTES
"Pharmacy chemotherapy calculations verification    Dx: CLL      Protocol: Bendamustine + Rituximab     *Dosing Reference*  Rituximab 375 mg/m2 IV on Day 1 of Cycle 1 - complete  followed by   Rituximab 500 mg/m2 IV on Day 1 of Cycles 2-6  Bendamustine 70 mg/m2 IV over 10 minutes daily on Days 1-2 of Cycles 1-6  28-day cycle for 6 cycles (Relapsed/Refractory: age > 65 years and younger patients with significant comorbidities)  NCCN Guidelines for Chronic Lymphocytic Leukemia/Small Lymphocytic Lymphoma V.5.2018  Ariella GASTELUM et al. J Clin Oncol. 2012;30(26):3209-16  pennie Wheeler. J Clin Oncol. 2011;29(26):3559-66  Lashellhashwini B, et al. Lancet Oncol. 2016;17(7):928-942       Allergies:  Patient has no known allergies.   /80   Pulse 70   Temp 36.3 °C (97.4 °F) (Temporal)   Resp 18   Ht 1.68 m (5' 6.14\")   Wt 103 kg (227 lb 1.2 oz)   SpO2 98%   BMI 36.49 kg/m²  Body surface area is 2.19 meters squared.     9/8/2017 12:02   Hep B Surface Antibody Quant <3.10   Hepatitis B Surface Antigen Negative   Hepatitis B Ccore Ab, Total Negative     Labs 02/04/19  ANC~ 1800 Plt = 73k (ok to treat per MD)   Hgb = 15.3   SCr = 0.67mg/dL CrCl ~ 93mL/min (min Scr = 0.7)  LFT's = WNL  TBili = 1.7 (MD is aware, ok to proceed)      Drug Order   (Drug name, dose, route, IV Fluid & volume, frequency, number of doses) Cycle 3, Day 2 of 2  Previous treatment: C2 = 01/03/19   Medication = Bendamustine (Bendeka)  Base Dose= 70 mg/m2  Calc Dose: Base Dose x 2.19 m2 = 153.3 mg  Final Dose = 153.3 mg  Route = IV  Fluid & Volume = NS 50 mL  Admin Duration = Over 10 minutes    Days 1-2      <5% difference, okay to treat with final dose     By my signature below, I confirm this process was performed independently with the BSA and all final chemotherapy dosing calculations congruent. I have reviewed the above chemotherapy order and that my calculation of the final dose and BSA (when applicable) corroborate those calculations of the order " entry pharmacist. Discrepancies of 5% or greater in the written dose have been addressed and documented within the EPIC Progress notes.      Jade Gilbert, IsaiahD

## 2019-02-07 NOTE — PROGRESS NOTES
Chemotherapy Verification - PRIMARY RN      Height = 168 cm  Weight = 103 kg  BSA = 2.19 m2       Medication: bendamustine  Dose: 70 mg/m2  Calculated Dose: 153.3 mg                             (In mg/m2, AUC, mg/kg)       I confirm this process was performed independently with the BSA and all final chemotherapy dosing calculations congruent.  Any discrepancies of 5% or greater have been addressed with the chemotherapy pharmacist. The resolution of the discrepancy has been documented in the EPIC progress notes.

## 2019-02-07 NOTE — PROGRESS NOTES
Pt scheduled for bendamustine D2 C3. Arrived ambulatory, independent; accompanied by wife. Pt denied pain/discomfort, s/sx infection, or other health changes. PIV placed 2/5/19 easily flushed, brisk blood return. Premeds given per orders. Infusion completed w/o adverse events. PIV flushed, brisk blood return; removed, cath intact. Treatment plan reviewed; pt verbalized knowledge of next appt; denied any unmet needs. Pt discharged ambulatory, independent, NAD.

## 2019-02-07 NOTE — PROGRESS NOTES
Chemotherapy Verification - SECONDARY RN       Height = 168 cm  Weight = 103 kg  BSA = 2.19 m2       Medication: Treanda  Dose: 70 mg/m2  Calculated Dose: 153.46 mg                             (In mg/m2, AUC, mg/kg)     I confirm that this process was performed independently.

## 2019-02-12 ENCOUNTER — TELEPHONE (OUTPATIENT)
Dept: OBGYN | Facility: MEDICAL CENTER | Age: 69
End: 2019-02-12

## 2019-02-12 NOTE — TELEPHONE ENCOUNTER
Left vm for pt reminding him to have his CBC drawn today. Pt informed to please call this office back and let us know when and where he had then drawn so we can follow up on them.     If pt calls back, please find out when and where he had his CBC drawn this week.

## 2019-02-13 ENCOUNTER — HOSPITAL ENCOUNTER (OUTPATIENT)
Dept: LAB | Facility: MEDICAL CENTER | Age: 69
End: 2019-02-13
Attending: NURSE PRACTITIONER
Payer: MEDICARE

## 2019-02-13 DIAGNOSIS — C91.10 CLL (CHRONIC LYMPHOCYTIC LEUKEMIA) (HCC): ICD-10-CM

## 2019-02-13 DIAGNOSIS — C22.0 HEPATOMA (HCC): ICD-10-CM

## 2019-02-13 LAB
BASOPHILS # BLD AUTO: 1.2 % (ref 0–1.8)
BASOPHILS # BLD: 0.04 K/UL (ref 0–0.12)
EOSINOPHIL # BLD AUTO: 0.16 K/UL (ref 0–0.51)
EOSINOPHIL NFR BLD: 5 % (ref 0–6.9)
ERYTHROCYTE [DISTWIDTH] IN BLOOD BY AUTOMATED COUNT: 55.4 FL (ref 35.9–50)
HCT VFR BLD AUTO: 43.3 % (ref 42–52)
HGB BLD-MCNC: 15 G/DL (ref 14–18)
IMM GRANULOCYTES # BLD AUTO: 0.03 K/UL (ref 0–0.11)
IMM GRANULOCYTES NFR BLD AUTO: 0.9 % (ref 0–0.9)
LYMPHOCYTES # BLD AUTO: 0.3 K/UL (ref 1–4.8)
LYMPHOCYTES NFR BLD: 9.3 % (ref 22–41)
MCH RBC QN AUTO: 32.2 PG (ref 27–33)
MCHC RBC AUTO-ENTMCNC: 34.6 G/DL (ref 33.7–35.3)
MCV RBC AUTO: 92.9 FL (ref 81.4–97.8)
MONOCYTES # BLD AUTO: 0.48 K/UL (ref 0–0.85)
MONOCYTES NFR BLD AUTO: 14.9 % (ref 0–13.4)
NEUTROPHILS # BLD AUTO: 2.22 K/UL (ref 1.82–7.42)
NEUTROPHILS NFR BLD: 68.7 % (ref 44–72)
NRBC # BLD AUTO: 0 K/UL
NRBC BLD-RTO: 0 /100 WBC
PLATELET # BLD AUTO: 70 K/UL (ref 164–446)
PMV BLD AUTO: 11.4 FL (ref 9–12.9)
RBC # BLD AUTO: 4.66 M/UL (ref 4.7–6.1)
WBC # BLD AUTO: 3.2 K/UL (ref 4.8–10.8)

## 2019-02-13 PROCEDURE — 82107 ALPHA-FETOPROTEIN L3: CPT

## 2019-02-13 PROCEDURE — 36415 COLL VENOUS BLD VENIPUNCTURE: CPT

## 2019-02-13 PROCEDURE — 83951 ONCOPROTEIN DCP: CPT

## 2019-02-13 PROCEDURE — 85025 COMPLETE CBC W/AUTO DIFF WBC: CPT

## 2019-02-17 LAB
ACARBOXYPROTHROMBIN SERPL-MCNC: 0.8 NG/ML (ref 0–7.4)
AFP L3 MFR SERPL: 7.6 % (ref 0–9.9)
AFP SERPL-MCNC: 9 NG/ML (ref 0–15)

## 2019-02-19 ENCOUNTER — TELEPHONE (OUTPATIENT)
Dept: RADIOLOGY | Facility: MEDICAL CENTER | Age: 69
End: 2019-02-19

## 2019-02-19 ENCOUNTER — TELEPHONE (OUTPATIENT)
Dept: HEMATOLOGY ONCOLOGY | Facility: MEDICAL CENTER | Age: 69
End: 2019-02-19

## 2019-02-19 NOTE — TELEPHONE ENCOUNTER
Tumor markers reviewed with Dr. Norris: not elevated post intervention. LM for pt to return call to discuss. Next imaging study will be MRI in 3 months. Discussed w/pt. Alternative plan offered was CT liver mass protocol due to pt complaints of claustrophobia with MRI but pt declined due to medical radiation exposure.

## 2019-02-19 NOTE — TELEPHONE ENCOUNTER
I called and spoke with pt and informed him that his platelets are still low.  I told him that GINA Messer wanted him to get his labs done a few days before his next treatment.  He verbalized understanding, and will plan on getting his labs done on 4/1/19.

## 2019-03-01 ENCOUNTER — HOSPITAL ENCOUNTER (OUTPATIENT)
Dept: LAB | Facility: MEDICAL CENTER | Age: 69
End: 2019-03-01
Attending: NURSE PRACTITIONER
Payer: MEDICARE

## 2019-03-01 DIAGNOSIS — C91.10 CLL (CHRONIC LYMPHOCYTIC LEUKEMIA) (HCC): ICD-10-CM

## 2019-03-01 LAB
ALBUMIN SERPL BCP-MCNC: 3.8 G/DL (ref 3.2–4.9)
ALBUMIN/GLOB SERPL: 2 G/DL
ALP SERPL-CCNC: 68 U/L (ref 30–99)
ALT SERPL-CCNC: 20 U/L (ref 2–50)
ANION GAP SERPL CALC-SCNC: 7 MMOL/L (ref 0–11.9)
AST SERPL-CCNC: 31 U/L (ref 12–45)
BASOPHILS # BLD AUTO: 1.5 % (ref 0–1.8)
BASOPHILS # BLD: 0.04 K/UL (ref 0–0.12)
BILIRUB SERPL-MCNC: 1.8 MG/DL (ref 0.1–1.5)
BUN SERPL-MCNC: 9 MG/DL (ref 8–22)
CALCIUM SERPL-MCNC: 9.5 MG/DL (ref 8.5–10.5)
CHLORIDE SERPL-SCNC: 110 MMOL/L (ref 96–112)
CO2 SERPL-SCNC: 26 MMOL/L (ref 20–33)
CREAT SERPL-MCNC: 0.59 MG/DL (ref 0.5–1.4)
EOSINOPHIL # BLD AUTO: 0.17 K/UL (ref 0–0.51)
EOSINOPHIL NFR BLD: 6.5 % (ref 0–6.9)
ERYTHROCYTE [DISTWIDTH] IN BLOOD BY AUTOMATED COUNT: 55.6 FL (ref 35.9–50)
GLOBULIN SER CALC-MCNC: 1.9 G/DL (ref 1.9–3.5)
GLUCOSE SERPL-MCNC: 163 MG/DL (ref 65–99)
HCT VFR BLD AUTO: 42.3 % (ref 42–52)
HGB BLD-MCNC: 14.6 G/DL (ref 14–18)
IMM GRANULOCYTES # BLD AUTO: 0 K/UL (ref 0–0.11)
IMM GRANULOCYTES NFR BLD AUTO: 0 % (ref 0–0.9)
LYMPHOCYTES # BLD AUTO: 0.4 K/UL (ref 1–4.8)
LYMPHOCYTES NFR BLD: 15.3 % (ref 22–41)
MCH RBC QN AUTO: 31.9 PG (ref 27–33)
MCHC RBC AUTO-ENTMCNC: 34.5 G/DL (ref 33.7–35.3)
MCV RBC AUTO: 92.4 FL (ref 81.4–97.8)
MONOCYTES # BLD AUTO: 0.44 K/UL (ref 0–0.85)
MONOCYTES NFR BLD AUTO: 16.8 % (ref 0–13.4)
NEUTROPHILS # BLD AUTO: 1.57 K/UL (ref 1.82–7.42)
NEUTROPHILS NFR BLD: 59.9 % (ref 44–72)
NRBC # BLD AUTO: 0 K/UL
NRBC BLD-RTO: 0 /100 WBC
PLATELET # BLD AUTO: 83 K/UL (ref 164–446)
PMV BLD AUTO: 10.3 FL (ref 9–12.9)
POTASSIUM SERPL-SCNC: 3.7 MMOL/L (ref 3.6–5.5)
PROT SERPL-MCNC: 5.7 G/DL (ref 6–8.2)
RBC # BLD AUTO: 4.58 M/UL (ref 4.7–6.1)
SODIUM SERPL-SCNC: 143 MMOL/L (ref 135–145)
WBC # BLD AUTO: 2.6 K/UL (ref 4.8–10.8)

## 2019-03-01 PROCEDURE — 36415 COLL VENOUS BLD VENIPUNCTURE: CPT

## 2019-03-01 PROCEDURE — 85025 COMPLETE CBC W/AUTO DIFF WBC: CPT

## 2019-03-01 PROCEDURE — 80053 COMPREHEN METABOLIC PANEL: CPT

## 2019-03-04 ENCOUNTER — OFFICE VISIT (OUTPATIENT)
Dept: HEMATOLOGY ONCOLOGY | Facility: MEDICAL CENTER | Age: 69
End: 2019-03-04
Payer: MEDICARE

## 2019-03-04 VITALS
DIASTOLIC BLOOD PRESSURE: 60 MMHG | BODY MASS INDEX: 35.59 KG/M2 | OXYGEN SATURATION: 95 % | HEIGHT: 66 IN | RESPIRATION RATE: 18 BRPM | SYSTOLIC BLOOD PRESSURE: 124 MMHG | HEART RATE: 70 BPM | TEMPERATURE: 98 F | WEIGHT: 221.45 LBS

## 2019-03-04 DIAGNOSIS — C91.10 CLL (CHRONIC LYMPHOCYTIC LEUKEMIA) (HCC): ICD-10-CM

## 2019-03-04 DIAGNOSIS — C22.0 HEPATOMA (HCC): ICD-10-CM

## 2019-03-04 PROCEDURE — 99214 OFFICE O/P EST MOD 30 MIN: CPT | Performed by: INTERNAL MEDICINE

## 2019-03-04 RX ORDER — ONDANSETRON 8 MG/1
8 TABLET, ORALLY DISINTEGRATING ORAL
Status: CANCELLED | OUTPATIENT
Start: 2019-03-05

## 2019-03-04 RX ORDER — MEPERIDINE HYDROCHLORIDE 25 MG/ML
25 INJECTION INTRAMUSCULAR; INTRAVENOUS; SUBCUTANEOUS
Status: CANCELLED | OUTPATIENT
Start: 2019-03-05

## 2019-03-04 RX ORDER — 0.9 % SODIUM CHLORIDE 0.9 %
5 VIAL (ML) INJECTION PRN
Status: CANCELLED | OUTPATIENT
Start: 2019-03-05

## 2019-03-04 RX ORDER — ONDANSETRON 8 MG/1
8 TABLET, ORALLY DISINTEGRATING ORAL
Status: CANCELLED | OUTPATIENT
Start: 2019-03-06

## 2019-03-04 RX ORDER — ONDANSETRON 2 MG/ML
4 INJECTION INTRAMUSCULAR; INTRAVENOUS
Status: CANCELLED | OUTPATIENT
Start: 2019-03-05

## 2019-03-04 RX ORDER — 0.9 % SODIUM CHLORIDE 0.9 %
VIAL (ML) INJECTION PRN
Status: CANCELLED | OUTPATIENT
Start: 2019-03-06

## 2019-03-04 RX ORDER — ACETAMINOPHEN 325 MG/1
650 TABLET ORAL
Status: CANCELLED | OUTPATIENT
Start: 2019-03-05

## 2019-03-04 RX ORDER — PROCHLORPERAZINE MALEATE 10 MG
10 TABLET ORAL EVERY 6 HOURS PRN
Status: CANCELLED | OUTPATIENT
Start: 2019-03-05

## 2019-03-04 RX ORDER — PROCHLORPERAZINE MALEATE 10 MG
10 TABLET ORAL EVERY 6 HOURS PRN
Status: CANCELLED | OUTPATIENT
Start: 2019-03-06

## 2019-03-04 RX ORDER — SODIUM CHLORIDE 9 MG/ML
INJECTION, SOLUTION INTRAVENOUS CONTINUOUS
Status: CANCELLED | OUTPATIENT
Start: 2019-03-05

## 2019-03-04 RX ORDER — 0.9 % SODIUM CHLORIDE 0.9 %
VIAL (ML) INJECTION PRN
Status: CANCELLED | OUTPATIENT
Start: 2019-03-05

## 2019-03-04 RX ORDER — SODIUM CHLORIDE 9 MG/ML
INJECTION, SOLUTION INTRAVENOUS CONTINUOUS
Status: CANCELLED | OUTPATIENT
Start: 2019-03-06

## 2019-03-04 RX ORDER — 0.9 % SODIUM CHLORIDE 0.9 %
5 VIAL (ML) INJECTION PRN
Status: CANCELLED | OUTPATIENT
Start: 2019-03-06

## 2019-03-04 RX ORDER — ONDANSETRON 2 MG/ML
4 INJECTION INTRAMUSCULAR; INTRAVENOUS
Status: CANCELLED | OUTPATIENT
Start: 2019-03-06

## 2019-03-04 RX ORDER — DIPHENHYDRAMINE HYDROCHLORIDE 50 MG/ML
25 INJECTION INTRAMUSCULAR; INTRAVENOUS
Status: CANCELLED | OUTPATIENT
Start: 2019-03-05

## 2019-03-04 RX ORDER — ACETAMINOPHEN 325 MG/1
650 TABLET ORAL ONCE
Status: CANCELLED | OUTPATIENT
Start: 2019-03-05 | End: 2019-03-05

## 2019-03-04 ASSESSMENT — PAIN SCALES - GENERAL: PAINLEVEL: NO PAIN

## 2019-03-04 NOTE — PROGRESS NOTES
Date of visit: 3/4/2019  8:57 AM      Chief Complaint- follow-up of CLL , 13q deleted  -Hepatocellular carcinoma        Identification/Prior relevant history:   -2004. Screening PSA elevated. Found to have prostate cancer Dr. Ramírez, urologist, treated with brachytherapy at [facility]. Followed with observation and serial PSA  March, 2014. Noted to have elevated WBC count  July 15, 2014. Flow cytometry shows CD5 positive B-cell lymphoproliferative disorder with a phenotype most consistent with chronic lymphocytic leukemia or small lymphocytic lymphoma  July 26, 2014. CT chest, abdomen and pelvis showed Hepatosplenomegaly. Slightly enlarged lymph nodes in the subcarinal region and right cardiophrenic angle.  December 11, 2014. Negative for [id number] mutation  January 4, 2016 ultrasound of the abdomen showed Cirrhosis with portal hypertension.Associated dilated portal vein and splenomegaly.No evidence for hepatoma  - 8/2017-he presented with significant iron deficiency anemia requiring iron infusion. Subsequently had upper and lower endoscopy , these were reportedly normal. He does not remember being informed that he has any esophageal viruses.     -12/6/17-CT abdomen- 1.  2.4 cm arterial enhancing mass in segment 4 of the liver demonstrates washout. LR-5.  1.3 cm arterial enhancing lesion in the hepatic dome is similar to the recent MRI. No definite washout or capsule appreciated. LR-3.  Morphologic characteristics of cirrhosis with evidence of portal hypertension including splenomegaly.  -12/2017-established care with me    - CLL FISH panel showed favorable prognostic marker of 13 qdeletion.   -He is following up with Dr. Senior.imaging of liver mass is suggestive of hepatoma.      CT of the chest-4/7/18-enlarged mediastinal lymph nodes with right paratracheal lymph nodes measuring up to 15 mm short axis and a subcarinal lymph node measuring 2.9 cm short axis. No hilar adenopathy identified. No axillary  lymphadenopathy identified. Multiple nonenlarged axillary lymph nodes identified bilaterally   decided not to do any intervention until his hepatoma issues are taken care of  -6/ 28/ 2018 the lesion in 4B was ablated but the operative ablation of a 4A lesion was unsuccessful due to proximity to the portal pedicle., No documented liver mass biopsy.     -underwent mapping on August 28 and Y90 SIRT of the left lobe on September 7.    -10/2018-started Rituxan with bendamustine at a dose of 70 mg/m square.  Excellent tolerance with resolution of his lymphocytosis.  Second cycle delayed due to TACE which was done on 12/15/18  Interim history  -He is status post 3 cycles with good tolerance.    1/21/19-MRI of the liver-No new arterial enhancing mass is identified.    2.  Previously described mass in the caudate lobe is now avascular with peripheral enhancement, consistent with recent chemoembolization. LR-treated    3.  Ablation cavity in the left hepatic lobe remains avascular.    4.  8 mm arterial enhancing observation in segment 4A is not as pronounced as on the prior exam and grossly stable. LR-3    5.  Stable peripheral enhancing lesion in segment 2. LR-treated    6.  Previously described rim-enhancing observation in the superior aspect of segment 2 is not visualized on the current exam, likely related to differences in technique.    7.  Morphologic characteristics of cirrhosis and evidence of portal hypertension including splenomegaly    Past Medical History:      Past Medical History:   Diagnosis Date   • Anemia    • Cancer (HCC) 2004    Prostate - did brachytherapy at [facility]   • Cancer (HCC) 2018    Liver   • Cirrhosis (HCC)    • CLL (chronic lymphocytic leukemia) (HCC) 2014   • CMV (cytomegalovirus infection) (HCC) 1990    Treated for 6 weeks   • Diabetes (HCC)     oral medication   • Heart burn    • Liver cancer (HCC)    • Liver tumor    • Rosacea        Past surgical history:       Past Surgical History:  "  Procedure Laterality Date   • HEPATIC ABLATION LAPAROSCOPIC  6/28/2018    Procedure: HEPATIC ABLATION LAPAROSCOPIC. SEGMENT 4B, HEPATOMA, REPAIR OF INCARCERATED UMBILICAL HERNIA;  Surgeon: Feliberto Burgos M.D.;  Location: SURGERY [facility];  Service: General   • BRACHY THERAPY         Allergies:       Patient has no known allergies.    Medications:         Current Outpatient Prescriptions   Medication Sig Dispense Refill   • cetirizine (ZYRTEC) 10 MG Tab Take 10 mg by mouth every morning.     • metFORMIN ER (GLUCOPHAGE XR) 500 MG TABLET SR 24 HR Take 2 Tabs by mouth 2 times a day. 360 Tab 1   • Multiple Vitamins-Minerals (MULTIVITAMIN PO) Take 1 Tab by mouth every morning.     • Polyethylene Glycol 3350 (MIRALAX PO) Take 1 Cap by mouth every morning.     • ondansetron (ZOFRAN) 4 MG Tab tablet Take 1 Tab by mouth every four hours as needed for Nausea/Vomiting (for nausea, vomiting). 30 Tab 6   • prochlorperazine (COMPAZINE) 10 MG Tab Take 1 Tab by mouth every 6 hours as needed (for nausea, vomiting). 30 Tab 6   • fluticasone (FLONASE ALLERGY RELIEF) 50 MCG/ACT nasal spray Spray 1 Spray in nose 2 times a day. 1 Bottle 0     No current facility-administered medications for this visit.          Social History:     Social History     Social History   • Marital status:      Spouse name: N/A   • Number of children: 1   • Years of education: N/A     Occupational History   • fertilizer sales      Social History Main Topics   • Smoking status: Never Smoker   • Smokeless tobacco: Former User     Types: Snuff     Quit date: 3/29/2015   • Alcohol use No   • Drug use: Yes     Types: Marijuana      Comment: \"Marijuana Use\" daily    • Sexual activity: No     Other Topics Concern   • Not on file     Social History Narrative    No known exposure to asbestos, dyes, or chemicals, however he was exposed to pesticides.  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    " "for 25 years.  1 child, alive and well.  He also has a step-child.        Family History:      Family History   Problem Relation Age of Onset   • Cancer Father 81        Bladder   • Cancer Brother         Prostate & CLL (s/p 8-10 years ago)   • Hyperlipidemia Sister    • Lung Disease Neg Hx    • Diabetes Neg Hx    • Heart Disease Neg Hx    • Hypertension Neg Hx    • Stroke Neg Hx    • Alcohol/Drug Neg Hx        Review of Systems:  All other review of systems are negative except what was mentioned above in the HPI.    Constitutional: Negative for fever, chills, weight loss and malaise/fatigue.    HEENT: No new auditory or visual complaints. No sore throat and neck pain.     Respiratory: Negative for cough, sputum production, shortness of breath and wheezing.    Cardiovascular: Negative for chest pain, palpitations, orthopnea and leg swelling.    Gastrointestinal: Negative for heartburn, nausea, vomiting and abdominal pain.    Genitourinary: Negative for dysuria, hematuria    Musculoskeletal: No new arthralgias or myalgias   Skin: Negative for rash and itching.    Neurological: Negative for focal weakness and headaches.    Endo/Heme/Allergies: No abnormal bleed/bruise.    Psychiatric/Behavioral: No new depression/anxiety.    Physical Exam:  Vitals: /60 (BP Location: Right arm, Patient Position: Sitting, BP Cuff Size: Large adult)   Pulse 70   Temp 36.7 °C (98 °F) (Temporal)   Resp 18   Ht 1.676 m (5' 6\")   Wt 100.4 kg (221 lb 7.2 oz)   SpO2 95%   BMI 35.74 kg/m²      General: Not in acute distress, alert and oriented x 3  HEENT: No pallor, icterus. Oropharynx clear.   Neck: Supple, no palpable masses.  Lymph nodes: No palpable cervical, supraclavicular, axillary or inguinal lymphadenopathy.    CVS: regular rate and rhythm, no rubs or gallops  RESP: Clear to auscultate bilaterally, no wheezing or crackles.   ABD: Soft, non tender, non distended, positive bowel sounds, no palpable organomegaly  EXT: No " edema or cyanosis  CNS: Alert and oriented x3, No focal deficits.  Skin- No rash      Labs:   Hospital Outpatient Visit on 03/01/2019   Component Date Value Ref Range Status   • WBC 03/01/2019 2.6* 4.8 - 10.8 K/uL Final   • RBC 03/01/2019 4.58* 4.70 - 6.10 M/uL Final   • Hemoglobin 03/01/2019 14.6  14.0 - 18.0 g/dL Final   • Hematocrit 03/01/2019 42.3  42.0 - 52.0 % Final   • MCV 03/01/2019 92.4  81.4 - 97.8 fL Final   • MCH 03/01/2019 31.9  27.0 - 33.0 pg Final   • MCHC 03/01/2019 34.5  33.7 - 35.3 g/dL Final   • RDW 03/01/2019 55.6* 35.9 - 50.0 fL Final   • Platelet Count 03/01/2019 83* 164 - 446 K/uL Final   • MPV 03/01/2019 10.3  9.0 - 12.9 fL Final   • Neutrophils-Polys 03/01/2019 59.90  44.00 - 72.00 % Final   • Lymphocytes 03/01/2019 15.30* 22.00 - 41.00 % Final   • Monocytes 03/01/2019 16.80* 0.00 - 13.40 % Final   • Eosinophils 03/01/2019 6.50  0.00 - 6.90 % Final   • Basophils 03/01/2019 1.50  0.00 - 1.80 % Final   • Immature Granulocytes 03/01/2019 0.00  0.00 - 0.90 % Final   • Nucleated RBC 03/01/2019 0.00  /100 WBC Final   • Neutrophils (Absolute) 03/01/2019 1.57* 1.82 - 7.42 K/uL Final    Includes immature neutrophils, if present.   • Lymphs (Absolute) 03/01/2019 0.40* 1.00 - 4.80 K/uL Final   • Monos (Absolute) 03/01/2019 0.44  0.00 - 0.85 K/uL Final   • Eos (Absolute) 03/01/2019 0.17  0.00 - 0.51 K/uL Final   • Baso (Absolute) 03/01/2019 0.04  0.00 - 0.12 K/uL Final   • Immature Granulocytes (abs) 03/01/2019 0.00  0.00 - 0.11 K/uL Final   • NRBC (Absolute) 03/01/2019 0.00  K/uL Final   • Sodium 03/01/2019 143  135 - 145 mmol/L Final   • Potassium 03/01/2019 3.7  3.6 - 5.5 mmol/L Final   • Chloride 03/01/2019 110  96 - 112 mmol/L Final   • Co2 03/01/2019 26  20 - 33 mmol/L Final   • Anion Gap 03/01/2019 7.0  0.0 - 11.9 Final   • Glucose 03/01/2019 163* 65 - 99 mg/dL Final   • Bun 03/01/2019 9  8 - 22 mg/dL Final   • Creatinine 03/01/2019 0.59  0.50 - 1.40 mg/dL Final   • Calcium 03/01/2019 9.5  8.5 -  10.5 mg/dL Final   • AST(SGOT) 03/01/2019 31  12 - 45 U/L Final   • ALT(SGPT) 03/01/2019 20  2 - 50 U/L Final   • Alkaline Phosphatase 03/01/2019 68  30 - 99 U/L Final   • Total Bilirubin 03/01/2019 1.8* 0.1 - 1.5 mg/dL Final   • Albumin 03/01/2019 3.8  3.2 - 4.9 g/dL Final   • Total Protein 03/01/2019 5.7* 6.0 - 8.2 g/dL Final   • Globulin 03/01/2019 1.9  1.9 - 3.5 g/dL Final   • A-G Ratio 03/01/2019 2.0  g/dL Final   • GFR If  03/01/2019 >60  >60 mL/min/1.73 m 2 Final   • GFR If Non  03/01/2019 >60  >60 mL/min/1.73 m 2 Final             Assessment and Plan:    CLL-Odom Stage 0, 13 q. Deleted.     He was on observation for more than 4years and developed progression with worsening leukocytosis and significant enlarged subcarinal lymph node seen on CT scan 4/2018.  His systemic treatment was delayed due to simultaneously diagnosed hepatocellular carcinoma.  Started Rituxan and bendamustine on 10/2018 with good tolerance and improvement in his lymphocytosis.  Second cycle was delayed due to  TACE.  He resumed chemotherapy and is due for cycle #4.  His lymphocytosis has resolved.  Will obtain a restaging PET scan and if he is in full remission we will hold further chemotherapy at this time.  PET scan should also help to clarify the status of his hepatocellular carcinoma.    HCC-6/2018-ablation of the lesion in 4 B  Y 90 lesion 4A.  progression of the caudate hepatoma -status post YUNIER TACE -12/15.  No major AFP elevation.  Recent MRI shows no obvious progression.  Follow-up with interventional radiology.  Will also check DCP tumor markers as he never had any AFP elevation.    Leukopenia and thrombocytopenia-this appears to be secondary to underlying cirrhosis.  His lymphocytosis has resolved.      He agreed and verbalized  agreement and understanding with the current plan.  I answered all questions and concerns at this time         Please note that this dictation was created using voice  recognition software. I have made every reasonable attempt to correct obvious errors, but I expect that there are errors of grammar and possibly content that I did not discover before finalizing the note.      SIGNATURES:  Alfonzo Servin    CC:  Carmencita Hand P.A.-C.  No ref. provider found

## 2019-03-04 NOTE — PROGRESS NOTES
"Pharmacy Chemotherapy calculation:    Dx: CLL (h/o Hepatocellular carcinoma and prostate cancer)  NOTE: 11/1/18 Clarified with Dr. Servin/Meenakshi that patient is being treated as CLL and they will update Treatment Plan for cycle 2 to reflect that and dose escalation of Rituxan. Bertin Ceballos    Cycle 4, Days 1-2  Previous treatment = Feb 5-6, 2019    Regimen: bendamustine + rituximab (>66y/o)  *Dosing Reference*  · Bendamustine 70 mg/m2 IV once per day on days 1 & 2  · Rituximab (Rituxan) as follows:  ? Cycle 1: 375 mg/m2 IV once on day 1 (completed)  ? Subsequent cycles: 500 mg/m2 IV once on day 1  28-day cycle for up to 6 cycles based on response and toxicity   Ariella GASTELUM et al - J Clin Oncol. 2012 Sep 10;30(26):3209-16. Epub 2012 Aug 6  Ariella GASTELUM et al - J Clin Oncol. 2011 Sep 10;29(26):3559-66. doi: 10.1200/JCO.2010.33.8061. Epub 2011 Aug 15  pennie Sharpe - Lancet Oncol. 2016 Jul;17(7):928-942. doi: 10.1016/-[id number])[phone]. Epub 2016 May 20.     /77   Pulse 60   Temp 36.4 °C (97.6 °F) (Temporal)   Resp 18   Ht 1.69 m (5' 6.53\")   Wt 101.4 kg (223 lb 8.7 oz)   SpO2 99%   BMI 35.50 kg/m²   Body surface area is 2.18 meters squared.      9/8/2017 12:02   Hepatitis B Surface Antigen Negative   Hepatitis B Ccore Ab, Total Negative     All lab results 3/1/19(ok for 5 day old labs per MD) within treatment parameters, except Tbili.   MD aware of all current lab results. Orders received to proceed with treatment.       Bendamustine (Bendeka) 70 mg/m2 x 2.18m2 = 153mg    <5% difference, ok to treat with final dose = 152.6mg IV on Days 1 and 2    Rituximab (Rituxan) 500 mg/m2 x  2.18m2 = 1090mg    Rounded to vial size (within 10%) per dose rounding protocol.    ok to treat with final dose = 1100 mg IV on Day 1 only      Dany Mahajan, IsaiahD  "

## 2019-03-05 ENCOUNTER — OUTPATIENT INFUSION SERVICES (OUTPATIENT)
Dept: ONCOLOGY | Facility: MEDICAL CENTER | Age: 69
End: 2019-03-05
Attending: INTERNAL MEDICINE
Payer: MEDICARE

## 2019-03-05 ENCOUNTER — TELEPHONE (OUTPATIENT)
Dept: HEMATOLOGY ONCOLOGY | Facility: MEDICAL CENTER | Age: 69
End: 2019-03-05

## 2019-03-05 VITALS
DIASTOLIC BLOOD PRESSURE: 77 MMHG | WEIGHT: 223.55 LBS | RESPIRATION RATE: 18 BRPM | TEMPERATURE: 97.6 F | BODY MASS INDEX: 35.09 KG/M2 | SYSTOLIC BLOOD PRESSURE: 149 MMHG | HEART RATE: 60 BPM | OXYGEN SATURATION: 99 % | HEIGHT: 67 IN

## 2019-03-05 DIAGNOSIS — C91.10 CLL (CHRONIC LYMPHOCYTIC LEUKEMIA) (HCC): ICD-10-CM

## 2019-03-05 PROCEDURE — 96375 TX/PRO/DX INJ NEW DRUG ADDON: CPT

## 2019-03-05 PROCEDURE — 96415 CHEMO IV INFUSION ADDL HR: CPT

## 2019-03-05 PROCEDURE — 96409 CHEMO IV PUSH SNGL DRUG: CPT

## 2019-03-05 PROCEDURE — 96411 CHEMO IV PUSH ADDL DRUG: CPT

## 2019-03-05 PROCEDURE — A9270 NON-COVERED ITEM OR SERVICE: HCPCS | Performed by: INTERNAL MEDICINE

## 2019-03-05 PROCEDURE — 96413 CHEMO IV INFUSION 1 HR: CPT

## 2019-03-05 PROCEDURE — 700105 HCHG RX REV CODE 258: Performed by: INTERNAL MEDICINE

## 2019-03-05 PROCEDURE — 700105 HCHG RX REV CODE 258

## 2019-03-05 PROCEDURE — 700111 HCHG RX REV CODE 636 W/ 250 OVERRIDE (IP): Mod: JG

## 2019-03-05 PROCEDURE — 700111 HCHG RX REV CODE 636 W/ 250 OVERRIDE (IP): Performed by: INTERNAL MEDICINE

## 2019-03-05 PROCEDURE — 700102 HCHG RX REV CODE 250 W/ 637 OVERRIDE(OP): Performed by: INTERNAL MEDICINE

## 2019-03-05 RX ORDER — ACETAMINOPHEN 325 MG/1
650 TABLET ORAL ONCE
Status: COMPLETED | OUTPATIENT
Start: 2019-03-05 | End: 2019-03-05

## 2019-03-05 RX ORDER — SODIUM CHLORIDE 9 MG/ML
INJECTION, SOLUTION INTRAVENOUS CONTINUOUS
Status: DISCONTINUED | OUTPATIENT
Start: 2019-03-05 | End: 2019-03-05 | Stop reason: HOSPADM

## 2019-03-05 RX ADMIN — BENDAMUSTINE HYDROCHLORIDE 152.6 MG: 25 INJECTION, SOLUTION INTRAVENOUS at 11:20

## 2019-03-05 RX ADMIN — SODIUM CHLORIDE: 9 INJECTION, SOLUTION INTRAVENOUS at 10:05

## 2019-03-05 RX ADMIN — ONDANSETRON HYDROCHLORIDE 16 MG: 2 SOLUTION INTRAMUSCULAR; INTRAVENOUS at 10:30

## 2019-03-05 RX ADMIN — ACETAMINOPHEN 650 MG: 325 TABLET, FILM COATED ORAL at 10:14

## 2019-03-05 RX ADMIN — DEXAMETHASONE SODIUM PHOSPHATE 12 MG: 4 INJECTION, SOLUTION INTRAMUSCULAR; INTRAVENOUS at 10:45

## 2019-03-05 RX ADMIN — SODIUM CHLORIDE 1100 MG: 9 INJECTION, SOLUTION INTRAVENOUS at 11:42

## 2019-03-05 RX ADMIN — DIPHENHYDRAMINE HYDROCHLORIDE 50 MG: 50 INJECTION INTRAMUSCULAR; INTRAVENOUS at 10:15

## 2019-03-05 NOTE — PROGRESS NOTES
"Pharmacy chemotherapy calculations verification    Patient Name: Sohail Duvall   Dx: CLL      Protocol: Bendamustine + Rituximab       *Dosing Reference*  Rituximab 375 mg/m2 IV on Day 1 of Cycle 1 - complete   followed by   Rituximab 500 mg/m2 IV on Day 1 of Cycles 2-6  Bendamustine 70 mg/m2 IV over 10 minutes daily on Days 1-2 of Cycles 1-6  28-day cycle for 6 cycles (Relapsed/Refractory: age > 65 years and younger patients with significant comorbidities)  NCCN Guidelines for Chronic Lymphocytic Leukemia/Small Lymphocytic Lymphoma V.5.2018  Ariella GASTELUM et al. J Clin Oncol. 2012;30(26):3209-16  Ariella GASTELUM et al. J Clin Oncol. 2011;29(26):3559-66  Chele DESAI et al. Lancet Oncol. 2016;17(7):928-942       Allergies:  Patient has no known allergies.       /77   Pulse 60   Temp 36.4 °C (97.6 °F) (Temporal)   Resp 18   Ht 1.69 m (5' 6.53\")   Wt 101.4 kg (223 lb 8.7 oz)   SpO2 99%   BMI 35.50 kg/m²  Body surface area is 2.18 meters squared.     9/8/2017 12:02   Hep B Surface Antibody Quant <3.10   Hepatitis B Surface Antigen Negative   Hepatitis B Ccore Ab, Total Negative       Labs 03/01/19  ANC~ 1600 Plt = 83k  Hgb = 14.6   SCr = 0.59mg/dL CrCl >125 mL/min (min Scr = 0.7)   LFT's = WNL  TBili = 1.8   MD is aware of labs, ok to proceed with chemo today       Drug Order   (Drug name, dose, route, IV Fluid & volume, frequency, number of doses) Cycle 4, Day 1  Previous treatment: C3 = Feb 5-6th, 2019   Medication = Rituximab (Rituxan)  Base Dose= 500mg/m2  Calc Dose: Base Dose x 2.17m2 = 1090 mg  Final Dose = 1100mg  Route = IV  Fluid & Volume =  mL  Admin Duration = Follow rate sheet  Day 1    <10% difference, okay to round to nearest vial size per P&T MAB protocol   Medication = Bendamustine (Bendeka)  Base Dose= 70 mg/m2  Calc Dose: Base Dose x 2.18m2 = 152.6 mg  Final Dose = 152.6 mg  Route = IV  Fluid & Volume = NS 50 mL  Admin Duration = Over 10 minutes    Days 1-2      <5% difference, " okay to treat with final dose     By my signature below, I confirm this process was performed independently with the BSA and all final chemotherapy dosing calculations congruent. I have reviewed the above chemotherapy order and that my calculation of the final dose and BSA (when applicable) corroborate those calculations of the  pharmacist. Discrepancies of 5% or greater in the written dose have been addressed and documented within the EPIC Progress notes.    Simi Kahn, PharmD, BCOP

## 2019-03-05 NOTE — PROGRESS NOTES
Chemotherapy Verification - PRIMARY RN      Height = 169 cm  Weight = 101.4 kg  BSA = 2.18 m2       Medication: Bendeka  Dose: 70 mg/m2  Calculated Dose: 152.7 mg                             (In mg/m2, AUC, mg/kg)     Medication: Rituxan  Dose: 500 mg/m2 mg/m2  Calculated Dose: 1090 mg round to vial                            (In mg/m2, AUC, mg/kg)      I confirm this process was performed independently with the BSA and all final chemotherapy dosing calculations congruent.  Any discrepancies of 5% or greater have been addressed with the chemotherapy pharmacist. The resolution of the discrepancy has been documented in the EPIC progress notes.

## 2019-03-05 NOTE — PROGRESS NOTES
Chemotherapy Verification - SECONDARY RN     D1C4    Height = 169 cm  Weight = 101.4 kg  BSA = 2.18 m2 - Hep B NEG      Medication: Bendamustin HCl (Bendeka)  Dose: 70 mg/m2  Calculated Dose: 152.6 mg                                Medication: Rituximab (Rituxan)  Dose: 500 mg/m2  Calculated Dose: 1090 mg                               I confirm that this process was performed independently.

## 2019-03-05 NOTE — TELEPHONE ENCOUNTER
ALEXEY Wells, from infusion called from [id number] stating patient is 1 day past the 4 day char for labs. States patient requested she call to see if he must be re-drawn for labs. Transferred call to GINA Avila.

## 2019-03-06 ENCOUNTER — OUTPATIENT INFUSION SERVICES (OUTPATIENT)
Dept: ONCOLOGY | Facility: MEDICAL CENTER | Age: 69
End: 2019-03-06
Attending: INTERNAL MEDICINE
Payer: MEDICARE

## 2019-03-06 VITALS
RESPIRATION RATE: 18 BRPM | SYSTOLIC BLOOD PRESSURE: 132 MMHG | HEIGHT: 67 IN | DIASTOLIC BLOOD PRESSURE: 69 MMHG | OXYGEN SATURATION: 98 % | BODY MASS INDEX: 36.02 KG/M2 | HEART RATE: 65 BPM | TEMPERATURE: 98.2 F | WEIGHT: 229.5 LBS

## 2019-03-06 DIAGNOSIS — C91.10 CLL (CHRONIC LYMPHOCYTIC LEUKEMIA) (HCC): ICD-10-CM

## 2019-03-06 PROCEDURE — 96375 TX/PRO/DX INJ NEW DRUG ADDON: CPT

## 2019-03-06 PROCEDURE — 700111 HCHG RX REV CODE 636 W/ 250 OVERRIDE (IP): Performed by: INTERNAL MEDICINE

## 2019-03-06 PROCEDURE — 96409 CHEMO IV PUSH SNGL DRUG: CPT

## 2019-03-06 PROCEDURE — 700105 HCHG RX REV CODE 258: Performed by: INTERNAL MEDICINE

## 2019-03-06 RX ADMIN — BENDAMUSTINE HYDROCHLORIDE 154.7 MG: 25 INJECTION, SOLUTION INTRAVENOUS at 16:12

## 2019-03-06 RX ADMIN — ONDANSETRON HYDROCHLORIDE 16 MG: 2 SOLUTION INTRAMUSCULAR; INTRAVENOUS at 15:50

## 2019-03-06 NOTE — PROGRESS NOTES
"Pharmacy chemotherapy calculations verification    Dx: CLL      Protocol: Bendamustine + Rituximab     *Dosing Reference*  Rituximab 375 mg/m2 IV on Day 1 of Cycle 1 - complete   followed by   Rituximab 500 mg/m2 IV on Day 1 of Cycles 2-6  Bendamustine 70 mg/m2 IV over 10 minutes daily on Days 1-2 of Cycles 1-6  28-day cycle for 6 cycles (Relapsed/Refractory: age > 65 years and younger patients with significant comorbidities)  NCCN Guidelines for Chronic Lymphocytic Leukemia/Small Lymphocytic Lymphoma V.5.2018  Ariella GASTELUM et al. J Clin Oncol. 2012;30(26):3209-16  pennie Wheeler. J Clin Oncol. 2011;29(26):3559-66  Chele B, et al. Lancet Oncol. 2016;17(7):928-942       Allergies:  Patient has no known allergies.   /69   Pulse 65   Temp 36.8 °C (98.2 °F) (Temporal)   Resp 18   Ht 1.69 m (5' 6.53\")   Wt 104.1 kg (229 lb 8 oz)   SpO2 98%   BMI 36.45 kg/m²  Body surface area is 2.21 meters squared.     9/8/2017 12:02   Hep B Surface Antibody Quant <3.10   Hepatitis B Surface Antigen Negative   Hepatitis B Ccore Ab, Total Negative       Labs 03/01/19  ANC~ 1600 Plt = 83k  Hgb = 14.6   SCr = 0.59mg/dL CrCl >125 mL/min (min Scr = 0.7)   LFT's = WNL  TBili = 1.8   MD is aware of labs, ok to proceed with chemo today       Drug Order   (Drug name, dose, route, IV Fluid & volume, frequency, number of doses) Cycle 4, Day 2 of 2  Previous treatment: C3 = Feb 5-6th, 2019   Medication = Bendamustine (Bendeka)  Base Dose= 70 mg/m2  Calc Dose: Base Dose x 2.21 m2 = 154.7 mg  Final Dose = 154.7 mg  Route = IV  Fluid & Volume = NS 50 mL  Admin Duration = Over 10 minutes    Days 1-2      <5% difference, okay to treat with final dose     By my signature below, I confirm this process was performed independently with the BSA and all final chemotherapy dosing calculations congruent. I have reviewed the above chemotherapy order and that my calculation of the final dose and BSA (when applicable) corroborate those " calculations of the  pharmacist. Discrepancies of 5% or greater in the written dose have been addressed and documented within the EPIC Progress notes.      Jade Gilbert, PharmD

## 2019-03-06 NOTE — PROGRESS NOTES
Returns for Rituxan, Treanda.  In good spirits.  Reports has PET scan scheduled to determine if needs to continue further tx.  Shared in hope.  Premeds and treatment given without issue.  IV was removed by accident, will need reaccess tomorrow.  DC to care of wife.

## 2019-03-06 NOTE — PROGRESS NOTES
Chemotherapy Verification - PRIMARY RN      Height = 1.69 m  Weight = 104.1 kg  BSA = 2.21 m2       Medication: bendamustine  Dose: 70 mg/m2  Calculated Dose: 154.7 mg                             (In mg/m2, AUC, mg/kg)       I confirm this process was performed independently with the BSA and all final chemotherapy dosing calculations congruent.  Any discrepancies of 5% or greater have been addressed with the chemotherapy pharmacist. The resolution of the discrepancy has been documented in the EPIC progress notes.

## 2019-03-06 NOTE — PROGRESS NOTES
Chemotherapy Verification - SECONDARY RN       Height = 169cm  Weight = 104.1kg  BSA = 2.21m2       Medication: Bendeka  Dose: 70mg/m2  Calculated Dose: 154.7mg                             (In mg/m2, AUC, mg/kg)       I confirm that this process was performed independently.

## 2019-03-07 NOTE — PROGRESS NOTES
Pt ambulatory w/ wife to [facility] for C4 D2 of bendamustine for lymphoma.  Pt w/ no s/s of infection, pt w/ no complaints at this time.  PIV established in RAC, brisk blood return noted, flushed per protocol.  Pre-med and chemo infused w/ no adverse reactions.  PIV w/ brisk blood return post-chemo, flushed per protocol and removed, gauze and coban dressing applied.  Pt left on foot in care of wife in NAD.  Pt states hew has completed chemo and does not need f/u appt w/ infusion at this time.

## 2019-03-29 ENCOUNTER — HOSPITAL ENCOUNTER (OUTPATIENT)
Dept: LAB | Facility: MEDICAL CENTER | Age: 69
End: 2019-03-29
Attending: INTERNAL MEDICINE
Payer: MEDICARE

## 2019-03-29 ENCOUNTER — HOSPITAL ENCOUNTER (OUTPATIENT)
Dept: LAB | Facility: MEDICAL CENTER | Age: 69
End: 2019-03-29
Attending: NURSE PRACTITIONER
Payer: MEDICARE

## 2019-03-29 DIAGNOSIS — C91.10 CLL (CHRONIC LYMPHOCYTIC LEUKEMIA) (HCC): ICD-10-CM

## 2019-03-29 DIAGNOSIS — C22.0 HEPATOMA (HCC): ICD-10-CM

## 2019-03-29 LAB
ALBUMIN SERPL BCP-MCNC: 3.9 G/DL (ref 3.2–4.9)
ALBUMIN SERPL BCP-MCNC: 3.9 G/DL (ref 3.2–4.9)
ALBUMIN/GLOB SERPL: 1.7 G/DL
ALBUMIN/GLOB SERPL: 1.7 G/DL
ALP SERPL-CCNC: 68 U/L (ref 30–99)
ALP SERPL-CCNC: 69 U/L (ref 30–99)
ALT SERPL-CCNC: 23 U/L (ref 2–50)
ALT SERPL-CCNC: 24 U/L (ref 2–50)
ANION GAP SERPL CALC-SCNC: 9 MMOL/L (ref 0–11.9)
ANION GAP SERPL CALC-SCNC: 9 MMOL/L (ref 0–11.9)
AST SERPL-CCNC: 37 U/L (ref 12–45)
AST SERPL-CCNC: 37 U/L (ref 12–45)
BASOPHILS # BLD AUTO: 1.1 % (ref 0–1.8)
BASOPHILS # BLD AUTO: 1.4 % (ref 0–1.8)
BASOPHILS # BLD: 0.03 K/UL (ref 0–0.12)
BASOPHILS # BLD: 0.04 K/UL (ref 0–0.12)
BILIRUB SERPL-MCNC: 2 MG/DL (ref 0.1–1.5)
BILIRUB SERPL-MCNC: 2.1 MG/DL (ref 0.1–1.5)
BUN SERPL-MCNC: 10 MG/DL (ref 8–22)
BUN SERPL-MCNC: 10 MG/DL (ref 8–22)
CALCIUM SERPL-MCNC: 9.1 MG/DL (ref 8.5–10.5)
CALCIUM SERPL-MCNC: 9.2 MG/DL (ref 8.5–10.5)
CHLORIDE SERPL-SCNC: 111 MMOL/L (ref 96–112)
CHLORIDE SERPL-SCNC: 111 MMOL/L (ref 96–112)
CO2 SERPL-SCNC: 24 MMOL/L (ref 20–33)
CO2 SERPL-SCNC: 25 MMOL/L (ref 20–33)
CREAT SERPL-MCNC: 0.6 MG/DL (ref 0.5–1.4)
CREAT SERPL-MCNC: 0.62 MG/DL (ref 0.5–1.4)
EOSINOPHIL # BLD AUTO: 0.09 K/UL (ref 0–0.51)
EOSINOPHIL # BLD AUTO: 0.11 K/UL (ref 0–0.51)
EOSINOPHIL NFR BLD: 3.2 % (ref 0–6.9)
EOSINOPHIL NFR BLD: 3.9 % (ref 0–6.9)
ERYTHROCYTE [DISTWIDTH] IN BLOOD BY AUTOMATED COUNT: 53.2 FL (ref 35.9–50)
ERYTHROCYTE [DISTWIDTH] IN BLOOD BY AUTOMATED COUNT: 53.4 FL (ref 35.9–50)
GLOBULIN SER CALC-MCNC: 2.3 G/DL (ref 1.9–3.5)
GLOBULIN SER CALC-MCNC: 2.3 G/DL (ref 1.9–3.5)
GLUCOSE SERPL-MCNC: 171 MG/DL (ref 65–99)
GLUCOSE SERPL-MCNC: 175 MG/DL (ref 65–99)
HCT VFR BLD AUTO: 43 % (ref 42–52)
HCT VFR BLD AUTO: 43.1 % (ref 42–52)
HGB BLD-MCNC: 14.8 G/DL (ref 14–18)
HGB BLD-MCNC: 15 G/DL (ref 14–18)
IMM GRANULOCYTES # BLD AUTO: 0.01 K/UL (ref 0–0.11)
IMM GRANULOCYTES # BLD AUTO: 0.02 K/UL (ref 0–0.11)
IMM GRANULOCYTES NFR BLD AUTO: 0.4 % (ref 0–0.9)
IMM GRANULOCYTES NFR BLD AUTO: 0.7 % (ref 0–0.9)
LDH SERPL L TO P-CCNC: 272 U/L (ref 107–266)
LYMPHOCYTES # BLD AUTO: 0.46 K/UL (ref 1–4.8)
LYMPHOCYTES # BLD AUTO: 0.47 K/UL (ref 1–4.8)
LYMPHOCYTES NFR BLD: 16.5 % (ref 22–41)
LYMPHOCYTES NFR BLD: 16.7 % (ref 22–41)
MCH RBC QN AUTO: 32 PG (ref 27–33)
MCH RBC QN AUTO: 32.5 PG (ref 27–33)
MCHC RBC AUTO-ENTMCNC: 34.3 G/DL (ref 33.7–35.3)
MCHC RBC AUTO-ENTMCNC: 34.9 G/DL (ref 33.7–35.3)
MCV RBC AUTO: 93.1 FL (ref 81.4–97.8)
MCV RBC AUTO: 93.1 FL (ref 81.4–97.8)
MONOCYTES # BLD AUTO: 0.46 K/UL (ref 0–0.85)
MONOCYTES # BLD AUTO: 0.49 K/UL (ref 0–0.85)
MONOCYTES NFR BLD AUTO: 16.5 % (ref 0–13.4)
MONOCYTES NFR BLD AUTO: 17.4 % (ref 0–13.4)
NEUTROPHILS # BLD AUTO: 1.71 K/UL (ref 1.82–7.42)
NEUTROPHILS # BLD AUTO: 1.72 K/UL (ref 1.82–7.42)
NEUTROPHILS NFR BLD: 60.9 % (ref 44–72)
NEUTROPHILS NFR BLD: 61.3 % (ref 44–72)
NRBC # BLD AUTO: 0 K/UL
NRBC # BLD AUTO: 0 K/UL
NRBC BLD-RTO: 0 /100 WBC
NRBC BLD-RTO: 0 /100 WBC
PLATELET # BLD AUTO: 83 K/UL (ref 164–446)
PLATELET # BLD AUTO: 84 K/UL (ref 164–446)
PMV BLD AUTO: 10.5 FL (ref 9–12.9)
PMV BLD AUTO: 10.6 FL (ref 9–12.9)
POTASSIUM SERPL-SCNC: 3.6 MMOL/L (ref 3.6–5.5)
POTASSIUM SERPL-SCNC: 3.7 MMOL/L (ref 3.6–5.5)
PROT SERPL-MCNC: 6.2 G/DL (ref 6–8.2)
PROT SERPL-MCNC: 6.2 G/DL (ref 6–8.2)
RBC # BLD AUTO: 4.62 M/UL (ref 4.7–6.1)
RBC # BLD AUTO: 4.63 M/UL (ref 4.7–6.1)
SODIUM SERPL-SCNC: 144 MMOL/L (ref 135–145)
SODIUM SERPL-SCNC: 145 MMOL/L (ref 135–145)
URATE SERPL-MCNC: 3.9 MG/DL (ref 2.5–8.3)
WBC # BLD AUTO: 2.8 K/UL (ref 4.8–10.8)
WBC # BLD AUTO: 2.8 K/UL (ref 4.8–10.8)

## 2019-03-29 PROCEDURE — 83951 ONCOPROTEIN DCP: CPT

## 2019-03-29 PROCEDURE — 84550 ASSAY OF BLOOD/URIC ACID: CPT

## 2019-03-29 PROCEDURE — 85025 COMPLETE CBC W/AUTO DIFF WBC: CPT | Mod: 91

## 2019-03-29 PROCEDURE — 80053 COMPREHEN METABOLIC PANEL: CPT

## 2019-03-29 PROCEDURE — 85025 COMPLETE CBC W/AUTO DIFF WBC: CPT

## 2019-03-29 PROCEDURE — 80053 COMPREHEN METABOLIC PANEL: CPT | Mod: 91

## 2019-03-29 PROCEDURE — 36415 COLL VENOUS BLD VENIPUNCTURE: CPT

## 2019-03-29 PROCEDURE — 83615 LACTATE (LD) (LDH) ENZYME: CPT

## 2019-04-01 ENCOUNTER — HOSPITAL ENCOUNTER (OUTPATIENT)
Dept: RADIOLOGY | Facility: MEDICAL CENTER | Age: 69
End: 2019-04-01
Attending: INTERNAL MEDICINE
Payer: MEDICARE

## 2019-04-01 DIAGNOSIS — C22.0 HEPATOMA (HCC): ICD-10-CM

## 2019-04-01 DIAGNOSIS — C91.10 CLL (CHRONIC LYMPHOCYTIC LEUKEMIA) (HCC): ICD-10-CM

## 2019-04-01 PROCEDURE — A9552 F18 FDG: HCPCS

## 2019-04-05 LAB — ACARBOXYPROTHROMBIN SERPL-MCNC: 0.7 NG/ML (ref 0–7.4)

## 2019-04-15 ENCOUNTER — TELEPHONE (OUTPATIENT)
Dept: HEMATOLOGY ONCOLOGY | Facility: MEDICAL CENTER | Age: 69
End: 2019-04-15

## 2019-04-15 ENCOUNTER — HOSPITAL ENCOUNTER (OUTPATIENT)
Dept: LAB | Facility: MEDICAL CENTER | Age: 69
End: 2019-04-15
Attending: PHYSICIAN ASSISTANT
Payer: MEDICARE

## 2019-04-15 ENCOUNTER — OFFICE VISIT (OUTPATIENT)
Dept: HEMATOLOGY ONCOLOGY | Facility: MEDICAL CENTER | Age: 69
End: 2019-04-15
Payer: MEDICARE

## 2019-04-15 VITALS
DIASTOLIC BLOOD PRESSURE: 74 MMHG | BODY MASS INDEX: 35.87 KG/M2 | TEMPERATURE: 98.8 F | HEART RATE: 70 BPM | OXYGEN SATURATION: 98 % | HEIGHT: 66 IN | WEIGHT: 223.22 LBS | SYSTOLIC BLOOD PRESSURE: 126 MMHG | RESPIRATION RATE: 14 BRPM

## 2019-04-15 DIAGNOSIS — C91.10 CLL (CHRONIC LYMPHOCYTIC LEUKEMIA) (HCC): ICD-10-CM

## 2019-04-15 DIAGNOSIS — C22.0 HEPATOMA (HCC): ICD-10-CM

## 2019-04-15 PROCEDURE — 99214 OFFICE O/P EST MOD 30 MIN: CPT | Performed by: INTERNAL MEDICINE

## 2019-04-15 PROCEDURE — 36415 COLL VENOUS BLD VENIPUNCTURE: CPT | Mod: GA

## 2019-04-15 PROCEDURE — 83036 HEMOGLOBIN GLYCOSYLATED A1C: CPT | Mod: GA

## 2019-04-15 RX ORDER — ONDANSETRON 4 MG/1
4 TABLET, FILM COATED ORAL EVERY 4 HOURS PRN
Qty: 15 TAB | Refills: 0 | Status: SHIPPED | OUTPATIENT
Start: 2019-04-15 | End: 2020-11-06 | Stop reason: SDUPTHER

## 2019-04-15 ASSESSMENT — PAIN SCALES - GENERAL: PAINLEVEL: NO PAIN

## 2019-04-15 NOTE — TELEPHONE ENCOUNTER
Called Patient to get his 3 month follow up visit scheduled. Left message, told patient to call back when he gets a chance.           When patient calls back, please inform him that he needs to come back in 3 months, and that Dr. Servin ordered labs that he needs to get done before that appointment.     * Please confirm appointment with patient.

## 2019-04-15 NOTE — PROGRESS NOTES
Date of visit: 4/15/2019  9:46 AM      Chief Complaint- follow-up of CLL , 13q deleted  -Hepatocellular carcinoma        Identification/Prior relevant history:   -2004. Screening PSA elevated. Found to have prostate cancer Dr. Ramírez, urologist, treated with brachytherapy at [place]. Followed with observation and serial PSA  March, 2014. Noted to have elevated WBC count  July 15, 2014. Flow cytometry shows CD5 positive B-cell lymphoproliferative disorder with a phenotype most consistent with chronic lymphocytic leukemia or small lymphocytic lymphoma  July 26, 2014. CT chest, abdomen and pelvis showed Hepatosplenomegaly. Slightly enlarged lymph nodes in the subcarinal region and right cardiophrenic angle.  December 11, 2014. Negative for [id number] mutation  January 4, 2016 ultrasound of the abdomen showed Cirrhosis with portal hypertension.Associated dilated portal vein and splenomegaly.No evidence for hepatoma  - 8/2017-he presented with significant iron deficiency anemia requiring iron infusion. Subsequently had upper and lower endoscopy , these were reportedly normal. He does not remember being informed that he has any esophageal viruses.     -12/6/17-CT abdomen- 1.  2.4 cm arterial enhancing mass in segment 4 of the liver demonstrates washout. LR-5.  1.3 cm arterial enhancing lesion in the hepatic dome is similar to the recent MRI. No definite washout or capsule appreciated. LR-3.  Morphologic characteristics of cirrhosis with evidence of portal hypertension including splenomegaly.  -12/2017-established care with me    - CLL FISH panel showed favorable prognostic marker of 13 qdeletion.   -He is following up with Dr. Senior.imaging of liver mass is suggestive of hepatoma.      CT of the chest-4/7/18-enlarged mediastinal lymph nodes with right paratracheal lymph nodes measuring up to 15 mm short axis and a subcarinal lymph node measuring 2.9 cm short axis. No hilar adenopathy identified. No axillary  lymphadenopathy identified. Multiple nonenlarged axillary lymph nodes identified bilaterally   decided not to do any intervention until his hepatoma issues are taken care of  -6/ 28/ 2018 the lesion in 4B was ablated but the operative ablation of a 4A lesion was unsuccessful due to proximity to the portal pedicle., No documented liver mass biopsy.     -underwent mapping on August 28 and Y90 SIRT of the left lobe on September 7.     -10/2018-started Rituxan with bendamustine at a dose of 70 mg/m square.  Excellent tolerance with resolution of his lymphocytosis.  Second cycle delayed due to TACE which was done on 12/15/18       1/21/19-MRI of the liver-No new arterial enhancing mass is identified.  2.  Previously described mass in the caudate lobe is now avascular with peripheral enhancement, consistent with recent chemoembolization. LR-treated  3.  Ablation cavity in the left hepatic lobe remains avascular.  4.  8 mm arterial enhancing observation in segment 4A is not as pronounced as on the prior exam and grossly stable. LR-35.  Stable peripheral enhancing lesion in segment 2. LR-treated  6.  Previously described rim-enhancing observation in the superior aspect of segment 2 is not visualized on the current exam, likely related to differences in technique.  Interim history-  -He is status post 4 cycles of Rituxan and Treanda with good tolerance.  4/1/19-PET scan-1.  Somewhat increased activity within the ascending colon, likely physiologic.  2.  No focal abnormal activity in the chest, abdomen or pelvis to indicate active tumor.  3.  Splenomegaly again noted.  4.  Findings consistent with cirrhosis.  No focal abnormal and activity in the liver.    Currently feeling much better  Past Medical History:      Past Medical History:   Diagnosis Date   • Anemia    • Cancer (HCC) 2004    Prostate - did brachytherapy at [facility]   • Cancer (HCC) 2018    Liver   • Cirrhosis (HCC)    • CLL (chronic lymphocytic leukemia) (HCC)  "2014   • CMV (cytomegalovirus infection) (HCC) 1990    Treated for 6 weeks   • Diabetes (HCC)     oral medication   • Heart burn    • Liver cancer (HCC)    • Liver tumor    • Rosacea        Past surgical history:       Past Surgical History:   Procedure Laterality Date   • HEPATIC ABLATION LAPAROSCOPIC  6/28/2018    Procedure: HEPATIC ABLATION LAPAROSCOPIC. SEGMENT 4B, HEPATOMA, REPAIR OF INCARCERATED UMBILICAL HERNIA;  Surgeon: Feliberto Burgos M.D.;  Location: SURGERY [facility];  Service: General   • BRACHY THERAPY         Allergies:       Patient has no known allergies.    Medications:         Current Outpatient Prescriptions   Medication Sig Dispense Refill   • cetirizine (ZYRTEC) 10 MG Tab Take 10 mg by mouth every morning.     • metFORMIN ER (GLUCOPHAGE XR) 500 MG TABLET SR 24 HR Take 2 Tabs by mouth 2 times a day. 360 Tab 1   • Multiple Vitamins-Minerals (MULTIVITAMIN PO) Take 1 Tab by mouth every morning.     • Polyethylene Glycol 3350 (MIRALAX PO) Take 1 Cap by mouth every morning.     • fluticasone (FLONASE ALLERGY RELIEF) 50 MCG/ACT nasal spray Spray 1 Spray in nose 2 times a day. 1 Bottle 0   • ondansetron (ZOFRAN) 4 MG Tab tablet Take 1 Tab by mouth every four hours as needed for Nausea/Vomiting (for nausea, vomiting). 30 Tab 6   • prochlorperazine (COMPAZINE) 10 MG Tab Take 1 Tab by mouth every 6 hours as needed (for nausea, vomiting). (Patient not taking: Reported on 3/5/2019) 30 Tab 6     No current facility-administered medications for this visit.          Social History:     Social History     Social History   • Marital status:      Spouse name: N/A   • Number of children: 1   • Years of education: N/A     Occupational History   • fertilizer sales      Social History Main Topics   • Smoking status: Never Smoker   • Smokeless tobacco: Former User     Types: Snuff     Quit date: 3/29/2015   • Alcohol use No   • Drug use: Yes     Types: Marijuana      Comment: \"Marijuana Use\" " "daily    • Sexual activity: No     Other Topics Concern   • Not on file     Social History Narrative    No known exposure to asbestos, dyes, or chemicals, however he was exposed to pesticides.  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  1 child, alive and well.  He also has a step-child.        Family History:      Family History   Problem Relation Age of Onset   • Cancer Father 81        Bladder   • Cancer Brother         Prostate & CLL (s/p 8-10 years ago)   • Hyperlipidemia Sister    • Lung Disease Neg Hx    • Diabetes Neg Hx    • Heart Disease Neg Hx    • Hypertension Neg Hx    • Stroke Neg Hx    • Alcohol/Drug Neg Hx        Review of Systems:  All other review of systems are negative except what was mentioned above in the HPI.    Constitutional: Negative for fever, chills, weight loss and malaise/fatigue.    HEENT: No new auditory or visual complaints. No sore throat and neck pain.     Respiratory: Negative for cough, sputum production, shortness of breath and wheezing.    Cardiovascular: Negative for chest pain, palpitations, orthopnea and leg swelling.    Gastrointestinal: Negative for heartburn, nausea, vomiting and abdominal pain.    Genitourinary: Negative for dysuria, hematuria    Musculoskeletal: No new arthralgias or myalgias   Skin: Negative for rash and itching.    Neurological: Negative for focal weakness and headaches.    Endo/Heme/Allergies: No abnormal bleed/bruise.    Psychiatric/Behavioral: No new depression/anxiety.    Physical Exam:  Vitals: /74 (BP Location: Right arm, Patient Position: Sitting, BP Cuff Size: Adult)   Pulse 70   Temp 37.1 °C (98.8 °F) (Temporal)   Resp 14   Ht 1.676 m (5' 6\")   Wt 101.2 kg (223 lb 3.5 oz)   SpO2 98%   BMI 36.03 kg/m²     General: Not in acute distress, alert and oriented x 3  HEENT: No pallor, icterus. Oropharynx clear.   Neck: Supple, no palpable masses.  Lymph nodes: No palpable " cervical, supraclavicular, axillary or inguinal lymphadenopathy.    CVS: regular rate and rhythm, no rubs or gallops  RESP: Clear to auscultate bilaterally, no wheezing or crackles.   ABD: Soft, non tender, non distended, positive bowel sounds, no palpable organomegaly  EXT: No edema or cyanosis  CNS: Alert and oriented x3, No focal deficits.  Skin- No rash      Labs:   No visits with results within 1 Week(s) from this visit.   Latest known visit with results is:   Hospital Outpatient Visit on 03/29/2019   Component Date Value Ref Range Status   • WBC 03/29/2019 2.8* 4.8 - 10.8 K/uL Final   • RBC 03/29/2019 4.63* 4.70 - 6.10 M/uL Final   • Hemoglobin 03/29/2019 14.8  14.0 - 18.0 g/dL Final   • Hematocrit 03/29/2019 43.1  42.0 - 52.0 % Final   • MCV 03/29/2019 93.1  81.4 - 97.8 fL Final   • MCH 03/29/2019 32.0  27.0 - 33.0 pg Final   • MCHC 03/29/2019 34.3  33.7 - 35.3 g/dL Final   • RDW 03/29/2019 53.2* 35.9 - 50.0 fL Final   • Platelet Count 03/29/2019 84* 164 - 446 K/uL Final   • MPV 03/29/2019 10.5  9.0 - 12.9 fL Final   • Neutrophils-Polys 03/29/2019 61.30  44.00 - 72.00 % Final   • Lymphocytes 03/29/2019 16.50* 22.00 - 41.00 % Final   • Monocytes 03/29/2019 16.50* 0.00 - 13.40 % Final   • Eosinophils 03/29/2019 3.90  0.00 - 6.90 % Final   • Basophils 03/29/2019 1.40  0.00 - 1.80 % Final   • Immature Granulocytes 03/29/2019 0.40  0.00 - 0.90 % Final   • Nucleated RBC 03/29/2019 0.00  /100 WBC Final   • Neutrophils (Absolute) 03/29/2019 1.71* 1.82 - 7.42 K/uL Final    Includes immature neutrophils, if present.   • Lymphs (Absolute) 03/29/2019 0.46* 1.00 - 4.80 K/uL Final   • Monos (Absolute) 03/29/2019 0.46  0.00 - 0.85 K/uL Final   • Eos (Absolute) 03/29/2019 0.11  0.00 - 0.51 K/uL Final   • Baso (Absolute) 03/29/2019 0.04  0.00 - 0.12 K/uL Final   • Immature Granulocytes (abs) 03/29/2019 0.01  0.00 - 0.11 K/uL Final   • NRBC (Absolute) 03/29/2019 0.00  K/uL Final   • Sodium 03/29/2019 145  135 - 145 mmol/L  Final   • Potassium 03/29/2019 3.7  3.6 - 5.5 mmol/L Final   • Chloride 03/29/2019 111  96 - 112 mmol/L Final   • Co2 03/29/2019 25  20 - 33 mmol/L Final   • Anion Gap 03/29/2019 9.0  0.0 - 11.9 Final   • Glucose 03/29/2019 175* 65 - 99 mg/dL Final   • Bun 03/29/2019 10  8 - 22 mg/dL Final   • Creatinine 03/29/2019 0.60  0.50 - 1.40 mg/dL Final   • Calcium 03/29/2019 9.1  8.5 - 10.5 mg/dL Final   • AST(SGOT) 03/29/2019 37  12 - 45 U/L Final   • ALT(SGPT) 03/29/2019 23  2 - 50 U/L Final   • Alkaline Phosphatase 03/29/2019 69  30 - 99 U/L Final   • Total Bilirubin 03/29/2019 2.1* 0.1 - 1.5 mg/dL Final   • Albumin 03/29/2019 3.9  3.2 - 4.9 g/dL Final   • Total Protein 03/29/2019 6.2  6.0 - 8.2 g/dL Final   • Globulin 03/29/2019 2.3  1.9 - 3.5 g/dL Final   • A-G Ratio 03/29/2019 1.7  g/dL Final   • LDH Total 03/29/2019 272* 107 - 266 U/L Final   • Juliette-gamma-carboxy Prothrombin 03/29/2019 0.7  0.0 - 7.4 ng/mL Final    Comment: INTERPRETIVE INFORMATION: Juliette-gamma-carboxy Prothrombin  The [facility] method is used.  Results obtained with different  assay methods or kits cannot be used interchangeably. The  juliette-gamma-carboxy prothrombin (DCP) assay is intended as a risk  assessment for the development of hepatocellular carcinoma in  patients with chronic liver diseases.  Elevated DCP values have  been shown to be associated with an increased risk for developing  hepatocellular carcinoma. Patients with elevated serum DCP should  be more intensely evaluated for evidence of hepatocellular  carcinoma.  Performed by [facility],  [address] [place] [address] [phone]  [email or web address], Abraham Munoz MD - Lab. Director     • GFR If  03/29/2019 >60  >60 mL/min/1.73 m 2 Final   • GFR If Non  03/29/2019 >60  >60 mL/min/1.73 m 2 Final       Assessment and Plan:  CLL-Odom Stage 0, 13 q. Deleted.     He was on observation for more than 4years and developed progression with worsening leukocytosis  and significant enlarged subcarinal lymph node seen on CT scan 4/2018.  His systemic treatment was delayed due to simultaneously diagnosed hepatocellular carcinoma.  Started Rituxan and bendamustine on 10/2018 with good tolerance and improvement in his lymphocytosis.  Second cycle was delayed due to  TACE.  He resumed chemotherapy and finished 4 cycles.  Restaging PET scan shows complete response.    HCC-6/2018-ablation of the lesion in 4 B  Y 90 lesion 4A.  progression of the caudate hepatoma -status post YUNIER TACE -12/15.  No major AFP elevation.  Recent MRI and PET scan shows no obvious progression.  Follow-up with interventional radiology.  Will also check DCP tumor markers as he never had any AFP elevation.     Leukopenia and thrombocytopenia-this appears to be secondary to underlying cirrhosis.  His lymphocytosis has resolved.    Return to clinic in 3 months for clinical follow-up.  He will discuss with interventional radiology about delaying his upcoming liver MRI as he just had a negative PET scan      He agreed and verbalized  agreement and understanding with the current plan.  I answered all questions and concerns at this time         Please note that this dictation was created using voice recognition software. I have made every reasonable attempt to correct obvious errors, but I expect that there are errors of grammar and possibly content that I did not discover before finalizing the note.      SIGNATURES:  Alfonzo Servin    CC:  Carmencita Hand P.A.-C.  No ref. provider found

## 2019-04-16 LAB
EST. AVERAGE GLUCOSE BLD GHB EST-MCNC: 126 MG/DL
HBA1C MFR BLD: 6 % (ref 0–5.6)

## 2019-04-17 ENCOUNTER — OFFICE VISIT (OUTPATIENT)
Dept: MEDICAL GROUP | Facility: PHYSICIAN GROUP | Age: 69
End: 2019-04-17
Payer: MEDICARE

## 2019-04-17 VITALS
TEMPERATURE: 98.6 F | HEIGHT: 66 IN | HEART RATE: 74 BPM | DIASTOLIC BLOOD PRESSURE: 78 MMHG | BODY MASS INDEX: 36 KG/M2 | WEIGHT: 224 LBS | SYSTOLIC BLOOD PRESSURE: 124 MMHG | OXYGEN SATURATION: 99 %

## 2019-04-17 DIAGNOSIS — E11.9 TYPE 2 DIABETES MELLITUS WITHOUT COMPLICATION, WITHOUT LONG-TERM CURRENT USE OF INSULIN (HCC): ICD-10-CM

## 2019-04-17 DIAGNOSIS — C22.0 HEPATOMA (HCC): ICD-10-CM

## 2019-04-17 DIAGNOSIS — C91.10 CLL (CHRONIC LYMPHOCYTIC LEUKEMIA) (HCC): ICD-10-CM

## 2019-04-17 PROCEDURE — 99214 OFFICE O/P EST MOD 30 MIN: CPT | Performed by: PHYSICIAN ASSISTANT

## 2019-04-17 RX ORDER — METFORMIN HYDROCHLORIDE 500 MG/1
500 TABLET, EXTENDED RELEASE ORAL 2 TIMES DAILY
Qty: 180 TAB | Refills: 3 | Status: SHIPPED | OUTPATIENT
Start: 2019-04-17 | End: 2020-04-07

## 2019-04-17 ASSESSMENT — PATIENT HEALTH QUESTIONNAIRE - PHQ9: CLINICAL INTERPRETATION OF PHQ2 SCORE: 0

## 2019-04-17 NOTE — PROGRESS NOTES
Subjective:   Sohail Duvall is a 68 y.o. male here today for follow-up on diabetes and other chronic conditions. Is an established patient of mine.    HPI:    Patient presents to the office today for routine follow-up.  I last saw him in September of last year.  Patient is a type II diabetic treated with metformin 100 mg twice daily.  Recent A1c done earlier this week was 6. He does not feel his diet has been the greatest, but is actively trying to improve things.     He is no longer taking blood pressure medication.  Has history of hypertension and was previously on losartan and hydrochlorothiazide.  Blood pressures have been fine since he lost weight. He denies concern and BP today in the office is 124/78.    He follows regularly with [facility] for CLL which was diagnosed in 2014.  This was treated with 4 months of chemotherapy after last appointment.  He had recent PET CT and follow-up with his oncologist. He was given the good news that his CLL is now in remission. His current oncologist (Dr. Branham) is leaving [facility], so he will be needing to re-establish with a new doctor.    He also follows regularly with Dr. Andrade for hepatoma.  He undergoes regular hepatic MRI for surveillance of this.      Current medicines (including changes today)  Current Outpatient Prescriptions   Medication Sig Dispense Refill   • metFORMIN ER (GLUCOPHAGE XR) 500 MG TABLET SR 24 HR Take 1 Tab by mouth 2 times a day. 180 Tab 3   • ondansetron (ZOFRAN) 4 MG Tab tablet Take 1 Tab by mouth every four hours as needed for Nausea/Vomiting (for nausea, vomiting). 15 Tab 0   • cetirizine (ZYRTEC) 10 MG Tab Take 10 mg by mouth every morning.     • prochlorperazine (COMPAZINE) 10 MG Tab Take 1 Tab by mouth every 6 hours as needed (for nausea, vomiting). 30 Tab 6   • Multiple Vitamins-Minerals (MULTIVITAMIN PO) Take 1 Tab by mouth every morning.     • Polyethylene Glycol 3350 (MIRALAX PO) Take 1 Cap by mouth every morning.     •  "fluticasone (FLONASE ALLERGY RELIEF) 50 MCG/ACT nasal spray Spray 1 Spray in nose 2 times a day. 1 Bottle 0     No current facility-administered medications for this visit.      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    ROS  Pulmonary ROS: No shortness of breath  Cardiovascular ROS: No chest pain  Neurologic ROS: No lower extremity numbness, tingling, pain       Objective:     /78 (BP Location: Right arm, Patient Position: Sitting, BP Cuff Size: Large adult)   Pulse 74   Temp 37 °C (98.6 °F)   Ht 1.676 m (5' 6\")   Wt 101.6 kg (224 lb)   SpO2 99%  Body mass index is 36.15 kg/m².     Physical Exam:  Constitutional: Alert, well-appearing, no distress.  Skin: No rashes in visible areas.  Eye: Conjunctiva clear, lids normal.  ENMT: Lips without lesions, moist mucus membranes.  Respiratory: Unlabored respiratory effort, lungs clear to auscultation, no wheezes, no rhonchi.  Cardiovascular: Normal S1, S2, no murmur, no lower extremity edema.    Monofilament testing with a 10 gram force: sensation intact: decreased bilaterally  Visual Inspection: Feet without maceration, ulcers, fissures.  Pedal pulses: intact bilaterally        Assessment and Plan:   The following treatment plan was discussed    1. Type 2 diabetes mellitus without complication, without long-term current use of insulin (HCC)  Chronic issue, well controlled on metformin.  Given recent A1c, I have advised him to cut his dosage to 500 mg twice daily.  Will recheck in 6 months along with other lab work.  Monofilament exam today does indicate some decreased sensation of his feet.  We discussed importance of regular foot checks.  - metFORMIN ER (GLUCOPHAGE XR) 500 MG " TABLET SR 24 HR; Take 1 Tab by mouth 2 times a day.  Dispense: 180 Tab; Refill: 3  - HEMOGLOBIN A1C; Future  - Lipid Profile; Future  - MICROALBUMIN CREAT RATIO URINE; Future  - Diabetic Monofilament LE Exam    2. CLL (chronic lymphocytic leukemia) (HCC)  Chronic issue, now in remission.  He will still require periodic oncology follow-up, so will be getting set up with a new oncologist.    3. Hepatoma (HCC)  Chronic issue, stable per patient.  He will continue to hepatic MRI for surveillance and regular follow-up with Dr. Andrade.      Followup: Return in about 6 months (around 10/17/2019).    Carmencita Hand P.A.-C.

## 2019-06-05 ENCOUNTER — HOSPITAL ENCOUNTER (OUTPATIENT)
Dept: LAB | Facility: MEDICAL CENTER | Age: 69
End: 2019-06-05
Attending: INTERNAL MEDICINE
Payer: MEDICARE

## 2019-06-05 LAB
ALBUMIN SERPL BCP-MCNC: 4 G/DL (ref 3.2–4.9)
ALBUMIN/GLOB SERPL: 1.9 G/DL
ALP SERPL-CCNC: 61 U/L (ref 30–99)
ALT SERPL-CCNC: 17 U/L (ref 2–50)
ANION GAP SERPL CALC-SCNC: 7 MMOL/L (ref 0–11.9)
AST SERPL-CCNC: 23 U/L (ref 12–45)
BASOPHILS # BLD AUTO: 0.6 % (ref 0–1.8)
BASOPHILS # BLD: 0.01 K/UL (ref 0–0.12)
BILIRUB SERPL-MCNC: 0.8 MG/DL (ref 0.1–1.5)
BUN SERPL-MCNC: 11 MG/DL (ref 8–22)
CALCIUM SERPL-MCNC: 8.8 MG/DL (ref 8.5–10.5)
CHLORIDE SERPL-SCNC: 113 MMOL/L (ref 96–112)
CO2 SERPL-SCNC: 22 MMOL/L (ref 20–33)
CREAT SERPL-MCNC: 0.64 MG/DL (ref 0.5–1.4)
EOSINOPHIL # BLD AUTO: 0.15 K/UL (ref 0–0.51)
EOSINOPHIL NFR BLD: 8.5 % (ref 0–6.9)
ERYTHROCYTE [DISTWIDTH] IN BLOOD BY AUTOMATED COUNT: 40.1 FL (ref 35.9–50)
FERRITIN SERPL-MCNC: 9.1 NG/ML (ref 22–322)
GLOBULIN SER CALC-MCNC: 2.1 G/DL (ref 1.9–3.5)
GLUCOSE SERPL-MCNC: 165 MG/DL (ref 65–99)
HCT VFR BLD AUTO: 39.6 % (ref 42–52)
HGB BLD-MCNC: 13.3 G/DL (ref 14–18)
IMM GRANULOCYTES # BLD AUTO: 0 K/UL (ref 0–0.11)
IMM GRANULOCYTES NFR BLD AUTO: 0 % (ref 0–0.9)
INR PPP: 1.15 (ref 0.87–1.13)
IRON SATN MFR SERPL: 7 % (ref 15–55)
IRON SERPL-MCNC: 35 UG/DL (ref 50–180)
LYMPHOCYTES # BLD AUTO: 0.24 K/UL (ref 1–4.8)
LYMPHOCYTES NFR BLD: 13.6 % (ref 22–41)
MCH RBC QN AUTO: 28.7 PG (ref 27–33)
MCHC RBC AUTO-ENTMCNC: 33.6 G/DL (ref 33.7–35.3)
MCV RBC AUTO: 85.5 FL (ref 81.4–97.8)
MONOCYTES # BLD AUTO: 0.25 K/UL (ref 0–0.85)
MONOCYTES NFR BLD AUTO: 14.1 % (ref 0–13.4)
NEUTROPHILS # BLD AUTO: 1.12 K/UL (ref 1.82–7.42)
NEUTROPHILS NFR BLD: 63.2 % (ref 44–72)
NRBC # BLD AUTO: 0 K/UL
NRBC BLD-RTO: 0 /100 WBC
PLATELET # BLD AUTO: 74 K/UL (ref 164–446)
PMV BLD AUTO: 12.1 FL (ref 9–12.9)
POTASSIUM SERPL-SCNC: 3.5 MMOL/L (ref 3.6–5.5)
PROT SERPL-MCNC: 6.1 G/DL (ref 6–8.2)
PROTHROMBIN TIME: 15 SEC (ref 12–14.6)
RBC # BLD AUTO: 4.63 M/UL (ref 4.7–6.1)
SODIUM SERPL-SCNC: 142 MMOL/L (ref 135–145)
TIBC SERPL-MCNC: 483 UG/DL (ref 250–450)
WBC # BLD AUTO: 1.8 K/UL (ref 4.8–10.8)

## 2019-06-05 PROCEDURE — 85610 PROTHROMBIN TIME: CPT

## 2019-06-05 PROCEDURE — 85025 COMPLETE CBC W/AUTO DIFF WBC: CPT

## 2019-06-05 PROCEDURE — 82728 ASSAY OF FERRITIN: CPT

## 2019-06-05 PROCEDURE — 83540 ASSAY OF IRON: CPT

## 2019-06-05 PROCEDURE — 83951 ONCOPROTEIN DCP: CPT

## 2019-06-05 PROCEDURE — 83550 IRON BINDING TEST: CPT

## 2019-06-05 PROCEDURE — 36415 COLL VENOUS BLD VENIPUNCTURE: CPT

## 2019-06-05 PROCEDURE — 82107 ALPHA-FETOPROTEIN L3: CPT

## 2019-06-05 PROCEDURE — 80053 COMPREHEN METABOLIC PANEL: CPT

## 2019-06-08 LAB
ACARBOXYPROTHROMBIN SERPL-MCNC: 0.5 NG/ML (ref 0–7.4)
AFP L3 MFR SERPL: 7 % (ref 0–9.9)
AFP SERPL-MCNC: 6 NG/ML (ref 0–15)

## 2019-06-17 DIAGNOSIS — C22.0 HEPATOMA (HCC): ICD-10-CM

## 2019-07-01 ENCOUNTER — APPOINTMENT (OUTPATIENT)
Dept: RADIOLOGY | Facility: MEDICAL CENTER | Age: 69
End: 2019-07-01
Attending: NURSE PRACTITIONER
Payer: MEDICARE

## 2019-07-01 DIAGNOSIS — C22.0 HEPATOMA (HCC): ICD-10-CM

## 2019-07-01 PROCEDURE — 700117 HCHG RX CONTRAST REV CODE 255: Performed by: NURSE PRACTITIONER

## 2019-07-01 PROCEDURE — 74183 MRI ABD W/O CNTR FLWD CNTR: CPT

## 2019-07-01 PROCEDURE — A9581 GADOXETATE DISODIUM INJ: HCPCS | Performed by: NURSE PRACTITIONER

## 2019-07-01 RX ORDER — GADOBUTROL 604.72 MG/ML
10 INJECTION INTRAVENOUS ONCE
Status: ACTIVE | OUTPATIENT
Start: 2019-07-01 | End: 2019-07-02

## 2019-07-01 RX ADMIN — GADOXETATE DISODIUM 10 ML: 181.43 INJECTION, SOLUTION INTRAVENOUS at 11:40

## 2019-07-03 NOTE — CONSULTS
"Interventional Oncology Consultation      Re: Sohail Duvall     MRN: [id number]   : 1950    Sohail Duvall was seen today for review of surveillance imaging after hepatic interventions by Joe Norris MD.  He was referred to our service by Feliberto Senior MD and is also under the care of Carmencita Hand PA-C, Alfonzo Servin MD, and Johanna Hooper MD.    History of Present Illness:   has a history of CLL and alcoholic cirrhosis. Imaging revealed hepatomas in 4A and 4B and he underwent the following interventions:  · 18 ablation 4B lesion (Nadeen)  · 19 Hepatic angiogram \"mapping\" (Myrna)  · 18 Y90 SIRT left lobe 0.7 gb ( 19 mCi)  (Myrna)  · 18 YUNIER TACE caudate lesion (Myrna)  He is seen today for review of labs and imaging studies and to plan follow up. Today, the patient denies problems. He denies nausea, constipation, or diarrhea. He has an upcoming appointment with Dr. Servin. He continues to work. He has received the MRI report and lab results via [facility] and has questions about the findings. He is accompanied by his wife to the follow up.     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 Stopped both medications because blood pressure readings were low, /82 today and has been low at home since stopping medications about 5 days ago. Last reading at home 117/72    • Liver cancer (HCC)    • Liver tumor    • Rosacea      Past Surgical History:   Procedure Laterality Date   • HEPATIC ABLATION LAPAROSCOPIC  2018    " "Procedure: HEPATIC ABLATION LAPAROSCOPIC. SEGMENT 4B, HEPATOMA, REPAIR OF INCARCERATED UMBILICAL HERNIA;  Surgeon: Feliberto Burgos M.D.;  Location: SURGERY [facility];  Service: General   • BRACHY THERAPY       Social History     Social History   • Marital status:      Spouse name: N/A   • Number of children: 1   • Years of education: N/A     Occupational History   • fertilizer sales      Social History Main Topics   • Smoking status: Never Smoker   • Smokeless tobacco: Former User     Types: Snuff     Quit date: 3/29/2015   • Alcohol use No   • Drug use: Yes     Types: Marijuana      Comment: \"Marijuana Use\" daily    • Sexual activity: No     Other Topics Concern   • Not on file     Social History Narrative    No known exposure to asbestos, dyes, or chemicals, however he was exposed to pesticides.  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  1 child, alive and well.  He also has a step-child.      Family History   Problem Relation Age of Onset   • Cancer Father 81        Bladder   • Cancer Brother         Prostate & CLL (s/p 8-10 years ago)   • Hyperlipidemia Sister    • Lung Disease Neg Hx    • Diabetes Neg Hx    • Heart Disease Neg Hx    • Hypertension Neg Hx    • Stroke Neg Hx    • Alcohol/Drug Neg Hx        Review of Systems   Constitutional: Negative for chills, diaphoresis, fever, malaise/fatigue and weight loss.   Eyes:        Negative for icterus   Gastrointestinal: Negative.  Negative for abdominal pain, blood in stool, constipation, diarrhea, heartburn, melena, nausea and vomiting.        Negative for ascites   Skin: Negative.         Negative for jaundice   Neurological: Negative for weakness.   Endo/Heme/Allergies: Does not bruise/bleed easily.   Psychiatric/Behavioral: Negative for substance abuse (negative for alcohol and tobacco use).     A comprehensive 14-point review of systems was negative except as described " above.     Labs:      Ref. Range 3/29/2019 11:36 4/15/2019 14:01 6/5/2019 09:19   WBC Latest Ref Range: 4.8 - 10.8 K/uL 2.8 (L)  1.8 (LL)   RBC Latest Ref Range: 4.70 - 6.10 M/uL 4.63 (L)  4.63 (L)   Hemoglobin Latest Ref Range: 14.0 - 18.0 g/dL 14.8  13.3 (L)   Hematocrit Latest Ref Range: 42.0 - 52.0 % 43.1  39.6 (L)   MCV Latest Ref Range: 81.4 - 97.8 fL 93.1  85.5   MCH Latest Ref Range: 27.0 - 33.0 pg 32.0  28.7   MCHC Latest Ref Range: 33.7 - 35.3 g/dL 34.3  33.6 (L)   RDW Latest Ref Range: 35.9 - 50.0 fL 53.2 (H)  40.1   Platelet Count Latest Ref Range: 164 - 446 K/uL 84 (L)  74 (L)   MPV Latest Ref Range: 9.0 - 12.9 fL 10.5  12.1   Neutrophils-Polys Latest Ref Range: 44.00 - 72.00 % 61.30  63.20   Neutrophils (Absolute) Latest Ref Range: 1.82 - 7.42 K/uL 1.71 (L)  1.12 (L)   Lymphocytes Latest Ref Range: 22.00 - 41.00 % 16.50 (L)  13.60 (L)   Lymphs (Absolute) Latest Ref Range: 1.00 - 4.80 K/uL 0.46 (L)  0.24 (L)   Monocytes Latest Ref Range: 0.00 - 13.40 % 16.50 (H)  14.10 (H)   Monos (Absolute) Latest Ref Range: 0.00 - 0.85 K/uL 0.46  0.25   Eosinophils Latest Ref Range: 0.00 - 6.90 % 3.90  8.50 (H)   Eos (Absolute) Latest Ref Range: 0.00 - 0.51 K/uL 0.11  0.15   Basophils Latest Ref Range: 0.00 - 1.80 % 1.40  0.60   Baso (Absolute) Latest Ref Range: 0.00 - 0.12 K/uL 0.04  0.01   Immature Granulocytes Latest Ref Range: 0.00 - 0.90 % 0.40  0.00   Immature Granulocytes (abs) Latest Ref Range: 0.00 - 0.11 K/uL 0.01  0.00   Nucleated RBC Latest Units: /100 WBC 0.00  0.00   NRBC (Absolute) Latest Units: K/uL 0.00  0.00   Sodium Latest Ref Range: 135 - 145 mmol/L 145  142   Potassium Latest Ref Range: 3.6 - 5.5 mmol/L 3.7  3.5 (L)   Chloride Latest Ref Range: 96 - 112 mmol/L 111  113 (H)   Co2 Latest Ref Range: 20 - 33 mmol/L 25  22   Anion Gap Latest Ref Range: 0.0 - 11.9  9.0  7.0   Glucose Latest Ref Range: 65 - 99 mg/dL 175 (H)  165 (H)   Bun Latest Ref Range: 8 - 22 mg/dL 10  11   Creatinine Latest Ref  Range: 0.50 - 1.40 mg/dL 0.60  0.64   GFR If  Latest Ref Range: >60 mL/min/1.73 m 2 >60  >60   GFR If Non  Latest Ref Range: >60 mL/min/1.73 m 2 >60  >60   Calcium Latest Ref Range: 8.5 - 10.5 mg/dL 9.1  8.8   AST(SGOT) Latest Ref Range: 12 - 45 U/L 37  23   ALT(SGPT) Latest Ref Range: 2 - 50 U/L 23  17   Alkaline Phosphatase Latest Ref Range: 30 - 99 U/L 69  61   Total Bilirubin Latest Ref Range: 0.1 - 1.5 mg/dL 2.1 (H)  0.8   Albumin Latest Ref Range: 3.2 - 4.9 g/dL 3.9  4.0   Total Protein Latest Ref Range: 6.0 - 8.2 g/dL 6.2  6.1   Globulin Latest Ref Range: 1.9 - 3.5 g/dL 2.3  2.1   A-G Ratio Latest Units: g/dL 1.7  1.9   LDH Total Latest Ref Range: 107 - 266 U/L 272 (H)     Iron Latest Ref Range: 50 - 180 ug/dL   35 (L)   Total Iron Binding Latest Ref Range: 250 - 450 ug/dL   483 (H)   % Saturation Latest Ref Range: 15 - 55 %   7 (L)   Glycohemoglobin Latest Ref Range: 0.0 - 5.6 %  6.0 (H)    Estim. Avg Glu Latest Units: mg/dL  126    INR Latest Ref Range: 0.87 - 1.13    1.15 (H)   PT Latest Ref Range: 12.0 - 14.6 sec   15.0 (H)        Ref. Range 2/13/2019 13:12 3/29/2019 11:36 6/5/2019 09:19   Jordon-gamma-carboxy Prothrombin Latest Ref Range: 0.0 - 7.4 ng/mL 0.8 0.7 0.5      Ref. Range 11/30/2018 08:14 12/11/2018 09:52 2/13/2019 13:12 6/5/2019 09:19   Alpha Fetoprotein Latest Ref Range: 0 - 15 ng/mL 9 9 9 6   AFP L3% Latest Ref Range: 0.0 - 9.9 % 7.9 8.3 7.6 7.0     Child [place] Class A  JIM Grade A1    Pathology:  None available at time of consultation    Radiology:   MRI abdomen on July 1, 2019 at [facility]:  1.  Stable wedge-shaped treated lesion in the anterior medial hepatic segment.  2.  Stable treated lesion in the caudate lobe, with unchanged linear enhancement in its periphery.  3.  Segment 2 lesion described on the prior study is not seen on today's study. No focal arterial enhancement in segment 2.  4.  New 1 cm arterially enhancing focus in segment 8 has no correlate on  other images. It is indeterminate. LR-3.  5.  Cirrhosis and portal hypertension, with postembolization changes in the left hepatic lobe. Splenomegaly. No ascites.  6.  Cholelithiasis.  7.  Stable left adrenal lesion, likely adenoma.  8.  Stable pancreatic tail cyst.    MRI abdomen on January 29, 2019 at [facility]:  1.  No new arterial enhancing mass is identified.  2.  Previously described mass in the caudate lobe is now avascular with peripheral enhancement, consistent with recent chemoembolization. LR-treated  3.  Ablation cavity in the left hepatic lobe remains avascular.  4.  8 mm arterial enhancing observation in segment 4A is not as pronounced as on the prior exam and grossly stable. LR-3  5.  Stable peripheral enhancing lesion in segment 2. LR-treated  6.  Previously described rim-enhancing observation in the superior aspect of segment 2 is not visualized on the current exam, likely related to differences in technique.  7.  Morphologic characteristics of cirrhosis and evidence of portal hypertension including splenomegaly  8.  Cholelithiasis.  9.  Left adrenal adenoma  10.  Stable 8 mm pancreatic tail cyst         Interventional radiology procedure December 14, 2018, at [facility]:  1.  DEBTACE procedure with total dose doxorubicin 100 mg administered as 100 mu Oncozene beads. (Caudate lobe)  2.  The patient is returned to the same day surgery reese for post procedure observation.       MRI abdomen November 10, 2018 at [facility]:  1.  Morphologic characteristics of cirrhosis with evidence of portal hypertension including trace ascites, splenomegaly, and portal venous dilatation.  2.  Interval enlargement in the mass in segment 4 adjacent to the caudate lobe. LR-5  3.  Previously described hyper enhancing masses in the left hepatic lobe demonstrate rim enhancement, consistent with recent Y 90 treatment. LR-treated.  4.  Ablation cavity in the left hepatic lobe is a vascular  5.  8 mm arterial enhancing focus in segment  4A is better defined than on the prior exam secondary to decreased motion. Size is not significantly changed.LR-3  6.  Stable 9 mm cystic mass in the pancreatic tail.  7.  Stable benign left adrenal adenoma  8.  Cholelithiasis. Dependent hypointense foci in the common bile duct may represent tiny nonobstructing calculi.       Interventional radiology procedure Y90 SIRT left lobe September 7, 2018, at [facility]:  1.  Technically successful Y90 radioembolization of the left hepatic lobe. The patient will followup in approximately 10 days for laboratory and clinical evaluation and 4-6 weeks for imaging evaluation.  2.  Immediately following the procedure, the patient was transported to nuclear medicine for SPECT imaging which demonstrates Bremsstrahlung emission from the left of the liver. There is no definite evidence of extrahepatic deposition of radioembolic   Material.     Nuclear medicine bremsstrahlung study on September 7, 2018 at [facility]:  The prescribed dose was  0.68 gb ( 18.4 mCi). The drawn dose was 0.78 gb (21mCi). Delivered dose after residual was measured at 0.7 gb ( 19 mCi). This represents  103% of the prescribed dose and  90% of the drawn dose. Bremsstrahlung images obtained   after the Y-90 radioembolization, confirm proper delivery to the targeted region. There is no uptake outside the planned treatment area. NOTE: The patient will be followed by interventional radiology and oncology.       Interventional radiology procedure August 8, 2018 at [facility]:  1.  Superior mesenteric arteriogram replaced common hepatic artery.  2.  Celiac arteriogram demonstrate no evidence of common hepatic arterial anatomy.  3.  Common hepatic arteriogram demonstrate no evidence of variant hepatic arterial anatomy.  4.  Selective catheterization and embolization of a small branch originating from the proximal aspect of the right hepatic artery which appeared to supply either a small subsegmental region of the right hepatic  lobe or a small amount of the hepatic   flexure of the colon.  5.  Proper hepatic arteriogram demonstrating 2 small branches originating from the proximal aspect of the right hepatic artery one of which appear to represent either a small subsegmental hepatic artery versus hepatic flexure colon branch and a small   cystic artery.  6.  Intra-arterial administration of 4.4 mCi technetium 99m labeled MAA into the hepatic artery demonstrated a hepatic pulmonary shunt fraction of 4%.        CT abdomen with and without August 8, 2018 at [facility]:  1.  Cirrhosis  2.  Interval ablation of a segment 4A hepatic nodule without evidence of residual tumor in that field  3.  Multiple additional hepatic nodules and masses as described above, mostly unchanged in size however the dominant central RIGHT hepatic mass has increased in size. These are likely hepatomas.  4.  Splenomegaly and enlarged portal vein, in keeping with the patient's known portal hypertension  5.  Cholelithiasis     Nuclear medicine quantitative lung study August 28, 2018 at [facility]:  Total hepatopulmonary shunt percentage was 4%. No evidence of extrahepatic radiotracer deposition is identified.     MRI abdomen on June 8, 2018 at [facility]:  Examination is limited by patient motion.  2.1 x 1.9 cm arterial hyperenhancing lesion within the left lobe of the liver appears compatible with hepatocellular carcinoma. LR-5    Hyperenhancing lesion within segment 4 of the liver bordering the caudate lobe measures 3 x 2 cm and is increased in size compared to prior. This lesion is compatible with hepatocellular carcinoma. LR-5    10 mm hyperenhancing lesion within segment 4 may be slightly decreased or unchanged in size compared to prior. LR-3    3 other small hyperenhancing lesions are seen within the right lobe of the liver measuring up to 1 cm. LR-3    Heterogeneous wedge-shaped area of delayed hypo-enhancement in the anterior liver is likely related to variable  "perfusion.    Findings of cirrhosis and portal hypertension.    Mildly prominent lymph nodes in the cardiophrenic fat are not significantly changed.    9 mm cystic lesion in the pancreatic tail could represent a small side branch IPMN.    Cholelithiasis. No biliary ductal dilatation is seen.    Subcarinal lymphadenopathy is partially imaged.    2 cm left adrenal nodule is unchanged and likely an adrenal adenoma.    Trace free fluid in the abdomen.    Findings discussed with Dr. Senior         CT thorax April 17, 2018 at [facility]:  1.  Mediastinal lymphadenopathy with markedly enlarged subcarinal lymph node could be due to metastatic disease or lymphoma. Reactive lymph nodes or lymphadenopathy due to granulomatous disease seems less likely.  2.  Coarse coronary artery calcifications.  3.  Cirrhosis with hypervascular hepatic lesions and splenomegaly.  4.  Cholelithiasis.     Nuclear medicine bone study April 17, 2018 at [facility]:  No scintigraphic evidence of bony metastatic disease     Portal pressure gradient April 4, 2018 at [facility]:  1.  Hepatic venography showing a patent right hepatic vein.  2.  Free and wedged right hepatic vein wedge pressures rendering a mean Hepatic Venous Pressure Gradient (HVPG) of 9.67 mmHg. This is consistent with portal hypertension.  (HVPG >= 10mmHg considered \"clinically significant\" portal HTN**  3.  Transjugular Liver biopsy was not performed at this time..  **Reference: Hepatic Venous Pressure Gradient in 2010:Optimal Measurement is Jose. Anya MCINTYRE, Wanda U. HEPATOLOGY, Vol. 51, No. 6, 2010     CT abdomen December 6, 2017 at [facility]:  1.  2.4 cm arterial enhancing mass in segment 4 of the liver demonstrates washout. LR-5.  2.  1.3 cm arterial enhancing lesion in the hepatic dome is similar to the recent MRI. No definite washout or capsule appreciated. LR-3  3.  Morphologic characteristics of cirrhosis with evidence of portal hypertension including splenomegaly.  4. "  Cholelithiasis.  5.  Stable left adrenal nodule, likely a benign adenoma.    Current Outpatient Prescriptions   Medication Sig Dispense Refill   • metFORMIN ER (GLUCOPHAGE XR) 500 MG TABLET SR 24 HR Take 1 Tab by mouth 2 times a day. 180 Tab 3   • ondansetron (ZOFRAN) 4 MG Tab tablet Take 1 Tab by mouth every four hours as needed for Nausea/Vomiting (for nausea, vomiting). 15 Tab 0   • cetirizine (ZYRTEC) 10 MG Tab Take 10 mg by mouth every morning.     • prochlorperazine (COMPAZINE) 10 MG Tab Take 1 Tab by mouth every 6 hours as needed (for nausea, vomiting). 30 Tab 6   • Multiple Vitamins-Minerals (MULTIVITAMIN PO) Take 1 Tab by mouth every morning.     • Polyethylene Glycol 3350 (MIRALAX PO) Take 1 Cap by mouth every morning.     • fluticasone (FLONASE ALLERGY RELIEF) 50 MCG/ACT nasal spray Spray 1 Spray in nose 2 times a day. 1 Bottle 0     No current facility-administered medications for this encounter.        No Known Allergies    Physical Exam   Constitutional: He is oriented to person, place, and time and well-developed, well-nourished, and in no distress. No distress.   HENT:   Head: Atraumatic.   Eyes: Pupils are equal, round, and reactive to light. No scleral icterus.   Pulmonary/Chest: Effort normal. No respiratory distress.   Abdominal: Soft. He exhibits no distension.   No ascites noted   Musculoskeletal: He exhibits no edema.   Neurological: He is alert and oriented to person, place, and time. Gait normal. Coordination normal.   Skin: Skin is warm and dry. No rash noted. He is not diaphoretic. No erythema. No pallor.   No jaundice noted   Psychiatric: Mood, memory, affect and judgment normal.     ECOG Performance Status 0    Impression:   1. Unresectable multifocal hepatocellular carcinoma status post left lobe radioembolization and chemoembolization of caudate hepatoma.  2. Cirrhosis.  3. Portal hypertension.  4. Thrombocytopenia.  5. Splenomegaly.  6. Chronic lymphocytic leukemia.   7. Diabetes  mellitus.     Plan:   Joe Norris MD has reviewed 's history and imaging studies. There is good treatment effect of the treated lesions. We will continue to observe the segment 2 lesion that has remained stable and the segment 8 LR3 lesion. No intervention is warranted at this time. Mr. Duvall states his GI specialist is following the stable pancreatic cyst. We discussed the method of the procedure at length and reviewed imaging studies. All questions were answered and the MRI report was explained to the patient and his wife. We discussed the possibility of tumor recurrence and the need to continue surveillance.  was instructed to continue to follow up with all his other treating physicians. We will check tumor markers in 3 months. If stable, we will defer imaging to 6 months. The patient and his wife are in agreement with the plan.       GINA Grewal with Joe Norris MD  Interventional Radiology   [address] ([id number])  [place] [address]  [phone]

## 2019-07-10 ENCOUNTER — HOSPITAL ENCOUNTER (OUTPATIENT)
Dept: RADIOLOGY | Facility: MEDICAL CENTER | Age: 69
End: 2019-07-10
Attending: NURSE PRACTITIONER

## 2019-07-10 ENCOUNTER — TELEPHONE (OUTPATIENT)
Dept: HEMATOLOGY ONCOLOGY | Facility: MEDICAL CENTER | Age: 69
End: 2019-07-10

## 2019-07-10 DIAGNOSIS — C22.0 HEPATOMA (HCC): ICD-10-CM

## 2019-07-10 ASSESSMENT — ENCOUNTER SYMPTOMS
DIARRHEA: 0
ROS SKIN COMMENTS: NEGATIVE FOR JAUNDICE
NAUSEA: 0
WEAKNESS: 0
ABDOMINAL PAIN: 0
DIAPHORESIS: 0
FEVER: 0
BLOOD IN STOOL: 0
ROS GI COMMENTS: NEGATIVE FOR ASCITES
BRUISES/BLEEDS EASILY: 0
CONSTIPATION: 0
HEARTBURN: 0
GASTROINTESTINAL NEGATIVE: 1
WEIGHT LOSS: 0
VOMITING: 0
CHILLS: 0

## 2019-07-10 ASSESSMENT — LIFESTYLE VARIABLES: SUBSTANCE_ABUSE: 0

## 2019-07-10 NOTE — TELEPHONE ENCOUNTER
Spoke to patient and confirmed he is going to see Dr. Servin at [facility] on 7/17/19. I let patient know we will cancel his appointment with our office.

## 2019-07-10 NOTE — TELEPHONE ENCOUNTER
"Patient informed that he is transferring care to [facility] to stay with Dr. Servin. Patient stated he will \" call us back if it doesn't work out with [facility].\" If you have any questions, please call him at [phone]  "

## 2019-07-17 ENCOUNTER — APPOINTMENT (OUTPATIENT)
Dept: HEMATOLOGY ONCOLOGY | Facility: MEDICAL CENTER | Age: 69
End: 2019-07-17
Payer: MEDICARE

## 2019-08-05 ENCOUNTER — OFFICE VISIT (OUTPATIENT)
Dept: URGENT CARE | Facility: PHYSICIAN GROUP | Age: 69
End: 2019-08-05
Payer: MEDICARE

## 2019-08-05 ENCOUNTER — APPOINTMENT (OUTPATIENT)
Dept: RADIOLOGY | Facility: IMAGING CENTER | Age: 69
End: 2019-08-05
Attending: PHYSICIAN ASSISTANT
Payer: MEDICARE

## 2019-08-05 VITALS
HEART RATE: 63 BPM | HEIGHT: 67 IN | TEMPERATURE: 98 F | DIASTOLIC BLOOD PRESSURE: 76 MMHG | WEIGHT: 225.6 LBS | RESPIRATION RATE: 16 BRPM | SYSTOLIC BLOOD PRESSURE: 126 MMHG | OXYGEN SATURATION: 98 % | BODY MASS INDEX: 35.41 KG/M2

## 2019-08-05 DIAGNOSIS — M79.671 RIGHT FOOT PAIN: ICD-10-CM

## 2019-08-05 DIAGNOSIS — M10.071 ACUTE IDIOPATHIC GOUT OF RIGHT FOOT: ICD-10-CM

## 2019-08-05 PROCEDURE — 99214 OFFICE O/P EST MOD 30 MIN: CPT | Performed by: PHYSICIAN ASSISTANT

## 2019-08-05 PROCEDURE — 73630 X-RAY EXAM OF FOOT: CPT | Mod: TC,RT | Performed by: PHYSICIAN ASSISTANT

## 2019-08-05 RX ORDER — INDOMETHACIN 50 MG/1
50 CAPSULE ORAL 3 TIMES DAILY
Qty: 21 CAP | Refills: 0 | Status: SHIPPED | OUTPATIENT
Start: 2019-08-05 | End: 2019-08-12

## 2019-08-05 ASSESSMENT — ENCOUNTER SYMPTOMS
COUGH: 0
FEVER: 0
SORE THROAT: 0
VOMITING: 0
NAUSEA: 0
EYE REDNESS: 0
SHORTNESS OF BREATH: 0
ABDOMINAL PAIN: 0
EYE DISCHARGE: 0
HEADACHES: 0

## 2019-08-05 NOTE — PROGRESS NOTES
Subjective:      VAUGHN Duvall is a 68 y.o. male who presents with Gout (rt heel, flare up X 4 days )        Foot Problem   This is a new (right foot) problem. Episode onset: x 3-4 days. The problem occurs constantly. The problem has been gradually worsening. Pertinent negatives include no abdominal pain, chest pain, congestion, coughing, fever, headaches, joint swelling, nausea, rash, sore throat or vomiting. The symptoms are aggravated by walking. He has tried nothing for the symptoms.     The patient presents to clinic c/o right foot pain x 3-4 days. The patient reports no injury or trauma to his right foot. He states the pain is located to his right heal. He denies swelling, bruising, and redness. No fever. The patient hasn't taken anything for his symptoms.     The patient states this pain is similar to a previous gout flare.     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  MEDS:   Current Outpatient Medications:   •  indomethacin (INDOCIN) 50 MG Cap, Take 1 Cap by mouth 3 times a day for 7 days., Disp: 21 Cap, Rfl: 0  •  metFORMIN ER (GLUCOPHAGE XR) 500 MG TABLET SR 24 HR, Take 1 Tab by mouth 2 times a day., Disp: 180 Tab, Rfl: 3  •  ondansetron (ZOFRAN) 4 MG Tab tablet, Take 1 Tab by mouth every four hours as needed for Nausea/Vomiting (for nausea, vomiting)., Disp: 15 Tab, Rfl: 0  •  cetirizine (ZYRTEC) 10 MG Tab, Take 10 mg by mouth every morning., Disp: , Rfl:   •  prochlorperazine (COMPAZINE) 10 MG Tab, Take 1 Tab by mouth every 6 hours as needed (for nausea, vomiting)., Disp: 30 Tab, Rfl: 6  •  Multiple Vitamins-Minerals (MULTIVITAMIN PO), Take 1 Tab by mouth every morning., Disp: , Rfl:   •  Polyethylene Glycol 3350  "(MIRALAX PO), Take 1 Cap by mouth every morning., Disp: , Rfl:   •  fluticasone (FLONASE ALLERGY RELIEF) 50 MCG/ACT nasal spray, Spray 1 Spray in nose 2 times a day., Disp: 1 Bottle, Rfl: 0  ALLERGIES: No Known Allergies  SURGHX:   Past Surgical History:   Procedure Laterality Date   • HEPATIC ABLATION LAPAROSCOPIC  6/28/2018    Procedure: HEPATIC ABLATION LAPAROSCOPIC. SEGMENT 4B, HEPATOMA, REPAIR OF INCARCERATED UMBILICAL HERNIA;  Surgeon: Feliberto Burgos M.D.;  Location: SURGERY [facility];  Service: General   • BRACHY THERAPY       SOCHX:  reports that he has never smoked. He quit smokeless tobacco use about 4 years ago.  His smokeless tobacco use included snuff. He reports that he has current or past drug history. Drug: Marijuana. He reports that he does not drink alcohol.  FH: Family history was reviewed, no pertinent findings to report    Review of Systems   Constitutional: Negative for fever.   HENT: Negative for congestion, ear pain and sore throat.    Eyes: Negative for discharge and redness.   Respiratory: Negative for cough and shortness of breath.    Cardiovascular: Negative for chest pain and leg swelling.   Gastrointestinal: Negative for abdominal pain, nausea and vomiting.   Musculoskeletal: Positive for joint pain. Negative for joint swelling.   Skin: Negative for rash.   Neurological: Negative for headaches.          Objective:     /76   Pulse 63   Temp 36.7 °C (98 °F)   Resp 16   Ht 1.702 m (5' 7\")   Wt 102.3 kg (225 lb 9.6 oz)   SpO2 98%   BMI 35.33 kg/m²      Physical Exam   Constitutional: He is oriented to person, place, and time. He appears well-developed and well-nourished. No distress.   HENT:   Head: Normocephalic and atraumatic.   Right Ear: External ear normal.   Left Ear: External ear normal.   Nose: Nose normal.   Eyes: Conjunctivae and EOM are normal.   Neck: Normal range of motion. Neck supple.   Cardiovascular: Normal rate.   Pulmonary/Chest: Effort " normal.   Musculoskeletal:   Right Foot:  Mild tenderness to the right heel. No swelling. No erythema. No ecchymosis.   ROM intact - the patient demonstrates full ROM of his right foot  Neurovascular intact  Strength 5/5 - with dorsiflexion and plantarflexion of the right foot  Normal gait   Neurological: He is alert and oriented to person, place, and time.   Skin: Skin is warm and dry.          Progress:  Right Foot XR:  FINDINGS:  There is no fracture or dislocation.  The visualized osseous structures are in anatomic alignment.  The joint spaces are preserved.  Bone mineralization is age-appropriate..  Prominent atherosclerotic arterial calcifications.      Impression       1.  No acute osseous abnormality.  2.  Atherosclerosis.          Assessment/Plan:     1. Right foot pain  - DX-FOOT-COMPLETE 3+ RIGHT; Future    2. Acute idiopathic gout of right foot  - indomethacin (INDOCIN) 50 MG Cap; Take 1 Cap by mouth 3 times a day for 7 days.  Dispense: 21 Cap; Refill: 0    The patient's presenting symptoms and physical exam are consistent with pain of his right foot.  The patient's right foot x-ray today in clinic showed no acute osseous abnormality.  The radiologist noted atherosclerosis with arterial calcifications.  The patient was informed of this incidental finding.  The patient states his current symptoms are similar to previous gout flare.  Therefore, the patient's right foot pain is likely due to an acute gout flare.  Will prescribe the patient indomethacin for his current symptoms.  Recommend OTC medications and supportive care for symptomatic management.  Recommend the patient follow-up with his PCP.  Discussed strict return precautions with the patient, and he verbalized understanding.    OTC Tylenol for pain/discomfort  RICE  Follow-up with PCP  Return to clinic or go to the ED if symptoms worsen or fail to improve, or if the patient should develop worsening/increasing right foot pain, swelling, redness or  warmth to the affected area, decreased range of motion, difficulty walking, numbness, tingling, or weakness, fever/chills, and or any concerning symptoms.    Discussed plan with the patient, and he agrees to the above.

## 2019-08-08 ASSESSMENT — ENCOUNTER SYMPTOMS: JOINT SWELLING: 0

## 2019-08-12 ENCOUNTER — OFFICE VISIT (OUTPATIENT)
Dept: MEDICAL GROUP | Facility: PHYSICIAN GROUP | Age: 69
End: 2019-08-12
Payer: MEDICARE

## 2019-08-12 VITALS
WEIGHT: 220 LBS | TEMPERATURE: 97.6 F | HEIGHT: 67 IN | HEART RATE: 66 BPM | BODY MASS INDEX: 34.53 KG/M2 | OXYGEN SATURATION: 99 % | DIASTOLIC BLOOD PRESSURE: 82 MMHG | SYSTOLIC BLOOD PRESSURE: 126 MMHG

## 2019-08-12 DIAGNOSIS — M72.2 PLANTAR FASCIITIS OF RIGHT FOOT: ICD-10-CM

## 2019-08-12 PROCEDURE — 99213 OFFICE O/P EST LOW 20 MIN: CPT | Performed by: PHYSICIAN ASSISTANT

## 2019-08-12 NOTE — PROGRESS NOTES
Subjective:   Sohail Duvall is a 68 y.o. male here today for right foot pain. Is an established patient of mine.    HPI:    Patient presents to the office today with concerns regarding gout. He has been dealing with pain of his right foot for about the last 2 weeks. Pain is located in the heel of his foot. Was seen in  on 8/5/19 for this pain. Foot x-rays were completed and unremarkable. He was started on indomethacin. He states that this helped for the first few days, but then the pain started worsening again.  By this past weekend, was finding it very difficult to bear weight on his right foot.  He denies any history of redness or swelling.  He states that symptoms feel similar to previous gout that was diagnosed 25 years ago.  He denies any foot joint pain.       Current medicines (including changes today)  Current Outpatient Medications   Medication Sig Dispense Refill   • indomethacin (INDOCIN) 50 MG Cap Take 1 Cap by mouth 3 times a day for 7 days. 21 Cap 0   • metFORMIN ER (GLUCOPHAGE XR) 500 MG TABLET SR 24 HR Take 1 Tab by mouth 2 times a day. 180 Tab 3   • ondansetron (ZOFRAN) 4 MG Tab tablet Take 1 Tab by mouth every four hours as needed for Nausea/Vomiting (for nausea, vomiting). 15 Tab 0   • cetirizine (ZYRTEC) 10 MG Tab Take 10 mg by mouth every morning.     • prochlorperazine (COMPAZINE) 10 MG Tab Take 1 Tab by mouth every 6 hours as needed (for nausea, vomiting). 30 Tab 6   • Multiple Vitamins-Minerals (MULTIVITAMIN PO) Take 1 Tab by mouth every morning.     • Polyethylene Glycol 3350 (MIRALAX PO) Take 1 Cap by mouth every morning.     • fluticasone (FLONASE ALLERGY RELIEF) 50 MCG/ACT nasal spray Spray 1 Spray in nose 2 times a day. 1 Bottle 0     No current facility-administered medications for this visit.      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    ROS  As per HPI.       Objective:     /82 (BP Location: Right arm, Patient Position: Sitting, BP Cuff Size: Adult)   Pulse 66   Temp 36.4 °C (97.6 °F)   Ht 1.702 m (5' 7\")   Wt 99.8 kg (220 lb)   SpO2 99%  Body mass index is 34.46 kg/m².     Physical Exam:  Constitutional: Alert, well-appearing, very pleasant, no distress.  Skin: Warm, dry, good turgor, no rashes in visible areas.  Eye: Pupils are equal and round, conjunctiva clear, lids normal.  ENMT: Lips without lesions, moist mucus membranes.  Extremities: Focused exam performed of the right foot.  There is no visible deformity, edema, or erythema noted.  Tender to palpation over the mid-heel and distal plantar fascia.      Assessment and Plan:   The following treatment plan was discussed    1. Plantar fasciitis of right foot  New problem, uncontrolled.  Patient's presentation most consistent with plantar fasciitis, not gout.  Patient was counseled on typical conservative treatment of plantar fasciitis.  He did just buy new supportive footwear, so recommend addition of inserts which he can buy over-the-counter.  We also discussed rest, frequent icing, stretching exercises, and taping.  He was provided with a handout detailing all of these things.  We discussed that lack of significant improvement with conservative measures typically warrants podiatry referral and possible steroid injection.  Offered to put in referral now so that it is ready if he should need it, but he declines.  We discussed that he can use anti-inflammatories in limited amounts but cautioned regarding side effects.  He will follow-up with me for significant worsening or lack of improvement.      Followup: Return if symptoms worsen or fail to improve.    Carmencita Hand P.A.-C.             "

## 2019-10-05 ENCOUNTER — OFFICE VISIT (OUTPATIENT)
Dept: URGENT CARE | Facility: PHYSICIAN GROUP | Age: 69
End: 2019-10-05
Payer: MEDICARE

## 2019-10-05 VITALS
OXYGEN SATURATION: 98 % | DIASTOLIC BLOOD PRESSURE: 86 MMHG | WEIGHT: 220 LBS | HEIGHT: 67 IN | TEMPERATURE: 97.8 F | SYSTOLIC BLOOD PRESSURE: 132 MMHG | HEART RATE: 60 BPM | BODY MASS INDEX: 34.53 KG/M2 | RESPIRATION RATE: 18 BRPM

## 2019-10-05 DIAGNOSIS — R68.89 FLU-LIKE SYMPTOMS: ICD-10-CM

## 2019-10-05 LAB
FLUAV+FLUBV AG SPEC QL IA: NEGATIVE
INT CON NEG: NEGATIVE
INT CON POS: POSITIVE

## 2019-10-05 PROCEDURE — 99214 OFFICE O/P EST MOD 30 MIN: CPT | Performed by: PHYSICIAN ASSISTANT

## 2019-10-05 PROCEDURE — 87804 INFLUENZA ASSAY W/OPTIC: CPT | Performed by: PHYSICIAN ASSISTANT

## 2019-10-05 ASSESSMENT — ENCOUNTER SYMPTOMS
RHINORRHEA: 1
FEVER: 0
WHEEZING: 0
MYALGIAS: 1
COUGH: 1
CHILLS: 1
NAUSEA: 0
SHORTNESS OF BREATH: 0
HEADACHES: 1
VOMITING: 0
ABDOMINAL PAIN: 0
SORE THROAT: 1
WEIGHT LOSS: 0
SPUTUM PRODUCTION: 0
DIARRHEA: 0
DIZZINESS: 0
HEMOPTYSIS: 0

## 2019-10-05 ASSESSMENT — COPD QUESTIONNAIRES: COPD: 0

## 2019-10-05 NOTE — PROGRESS NOTES
"Subjective:      VAUGHN Duvall is a 68 y.o. male who presents with Sore Throat (cough/runny nose, body aches, ear aches, sinus pressure, congestion, x1  day )        Patient is accompanied by his wife.     Cough   This is a new problem. The current episode started yesterday. The problem has been unchanged. The problem occurs every few minutes. Associated symptoms include chills, headaches, myalgias, nasal congestion, postnasal drip, rhinorrhea and a sore throat. Pertinent negatives include no chest pain, ear pain, fever, hemoptysis, rash, shortness of breath, weight loss or wheezing. Nothing aggravates the symptoms. He has tried OTC cough suppressant for the symptoms. The treatment provided mild relief. There is no history of asthma, COPD or pneumonia.     Patient presents to urgent care reporting a 1 day history of body aches, chills, cough, congestion, and sinus congestion. No measured fevers. PMH is significant for CLL. No history of chronic lung disease or pneumonia. He has received all pneumococcal vaccinations. No known sick contacts. He hasn't received a flu shot this year.     Review of Systems   Constitutional: Positive for chills. Negative for fever and weight loss.   HENT: Positive for congestion, postnasal drip, rhinorrhea and sore throat. Negative for ear pain.    Respiratory: Positive for cough. Negative for hemoptysis, sputum production, shortness of breath and wheezing.    Cardiovascular: Negative for chest pain.   Gastrointestinal: Negative for abdominal pain, diarrhea, nausea and vomiting.   Genitourinary: Negative.    Musculoskeletal: Positive for myalgias.   Skin: Negative for rash.   Neurological: Positive for headaches. Negative for dizziness.        Objective:     /86 (BP Location: Right arm, Patient Position: Sitting, BP Cuff Size: Large adult)   Pulse 60   Temp 36.6 °C (97.8 °F) (Temporal)   Resp 18   Ht 1.702 m (5' 7\")   Wt 99.8 kg (220 lb)   SpO2 98%   BMI 34.46 kg/m²  "     Physical Exam   Constitutional: He is oriented to person, place, and time. He appears well-developed and well-nourished. No distress.   HENT:   Head: Normocephalic and atraumatic.   Right Ear: Hearing, tympanic membrane, external ear and ear canal normal.   Left Ear: Hearing, tympanic membrane, external ear and ear canal normal.   Mouth/Throat: Oropharynx is clear and moist. No oropharyngeal exudate, posterior oropharyngeal edema or posterior oropharyngeal erythema.   Eyes: Pupils are equal, round, and reactive to light. Conjunctivae are normal. Right eye exhibits no discharge. Left eye exhibits no discharge.   Neck: Normal range of motion.   Cardiovascular: Normal rate, regular rhythm and normal heart sounds.   No murmur heard.  Pulmonary/Chest: Effort normal. No stridor. No respiratory distress. He has no wheezes.   Musculoskeletal: Normal range of motion.   Neurological: He is alert and oriented to person, place, and time.   Skin: Skin is warm and dry. He is not diaphoretic.   Psychiatric: He has a normal mood and affect. His behavior is normal.   Nursing note and vitals reviewed.       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  MEDS:   Current Outpatient Medications:   •  metFORMIN ER (GLUCOPHAGE XR) 500 MG TABLET SR 24 HR, Take 1 Tab by mouth 2 times a day., Disp: 180 Tab, Rfl: 3  •  ondansetron (ZOFRAN) 4 MG Tab tablet, Take 1 Tab by mouth every four hours as needed for Nausea/Vomiting (for nausea, vomiting). (Patient not taking: Reported on 10/5/2019), Disp: 15 Tab, Rfl: 0  •  cetirizine (ZYRTEC) 10 MG Tab, Take 10 mg by mouth every morning., Disp: , Rfl:   •  Multiple Vitamins-Minerals (MULTIVITAMIN PO), Take 1 Tab by  mouth every morning., Disp: , Rfl:   •  Polyethylene Glycol 3350 (MIRALAX PO), Take 1 Cap by mouth every morning., Disp: , Rfl:   •  fluticasone (FLONASE ALLERGY RELIEF) 50 MCG/ACT nasal spray, Spray 1 Spray in nose 2 times a day. (Patient not taking: Reported on 10/5/2019), Disp: 1 Bottle, Rfl: 0  ALLERGIES: No Known Allergies  SURGHX:   Past Surgical History:   Procedure Laterality Date   • HEPATIC ABLATION LAPAROSCOPIC  6/28/2018    Procedure: HEPATIC ABLATION LAPAROSCOPIC. SEGMENT 4B, HEPATOMA, REPAIR OF INCARCERATED UMBILICAL HERNIA;  Surgeon: Feliberto Burgos M.D.;  Location: SURGERY [facility];  Service: General   • BRACHY THERAPY       SOCHX:  reports that he has never smoked. He quit smokeless tobacco use about 4 years ago.  His smokeless tobacco use included snuff. He reports that he has current or past drug history. Drug: Marijuana. He reports that he does not drink alcohol.  FH: family history includes Cancer in his brother; Cancer (age of onset: 81) in his father; Hyperlipidemia in his sister.       Assessment/Plan:     1. Flu-like symptoms  - POCT Influenza A/B: NEGATIVE     Advised patient symptoms are most likely viral in etiology, recommend supportive care. Increased fluids and rest. OTC cough medications as needed for symptomatic relief. Call or return to office if symptoms persist or worsen. The patient demonstrated a good understanding and agreed with the treatment plan.

## 2019-10-28 ENCOUNTER — HOSPITAL ENCOUNTER (OUTPATIENT)
Dept: LAB | Facility: MEDICAL CENTER | Age: 69
End: 2019-10-28
Attending: PHYSICIAN ASSISTANT
Payer: MEDICARE

## 2019-10-28 ENCOUNTER — HOSPITAL ENCOUNTER (OUTPATIENT)
Dept: LAB | Facility: MEDICAL CENTER | Age: 69
End: 2019-10-28
Attending: INTERNAL MEDICINE
Payer: MEDICARE

## 2019-10-28 DIAGNOSIS — E11.9 TYPE 2 DIABETES MELLITUS WITHOUT COMPLICATION, WITHOUT LONG-TERM CURRENT USE OF INSULIN (HCC): ICD-10-CM

## 2019-10-28 LAB
ALBUMIN SERPL BCP-MCNC: 4.3 G/DL (ref 3.2–4.9)
ALBUMIN/GLOB SERPL: 1.9 G/DL
ALP SERPL-CCNC: 64 U/L (ref 30–99)
ALT SERPL-CCNC: 23 U/L (ref 2–50)
ANION GAP SERPL CALC-SCNC: 8 MMOL/L (ref 0–11.9)
ANISOCYTOSIS BLD QL SMEAR: ABNORMAL
AST SERPL-CCNC: 27 U/L (ref 12–45)
BASOPHILS # BLD AUTO: 0.9 % (ref 0–1.8)
BASOPHILS # BLD: 0.03 K/UL (ref 0–0.12)
BILIRUB SERPL-MCNC: 1.5 MG/DL (ref 0.1–1.5)
BUN SERPL-MCNC: 13 MG/DL (ref 8–22)
CALCIUM SERPL-MCNC: 9.7 MG/DL (ref 8.5–10.5)
CHLORIDE SERPL-SCNC: 109 MMOL/L (ref 96–112)
CO2 SERPL-SCNC: 25 MMOL/L (ref 20–33)
CREAT SERPL-MCNC: 0.76 MG/DL (ref 0.5–1.4)
CREAT UR-MCNC: 135.2 MG/DL
EOSINOPHIL # BLD AUTO: 0.17 K/UL (ref 0–0.51)
EOSINOPHIL NFR BLD: 5.3 % (ref 0–6.9)
ERYTHROCYTE [DISTWIDTH] IN BLOOD BY AUTOMATED COUNT: 50.5 FL (ref 35.9–50)
GLOBULIN SER CALC-MCNC: 2.3 G/DL (ref 1.9–3.5)
GLUCOSE SERPL-MCNC: 168 MG/DL (ref 65–99)
HCT VFR BLD AUTO: 47.4 % (ref 42–52)
HGB BLD-MCNC: 16.9 G/DL (ref 14–18)
LYMPHOCYTES # BLD AUTO: 0.34 K/UL (ref 1–4.8)
LYMPHOCYTES NFR BLD: 10.6 % (ref 22–41)
MANUAL DIFF BLD: NORMAL
MCH RBC QN AUTO: 33.4 PG (ref 27–33)
MCHC RBC AUTO-ENTMCNC: 35.7 G/DL (ref 33.7–35.3)
MCV RBC AUTO: 93.7 FL (ref 81.4–97.8)
MICROALBUMIN UR-MCNC: 1 MG/DL
MICROALBUMIN/CREAT UR: 7 MG/G (ref 0–30)
MICROCYTES BLD QL SMEAR: ABNORMAL
MONOCYTES # BLD AUTO: 0.31 K/UL (ref 0–0.85)
MONOCYTES NFR BLD AUTO: 9.7 % (ref 0–13.4)
MORPHOLOGY BLD-IMP: NORMAL
NEUTROPHILS # BLD AUTO: 2.35 K/UL (ref 1.82–7.42)
NEUTROPHILS NFR BLD: 67.3 % (ref 44–72)
NEUTS BAND NFR BLD MANUAL: 6.2 % (ref 0–10)
NRBC # BLD AUTO: 0 K/UL
NRBC BLD-RTO: 0 /100 WBC
OVALOCYTES BLD QL SMEAR: NORMAL
PLATELET # BLD AUTO: 98 K/UL (ref 164–446)
PLATELET BLD QL SMEAR: NORMAL
PMV BLD AUTO: 11.9 FL (ref 9–12.9)
POIKILOCYTOSIS BLD QL SMEAR: NORMAL
POTASSIUM SERPL-SCNC: 4.1 MMOL/L (ref 3.6–5.5)
PROT SERPL-MCNC: 6.6 G/DL (ref 6–8.2)
RBC # BLD AUTO: 5.06 M/UL (ref 4.7–6.1)
RBC BLD AUTO: PRESENT
SODIUM SERPL-SCNC: 142 MMOL/L (ref 135–145)
WBC # BLD AUTO: 3.2 K/UL (ref 4.8–10.8)

## 2019-10-28 PROCEDURE — 82105 ALPHA-FETOPROTEIN SERUM: CPT

## 2019-10-28 PROCEDURE — 85027 COMPLETE CBC AUTOMATED: CPT

## 2019-10-28 PROCEDURE — 80053 COMPREHEN METABOLIC PANEL: CPT

## 2019-10-28 PROCEDURE — 82570 ASSAY OF URINE CREATININE: CPT

## 2019-10-28 PROCEDURE — 85007 BL SMEAR W/DIFF WBC COUNT: CPT

## 2019-10-28 PROCEDURE — 82043 UR ALBUMIN QUANTITATIVE: CPT

## 2019-10-28 PROCEDURE — 83036 HEMOGLOBIN GLYCOSYLATED A1C: CPT | Mod: GA

## 2019-10-28 PROCEDURE — 36415 COLL VENOUS BLD VENIPUNCTURE: CPT | Mod: GA

## 2019-10-29 LAB
EST. AVERAGE GLUCOSE BLD GHB EST-MCNC: 146 MG/DL
HBA1C MFR BLD: 6.7 % (ref 0–5.6)

## 2019-10-30 ENCOUNTER — OFFICE VISIT (OUTPATIENT)
Dept: MEDICAL GROUP | Facility: PHYSICIAN GROUP | Age: 69
End: 2019-10-30
Payer: MEDICARE

## 2019-10-30 VITALS
BODY MASS INDEX: 35.44 KG/M2 | OXYGEN SATURATION: 98 % | TEMPERATURE: 98.5 F | HEIGHT: 67 IN | SYSTOLIC BLOOD PRESSURE: 124 MMHG | WEIGHT: 225.8 LBS | DIASTOLIC BLOOD PRESSURE: 70 MMHG | HEART RATE: 68 BPM

## 2019-10-30 DIAGNOSIS — E11.9 TYPE 2 DIABETES MELLITUS WITHOUT COMPLICATION, WITHOUT LONG-TERM CURRENT USE OF INSULIN (HCC): ICD-10-CM

## 2019-10-30 DIAGNOSIS — E66.9 OBESITY (BMI 30-39.9): ICD-10-CM

## 2019-10-30 DIAGNOSIS — C91.10 CLL (CHRONIC LYMPHOCYTIC LEUKEMIA) (HCC): ICD-10-CM

## 2019-10-30 DIAGNOSIS — C22.0 HEPATOMA (HCC): ICD-10-CM

## 2019-10-30 PROBLEM — D69.59 SECONDARY THROMBOCYTOPENIA: Status: ACTIVE | Noted: 2018-03-20

## 2019-10-30 LAB — AFP-TM SERPL-MCNC: 5 NG/ML (ref 0–9)

## 2019-10-30 PROCEDURE — 99214 OFFICE O/P EST MOD 30 MIN: CPT | Performed by: PHYSICIAN ASSISTANT

## 2019-10-30 NOTE — PROGRESS NOTES
Subjective:   Sohail Duvall is a 68 y.o. male here today for follow-up on diabetes and other chronic conditions. Is an established patient of mine.    HPI:    Patient presents to the office today for routine follow-up.  I last saw him in August 2019.  At that time, he was having issues with right foot pain consistent with plantar fasciitis.  We had discussed conservative management including stretching, taping, inserts, and anti-inflammatories. He states that pain resolved after a couple of weeks. No other current concerns.    He is a type II diabetic treated with metformin  mg twice daily.  Most recent A1c done on 10/28 was 6.7. He does admit to worsening diet lately. Has gained some weight since last visit.    He continues to follow with oncology for his CLL which was initially diagnosed in 2014.  Follows with [facility]. He completed four cycles of chemotherapy back in May. He states that no further treatment has been recommended and he is simply monitored with lab work.     [facility] for his hepat[facility]. Is s/p previous hepatic ablation and Latia-90 radiation treatment last year. Undergoes periodic monitoring. States has upcoming endoscopy scheduled for next month.      Current medicines (including changes today)  Current Outpatient Medications   Medication Sig Dispense Refill   • metFORMIN ER (GLUCOPHAGE XR) 500 MG TABLET SR 24 HR Take 1 Tab by mouth 2 times a day. 180 Tab 3   • ondansetron (ZOFRAN) 4 MG Tab tablet Take 1 Tab by mouth every four hours as needed for Nausea/Vomiting (for nausea, vomiting). 15 Tab 0   • cetirizine (ZYRTEC) 10 MG Tab Take 10 mg by mouth every morning.     • Multiple Vitamins-Minerals (MULTIVITAMIN PO) Take 1 Tab by mouth every morning.     • Polyethylene Glycol 3350 (MIRALAX PO) Take 1 Cap by mouth every morning.     • fluticasone (FLONASE ALLERGY RELIEF) 50 MCG/ACT nasal spray Spray 1 Spray in nose 2 times a day. 1 Bottle 0     No current  "facility-administered medications for this visit.      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    ROS  Pulmonary ROS: No shortness of breath  Cardiovascular ROS: No chest pain, No edema  Gastrointestinal ROS: No abdominal pain     Objective:     /70 (BP Location: Left arm, Patient Position: Sitting)   Pulse 68   Temp 36.9 °C (98.5 °F) (Temporal)   Ht 1.702 m (5' 7\")   Wt 102.4 kg (225 lb 12.8 oz)   SpO2 98%  Body mass index is 35.37 kg/m².     Physical Exam:  Constitutional: Alert, obese but otherwise well-appearing, very pleasant, no distress.  Skin: No rashes in visible areas.  Eye: Pupils are equal and round, conjunctiva clear, lids normal.  ENMT: Lips without lesions, moist mucus membranes.  Respiratory: Unlabored respiratory effort, lungs clear to auscultation, no wheezes, no rhonchi.  Cardiovascular: Normal S1, S2, no murmur.      Assessment and Plan:   The following treatment plan was discussed    1. Type 2 diabetes mellitus without complication, without long-term current use of insulin (HCC)  Established problem, has worsened since last visit but A1c still under good control at 6.7. We discussed importance of limiting carbohydrates and sweets. He is going to work on this. Also discussed importance of exercise, weight loss. Will plan on repeating A1c again prior to next appointment.    2. CLL (chronic lymphocytic leukemia) (HCC)  Established problem, well-controlled since completing chemotherapy. Reviewed recent lab results. He will continue to follow with oncology.    3. Hepatoma (HCC)  Established problem, stable. Will continue periodic monitoring with GI specialist.    4. Obesity (BMI 30-39.9)  - Patient " identified as having weight management issue.  Appropriate orders and counseling given.      Followup: Return in about 7 months (around 5/30/2020).    Carmencita Hand P.A.-C.

## 2019-11-05 NOTE — PROGRESS NOTES
Pt noted to be due for liver CA surveillance with IR. Review of interval records indicates he has been diligent with f/u with his oncologist and GI physician, and per GI notes will be under their surveillance for liver cancer with a planned Eovist MRI in December.     Called pt to discuss. He confirms good follow up with GI for his liver CA with tumor markers and imaging. IR will therefore sign off active surveillance but remain available for future questions or concerns. Pt in agreement with this plan.

## 2019-11-14 ENCOUNTER — HOSPITAL ENCOUNTER (EMERGENCY)
Facility: MEDICAL CENTER | Age: 69
End: 2019-11-14
Attending: EMERGENCY MEDICINE
Payer: MEDICARE

## 2019-11-14 VITALS
WEIGHT: 219.14 LBS | DIASTOLIC BLOOD PRESSURE: 81 MMHG | HEIGHT: 67 IN | RESPIRATION RATE: 18 BRPM | OXYGEN SATURATION: 95 % | TEMPERATURE: 97.2 F | BODY MASS INDEX: 34.39 KG/M2 | HEART RATE: 53 BPM | SYSTOLIC BLOOD PRESSURE: 151 MMHG

## 2019-11-14 DIAGNOSIS — R10.9 ABDOMINAL PAIN, UNSPECIFIED ABDOMINAL LOCATION: ICD-10-CM

## 2019-11-14 DIAGNOSIS — G89.18 ACUTE POST-OPERATIVE PAIN: ICD-10-CM

## 2019-11-14 LAB
ALBUMIN SERPL BCP-MCNC: 4.5 G/DL (ref 3.2–4.9)
ALBUMIN/GLOB SERPL: 2.3 G/DL
ALP SERPL-CCNC: 60 U/L (ref 30–99)
ALT SERPL-CCNC: 21 U/L (ref 2–50)
ANION GAP SERPL CALC-SCNC: 10 MMOL/L (ref 0–11.9)
APTT PPP: 29.7 SEC (ref 24.7–36)
AST SERPL-CCNC: 25 U/L (ref 12–45)
BASOPHILS # BLD AUTO: 0.5 % (ref 0–1.8)
BASOPHILS # BLD: 0.03 K/UL (ref 0–0.12)
BILIRUB SERPL-MCNC: 2.8 MG/DL (ref 0.1–1.5)
BUN SERPL-MCNC: 11 MG/DL (ref 8–22)
CALCIUM SERPL-MCNC: 9.2 MG/DL (ref 8.5–10.5)
CHLORIDE SERPL-SCNC: 105 MMOL/L (ref 96–112)
CO2 SERPL-SCNC: 23 MMOL/L (ref 20–33)
CREAT SERPL-MCNC: 0.67 MG/DL (ref 0.5–1.4)
EOSINOPHIL # BLD AUTO: 0.02 K/UL (ref 0–0.51)
EOSINOPHIL NFR BLD: 0.4 % (ref 0–6.9)
ERYTHROCYTE [DISTWIDTH] IN BLOOD BY AUTOMATED COUNT: 47.1 FL (ref 35.9–50)
GLOBULIN SER CALC-MCNC: 2 G/DL (ref 1.9–3.5)
GLUCOSE SERPL-MCNC: 149 MG/DL (ref 65–99)
HCT VFR BLD AUTO: 47.5 % (ref 42–52)
HGB BLD-MCNC: 16.9 G/DL (ref 14–18)
IMM GRANULOCYTES # BLD AUTO: 0.02 K/UL (ref 0–0.11)
IMM GRANULOCYTES NFR BLD AUTO: 0.4 % (ref 0–0.9)
INR PPP: 1.15 (ref 0.87–1.13)
LIPASE SERPL-CCNC: 22 U/L (ref 11–82)
LYMPHOCYTES # BLD AUTO: 0.48 K/UL (ref 1–4.8)
LYMPHOCYTES NFR BLD: 8.5 % (ref 22–41)
MCH RBC QN AUTO: 32.1 PG (ref 27–33)
MCHC RBC AUTO-ENTMCNC: 35.6 G/DL (ref 33.7–35.3)
MCV RBC AUTO: 90.3 FL (ref 81.4–97.8)
MONOCYTES # BLD AUTO: 0.5 K/UL (ref 0–0.85)
MONOCYTES NFR BLD AUTO: 8.9 % (ref 0–13.4)
NEUTROPHILS # BLD AUTO: 4.59 K/UL (ref 1.82–7.42)
NEUTROPHILS NFR BLD: 81.3 % (ref 44–72)
NRBC # BLD AUTO: 0 K/UL
NRBC BLD-RTO: 0 /100 WBC
PLATELET # BLD AUTO: 80 K/UL (ref 164–446)
PMV BLD AUTO: 9.9 FL (ref 9–12.9)
POTASSIUM SERPL-SCNC: 3.6 MMOL/L (ref 3.6–5.5)
PROT SERPL-MCNC: 6.5 G/DL (ref 6–8.2)
PROTHROMBIN TIME: 15 SEC (ref 12–14.6)
RBC # BLD AUTO: 5.26 M/UL (ref 4.7–6.1)
SODIUM SERPL-SCNC: 138 MMOL/L (ref 135–145)
WBC # BLD AUTO: 5.6 K/UL (ref 4.8–10.8)

## 2019-11-14 PROCEDURE — 36415 COLL VENOUS BLD VENIPUNCTURE: CPT

## 2019-11-14 PROCEDURE — 85025 COMPLETE CBC W/AUTO DIFF WBC: CPT

## 2019-11-14 PROCEDURE — 700102 HCHG RX REV CODE 250 W/ 637 OVERRIDE(OP): Performed by: EMERGENCY MEDICINE

## 2019-11-14 PROCEDURE — 96374 THER/PROPH/DIAG INJ IV PUSH: CPT

## 2019-11-14 PROCEDURE — 83690 ASSAY OF LIPASE: CPT

## 2019-11-14 PROCEDURE — 85610 PROTHROMBIN TIME: CPT

## 2019-11-14 PROCEDURE — 700105 HCHG RX REV CODE 258: Performed by: EMERGENCY MEDICINE

## 2019-11-14 PROCEDURE — 700111 HCHG RX REV CODE 636 W/ 250 OVERRIDE (IP): Performed by: EMERGENCY MEDICINE

## 2019-11-14 PROCEDURE — 80053 COMPREHEN METABOLIC PANEL: CPT

## 2019-11-14 PROCEDURE — A9270 NON-COVERED ITEM OR SERVICE: HCPCS | Performed by: EMERGENCY MEDICINE

## 2019-11-14 PROCEDURE — 99285 EMERGENCY DEPT VISIT HI MDM: CPT

## 2019-11-14 PROCEDURE — 85730 THROMBOPLASTIN TIME PARTIAL: CPT

## 2019-11-14 PROCEDURE — 96375 TX/PRO/DX INJ NEW DRUG ADDON: CPT

## 2019-11-14 RX ORDER — ONDANSETRON 2 MG/ML
4 INJECTION INTRAMUSCULAR; INTRAVENOUS ONCE
Status: COMPLETED | OUTPATIENT
Start: 2019-11-14 | End: 2019-11-14

## 2019-11-14 RX ORDER — LIDOCAINE HYDROCHLORIDE 20 MG/ML
15 SOLUTION OROPHARYNGEAL PRN
Qty: 150 ML | Refills: 0 | Status: SHIPPED
Start: 2019-11-14 | End: 2020-11-06

## 2019-11-14 RX ORDER — SODIUM CHLORIDE 9 MG/ML
1000 INJECTION, SOLUTION INTRAVENOUS ONCE
Status: COMPLETED | OUTPATIENT
Start: 2019-11-14 | End: 2019-11-14

## 2019-11-14 RX ORDER — HYDROMORPHONE HYDROCHLORIDE 1 MG/ML
1 INJECTION, SOLUTION INTRAMUSCULAR; INTRAVENOUS; SUBCUTANEOUS ONCE
Status: COMPLETED | OUTPATIENT
Start: 2019-11-14 | End: 2019-11-14

## 2019-11-14 RX ORDER — OMEPRAZOLE 20 MG/1
20 CAPSULE, DELAYED RELEASE ORAL DAILY
Qty: 30 CAP | Refills: 0 | Status: SHIPPED | OUTPATIENT
Start: 2019-11-14 | End: 2019-12-10 | Stop reason: SDUPTHER

## 2019-11-14 RX ADMIN — ONDANSETRON 4 MG: 2 INJECTION INTRAMUSCULAR; INTRAVENOUS at 09:34

## 2019-11-14 RX ADMIN — HYDROMORPHONE HYDROCHLORIDE 1 MG: 1 INJECTION, SOLUTION INTRAMUSCULAR; INTRAVENOUS; SUBCUTANEOUS at 09:34

## 2019-11-14 RX ADMIN — SODIUM CHLORIDE 1000 ML: 9 INJECTION, SOLUTION INTRAVENOUS at 09:34

## 2019-11-14 RX ADMIN — LIDOCAINE HYDROCHLORIDE 30 ML: 20 SOLUTION OROPHARYNGEAL at 09:34

## 2019-11-14 ASSESSMENT — PAIN DESCRIPTION - DESCRIPTORS: DESCRIPTORS: BURNING

## 2019-11-14 NOTE — ED NOTES
Pt given discharge instructions/prescriptions sent to pharm of choosing/ home care instructions explained, pt verbalized understanding of instructions given/pt understands the importance of follow up, pt ambulatory to ER [facility].

## 2019-11-14 NOTE — ED TRIAGE NOTES
"Sohail Duvall 68 y.o. male ambulatory to triage with family for     Chief Complaint   Patient presents with   • Abdominal Pain     Tuesday patient had EGD with variceal banding x 4.  Pt reports \"my belly is on fire and I can't stop vomiting\"   • N/V     patient reports he is vomiting anything he eats almost immediately after.  Pt denies any blood in his vomit.     Pt denies fever, denies complications during procedure.  BP (!) 180/91   Pulse 60   Temp 36.2 °C (97.2 °F) (Oral)   Resp 16   Ht 1.702 m (5' 7\")   Wt 99.4 kg (219 lb 2.2 oz)   SpO2 96%   BMI 34.32 kg/m²   Pt to the senior lobby to await bed assignment.  Advised to return to the triage desk for any changes/concerns.  "

## 2019-11-14 NOTE — ED PROVIDER NOTES
"ED Provider Note    Scribed for Benjamín Pfeiffer M.D. by Noam Jasso. 11/14/2019  9:12 AM    Primary care provider: Carmencita Hand P.A.-C.  Means of arrival: walk in  History obtained from: patient  History limited by: none    CHIEF COMPLAINT  Chief Complaint   Patient presents with   • Abdominal Pain     Tuesday patient had EGD with variceal banding x 4.  Pt reports \"my belly is on fire and I can't stop vomiting\"   • N/V     patient reports he is vomiting anything he eats almost immediately after.  Pt denies any blood in his vomit.       HPI  Sohail Duvall is a 68 y.o. male who presents to the Emergency Department complaining of persistent and gradually worsening epigastric abdominal pain status post EGD procedure 2 days ago. He describes his pain as severe that was not improved with his prescribed narcotic pain medications, and does not radiate. Patient reports associated headache, nausea, vomiting. He reports a history of EGD procedure performed by Dr. Hooper (GI) 2 days ago. Patient states that he was able to eat as directed after the procedure, but developed his symptoms shortly thereafter. His symptoms persisted over the last 2 days and was referred to the ED for evaluation. He reports a history of cancer, cirrhosis, diabetes. Patient denies hematemesis.      REVIEW OF SYSTEMS  Pertinent positives include abdominal pain, nausea, vomiting, headache.   Pertinent negatives include no hematemesis.    All other systems reviewed and negative. See HPI for further details.     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    SURGICAL HISTORY   has a past surgical history that includes brachy therapy and " "hepatic ablation laparoscopic (6/28/2018).    SOCIAL HISTORY  Social History     Tobacco Use   • Smoking status: Never Smoker   • Smokeless tobacco: Former User     Types: Snuff   Substance Use Topics   • Alcohol use: No     Alcohol/week: 16.8 oz     Types: 28 Cans of beer per week   • Drug use: Yes     Types: Marijuana     Comment: \"Marijuana Use\" daily       Social History     Substance and Sexual Activity   Drug Use Yes   • Types: Marijuana    Comment: \"Marijuana Use\" daily        FAMILY HISTORY  Family History   Problem Relation Age of Onset   • Cancer Father 81        Bladder   • Cancer Brother         Prostate & CLL (s/p 8-10 years ago)   • Hyperlipidemia Sister    • Lung Disease Neg Hx    • Diabetes Neg Hx    • Heart Disease Neg Hx    • Hypertension Neg Hx    • Stroke Neg Hx    • Alcohol/Drug Neg Hx        CURRENT MEDICATIONS  Current Outpatient Medications:   •  metFORMIN ER (GLUCOPHAGE XR) 500 MG TABLET SR 24 HR, Take 1 Tab by mouth 2 times a day., Disp: 180 Tab, Rfl: 3  •  ondansetron (ZOFRAN) 4 MG Tab tablet, Take 1 Tab by mouth every four hours as needed for Nausea/Vomiting (for nausea, vomiting)., Disp: 15 Tab, Rfl: 0  •  cetirizine (ZYRTEC) 10 MG Tab, Take 10 mg by mouth every morning., Disp: , Rfl:   •  Multiple Vitamins-Minerals (MULTIVITAMIN PO), Take 1 Tab by mouth every morning., Disp: , Rfl:   •  Polyethylene Glycol 3350 (MIRALAX PO), Take 1 Cap by mouth every morning., Disp: , Rfl:   •  fluticasone (FLONASE ALLERGY RELIEF) 50 MCG/ACT nasal spray, Spray 1 Spray in nose 2 times a day., Disp: 1 Bottle, Rfl: 0    ALLERGIES  No Known Allergies    PHYSICAL EXAM  VITAL SIGNS: /80   Pulse 60   Temp 36.2 °C (97.2 °F) (Oral)   Resp 16   Ht 1.702 m (5' 7\")   Wt 99.4 kg (219 lb 2.2 oz)   SpO2 96%   BMI 34.32 kg/m²     Nursing note and vitals reviewed.  Constitutional: Well-developed and well-nourished. No distress. Chronically ill appearing.   HENT: Head is normocephalic and atraumatic. " Oropharynx is clear without exudate or erythema. Dry mucous membranes  Eyes: Pupils are equal, round, and reactive to light. Conjunctiva are normal.   Cardiovascular: Normal rate and regular rhythm. No murmur heard. Normal radial pulses.  Pulmonary/Chest: Breath sounds normal. No wheezes or rales.   Abdominal: Soft and non-tender. Obese. Hepatomegaly with liver edge at 4 finger breadths below the right costal margin.   Musculoskeletal: Extremities exhibit normal range of motion without edema or tenderness.   Neurological: Awake, alert and oriented to person, place, and time. No focal deficits noted.  Skin: Skin is warm and dry. No rash.   Psychiatric: Normal mood and affect. Appropriate for clinical situation.    DIAGNOSTIC STUDIES / PROCEDURES    LABS  Results for orders placed or performed during the hospital encounter of 11/14/19   CBC WITH DIFFERENTIAL   Result Value Ref Range    WBC 5.6 4.8 - 10.8 K/uL    RBC 5.26 4.70 - 6.10 M/uL    Hemoglobin 16.9 14.0 - 18.0 g/dL    Hematocrit 47.5 42.0 - 52.0 %    MCV 90.3 81.4 - 97.8 fL    MCH 32.1 27.0 - 33.0 pg    MCHC 35.6 (H) 33.7 - 35.3 g/dL    RDW 47.1 35.9 - 50.0 fL    Platelet Count 80 (L) 164 - 446 K/uL    MPV 9.9 9.0 - 12.9 fL    Neutrophils-Polys 81.30 (H) 44.00 - 72.00 %    Lymphocytes 8.50 (L) 22.00 - 41.00 %    Monocytes 8.90 0.00 - 13.40 %    Eosinophils 0.40 0.00 - 6.90 %    Basophils 0.50 0.00 - 1.80 %    Immature Granulocytes 0.40 0.00 - 0.90 %    Nucleated RBC 0.00 /100 WBC    Neutrophils (Absolute) 4.59 1.82 - 7.42 K/uL    Lymphs (Absolute) 0.48 (L) 1.00 - 4.80 K/uL    Monos (Absolute) 0.50 0.00 - 0.85 K/uL    Eos (Absolute) 0.02 0.00 - 0.51 K/uL    Baso (Absolute) 0.03 0.00 - 0.12 K/uL    Immature Granulocytes (abs) 0.02 0.00 - 0.11 K/uL    NRBC (Absolute) 0.00 K/uL   COMP METABOLIC PANEL   Result Value Ref Range    Sodium 138 135 - 145 mmol/L    Potassium 3.6 3.6 - 5.5 mmol/L    Chloride 105 96 - 112 mmol/L    Co2 23 20 - 33 mmol/L    Anion Gap 10.0  0.0 - 11.9    Glucose 149 (H) 65 - 99 mg/dL    Bun 11 8 - 22 mg/dL    Creatinine 0.67 0.50 - 1.40 mg/dL    Calcium 9.2 8.5 - 10.5 mg/dL    AST(SGOT) 25 12 - 45 U/L    ALT(SGPT) 21 2 - 50 U/L    Alkaline Phosphatase 60 30 - 99 U/L    Total Bilirubin 2.8 (H) 0.1 - 1.5 mg/dL    Albumin 4.5 3.2 - 4.9 g/dL    Total Protein 6.5 6.0 - 8.2 g/dL    Globulin 2.0 1.9 - 3.5 g/dL    A-G Ratio 2.3 g/dL   LIPASE   Result Value Ref Range    Lipase 22 11 - 82 U/L   APTT   Result Value Ref Range    APTT 29.7 24.7 - 36.0 sec   PROTHROMBIN TIME (INR)   Result Value Ref Range    PT 15.0 (H) 12.0 - 14.6 sec    INR 1.15 (H) 0.87 - 1.13   ESTIMATED GFR   Result Value Ref Range    GFR If African American >60 >60 mL/min/1.73 m 2    GFR If Non African American >60 >60 mL/min/1.73 m 2   All labs reviewed by me.    COURSE & MEDICAL DECISION MAKING  Nursing notes, VS, PMSFHx reviewed in chart.     Review of past medical records shows the patient has a complex medical history.     9:12 AM - Patient seen and examined at bedside. Patient will be treated with NS 1000 ml for 2 days of vomiting, clinical dehydration, Dilaudid 1 mg, Zofran 4 mg, GI cocktail 30 ml. Ordered CBC with differential, CMP, Lipae, APTT, PT/INR to evaluate his symptoms. The differential diagnoses include but are not limited to: dehydration, post procedural complications, postprocedural pain.  Pancreatitis, gastritis, peptic ulcer disease    9:59 AM - Paged GI.    11:43 AM  - I discussed the patient's case and the above findings with Dr. Bryant Singh.  We reviewed the case, laboratory findings, history.  He feels as likely the patient is having pain following the variceal banding.  Recommended a PPI, and viscous lidocaine, this will be initiated.   He recommends the patient follow-up in clinic.    11:44 AM  - Patient reevaluated at bedside. Patient had a positive response to IV fluids with improved clinical hydration status. Discussed consult with GI and plan of care.     The  patient will return for new or worsening symptoms and is stable at the time of discharge.    The patient is referred to a primary physician for blood pressure management, diabetic screening, and for all other preventative health concerns.    DISPOSITION:  Patient will be discharged home in stable condition.    FOLLOW UP:  [facility], Emergency Dept  [address] [address] [phone]  Schedule an appointment as soon as possible for a visit       [facility]  [address] [address] [phone]  Schedule an appointment as soon as possible for a visit         OUTPATIENT MEDICATIONS:  New Prescriptions    LIDOCAINE (XYLOCAINE) 2 % SOLUTION    Take 15 mL by mouth as needed for Throat/Mouth Pain.    OMEPRAZOLE (PRILOSEC) 20 MG DELAYED-RELEASE CAPSULE    Take 1 Cap by mouth every day.         FINAL IMPRESSION  1. Abdominal pain, unspecified abdominal location    2. Acute post-operative pain          Noam KAUFMAN (Katty), am scribing for, and in the presence of, Benjamín Pfeiffer M.D..    Electronically signed by: Noam Jasso ([email or web address]), 11/14/2019    Benjamín KAUFMAN M.D. personally performed the services described in this documentation, as scribed by Noam Jasso in my presence, and it is both accurate and complete. C    The note accurately reflects work and decisions made by me.  Benjamín Pfeiffer  11/14/2019  11:48 AM

## 2019-11-25 ENCOUNTER — HOSPITAL ENCOUNTER (OUTPATIENT)
Dept: LAB | Facility: MEDICAL CENTER | Age: 69
End: 2019-11-25
Attending: INTERNAL MEDICINE
Payer: MEDICARE

## 2019-11-25 LAB
25(OH)D3 SERPL-MCNC: 22 NG/ML (ref 30–100)
ALBUMIN SERPL BCP-MCNC: 4.6 G/DL (ref 3.2–4.9)
ALBUMIN/GLOB SERPL: 2.4 G/DL
ALP SERPL-CCNC: 63 U/L (ref 30–99)
ALT SERPL-CCNC: 26 U/L (ref 2–50)
AMMONIA PLAS-SCNC: 48 UMOL/L (ref 11–45)
ANION GAP SERPL CALC-SCNC: 7 MMOL/L (ref 0–11.9)
AST SERPL-CCNC: 30 U/L (ref 12–45)
BASOPHILS # BLD AUTO: 1 % (ref 0–1.8)
BASOPHILS # BLD: 0.04 K/UL (ref 0–0.12)
BILIRUB SERPL-MCNC: 1.8 MG/DL (ref 0.1–1.5)
BUN SERPL-MCNC: 13 MG/DL (ref 8–22)
CALCIUM SERPL-MCNC: 9.3 MG/DL (ref 8.5–10.5)
CHLORIDE SERPL-SCNC: 109 MMOL/L (ref 96–112)
CO2 SERPL-SCNC: 27 MMOL/L (ref 20–33)
CREAT SERPL-MCNC: 0.9 MG/DL (ref 0.5–1.4)
EOSINOPHIL # BLD AUTO: 0.09 K/UL (ref 0–0.51)
EOSINOPHIL NFR BLD: 2.3 % (ref 0–6.9)
ERYTHROCYTE [DISTWIDTH] IN BLOOD BY AUTOMATED COUNT: 49.4 FL (ref 35.9–50)
FASTING STATUS PATIENT QL REPORTED: NORMAL
FERRITIN SERPL-MCNC: 56.2 NG/ML (ref 22–322)
GLOBULIN SER CALC-MCNC: 1.9 G/DL (ref 1.9–3.5)
GLUCOSE SERPL-MCNC: 164 MG/DL (ref 65–99)
HCT VFR BLD AUTO: 46.9 % (ref 42–52)
HGB BLD-MCNC: 16.7 G/DL (ref 14–18)
IMM GRANULOCYTES # BLD AUTO: 0.02 K/UL (ref 0–0.11)
IMM GRANULOCYTES NFR BLD AUTO: 0.5 % (ref 0–0.9)
IRON SATN MFR SERPL: 44 % (ref 15–55)
IRON SERPL-MCNC: 185 UG/DL (ref 50–180)
LYMPHOCYTES # BLD AUTO: 0.66 K/UL (ref 1–4.8)
LYMPHOCYTES NFR BLD: 16.8 % (ref 22–41)
MCH RBC QN AUTO: 32.9 PG (ref 27–33)
MCHC RBC AUTO-ENTMCNC: 35.6 G/DL (ref 33.7–35.3)
MCV RBC AUTO: 92.5 FL (ref 81.4–97.8)
MONOCYTES # BLD AUTO: 0.46 K/UL (ref 0–0.85)
MONOCYTES NFR BLD AUTO: 11.7 % (ref 0–13.4)
NEUTROPHILS # BLD AUTO: 2.65 K/UL (ref 1.82–7.42)
NEUTROPHILS NFR BLD: 67.7 % (ref 44–72)
NRBC # BLD AUTO: 0 K/UL
NRBC BLD-RTO: 0 /100 WBC
PLATELET # BLD AUTO: 78 K/UL (ref 164–446)
PMV BLD AUTO: 10.4 FL (ref 9–12.9)
POTASSIUM SERPL-SCNC: 4.2 MMOL/L (ref 3.6–5.5)
PROT SERPL-MCNC: 6.5 G/DL (ref 6–8.2)
RBC # BLD AUTO: 5.07 M/UL (ref 4.7–6.1)
SODIUM SERPL-SCNC: 143 MMOL/L (ref 135–145)
TIBC SERPL-MCNC: 416 UG/DL (ref 250–450)
WBC # BLD AUTO: 3.9 K/UL (ref 4.8–10.8)

## 2019-11-25 PROCEDURE — 83540 ASSAY OF IRON: CPT

## 2019-11-25 PROCEDURE — 83550 IRON BINDING TEST: CPT

## 2019-11-25 PROCEDURE — 83951 ONCOPROTEIN DCP: CPT

## 2019-11-25 PROCEDURE — 82728 ASSAY OF FERRITIN: CPT

## 2019-11-25 PROCEDURE — 82140 ASSAY OF AMMONIA: CPT

## 2019-11-25 PROCEDURE — 36415 COLL VENOUS BLD VENIPUNCTURE: CPT

## 2019-11-25 PROCEDURE — 80053 COMPREHEN METABOLIC PANEL: CPT

## 2019-11-25 PROCEDURE — 82105 ALPHA-FETOPROTEIN SERUM: CPT

## 2019-11-25 PROCEDURE — 85025 COMPLETE CBC W/AUTO DIFF WBC: CPT

## 2019-11-25 PROCEDURE — 84590 ASSAY OF VITAMIN A: CPT

## 2019-11-25 PROCEDURE — 82306 VITAMIN D 25 HYDROXY: CPT

## 2019-11-28 LAB — ACARBOXYPROTHROMBIN SERPL-MCNC: 1.1 NG/ML (ref 0–7.4)

## 2019-11-30 LAB
AFP-TM SERPL-MCNC: 5 NG/ML (ref 0–9)
ANNOTATION COMMENT IMP: NORMAL
RETINYL PALMITATE SERPL-MCNC: <0.02 MG/L (ref 0–0.1)
VIT A SERPL-MCNC: 0.53 MG/L (ref 0.3–1.2)

## 2019-12-10 RX ORDER — OMEPRAZOLE 20 MG/1
20 CAPSULE, DELAYED RELEASE ORAL DAILY
Qty: 90 CAP | Refills: 1 | Status: ON HOLD
Start: 2019-12-10 | End: 2020-08-05

## 2019-12-10 NOTE — TELEPHONE ENCOUNTER
Was the patient seen in the last year in this department? Yes    Does patient have an active prescription for medications requested? No     Received Request Via: Pharmacy      Pt met protocol?: Yes, ov 10/19

## 2020-03-06 ENCOUNTER — HOSPITAL ENCOUNTER (OUTPATIENT)
Dept: LAB | Facility: MEDICAL CENTER | Age: 70
End: 2020-03-06
Attending: INTERNAL MEDICINE
Payer: MEDICARE

## 2020-03-06 LAB
25(OH)D3 SERPL-MCNC: 28 NG/ML (ref 30–100)
ALBUMIN SERPL BCP-MCNC: 4.4 G/DL (ref 3.2–4.9)
ALBUMIN/GLOB SERPL: 1.8 G/DL
ALP SERPL-CCNC: 61 U/L (ref 30–99)
ALT SERPL-CCNC: 26 U/L (ref 2–50)
ANION GAP SERPL CALC-SCNC: 10 MMOL/L (ref 0–11.9)
AST SERPL-CCNC: 35 U/L (ref 12–45)
BASOPHILS # BLD AUTO: 1 % (ref 0–1.8)
BASOPHILS # BLD: 0.03 K/UL (ref 0–0.12)
BILIRUB SERPL-MCNC: 2.1 MG/DL (ref 0.1–1.5)
BUN SERPL-MCNC: 13 MG/DL (ref 8–22)
CALCIUM SERPL-MCNC: 9.6 MG/DL (ref 8.5–10.5)
CHLORIDE SERPL-SCNC: 107 MMOL/L (ref 96–112)
CO2 SERPL-SCNC: 24 MMOL/L (ref 20–33)
CREAT SERPL-MCNC: 0.79 MG/DL (ref 0.5–1.4)
EOSINOPHIL # BLD AUTO: 0.11 K/UL (ref 0–0.51)
EOSINOPHIL NFR BLD: 3.5 % (ref 0–6.9)
ERYTHROCYTE [DISTWIDTH] IN BLOOD BY AUTOMATED COUNT: 48.7 FL (ref 35.9–50)
FASTING STATUS PATIENT QL REPORTED: NORMAL
GGT SERPL-CCNC: 150 U/L (ref 7–51)
GLOBULIN SER CALC-MCNC: 2.4 G/DL (ref 1.9–3.5)
GLUCOSE SERPL-MCNC: 185 MG/DL (ref 65–99)
HCT VFR BLD AUTO: 49.3 % (ref 42–52)
HGB BLD-MCNC: 17.5 G/DL (ref 14–18)
IMM GRANULOCYTES # BLD AUTO: 0.01 K/UL (ref 0–0.11)
IMM GRANULOCYTES NFR BLD AUTO: 0.3 % (ref 0–0.9)
INR PPP: 1.16 (ref 0.87–1.13)
LYMPHOCYTES # BLD AUTO: 0.59 K/UL (ref 1–4.8)
LYMPHOCYTES NFR BLD: 19 % (ref 22–41)
MCH RBC QN AUTO: 33.1 PG (ref 27–33)
MCHC RBC AUTO-ENTMCNC: 35.5 G/DL (ref 33.7–35.3)
MCV RBC AUTO: 93.2 FL (ref 81.4–97.8)
MONOCYTES # BLD AUTO: 0.34 K/UL (ref 0–0.85)
MONOCYTES NFR BLD AUTO: 11 % (ref 0–13.4)
NEUTROPHILS # BLD AUTO: 2.02 K/UL (ref 1.82–7.42)
NEUTROPHILS NFR BLD: 65.2 % (ref 44–72)
NRBC # BLD AUTO: 0 K/UL
NRBC BLD-RTO: 0 /100 WBC
PLATELET # BLD AUTO: 70 K/UL (ref 164–446)
PMV BLD AUTO: 10.7 FL (ref 9–12.9)
POTASSIUM SERPL-SCNC: 3.5 MMOL/L (ref 3.6–5.5)
PROT SERPL-MCNC: 6.8 G/DL (ref 6–8.2)
PROTHROMBIN TIME: 15.1 SEC (ref 12–14.6)
RBC # BLD AUTO: 5.29 M/UL (ref 4.7–6.1)
SODIUM SERPL-SCNC: 141 MMOL/L (ref 135–145)
WBC # BLD AUTO: 3.1 K/UL (ref 4.8–10.8)

## 2020-03-06 PROCEDURE — 36415 COLL VENOUS BLD VENIPUNCTURE: CPT

## 2020-03-06 PROCEDURE — 85610 PROTHROMBIN TIME: CPT

## 2020-03-06 PROCEDURE — 82306 VITAMIN D 25 HYDROXY: CPT

## 2020-03-06 PROCEDURE — 82107 ALPHA-FETOPROTEIN L3: CPT

## 2020-03-06 PROCEDURE — 82977 ASSAY OF GGT: CPT

## 2020-03-06 PROCEDURE — 83951 ONCOPROTEIN DCP: CPT

## 2020-03-06 PROCEDURE — 80053 COMPREHEN METABOLIC PANEL: CPT

## 2020-03-06 PROCEDURE — 84630 ASSAY OF ZINC: CPT

## 2020-03-06 PROCEDURE — 84590 ASSAY OF VITAMIN A: CPT

## 2020-03-06 PROCEDURE — 85025 COMPLETE CBC W/AUTO DIFF WBC: CPT

## 2020-03-08 LAB — ZINC BLD-MCNC: 591.5 UG/DL (ref 440–860)

## 2020-03-09 LAB
AFP L3 MFR SERPL: <0.5 % (ref 0–9.9)
AFP SERPL-MCNC: 5 NG/ML (ref 0–15)

## 2020-03-11 ENCOUNTER — HOSPITAL ENCOUNTER (OUTPATIENT)
Dept: RADIOLOGY | Facility: MEDICAL CENTER | Age: 70
End: 2020-03-11
Attending: INTERNAL MEDICINE
Payer: MEDICARE

## 2020-03-11 DIAGNOSIS — D12.3 BENIGN NEOPLASM OF TRANSVERSE COLON: ICD-10-CM

## 2020-03-11 PROCEDURE — 700117 HCHG RX CONTRAST REV CODE 255: Performed by: INTERNAL MEDICINE

## 2020-03-11 PROCEDURE — 74183 MRI ABD W/O CNTR FLWD CNTR: CPT

## 2020-03-11 PROCEDURE — A9581 GADOXETATE DISODIUM INJ: HCPCS | Performed by: INTERNAL MEDICINE

## 2020-03-11 RX ADMIN — GADOXETATE DISODIUM 10 ML: 181.43 INJECTION, SOLUTION INTRAVENOUS at 13:45

## 2020-03-12 LAB — ACARBOXYPROTHROMBIN SERPL-MCNC: 1 NG/ML (ref 0–7.4)

## 2020-03-12 NOTE — PROGRESS NOTES
Left message on Mom's voice-mail to call office today.  Follow up UA is ordered. Site Confirmed with MD, patient and RN pre procedure   BarbeauTest completed pre procedure, barbeau test of the left extremity was performed demonstrating dual circulation to the hand   Transarterial chemoembolization of liver tumors by MD Norris assisted by RT Das ,Left wrist radial artery  access site; Radial cocktail admin by MD Norris;  End Tidal CO2 range 24-31 during procedure  DOXOrubicin admin by MD, see report    Left wrist sealed with HemoBand 15 mmHg,   LUE +2, pulses pre procedure   LUE +2,  pulses post procedure   Patient tolerated procedure, hemodynamically stable; pt awake and talking post procedure; report given to [facility] ALEXEY Nelson;   Patient to be transported to [facility] via IR RN monitored then transferred care to report RN

## 2020-03-14 LAB
ANNOTATION COMMENT IMP: NORMAL
RETINYL PALMITATE SERPL-MCNC: 0.07 MG/L (ref 0–0.1)
VIT A SERPL-MCNC: 0.59 MG/L (ref 0.3–1.2)

## 2020-04-06 DIAGNOSIS — E11.9 TYPE 2 DIABETES MELLITUS WITHOUT COMPLICATION, WITHOUT LONG-TERM CURRENT USE OF INSULIN (HCC): ICD-10-CM

## 2020-04-07 NOTE — TELEPHONE ENCOUNTER
Was the patient seen in the last year in this department? Yes    Does patient have an active prescription for medications requested? No     Received Request Via: Pharmacy    Pt met protocol?: Yes     Last OV 10/30/2019    Lab Results   Component Value Date/Time    HBA1C 6.7 (H) 10/28/2019 01:15 PM      Lab Results   Component Value Date/Time    CHOLSTRLTOT 128 10/18/2016 1036    TRIGLYCERIDE 127 10/18/2016 1036    HDL 46 10/18/2016 1036    LDL 57 10/18/2016 1036

## 2020-04-08 RX ORDER — METFORMIN HYDROCHLORIDE 500 MG/1
TABLET, EXTENDED RELEASE ORAL
Qty: 180 TAB | Refills: 0 | Status: SHIPPED | OUTPATIENT
Start: 2020-04-08 | End: 2020-07-07

## 2020-06-01 ENCOUNTER — HOSPITAL ENCOUNTER (OUTPATIENT)
Dept: LAB | Facility: MEDICAL CENTER | Age: 70
End: 2020-06-01
Attending: PHYSICIAN ASSISTANT
Payer: MEDICARE

## 2020-06-01 ENCOUNTER — HOSPITAL ENCOUNTER (OUTPATIENT)
Dept: LAB | Facility: MEDICAL CENTER | Age: 70
End: 2020-06-01
Attending: INTERNAL MEDICINE
Payer: MEDICARE

## 2020-06-01 ENCOUNTER — OFFICE VISIT (OUTPATIENT)
Dept: MEDICAL GROUP | Facility: PHYSICIAN GROUP | Age: 70
End: 2020-06-01
Payer: MEDICARE

## 2020-06-01 VITALS
TEMPERATURE: 98.6 F | SYSTOLIC BLOOD PRESSURE: 164 MMHG | DIASTOLIC BLOOD PRESSURE: 80 MMHG | BODY MASS INDEX: 34.84 KG/M2 | HEART RATE: 86 BPM | OXYGEN SATURATION: 96 % | WEIGHT: 222 LBS | HEIGHT: 67 IN

## 2020-06-01 DIAGNOSIS — I15.2 HYPERTENSION SECONDARY TO ENDOCRINE DISORDERS: ICD-10-CM

## 2020-06-01 DIAGNOSIS — C22.0 HEPATOMA (HCC): ICD-10-CM

## 2020-06-01 DIAGNOSIS — B07.8 OTHER VIRAL WARTS: ICD-10-CM

## 2020-06-01 DIAGNOSIS — I85.00 ESOPHAGEAL VARICES WITHOUT BLEEDING, UNSPECIFIED ESOPHAGEAL VARICES TYPE (HCC): ICD-10-CM

## 2020-06-01 DIAGNOSIS — E11.9 TYPE 2 DIABETES MELLITUS WITHOUT COMPLICATION, WITHOUT LONG-TERM CURRENT USE OF INSULIN (HCC): ICD-10-CM

## 2020-06-01 DIAGNOSIS — C91.10 CLL (CHRONIC LYMPHOCYTIC LEUKEMIA) (HCC): ICD-10-CM

## 2020-06-01 LAB
BASOPHILS # BLD AUTO: 1.1 % (ref 0–1.8)
BASOPHILS # BLD: 0.04 K/UL (ref 0–0.12)
EOSINOPHIL # BLD AUTO: 0.04 K/UL (ref 0–0.51)
EOSINOPHIL NFR BLD: 1.1 % (ref 0–6.9)
ERYTHROCYTE [DISTWIDTH] IN BLOOD BY AUTOMATED COUNT: 51.2 FL (ref 35.9–50)
EST. AVERAGE GLUCOSE BLD GHB EST-MCNC: 120 MG/DL
HBA1C MFR BLD: 5.8 % (ref 0–5.6)
HCT VFR BLD AUTO: 50.7 % (ref 42–52)
HGB BLD-MCNC: 17.8 G/DL (ref 14–18)
IMM GRANULOCYTES # BLD AUTO: 0.02 K/UL (ref 0–0.11)
IMM GRANULOCYTES NFR BLD AUTO: 0.5 % (ref 0–0.9)
INR PPP: 1.14 (ref 0.87–1.13)
LYMPHOCYTES # BLD AUTO: 0.58 K/UL (ref 1–4.8)
LYMPHOCYTES NFR BLD: 15.4 % (ref 22–41)
MCH RBC QN AUTO: 34.1 PG (ref 27–33)
MCHC RBC AUTO-ENTMCNC: 35.1 G/DL (ref 33.7–35.3)
MCV RBC AUTO: 97.1 FL (ref 81.4–97.8)
MONOCYTES # BLD AUTO: 0.47 K/UL (ref 0–0.85)
MONOCYTES NFR BLD AUTO: 12.5 % (ref 0–13.4)
NEUTROPHILS # BLD AUTO: 2.62 K/UL (ref 1.82–7.42)
NEUTROPHILS NFR BLD: 69.4 % (ref 44–72)
NRBC # BLD AUTO: 0 K/UL
NRBC BLD-RTO: 0 /100 WBC
PLATELET # BLD AUTO: 80 K/UL (ref 164–446)
PMV BLD AUTO: 11.4 FL (ref 9–12.9)
PROTHROMBIN TIME: 14.9 SEC (ref 12–14.6)
RBC # BLD AUTO: 5.22 M/UL (ref 4.7–6.1)
WBC # BLD AUTO: 3.8 K/UL (ref 4.8–10.8)

## 2020-06-01 PROCEDURE — 85025 COMPLETE CBC W/AUTO DIFF WBC: CPT

## 2020-06-01 PROCEDURE — 83951 ONCOPROTEIN DCP: CPT

## 2020-06-01 PROCEDURE — 80053 COMPREHEN METABOLIC PANEL: CPT

## 2020-06-01 PROCEDURE — 99214 OFFICE O/P EST MOD 30 MIN: CPT | Mod: 25 | Performed by: PHYSICIAN ASSISTANT

## 2020-06-01 PROCEDURE — 82107 ALPHA-FETOPROTEIN L3: CPT

## 2020-06-01 PROCEDURE — 85610 PROTHROMBIN TIME: CPT

## 2020-06-01 PROCEDURE — 83036 HEMOGLOBIN GLYCOSYLATED A1C: CPT | Mod: GA

## 2020-06-01 PROCEDURE — 17110 DESTRUCTION B9 LES UP TO 14: CPT | Performed by: PHYSICIAN ASSISTANT

## 2020-06-01 PROCEDURE — 36415 COLL VENOUS BLD VENIPUNCTURE: CPT | Mod: GA

## 2020-06-01 ASSESSMENT — PATIENT HEALTH QUESTIONNAIRE - PHQ9: CLINICAL INTERPRETATION OF PHQ2 SCORE: 0

## 2020-06-01 ASSESSMENT — FIBROSIS 4 INDEX: FIB4 SCORE: 6.77

## 2020-06-01 NOTE — PROGRESS NOTES
"Subjective:   Sohail Duvall is a 69 y.o. male here today for follow-up on DM2 and other medical problems. Is an established patient of mine.    HPI:    Patient presents to the office today for routine follow-up on diabetes and other health issues.  Last visit with me was in October 2019. He does have new concern today for what he believes to be a \"corn\" on his left thumb. States has had for about 2 years. Can see a \"seed\" inside. Has been applying Dr. Richter's corn remover to area without improvement.    He continues on metformin  mg twice daily for his type 2 diabetes.  His last A1c in 10/2019 was 6.7. Not currently checking blood sugar readings. BP today in the office is 164/80. He used to take losartan and HCTZ but stopped both when he lost some weight and BP readings went down.     He follows with oncology ([facility]) for CLL which was initially diagnosed in 2014.  He underwent chemotherapy in May of last year. He states he has not heard back from office regarding his next follow-up appointment. He is thinking about switching back to [facility].    Also follows with gastroenterology for hepatoma.  He has undergone hepatic ablation and radiation treatment in 2018 and undergoes periodic monitoring. Had MRI in 3/2020 which he states did not show any tumor. He has had banding of esophageal varices x 2 since I last saw him.      Current medicines (including changes today)  Current Outpatient Medications   Medication Sig Dispense Refill   • metFORMIN ER (GLUCOPHAGE XR) 500 MG TABLET SR 24 HR TAKE ONE TABLET BY MOUTH TWICE A  Tab 0   • omeprazole (PRILOSEC) 20 MG delayed-release capsule Take 1 Cap by mouth every day. 90 Cap 1   • lidocaine (XYLOCAINE) 2 % Solution Take 15 mL by mouth as needed for Throat/Mouth Pain. 150 mL 0   • ondansetron (ZOFRAN) 4 MG Tab tablet Take 1 Tab by mouth every four hours as needed for Nausea/Vomiting (for nausea, vomiting). 15 Tab 0   • cetirizine (ZYRTEC) " "10 MG Tab Take 10 mg by mouth every morning.     • Multiple Vitamins-Minerals (MULTIVITAMIN PO) Take 1 Tab by mouth every morning.     • Polyethylene Glycol 3350 (MIRALAX PO) Take 1 Cap by mouth every morning.     • fluticasone (FLONASE ALLERGY RELIEF) 50 MCG/ACT nasal spray Spray 1 Spray in nose 2 times a day. 1 Bottle 0     No current facility-administered medications for this visit.      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    ROS  As per HPI.       Objective:     BP (!) 164/80 (BP Location: Left arm, Patient Position: Sitting, BP Cuff Size: Adult)   Pulse 86   Temp 37 °C (98.6 °F) (Tympanic)   Ht 1.702 m (5' 7\")   Wt 100.7 kg (222 lb)   SpO2 96%  Body mass index is 34.77 kg/m².     Physical Exam:  Constitutional: Alert, obese but otherwise well-appearing, no distress.  Skin: Warm, dry, good turgor, no rashes in visible areas. There is an ~5 mm papular lesion on the volar aspect of the left thumb. There are several layers of overlying callus formation.  Eye: Pupils are equal and round, conjunctiva clear, lids normal.  ENMT: External ears and nose without lesions. Lips without lesions, moist mucus membranes.  Neck: Normal-appearing.  Respiratory: Unlabored respiratory effort.      Assessment and Plan:   The following treatment plan was discussed    1. Type 2 diabetes mellitus without complication, without long-term current use of insulin (HCC)  Established problem, suspect remains well-controlled with metformin. He is overdue for repeat A1c which I have ordered. In the meantime, continue metformin at current dose.   - HEMOGLOBIN A1C; Future  - Lipid Profile; Future    2. 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 BP elevated today in the office. States typically run normal at home but hasn't checked in awhile. I have advised him to routinely check over the next 2 weeks and report readings to me on MyChart. Will plan to re-start BP medication if continues to run high.    3. Hepatoma (HCC)  Established problem, stable. Will continue to follow with GI for management.    4. CLL (chronic lymphocytic leukemia) (HCC)  Established problem, stable. Needs continued oncology follow-up. If unable to get in with [facility], may consider re-establishing with [facility].    5. Esophageal varices without bleeding, unspecified esophageal varices type (HCC)  New problem since last visit with me. Has had banded x 2. Will continue to follow with GI for surveillance.    6. Other viral warts  New problem, uncontrolled. Papular lesion on left thumb consistent with wart. Offered cryotherapy which he was agreeable with. Prior to freezing, took several layers of callus off using #11 blade scalpel. Cryotherapy was then performed using freeze-thaw-freeze technique. Patient tolerated well. Aftercare discussed.      Followup: Return in about 6 months (around 12/1/2020), or if symptoms worsen or fail to improve.    Carmencita Hand P.A.-C.

## 2020-06-02 LAB
ALBUMIN SERPL BCP-MCNC: 4.7 G/DL (ref 3.2–4.9)
ALBUMIN/GLOB SERPL: 2 G/DL
ALP SERPL-CCNC: 75 U/L (ref 30–99)
ALT SERPL-CCNC: 42 U/L (ref 2–50)
ANION GAP SERPL CALC-SCNC: 12 MMOL/L (ref 7–16)
AST SERPL-CCNC: 47 U/L (ref 12–45)
BILIRUB SERPL-MCNC: 1.9 MG/DL (ref 0.1–1.5)
BUN SERPL-MCNC: 11 MG/DL (ref 8–22)
CALCIUM SERPL-MCNC: 9.6 MG/DL (ref 8.5–10.5)
CHLORIDE SERPL-SCNC: 105 MMOL/L (ref 96–112)
CO2 SERPL-SCNC: 27 MMOL/L (ref 20–33)
CREAT SERPL-MCNC: 0.53 MG/DL (ref 0.5–1.4)
GLOBULIN SER CALC-MCNC: 2.3 G/DL (ref 1.9–3.5)
GLUCOSE SERPL-MCNC: 155 MG/DL (ref 65–99)
POTASSIUM SERPL-SCNC: 4.1 MMOL/L (ref 3.6–5.5)
PROT SERPL-MCNC: 7 G/DL (ref 6–8.2)
SODIUM SERPL-SCNC: 144 MMOL/L (ref 135–145)

## 2020-06-04 LAB
ACARBOXYPROTHROMBIN SERPL-MCNC: 0.8 NG/ML (ref 0–7.4)
AFP L3 MFR SERPL: <0.5 % (ref 0–9.9)
AFP SERPL-MCNC: 5 NG/ML (ref 0–15)

## 2020-07-01 ENCOUNTER — HOSPITAL ENCOUNTER (OUTPATIENT)
Dept: RADIOLOGY | Facility: MEDICAL CENTER | Age: 70
End: 2020-07-01
Attending: INTERNAL MEDICINE
Payer: MEDICARE

## 2020-07-01 DIAGNOSIS — C22.0 LIVER CELL CARCINOMA (HCC): ICD-10-CM

## 2020-07-01 PROCEDURE — 76700 US EXAM ABDOM COMPLETE: CPT

## 2020-07-02 DIAGNOSIS — E11.9 TYPE 2 DIABETES MELLITUS WITHOUT COMPLICATION, WITHOUT LONG-TERM CURRENT USE OF INSULIN (HCC): ICD-10-CM

## 2020-07-07 RX ORDER — METFORMIN HYDROCHLORIDE 500 MG/1
TABLET, EXTENDED RELEASE ORAL
Qty: 180 TAB | Refills: 0 | Status: SHIPPED | OUTPATIENT
Start: 2020-07-07 | End: 2020-09-30

## 2020-07-07 NOTE — TELEPHONE ENCOUNTER
Has appt in December.  Last seen by PCP 06/1/2020.     Lab Results   Component Value Date/Time    HBA1C 5.8 (H) 06/01/2020 12:12 PM      Lab Results   Component Value Date/Time    MALBCRT 7 10/28/2019 01:15 PM    MICROALBUR 1.0 10/28/2019 01:15 PM      Lab Results   Component Value Date/Time    ALKPHOSPHAT 75 06/01/2020 12:15 PM    ASTSGOT 47 (H) 06/01/2020 12:15 PM    ALTSGPT 42 06/01/2020 12:15 PM    TBILIRUBIN 1.9 (H) 06/01/2020 12:15 PM        Will send 6 month(s) to the pharmacy.

## 2020-07-08 DIAGNOSIS — I15.2 HYPERTENSION SECONDARY TO ENDOCRINE DISORDERS: ICD-10-CM

## 2020-07-08 RX ORDER — LOSARTAN POTASSIUM 50 MG/1
50 TABLET ORAL DAILY
Qty: 30 TAB | Refills: 5 | Status: SHIPPED | OUTPATIENT
Start: 2020-07-08 | End: 2021-01-04

## 2020-07-30 ENCOUNTER — APPOINTMENT (OUTPATIENT)
Dept: RADIOLOGY | Facility: MEDICAL CENTER | Age: 70
End: 2020-07-30
Attending: EMERGENCY MEDICINE
Payer: MEDICARE

## 2020-07-30 ENCOUNTER — HOSPITAL ENCOUNTER (EMERGENCY)
Facility: MEDICAL CENTER | Age: 70
End: 2020-07-30
Attending: EMERGENCY MEDICINE
Payer: MEDICARE

## 2020-07-30 VITALS
DIASTOLIC BLOOD PRESSURE: 89 MMHG | BODY MASS INDEX: 35.5 KG/M2 | SYSTOLIC BLOOD PRESSURE: 149 MMHG | TEMPERATURE: 98 F | HEART RATE: 84 BPM | OXYGEN SATURATION: 95 % | HEIGHT: 67 IN | WEIGHT: 226.19 LBS | RESPIRATION RATE: 18 BRPM

## 2020-07-30 DIAGNOSIS — S61.011A LACERATION OF RIGHT THUMB WITH TENDON INVOLVEMENT, INITIAL ENCOUNTER: ICD-10-CM

## 2020-07-30 DIAGNOSIS — S61.411A LACERATION OF RIGHT HAND WITHOUT FOREIGN BODY, INITIAL ENCOUNTER: ICD-10-CM

## 2020-07-30 PROCEDURE — 90715 TDAP VACCINE 7 YRS/> IM: CPT | Performed by: EMERGENCY MEDICINE

## 2020-07-30 PROCEDURE — 304217 HCHG IRRIGATION SYSTEM

## 2020-07-30 PROCEDURE — 700111 HCHG RX REV CODE 636 W/ 250 OVERRIDE (IP): Performed by: EMERGENCY MEDICINE

## 2020-07-30 PROCEDURE — A9270 NON-COVERED ITEM OR SERVICE: HCPCS | Performed by: EMERGENCY MEDICINE

## 2020-07-30 PROCEDURE — 304999 HCHG REPAIR-SIMPLE/INTERMED LEVEL 1

## 2020-07-30 PROCEDURE — 90471 IMMUNIZATION ADMIN: CPT

## 2020-07-30 PROCEDURE — 700101 HCHG RX REV CODE 250: Performed by: EMERGENCY MEDICINE

## 2020-07-30 PROCEDURE — 99284 EMERGENCY DEPT VISIT MOD MDM: CPT

## 2020-07-30 PROCEDURE — 73140 X-RAY EXAM OF FINGER(S): CPT | Mod: RT

## 2020-07-30 PROCEDURE — 303747 HCHG EXTRA SUTURE

## 2020-07-30 PROCEDURE — 302874 HCHG BANDAGE ACE 2 OR 3""

## 2020-07-30 PROCEDURE — 700102 HCHG RX REV CODE 250 W/ 637 OVERRIDE(OP): Performed by: EMERGENCY MEDICINE

## 2020-07-30 RX ORDER — LIDOCAINE HYDROCHLORIDE 20 MG/ML
20 INJECTION, SOLUTION INFILTRATION; PERINEURAL ONCE
Status: COMPLETED | OUTPATIENT
Start: 2020-07-30 | End: 2020-07-30

## 2020-07-30 RX ORDER — CEPHALEXIN 500 MG/1
500 CAPSULE ORAL ONCE
Status: COMPLETED | OUTPATIENT
Start: 2020-07-30 | End: 2020-07-30

## 2020-07-30 RX ORDER — CEPHALEXIN 500 MG/1
500 CAPSULE ORAL 4 TIMES DAILY
Qty: 28 CAP | Refills: 0 | Status: SHIPPED | OUTPATIENT
Start: 2020-07-30 | End: 2020-08-06

## 2020-07-30 RX ADMIN — LIDOCAINE HYDROCHLORIDE 20 ML: 20 INJECTION, SOLUTION INFILTRATION; PERINEURAL at 14:45

## 2020-07-30 RX ADMIN — CLOSTRIDIUM TETANI TOXOID ANTIGEN (FORMALDEHYDE INACTIVATED), CORYNEBACTERIUM DIPHTHERIAE TOXOID ANTIGEN (FORMALDEHYDE INACTIVATED), BORDETELLA PERTUSSIS TOXOID ANTIGEN (GLUTARALDEHYDE INACTIVATED), BORDETELLA PERTUSSIS FILAMENTOUS HEMAGGLUTININ ANTIGEN (FORMALDEHYDE INACTIVATED), BORDETELLA PERTUSSIS PERTACTIN ANTIGEN, AND BORDETELLA PERTUSSIS FIMBRIAE 2/3 ANTIGEN 0.5 ML: 5; 2; 2.5; 5; 3; 5 INJECTION, SUSPENSION INTRAMUSCULAR at 14:57

## 2020-07-30 RX ADMIN — CEPHALEXIN 500 MG: 500 CAPSULE ORAL at 16:03

## 2020-07-30 ASSESSMENT — FIBROSIS 4 INDEX: FIB4 SCORE: 6.26

## 2020-07-30 NOTE — ED TRIAGE NOTES
"[place]  Chief Complaint   Patient presents with   • Hand Laceration     Pt has a laceration to his left thumb from the kick back on a table saw. Pt  able to move thumb.  Bleeding controlled with pressure and bandaids.     Pt ambulatory to triage with above complaint.     /83   Pulse 64   Temp 36.9 °C (98.4 °F) (Temporal)   Resp 14   Ht 1.702 m (5' 7\")   Wt 102.6 kg (226 lb 3.1 oz)   SpO2 99%   BMI 35.43 kg/m²     Pt informed of triage process and encouraged to notify staff of any changes or concerns. Pt verbalized understanding of instructions. Apologized for long wait time. Pt placed back in lobby.     "

## 2020-07-30 NOTE — DISCHARGE INSTRUCTIONS
Call today or first thing in the morning to make an appointment to see 1 of the hand specialists in the clinic.  Ideally I would like you seen on Friday so that they may change your dressing.  Hopefully Monday at the latest.  Let them know this is Dr. Smlalwood's recommendation.    If they can see you tomorrow, go ahead and leave the dressing in place until then.  Otherwise, I would like you to change it once a day, applying antibiotic ointment and a new dressing.  Put that splint back in place.    The hand stitches will likely need to come out in 7 or 8 days and we can do that here.  I will defer that to the hand surgeon however.

## 2020-07-30 NOTE — ED PROVIDER NOTES
"ED Provider Note    CHIEF COMPLAINT  Laceration    HPI  Sohail Duvall is a 69 y.o. male who presents with complaint of a laceration on the right hand.  Is right-hand dominant.  He was cutting a piece of wood with a table saw when it kicked back and caught his hand.  He has a laceration on his thumb and hand.  Unsure last tetanus.  Last meal was some yogurt at about noon.  No other complaint.    PAST MEDICAL HISTORY  Past Medical History:   Diagnosis Date   • Anemia    • Cancer (HCC) 2004    Prostate - did brachytherapy at [facility]   • Cancer (HCC) 2018    Liver   • Cirrhosis (HCC)    • CLL (chronic lymphocytic leukemia) (HCC) 2014   • CMV (cytomegalovirus infection) (HCC) 1990    Treated for 6 weeks   • Diabetes (HCC)     oral medication   • Heart burn    • 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 Stopped both medications because blood pressure readings were low, /82 today and has been low at home since stopping medications about 5 days ago. Last reading at home 117/72    • Liver cancer (HCC)    • Liver tumor    • Plantar fasciitis of right foot 8/12/2019   • Rosacea      \"Prolonged PT.\"    SOCIAL HISTORY  Social History     Tobacco Use   • Smoking status: Never Smoker   • Smokeless tobacco: Former User     Types: Snuff   Substance Use Topics   • Alcohol use: No     Alcohol/week: 16.8 oz     Types: 28 Cans of beer per week   • Drug use: Yes     Types: Marijuana, Inhaled, Oral     Comment: \"Marijuana Use\" daily        CURRENT MEDICATIONS  Noted no anticoagulant or antiplatelet therapy4    ALLERGIES  No Known Allergies    REVIEW OF SYSTEMS  See HPI for further details. Review of systems as above, laceration as described.    PHYSICAL EXAM  VITAL SIGNS: /83   Pulse 64   Temp 36.9 °C (98.4 °F) (Temporal)   Resp 14   Ht 1.702 m (5' 7\")   Wt 102.6 kg (226 lb 3.1 oz)   SpO2 99%   BMI 35.43 kg/m²     Constitutional: Well " appearing patient in no acute distress.  Not toxic, nor ill in appearance.  HENT: Mucus membranes moist.  Eyes: No scleral icterus.   Thorax & Lungs: Breathing easily on room air.  Skin: Right hand: There is a 1.5 cm laceration in the first webspace, relatively superficial.  There is another, just distal to this there is a 1 cm circular laceration with a attached pedicle.  On the thumb, starting at the distal dorsal aspect just proximal to the nail there is a deep laceration which appears to go to the bone wrapping around across the IP towards the medial aspect.  This is 2 cm in length.  Is unable to fully extend the thumb.  FDS, FDP and extension of the index are all normal.  Cardio: Distal pulses intact, skin is warm and well perfused, cap refill less than 2 seconds.  Neurologic: Distal strength and sensation intact.  Discriminate touch Is intact in both fingerpads..  Psychiatric: Normal affect appropriate for the clinical situation.    X-ray:  DX-FINGER(S) 2+ RIGHT   Final Result      1.  Findings suggest open fracture at the base of RIGHT 1st distal phalanx with possible open joint injury as well.   2.  Moderate osteoarthritis.   3.  Limited by artifact.            LACERATION PROCEDURE NOTE  Verbal consent is obtained.  The skin is anesthetized using a combination of local infiltration as well as digital block of his thumb using 2% lidocaine without epinephrine, 10 cc.  The wound is irrigated with copious normal saline.  It is explored, no foreign bodies are seen or felt.  The wound is prepped with Betadine and draped in the usual sterile fashion.  Devitalized wound edges are debrided sharply with scissors.  The laceration over the thumb was reapproximated using four 4-0 nylon sutures in a similar fashion.  The linear laceration over the webspace is closed with two 4-0 nylon sutures.  The avulsion type circular laceration is closed with three 4-0 nylon sutures.  We will treat this is a skin graft.  He was  "advised him concerned that the skin flap is somewhat dusky and it may not \"take.\"  Antibiotic ointment and dressing are applied.  Patient tolerated the procedure with no difficulty.      COURSE & MEDICAL DECISION MAKING  The patient is instructed to keep the wound moist with antibiotic ointment, covered with a dressing.    Patient is kept n.p.o.  Although he did get somewhat queasy, was given some juice with good effect.  Although I did close the skin, that thumb will need further surgical care.  I discussed the patient's case with Dr. Smallwood who asked the patient call to get set up with 1 of the hand specialist in the office.  Agrees with Keflex.  I have asked the patient to keep the wound moist with antibiotic ointment.  Cover with a dressing.      FINAL IMPRESSION  1. Laceration of right thumb with tendon involvement, initial encounter    2. Laceration of right hand without foreign body, initial encounter           This dictation was created using voice recognition software.    Electronically signed by: Lane Hoyt M.D., 7/30/2020 3:44 PM      "

## 2020-07-30 NOTE — ED NOTES
Pt discharged with one paper prescription to be picked up at pharmacy. Pt verbalized understanding of discharge instructions, medication education, as well as information for follow-up. Pt ambulated to the lobby with spouse for transport home.

## 2020-08-01 ENCOUNTER — APPOINTMENT (OUTPATIENT)
Dept: ADMISSIONS | Facility: MEDICAL CENTER | Age: 70
End: 2020-08-01
Attending: ORTHOPAEDIC SURGERY
Payer: MEDICARE

## 2020-08-03 ENCOUNTER — OFFICE VISIT (OUTPATIENT)
Dept: ADMISSIONS | Facility: MEDICAL CENTER | Age: 70
End: 2020-08-03
Attending: ORTHOPAEDIC SURGERY
Payer: MEDICARE

## 2020-08-03 DIAGNOSIS — Z01.812 PRE-OPERATIVE LABORATORY EXAMINATION: ICD-10-CM

## 2020-08-03 LAB
COVID ORDER STATUS COVID19: NORMAL
SARS-COV-2 RNA RESP QL NAA+PROBE: NOTDETECTED
SPECIMEN SOURCE: NORMAL

## 2020-08-03 PROCEDURE — U0003 INFECTIOUS AGENT DETECTION BY NUCLEIC ACID (DNA OR RNA); SEVERE ACUTE RESPIRATORY SYNDROME CORONAVIRUS 2 (SARS-COV-2) (CORONAVIRUS DISEASE [COVID-19]), AMPLIFIED PROBE TECHNIQUE, MAKING USE OF HIGH THROUGHPUT TECHNOLOGIES AS DESCRIBED BY CMS-2020-01-R: HCPCS

## 2020-08-04 DIAGNOSIS — Z01.810 PRE-OPERATIVE CARDIOVASCULAR EXAMINATION: ICD-10-CM

## 2020-08-04 DIAGNOSIS — Z01.812 PRE-PROCEDURAL LABORATORY EXAMINATION: ICD-10-CM

## 2020-08-04 LAB
ALBUMIN SERPL BCP-MCNC: 4.4 G/DL (ref 3.2–4.9)
ALBUMIN/GLOB SERPL: 2.3 G/DL
ALP SERPL-CCNC: 76 U/L (ref 30–99)
ALT SERPL-CCNC: 36 U/L (ref 2–50)
ANION GAP SERPL CALC-SCNC: 14 MMOL/L (ref 7–16)
AST SERPL-CCNC: 41 U/L (ref 12–45)
BILIRUB SERPL-MCNC: 1.1 MG/DL (ref 0.1–1.5)
BUN SERPL-MCNC: 12 MG/DL (ref 8–22)
CALCIUM SERPL-MCNC: 9.1 MG/DL (ref 8.5–10.5)
CHLORIDE SERPL-SCNC: 106 MMOL/L (ref 96–112)
CO2 SERPL-SCNC: 22 MMOL/L (ref 20–33)
CREAT SERPL-MCNC: 0.56 MG/DL (ref 0.5–1.4)
EKG IMPRESSION: NORMAL
ERYTHROCYTE [DISTWIDTH] IN BLOOD BY AUTOMATED COUNT: 47.9 FL (ref 35.9–50)
GLOBULIN SER CALC-MCNC: 1.9 G/DL (ref 1.9–3.5)
GLUCOSE SERPL-MCNC: 201 MG/DL (ref 65–99)
HCT VFR BLD AUTO: 47.8 % (ref 42–52)
HGB BLD-MCNC: 16.9 G/DL (ref 14–18)
MCH RBC QN AUTO: 34.4 PG (ref 27–33)
MCHC RBC AUTO-ENTMCNC: 35.4 G/DL (ref 33.7–35.3)
MCV RBC AUTO: 97.4 FL (ref 81.4–97.8)
PLATELET # BLD AUTO: 85 K/UL (ref 164–446)
PMV BLD AUTO: 10.5 FL (ref 9–12.9)
POTASSIUM SERPL-SCNC: 4.2 MMOL/L (ref 3.6–5.5)
PROT SERPL-MCNC: 6.3 G/DL (ref 6–8.2)
RBC # BLD AUTO: 4.91 M/UL (ref 4.7–6.1)
SODIUM SERPL-SCNC: 142 MMOL/L (ref 135–145)
WBC # BLD AUTO: 2.6 K/UL (ref 4.8–10.8)

## 2020-08-04 PROCEDURE — 93010 ELECTROCARDIOGRAM REPORT: CPT | Performed by: INTERNAL MEDICINE

## 2020-08-04 PROCEDURE — 85027 COMPLETE CBC AUTOMATED: CPT

## 2020-08-04 PROCEDURE — 93005 ELECTROCARDIOGRAM TRACING: CPT

## 2020-08-04 PROCEDURE — 36415 COLL VENOUS BLD VENIPUNCTURE: CPT

## 2020-08-04 PROCEDURE — 80053 COMPREHEN METABOLIC PANEL: CPT

## 2020-08-04 ASSESSMENT — FIBROSIS 4 INDEX: FIB4 SCORE: 6.26

## 2020-08-05 ENCOUNTER — ANESTHESIA EVENT (OUTPATIENT)
Dept: SURGERY | Facility: MEDICAL CENTER | Age: 70
End: 2020-08-05
Payer: MEDICARE

## 2020-08-05 ENCOUNTER — ANESTHESIA (OUTPATIENT)
Dept: SURGERY | Facility: MEDICAL CENTER | Age: 70
End: 2020-08-05
Payer: MEDICARE

## 2020-08-05 ENCOUNTER — HOSPITAL ENCOUNTER (OUTPATIENT)
Facility: MEDICAL CENTER | Age: 70
End: 2020-08-05
Attending: ORTHOPAEDIC SURGERY | Admitting: ORTHOPAEDIC SURGERY
Payer: MEDICARE

## 2020-08-05 VITALS
OXYGEN SATURATION: 95 % | BODY MASS INDEX: 34.5 KG/M2 | HEIGHT: 67 IN | HEART RATE: 54 BPM | RESPIRATION RATE: 16 BRPM | TEMPERATURE: 98 F | SYSTOLIC BLOOD PRESSURE: 154 MMHG | DIASTOLIC BLOOD PRESSURE: 78 MMHG | WEIGHT: 219.8 LBS

## 2020-08-05 DIAGNOSIS — S61.111A LACERATION OF RIGHT THUMB WITHOUT FOREIGN BODY WITH DAMAGE TO NAIL, INITIAL ENCOUNTER: ICD-10-CM

## 2020-08-05 LAB — GLUCOSE BLD-MCNC: 129 MG/DL (ref 65–99)

## 2020-08-05 PROCEDURE — 700102 HCHG RX REV CODE 250 W/ 637 OVERRIDE(OP): Performed by: ORTHOPAEDIC SURGERY

## 2020-08-05 PROCEDURE — 700111 HCHG RX REV CODE 636 W/ 250 OVERRIDE (IP): Performed by: ORTHOPAEDIC SURGERY

## 2020-08-05 PROCEDURE — 500881 HCHG PACK, EXTREMITY: Performed by: ORTHOPAEDIC SURGERY

## 2020-08-05 PROCEDURE — 700105 HCHG RX REV CODE 258: Performed by: ORTHOPAEDIC SURGERY

## 2020-08-05 PROCEDURE — 160048 HCHG OR STATISTICAL LEVEL 1-5: Performed by: ORTHOPAEDIC SURGERY

## 2020-08-05 PROCEDURE — 160025 RECOVERY II MINUTES (STATS): Performed by: ORTHOPAEDIC SURGERY

## 2020-08-05 PROCEDURE — 160009 HCHG ANES TIME/MIN: Performed by: ORTHOPAEDIC SURGERY

## 2020-08-05 PROCEDURE — 160046 HCHG PACU - 1ST 60 MINS PHASE II: Performed by: ORTHOPAEDIC SURGERY

## 2020-08-05 PROCEDURE — 160039 HCHG SURGERY MINUTES - EA ADDL 1 MIN LEVEL 3: Performed by: ORTHOPAEDIC SURGERY

## 2020-08-05 PROCEDURE — 501838 HCHG SUTURE GENERAL: Performed by: ORTHOPAEDIC SURGERY

## 2020-08-05 PROCEDURE — 82962 GLUCOSE BLOOD TEST: CPT

## 2020-08-05 PROCEDURE — A9270 NON-COVERED ITEM OR SERVICE: HCPCS | Performed by: ORTHOPAEDIC SURGERY

## 2020-08-05 PROCEDURE — 160035 HCHG PACU - 1ST 60 MINS PHASE I: Performed by: ORTHOPAEDIC SURGERY

## 2020-08-05 PROCEDURE — 700101 HCHG RX REV CODE 250: Performed by: ANESTHESIOLOGY

## 2020-08-05 PROCEDURE — 700111 HCHG RX REV CODE 636 W/ 250 OVERRIDE (IP): Performed by: ANESTHESIOLOGY

## 2020-08-05 PROCEDURE — 160028 HCHG SURGERY MINUTES - 1ST 30 MINS LEVEL 3: Performed by: ORTHOPAEDIC SURGERY

## 2020-08-05 PROCEDURE — C1713 ANCHOR/SCREW BN/BN,TIS/BN: HCPCS | Performed by: ORTHOPAEDIC SURGERY

## 2020-08-05 PROCEDURE — 160002 HCHG RECOVERY MINUTES (STAT): Performed by: ORTHOPAEDIC SURGERY

## 2020-08-05 DEVICE — WIRE K- SMTH .062 4 - (6TX6=36): Type: IMPLANTABLE DEVICE | Status: FUNCTIONAL

## 2020-08-05 RX ORDER — BUPIVACAINE HYDROCHLORIDE 2.5 MG/ML
INJECTION, SOLUTION EPIDURAL; INFILTRATION; INTRACAUDAL
Status: DISCONTINUED
Start: 2020-08-05 | End: 2020-08-05 | Stop reason: HOSPADM

## 2020-08-05 RX ORDER — MEPERIDINE HYDROCHLORIDE 25 MG/ML
12.5 INJECTION INTRAMUSCULAR; INTRAVENOUS; SUBCUTANEOUS
Status: DISCONTINUED | OUTPATIENT
Start: 2020-08-05 | End: 2020-08-05 | Stop reason: HOSPADM

## 2020-08-05 RX ORDER — CEFAZOLIN SODIUM 1 G/3ML
INJECTION, POWDER, FOR SOLUTION INTRAMUSCULAR; INTRAVENOUS PRN
Status: DISCONTINUED | OUTPATIENT
Start: 2020-08-05 | End: 2020-08-05 | Stop reason: SURG

## 2020-08-05 RX ORDER — HALOPERIDOL 5 MG/ML
1 INJECTION INTRAMUSCULAR
Status: DISCONTINUED | OUTPATIENT
Start: 2020-08-05 | End: 2020-08-05 | Stop reason: HOSPADM

## 2020-08-05 RX ORDER — ONDANSETRON 2 MG/ML
4 INJECTION INTRAMUSCULAR; INTRAVENOUS
Status: DISCONTINUED | OUTPATIENT
Start: 2020-08-05 | End: 2020-08-05 | Stop reason: HOSPADM

## 2020-08-05 RX ORDER — OXYCODONE HCL 5 MG/5 ML
10 SOLUTION, ORAL ORAL
Status: DISCONTINUED | OUTPATIENT
Start: 2020-08-05 | End: 2020-08-05 | Stop reason: HOSPADM

## 2020-08-05 RX ORDER — HYDROMORPHONE HYDROCHLORIDE 1 MG/ML
0.2 INJECTION, SOLUTION INTRAMUSCULAR; INTRAVENOUS; SUBCUTANEOUS
Status: DISCONTINUED | OUTPATIENT
Start: 2020-08-05 | End: 2020-08-05 | Stop reason: HOSPADM

## 2020-08-05 RX ORDER — LIDOCAINE HYDROCHLORIDE 20 MG/ML
INJECTION, SOLUTION EPIDURAL; INFILTRATION; INTRACAUDAL; PERINEURAL PRN
Status: DISCONTINUED | OUTPATIENT
Start: 2020-08-05 | End: 2020-08-05 | Stop reason: SURG

## 2020-08-05 RX ORDER — SODIUM CHLORIDE, SODIUM LACTATE, POTASSIUM CHLORIDE, CALCIUM CHLORIDE 600; 310; 30; 20 MG/100ML; MG/100ML; MG/100ML; MG/100ML
INJECTION, SOLUTION INTRAVENOUS CONTINUOUS
Status: DISCONTINUED | OUTPATIENT
Start: 2020-08-05 | End: 2020-08-05 | Stop reason: HOSPADM

## 2020-08-05 RX ORDER — HYDROMORPHONE HYDROCHLORIDE 1 MG/ML
0.4 INJECTION, SOLUTION INTRAMUSCULAR; INTRAVENOUS; SUBCUTANEOUS
Status: DISCONTINUED | OUTPATIENT
Start: 2020-08-05 | End: 2020-08-05 | Stop reason: HOSPADM

## 2020-08-05 RX ORDER — BUPIVACAINE HYDROCHLORIDE 2.5 MG/ML
INJECTION, SOLUTION EPIDURAL; INFILTRATION; INTRACAUDAL
Status: DISCONTINUED | OUTPATIENT
Start: 2020-08-05 | End: 2020-08-05 | Stop reason: HOSPADM

## 2020-08-05 RX ORDER — HYDROMORPHONE HYDROCHLORIDE 1 MG/ML
0.1 INJECTION, SOLUTION INTRAMUSCULAR; INTRAVENOUS; SUBCUTANEOUS
Status: DISCONTINUED | OUTPATIENT
Start: 2020-08-05 | End: 2020-08-05 | Stop reason: HOSPADM

## 2020-08-05 RX ORDER — ONDANSETRON 2 MG/ML
INJECTION INTRAMUSCULAR; INTRAVENOUS PRN
Status: DISCONTINUED | OUTPATIENT
Start: 2020-08-05 | End: 2020-08-05 | Stop reason: SURG

## 2020-08-05 RX ORDER — HYDROCODONE BITARTRATE AND ACETAMINOPHEN 5; 325 MG/1; MG/1
1 TABLET ORAL EVERY 6 HOURS PRN
Qty: 20 TAB | Refills: 0 | Status: SHIPPED | OUTPATIENT
Start: 2020-08-05 | End: 2020-08-15

## 2020-08-05 RX ORDER — DEXAMETHASONE SODIUM PHOSPHATE 4 MG/ML
INJECTION, SOLUTION INTRA-ARTICULAR; INTRALESIONAL; INTRAMUSCULAR; INTRAVENOUS; SOFT TISSUE PRN
Status: DISCONTINUED | OUTPATIENT
Start: 2020-08-05 | End: 2020-08-05 | Stop reason: SURG

## 2020-08-05 RX ORDER — OXYCODONE HCL 5 MG/5 ML
5 SOLUTION, ORAL ORAL
Status: DISCONTINUED | OUTPATIENT
Start: 2020-08-05 | End: 2020-08-05 | Stop reason: HOSPADM

## 2020-08-05 RX ADMIN — LIDOCAINE HYDROCHLORIDE 100 MG: 20 INJECTION, SOLUTION EPIDURAL; INFILTRATION; INTRACAUDAL at 09:43

## 2020-08-05 RX ADMIN — POVIDONE-IODINE 15 ML: 10 SOLUTION TOPICAL at 08:23

## 2020-08-05 RX ADMIN — PROPOFOL 200 MG: 10 INJECTION, EMULSION INTRAVENOUS at 09:43

## 2020-08-05 RX ADMIN — FENTANYL CITRATE 50 MCG: 50 INJECTION INTRAMUSCULAR; INTRAVENOUS at 09:56

## 2020-08-05 RX ADMIN — FENTANYL CITRATE 50 MCG: 50 INJECTION INTRAMUSCULAR; INTRAVENOUS at 10:03

## 2020-08-05 RX ADMIN — SODIUM CHLORIDE, POTASSIUM CHLORIDE, SODIUM LACTATE AND CALCIUM CHLORIDE 1000 ML: 600; 310; 30; 20 INJECTION, SOLUTION INTRAVENOUS at 08:23

## 2020-08-05 RX ADMIN — DEXAMETHASONE SODIUM PHOSPHATE 4 MG: 4 INJECTION, SOLUTION INTRA-ARTICULAR; INTRALESIONAL; INTRAMUSCULAR; INTRAVENOUS; SOFT TISSUE at 09:43

## 2020-08-05 RX ADMIN — CEFAZOLIN 2 G: 330 INJECTION, POWDER, FOR SOLUTION INTRAMUSCULAR; INTRAVENOUS at 09:43

## 2020-08-05 RX ADMIN — ONDANSETRON 4 MG: 2 INJECTION INTRAMUSCULAR; INTRAVENOUS at 10:03

## 2020-08-05 ASSESSMENT — FIBROSIS 4 INDEX: FIB4 SCORE: 5.55

## 2020-08-05 NOTE — OR NURSING
1045  IN PHASE 2.  RECEIVED PATIENT FROM PACU1.  REPORT FROM KYRIE NGUYEN RN.  PATIENT UP WALKING TO BATHROOM.    1101  WIFE FRANCIS TO BEDSIDE.      1117  DISCHARGED.  DISCHARGE INSTRUCTIONS GIVEN TO PATIENT AND HIS WIFE.  A VERBAL UNDERSTANDING OF ALL INSTRUCTIONS WAS STATED.  PATIENT TAKING PO, VOIDING AND AMBULATING WITHOUT DIFFICULTY.  PATIENT STATES HE IS READY TO GO HOME.  SLING IN PLACE.

## 2020-08-05 NOTE — ANESTHESIA POSTPROCEDURE EVALUATION
Patient: Sohail Duvall    Procedure Summary     Date: 08/05/20 Room / Location: [facility] ROOM 22 / SURGERY SAME DAY [facility]    Anesthesia Start: 0940 Anesthesia Stop: 1016    Procedures:       EXPLORATION, WOUND- THUMB (Right Thumb)      REPAIR, TENDON- EXTENSOR (Hand) Diagnosis: (LACERATION WITHOUT FOREIGN BODY THUMB WITHOUT DAMAGE TO NAIL INITIAL ENCOUNTER)    Surgeon: Yumiko Griffiths M.D. Responsible Provider: Tobey Gansert, M.D.    Anesthesia Type: general ASA Status: 3          Final Anesthesia Type: general  Last vitals  BP   Blood Pressure : 143/77    Temp   36.8 °C (98.2 °F)    Pulse   Pulse: 65   Resp   14    SpO2   98 %      Anesthesia Post Evaluation    Patient location during evaluation: PACU  Patient participation: complete - patient participated  Level of consciousness: awake and alert    Airway patency: patent  Anesthetic complications: no  Cardiovascular status: hemodynamically stable  Respiratory status: acceptable  Hydration status: euvolemic    PONV: none           Nurse Pain Score: 0 (NPRS)

## 2020-08-05 NOTE — OR NURSING
1016 Pt arrived from OR. Awake on arrival without airway. Denies any pain/nausea. Denies numbness/tingling to right hand, cap refill good, able to move all fingers. Arm is elevated with pillow, dressing in clean/dry/intact.   1031 Pt sitting up in stretcher speaking full sentences. Pt able to tolerate PO fluids.   1042 pt denies any pain. Report given to ALEXEY Navas in phase II.

## 2020-08-05 NOTE — OP REPORT
Orthopedic Surgery Operative Report    Pre-operative Diagnosis:  EPL laceration of right thumb    Post-operative Diagnosis:  Traumatic arthrotomy thumb IP joint  Open fracture, avulsion fracture with EPL attached    Procedures Performed:  Irrigation and debridement traumatic arthrotomy   Tendon repair   Pinning of IP joint    Anesthesia:  General     Anesthesiologist:  Jair     Surgeon:  Yumiko Griffiths M.D.     Assistants:  none    EBL:  minimal    Complications:  none    Indication/Procedure:  The patient was met in the preoperative holding area. The operative site was marked. The patient was brought back to the OR by the anesthesia team. General anesthesia was induced. Perioperative antibiotics were given    The operative extremity was prepped and draped in the standard fashion. A multidisciplinary time out was performed and the correct patient, site and procedure were confirmed.     We began by exploring the traumatic wound. The tendon was completely lacerated with a bony fragment attached. No distal tendon was intact. There was a traumatic arthrotomy of the IP joint. The wound was thoroughly irrigated with saline. Excisional debridement down to the level of the bone and joint was performed with a knife, curette and ronguer. The wound was again irrigated. The IP joint was pinned in extension to allow for a tension free repair. Using an 0 ethibond, we placed suture into the distal phalanx and then through the tendon to advance it. 2 sutures were placed.     The wound was again irrigated with saline. Skin was closed with 4-0 nylon. He was placed in a thumb spica splint    The patient was awoken from anesthesia and taken to recovery in stable condition.     Post Operative Plan:  NWB operative extremity  Continue PO antibiotics  Follow up 2 weeks for suture removal and transition to cast     Yumiko Griffiths M.D.

## 2020-08-05 NOTE — DISCHARGE INSTRUCTIONS
ACTIVITY: Rest and take it easy for the first 24 hours.  A responsible adult is recommended to remain with you during that time.  It is normal to feel sleepy.  We encourage you to not do anything that requires balance, judgment or coordination.    MILD FLU-LIKE SYMPTOMS ARE NORMAL. YOU MAY EXPERIENCE GENERALIZED MUSCLE ACHES, THROAT IRRITATION, HEADACHE AND/OR SOME NAUSEA.    FOR 24 HOURS DO NOT:  Drive, operate machinery or run household appliances.  Drink beer or alcoholic beverages.   Make important decisions or sign legal documents.    SPECIAL INSTRUCTIONS: *SEE HAND INSTRUCTION SHEET**    DIET: To avoid nausea, slowly advance diet as tolerated, avoiding spicy or greasy foods for the first day.  Add more substantial food to your diet according to your physician's instructions.  Babies can be fed formula or breast milk as soon as they are hungry.  INCREASE FLUIDS AND FIBER TO AVOID CONSTIPATION.    SURGICAL DRESSING/BATHING: *KEEP DRESSING CLEAN AND DRY  MAY SHOWER TOMORROW WITH DRESSING COVERED.**    FOLLOW-UP APPOINTMENT:  A follow-up appointment should be arranged with your doctor in *FOLLOW UP WITH DR. MELARA**; call to schedule.    You should CALL YOUR PHYSICIAN if you develop:  Fever greater than 101 degrees F.  Pain not relieved by medication, or persistent nausea or vomiting.  Excessive bleeding (blood soaking through dressing) or unexpected drainage from the wound.  Extreme redness or swelling around the incision site, drainage of pus or foul smelling drainage.  Inability to urinate or empty your bladder within 8 hours.  Problems with breathing or chest pain.    You should call 911 if you develop problems with breathing or chest pain.  If you are unable to contact your doctor or surgical center, you should go to the nearest emergency room or urgent care center.  Physician's telephone #: *DR. MELARA [phone]**    If any questions arise, call your doctor.  If your doctor is not available, please feel  free to call the Surgical Center at [phone].  The Center is open Monday through Friday from 7AM to 7PM.  You can also call the HEALTH HOTLINE open 24 hours/day, 7 days/week and speak to a nurse at [phone], or toll free at [phone].    A registered nurse may call you a few days after your surgery to see how you are doing after your procedure.    MEDICATIONS: Resume taking daily medication.  Take prescribed pain medication with food.  If no medication is prescribed, you may take non-aspirin pain medication if needed.  PAIN MEDICATION CAN BE VERY CONSTIPATING.  Take a stool softener or laxative such as senokot, pericolace, or milk of magnesia if needed.    Prescription given for **NORCO*.  Last pain medication given at *_______________________________**.    If your physician has prescribed pain medication that includes Acetaminophen (Tylenol), do not take additional Acetaminophen (Tylenol) while taking the prescribed medication.    Depression / Suicide Risk    As you are discharged from this [facility] facility, it is important to learn how to keep safe from harming yourself.    Recognize the warning signs:  · Abrupt changes in personality, positive or negative- including increase in energy   · Giving away possessions  · Change in eating patterns- significant weight changes-  positive or negative  · Change in sleeping patterns- unable to sleep or sleeping all the time   · Unwillingness or inability to communicate  · Depression  · Unusual sadness, discouragement and loneliness  · Talk of wanting to die  · Neglect of personal appearance   · Rebelliousness- reckless behavior  · Withdrawal from people/activities they love  · Confusion- inability to concentrate     If you or a loved one observes any of these behaviors or has concerns about self-harm, here's what you can do:  · Talk about it- your feelings and reasons for harming yourself  · Remove any means that you might use to hurt yourself  (examples: pills, rope, extension cords, firearm)  · Get professional help from the community (Mental Health, Substance Abuse, psychological counseling)  · Do not be alone:Call your Safe Contact- someone whom you trust who will be there for you.  · Call your local CRISIS HOTLINE 240-6410 or 963-355-6514  · Call your local Children's Mobile Crisis Response Team [place] (130) 563-5222 or www.[employer]ol[employer]  · Call the toll free National Suicide Prevention Hotlines   · National Suicide Prevention Lifeline 206-629-VCJE (1487)  National Hope Line Network 800-SUICIDE (862-7916)Discharge Education for patients on UZAIR (Obstructive Sleep Apnea) Protocol    Prior to receiving sedation or anesthesia, we screen all patients for Obstructive Sleep Apnea.  During your screening, you were identified as having suspected, but not confirmed Obstructive Sleep Apnea(UZAIR).    What is Obstructive Sleep Apnea?  Sleep apnea (AP-ne-ah) is a common disorder which involves breathing pauses that occur during sleep.  These can last from 10 seconds to a minute or longer.  Normal breathing resumes often with a loud snort or choking sound.    Sleep apnea occurs in all age groups and both genders but is more common in men and people over 40 years of age.  It has been estimated that as many as 18 million Americans have sleep apnea.  Most people who have sleep apnea don’t know they have it because it only occurs during sleep.  A family member and/or bed partner may first notice the signs of sleep apnea.  Sleep apnea is a chronic (ongoing) condition that disrupts the quality and quantity of your sleep repeatedly throughout the night.  This often results in excessive daytime sleepiness or fatigue during the day.  It may also contribute to high blood pressure, heart problems, and complications following medications used for surgery and procedures.    To establish a definitive diagnosis, further testing from a specialist would be needed.  We  recommend that you follow up with your primary care physician.    We recommend that you should be with an adult observer for at least 24 hours after your sedation/anesthesia.  If you have a CPAP machine, you should wear it during any sleep period (day or night) for the week following your procedure.  We encourage you to sleep on your side or in a sitting position, even with napping.  Lying flat on your back increases the risk of apnea and airway obstruction during your post procedure recovery period.    It is important to prevent over-sedation that could increase your risk for apnea.  Please take all pain medication as directed by your physician.  If you are not getting pain relief, please contact your physician to discuss possible approaches to relieving pain while minimizing medications that can affect your breathing and oxygen levels.      ·

## 2020-08-05 NOTE — ANESTHESIA PROCEDURE NOTES
Airway    Date/Time: 8/5/2020 9:44 AM  Performed by: Tobey Gansert, M.D.  Authorized by: Tobey Gansert, M.D.     Location:  OR  Urgency:  Elective  Indications for Airway Management:  Anesthesia      Spontaneous Ventilation: absent    Sedation Level:  Deep  Preoxygenated: Yes    Final Airway Type:  Supraglottic airway  Final Supraglottic Airway:  Standard LMA    SGA Size:  5  Number of Attempts at Approach:  1

## 2020-08-05 NOTE — ANESTHESIA PREPROCEDURE EVALUATION
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         (+) Hepatoma (HCC)      ENDO   (+) Type 2 diabetes mellitus without complication, without long-term current use of insulin (HCC)      Other   (+) CLL (chronic lymphocytic leukemia) (HCC)       Physical Exam    Airway   Mallampati: II  TM distance: >3 FB  Neck ROM: full       Cardiovascular - normal exam  Rhythm: regular  Rate: normal  (-) murmur     Dental - normal exam           Pulmonary - normal exam  Breath sounds clear to auscultation     Abdominal   (+) obese     Neurological - normal exam                 Anesthesia Plan    ASA 3 (CLL)   ASA physical status 3 criteria: other (comment)    Plan - general       Airway plan will be LMA        Induction: intravenous    Postoperative Plan: Postoperative administration of opioids is intended.    Pertinent diagnostic labs and testing reviewed    Informed Consent:    Anesthetic plan and risks discussed with patient.    Use of blood products discussed with: patient whom consented to blood products.

## 2020-08-05 NOTE — ANESTHESIA TIME REPORT
Anesthesia Start and Stop Event Times     Date Time Event    8/5/2020 0923 Ready for Procedure     0940 Anesthesia Start     1016 Anesthesia Stop        Responsible Staff  08/05/20    Name Role Begin End    Tobey Gansert, M.D. Anesth 0940 1016        Preop Diagnosis (Free Text):  Pre-op Diagnosis     LACERATION WITHOUT FOREIGN BODY THUMB WITHOUT DAMAGE TO NAIL INITIAL ENCOUNTER        Preop Diagnosis (Codes):    Post op Diagnosis  Laceration of extensor muscle and tendon of thumb at wrist and hand level      Premium Reason  Non-Premium    Comments:

## 2020-09-28 DIAGNOSIS — E11.9 TYPE 2 DIABETES MELLITUS WITHOUT COMPLICATION, WITHOUT LONG-TERM CURRENT USE OF INSULIN (HCC): ICD-10-CM

## 2020-09-30 RX ORDER — METFORMIN HYDROCHLORIDE 500 MG/1
TABLET, EXTENDED RELEASE ORAL
Qty: 180 TAB | Refills: 1 | Status: SHIPPED | OUTPATIENT
Start: 2020-09-30 | End: 2021-04-06 | Stop reason: SDUPTHER

## 2020-09-30 NOTE — TELEPHONE ENCOUNTER
Last seen by PCP 06/1/2020.     Lab Results   Component Value Date/Time    HBA1C 5.8 (H) 06/01/2020 12:12 PM      Lab Results   Component Value Date/Time    MALBCRT 7 10/28/2019 01:15 PM    MICROALBUR 1.0 10/28/2019 01:15 PM      Lab Results   Component Value Date/Time    ALKPHOSPHAT 76 08/04/2020 10:08 AM    ASTSGOT 41 08/04/2020 10:08 AM    ALTSGPT 36 08/04/2020 10:08 AM    TBILIRUBIN 1.1 08/04/2020 10:08 AM        Will send 6 month(s) to the pharmacy.

## 2020-10-01 DIAGNOSIS — E11.9 TYPE 2 DIABETES MELLITUS WITHOUT COMPLICATION, WITHOUT LONG-TERM CURRENT USE OF INSULIN (HCC): ICD-10-CM

## 2020-10-05 RX ORDER — METFORMIN HYDROCHLORIDE 500 MG/1
TABLET, EXTENDED RELEASE ORAL
Qty: 180 TAB | Refills: 0 | OUTPATIENT
Start: 2020-10-05

## 2020-10-27 ENCOUNTER — NURSE TRIAGE (OUTPATIENT)
Dept: HEALTH INFORMATION MANAGEMENT | Facility: OTHER | Age: 70
End: 2020-10-27

## 2020-10-27 NOTE — TELEPHONE ENCOUNTER
1. Caller Name:     Nephew           Call Back Number: [facility] PCP or Specialty Provider: Yes         2.  In the last two weeks, has the patient had any new or worsening symptoms (not explained by alternative diagnosis)? Yes, the patient reports the following COVID-19 consistent symptoms: fever of at least 100.4°F (38°C) or greater.    3.  Does patient have any comoribidities? None     4.  Has the patient traveled in the last 14 days OR had any known contact with someone who is suspected or confirmed to have COVID-19?  No.    5. Disposition: Advised to go to     Note routed to [facility] Provider: SRINIVAS only.

## 2020-11-06 ENCOUNTER — OFFICE VISIT (OUTPATIENT)
Dept: MEDICAL GROUP | Facility: PHYSICIAN GROUP | Age: 70
End: 2020-11-06
Payer: MEDICARE

## 2020-11-06 VITALS
SYSTOLIC BLOOD PRESSURE: 124 MMHG | HEIGHT: 67 IN | HEART RATE: 66 BPM | BODY MASS INDEX: 35 KG/M2 | WEIGHT: 223 LBS | TEMPERATURE: 97 F | DIASTOLIC BLOOD PRESSURE: 76 MMHG | OXYGEN SATURATION: 99 %

## 2020-11-06 DIAGNOSIS — C91.10 CLL (CHRONIC LYMPHOCYTIC LEUKEMIA) (HCC): ICD-10-CM

## 2020-11-06 DIAGNOSIS — E11.9 TYPE 2 DIABETES MELLITUS WITHOUT COMPLICATION, WITHOUT LONG-TERM CURRENT USE OF INSULIN (HCC): ICD-10-CM

## 2020-11-06 DIAGNOSIS — I15.2 HYPERTENSION SECONDARY TO ENDOCRINE DISORDERS: ICD-10-CM

## 2020-11-06 DIAGNOSIS — B07.8 PALMAR WART: ICD-10-CM

## 2020-11-06 DIAGNOSIS — C22.8 PRIMARY MALIGNANT NEOPLASM OF LIVER (HCC): ICD-10-CM

## 2020-11-06 DIAGNOSIS — R11.0 NAUSEA: ICD-10-CM

## 2020-11-06 LAB
HBA1C MFR BLD: 6.2 % (ref 0–5.6)
INT CON NEG: NEGATIVE
INT CON POS: POSITIVE

## 2020-11-06 PROCEDURE — 17110 DESTRUCTION B9 LES UP TO 14: CPT | Performed by: FAMILY MEDICINE

## 2020-11-06 PROCEDURE — 83036 HEMOGLOBIN GLYCOSYLATED A1C: CPT | Performed by: FAMILY MEDICINE

## 2020-11-06 PROCEDURE — 99214 OFFICE O/P EST MOD 30 MIN: CPT | Mod: 25 | Performed by: FAMILY MEDICINE

## 2020-11-06 RX ORDER — ONDANSETRON 4 MG/1
4 TABLET, FILM COATED ORAL EVERY 4 HOURS PRN
Qty: 30 TAB | Refills: 3 | Status: SHIPPED | OUTPATIENT
Start: 2020-11-06 | End: 2021-07-01

## 2020-11-06 RX ORDER — FAMOTIDINE 20 MG
1000 TABLET ORAL DAILY
COMMUNITY

## 2020-11-06 ASSESSMENT — FIBROSIS 4 INDEX: FIB4 SCORE: 5.55

## 2020-11-06 NOTE — PROGRESS NOTES
CC: Diabetes    HISTORY OF THE PRESENT ILLNESS: Patient is a 69 y.o. male. This pleasant patient is here today to discuss following issues and establish care.    Patient is primarily here to establish care today.  He is hoping to get his hemoglobin A1c checked.  He does have known type 2 diabetes and takes Metformin extended release 500 mg twice daily.  He reports his last hemoglobin A1c was 6.0 and that he overall feels like he is doing well.    Patient had last lab work in August 2020.  At that time his white blood cell count was 2.6.  He does have known history of CLL.  He was previously following with Dr. Servin, but had to get a referral to get reestablished.  He has an appointment with Dr. Servin next week.    Patient is unfortunately also dealing with liver cancer right now.  He is status post ablation, chemotherapy and radiation.  He reports he is doing quite well.  He has follow-up MRI and ultrasound scheduled per gastroenterology.  He denies any symptoms such as abdominal pain.  He does request refills on his Zofran today.  He notes that since his chemo and radiation therapy, he has been getting intermittent nausea, maybe once per week.  Zofran works really well for this.    He also does have known hypertension.  Losartan 50 mg daily.  Blood pressures well controlled today.  He checks his blood pressure at home and states that it is typically below 150 systolic, which is good for him.    Has a wart on his left thumb.  He has had it frozen off 2 other times with previous PCP.  He is requesting that we freeze it off again today, as it seems to keep coming back.    Allergies: Patient has no known allergies.    Current Outpatient Medications Ordered in Epic   Medication Sig Dispense Refill   • Vitamin D, Cholecalciferol, 25 MCG (1000 UT) Cap Take 1,000 Units by mouth every day.     • ondansetron (ZOFRAN) 4 MG Tab tablet Take 1 Tab by mouth every four hours as needed for Nausea/Vomiting (for nausea, vomiting).  30 Tab 3   • metFORMIN ER (GLUCOPHAGE XR) 500 MG TABLET SR 24 HR TAKE ONE TABLET BY MOUTH TWICE A  Tab 1   • losartan (COZAAR) 50 MG Tab Take 1 Tab by mouth every day. 30 Tab 5   • cetirizine (ZYRTEC) 10 MG Tab Take 10 mg by mouth every morning.     • Multiple Vitamins-Minerals (MULTIVITAMIN PO) Take 1 Tab by mouth every morning.     • Polyethylene Glycol 3350 (MIRALAX PO) Take 1 Cap by mouth every morning.     • fluticasone (FLONASE ALLERGY RELIEF) 50 MCG/ACT nasal spray Spray 1 Spray in nose 2 times a day. 1 Bottle 0     No current Epic-ordered facility-administered medications on file.        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 Stopped both medications because blood pressure readings were low, /82 today and has been low at home since stopping medications about 5 days ago. Last reading at home 117/72    • Liver cancer (HCC)    • Liver tumor    • Plantar fasciitis of right foot 8/12/2019   • Rosacea        Past Surgical History:   Procedure Laterality Date   • WOUND EXPLORATION ORTHO Right 8/5/2020    Procedure: EXPLORATION, WOUND- THUMB;  Surgeon: Yumiko Griffiths M.D.;  Location: SURGERY SAME DAY [facility];  Service: Orthopedics   • TENDON REPAIR  8/5/2020    Procedure: REPAIR, TENDON- EXTENSOR;  Surgeon: Yumiko Griffiths M.D.;  Location: SURGERY SAME DAY [facility];  Service: Orthopedics   • HEPATIC ABLATION LAPAROSCOPIC  6/28/2018    Procedure: HEPATIC ABLATION LAPAROSCOPIC. SEGMENT 4B, HEPATOMA, REPAIR OF INCARCERATED UMBILICAL  "HERNIA;  Surgeon: Feliberto Burgos M.D.;  Location: SURGERY [facility];  Service: General   • BRACHY THERAPY         Social History     Tobacco Use   • Smoking status: Never Smoker   • Smokeless tobacco: Former User     Types: Snuff   Substance Use Topics   • Alcohol use: No     Alcohol/week: 16.8 oz     Types: 28 Cans of beer per week   • Drug use: Not Currently     Types: Marijuana, Inhaled, Oral     Comment: \"Marijuana Use\" daily        Social History     Social History Narrative    No known exposure to asbestos, dyes, or chemicals, however he was exposed to pesticides.  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  1 child, alive and well.  He also has a step-child.        Family History   Problem Relation Age of Onset   • Cancer Father 81        Bladder   • Cancer Brother         Prostate & CLL (s/p 8-10 years ago)   • Hyperlipidemia Sister    • Lung Disease Neg Hx    • Diabetes Neg Hx    • Heart Disease Neg Hx    • Hypertension Neg Hx    • Stroke Neg Hx    • Alcohol/Drug Neg Hx        ROS:     - Constitutional: Negative for fever, chills, unexpected weight change, and fatigue/generalized weakness.     - Respiratory: Negative for cough, sputum production, chest congestion, dyspnea, wheezing, and crackles.      - Cardiovascular: Negative for chest pain, palpitations, orthopnea, PND, and bilateral lower extremity edema.     - Gastrointestinal: Positive for intermittent nausea.  Negative for heartburn, vomiting, abdominal pain, hematochezia, melena, diarrhea, constipation, and greasy/foul-smelling stools.     - Genitourinary: Negative for dysuria, polyuria, hematuria, pyuria, urinary urgency, and urinary incontinence.       Labs: Labs reviewed from August 4, 2020 and questions answered with patient.    Exam: /76 (BP Location: Right arm, Patient Position: Sitting, BP Cuff Size: Adult)   Pulse 66   Temp 36.1 °C (97 °F) (Temporal)  " " Ht 1.702 m (5' 7\")   Wt 101.2 kg (223 lb)   SpO2 99%  Body mass index is 34.93 kg/m².    General: Well appearing, NAD  Neck: Supple without JVD. No thyromegaly or nodules  Pulmonary: Clear to ausculation.  Normal effort. No rales, ronchi, or wheezing.  Cardiovascular: Regular rate and rhythm without murmur, rubs or gallop.   Abdomen: Soft, nontender, nondistended. Normal bowel sounds.  Liver palpably normal.  Mild splenomegaly noted.  Lymph: No cervical, supraclavicular lymph nodes are palpable  Skin: Warm and dry.  Wart noted on palmar surface of left thumb.  Musculoskeletal:  No extremity cyanosis, clubbing, or edema.  Psych: Normal mood and affect. Alert and oriented. Judgment and insight is normal.    Please note that this dictation was created using voice recognition software. I have made every reasonable attempt to correct obvious errors, but I expect that there are errors of grammar and possibly content that I did not discover before finalizing the note.      Assessment/Plan  Sohail was seen today for diabetes follow-up, warts and medication refill.    Diagnoses and all orders for this visit:    CLL (chronic lymphocytic leukemia) (HCC)  Chronic issue, with most recent white blood cell count 2.6.  He has appointment with his oncologist next week.    Type 2 diabetes mellitus without complication, without long-term current use of insulin (HCC)  Chronic issue, point-of-care A1c 6.2 in clinic today.  Recommend continuing current dose of Metformin at this time.  -     POCT  A1C    Primary malignant neoplasm of liver (HCC)   Patient continues to be followed by gastroenterology for his liver cancer with upcoming imaging.  Will request records.    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  Recommend he bring his blood pressure cuff and at our next visit so we can compare his to ours.    Palmar wart  Ongoing issue, cryotherapy performed as " below.    Cryotherapy of Benign Skin Lesion Procedure Note    Pre-operative Diagnosis: Wart    Location: Left thumb    Procedure Details   The risks and benefits of the procedure and verbal informed consent obtained. The area was cleaned with alcohol. Three applications of liquid nitrogen via cryogun applied to ice ball stage.  No complications.  Aftercare reviewed.    Nausea  Chronic intermittent issue since chemotherapy for his liver cancer.  Zofran refilled today.  -     ondansetron (ZOFRAN) 4 MG Tab tablet; Take 1 Tab by mouth every four hours as needed for Nausea/Vomiting (for nausea, vomiting).    Follow-up in 4 to 6 months or sooner if needed.    Oliva Alegre DO  [facility]

## 2020-11-09 ENCOUNTER — HOSPITAL ENCOUNTER (OUTPATIENT)
Facility: MEDICAL CENTER | Age: 70
End: 2020-11-09
Attending: INTERNAL MEDICINE
Payer: MEDICARE

## 2020-11-09 PROCEDURE — 80053 COMPREHEN METABOLIC PANEL: CPT

## 2020-11-09 PROCEDURE — 83615 LACTATE (LD) (LDH) ENZYME: CPT

## 2020-11-10 LAB
ALBUMIN SERPL BCP-MCNC: 4.4 G/DL (ref 3.2–4.9)
ALBUMIN/GLOB SERPL: 2 G/DL
ALP SERPL-CCNC: 67 U/L (ref 30–99)
ALT SERPL-CCNC: 37 U/L (ref 2–50)
ANION GAP SERPL CALC-SCNC: 15 MMOL/L (ref 7–16)
AST SERPL-CCNC: 43 U/L (ref 12–45)
BILIRUB SERPL-MCNC: 1.3 MG/DL (ref 0.1–1.5)
BUN SERPL-MCNC: 12 MG/DL (ref 8–22)
CALCIUM SERPL-MCNC: 9.6 MG/DL (ref 8.5–10.5)
CHLORIDE SERPL-SCNC: 101 MMOL/L (ref 96–112)
CO2 SERPL-SCNC: 23 MMOL/L (ref 20–33)
CREAT SERPL-MCNC: 0.59 MG/DL (ref 0.5–1.4)
GLOBULIN SER CALC-MCNC: 2.2 G/DL (ref 1.9–3.5)
GLUCOSE SERPL-MCNC: 172 MG/DL (ref 65–99)
LDH SERPL L TO P-CCNC: 214 U/L (ref 107–266)
POTASSIUM SERPL-SCNC: 3.7 MMOL/L (ref 3.6–5.5)
PROT SERPL-MCNC: 6.6 G/DL (ref 6–8.2)
SODIUM SERPL-SCNC: 139 MMOL/L (ref 135–145)

## 2020-11-18 ENCOUNTER — APPOINTMENT (OUTPATIENT)
Dept: RADIOLOGY | Facility: MEDICAL CENTER | Age: 70
End: 2020-11-18
Attending: INTERNAL MEDICINE
Payer: MEDICARE

## 2020-11-18 DIAGNOSIS — C22.0 LIVER CELL CARCINOMA (HCC): ICD-10-CM

## 2020-11-18 PROCEDURE — 700117 HCHG RX CONTRAST REV CODE 255: Performed by: INTERNAL MEDICINE

## 2020-11-18 PROCEDURE — 76700 US EXAM ABDOM COMPLETE: CPT

## 2020-11-18 PROCEDURE — 74183 MRI ABD W/O CNTR FLWD CNTR: CPT

## 2020-11-18 PROCEDURE — A9581 GADOXETATE DISODIUM INJ: HCPCS | Performed by: INTERNAL MEDICINE

## 2020-11-18 RX ADMIN — GADOXETATE DISODIUM 10 ML: 181.43 INJECTION, SOLUTION INTRAVENOUS at 13:10

## 2020-12-08 ASSESSMENT — ENCOUNTER SYMPTOMS
BLOOD IN STOOL: 0
HEARTBURN: 0
VOMITING: 0
GASTROINTESTINAL NEGATIVE: 1
WEAKNESS: 0
ROS GI COMMENTS: NEGATIVE FOR ASCITES
ABDOMINAL PAIN: 0
FEVER: 0
CHILLS: 0
ROS SKIN COMMENTS: NEGATIVE FOR JAUNDICE
CONSTIPATION: 0
NAUSEA: 0
DIARRHEA: 0
BRUISES/BLEEDS EASILY: 0
WEIGHT LOSS: 0
DIAPHORESIS: 0

## 2020-12-08 ASSESSMENT — LIFESTYLE VARIABLES: SUBSTANCE_ABUSE: 0

## 2020-12-08 NOTE — CONSULTS
"Interventional Oncology Consultation      Re: Sohail Duvall     MRN: [id number]   : 1950    Sohail Duvall was seen today for review of surveillance imaging after hepatic interventions by Joe Norris MD. He was referred to our service by Feliberto Senior MD and is also under the care of Carmencita Hand PA-C, Alfonzo Servin MD, and Johanna Hooper MD.    History of Present Illness:   has a history of CLL and alcoholic cirrhosis. Imaging revealed hepatomas in 4A and 4B and he underwent the following interventions:  · 18 ablation 4B lesion (Nadeen)  · 19 Hepatic angiogram \"mapping\" (Myrna)  · 18 Y90 SIRT left lobe 0.7 gb ( 19 mCi)  (Myrna)  · 18 YUNIER TACE caudate lesion (Myrna)    Interval imaging has been stable for a year, however he now presents with disease progression in the left and caudate lobes. Today, the patient denies acute complaints. He denies nausea, jaundice, abdominal pain, constipation, or diarrhea. He denies current upper respiratory complaints. He had a traumatic injury to his right thumb in the summer and required surgery, and states he has completely recovered. He continues to work part time. He is not accompanied by his wife to the appointment.     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 Stopped both medications because blood pressure readings were low, /82 today and has been low at home since stopping medications about 5 days ago. Last reading at home 117/72    • " "Liver cancer (HCC)    • Liver tumor    • Plantar fasciitis of right foot 8/12/2019   • Rosacea      Past Surgical History:   Procedure Laterality Date   • WOUND EXPLORATION ORTHO Right 8/5/2020    Procedure: EXPLORATION, WOUND- THUMB;  Surgeon: Yumiko Griffiths M.D.;  Location: SURGERY SAME DAY [facility];  Service: Orthopedics   • TENDON REPAIR  8/5/2020    Procedure: REPAIR, TENDON- EXTENSOR;  Surgeon: Yumiko Griffiths M.D.;  Location: SURGERY SAME DAY [facility];  Service: Orthopedics   • HEPATIC ABLATION LAPAROSCOPIC  6/28/2018    Procedure: HEPATIC ABLATION LAPAROSCOPIC. SEGMENT 4B, HEPATOMA, REPAIR OF INCARCERATED UMBILICAL HERNIA;  Surgeon: Feliberto Burgos M.D.;  Location: SURGERY [facility];  Service: General   • BRACHY THERAPY       Social History     Socioeconomic History   • Marital status:      Spouse name: Not on file   • Number of children: 1   • Years of education: Not on file   • Highest education level: Not on file   Occupational History   • Occupation: fertilizer sales   Social Needs   • Financial resource strain: Not on file   • Food insecurity     Worry: Not on file     Inability: Not on file   • Transportation needs     Medical: Not on file     Non-medical: Not on file   Tobacco Use   • Smoking status: Never Smoker   • Smokeless tobacco: Former User     Types: Snuff   Substance and Sexual Activity   • Alcohol use: No     Alcohol/week: 16.8 oz     Types: 28 Cans of beer per week   • Drug use: Not Currently     Types: Marijuana, Inhaled, Oral     Comment: \"Marijuana Use\" daily    • Sexual activity: Never   Lifestyle   • Physical activity     Days per week: Not on file     Minutes per session: Not on file   • Stress: Not on file   Relationships   • Social connections     Talks on phone: Not on file     Gets together: Not on file     Attends [employer] service: Not on file     Active member of club or organization: Not on file     Attends meetings of clubs or " organizations: Not on file     Relationship status: Not on file   • Intimate partner violence     Fear of current or ex partner: Not on file     Emotionally abused: Not on file     Physically abused: Not on file     Forced sexual activity: Not on file   Other Topics Concern   • Not on file   Social History Narrative    No known exposure to asbestos, dyes, or chemicals, however he was exposed to pesticides.  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  1 child, alive and well.  He also has a step-child.      Family History   Problem Relation Age of Onset   • Cancer Father 81        Bladder   • Cancer Brother         Prostate & CLL (s/p 8-10 years ago)   • Hyperlipidemia Sister    • Lung Disease Neg Hx    • Diabetes Neg Hx    • Heart Disease Neg Hx    • Hypertension Neg Hx    • Stroke Neg Hx    • Alcohol/Drug Neg Hx        Review of Systems   Constitutional: Negative for chills, diaphoresis, fever, malaise/fatigue and weight loss.   Eyes:        Negative for icterus   Gastrointestinal: Negative.  Negative for abdominal pain, blood in stool, constipation, diarrhea, heartburn, melena, nausea and vomiting.        Negative for ascites   Skin: Negative.         Negative for jaundice   Neurological: Negative for weakness.   Endo/Heme/Allergies: Does not bruise/bleed easily.   Psychiatric/Behavioral: Negative for substance abuse (negative for alcohol and tobacco use).     A comprehensive 14-point review of systems was negative except as described above.     Labs:      Ref. Range 11/6/2020 10:25 11/9/2020 10:10   Sodium Latest Ref Range: 135 - 145 mmol/L  139   Potassium Latest Ref Range: 3.6 - 5.5 mmol/L  3.7   Chloride Latest Ref Range: 96 - 112 mmol/L  101   Co2 Latest Ref Range: 20 - 33 mmol/L  23   Anion Gap Latest Ref Range: 7.0 - 16.0   15.0   Glucose Latest Ref Range: 65 - 99 mg/dL  172 (H)   Bun Latest Ref Range: 8 - 22 mg/dL  12   Creatinine Latest Ref  Range: 0.50 - 1.40 mg/dL  0.59   GFR If  Latest Ref Range: >60 mL/min/1.73 m 2  >60   GFR If Non  Latest Ref Range: >60 mL/min/1.73 m 2  >60   Calcium Latest Ref Range: 8.5 - 10.5 mg/dL  9.6   AST(SGOT) Latest Ref Range: 12 - 45 U/L  43   ALT(SGPT) Latest Ref Range: 2 - 50 U/L  37   Alkaline Phosphatase Latest Ref Range: 30 - 99 U/L  67   Total Bilirubin Latest Ref Range: 0.1 - 1.5 mg/dL  1.3   Albumin Latest Ref Range: 3.2 - 4.9 g/dL  4.4   Total Protein Latest Ref Range: 6.0 - 8.2 g/dL  6.6   Globulin Latest Ref Range: 1.9 - 3.5 g/dL  2.2   A-G Ratio Latest Units: g/dL  2.0   LDH Total Latest Ref Range: 107 - 266 U/L  214   Glycohemoglobin Latest Ref Range: 0.0 - 5.6 % 6.2 (A)        Child Galloway Class unable to calculate  JIM Grade A1  NLR unable to calculate    Pathology:  None available at time of consultation    Radiology:   MRI abdomen 11/18/2020 at [facility]:  1. Previously treated lesions show no viable tumor. LR-treated.  2. Corresponding to the lesion detected on the ultrasound, there is an 8 mm lesion at the junction of anterior and medial hepatic segments. It is new since prior MRI, and meets criteria for HCC (LR-5).  3. A 1.6 cm segment 4 lesion seen on the prior MR study is stable in size. It shows mild arterial enhancement and mild washout, and likely represents an HCC which is stable since prior study. LR-5.  4. No additional lesions are detected on today's study.  5. Cirrhosis and portal hypertension. No ascites.  6. Cholelithiasis.  7. Stable 1 cm cystic lesion in the pancreatic tail, likely a side branch IPMN.  8. Colonic diverticulosis.        MRI abdomen 3/11/20 at [facility]:   1.  There are morphologic changes of the liver consistent with cirrhosis.  2.  There are stable posttreatment changes in the caudate lobe and superior medial segment left lobe and segment 2. LR treated  3.  Stable 10 mm focus of arterial enhancement without washout or restricted diffusion, likely  in segment 8. LR 3.  4.  There is a 9 mm arterial enhancing focus inferiorly in segment 8 or possibly 4a without washout or diffusion. LR 3  5.  There is a questionable 6 mm arterial focus of enhancement at the junction of segment 8/5 without washout or restricted diffusion. LR 3  6.  Stable splenomegaly, possibly related to cirrhosis versus underlying CLL.  7.  Stable cholelithiasis without biliary dilatation.  8.  No change in 9 mm pancreatic tail simple appearing cyst.  9.  No change in left adrenal gland adenoma.        LR-3: intermediate probability and LR treated lesions    MRI abdomen on July 1, 2019 at [facility]:  1.  Stable wedge-shaped treated lesion in the anterior medial hepatic segment.  2.  Stable treated lesion in the caudate lobe, with unchanged linear enhancement in its periphery.  3.  Segment 2 lesion described on the prior study is not seen on today's study. No focal arterial enhancement in segment 2.  4.  New 1 cm arterially enhancing focus in segment 8 has no correlate on other images. It is indeterminate. LR-3.  5.  Cirrhosis and portal hypertension, with postembolization changes in the left hepatic lobe. Splenomegaly. No ascites.  6.  Cholelithiasis.  7.  Stable left adrenal lesion, likely adenoma.  8.  Stable pancreatic tail cyst.    MRI abdomen on January 29, 2019 at [facility]:  1.  No new arterial enhancing mass is identified.  2.  Previously described mass in the caudate lobe is now avascular with peripheral enhancement, consistent with recent chemoembolization. LR-treated  3.  Ablation cavity in the left hepatic lobe remains avascular.  4.  8 mm arterial enhancing observation in segment 4A is not as pronounced as on the prior exam and grossly stable. LR-3  5.  Stable peripheral enhancing lesion in segment 2. LR-treated  6.  Previously described rim-enhancing observation in the superior aspect of segment 2 is not visualized on the current exam, likely related to differences in technique.  7.   Morphologic characteristics of cirrhosis and evidence of portal hypertension including splenomegaly  8.  Cholelithiasis.  9.  Left adrenal adenoma  10.  Stable 8 mm pancreatic tail cyst         Interventional radiology procedure December 14, 2018, at [facility]:  1.  DEBTACE procedure with total dose doxorubicin 100 mg administered as 100 mu Oncozene beads. (Caudate lobe)  2.  The patient is returned to the same day surgery reese for post procedure observation.       MRI abdomen November 10, 2018 at [facility]:  1.  Morphologic characteristics of cirrhosis with evidence of portal hypertension including trace ascites, splenomegaly, and portal venous dilatation.  2.  Interval enlargement in the mass in segment 4 adjacent to the caudate lobe. LR-5  3.  Previously described hyper enhancing masses in the left hepatic lobe demonstrate rim enhancement, consistent with recent Y 90 treatment. LR-treated.  4.  Ablation cavity in the left hepatic lobe is a vascular  5.  8 mm arterial enhancing focus in segment 4A is better defined than on the prior exam secondary to decreased motion. Size is not significantly changed.LR-3  6.  Stable 9 mm cystic mass in the pancreatic tail.  7.  Stable benign left adrenal adenoma  8.  Cholelithiasis. Dependent hypointense foci in the common bile duct may represent tiny nonobstructing calculi.       Interventional radiology procedure Y90 SIRT left lobe September 7, 2018, at [facility]:  1.  Technically successful Y90 radioembolization of the left hepatic lobe. The patient will followup in approximately 10 days for laboratory and clinical evaluation and 4-6 weeks for imaging evaluation.  2.  Immediately following the procedure, the patient was transported to nuclear medicine for SPECT imaging which demonstrates Bremsstrahlung emission from the left of the liver. There is no definite evidence of extrahepatic deposition of radioembolic   Material.     Nuclear medicine bremsstrahlung study on September 7,  2018 at [facility]:  The prescribed dose was  0.68 gb ( 18.4 mCi). The drawn dose was 0.78 gb (21mCi). Delivered dose after residual was measured at 0.7 gb ( 19 mCi). This represents  103% of the prescribed dose and  90% of the drawn dose. Bremsstrahlung images obtained   after the Y-90 radioembolization, confirm proper delivery to the targeted region. There is no uptake outside the planned treatment area. NOTE: The patient will be followed by interventional radiology and oncology.       Interventional radiology procedure August 8, 2018 at [facility]:  1.  Superior mesenteric arteriogram replaced common hepatic artery.  2.  Celiac arteriogram demonstrate no evidence of common hepatic arterial anatomy.  3.  Common hepatic arteriogram demonstrate no evidence of variant hepatic arterial anatomy.  4.  Selective catheterization and embolization of a small branch originating from the proximal aspect of the right hepatic artery which appeared to supply either a small subsegmental region of the right hepatic lobe or a small amount of the hepatic   flexure of the colon.  5.  Proper hepatic arteriogram demonstrating 2 small branches originating from the proximal aspect of the right hepatic artery one of which appear to represent either a small subsegmental hepatic artery versus hepatic flexure colon branch and a small   cystic artery.  6.  Intra-arterial administration of 4.4 mCi technetium 99m labeled MAA into the hepatic artery demonstrated a hepatic pulmonary shunt fraction of 4%.        CT abdomen with and without August 8, 2018 at [facility]:  1.  Cirrhosis  2.  Interval ablation of a segment 4A hepatic nodule without evidence of residual tumor in that field  3.  Multiple additional hepatic nodules and masses as described above, mostly unchanged in size however the dominant central RIGHT hepatic mass has increased in size. These are likely hepatomas.  4.  Splenomegaly and enlarged portal vein, in keeping with the patient's known  portal hypertension  5.  Cholelithiasis     Nuclear medicine quantitative lung study August 28, 2018 at [facility]:  Total hepatopulmonary shunt percentage was 4%. No evidence of extrahepatic radiotracer deposition is identified.     MRI abdomen on June 8, 2018 at [facility]:  Examination is limited by patient motion.  2.1 x 1.9 cm arterial hyperenhancing lesion within the left lobe of the liver appears compatible with hepatocellular carcinoma. LR-5    Hyperenhancing lesion within segment 4 of the liver bordering the caudate lobe measures 3 x 2 cm and is increased in size compared to prior. This lesion is compatible with hepatocellular carcinoma. LR-5    10 mm hyperenhancing lesion within segment 4 may be slightly decreased or unchanged in size compared to prior. LR-3    3 other small hyperenhancing lesions are seen within the right lobe of the liver measuring up to 1 cm. LR-3    Heterogeneous wedge-shaped area of delayed hypo-enhancement in the anterior liver is likely related to variable perfusion.    Findings of cirrhosis and portal hypertension.    Mildly prominent lymph nodes in the cardiophrenic fat are not significantly changed.    9 mm cystic lesion in the pancreatic tail could represent a small side branch IPMN.    Cholelithiasis. No biliary ductal dilatation is seen.    Subcarinal lymphadenopathy is partially imaged.    2 cm left adrenal nodule is unchanged and likely an adrenal adenoma.    Trace free fluid in the abdomen.    Findings discussed with Dr. Senior         CT thorax April 17, 2018 at [facility]:  1.  Mediastinal lymphadenopathy with markedly enlarged subcarinal lymph node could be due to metastatic disease or lymphoma. Reactive lymph nodes or lymphadenopathy due to granulomatous disease seems less likely.  2.  Coarse coronary artery calcifications.  3.  Cirrhosis with hypervascular hepatic lesions and splenomegaly.  4.  Cholelithiasis.     Nuclear medicine bone study April 17, 2018 at [facility]:  No  "scintigraphic evidence of bony metastatic disease     Portal pressure gradient April 4, 2018 at [facility]:  1.  Hepatic venography showing a patent right hepatic vein.  2.  Free and wedged right hepatic vein wedge pressures rendering a mean Hepatic Venous Pressure Gradient (HVPG) of 9.67 mmHg. This is consistent with portal hypertension.  (HVPG >= 10mmHg considered \"clinically significant\" portal HTN**  3.  Transjugular Liver biopsy was not performed at this time..  **Reference: Hepatic Venous Pressure Gradient in 2010:Optimal Measurement is Jose. Anya MCINTYRE, Wanda RODRIGUEZ. HEPATOLOGY, Vol. 51, No. 6, 2010     CT abdomen December 6, 2017 at [facility]:  1.  2.4 cm arterial enhancing mass in segment 4 of the liver demonstrates washout. LR-5.  2.  1.3 cm arterial enhancing lesion in the hepatic dome is similar to the recent MRI. No definite washout or capsule appreciated. LR-3  3.  Morphologic characteristics of cirrhosis with evidence of portal hypertension including splenomegaly.  4.  Cholelithiasis.  5.  Stable left adrenal nodule, likely a benign adenoma.    Current Outpatient Medications   Medication Sig Dispense Refill   • Vitamin D, Cholecalciferol, 25 MCG (1000 UT) Cap Take 1,000 Units by mouth every day.     • ondansetron (ZOFRAN) 4 MG Tab tablet Take 1 Tab by mouth every four hours as needed for Nausea/Vomiting (for nausea, vomiting). 30 Tab 3   • metFORMIN ER (GLUCOPHAGE XR) 500 MG TABLET SR 24 HR TAKE ONE TABLET BY MOUTH TWICE A  Tab 1   • losartan (COZAAR) 50 MG Tab Take 1 Tab by mouth every day. 30 Tab 5   • cetirizine (ZYRTEC) 10 MG Tab Take 10 mg by mouth every morning.     • Multiple Vitamins-Minerals (MULTIVITAMIN PO) Take 1 Tab by mouth every morning.     • Polyethylene Glycol 3350 (MIRALAX PO) Take 1 Cap by mouth every morning.     • fluticasone (FLONASE ALLERGY RELIEF) 50 MCG/ACT nasal spray Spray 1 Spray in nose 2 times a day. 1 Bottle 0     No current facility-administered medications for this " encounter.        No Known Allergies    Physical Exam   Constitutional: He is oriented to person, place, and time and well-developed, well-nourished, and in no distress. No distress.   Eyes: Pupils are equal, round, and reactive to light. No scleral icterus.   Pulmonary/Chest: Effort normal. No respiratory distress.   Abdominal: Soft. He exhibits no distension.   No ascites noted   Musculoskeletal:         General: No edema.      Comments: Well healed right thumb wound   Neurological: He is alert and oriented to person, place, and time. Gait normal. Coordination normal.   Skin: Skin is warm and dry. No rash noted. He is not diaphoretic. No erythema. No pallor.   No jaundice noted   Psychiatric: Mood, memory, affect and judgment normal.     ECOG Performance Status 0    Impression:   1. Unresectable multifocal hepatocellular carcinoma status post left lobe radioembolization and chemoembolization of caudate hepatoma, with disease progression.  2. Cirrhosis.  3. Portal hypertension.  4. Thrombocytopenia.  5. Splenomegaly.  6. Chronic lymphocytic leukemia.   7. Diabetes mellitus.     Plan:   Joe Norris MD has reviewed 's history and imaging studies. He is a candidate for palliative transarterial chemoembolization of unresectable hepatoma(s). We discussed the method of the procedure at length including angiography and embolization as well as the expected clinical course with the possibility of post-embolization syndrome nausea and pain and hospitalization. We explained that this procedure is palliative and not curative. We discussed the possibility of tumor recurrence and development of future tumors.  We additionally discussed the risks, including bleeding and infection, damage to the arteries or adjacent tissue, reaction to any medications given during the procedure, potential side effects of contrast administration including renal damage, non-target embolization, radiation-induced ulcers or liver  disease in the setting of radioembolization, liver failure, and death. There is a risk the procedure will not be effective. We discussed alternatives to the procedure including surveillance with no intervention and surgery. The patient verbalizes understanding of risks, benefits, and alternatives to IR intervention and elects to proceed. We plan to treat any lesions that we can visualize on angiography. All questions were answered. Written pre- and post- procedure care instructions were provided. We explained that the patient will need to have ongoing surveillance after the procedure, which will be managed by the treating team, and that future treatment depends on multiple factors including lab studies, imaging, and performance status. He is scheduled for January 6. He asked that follow up scans be CT rather than MRI due to claustrophobia.        GINA Grewal with Joe Norris MD  Interventional Radiology   [address] ([id number])  [place] [address]  [phone]

## 2020-12-10 ENCOUNTER — HOSPITAL ENCOUNTER (OUTPATIENT)
Dept: RADIOLOGY | Facility: MEDICAL CENTER | Age: 70
End: 2020-12-10
Attending: INTERNAL MEDICINE
Payer: MEDICARE

## 2020-12-10 DIAGNOSIS — C22.0 LIVER CELL CARCINOMA (HCC): ICD-10-CM

## 2020-12-30 DIAGNOSIS — I15.2 HYPERTENSION SECONDARY TO ENDOCRINE DISORDERS: ICD-10-CM

## 2020-12-31 ENCOUNTER — PRE-ADMISSION TESTING (OUTPATIENT)
Dept: ADMISSIONS | Facility: MEDICAL CENTER | Age: 70
End: 2020-12-31
Attending: RADIOLOGY
Payer: MEDICARE

## 2020-12-31 DIAGNOSIS — Z01.812 PRE-OPERATIVE LABORATORY EXAMINATION: ICD-10-CM

## 2020-12-31 LAB
ALBUMIN SERPL BCP-MCNC: 4.5 G/DL (ref 3.2–4.9)
ALBUMIN/GLOB SERPL: 2 G/DL
ALP SERPL-CCNC: 72 U/L (ref 30–99)
ALT SERPL-CCNC: 51 U/L (ref 2–50)
ANION GAP SERPL CALC-SCNC: 9 MMOL/L (ref 7–16)
AST SERPL-CCNC: 52 U/L (ref 12–45)
BASOPHILS # BLD AUTO: 1 % (ref 0–1.8)
BASOPHILS # BLD: 0.03 K/UL (ref 0–0.12)
BILIRUB SERPL-MCNC: 2.3 MG/DL (ref 0.1–1.5)
BUN SERPL-MCNC: 12 MG/DL (ref 8–22)
CALCIUM SERPL-MCNC: 9.2 MG/DL (ref 8.5–10.5)
CHLORIDE SERPL-SCNC: 106 MMOL/L (ref 96–112)
CO2 SERPL-SCNC: 23 MMOL/L (ref 20–33)
CREAT SERPL-MCNC: 0.46 MG/DL (ref 0.5–1.4)
EOSINOPHIL # BLD AUTO: 0.06 K/UL (ref 0–0.51)
EOSINOPHIL NFR BLD: 1.9 % (ref 0–6.9)
ERYTHROCYTE [DISTWIDTH] IN BLOOD BY AUTOMATED COUNT: 47.6 FL (ref 35.9–50)
GLOBULIN SER CALC-MCNC: 2.2 G/DL (ref 1.9–3.5)
GLUCOSE SERPL-MCNC: 146 MG/DL (ref 65–99)
HCT VFR BLD AUTO: 49.7 % (ref 42–52)
HGB BLD-MCNC: 17.6 G/DL (ref 14–18)
IMM GRANULOCYTES # BLD AUTO: 0.01 K/UL (ref 0–0.11)
IMM GRANULOCYTES NFR BLD AUTO: 0.3 % (ref 0–0.9)
INR PPP: 1.1 (ref 0.87–1.13)
LYMPHOCYTES # BLD AUTO: 0.56 K/UL (ref 1–4.8)
LYMPHOCYTES NFR BLD: 18.2 % (ref 22–41)
MCH RBC QN AUTO: 34.2 PG (ref 27–33)
MCHC RBC AUTO-ENTMCNC: 35.4 G/DL (ref 33.7–35.3)
MCV RBC AUTO: 96.5 FL (ref 81.4–97.8)
MONOCYTES # BLD AUTO: 0.29 K/UL (ref 0–0.85)
MONOCYTES NFR BLD AUTO: 9.4 % (ref 0–13.4)
NEUTROPHILS # BLD AUTO: 2.13 K/UL (ref 1.82–7.42)
NEUTROPHILS NFR BLD: 69.2 % (ref 44–72)
NRBC # BLD AUTO: 0 K/UL
NRBC BLD-RTO: 0 /100 WBC
PLATELET # BLD AUTO: 66 K/UL (ref 164–446)
PMV BLD AUTO: 10.9 FL (ref 9–12.9)
POTASSIUM SERPL-SCNC: 3.7 MMOL/L (ref 3.6–5.5)
PROT SERPL-MCNC: 6.7 G/DL (ref 6–8.2)
PROTHROMBIN TIME: 14.5 SEC (ref 12–14.6)
RBC # BLD AUTO: 5.15 M/UL (ref 4.7–6.1)
SODIUM SERPL-SCNC: 138 MMOL/L (ref 135–145)
WBC # BLD AUTO: 3.1 K/UL (ref 4.8–10.8)

## 2020-12-31 PROCEDURE — 85025 COMPLETE CBC W/AUTO DIFF WBC: CPT

## 2020-12-31 PROCEDURE — 85610 PROTHROMBIN TIME: CPT

## 2020-12-31 PROCEDURE — 80053 COMPREHEN METABOLIC PANEL: CPT

## 2020-12-31 PROCEDURE — 82107 ALPHA-FETOPROTEIN L3: CPT

## 2020-12-31 PROCEDURE — 36415 COLL VENOUS BLD VENIPUNCTURE: CPT

## 2020-12-31 ASSESSMENT — FIBROSIS 4 INDEX: FIB4 SCORE: 5.82

## 2021-01-02 ENCOUNTER — APPOINTMENT (OUTPATIENT)
Dept: ADMISSIONS | Facility: MEDICAL CENTER | Age: 71
End: 2021-01-02
Payer: MEDICARE

## 2021-01-02 LAB
AFP L3 MFR SERPL: 6.4 % (ref 0–9.9)
AFP SERPL-MCNC: 6 NG/ML (ref 0–15)

## 2021-01-04 RX ORDER — LOSARTAN POTASSIUM 50 MG/1
TABLET ORAL
Qty: 90 TAB | Refills: 1 | Status: SHIPPED | OUTPATIENT
Start: 2021-01-04 | End: 2021-06-29 | Stop reason: SDUPTHER

## 2021-01-04 NOTE — TELEPHONE ENCOUNTER
Refill X 6 months, sent to pharmacy.Pt. Seen in the last 6 months per protocol.   Lab Results   Component Value Date/Time    SODIUM 138 12/31/2020 10:30 AM    POTASSIUM 3.7 12/31/2020 10:30 AM    CHLORIDE 106 12/31/2020 10:30 AM    CO2 23 12/31/2020 10:30 AM    GLUCOSE 146 (H) 12/31/2020 10:30 AM    BUN 12 12/31/2020 10:30 AM    CREATININE 0.46 (L) 12/31/2020 10:30 AM

## 2021-01-05 RX ORDER — DEXAMETHASONE SODIUM PHOSPHATE 4 MG/ML
12 INJECTION, SOLUTION INTRA-ARTICULAR; INTRALESIONAL; INTRAMUSCULAR; INTRAVENOUS; SOFT TISSUE ONCE
Status: COMPLETED | OUTPATIENT
Start: 2021-01-06 | End: 2021-01-06

## 2021-01-05 NOTE — PROGRESS NOTES
"Pharmacy Chemotherapy calculation:    Protocol: TACE with doxorubicin loaded Oncozene microspheres    *Dosing Reference*  Doxorubicin 100-150 mg, loaded in 100 micron microspheres   Or  Irinotecan 100 mg, loaded in 100 micron microspheres    Annie GASTELUM et al. Transarterial chemoembolization of unresectable hepatocellular carcinoma with drug eluting beads: results of an open-label study of 62 patients. 2008. Cardiovasc Intervent Radiol. Mar-Apr;31(2):269-80.   Narendra MASTERS et al. Chemoembolization clinic: tumor response to a new, small diameter drug-eluting embolic in hepatocellular carcinoma (HCC). Abstract #143 SIR March 2015: Presentation March 2, 2015.   Stew JOEL et al. Continuation of: First experiences with superselective TANDEM® TACE in [place]. Abstract #333. (JVIR. 2014. 25:[id number]).     Dx: HCC     Allergies:  Patient has no known allergies.     Ht 1.575 m (5' 2\")   Wt 104.7 kg (230 lb 13.2 oz)   BMI 42.22 kg/m²  Body surface area is 2.14 meters squared.    Labs not required    Drug Order   (Drug name, dose, route, IV Fluid & volume, frequency, number of doses)       Medication = Doxorubicin   Base Dose = 150 mg   Size = 100 micron Oncozene microspheres  Calc Dose: fixed dose  Final Dose = 150 mg  Route = Intrahepatic  Fluid & Volume = in 60 mL syringe  Admin Duration = to be administered during procedure by interventional radiologist   To be given by radiologist on 1/6/21       <10% difference, ok to treat with final written dose     By my signature below, I confirm this process was performed independently with the BSA and all final chemotherapy dosing calculations congruent. I have reviewed the above chemotherapy order and that my calculation of the final dose and BSA (when applicable) corroborate those calculations of the  pharmacist. Discrepancies of 10% or greater in the written dose have been addressed and documented within the [facility] notes.    Tika Orosco, " PharmD

## 2021-01-05 NOTE — PROGRESS NOTES
"Pharmacy Chemotherapy calculation:    Protocol: TACE with doxorubicin loaded Oncozene microspheres    *Dosing Reference*  Doxorubicin 100-150 mg, loaded in 100 micron microspheres   Or  Irinotecan 100 mg, loaded in 100 micron microspheres   Annie GASTELUM et al. Transarterial chemoembolization of unresectable hepatocellular carcinoma with drug eluting beads: results of an open-label study of 62 patients. 2008. Cardiovasc Intervent Radiol. Mar-Apr;31(2):269-80.   Narendra MASTERS et al. Chemoembolization clinic: tumor response to a new, small diameter drug-eluting embolic in hepatocellular carcinoma (HCC). Abstract #143 SIR March 2015: Presentation March 2, 2015.   Stew JOEL et al. Continuation of: First experiences with superselective TANDEM® TACE in [place]. Abstract #333. (JVIR. 2014. 25:[id number]).     Dx: HCC     Allergies:  Patient has no known allergies.     Ht 1.575 m (5' 2\")   Wt 104.7 kg (230 lb 13.2 oz)   BMI 42.22 kg/m²  Body surface area is 2.14 meters squared.    Labs not required    Fixed dose - no calculations required     1.  Doxorubicin 150 mg in Oncozene microspheres 100 micron              For TACE procedure in radiology to be administered  by Radiologist on 1/6/21      CHAKA Mahajan Pharm.D.        "

## 2021-01-06 ENCOUNTER — HOSPITAL ENCOUNTER (OUTPATIENT)
Facility: MEDICAL CENTER | Age: 71
End: 2021-01-06
Attending: RADIOLOGY | Admitting: RADIOLOGY
Payer: MEDICARE

## 2021-01-06 ENCOUNTER — APPOINTMENT (OUTPATIENT)
Dept: RADIOLOGY | Facility: MEDICAL CENTER | Age: 71
End: 2021-01-06
Attending: RADIOLOGY
Payer: MEDICARE

## 2021-01-06 VITALS
TEMPERATURE: 97.8 F | HEART RATE: 63 BPM | DIASTOLIC BLOOD PRESSURE: 99 MMHG | RESPIRATION RATE: 16 BRPM | WEIGHT: 230.82 LBS | SYSTOLIC BLOOD PRESSURE: 175 MMHG | OXYGEN SATURATION: 97 % | BODY MASS INDEX: 42.48 KG/M2 | HEIGHT: 62 IN

## 2021-01-06 DIAGNOSIS — C22.0 HEPATOMA (HCC): ICD-10-CM

## 2021-01-06 LAB — GLUCOSE BLD-MCNC: 143 MG/DL (ref 65–99)

## 2021-01-06 PROCEDURE — 82105 ALPHA-FETOPROTEIN SERUM: CPT

## 2021-01-06 PROCEDURE — 160002 HCHG RECOVERY MINUTES (STAT)

## 2021-01-06 PROCEDURE — 99153 MOD SED SAME PHYS/QHP EA: CPT

## 2021-01-06 PROCEDURE — 700105 HCHG RX REV CODE 258: Performed by: RADIOLOGY

## 2021-01-06 PROCEDURE — 700111 HCHG RX REV CODE 636 W/ 250 OVERRIDE (IP): Performed by: RADIOLOGY

## 2021-01-06 PROCEDURE — 700117 HCHG RX CONTRAST REV CODE 255: Performed by: RADIOLOGY

## 2021-01-06 PROCEDURE — 700111 HCHG RX REV CODE 636 W/ 250 OVERRIDE (IP)

## 2021-01-06 PROCEDURE — 82962 GLUCOSE BLOOD TEST: CPT

## 2021-01-06 RX ORDER — ONDANSETRON 2 MG/ML
4 INJECTION INTRAMUSCULAR; INTRAVENOUS PRN
Status: ACTIVE | OUTPATIENT
Start: 2021-01-06 | End: 2021-01-06

## 2021-01-06 RX ORDER — OXYCODONE HYDROCHLORIDE 5 MG/1
5 TABLET ORAL EVERY 4 HOURS PRN
Qty: 30 TAB | Refills: 0 | Status: SHIPPED | OUTPATIENT
Start: 2021-01-06 | End: 2021-01-11

## 2021-01-06 RX ORDER — SODIUM CHLORIDE 9 MG/ML
500 INJECTION, SOLUTION INTRAVENOUS
Status: ACTIVE | OUTPATIENT
Start: 2021-01-06 | End: 2021-01-06

## 2021-01-06 RX ORDER — VERAPAMIL HYDROCHLORIDE 2.5 MG/ML
INJECTION, SOLUTION INTRAVENOUS
Status: COMPLETED
Start: 2021-01-06 | End: 2021-01-06

## 2021-01-06 RX ORDER — OXYCODONE HYDROCHLORIDE 5 MG/1
5 TABLET ORAL
Status: DISCONTINUED | OUTPATIENT
Start: 2021-01-06 | End: 2021-01-06 | Stop reason: HOSPADM

## 2021-01-06 RX ORDER — ONDANSETRON 2 MG/ML
4 INJECTION INTRAMUSCULAR; INTRAVENOUS EVERY 8 HOURS PRN
Status: DISCONTINUED | OUTPATIENT
Start: 2021-01-06 | End: 2021-01-06 | Stop reason: HOSPADM

## 2021-01-06 RX ORDER — OXYCODONE HYDROCHLORIDE 10 MG/1
10 TABLET ORAL
Status: DISCONTINUED | OUTPATIENT
Start: 2021-01-06 | End: 2021-01-06 | Stop reason: HOSPADM

## 2021-01-06 RX ORDER — MIDAZOLAM HYDROCHLORIDE 1 MG/ML
.5-2 INJECTION INTRAMUSCULAR; INTRAVENOUS PRN
Status: ACTIVE | OUTPATIENT
Start: 2021-01-06 | End: 2021-01-06

## 2021-01-06 RX ORDER — MIDAZOLAM HYDROCHLORIDE 1 MG/ML
INJECTION INTRAMUSCULAR; INTRAVENOUS
Status: COMPLETED
Start: 2021-01-06 | End: 2021-01-06

## 2021-01-06 RX ORDER — MORPHINE SULFATE 4 MG/ML
4 INJECTION, SOLUTION INTRAMUSCULAR; INTRAVENOUS
Status: DISCONTINUED | OUTPATIENT
Start: 2021-01-06 | End: 2021-01-06 | Stop reason: HOSPADM

## 2021-01-06 RX ORDER — HEPARIN SODIUM 1000 [USP'U]/ML
INJECTION, SOLUTION INTRAVENOUS; SUBCUTANEOUS
Status: COMPLETED
Start: 2021-01-06 | End: 2021-01-06

## 2021-01-06 RX ORDER — HYDRALAZINE HYDROCHLORIDE 20 MG/ML
10 INJECTION INTRAMUSCULAR; INTRAVENOUS ONCE
Status: COMPLETED | OUTPATIENT
Start: 2021-01-06 | End: 2021-01-06

## 2021-01-06 RX ORDER — HYDRALAZINE HYDROCHLORIDE 20 MG/ML
INJECTION INTRAMUSCULAR; INTRAVENOUS
Status: COMPLETED
Start: 2021-01-06 | End: 2021-01-06

## 2021-01-06 RX ADMIN — HYDRALAZINE HYDROCHLORIDE 10 MG: 20 INJECTION INTRAMUSCULAR; INTRAVENOUS at 16:24

## 2021-01-06 RX ADMIN — MIDAZOLAM HYDROCHLORIDE 1 MG: 1 INJECTION INTRAMUSCULAR; INTRAVENOUS at 13:59

## 2021-01-06 RX ADMIN — ONDANSETRON HYDROCHLORIDE 16 MG: 2 SOLUTION INTRAMUSCULAR; INTRAVENOUS at 12:27

## 2021-01-06 RX ADMIN — DOXORUBICIN HYDROCHLORIDE 150 MG: 2 INJECTION, SOLUTION INTRAVENOUS at 13:30

## 2021-01-06 RX ADMIN — HEPARIN SODIUM: 1000 INJECTION, SOLUTION INTRAVENOUS; SUBCUTANEOUS at 13:15

## 2021-01-06 RX ADMIN — IOHEXOL 120 ML: 300 INJECTION, SOLUTION INTRAVENOUS at 14:52

## 2021-01-06 RX ADMIN — VERAPAMIL HYDROCHLORIDE 2.5 MG: 2.5 INJECTION, SOLUTION INTRAVENOUS at 13:15

## 2021-01-06 RX ADMIN — MIDAZOLAM HYDROCHLORIDE 1 MG: 1 INJECTION, SOLUTION INTRAMUSCULAR; INTRAVENOUS at 13:59

## 2021-01-06 RX ADMIN — DEXAMETHASONE SODIUM PHOSPHATE 12 MG: 4 INJECTION, SOLUTION INTRA-ARTICULAR; INTRALESIONAL; INTRAMUSCULAR; INTRAVENOUS; SOFT TISSUE at 12:41

## 2021-01-06 RX ADMIN — FENTANYL CITRATE 25 MCG: 50 INJECTION, SOLUTION INTRAMUSCULAR; INTRAVENOUS at 13:59

## 2021-01-06 RX ADMIN — CEFTRIAXONE SODIUM 2 G: 2 INJECTION, POWDER, FOR SOLUTION INTRAMUSCULAR; INTRAVENOUS at 12:51

## 2021-01-06 NOTE — DISCHARGE INSTRUCTIONS
ACTIVITY: Rest and take it easy for the first 24 hours.  A responsible adult is recommended to remain with you during that time.  It is normal to feel sleepy.  We encourage you to not do anything that requires balance, judgment or coordination.    MILD FLU-LIKE SYMPTOMS ARE NORMAL. YOU MAY EXPERIENCE GENERALIZED MUSCLE ACHES, THROAT IRRITATION, HEADACHE AND/OR SOME NAUSEA.    FOR 24 HOURS DO NOT:  Drive, operate machinery or run household appliances.  Drink beer or alcoholic beverages.   Make important decisions or sign legal documents.    SPECIAL INSTRUCTIONS: follow up with your doctor call find out when to follow up.     DIET: To avoid nausea, slowly advance diet as tolerated, avoiding spicy or greasy foods for the first day.  Add more substantial food to your diet according to your physician's instructions.  Babies can be fed formula or breast milk as soon as they are hungry.  INCREASE FLUIDS AND FIBER TO AVOID CONSTIPATION.    SURGICAL DRESSING/BATHING: Keep dressing clean dry intact for 24 hours. Remove dressing after 24 hours. OK to shower after 24 hours.     FOLLOW-UP APPOINTMENT:  A follow-up appointment should be arranged with your doctor in [phone]; call to schedule.    You should CALL YOUR PHYSICIAN if you develop:  Fever greater than 101 degrees F.  Pain not relieved by medication, or persistent nausea or vomiting.  Excessive bleeding (blood soaking through dressing) or unexpected drainage from the wound.  Extreme redness or swelling around the incision site, drainage of pus or foul smelling drainage.  Inability to urinate or empty your bladder within 8 hours.  Problems with breathing or chest pain.    You should call 911 if you develop problems with breathing or chest pain.  If you are unable to contact your doctor or surgical center, you should go to the nearest emergency room or urgent care center.  Physician's telephone #: [phone]    If any questions arise, call your doctor.  If your doctor is not  available, please feel free to call the Surgical Center at [phone]. The Contact Center is open Monday through Friday 7AM to 5PM and may speak to a nurse at [phone], or toll free at [phone].     A registered nurse may call you a few days after your surgery to see how you are doing after your procedure.    MEDICATIONS: Resume taking daily medication.  Take prescribed pain medication with food.  If no medication is prescribed, you may take non-aspirin pain medication if needed.  PAIN MEDICATION CAN BE VERY CONSTIPATING.  Take a stool softener or laxative such as senokot, pericolace, or milk of magnesia if needed.    If your physician has prescribed pain medication that includes Acetaminophen (Tylenol), do not take additional Acetaminophen (Tylenol) while taking the prescribed medication.    Depression / Suicide Risk    As you are discharged from this [facility] facility, it is important to learn how to keep safe from harming yourself.    Recognize the warning signs:  · Abrupt changes in personality, positive or negative- including increase in energy   · Giving away possessions  · Change in eating patterns- significant weight changes-  positive or negative  · Change in sleeping patterns- unable to sleep or sleeping all the time   · Unwillingness or inability to communicate  · Depression  · Unusual sadness, discouragement and loneliness  · Talk of wanting to die  · Neglect of personal appearance   · Rebelliousness- reckless behavior  · Withdrawal from people/activities they love  · Confusion- inability to concentrate     If you or a loved one observes any of these behaviors or has concerns about self-harm, here's what you can do:  · Talk about it- your feelings and reasons for harming yourself  · Remove any means that you might use to hurt yourself (examples: pills, rope, extension cords, firearm)  · Get professional help from the community (Mental Health, Substance Abuse, psychological  counseling)  · Do not be alone:Call your Safe Contact- someone whom you trust who will be there for you.  · Call your local CRISIS HOTLINE 364-4172 or 708-578-6818  · Call your local Children's Mobile Crisis Response Team [place] (757) 020-0551 or www.zuuka!  · Call the toll free National Suicide Prevention Hotlines   · National Suicide Prevention Lifeline 608-485-WIIO (6083)  [facility] Line Network 800-SUICIDE (167-8830)  Radial Catherization Discharge Instructions      · Do not subject hand/arm to any forceful movements for 24 hours    i.e. supporting weight when rising from the chair or bed.   · Do not drive a car for 24 hours  · You may remove the dressing tomorrow  · You may shower on the day following your procedure.  Do not take a tub bath or submerge the puncture site in water for 3 days following the procedure.  · No Lifting more than 3-5 pounds with affected wrist for 5 days  · Follow up with Dr. Norris  2-4 weeks.  · Increase fluids for 2 days post procedure.  · Continue all previous medications unless otherwise instructed.    If bleeding should occur following discharge:  · Sit down and apply firm pressure to site with your fingers for 10 minutes  · If the bleeding stops, continue to sit quietly, keeping your wrist straight for 2 hours.  Notify physician as soon as possible ( [phone])  · If bleeding does not stop after 10 minutes, or if there is a large amount of bleeding or spurting, call 911 immediately.  Do not drive yourself to the hospital.

## 2021-01-06 NOTE — OR SURGEON
.Immediate Post- Operative Note        PostOp Diagnosis: Multifocal HCC.       Procedure(s): L lobe DEBTACE,       Estimated Blood Loss: Less than 5 ml        Complications: None            1/6/2021    1444 PM     Joe Norris M.D.

## 2021-01-06 NOTE — PROGRESS NOTES
IR RN note    Patient underwent a Transarterial chemoembolization of liver mass by Dr. Norris.  Procedure site was verified by MD using imaging guidance. Consent was signed.  IR tech Regulo and Hernando assisted. Patient was placed in a supine position.  Vitals were taken every 5 minutes and remained stable during procedure (see doc flow sheet for results).  CO2 waveform capnography was monitored throughout procedure, see chart.  Left radial artery access site.   A T-Band dressing was placed over surgical site. Report called to Tabby BLACKBURN. Pt transported by serafin with RN to PPU, with monitor .   Reviewed sedation orders with MD  TR- band at 13

## 2021-01-07 NOTE — OR NURSING
1502 Patient arrived from IR s/p TACE right radial approach TR band clean dry intact. Patient fully awake, denies pain, denies nausea. Vss.   1530 Patient tolerating po intake.  1625 Bp high notified Dr mckenna order received for bp medication, see MAR  1646 Discharge instructions given to patient. Patient verbalize understanding of the orders, reviewed activity, worsening symptoms, follow up, medications, dressing care.  1655 Criteria met to discharge patient home.   1702 Patient escorted via w/c with all his personal belongings.

## 2021-01-08 LAB — AFP-TM SERPL-MCNC: 6 NG/ML (ref 0–9)

## 2021-01-13 DIAGNOSIS — C22.0 LIVER CELL CARCINOMA (HCC): ICD-10-CM

## 2021-01-13 DIAGNOSIS — C22.0 HEPATOMA (HCC): ICD-10-CM

## 2021-01-15 DIAGNOSIS — Z23 NEED FOR VACCINATION: ICD-10-CM

## 2021-01-19 ENCOUNTER — HOSPITAL ENCOUNTER (OUTPATIENT)
Dept: LAB | Facility: MEDICAL CENTER | Age: 71
End: 2021-01-19
Attending: NURSE PRACTITIONER
Payer: MEDICARE

## 2021-01-19 ENCOUNTER — HOSPITAL ENCOUNTER (OUTPATIENT)
Dept: LAB | Facility: MEDICAL CENTER | Age: 71
End: 2021-01-19
Attending: PHYSICIAN ASSISTANT
Payer: MEDICARE

## 2021-01-19 DIAGNOSIS — C22.0 LIVER CELL CARCINOMA (HCC): ICD-10-CM

## 2021-01-19 DIAGNOSIS — E11.9 TYPE 2 DIABETES MELLITUS WITHOUT COMPLICATION, WITHOUT LONG-TERM CURRENT USE OF INSULIN (HCC): ICD-10-CM

## 2021-01-19 LAB
ALBUMIN SERPL BCP-MCNC: 4.7 G/DL (ref 3.2–4.9)
ALBUMIN/GLOB SERPL: 2.1 G/DL
ALP SERPL-CCNC: 73 U/L (ref 30–99)
ALT SERPL-CCNC: 39 U/L (ref 2–50)
ANION GAP SERPL CALC-SCNC: 9 MMOL/L (ref 7–16)
AST SERPL-CCNC: 37 U/L (ref 12–45)
BASOPHILS # BLD AUTO: 0.9 % (ref 0–1.8)
BASOPHILS # BLD: 0.04 K/UL (ref 0–0.12)
BILIRUB SERPL-MCNC: 1.8 MG/DL (ref 0.1–1.5)
BUN SERPL-MCNC: 13 MG/DL (ref 8–22)
CALCIUM SERPL-MCNC: 9.7 MG/DL (ref 8.5–10.5)
CHLORIDE SERPL-SCNC: 107 MMOL/L (ref 96–112)
CHOLEST SERPL-MCNC: 131 MG/DL (ref 100–199)
CO2 SERPL-SCNC: 25 MMOL/L (ref 20–33)
CREAT SERPL-MCNC: 0.64 MG/DL (ref 0.5–1.4)
EOSINOPHIL # BLD AUTO: 0.14 K/UL (ref 0–0.51)
EOSINOPHIL NFR BLD: 3.2 % (ref 0–6.9)
ERYTHROCYTE [DISTWIDTH] IN BLOOD BY AUTOMATED COUNT: 46.7 FL (ref 35.9–50)
GLOBULIN SER CALC-MCNC: 2.2 G/DL (ref 1.9–3.5)
GLUCOSE SERPL-MCNC: 158 MG/DL (ref 65–99)
HCT VFR BLD AUTO: 51.3 % (ref 42–52)
HDLC SERPL-MCNC: 66 MG/DL
HGB BLD-MCNC: 18.3 G/DL (ref 14–18)
IMM GRANULOCYTES # BLD AUTO: 0.02 K/UL (ref 0–0.11)
IMM GRANULOCYTES NFR BLD AUTO: 0.5 % (ref 0–0.9)
INR PPP: 1.09 (ref 0.87–1.13)
LDLC SERPL CALC-MCNC: 51 MG/DL
LYMPHOCYTES # BLD AUTO: 0.9 K/UL (ref 1–4.8)
LYMPHOCYTES NFR BLD: 20.5 % (ref 22–41)
MCH RBC QN AUTO: 34.8 PG (ref 27–33)
MCHC RBC AUTO-ENTMCNC: 35.7 G/DL (ref 33.7–35.3)
MCV RBC AUTO: 97.5 FL (ref 81.4–97.8)
MONOCYTES # BLD AUTO: 0.3 K/UL (ref 0–0.85)
MONOCYTES NFR BLD AUTO: 6.8 % (ref 0–13.4)
NEUTROPHILS # BLD AUTO: 2.99 K/UL (ref 1.82–7.42)
NEUTROPHILS NFR BLD: 68.1 % (ref 44–72)
NRBC # BLD AUTO: 0 K/UL
NRBC BLD-RTO: 0 /100 WBC
PLATELET # BLD AUTO: 90 K/UL (ref 164–446)
PMV BLD AUTO: 9.9 FL (ref 9–12.9)
POTASSIUM SERPL-SCNC: 4 MMOL/L (ref 3.6–5.5)
PROT SERPL-MCNC: 6.9 G/DL (ref 6–8.2)
PROTHROMBIN TIME: 14.5 SEC (ref 12–14.6)
RBC # BLD AUTO: 5.26 M/UL (ref 4.7–6.1)
SODIUM SERPL-SCNC: 141 MMOL/L (ref 135–145)
TRIGL SERPL-MCNC: 69 MG/DL (ref 0–149)
WBC # BLD AUTO: 4.4 K/UL (ref 4.8–10.8)

## 2021-01-19 PROCEDURE — 80061 LIPID PANEL: CPT

## 2021-01-19 PROCEDURE — 80053 COMPREHEN METABOLIC PANEL: CPT

## 2021-01-19 PROCEDURE — 85025 COMPLETE CBC W/AUTO DIFF WBC: CPT

## 2021-01-19 PROCEDURE — 36415 COLL VENOUS BLD VENIPUNCTURE: CPT

## 2021-01-19 PROCEDURE — 85610 PROTHROMBIN TIME: CPT

## 2021-01-21 ENCOUNTER — HOSPITAL ENCOUNTER (OUTPATIENT)
Dept: RADIOLOGY | Facility: MEDICAL CENTER | Age: 71
End: 2021-01-21
Attending: NURSE PRACTITIONER
Payer: MEDICARE

## 2021-01-21 DIAGNOSIS — C22.0 LIVER CELL CARCINOMA (HCC): ICD-10-CM

## 2021-01-21 DIAGNOSIS — C22.0 HEPATOMA (HCC): ICD-10-CM

## 2021-01-21 NOTE — PROGRESS NOTES
"  Telephone Appointment Visit   As a means of avoiding spread of COVID-19, this visit is being conducted by telephone. This telephone visit was initiated by the patient and they verbally consented. Joe Norris MD present and participating in entirety of telehealth appointment.    Time at start of call: 0804    Reason for Call:  Lab Follow-up and Hospital Follow-up    HPI:    Sohail Duvall was seen today for review of surveillance imaging after hepatic interventions by Joe Norris MD. He was referred to our service by Feliberto Senior MD and is also under the care of Carmencita Hand PA-C, Alfonzo Servin MD, and Johanna Hooper MD.     has a history of CLL and alcoholic cirrhosis. Imaging revealed hepatomas in 4A and 4B and he underwent the following interventions at [facility]:  · 6/28/18 ablation 4B lesion (Nadeen)  · 8/28/19 Hepatic angiogram \"mapping\" (Myrna)  · 9/7/18 Y90 SIRT left lobe 0.7 gb ( 19 mCi)  (Myrna)  · 12/14/18 YUNIER TACE caudate lesion (Myrna)  · 1/6/21 YUNIER TACE (Myrna)    Today, the patient denies acute complaints. He feels at \"100%\". He denies nausea, jaundice, abdominal pain, constipation, or diarrhea. He reports a transient left abdomen reddened area post procedure, without pruritus or pain and had not had it after prior procedures. It has resolved. He admits to drinking alcohol. He continues to work part time. He has a CT scan scheduled for next week ordered by his gastroenterologist.   Labs / Images Reviewed:      Ref. Range 12/31/2020 10:30 1/6/2021 12:47 1/6/2021 14:00 1/19/2021 09:28   WBC Latest Ref Range: 4.8 - 10.8 K/uL 3.1 (L)   4.4 (L)   RBC Latest Ref Range: 4.70 - 6.10 M/uL 5.15   5.26   Hemoglobin Latest Ref Range: 14.0 - 18.0 g/dL 17.6   18.3 (H)   Hematocrit Latest Ref Range: 42.0 - 52.0 % 49.7   51.3   MCV Latest Ref Range: 81.4 - 97.8 fL 96.5   97.5   MCH Latest Ref Range: 27.0 - 33.0 pg 34.2 (H)   34.8 (H)   MCHC Latest Ref Range: 33.7 - 35.3 " g/dL 35.4 (H)   35.7 (H)   RDW Latest Ref Range: 35.9 - 50.0 fL 47.6   46.7   Platelet Count Latest Ref Range: 164 - 446 K/uL 66 (L)   90 (L)   MPV Latest Ref Range: 9.0 - 12.9 fL 10.9   9.9   Neutrophils-Polys Latest Ref Range: 44.00 - 72.00 % 69.20   68.10   Neutrophils (Absolute) Latest Ref Range: 1.82 - 7.42 K/uL 2.13   2.99   Lymphocytes Latest Ref Range: 22.00 - 41.00 % 18.20 (L)   20.50 (L)   Lymphs (Absolute) Latest Ref Range: 1.00 - 4.80 K/uL 0.56 (L)   0.90 (L)   Monocytes Latest Ref Range: 0.00 - 13.40 % 9.40   6.80   Monos (Absolute) Latest Ref Range: 0.00 - 0.85 K/uL 0.29   0.30   Eosinophils Latest Ref Range: 0.00 - 6.90 % 1.90   3.20   Eos (Absolute) Latest Ref Range: 0.00 - 0.51 K/uL 0.06   0.14   Basophils Latest Ref Range: 0.00 - 1.80 % 1.00   0.90   Baso (Absolute) Latest Ref Range: 0.00 - 0.12 K/uL 0.03   0.04   Immature Granulocytes Latest Ref Range: 0.00 - 0.90 % 0.30   0.50   Immature Granulocytes (abs) Latest Ref Range: 0.00 - 0.11 K/uL 0.01   0.02   Nucleated RBC Latest Units: /100 WBC 0.00   0.00   NRBC (Absolute) Latest Units: K/uL 0.00   0.00   Sodium Latest Ref Range: 135 - 145 mmol/L 138   141   Potassium Latest Ref Range: 3.6 - 5.5 mmol/L 3.7   4.0   Chloride Latest Ref Range: 96 - 112 mmol/L 106   107   Co2 Latest Ref Range: 20 - 33 mmol/L 23   25   Anion Gap Latest Ref Range: 7.0 - 16.0  9.0   9.0   Glucose Latest Ref Range: 65 - 99 mg/dL 146 (H)   158 (H)   Bun Latest Ref Range: 8 - 22 mg/dL 12   13   Creatinine Latest Ref Range: 0.50 - 1.40 mg/dL 0.46 (L)   0.64   GFR If  Latest Ref Range: >60 mL/min/1.73 m 2 >60   >60   GFR If Non  Latest Ref Range: >60 mL/min/1.73 m 2 >60   >60   Calcium Latest Ref Range: 8.5 - 10.5 mg/dL 9.2   9.7   AST(SGOT) Latest Ref Range: 12 - 45 U/L 52 (H)   37   ALT(SGPT) Latest Ref Range: 2 - 50 U/L 51 (H)   39   Alkaline Phosphatase Latest Ref Range: 30 - 99 U/L 72   73   Total Bilirubin Latest Ref Range: 0.1 - 1.5 mg/dL  2.3 (H)   1.8 (H)   Albumin Latest Ref Range: 3.2 - 4.9 g/dL 4.5   4.7   Total Protein Latest Ref Range: 6.0 - 8.2 g/dL 6.7   6.9   Globulin Latest Ref Range: 1.9 - 3.5 g/dL 2.2   2.2   A-G Ratio Latest Units: g/dL 2.0   2.1   Cholesterol,Tot Latest Ref Range: 100 - 199 mg/dL    131   Triglycerides Latest Ref Range: 0 - 149 mg/dL    69   HDL Latest Ref Range: >=40 mg/dL    66   LDL Latest Ref Range: <100 mg/dL    51   Glucose - Accu-Ck Latest Ref Range: 65 - 99 mg/dL  143 (H)     INR Latest Ref Range: 0.87 - 1.13  1.10   1.09   PT Latest Ref Range: 12.0 - 14.6 sec 14.5   14.5      Ref. Range 3/6/2020 10:48 6/1/2020 12:15 8/3/2020 10:09 12/31/2020 10:30 1/6/2021 14:00   AFP L3% Latest Ref Range: 0.0 - 9.9 % <0.5 <0.5  6.4    Alpha Fetoprotein Latest Ref Range: 0 - 9 ng/mL 5 5  6 6       Child Galloway Class A   JIM Grade A1  NLR 3.24 (Hx CLL)    Pathology:  None available at time of consultation    Radiology:   Interventional Radiology procedure 1/6/21 at [facility]:  1.  DEBTACE procedure with total dose doxorubicin 150 mg administered as 100 mu Oncozene beads.  2.  The patient is returned to the same day surgery reese for post procedure observation.    MRI abdomen 11/18/2020 at [facility]:  1. Previously treated lesions show no viable tumor. LR-treated.  2. Corresponding to the lesion detected on the ultrasound, there is an 8 mm lesion at the junction of anterior and medial hepatic segments. It is new since prior MRI, and meets criteria for HCC (LR-5).  3. A 1.6 cm segment 4 lesion seen on the prior MR study is stable in size. It shows mild arterial enhancement and mild washout, and likely represents an HCC which is stable since prior study. LR-5.  4. No additional lesions are detected on today's study.  5. Cirrhosis and portal hypertension. No ascites.  6. Cholelithiasis.  7. Stable 1 cm cystic lesion in the pancreatic tail, likely a side branch IPMN.  8. Colonic diverticulosis.        MRI abdomen 3/11/20 at [facility]:   1.   There are morphologic changes of the liver consistent with cirrhosis.  2.  There are stable posttreatment changes in the caudate lobe and superior medial segment left lobe and segment 2. LR treated  3.  Stable 10 mm focus of arterial enhancement without washout or restricted diffusion, likely in segment 8. LR 3.  4.  There is a 9 mm arterial enhancing focus inferiorly in segment 8 or possibly 4a without washout or diffusion. LR 3  5.  There is a questionable 6 mm arterial focus of enhancement at the junction of segment 8/5 without washout or restricted diffusion. LR 3  6.  Stable splenomegaly, possibly related to cirrhosis versus underlying CLL.  7.  Stable cholelithiasis without biliary dilatation.  8.  No change in 9 mm pancreatic tail simple appearing cyst.  9.  No change in left adrenal gland adenoma.        LR-3: intermediate probability and LR treated lesions    MRI abdomen on July 1, 2019 at [facility]:  1.  Stable wedge-shaped treated lesion in the anterior medial hepatic segment.  2.  Stable treated lesion in the caudate lobe, with unchanged linear enhancement in its periphery.  3.  Segment 2 lesion described on the prior study is not seen on today's study. No focal arterial enhancement in segment 2.  4.  New 1 cm arterially enhancing focus in segment 8 has no correlate on other images. It is indeterminate. LR-3.  5.  Cirrhosis and portal hypertension, with postembolization changes in the left hepatic lobe. Splenomegaly. No ascites.  6.  Cholelithiasis.  7.  Stable left adrenal lesion, likely adenoma.  8.  Stable pancreatic tail cyst.    MRI abdomen on January 29, 2019 at [facility]:  1.  No new arterial enhancing mass is identified.  2.  Previously described mass in the caudate lobe is now avascular with peripheral enhancement, consistent with recent chemoembolization. LR-treated  3.  Ablation cavity in the left hepatic lobe remains avascular.  4.  8 mm arterial enhancing observation in segment 4A is not as  pronounced as on the prior exam and grossly stable. LR-3  5.  Stable peripheral enhancing lesion in segment 2. LR-treated  6.  Previously described rim-enhancing observation in the superior aspect of segment 2 is not visualized on the current exam, likely related to differences in technique.  7.  Morphologic characteristics of cirrhosis and evidence of portal hypertension including splenomegaly  8.  Cholelithiasis.  9.  Left adrenal adenoma  10.  Stable 8 mm pancreatic tail cyst         Interventional radiology procedure December 14, 2018, at [facility]:  1.  DEBTACE procedure with total dose doxorubicin 100 mg administered as 100 mu Oncozene beads. (Caudate lobe)  2.  The patient is returned to the same day surgery reese for post procedure observation.       MRI abdomen November 10, 2018 at [facility]:  1.  Morphologic characteristics of cirrhosis with evidence of portal hypertension including trace ascites, splenomegaly, and portal venous dilatation.  2.  Interval enlargement in the mass in segment 4 adjacent to the caudate lobe. LR-5  3.  Previously described hyper enhancing masses in the left hepatic lobe demonstrate rim enhancement, consistent with recent Y 90 treatment. LR-treated.  4.  Ablation cavity in the left hepatic lobe is a vascular  5.  8 mm arterial enhancing focus in segment 4A is better defined than on the prior exam secondary to decreased motion. Size is not significantly changed.LR-3  6.  Stable 9 mm cystic mass in the pancreatic tail.  7.  Stable benign left adrenal adenoma  8.  Cholelithiasis. Dependent hypointense foci in the common bile duct may represent tiny nonobstructing calculi.       Interventional radiology procedure Y90 SIRT left lobe September 7, 2018, at [facility]:  1.  Technically successful Y90 radioembolization of the left hepatic lobe. The patient will followup in approximately 10 days for laboratory and clinical evaluation and 4-6 weeks for imaging evaluation.  2.  Immediately following  the procedure, the patient was transported to nuclear medicine for SPECT imaging which demonstrates Bremsstrahlung emission from the left of the liver. There is no definite evidence of extrahepatic deposition of radioembolic   Material.     Nuclear medicine bremsstrahlung study on September 7, 2018 at [facility]:  The prescribed dose was  0.68 gb ( 18.4 mCi). The drawn dose was 0.78 gb (21mCi). Delivered dose after residual was measured at 0.7 gb ( 19 mCi). This represents  103% of the prescribed dose and  90% of the drawn dose. Bremsstrahlung images obtained   after the Y-90 radioembolization, confirm proper delivery to the targeted region. There is no uptake outside the planned treatment area. NOTE: The patient will be followed by interventional radiology and oncology.       Interventional radiology procedure August 8, 2018 at [facility]:  1.  Superior mesenteric arteriogram replaced common hepatic artery.  2.  Celiac arteriogram demonstrate no evidence of common hepatic arterial anatomy.  3.  Common hepatic arteriogram demonstrate no evidence of variant hepatic arterial anatomy.  4.  Selective catheterization and embolization of a small branch originating from the proximal aspect of the right hepatic artery which appeared to supply either a small subsegmental region of the right hepatic lobe or a small amount of the hepatic   flexure of the colon.  5.  Proper hepatic arteriogram demonstrating 2 small branches originating from the proximal aspect of the right hepatic artery one of which appear to represent either a small subsegmental hepatic artery versus hepatic flexure colon branch and a small   cystic artery.  6.  Intra-arterial administration of 4.4 mCi technetium 99m labeled MAA into the hepatic artery demonstrated a hepatic pulmonary shunt fraction of 4%.        CT abdomen with and without August 8, 2018 at [facility]:  1.  Cirrhosis  2.  Interval ablation of a segment 4A hepatic nodule without evidence of residual  tumor in that field  3.  Multiple additional hepatic nodules and masses as described above, mostly unchanged in size however the dominant central RIGHT hepatic mass has increased in size. These are likely hepatomas.  4.  Splenomegaly and enlarged portal vein, in keeping with the patient's known portal hypertension  5.  Cholelithiasis     Nuclear medicine quantitative lung study August 28, 2018 at [facility]:  Total hepatopulmonary shunt percentage was 4%. No evidence of extrahepatic radiotracer deposition is identified.     MRI abdomen on June 8, 2018 at [facility]:  Examination is limited by patient motion.  2.1 x 1.9 cm arterial hyperenhancing lesion within the left lobe of the liver appears compatible with hepatocellular carcinoma. LR-5    Hyperenhancing lesion within segment 4 of the liver bordering the caudate lobe measures 3 x 2 cm and is increased in size compared to prior. This lesion is compatible with hepatocellular carcinoma. LR-5    10 mm hyperenhancing lesion within segment 4 may be slightly decreased or unchanged in size compared to prior. LR-3    3 other small hyperenhancing lesions are seen within the right lobe of the liver measuring up to 1 cm. LR-3    Heterogeneous wedge-shaped area of delayed hypo-enhancement in the anterior liver is likely related to variable perfusion.    Findings of cirrhosis and portal hypertension.    Mildly prominent lymph nodes in the cardiophrenic fat are not significantly changed.    9 mm cystic lesion in the pancreatic tail could represent a small side branch IPMN.    Cholelithiasis. No biliary ductal dilatation is seen.    Subcarinal lymphadenopathy is partially imaged.    2 cm left adrenal nodule is unchanged and likely an adrenal adenoma.    Trace free fluid in the abdomen.    Findings discussed with Dr. Senior         CT thorax April 17, 2018 at [facility]:  1.  Mediastinal lymphadenopathy with markedly enlarged subcarinal lymph node could be due to metastatic disease or  "lymphoma. Reactive lymph nodes or lymphadenopathy due to granulomatous disease seems less likely.  2.  Coarse coronary artery calcifications.  3.  Cirrhosis with hypervascular hepatic lesions and splenomegaly.  4.  Cholelithiasis.     Nuclear medicine bone study April 17, 2018 at [facility]:  No scintigraphic evidence of bony metastatic disease     Portal pressure gradient April 4, 2018 at [facility]:  1.  Hepatic venography showing a patent right hepatic vein.  2.  Free and wedged right hepatic vein wedge pressures rendering a mean Hepatic Venous Pressure Gradient (HVPG) of 9.67 mmHg. This is consistent with portal hypertension.  (HVPG >= 10mmHg considered \"clinically significant\" portal HTN**  3.  Transjugular Liver biopsy was not performed at this time..  **Reference: Hepatic Venous Pressure Gradient in 2010:Optimal Measurement is Jose. Anya MCINTYRE, Wanda U. HEPATOLOGY, Vol. 51, No. 6, 2010     CT abdomen December 6, 2017 at [facility]:  1.  2.4 cm arterial enhancing mass in segment 4 of the liver demonstrates washout. LR-5.  2.  1.3 cm arterial enhancing lesion in the hepatic dome is similar to the recent MRI. No definite washout or capsule appreciated. LR-3  3.  Morphologic characteristics of cirrhosis with evidence of portal hypertension including splenomegaly.  4.  Cholelithiasis.  5.  Stable left adrenal nodule, likely a benign adenoma.  Assessment and Plan:   Impression:   1. Unresectable multifocal hepatocellular carcinoma status post left lobe radioembolization and chemoembolization of mulitfocal hepatomas.  2. 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  3. Cirrhosis.  4. Portal hypertension.  5. Thrombocytopenia.  6. Splenomegaly.  7. Chronic lymphocytic leukemia.   8. Diabetes mellitus.  9. Hyperbilirubinemia.  10. Alcohol use.    Plan:   Joe Norris MD has reviewed 's history and imaging studies. He has recovered well after palliative transarterial chemoembolization of " unresectable hepatoma(s). We treated all lesions that were visualized on angiography. All questions were answered. The transient erythema in his abdomen was likely related to chemoembolic agents in the falciform artery, which we explained is poorly visualized on angiography but transient cutaneous changes can occur after YUNIER TACE. We explained that the patient will need to have ongoing surveillance after the procedure, which will be managed by the treating team, and that future treatment depends on multiple factors including lab studies, imaging, and performance status. Retreatment in the left lobe in the future would best be accessed via the femoral artery due to his replaced common hepatic artery. He was advised against alcohol use and other hepatic toxins as his bilirubin is affected by this. He asked that follow up scans be CT rather than MRI due to claustrophobia. He will reschedule his CT scan to March as next week is too early to image post TACE due to ongoing inflammatory changes.     Follow-up: Labs and CT abdomen in March    Time at end of call: 0813  Total Time Spent: 5-10 minutes    ADRIANA Ceron.

## 2021-03-19 ENCOUNTER — HOSPITAL ENCOUNTER (OUTPATIENT)
Dept: RADIOLOGY | Facility: MEDICAL CENTER | Age: 71
End: 2021-03-19
Attending: INTERNAL MEDICINE
Payer: MEDICARE

## 2021-03-19 DIAGNOSIS — K70.30 ALCOHOLIC CIRRHOSIS, UNSPECIFIED WHETHER ASCITES PRESENT (HCC): ICD-10-CM

## 2021-03-19 DIAGNOSIS — D50.9 IRON DEFICIENCY ANEMIA, UNSPECIFIED IRON DEFICIENCY ANEMIA TYPE: ICD-10-CM

## 2021-03-19 DIAGNOSIS — E55.9 AVITAMINOSIS D: ICD-10-CM

## 2021-03-19 DIAGNOSIS — Z79.1 ENCOUNTER FOR LONG-TERM (CURRENT) USE OF NSAIDS: ICD-10-CM

## 2021-03-19 DIAGNOSIS — C91.91 LYMPHOID LEUKEMIA IN REMISSION, UNSPECIFIED LYMPHOID LEUKEMIA TYPE (HCC): ICD-10-CM

## 2021-03-19 DIAGNOSIS — C22.0 LIVER CARCINOMA (HCC): ICD-10-CM

## 2021-03-19 DIAGNOSIS — D12.5 BENIGN NEOPLASM OF SIGMOID COLON: ICD-10-CM

## 2021-03-19 DIAGNOSIS — I85.00 ESOPHAGEAL VARICES WITHOUT BLEEDING, UNSPECIFIED ESOPHAGEAL VARICES TYPE (HCC): ICD-10-CM

## 2021-03-19 DIAGNOSIS — D12.3 BENIGN NEOPLASM OF TRANSVERSE COLON: ICD-10-CM

## 2021-03-19 DIAGNOSIS — Z86.010 PERSONAL HISTORY OF COLONIC POLYPS: ICD-10-CM

## 2021-03-19 DIAGNOSIS — E50.9 VITAMIN A DEFICIENCY DISEASE: ICD-10-CM

## 2021-03-19 PROCEDURE — 74160 CT ABDOMEN W/CONTRAST: CPT | Mod: MH

## 2021-03-19 PROCEDURE — 700117 HCHG RX CONTRAST REV CODE 255: Performed by: INTERNAL MEDICINE

## 2021-03-19 RX ADMIN — IOHEXOL 100 ML: 350 INJECTION, SOLUTION INTRAVENOUS at 12:30

## 2021-04-05 ENCOUNTER — HOSPITAL ENCOUNTER (OUTPATIENT)
Dept: LAB | Facility: MEDICAL CENTER | Age: 71
End: 2021-04-05
Attending: NURSE PRACTITIONER
Payer: MEDICARE

## 2021-04-05 DIAGNOSIS — C22.0 HEPATOMA (HCC): ICD-10-CM

## 2021-04-05 LAB
ALBUMIN SERPL BCP-MCNC: 4.6 G/DL (ref 3.2–4.9)
ALBUMIN/GLOB SERPL: 1.9 G/DL
ALP SERPL-CCNC: 73 U/L (ref 30–99)
ALT SERPL-CCNC: 48 U/L (ref 2–50)
ANION GAP SERPL CALC-SCNC: 10 MMOL/L (ref 7–16)
AST SERPL-CCNC: 40 U/L (ref 12–45)
BASOPHILS # BLD AUTO: 0.7 % (ref 0–1.8)
BASOPHILS # BLD: 0.03 K/UL (ref 0–0.12)
BILIRUB SERPL-MCNC: 1.6 MG/DL (ref 0.1–1.5)
BUN SERPL-MCNC: 14 MG/DL (ref 8–22)
CALCIUM SERPL-MCNC: 9.7 MG/DL (ref 8.5–10.5)
CHLORIDE SERPL-SCNC: 104 MMOL/L (ref 96–112)
CO2 SERPL-SCNC: 26 MMOL/L (ref 20–33)
CREAT SERPL-MCNC: 0.61 MG/DL (ref 0.5–1.4)
EOSINOPHIL # BLD AUTO: 0.04 K/UL (ref 0–0.51)
EOSINOPHIL NFR BLD: 0.9 % (ref 0–6.9)
ERYTHROCYTE [DISTWIDTH] IN BLOOD BY AUTOMATED COUNT: 47 FL (ref 35.9–50)
GLOBULIN SER CALC-MCNC: 2.4 G/DL (ref 1.9–3.5)
GLUCOSE SERPL-MCNC: 143 MG/DL (ref 65–99)
HCT VFR BLD AUTO: 49.7 % (ref 42–52)
HGB BLD-MCNC: 17.6 G/DL (ref 14–18)
IMM GRANULOCYTES # BLD AUTO: 0.01 K/UL (ref 0–0.11)
IMM GRANULOCYTES NFR BLD AUTO: 0.2 % (ref 0–0.9)
INR PPP: 1.14 (ref 0.87–1.13)
LYMPHOCYTES # BLD AUTO: 0.64 K/UL (ref 1–4.8)
LYMPHOCYTES NFR BLD: 14.8 % (ref 22–41)
MCH RBC QN AUTO: 34 PG (ref 27–33)
MCHC RBC AUTO-ENTMCNC: 35.4 G/DL (ref 33.7–35.3)
MCV RBC AUTO: 96.1 FL (ref 81.4–97.8)
MONOCYTES # BLD AUTO: 0.43 K/UL (ref 0–0.85)
MONOCYTES NFR BLD AUTO: 9.9 % (ref 0–13.4)
NEUTROPHILS # BLD AUTO: 3.18 K/UL (ref 1.82–7.42)
NEUTROPHILS NFR BLD: 73.5 % (ref 44–72)
NRBC # BLD AUTO: 0 K/UL
NRBC BLD-RTO: 0 /100 WBC
PLATELET # BLD AUTO: 82 K/UL (ref 164–446)
PMV BLD AUTO: 11.7 FL (ref 9–12.9)
POTASSIUM SERPL-SCNC: 3.9 MMOL/L (ref 3.6–5.5)
PROT SERPL-MCNC: 7 G/DL (ref 6–8.2)
PROTHROMBIN TIME: 15 SEC (ref 12–14.6)
RBC # BLD AUTO: 5.17 M/UL (ref 4.7–6.1)
SODIUM SERPL-SCNC: 140 MMOL/L (ref 135–145)
WBC # BLD AUTO: 4.3 K/UL (ref 4.8–10.8)

## 2021-04-05 PROCEDURE — 36415 COLL VENOUS BLD VENIPUNCTURE: CPT

## 2021-04-05 PROCEDURE — 80053 COMPREHEN METABOLIC PANEL: CPT

## 2021-04-05 PROCEDURE — 85610 PROTHROMBIN TIME: CPT

## 2021-04-05 PROCEDURE — 85025 COMPLETE CBC W/AUTO DIFF WBC: CPT

## 2021-04-05 PROCEDURE — 82107 ALPHA-FETOPROTEIN L3: CPT

## 2021-04-06 DIAGNOSIS — E11.9 TYPE 2 DIABETES MELLITUS WITHOUT COMPLICATION, WITHOUT LONG-TERM CURRENT USE OF INSULIN (HCC): ICD-10-CM

## 2021-04-06 RX ORDER — METFORMIN HYDROCHLORIDE 500 MG/1
TABLET, EXTENDED RELEASE ORAL
Qty: 180 TABLET | Refills: 0 | Status: SHIPPED | OUTPATIENT
Start: 2021-04-06 | End: 2021-06-29

## 2021-04-07 ENCOUNTER — OFFICE VISIT (OUTPATIENT)
Dept: MEDICAL GROUP | Facility: PHYSICIAN GROUP | Age: 71
End: 2021-04-07
Payer: MEDICARE

## 2021-04-07 ENCOUNTER — HOSPITAL ENCOUNTER (OUTPATIENT)
Facility: MEDICAL CENTER | Age: 71
End: 2021-04-07
Attending: FAMILY MEDICINE
Payer: MEDICARE

## 2021-04-07 VITALS
OXYGEN SATURATION: 100 % | TEMPERATURE: 97.3 F | DIASTOLIC BLOOD PRESSURE: 78 MMHG | HEART RATE: 64 BPM | HEIGHT: 67 IN | WEIGHT: 215 LBS | BODY MASS INDEX: 33.74 KG/M2 | SYSTOLIC BLOOD PRESSURE: 124 MMHG

## 2021-04-07 DIAGNOSIS — C22.8 PRIMARY MALIGNANT NEOPLASM OF LIVER (HCC): ICD-10-CM

## 2021-04-07 DIAGNOSIS — C91.10 CLL (CHRONIC LYMPHOCYTIC LEUKEMIA) (HCC): ICD-10-CM

## 2021-04-07 DIAGNOSIS — E11.9 TYPE 2 DIABETES MELLITUS WITHOUT COMPLICATION, WITHOUT LONG-TERM CURRENT USE OF INSULIN (HCC): ICD-10-CM

## 2021-04-07 DIAGNOSIS — I15.2 HYPERTENSION SECONDARY TO ENDOCRINE DISORDERS: ICD-10-CM

## 2021-04-07 DIAGNOSIS — D69.59 SECONDARY THROMBOCYTOPENIA: ICD-10-CM

## 2021-04-07 DIAGNOSIS — B07.9 VIRAL WART ON FINGER: ICD-10-CM

## 2021-04-07 LAB
CREAT UR-MCNC: 123.81 MG/DL
HBA1C MFR BLD: 5.9 % (ref 0–5.6)
INT CON NEG: NEGATIVE
INT CON POS: POSITIVE
MICROALBUMIN UR-MCNC: 1.4 MG/DL
MICROALBUMIN/CREAT UR: 11 MG/G (ref 0–30)

## 2021-04-07 PROCEDURE — 82043 UR ALBUMIN QUANTITATIVE: CPT

## 2021-04-07 PROCEDURE — 17110 DESTRUCTION B9 LES UP TO 14: CPT | Performed by: FAMILY MEDICINE

## 2021-04-07 PROCEDURE — 99214 OFFICE O/P EST MOD 30 MIN: CPT | Mod: 25 | Performed by: FAMILY MEDICINE

## 2021-04-07 PROCEDURE — 82570 ASSAY OF URINE CREATININE: CPT

## 2021-04-07 PROCEDURE — 83036 HEMOGLOBIN GLYCOSYLATED A1C: CPT | Performed by: FAMILY MEDICINE

## 2021-04-07 ASSESSMENT — FIBROSIS 4 INDEX: FIB4 SCORE: 4.93

## 2021-04-07 ASSESSMENT — PATIENT HEALTH QUESTIONNAIRE - PHQ9: CLINICAL INTERPRETATION OF PHQ2 SCORE: 0

## 2021-04-07 NOTE — PROGRESS NOTES
CC: Diabetes    HISTORY OF THE PRESENT ILLNESS: Patient is a 70 y.o. male.     Patient is here today for diabetes follow-up.  Historically very well controlled on Metformin 500 mg extended release twice daily.  Tolerates medicine fine without any issue.  Hemoglobin A1c 5.9 in clinic today.    Patient recently did have lab work done.  He leukopenia and thrombocytopenia.  He has known CLL.  Follows with Dr. Servin.     He also unfortunately recently found out that he has some new lesions from hepatocellular carcinoma.  Likely planning for upcoming radiation treatment.  Patient does have intermittent nausea, but otherwise reports that he is feeling quite well.  He has ondansetron on hand for when he gets nauseous.    Blood pressure remains well controlled on losartan.    He continues to have a wart on his left thumb.  He did see a dermatologist for this who also perform cryotherapy, but the wart is quite stubborn and keeps returning.  He would like cryotherapy again today.    Allergies: Patient has no known allergies.    Current Outpatient Medications Ordered in Epic   Medication Sig Dispense Refill   • metFORMIN ER (GLUCOPHAGE XR) 500 MG TABLET SR 24 HR TAKE ONE TABLET BY MOUTH TWICE A  tablet 0   • losartan (COZAAR) 50 MG Tab TAKE ONE TABLET BY MOUTH DAILY 90 Tab 1   • Vitamin D, Cholecalciferol, 25 MCG (1000 UT) Cap Take 1,000 Units by mouth every day.     • ondansetron (ZOFRAN) 4 MG Tab tablet Take 1 Tab by mouth every four hours as needed for Nausea/Vomiting (for nausea, vomiting). 30 Tab 3   • cetirizine (ZYRTEC) 10 MG Tab Take 10 mg by mouth every morning.     • Multiple Vitamins-Minerals (MULTIVITAMIN PO) Take 1 Tab by mouth every morning.     • Polyethylene Glycol 3350 (MIRALAX PO) Take 1 Cap by mouth every morning.     • fluticasone (FLONASE ALLERGY RELIEF) 50 MCG/ACT nasal spray Spray 1 Spray in nose 2 times a day. 1 Bottle 0     No current Epic-ordered facility-administered medications on file.  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 Stopped both medications because blood pressure readings were low, /82 today and has been low at home since stopping medications about 5 days ago. Last reading at home 117/72    • Indigestion    • Liver cancer (HCC)    • Liver tumor    • Plantar fasciitis of right foot 8/12/2019   • Rosacea    • Snoring     no sleep study       Past Surgical History:   Procedure Laterality Date   • WOUND EXPLORATION ORTHO Right 8/5/2020    Procedure: EXPLORATION, WOUND- THUMB;  Surgeon: Yumiko Griffiths M.D.;  Location: SURGERY SAME DAY [facility] ORS;  Service: Orthopedics   • TENDON REPAIR  8/5/2020    Procedure: REPAIR, TENDON- EXTENSOR;  Surgeon: Yumiko Griffiths M.D.;  Location: SURGERY SAME DAY [facility] ORS;  Service: Orthopedics   • HEPATIC ABLATION LAPAROSCOPIC  6/28/2018    Procedure: HEPATIC ABLATION LAPAROSCOPIC. SEGMENT 4B, HEPATOMA, REPAIR OF INCARCERATED UMBILICAL HERNIA;  Surgeon: Feliberto Burgos M.D.;  Location: SURGERY [facility];  Service: General   • BRACHY THERAPY         Social History     Tobacco Use   • Smoking status: Never Smoker   • Smokeless tobacco: Former User     Types: Snuff   Substance Use Topics   • Alcohol use: Not Currently     Alcohol/week: 16.8 oz     Types: 28 Cans of beer per week   • Drug use: Yes     Types: Marijuana, Inhaled, Oral     Comment: \"Marijuana Use\" daily        Social History     Social History Narrative    No known exposure to asbestos, dyes, or chemicals, " "however he was exposed to pesticides.  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  1 child, alive and well.  He also has a step-child.        Family History   Problem Relation Age of Onset   • Cancer Father 81        Bladder   • Cancer Brother         Prostate & CLL (s/p 8-10 years ago)   • Hyperlipidemia Sister    • Lung Disease Neg Hx    • Diabetes Neg Hx    • Heart Disease Neg Hx    • Hypertension Neg Hx    • Stroke Neg Hx    • Alcohol/Drug Neg Hx        ROS:   Denies fever, chest pain, shortness of breath      Labs: Labs reviewed from April 5, 2021.  Point-of-care A1c in clinic today 5.9.  Imaging: CT abdomen reviewed from March 19, 2021.    Exam: /78 (BP Location: Right arm, Patient Position: Sitting, BP Cuff Size: Adult)   Pulse 64   Temp 36.3 °C (97.3 °F) (Temporal)   Ht 1.702 m (5' 7\")   Wt 97.5 kg (215 lb)   SpO2 100%  Body mass index is 33.67 kg/m².    General: Well appearing, NAD  Pulmonary: Clear to ausculation.  Normal effort. No rales, ronchi, or wheezing.  Cardiovascular: Regular rate and rhythm without murmur, rubs or gallop.   Abdomen: Mild right upper quadrant tenderness noted.  Soft,  nondistended. Normal bowel sounds. Liver and spleen are not palpable. No rebound or guarding  Skin: Warm and dry.  No obvious lesions.  Musculoskeletal:  No extremity cyanosis, clubbing, or edema.  Psych: Normal mood and affect. Alert and oriented. Judgment and insight is normal.    Please note that this dictation was created using voice recognition software. I have made every reasonable attempt to correct obvious errors, but I expect that there are errors of grammar and possibly content that I did not discover before finalizing the note.      Assessment/Plan  Sohail was seen today for diabetes follow-up.    Diagnoses and all orders for this visit:    Type 2 diabetes mellitus without complication, without long-term current use of insulin " (HCC)  Chronic issue, very well controlled on current dose of Metformin.  Urine microalbumin collected today.  -     POCT  A1C  -     MICROALBUMIN CREAT RATIO URINE (CLINIC COLLECT); Future    Primary malignant neoplasm of liver (HCC)  Chronic issue.  As above, likely undergoing radiation treatment again.    CLL (chronic lymphocytic leukemia) (HCC)  Secondary thrombocytopenia  Chronic issue, follows with Dr. Servin.  Recent white blood count and platelets stable.    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    Viral wart on finger  Chronic issue, cryotherapy performed as below.    Procedure Details   The risks and benefits of the procedure and verbal informed consent obtained. The area was cleaned with alcohol. Three applications of liquid nitrogen applied via cryogun to ice ball stage.  No complications.     Follow-up in 6 months or sooner if needed.    Oliva Alegre DO  [facility]

## 2021-04-08 ENCOUNTER — HOSPITAL ENCOUNTER (OUTPATIENT)
Dept: RADIOLOGY | Facility: MEDICAL CENTER | Age: 71
End: 2021-04-08
Attending: NURSE PRACTITIONER
Payer: MEDICARE

## 2021-04-08 DIAGNOSIS — C22.0 HEPATOMA (HCC): ICD-10-CM

## 2021-04-08 LAB
AFP L3 MFR SERPL: 6.6 % (ref 0–9.9)
AFP SERPL-MCNC: 6 NG/ML (ref 0–15)

## 2021-04-08 NOTE — PROGRESS NOTES
"    Telephone Appointment Visit   As a means of avoiding spread of COVID-19, this visit is being conducted by telephone. This telephone visit was initiated by the patient and they verbally consented. Joe Norris MD present and participating in entirety of telehealth appointment.    Time at start of call: 1003    Reason for Call:  Lab Follow-up, Hospital Follow-up and imaging follow up    HPI:    Sohail Duvall was seen today for review of surveillance imaging after hepatic interventions by Joe Norris MD. He was referred to our service by Feliberto Senior MD and is also under the care of Carmencita Hand PA-C, Alfonzo Servin MD, and Johanna Hooper MD.     has a history of CLL and alcoholic cirrhosis. Imaging revealed hepatomas in 4A and 4B and he underwent the following interventions at [facility]:  · 6/28/18 ablation 4B lesion (Nadeen)  · 8/28/19 Hepatic angiogram \"mapping\" (Myrna)  · 9/7/18 Y90 SIRT left lobe 0.7 gb ( 19 mCi)  (Myrna)  · 12/14/18 YUNIER TACE caudate lesion (Myrna)  · 1/6/21 YUNIER TACE (Myrna)    Mr. Duvall is seen today for evaluation of follow up imaging. Today, the patient denies acute complaints. He feels good. He does not report new symptoms.  Labs / Images Reviewed:      Ref. Range 1/19/2021 09:28 4/5/2021 15:05 4/7/2021 10:36 4/7/2021 11:09   WBC Latest Ref Range: 4.8 - 10.8 K/uL 4.4 (L) 4.3 (L)     RBC Latest Ref Range: 4.70 - 6.10 M/uL 5.26 5.17     Hemoglobin Latest Ref Range: 14.0 - 18.0 g/dL 18.3 (H) 17.6     Hematocrit Latest Ref Range: 42.0 - 52.0 % 51.3 49.7     MCV Latest Ref Range: 81.4 - 97.8 fL 97.5 96.1     MCH Latest Ref Range: 27.0 - 33.0 pg 34.8 (H) 34.0 (H)     MCHC Latest Ref Range: 33.7 - 35.3 g/dL 35.7 (H) 35.4 (H)     RDW Latest Ref Range: 35.9 - 50.0 fL 46.7 47.0     Platelet Count Latest Ref Range: 164 - 446 K/uL 90 (L) 82 (L)     MPV Latest Ref Range: 9.0 - 12.9 fL 9.9 11.7     Neutrophils-Polys Latest Ref Range: 44.00 - 72.00 % " 68.10 73.50 (H)     Neutrophils (Absolute) Latest Ref Range: 1.82 - 7.42 K/uL 2.99 3.18     Lymphocytes Latest Ref Range: 22.00 - 41.00 % 20.50 (L) 14.80 (L)     Lymphs (Absolute) Latest Ref Range: 1.00 - 4.80 K/uL 0.90 (L) 0.64 (L)     Monocytes Latest Ref Range: 0.00 - 13.40 % 6.80 9.90     Monos (Absolute) Latest Ref Range: 0.00 - 0.85 K/uL 0.30 0.43     Eosinophils Latest Ref Range: 0.00 - 6.90 % 3.20 0.90     Eos (Absolute) Latest Ref Range: 0.00 - 0.51 K/uL 0.14 0.04     Basophils Latest Ref Range: 0.00 - 1.80 % 0.90 0.70     Baso (Absolute) Latest Ref Range: 0.00 - 0.12 K/uL 0.04 0.03     Immature Granulocytes Latest Ref Range: 0.00 - 0.90 % 0.50 0.20     Immature Granulocytes (abs) Latest Ref Range: 0.00 - 0.11 K/uL 0.02 0.01     Nucleated RBC Latest Units: /100 WBC 0.00 0.00     NRBC (Absolute) Latest Units: K/uL 0.00 0.00     Sodium Latest Ref Range: 135 - 145 mmol/L 141 140     Potassium Latest Ref Range: 3.6 - 5.5 mmol/L 4.0 3.9     Chloride Latest Ref Range: 96 - 112 mmol/L 107 104     Co2 Latest Ref Range: 20 - 33 mmol/L 25 26     Anion Gap Latest Ref Range: 7.0 - 16.0  9.0 10.0     Glucose Latest Ref Range: 65 - 99 mg/dL 158 (H) 143 (H)     Bun Latest Ref Range: 8 - 22 mg/dL 13 14     Creatinine Latest Ref Range: 0.50 - 1.40 mg/dL 0.64 0.61     GFR If  Latest Ref Range: >60 mL/min/1.73 m 2 >60 >60     GFR If Non  Latest Ref Range: >60 mL/min/1.73 m 2 >60 >60     Calcium Latest Ref Range: 8.5 - 10.5 mg/dL 9.7 9.7     AST(SGOT) Latest Ref Range: 12 - 45 U/L 37 40     ALT(SGPT) Latest Ref Range: 2 - 50 U/L 39 48     Alkaline Phosphatase Latest Ref Range: 30 - 99 U/L 73 73     Total Bilirubin Latest Ref Range: 0.1 - 1.5 mg/dL 1.8 (H) 1.6 (H)     Albumin Latest Ref Range: 3.2 - 4.9 g/dL 4.7 4.6     Total Protein Latest Ref Range: 6.0 - 8.2 g/dL 6.9 7.0     Globulin Latest Ref Range: 1.9 - 3.5 g/dL 2.2 2.4     A-G Ratio Latest Units: g/dL 2.1 1.9     Glycohemoglobin Latest  Ref Range: 0.0 - 5.6 %   5.9 (A)    Cholesterol,Tot Latest Ref Range: 100 - 199 mg/dL 131      Triglycerides Latest Ref Range: 0 - 149 mg/dL 69      HDL Latest Ref Range: >=40 mg/dL 66      LDL Latest Ref Range: <100 mg/dL 51      Micro Alb Creat Ratio Latest Ref Range: 0 - 30 mg/g    11   Creatinine, Urine Latest Units: mg/dL    123.81   Microalbumin, Urine Random Latest Units: mg/dL    1.4   INR Latest Ref Range: 0.87 - 1.13  1.09 1.14 (H)     PT Latest Ref Range: 12.0 - 14.6 sec 14.5 15.0 (H)          Ref. Range 3/6/2020 10:48 6/1/2020 12:15   Jordon-gamma-carboxy Prothrombin Latest Ref Range: 0.0 - 7.4 ng/mL 1.0 0.8      Ref. Range 3/6/2020 10:48 6/1/2020 12:15 8/3/2020 10:09 12/31/2020 10:30 1/6/2021 14:00   AFP L3% Latest Ref Range: 0.0 - 9.9 % <0.5 <0.5  6.4    Alpha Fetoprotein Latest Ref Range: 0 - 9 ng/mL 5 5  6 6       Child Galloway Class  A  JIM Grade A1    Pathology:  None available at time of consultation    Radiology:   CT abdomen 3/19/21 at [facility]:  Enhancing lesion at the hepatic dome anteriorly is likely not significantly changed, worrisome for hepatocellular carcinoma.     Enhancing lesion within segment 4 of the liver is also likely not significantly changed, also worrisome for hepatocellular carcinoma.     Small 6 mm enhancing focus within the segment 4 of the liver is not significantly changed.     Enhancing 6 mm focus within the left lobe of the liver is stable.     6.5 mm enhancing focus near the intrahepatic IVC was not definitively seen previously.     Posttreatment changes are again noted in the caudate lobe and in the anterior right lobe of the liver.     Findings of cirrhosis and portal hypertension.     Left adrenal adenoma.     Colonic diverticulosis.     Cholelithiasis     Atherosclerotic plaque.    Interventional Radiology procedure 1/6/21 at [facility]:  1.  DEBTACE procedure with total dose doxorubicin 150 mg administered as 100 mu Oncozene beads.  2.  The patient is returned to the same  day surgery reese for post procedure observation.    MRI abdomen 11/18/2020 at [facility]:  1. Previously treated lesions show no viable tumor. LR-treated.  2. Corresponding to the lesion detected on the ultrasound, there is an 8 mm lesion at the junction of anterior and medial hepatic segments. It is new since prior MRI, and meets criteria for HCC (LR-5).  3. A 1.6 cm segment 4 lesion seen on the prior MR study is stable in size. It shows mild arterial enhancement and mild washout, and likely represents an HCC which is stable since prior study. LR-5.  4. No additional lesions are detected on today's study.  5. Cirrhosis and portal hypertension. No ascites.  6. Cholelithiasis.  7. Stable 1 cm cystic lesion in the pancreatic tail, likely a side branch IPMN.  8. Colonic diverticulosis.        MRI abdomen 3/11/20 at [facility]:   1.  There are morphologic changes of the liver consistent with cirrhosis.  2.  There are stable posttreatment changes in the caudate lobe and superior medial segment left lobe and segment 2. LR treated  3.  Stable 10 mm focus of arterial enhancement without washout or restricted diffusion, likely in segment 8. LR 3.  4.  There is a 9 mm arterial enhancing focus inferiorly in segment 8 or possibly 4a without washout or diffusion. LR 3  5.  There is a questionable 6 mm arterial focus of enhancement at the junction of segment 8/5 without washout or restricted diffusion. LR 3  6.  Stable splenomegaly, possibly related to cirrhosis versus underlying CLL.  7.  Stable cholelithiasis without biliary dilatation.  8.  No change in 9 mm pancreatic tail simple appearing cyst.  9.  No change in left adrenal gland adenoma.        LR-3: intermediate probability and LR treated lesions    MRI abdomen on July 1, 2019 at [facility]:  1.  Stable wedge-shaped treated lesion in the anterior medial hepatic segment.  2.  Stable treated lesion in the caudate lobe, with unchanged linear enhancement in its periphery.  3.  Segment 2  lesion described on the prior study is not seen on today's study. No focal arterial enhancement in segment 2.  4.  New 1 cm arterially enhancing focus in segment 8 has no correlate on other images. It is indeterminate. LR-3.  5.  Cirrhosis and portal hypertension, with postembolization changes in the left hepatic lobe. Splenomegaly. No ascites.  6.  Cholelithiasis.  7.  Stable left adrenal lesion, likely adenoma.  8.  Stable pancreatic tail cyst.    MRI abdomen on January 29, 2019 at [facility]:  1.  No new arterial enhancing mass is identified.  2.  Previously described mass in the caudate lobe is now avascular with peripheral enhancement, consistent with recent chemoembolization. LR-treated  3.  Ablation cavity in the left hepatic lobe remains avascular.  4.  8 mm arterial enhancing observation in segment 4A is not as pronounced as on the prior exam and grossly stable. LR-3  5.  Stable peripheral enhancing lesion in segment 2. LR-treated  6.  Previously described rim-enhancing observation in the superior aspect of segment 2 is not visualized on the current exam, likely related to differences in technique.  7.  Morphologic characteristics of cirrhosis and evidence of portal hypertension including splenomegaly  8.  Cholelithiasis.  9.  Left adrenal adenoma  10.  Stable 8 mm pancreatic tail cyst         Interventional radiology procedure December 14, 2018, at [facility]:  1.  DEBTACE procedure with total dose doxorubicin 100 mg administered as 100 mu Oncozene beads. (Caudate lobe)  2.  The patient is returned to the same day surgery reese for post procedure observation.       MRI abdomen November 10, 2018 at [facility]:  1.  Morphologic characteristics of cirrhosis with evidence of portal hypertension including trace ascites, splenomegaly, and portal venous dilatation.  2.  Interval enlargement in the mass in segment 4 adjacent to the caudate lobe. LR-5  3.  Previously described hyper enhancing masses in the left hepatic lobe  demonstrate rim enhancement, consistent with recent Y 90 treatment. LR-treated.  4.  Ablation cavity in the left hepatic lobe is a vascular  5.  8 mm arterial enhancing focus in segment 4A is better defined than on the prior exam secondary to decreased motion. Size is not significantly changed.LR-3  6.  Stable 9 mm cystic mass in the pancreatic tail.  7.  Stable benign left adrenal adenoma  8.  Cholelithiasis. Dependent hypointense foci in the common bile duct may represent tiny nonobstructing calculi.       Interventional radiology procedure Y90 SIRT left lobe September 7, 2018, at [facility]:  1.  Technically successful Y90 radioembolization of the left hepatic lobe. The patient will followup in approximately 10 days for laboratory and clinical evaluation and 4-6 weeks for imaging evaluation.  2.  Immediately following the procedure, the patient was transported to nuclear medicine for SPECT imaging which demonstrates Bremsstrahlung emission from the left of the liver. There is no definite evidence of extrahepatic deposition of radioembolic   Material.     Nuclear medicine bremsstrahlung study on September 7, 2018 at [facility]:  The prescribed dose was  0.68 gb ( 18.4 mCi). The drawn dose was 0.78 gb (21mCi). Delivered dose after residual was measured at 0.7 gb ( 19 mCi). This represents  103% of the prescribed dose and  90% of the drawn dose. Bremsstrahlung images obtained   after the Y-90 radioembolization, confirm proper delivery to the targeted region. There is no uptake outside the planned treatment area. NOTE: The patient will be followed by interventional radiology and oncology.       Interventional radiology procedure August 8, 2018 at [facility]:  1.  Superior mesenteric arteriogram replaced common hepatic artery.  2.  Celiac arteriogram demonstrate no evidence of common hepatic arterial anatomy.  3.  Common hepatic arteriogram demonstrate no evidence of variant hepatic arterial anatomy.  4.  Selective  catheterization and embolization of a small branch originating from the proximal aspect of the right hepatic artery which appeared to supply either a small subsegmental region of the right hepatic lobe or a small amount of the hepatic   flexure of the colon.  5.  Proper hepatic arteriogram demonstrating 2 small branches originating from the proximal aspect of the right hepatic artery one of which appear to represent either a small subsegmental hepatic artery versus hepatic flexure colon branch and a small   cystic artery.  6.  Intra-arterial administration of 4.4 mCi technetium 99m labeled MAA into the hepatic artery demonstrated a hepatic pulmonary shunt fraction of 4%.        CT abdomen with and without August 8, 2018 at [facility]:  1.  Cirrhosis  2.  Interval ablation of a segment 4A hepatic nodule without evidence of residual tumor in that field  3.  Multiple additional hepatic nodules and masses as described above, mostly unchanged in size however the dominant central RIGHT hepatic mass has increased in size. These are likely hepatomas.  4.  Splenomegaly and enlarged portal vein, in keeping with the patient's known portal hypertension  5.  Cholelithiasis     Nuclear medicine quantitative lung study August 28, 2018 at [facility]:  Total hepatopulmonary shunt percentage was 4%. No evidence of extrahepatic radiotracer deposition is identified.     MRI abdomen on June 8, 2018 at [facility]:  Examination is limited by patient motion.  2.1 x 1.9 cm arterial hyperenhancing lesion within the left lobe of the liver appears compatible with hepatocellular carcinoma. LR-5    Hyperenhancing lesion within segment 4 of the liver bordering the caudate lobe measures 3 x 2 cm and is increased in size compared to prior. This lesion is compatible with hepatocellular carcinoma. LR-5    10 mm hyperenhancing lesion within segment 4 may be slightly decreased or unchanged in size compared to prior. LR-3    3 other small hyperenhancing lesions are  "seen within the right lobe of the liver measuring up to 1 cm. LR-3    Heterogeneous wedge-shaped area of delayed hypo-enhancement in the anterior liver is likely related to variable perfusion.    Findings of cirrhosis and portal hypertension.    Mildly prominent lymph nodes in the cardiophrenic fat are not significantly changed.    9 mm cystic lesion in the pancreatic tail could represent a small side branch IPMN.    Cholelithiasis. No biliary ductal dilatation is seen.    Subcarinal lymphadenopathy is partially imaged.    2 cm left adrenal nodule is unchanged and likely an adrenal adenoma.    Trace free fluid in the abdomen.    Findings discussed with Dr. Senior         CT thorax April 17, 2018 at [facility]:  1.  Mediastinal lymphadenopathy with markedly enlarged subcarinal lymph node could be due to metastatic disease or lymphoma. Reactive lymph nodes or lymphadenopathy due to granulomatous disease seems less likely.  2.  Coarse coronary artery calcifications.  3.  Cirrhosis with hypervascular hepatic lesions and splenomegaly.  4.  Cholelithiasis.     Nuclear medicine bone study April 17, 2018 at [facility]:  No scintigraphic evidence of bony metastatic disease     Portal pressure gradient April 4, 2018 at [facility]:  1.  Hepatic venography showing a patent right hepatic vein.  2.  Free and wedged right hepatic vein wedge pressures rendering a mean Hepatic Venous Pressure Gradient (HVPG) of 9.67 mmHg. This is consistent with portal hypertension.  (HVPG >= 10mmHg considered \"clinically significant\" portal HTN**  3.  Transjugular Liver biopsy was not performed at this time..  **Reference: Hepatic Venous Pressure Gradient in 2010:Optimal Measurement is Jose. Anya MCINTYRE, Wanda U. HEPATOLOGY, Vol. 51, No. 6, 2010     CT abdomen December 6, 2017 at [facility]:  1.  2.4 cm arterial enhancing mass in segment 4 of the liver demonstrates washout. LR-5.  2.  1.3 cm arterial enhancing lesion in the hepatic dome is similar to " the recent MRI. No definite washout or capsule appreciated. LR-3  3.  Morphologic characteristics of cirrhosis with evidence of portal hypertension including splenomegaly.  4.  Cholelithiasis.  5.  Stable left adrenal nodule, likely a benign adenoma.  Assessment and Plan:   Impression:   1. Unresectable multifocal hepatocellular carcinoma status post left lobe radioembolization and chemoembolization of multifocal hepatomas.  2. 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  3. Cirrhosis.  4. Portal hypertension.  5. Thrombocytopenia.  6. Splenomegaly.  7. Chronic lymphocytic leukemia.   8. Diabetes mellitus.  9. Hyperbilirubinemia.  10. History of alcohol use.    Plan:   Joe Norris MD has reviewed 's history and imaging studies. He has recovered well after palliative transarterial chemoembolization of unresectable hepatoma(s). We treated all lesions that were visualized on angiography, however there is not a good treatment effect demonstrated on his recent CT after our most recent interventions. Additional IR procedures would likely not yield appreciable benefit. At this point, we are recommending evaluation with external beam radiation therapy and systemic management. Mr. Duvall is established with Dr. Servin for his CLL and HCC and we advised him to make an appointment for consideration of systematic therapy. We placed a radiation oncology referral for evaluation and also placed a referral to cancer nurse navigation as the patient indicated he has not been evaluated before. All questions were answered.     Follow-up: with referrals as described above.    Time at end of call: 1008  Total Call Time Spent: 5-10 minutes    ADRIAAN Ceron.

## 2021-04-13 ENCOUNTER — PATIENT OUTREACH (OUTPATIENT)
Dept: OTHER | Facility: MEDICAL CENTER | Age: 71
End: 2021-04-13

## 2021-04-13 NOTE — PROGRESS NOTES
Oncology nurse navigator called patient to follow up on the referral sent by radiology.  ZOILA very familiar with the patient because I was involved with him for his CLL.  The patient reports that he does remember me and my role.  He states that he is on his way to see Dr. Servin in about 45 minutes.  He states that he is witing to hear from the radiation department from [facility].  ZOILA explained that his referral is currently undereview as we speak.

## 2021-04-21 ENCOUNTER — PATIENT OUTREACH (OUTPATIENT)
Dept: OTHER | Facility: MEDICAL CENTER | Age: 71
End: 2021-04-21

## 2021-04-21 NOTE — PROGRESS NOTES
Oncology nurse navigator reached out to the patient to return his call.  Patient reports that he was concerned that he had not heard back yet from the [facility] radiation department.  Patient states that however they did call yesterday and that now he has a scheduled appointment with Dr. Kieta on 04/26/2021.  He was also asking about his last visit last week with Dr. Servin regarding some medication and wanting to know if it will be started after his radiation.  Oncology nurse navigator explained to patient that I was uncertain and would have to call over to cancer care and gather some more information before I could answer that.

## 2021-04-26 ENCOUNTER — HOSPITAL ENCOUNTER (OUTPATIENT)
Dept: RADIATION ONCOLOGY | Facility: MEDICAL CENTER | Age: 71
End: 2021-04-30
Attending: RADIOLOGY
Payer: MEDICARE

## 2021-04-26 VITALS
OXYGEN SATURATION: 98 % | HEART RATE: 56 BPM | WEIGHT: 223.77 LBS | DIASTOLIC BLOOD PRESSURE: 77 MMHG | SYSTOLIC BLOOD PRESSURE: 184 MMHG | BODY MASS INDEX: 35.12 KG/M2 | TEMPERATURE: 98.3 F | HEIGHT: 67 IN

## 2021-04-26 DIAGNOSIS — C22.8 PRIMARY MALIGNANT NEOPLASM OF LIVER (HCC): ICD-10-CM

## 2021-04-26 PROCEDURE — 99214 OFFICE O/P EST MOD 30 MIN: CPT | Performed by: RADIOLOGY

## 2021-04-26 PROCEDURE — 99205 OFFICE O/P NEW HI 60 MIN: CPT | Performed by: RADIOLOGY

## 2021-04-26 ASSESSMENT — FIBROSIS 4 INDEX: FIB4 SCORE: 4.93

## 2021-04-26 ASSESSMENT — PAIN SCALES - GENERAL: PAINLEVEL: NO PAIN

## 2021-04-26 NOTE — NON-PROVIDER
"Patient was seen today in clinic with Dr. Keita for consultation.  Vitals signs and weight were obtained and pain assessment was completed.  Allergies and medications were reviewed with the patient.  Toxicities of treatment assessed.     Vitals/Pain:  Vitals:    04/26/21 0953   BP: (!) 184/77   BP Location: Left arm   Patient Position: Sitting   BP Cuff Size: Adult   Pulse: (!) 56   Temp: 36.8 °C (98.3 °F)   SpO2: 98%   Weight: 101 kg (223 lb 12.3 oz)   Height: 1.702 m (5' 7\")   Pain Score: No pain        Allergies:   Patient has no known allergies.    Current Medications:  Current Outpatient Medications   Medication Sig Dispense Refill   • metFORMIN ER (GLUCOPHAGE XR) 500 MG TABLET SR 24 HR TAKE ONE TABLET BY MOUTH TWICE A  tablet 0   • losartan (COZAAR) 50 MG Tab TAKE ONE TABLET BY MOUTH DAILY 90 Tab 1   • Vitamin D, Cholecalciferol, 25 MCG (1000 UT) Cap Take 1,000 Units by mouth every day.     • ondansetron (ZOFRAN) 4 MG Tab tablet Take 1 Tab by mouth every four hours as needed for Nausea/Vomiting (for nausea, vomiting). 30 Tab 3   • cetirizine (ZYRTEC) 10 MG Tab Take 10 mg by mouth every morning.     • Multiple Vitamins-Minerals (MULTIVITAMIN PO) Take 1 Tab by mouth every morning.     • Polyethylene Glycol 3350 (MIRALAX PO) Take 1 Cap by mouth every morning.     • fluticasone (FLONASE ALLERGY RELIEF) 50 MCG/ACT nasal spray Spray 1 Spray in nose 2 times a day. 1 Bottle 0     No current facility-administered medications for this encounter.           Barbara Barajas, Med Ass't  "

## 2021-04-29 NOTE — CONSULTS
RADIATION ONCOLOGY CONSULT    DATE OF SERVICE: 4/26/2021    IDENTIFICATION: A 70 y.o. male with multifocal HCC  Visit Diagnoses     ICD-10-CM   1. Primary malignant neoplasm of liver (HCC)  C22.8     He is here at the kind request of Dr. Norris    HISTORY OF PRESENT ILLNESS:   Patient is a pleasant 70-year-old male with history of alcoholic cirrhosis and CLL with history of hepatoma is in segment 4A and 4B and underwent ablation to the 4B lesion June 28, 2018 by Dr. Burgos and underwent Y 90 to the left lobe September 7, 2018 by Dr. Norris with TACE given December 14, 2018 to the caudate lesion.  On January 6, 2021 patient underwent TACE to be segment 2/3 and 4 A/B areas of the liver.  CT liver March 19, 2021 shows enhancing lesion at the hepatic dome anteriorly not significantly changed worsening for hepatocellular carcinoma with enhancing lesion within segment 4 the liver also likely not significant changed worrisome for hepatocellular carcinoma.  Small 6 mm enhancing focus within segment 4 enhancing 6 mm focus of the left lobe of liver stable.  Patient overall is feeling well and is not in any systemic treatment at this time.    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;  Service:  Orthopedics   • TENDON REPAIR  8/5/2020    Procedure: REPAIR, TENDON- EXTENSOR;  Surgeon: Yumiko Griffiths M.D.;  Location: SURGERY SAME DAY [facility];  Service: Orthopedics   • HEPATIC ABLATION LAPAROSCOPIC  6/28/2018    Procedure: HEPATIC ABLATION LAPAROSCOPIC. SEGMENT 4B, HEPATOMA, REPAIR OF INCARCERATED UMBILICAL HERNIA;  Surgeon: Feliberto Burgos M.D.;  Location: SURGERY [facility] [place];  Service: General   • BRACHY THERAPY         CURRENT MEDICATIONS:  Current Outpatient Medications   Medication Sig Dispense Refill   • metFORMIN ER (GLUCOPHAGE XR) 500 MG TABLET SR 24 HR TAKE ONE TABLET BY MOUTH TWICE A  tablet 0   • losartan (COZAAR) 50 MG Tab TAKE ONE TABLET BY MOUTH DAILY 90 Tab 1   • Vitamin D, Cholecalciferol, 25 MCG (1000 UT) Cap Take 1,000 Units by mouth every day.     • ondansetron (ZOFRAN) 4 MG Tab tablet Take 1 Tab by mouth every four hours as needed for Nausea/Vomiting (for nausea, vomiting). 30 Tab 3   • cetirizine (ZYRTEC) 10 MG Tab Take 10 mg by mouth every morning.     • Multiple Vitamins-Minerals (MULTIVITAMIN PO) Take 1 Tab by mouth every morning.     • Polyethylene Glycol 3350 (MIRALAX PO) Take 1 Cap by mouth every morning.     • fluticasone (FLONASE ALLERGY RELIEF) 50 MCG/ACT nasal spray Spray 1 Spray in nose 2 times a day. 1 Bottle 0     No current facility-administered medications for this encounter.       ALLERGIES:    Patient has no known allergies.    FAMILY HISTORY:    family history includes Cancer in his brother; Cancer (age of onset: 81) in his father; Hyperlipidemia in his sister.    SOCIAL HISTORY:     reports that he has never smoked. He quit smokeless tobacco use about 6 years ago.  His smokeless tobacco use included snuff. He reports previous alcohol use of about 16.8 oz of alcohol per week. He reports current drug use. Drugs: Marijuana, Inhaled, and Oral.    REVIEW OF SYSTEMS:  A review of systems for today's date of service was reviewed and uploaded into  "the electronic medical record.      PHYSICAL EXAM:    ECOG PERFORMANCE STATUS:  ECOG Performance Review 4/26/2021   ECOG Performance Status Fully active, able to carry on all pre-disease performance without restriction   Some recent data might be hidden     Karnofsky Score 4/26/2021   Karnofsky Score 90   Some recent data might be hidden     BP (!) 184/77 (BP Location: Left arm, Patient Position: Sitting, BP Cuff Size: Adult)   Pulse (!) 56   Temp 36.8 °C (98.3 °F)   Ht 1.702 m (5' 7\")   Wt 101 kg (223 lb 12.3 oz)   SpO2 98%   BMI 35.05 kg/m²   Physical Exam  Vitals reviewed.   HENT:      Head: Normocephalic.      Mouth/Throat:      Mouth: Mucous membranes are moist.   Eyes:      Pupils: Pupils are equal, round, and reactive to light.   Cardiovascular:      Rate and Rhythm: Normal rate.   Musculoskeletal:         General: Normal range of motion.      Cervical back: Normal range of motion.   Neurological:      General: No focal deficit present.      Mental Status: He is alert.   Psychiatric:         Mood and Affect: Mood normal.          LABORATORY DATA:   Lab Results   Component Value Date/Time    WBC 4.3 (L) 04/05/2021 1505    WBC 4.4 (L) 01/19/2021 0928    WBC 3.1 (L) 12/31/2020 1030    HEMOGLOBIN 17.6 04/05/2021 1505    HEMOGLOBIN 18.3 (H) 01/19/2021 0928    HEMOGLOBIN 17.6 12/31/2020 1030    HEMATOCRIT 49.7 04/05/2021 1505    HEMATOCRIT 51.3 01/19/2021 0928    HEMATOCRIT 49.7 12/31/2020 1030    MCV 96.1 04/05/2021 1505    MCV 97.5 01/19/2021 0928    MCV 96.5 12/31/2020 1030    PLATELETCT 82 (L) 04/05/2021 1505    PLATELETCT 90 (L) 01/19/2021 0928    PLATELETCT 66 (L) 12/31/2020 1030    NEUTS 3.18 04/05/2021 1505    NEUTS 2.99 01/19/2021 0928    NEUTS 2.13 12/31/2020 1030      Lab Results   Component Value Date/Time    SODIUM 140 04/05/2021 1505    SODIUM 141 01/19/2021 0928    SODIUM 138 12/31/2020 1030    POTASSIUM 3.9 04/05/2021 1505    POTASSIUM 4.0 01/19/2021 0928    POTASSIUM 3.7 12/31/2020 1030    " BUN 14 04/05/2021 1505    BUN 13 01/19/2021 0928    BUN 12 12/31/2020 1030    CREATININE 0.61 04/05/2021 1505    CREATININE 0.64 01/19/2021 0928    CREATININE 0.46 (L) 12/31/2020 1030    CALCIUM 9.7 04/05/2021 1505    CALCIUM 9.7 01/19/2021 0928    CALCIUM 9.2 12/31/2020 1030    ALBUMIN 4.6 04/05/2021 1505    ALBUMIN 4.7 01/19/2021 0928    ALBUMIN 4.5 12/31/2020 1030    ASTSGOT 40 04/05/2021 1505    ASTSGOT 37 01/19/2021 0928    ASTSGOT 52 (H) 12/31/2020 1030    ALKPHOSPHAT 73 04/05/2021 1505    ALKPHOSPHAT 73 01/19/2021 0928    ALKPHOSPHAT 72 12/31/2020 1030    IFNOTAFR >60 04/05/2021 1505    IFNOTAFR >60 01/19/2021 0928    IFNOTAFR >60 12/31/2020 1030    LDHTOTAL 214 11/09/2020 1010    LDHTOTAL 272 (H) 03/29/2019 1136    LDHTOTAL 169 09/18/2018 0935       RADIOLOGY DATA:  No results found.    IMPRESSION:    70 y.o. male with multifocal HCC  Visit Diagnoses     ICD-10-CM   1. Primary malignant neoplasm of liver (HCC)  C22.8       RECOMMENDATIONS:   I discussed with patient that at this time I would not recommend stereotactic body radiation therapy as he has multifocal disease in few different areas and think that he would benefit from systemic therapy first and if he does have oligo progressive disease local regionally we could consider consolidation with stereotactic body radiation therapy to oligo progressive for persistent disease.  I will refer him back to his medical oncologist Dr. Servin who he knows for his CLL for consideration of first-line atezolizumab and bevacizumab.  I will see him back in 3 to 4 months after he has had a chance to go through systemic treatment.    Thank you for the opportunity to participate in his care.  If any questions or comments, please do not hesitate in calling.    Orders Placed This Encounter   • REFERRAL TO ONCOLOGY NURSE NAVIGATOR     CC: Dr. Servin

## 2021-04-29 NOTE — ADDENDUM NOTE
Encounter addended by: Quique Keita M.D. on: 4/29/2021 4:57 PM   Actions taken: Clinical Note Signed, Results reviewed in IB

## 2021-04-30 ENCOUNTER — PATIENT OUTREACH (OUTPATIENT)
Dept: OTHER | Facility: MEDICAL CENTER | Age: 71
End: 2021-04-30

## 2021-04-30 NOTE — PROGRESS NOTES
Oncology nurse navigator received a phone call from patient asking why he has to have an MRI that Dr. Keita ordered.  Oncology nurse navigator spoke to Dr. Keita's MA she stated that Dr. Keita has ordered an MRI to be done the end of July.  Patient is to follow up with Dr. Servin for systemic therapy and he is due to see him on May 10th.

## 2021-05-07 ENCOUNTER — HOSPITAL ENCOUNTER (OUTPATIENT)
Dept: LAB | Facility: MEDICAL CENTER | Age: 71
End: 2021-05-07
Attending: INTERNAL MEDICINE
Payer: MEDICARE

## 2021-05-07 LAB
ALBUMIN SERPL BCP-MCNC: 4.5 G/DL (ref 3.2–4.9)
ALBUMIN/GLOB SERPL: 2.3 G/DL
ALP SERPL-CCNC: 71 U/L (ref 30–99)
ALT SERPL-CCNC: 37 U/L (ref 2–50)
ANION GAP SERPL CALC-SCNC: 11 MMOL/L (ref 7–16)
AST SERPL-CCNC: 41 U/L (ref 12–45)
BASOPHILS # BLD AUTO: 1 % (ref 0–1.8)
BASOPHILS # BLD: 0.03 K/UL (ref 0–0.12)
BILIRUB SERPL-MCNC: 1.6 MG/DL (ref 0.1–1.5)
BUN SERPL-MCNC: 14 MG/DL (ref 8–22)
CALCIUM SERPL-MCNC: 9.5 MG/DL (ref 8.5–10.5)
CHLORIDE SERPL-SCNC: 109 MMOL/L (ref 96–112)
CO2 SERPL-SCNC: 24 MMOL/L (ref 20–33)
CREAT SERPL-MCNC: 0.77 MG/DL (ref 0.5–1.4)
EOSINOPHIL # BLD AUTO: 0.09 K/UL (ref 0–0.51)
EOSINOPHIL NFR BLD: 2.9 % (ref 0–6.9)
ERYTHROCYTE [DISTWIDTH] IN BLOOD BY AUTOMATED COUNT: 48 FL (ref 35.9–50)
FASTING STATUS PATIENT QL REPORTED: NORMAL
GLOBULIN SER CALC-MCNC: 2 G/DL (ref 1.9–3.5)
GLUCOSE SERPL-MCNC: 176 MG/DL (ref 65–99)
HCT VFR BLD AUTO: 47.3 % (ref 42–52)
HGB BLD-MCNC: 16.3 G/DL (ref 14–18)
IMM GRANULOCYTES # BLD AUTO: 0.01 K/UL (ref 0–0.11)
IMM GRANULOCYTES NFR BLD AUTO: 0.3 % (ref 0–0.9)
LDH SERPL L TO P-CCNC: 226 U/L (ref 107–266)
LYMPHOCYTES # BLD AUTO: 0.57 K/UL (ref 1–4.8)
LYMPHOCYTES NFR BLD: 18.6 % (ref 22–41)
MCH RBC QN AUTO: 33.3 PG (ref 27–33)
MCHC RBC AUTO-ENTMCNC: 34.5 G/DL (ref 33.7–35.3)
MCV RBC AUTO: 96.7 FL (ref 81.4–97.8)
MONOCYTES # BLD AUTO: 0.34 K/UL (ref 0–0.85)
MONOCYTES NFR BLD AUTO: 11.1 % (ref 0–13.4)
NEUTROPHILS # BLD AUTO: 2.02 K/UL (ref 1.82–7.42)
NEUTROPHILS NFR BLD: 66.1 % (ref 44–72)
NRBC # BLD AUTO: 0 K/UL
NRBC BLD-RTO: 0 /100 WBC
PLATELET # BLD AUTO: 73 K/UL (ref 164–446)
PMV BLD AUTO: 11.3 FL (ref 9–12.9)
POTASSIUM SERPL-SCNC: 3.8 MMOL/L (ref 3.6–5.5)
PROT SERPL-MCNC: 6.5 G/DL (ref 6–8.2)
RBC # BLD AUTO: 4.89 M/UL (ref 4.7–6.1)
SODIUM SERPL-SCNC: 144 MMOL/L (ref 135–145)
WBC # BLD AUTO: 3.1 K/UL (ref 4.8–10.8)

## 2021-05-07 PROCEDURE — 36415 COLL VENOUS BLD VENIPUNCTURE: CPT

## 2021-05-07 PROCEDURE — 83615 LACTATE (LD) (LDH) ENZYME: CPT

## 2021-05-07 PROCEDURE — 80053 COMPREHEN METABOLIC PANEL: CPT

## 2021-05-07 PROCEDURE — 85025 COMPLETE CBC W/AUTO DIFF WBC: CPT

## 2021-05-18 ENCOUNTER — HOSPITAL ENCOUNTER (OUTPATIENT)
Dept: LAB | Facility: MEDICAL CENTER | Age: 71
End: 2021-05-18
Attending: INTERNAL MEDICINE
Payer: MEDICARE

## 2021-05-18 LAB
ALBUMIN SERPL BCP-MCNC: 4.7 G/DL (ref 3.2–4.9)
ALBUMIN/GLOB SERPL: 2 G/DL
ALP SERPL-CCNC: 75 U/L (ref 30–99)
ALT SERPL-CCNC: 39 U/L (ref 2–50)
ANION GAP SERPL CALC-SCNC: 11 MMOL/L (ref 7–16)
AST SERPL-CCNC: 41 U/L (ref 12–45)
BASOPHILS # BLD AUTO: 1 % (ref 0–1.8)
BASOPHILS # BLD: 0.03 K/UL (ref 0–0.12)
BILIRUB SERPL-MCNC: 2.1 MG/DL (ref 0.1–1.5)
BUN SERPL-MCNC: 10 MG/DL (ref 8–22)
CALCIUM SERPL-MCNC: 9.9 MG/DL (ref 8.5–10.5)
CHLORIDE SERPL-SCNC: 108 MMOL/L (ref 96–112)
CO2 SERPL-SCNC: 25 MMOL/L (ref 20–33)
CREAT SERPL-MCNC: 0.75 MG/DL (ref 0.5–1.4)
EOSINOPHIL # BLD AUTO: 0.12 K/UL (ref 0–0.51)
EOSINOPHIL NFR BLD: 3.8 % (ref 0–6.9)
ERYTHROCYTE [DISTWIDTH] IN BLOOD BY AUTOMATED COUNT: 49.2 FL (ref 35.9–50)
FASTING STATUS PATIENT QL REPORTED: NORMAL
GGT SERPL-CCNC: 293 U/L (ref 7–51)
GLOBULIN SER CALC-MCNC: 2.4 G/DL (ref 1.9–3.5)
GLUCOSE SERPL-MCNC: 148 MG/DL (ref 65–99)
HCT VFR BLD AUTO: 51.1 % (ref 42–52)
HGB BLD-MCNC: 18.1 G/DL (ref 14–18)
IMM GRANULOCYTES # BLD AUTO: 0.01 K/UL (ref 0–0.11)
IMM GRANULOCYTES NFR BLD AUTO: 0.3 % (ref 0–0.9)
INR PPP: 1.16 (ref 0.87–1.13)
LYMPHOCYTES # BLD AUTO: 0.74 K/UL (ref 1–4.8)
LYMPHOCYTES NFR BLD: 23.6 % (ref 22–41)
MCH RBC QN AUTO: 33.8 PG (ref 27–33)
MCHC RBC AUTO-ENTMCNC: 35.4 G/DL (ref 33.7–35.3)
MCV RBC AUTO: 95.3 FL (ref 81.4–97.8)
MONOCYTES # BLD AUTO: 0.31 K/UL (ref 0–0.85)
MONOCYTES NFR BLD AUTO: 9.9 % (ref 0–13.4)
NEUTROPHILS # BLD AUTO: 1.93 K/UL (ref 1.82–7.42)
NEUTROPHILS NFR BLD: 61.4 % (ref 44–72)
NRBC # BLD AUTO: 0 K/UL
NRBC BLD-RTO: 0 /100 WBC
PLATELET # BLD AUTO: 71 K/UL (ref 164–446)
PMV BLD AUTO: 10.4 FL (ref 9–12.9)
POTASSIUM SERPL-SCNC: 4.1 MMOL/L (ref 3.6–5.5)
PROT SERPL-MCNC: 7.1 G/DL (ref 6–8.2)
PROTHROMBIN TIME: 15.1 SEC (ref 12–14.6)
RBC # BLD AUTO: 5.36 M/UL (ref 4.7–6.1)
SODIUM SERPL-SCNC: 144 MMOL/L (ref 135–145)
WBC # BLD AUTO: 3.1 K/UL (ref 4.8–10.8)

## 2021-05-18 PROCEDURE — 36415 COLL VENOUS BLD VENIPUNCTURE: CPT

## 2021-05-18 PROCEDURE — 85025 COMPLETE CBC W/AUTO DIFF WBC: CPT

## 2021-05-18 PROCEDURE — 80053 COMPREHEN METABOLIC PANEL: CPT

## 2021-05-18 PROCEDURE — 82977 ASSAY OF GGT: CPT

## 2021-05-18 PROCEDURE — 82107 ALPHA-FETOPROTEIN L3: CPT

## 2021-05-18 PROCEDURE — 83951 ONCOPROTEIN DCP: CPT

## 2021-05-18 PROCEDURE — 85610 PROTHROMBIN TIME: CPT

## 2021-05-18 PROCEDURE — 84590 ASSAY OF VITAMIN A: CPT

## 2021-05-18 PROCEDURE — 82306 VITAMIN D 25 HYDROXY: CPT

## 2021-05-20 LAB
ACARBOXYPROTHROMBIN SERPL-MCNC: 1.1 NG/ML (ref 0–7.4)
AFP L3 MFR SERPL: 6.2 % (ref 0–9.9)
AFP SERPL-MCNC: 6 NG/ML (ref 0–15)

## 2021-05-21 LAB
25(OH)D3 SERPL-MCNC: 52 NG/ML (ref 30–80)
ANNOTATION COMMENT IMP: NORMAL
RETINYL PALMITATE SERPL-MCNC: 0.03 MG/L (ref 0–0.1)
VIT A SERPL-MCNC: 0.74 MG/L (ref 0.3–1.2)

## 2021-06-03 VITALS — SYSTOLIC BLOOD PRESSURE: 132 MMHG | DIASTOLIC BLOOD PRESSURE: 85 MMHG

## 2021-06-09 ENCOUNTER — HOSPITAL ENCOUNTER (OUTPATIENT)
Dept: RADIOLOGY | Facility: MEDICAL CENTER | Age: 71
End: 2021-06-09
Attending: RADIOLOGY
Payer: MEDICARE

## 2021-06-09 DIAGNOSIS — C22.8 PRIMARY MALIGNANT NEOPLASM OF LIVER (HCC): ICD-10-CM

## 2021-06-09 PROCEDURE — 74183 MRI ABD W/O CNTR FLWD CNTR: CPT | Mod: ME

## 2021-06-09 PROCEDURE — 700117 HCHG RX CONTRAST REV CODE 255: Performed by: RADIOLOGY

## 2021-06-09 PROCEDURE — A9576 INJ PROHANCE MULTIPACK: HCPCS | Performed by: RADIOLOGY

## 2021-06-09 RX ADMIN — GADOTERIDOL 20 ML: 279.3 INJECTION, SOLUTION INTRAVENOUS at 14:36

## 2021-06-29 DIAGNOSIS — E11.9 TYPE 2 DIABETES MELLITUS WITHOUT COMPLICATION, WITHOUT LONG-TERM CURRENT USE OF INSULIN (HCC): ICD-10-CM

## 2021-06-29 DIAGNOSIS — I15.2 HYPERTENSION SECONDARY TO ENDOCRINE DISORDERS: ICD-10-CM

## 2021-06-29 RX ORDER — LOSARTAN POTASSIUM 50 MG/1
TABLET ORAL
Qty: 90 TABLET | Refills: 1 | Status: SHIPPED
Start: 2021-06-29 | End: 2021-10-25

## 2021-06-29 RX ORDER — METFORMIN HYDROCHLORIDE 500 MG/1
TABLET, EXTENDED RELEASE ORAL
Qty: 180 TABLET | Refills: 1 | Status: SHIPPED | OUTPATIENT
Start: 2021-06-29 | End: 2021-10-21 | Stop reason: SDUPTHER

## 2021-06-29 NOTE — TELEPHONE ENCOUNTER
Patient has recently been seen by PCP within the last 6 months per protocol (4/21). Will refill medications for 6 months.  Lab Results   Component Value Date/Time    HBA1C 5.9 (A) 04/07/2021 10:36 AM      Lab Results   Component Value Date/Time    MALBCRT 11 04/07/2021 11:09 AM    MICROALBUR 1.4 04/07/2021 11:09 AM      Lab Results   Component Value Date/Time    ALKPHOSPHAT 75 05/18/2021 10:15 AM    ASTSGOT 41 05/18/2021 10:15 AM    ALTSGPT 39 05/18/2021 10:15 AM    TBILIRUBIN 2.1 (H) 05/18/2021 10:15 AM

## 2021-06-30 DIAGNOSIS — R11.0 NAUSEA: ICD-10-CM

## 2021-07-01 RX ORDER — ONDANSETRON 4 MG/1
TABLET, FILM COATED ORAL
Qty: 30 TABLET | Refills: 3 | Status: SHIPPED | OUTPATIENT
Start: 2021-07-01 | End: 2023-04-24

## 2021-08-02 NOTE — PROGRESS NOTES
Subjective:   8/3/2021  2:01 PM  Primary care physician:Oliva Alegre D.O.  Radiation oncology:Quique Keita MD  Referring Provider: Johanna Hooper MD   Medical Oncologist: Alfonzo Servin MD     Chief Complaint:   Chief Complaint   Patient presents with   • New Patient     NP LIVER CA REF DR SERVIN OLD PATIENT SEE LAST IN 6/2018     Diagnosis:   1. Primary malignant neoplasm of liver (HCC)     2. Abdominal pain, unspecified abdominal location     3. Portal hypertension (HCC)     4. Alcoholic cirrhosis of liver without ascites (HCC)         History of presenting illness:  Sohail Duvall  is a pleasant 70 y.o. year old male who presented with What appears to be multifocal HCC.  The patient is an old patient of mine who has not returned to see me since 2017.  In the interval time he has undergone a Y 90, multiple cases, and he was recently seen by Dr. Servin in order to possibly start systemic treatment for his HCC.  The patient is a child B cirrhotic.  His most recent labs which were May 2021 showed bilirubin of 2.1, platelet count of 70, I do not have an INR and is elevated LFTs.  Patient has not had a staging chest CT or a bone scan recently.  I have no tumor markers.  He was seen by Dr. Keita from radiation oncology who felt he had nothing for the patient.  He was then referred in to see me regarding possible ablation.  I personally reviewed the patient's old CT scan and his most recent MRI from June 2021.  I also reviewed the labs going back several months.  I also reviewed Dr. Servin's note as well as the note from Dr. Keita.  I have also reviewed all the reports from interventional radiology.  He is here with his wife to discuss neck steps.  He has not seen his gastroenterologist in some time and no one is managing his tense ascites.      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 Stopped both medications because blood pressure readings were low, /82 today and has been low at home since stopping medications about 5 days ago. Last reading at home 117/72    • Indigestion    • Liver cancer (HCC)    • Liver tumor    • Plantar fasciitis of right foot 8/12/2019   • Rosacea    • Snoring     no sleep study     Past Surgical History:   Procedure Laterality Date   • WOUND EXPLORATION ORTHO Right 8/5/2020    Procedure: EXPLORATION, WOUND- THUMB;  Surgeon: Yumiko Griffiths M.D.;  Location: SURGERY SAME DAY [facility];  Service: Orthopedics   • TENDON REPAIR  8/5/2020    Procedure: REPAIR, TENDON- EXTENSOR;  Surgeon: Yumiko Griffiths M.D.;  Location: SURGERY SAME DAY [facility];  Service: Orthopedics   • HEPATIC ABLATION LAPAROSCOPIC  6/28/2018    Procedure: HEPATIC ABLATION LAPAROSCOPIC. SEGMENT 4B, HEPATOMA, REPAIR OF INCARCERATED UMBILICAL HERNIA;  Surgeon: Feliberto Burgos M.D.;  Location: SURGERY [facility];  Service: General   • BRACHY THERAPY       No Known Allergies  Outpatient Encounter Medications as of 8/3/2021   Medication Sig Dispense Refill   • ondansetron (ZOFRAN) 4 MG Tab tablet TAKE ONE TABLET BY MOUTH EVERY 4 HOURS AS NEEDED FOR NAUSEA AND VOMITING 30 tablet 3   • metFORMIN ER (GLUCOPHAGE XR) 500 MG TABLET SR 24 HR TAKE ONE TABLET BY MOUTH TWICE A  tablet 1   • losartan (COZAAR) 50 MG Tab TAKE ONE TABLET BY MOUTH DAILY 90 tablet 1   • Vitamin D, Cholecalciferol, 25 MCG (1000 UT) Cap Take 1,000 Units by mouth every day.     • cetirizine (ZYRTEC) 10 MG Tab Take 10 mg by mouth every morning.     • Multiple  "Vitamins-Minerals (MULTIVITAMIN PO) Take 1 Tab by mouth every morning.     • Polyethylene Glycol 3350 (MIRALAX PO) Take 1 Cap by mouth every morning.     • fluticasone (FLONASE ALLERGY RELIEF) 50 MCG/ACT nasal spray Spray 1 Spray in nose 2 times a day. 1 Bottle 0     No facility-administered encounter medications on file as of 8/3/2021.     Social History     Socioeconomic History   • Marital status:      Spouse name: Not on file   • Number of children: 1   • Years of education: Not on file   • Highest education level: Not on file   Occupational History   • Occupation: fertilizer sales   Tobacco Use   • Smoking status: Never Smoker   • Smokeless tobacco: Former User     Types: Snuff   Vaping Use   • Vaping Use: Never used   Substance and Sexual Activity   • Alcohol use: Not Currently     Alcohol/week: 16.8 oz     Types: 28 Cans of beer per week   • Drug use: Yes     Types: Marijuana, Inhaled, Oral     Comment: \"Marijuana Use\" daily    • Sexual activity: Never   Other Topics Concern   • Not on file   Social History Narrative    No known exposure to asbestos, dyes, or chemicals, however he was exposed to pesticides.  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  1 child, alive and well.  He also has a step-child.      Social Determinants of Health     Financial Resource Strain:    • Difficulty of Paying Living Expenses:    Food Insecurity:    • Worried About Running Out of Food in the Last Year:    • Ran Out of Food in the Last Year:    Transportation Needs:    • Lack of Transportation (Medical):    • Lack of Transportation (Non-Medical):    Physical Activity:    • Days of Exercise per Week:    • Minutes of Exercise per Session:    Stress:    • Feeling of Stress :    Social Connections:    • Frequency of Communication with Friends and Family:    • Frequency of Social Gatherings with Friends and Family:    • Attends [employer] Services:    • Active " "Member of Clubs or Organizations:    • Attends Club or Organization Meetings:    • Marital Status:    Intimate Partner Violence:    • Fear of Current or Ex-Partner:    • Emotionally Abused:    • Physically Abused:    • Sexually Abused:       Social History     Tobacco Use   Smoking Status Never Smoker   Smokeless Tobacco Former User   • Types: Snuff     Social History     Substance and Sexual Activity   Alcohol Use Not Currently   • Alcohol/week: 16.8 oz   • Types: 28 Cans of beer per week     Social History     Substance and Sexual Activity   Drug Use Yes   • Types: Marijuana, Inhaled, Oral    Comment: \"Marijuana Use\" daily         Family History   Problem Relation Age of Onset   • Cancer Father 81        Bladder   • Cancer Brother         Prostate & CLL (s/p 8-10 years ago)   • Hyperlipidemia Sister    • Lung Disease Neg Hx    • Diabetes Neg Hx    • Heart Disease Neg Hx    • Hypertension Neg Hx    • Stroke Neg Hx    • Alcohol/Drug Neg Hx        Review of Systems   Gastrointestinal: Positive for abdominal pain.   All other systems reviewed and are negative.       Objective:   /68 (BP Location: Right arm, Patient Position: Sitting, BP Cuff Size: Adult)   Pulse (!) 58   Temp 36.3 °C (97.3 °F) (Temporal)   Ht 1.702 m (5' 7\")   Wt 104 kg (230 lb)   SpO2 98%   BMI 36.02 kg/m²     Physical Exam  Vitals and nursing note reviewed.   Constitutional:       Appearance: Normal appearance.   HENT:      Head: Normocephalic and atraumatic.      Nose: Nose normal.      Mouth/Throat:      Mouth: Mucous membranes are dry.   Eyes:      General: Scleral icterus present.      Extraocular Movements: Extraocular movements intact.      Pupils: Pupils are equal, round, and reactive to light.   Abdominal:      General: Abdomen is flat. Bowel sounds are normal.      Palpations: Abdomen is soft.      Comments: Severe ascites even consider tense.  Obvious umbilical hernia but no pain.   Musculoskeletal:         General: Normal " range of motion.   Skin:     General: Skin is warm and dry.   Neurological:      General: No focal deficit present.      Mental Status: He is alert and oriented to person, place, and time.   Psychiatric:         Mood and Affect: Mood normal.         Behavior: Behavior normal.         Thought Content: Thought content normal.         Judgment: Judgment normal.         Labs:  Results for LUIS KAYE (MRN [id number]) as of 8/2/2021 15:00   Ref. Range 5/18/2021 10:15   WBC Latest Ref Range: 4.8 - 10.8 K/uL 3.1 (L)   RBC Latest Ref Range: 4.70 - 6.10 M/uL 5.36   Hemoglobin Latest Ref Range: 14.0 - 18.0 g/dL 18.1 (H)   Hematocrit Latest Ref Range: 42.0 - 52.0 % 51.1   MCV Latest Ref Range: 81.4 - 97.8 fL 95.3   MCH Latest Ref Range: 27.0 - 33.0 pg 33.8 (H)   MCHC Latest Ref Range: 33.7 - 35.3 g/dL 35.4 (H)   RDW Latest Ref Range: 35.9 - 50.0 fL 49.2   Platelet Count Latest Ref Range: 164 - 446 K/uL 71 (L)   MPV Latest Ref Range: 9.0 - 12.9 fL 10.4   Neutrophils-Polys Latest Ref Range: 44.00 - 72.00 % 61.40   Neutrophils (Absolute) Latest Ref Range: 1.82 - 7.42 K/uL 1.93   Lymphocytes Latest Ref Range: 22.00 - 41.00 % 23.60   Lymphs (Absolute) Latest Ref Range: 1.00 - 4.80 K/uL 0.74 (L)   Monocytes Latest Ref Range: 0.00 - 13.40 % 9.90   Monos (Absolute) Latest Ref Range: 0.00 - 0.85 K/uL 0.31   Eosinophils Latest Ref Range: 0.00 - 6.90 % 3.80   Eos (Absolute) Latest Ref Range: 0.00 - 0.51 K/uL 0.12   Basophils Latest Ref Range: 0.00 - 1.80 % 1.00   Baso (Absolute) Latest Ref Range: 0.00 - 0.12 K/uL 0.03   Immature Granulocytes Latest Ref Range: 0.00 - 0.90 % 0.30   Immature Granulocytes (abs) Latest Ref Range: 0.00 - 0.11 K/uL 0.01   Nucleated RBC Latest Units: /100 WBC 0.00   NRBC (Absolute) Latest Units: K/uL 0.00   Sodium Latest Ref Range: 135 - 145 mmol/L 144   Potassium Latest Ref Range: 3.6 - 5.5 mmol/L 4.1   Chloride Latest Ref Range: 96 - 112 mmol/L 108   Co2 Latest Ref Range: 20 - 33 mmol/L 25   Anion  Gap Latest Ref Range: 7.0 - 16.0  11.0   Glucose Latest Ref Range: 65 - 99 mg/dL 148 (H)   Bun Latest Ref Range: 8 - 22 mg/dL 10   Creatinine Latest Ref Range: 0.50 - 1.40 mg/dL 0.75   GFR If  Latest Ref Range: >60 mL/min/1.73 m 2 >60   GFR If Non  Latest Ref Range: >60 mL/min/1.73 m 2 >60   Calcium Latest Ref Range: 8.5 - 10.5 mg/dL 9.9   AST(SGOT) Latest Ref Range: 12 - 45 U/L 41   ALT(SGPT) Latest Ref Range: 2 - 50 U/L 39   Alkaline Phosphatase Latest Ref Range: 30 - 99 U/L 75   Total Bilirubin Latest Ref Range: 0.1 - 1.5 mg/dL 2.1 (H)   Albumin Latest Ref Range: 3.2 - 4.9 g/dL 4.7   Total Protein Latest Ref Range: 6.0 - 8.2 g/dL 7.1   Globulin Latest Ref Range: 1.9 - 3.5 g/dL 2.4   A-G Ratio Latest Units: g/dL 2.0   Gamma Gt Latest Ref Range: 7 - 51 U/L 293 (H)   INR Latest Ref Range: 0.87 - 1.13  1.16 (H)   PT Latest Ref Range: 12.0 - 14.6 sec 15.1 (H)   Jordon-gamma-carboxy Prothrombin Latest Ref Range: 0.0 - 7.4 ng/mL 1.1   25-Hydroxy   Vitamin D 25 Latest Ref Range: 30 - 80 ng/mL 52   AFP L3% Latest Ref Range: 0.0 - 9.9 % 6.2   Alpha Fetoprotein Latest Ref Range: 0 - 15 ng/mL 6   Retinal Interp Unknown Normal   Retinol Palm Latest Ref Range: 0.00 - 0.10 mg/L 0.03   Vitamin A Latest Ref Range: 0.30 - 1.20 mg/L 0.74       Imagin21 MRI   Per my read,     IMPRESSION:        1.  Morphologic characteristics of cirrhosis with evidence of portal hypertension including splenomegaly, trace ascites, and varices.     2.  Observation in the medial left hepatic lobe demonstrates interval enlargement, subthreshold.LR-5     3.  Observation bordering segments 2 and 3 is stable. LR-5     4.  Observation in segment 2 is stable. LR-3     5.  Observation in the hepatic dome has increased in size from the prior exam, subthreshold growth. LR-5     6.  Caudate lobe lesion remains avascular status post TACE. LR-treated nonviable     7.  Ablation cavity in the left hepatic lobe remains avascular.  LR-treated nonviable     8.  Benign left adrenal adenoma.     9.  Cholelithiasis    3/19/21 CT     IMPRESSION:     Enhancing lesion at the hepatic dome anteriorly is likely not significantly changed, worrisome for hepatocellular carcinoma.     Enhancing lesion within segment 4 of the liver is also likely not significantly changed, also worrisome for hepatocellular carcinoma.     Small 6 mm enhancing focus within the segment 4 of the liver is not significantly changed.     Enhancing 6 mm focus within the left lobe of the liver is stable.     6.5 mm enhancing focus near the intrahepatic IVC was not definitively seen previously.     Posttreatment changes are again noted in the caudate lobe and in the anterior right lobe of the liver.     Findings of cirrhosis and portal hypertension.     Left adrenal adenoma.     Colonic diverticulosis.     Cholelithiasis     Atherosclerotic plaque.            Pathology:  NA    Procedures: 6/28/18    1.  Laparoscopic microwave thermal ablation of segment 4B lesion at 100 flowers   at 2 minutes giving us an ablation cavity of 3x3.7 with a 100 watt track   ablation of 7 seconds.  2.  Repair of incarcerated ventral hernia primarily with no mesh.    Diagnosis:     1. Primary malignant neoplasm of liver (HCC)     2. Abdominal pain, unspecified abdominal location     3. Portal hypertension (HCC)     4. Alcoholic cirrhosis of liver without ascites (HCC)             Medical Decision Making:  Today's Assessment / Status / Plan:     In light of the present findings, the patient is seeing me secondary to multifocal HCC.  I have not seen the patient since 2017 and he has undergone yttrium-90 treatments, multiple cases, it does not appear as though his ascites is being managed well, and we have no recent labs in the last 2 months regarding his liver function tests.  He does not have a bone scan or chest CT to stage him fully.  My recommendation is to get a CBC, CMP, AFP, AFP L3 percent, DCP, chest  CT, and a bone scan.  I have ordered those.  The patient will see me after it has been done.  I am also reaching out to his gastroenterologist to manage his tense ascites.    I, Dr. Senior have entered, reviewed and confirmed the above diagnoses related to this patient on this date of service, 8/3/2021  2:01 PM.    He agreed and verbalized his agreement and understanding with the current plan. I answered all questions and concerns he has at this time and advised him to call at any time in the interim with questions or concerns in regards to his care.    Thank you for allowing me to participate in his care, I will continue to follow closely.       Please note that this dictation was created using voice recognition software. I have made every reasonable attempt to correct obvious errors, but I expect that there are errors of grammar and possibly content that I did not discover before finalizing the note.     Thank you for this consultation and allowing me to participate in your patient's care. If I can be of further service please contact my office.

## 2021-08-03 ENCOUNTER — OFFICE VISIT (OUTPATIENT)
Dept: SURGICAL ONCOLOGY | Facility: MEDICAL CENTER | Age: 71
End: 2021-08-03
Payer: MEDICARE

## 2021-08-03 VITALS
WEIGHT: 230 LBS | HEIGHT: 67 IN | DIASTOLIC BLOOD PRESSURE: 68 MMHG | BODY MASS INDEX: 36.1 KG/M2 | OXYGEN SATURATION: 98 % | TEMPERATURE: 97.3 F | HEART RATE: 58 BPM | SYSTOLIC BLOOD PRESSURE: 144 MMHG

## 2021-08-03 DIAGNOSIS — K76.6 PORTAL HYPERTENSION (HCC): ICD-10-CM

## 2021-08-03 DIAGNOSIS — C22.8 PRIMARY MALIGNANT NEOPLASM OF LIVER (HCC): ICD-10-CM

## 2021-08-03 DIAGNOSIS — K70.30 ALCOHOLIC CIRRHOSIS OF LIVER WITHOUT ASCITES (HCC): ICD-10-CM

## 2021-08-03 DIAGNOSIS — R10.9 ABDOMINAL PAIN, UNSPECIFIED ABDOMINAL LOCATION: ICD-10-CM

## 2021-08-03 PROCEDURE — 99205 OFFICE O/P NEW HI 60 MIN: CPT | Performed by: SURGERY

## 2021-08-03 ASSESSMENT — ENCOUNTER SYMPTOMS: ABDOMINAL PAIN: 1

## 2021-08-03 ASSESSMENT — FIBROSIS 4 INDEX: FIB4 SCORE: 6.47

## 2021-08-04 NOTE — ASSESSMENT & PLAN NOTE
A1c 7 in April 2016 recently in October A1c down to 5.3. Has been limiting carbs and lost 16 pounds since June. Still walking 1/2 mile daily. Managed with metformin 500 mg xr bid with meals. Dinner and lunch, breakfast if he eats in the morning. Due have eye exam, requested.  Will decrease metformin to once daily now, has been taking one then two daily alternating.   FLP within range - LDL 57 no treatment necessary.     fair balance

## 2021-08-11 ENCOUNTER — HOSPITAL ENCOUNTER (OUTPATIENT)
Dept: LAB | Facility: MEDICAL CENTER | Age: 71
End: 2021-08-11
Attending: SURGERY
Payer: MEDICARE

## 2021-08-11 ENCOUNTER — HOSPITAL ENCOUNTER (OUTPATIENT)
Dept: LAB | Facility: MEDICAL CENTER | Age: 71
End: 2021-08-11
Attending: INTERNAL MEDICINE
Payer: MEDICARE

## 2021-08-11 DIAGNOSIS — C22.8 PRIMARY MALIGNANT NEOPLASM OF LIVER (HCC): ICD-10-CM

## 2021-08-11 DIAGNOSIS — R10.9 ABDOMINAL PAIN, UNSPECIFIED ABDOMINAL LOCATION: ICD-10-CM

## 2021-08-11 DIAGNOSIS — K76.6 PORTAL HYPERTENSION (HCC): ICD-10-CM

## 2021-08-11 DIAGNOSIS — K70.30 ALCOHOLIC CIRRHOSIS OF LIVER WITHOUT ASCITES (HCC): ICD-10-CM

## 2021-08-11 LAB
ALBUMIN SERPL BCP-MCNC: 4.4 G/DL (ref 3.2–4.9)
ALBUMIN SERPL BCP-MCNC: 4.4 G/DL (ref 3.2–4.9)
ALBUMIN/GLOB SERPL: 1.9 G/DL
ALBUMIN/GLOB SERPL: 2 G/DL
ALP SERPL-CCNC: 65 U/L (ref 30–99)
ALP SERPL-CCNC: 66 U/L (ref 30–99)
ALT SERPL-CCNC: 50 U/L (ref 2–50)
ALT SERPL-CCNC: 50 U/L (ref 2–50)
ANION GAP SERPL CALC-SCNC: 10 MMOL/L (ref 7–16)
ANION GAP SERPL CALC-SCNC: 11 MMOL/L (ref 7–16)
AST SERPL-CCNC: 34 U/L (ref 12–45)
AST SERPL-CCNC: 36 U/L (ref 12–45)
BASOPHILS # BLD AUTO: 0.9 % (ref 0–1.8)
BASOPHILS # BLD AUTO: 1.2 % (ref 0–1.8)
BASOPHILS # BLD: 0.04 K/UL (ref 0–0.12)
BASOPHILS # BLD: 0.05 K/UL (ref 0–0.12)
BILIRUB SERPL-MCNC: 1.6 MG/DL (ref 0.1–1.5)
BILIRUB SERPL-MCNC: 1.6 MG/DL (ref 0.1–1.5)
BUN SERPL-MCNC: 23 MG/DL (ref 8–22)
BUN SERPL-MCNC: 24 MG/DL (ref 8–22)
CALCIUM SERPL-MCNC: 9.4 MG/DL (ref 8.5–10.5)
CALCIUM SERPL-MCNC: 9.4 MG/DL (ref 8.5–10.5)
CHLORIDE SERPL-SCNC: 102 MMOL/L (ref 96–112)
CHLORIDE SERPL-SCNC: 105 MMOL/L (ref 96–112)
CO2 SERPL-SCNC: 24 MMOL/L (ref 20–33)
CO2 SERPL-SCNC: 25 MMOL/L (ref 20–33)
CREAT SERPL-MCNC: 0.9 MG/DL (ref 0.5–1.4)
CREAT SERPL-MCNC: 0.92 MG/DL (ref 0.5–1.4)
EOSINOPHIL # BLD AUTO: 0.17 K/UL (ref 0–0.51)
EOSINOPHIL # BLD AUTO: 0.18 K/UL (ref 0–0.51)
EOSINOPHIL NFR BLD: 4 % (ref 0–6.9)
EOSINOPHIL NFR BLD: 4.1 % (ref 0–6.9)
ERYTHROCYTE [DISTWIDTH] IN BLOOD BY AUTOMATED COUNT: 47.6 FL (ref 35.9–50)
ERYTHROCYTE [DISTWIDTH] IN BLOOD BY AUTOMATED COUNT: 47.7 FL (ref 35.9–50)
GLOBULIN SER CALC-MCNC: 2.2 G/DL (ref 1.9–3.5)
GLOBULIN SER CALC-MCNC: 2.3 G/DL (ref 1.9–3.5)
GLUCOSE SERPL-MCNC: 215 MG/DL (ref 65–99)
GLUCOSE SERPL-MCNC: 217 MG/DL (ref 65–99)
HCT VFR BLD AUTO: 49.6 % (ref 42–52)
HCT VFR BLD AUTO: 50.3 % (ref 42–52)
HGB BLD-MCNC: 17.5 G/DL (ref 14–18)
HGB BLD-MCNC: 17.7 G/DL (ref 14–18)
IMM GRANULOCYTES # BLD AUTO: 0.02 K/UL (ref 0–0.11)
IMM GRANULOCYTES # BLD AUTO: 0.02 K/UL (ref 0–0.11)
IMM GRANULOCYTES NFR BLD AUTO: 0.5 % (ref 0–0.9)
IMM GRANULOCYTES NFR BLD AUTO: 0.5 % (ref 0–0.9)
INR PPP: 1.25 (ref 0.87–1.13)
LYMPHOCYTES # BLD AUTO: 1.11 K/UL (ref 1–4.8)
LYMPHOCYTES # BLD AUTO: 1.12 K/UL (ref 1–4.8)
LYMPHOCYTES NFR BLD: 25.1 % (ref 22–41)
LYMPHOCYTES NFR BLD: 26.1 % (ref 22–41)
MCH RBC QN AUTO: 34 PG (ref 27–33)
MCH RBC QN AUTO: 34.8 PG (ref 27–33)
MCHC RBC AUTO-ENTMCNC: 34.8 G/DL (ref 33.7–35.3)
MCHC RBC AUTO-ENTMCNC: 35.7 G/DL (ref 33.7–35.3)
MCV RBC AUTO: 97.6 FL (ref 81.4–97.8)
MCV RBC AUTO: 97.7 FL (ref 81.4–97.8)
MONOCYTES # BLD AUTO: 0.32 K/UL (ref 0–0.85)
MONOCYTES # BLD AUTO: 0.37 K/UL (ref 0–0.85)
MONOCYTES NFR BLD AUTO: 7.5 % (ref 0–13.4)
MONOCYTES NFR BLD AUTO: 8.4 % (ref 0–13.4)
NEUTROPHILS # BLD AUTO: 2.61 K/UL (ref 1.82–7.42)
NEUTROPHILS # BLD AUTO: 2.7 K/UL (ref 1.82–7.42)
NEUTROPHILS NFR BLD: 60.7 % (ref 44–72)
NEUTROPHILS NFR BLD: 61 % (ref 44–72)
NRBC # BLD AUTO: 0 K/UL
NRBC # BLD AUTO: 0 K/UL
NRBC BLD-RTO: 0 /100 WBC
NRBC BLD-RTO: 0 /100 WBC
PLATELET # BLD AUTO: 78 K/UL (ref 164–446)
PLATELET # BLD AUTO: 79 K/UL (ref 164–446)
PMV BLD AUTO: 11 FL (ref 9–12.9)
PMV BLD AUTO: 11.6 FL (ref 9–12.9)
POTASSIUM SERPL-SCNC: 4.2 MMOL/L (ref 3.6–5.5)
POTASSIUM SERPL-SCNC: 4.3 MMOL/L (ref 3.6–5.5)
PROT SERPL-MCNC: 6.6 G/DL (ref 6–8.2)
PROT SERPL-MCNC: 6.7 G/DL (ref 6–8.2)
PROTHROMBIN TIME: 15.4 SEC (ref 12–14.6)
RBC # BLD AUTO: 5.08 M/UL (ref 4.7–6.1)
RBC # BLD AUTO: 5.15 M/UL (ref 4.7–6.1)
SODIUM SERPL-SCNC: 137 MMOL/L (ref 135–145)
SODIUM SERPL-SCNC: 140 MMOL/L (ref 135–145)
WBC # BLD AUTO: 4.3 K/UL (ref 4.8–10.8)
WBC # BLD AUTO: 4.4 K/UL (ref 4.8–10.8)

## 2021-08-11 PROCEDURE — 80053 COMPREHEN METABOLIC PANEL: CPT | Mod: 91

## 2021-08-11 PROCEDURE — 85025 COMPLETE CBC W/AUTO DIFF WBC: CPT | Mod: 91

## 2021-08-11 PROCEDURE — 80053 COMPREHEN METABOLIC PANEL: CPT

## 2021-08-11 PROCEDURE — 82105 ALPHA-FETOPROTEIN SERUM: CPT

## 2021-08-11 PROCEDURE — 85025 COMPLETE CBC W/AUTO DIFF WBC: CPT

## 2021-08-11 PROCEDURE — 36415 COLL VENOUS BLD VENIPUNCTURE: CPT

## 2021-08-11 PROCEDURE — 83951 ONCOPROTEIN DCP: CPT

## 2021-08-11 PROCEDURE — 85610 PROTHROMBIN TIME: CPT

## 2021-08-11 PROCEDURE — 82107 ALPHA-FETOPROTEIN L3: CPT

## 2021-08-12 ENCOUNTER — HOSPITAL ENCOUNTER (OUTPATIENT)
Dept: RADIOLOGY | Facility: MEDICAL CENTER | Age: 71
End: 2021-08-12
Attending: SURGERY
Payer: MEDICARE

## 2021-08-12 DIAGNOSIS — K76.6 PORTAL HYPERTENSION (HCC): ICD-10-CM

## 2021-08-12 DIAGNOSIS — K70.30 ALCOHOLIC CIRRHOSIS OF LIVER WITHOUT ASCITES (HCC): ICD-10-CM

## 2021-08-12 DIAGNOSIS — R10.9 ABDOMINAL PAIN, UNSPECIFIED ABDOMINAL LOCATION: ICD-10-CM

## 2021-08-12 DIAGNOSIS — C22.8 PRIMARY MALIGNANT NEOPLASM OF LIVER (HCC): ICD-10-CM

## 2021-08-12 PROCEDURE — 700117 HCHG RX CONTRAST REV CODE 255: Performed by: SURGERY

## 2021-08-12 PROCEDURE — 71260 CT THORAX DX C+: CPT | Mod: ME

## 2021-08-12 RX ADMIN — IOHEXOL 80 ML: 350 INJECTION, SOLUTION INTRAVENOUS at 13:19

## 2021-08-14 LAB
AFP L3 MFR SERPL: 6.3 % (ref 0–9.9)
AFP SERPL-MCNC: 6 NG/ML (ref 0–15)

## 2021-08-15 LAB
ACARBOXYPROTHROMBIN SERPL-MCNC: 1.3 NG/ML (ref 0–7.4)
AFP-TM SERPL-MCNC: 7 NG/ML (ref 0–9)

## 2021-09-14 VITALS — DIASTOLIC BLOOD PRESSURE: 64 MMHG | SYSTOLIC BLOOD PRESSURE: 105 MMHG

## 2021-09-20 ENCOUNTER — HOSPITAL ENCOUNTER (OUTPATIENT)
Dept: RADIOLOGY | Facility: MEDICAL CENTER | Age: 71
End: 2021-09-20
Attending: INTERNAL MEDICINE
Payer: MEDICARE

## 2021-09-20 DIAGNOSIS — D75.1 ERYTHROCYTOSIS DUE TO HEPATOMA (HCC): ICD-10-CM

## 2021-09-20 DIAGNOSIS — C91.11 CHRONIC LYMPHOID LEUKEMIA IN REMISSION (HCC): ICD-10-CM

## 2021-09-20 DIAGNOSIS — C22.0 ERYTHROCYTOSIS DUE TO HEPATOMA (HCC): ICD-10-CM

## 2021-09-20 PROCEDURE — 78306 BONE IMAGING WHOLE BODY: CPT | Mod: MH

## 2021-10-13 ENCOUNTER — APPOINTMENT (OUTPATIENT)
Dept: MEDICAL GROUP | Facility: PHYSICIAN GROUP | Age: 71
End: 2021-10-13
Payer: MEDICARE

## 2021-10-13 ENCOUNTER — APPOINTMENT (OUTPATIENT)
Dept: RADIOLOGY | Facility: MEDICAL CENTER | Age: 71
End: 2021-10-13
Attending: INTERNAL MEDICINE
Payer: MEDICARE

## 2021-10-13 DIAGNOSIS — C22.0 ERYTHROCYTOSIS DUE TO HEPATOMA (HCC): ICD-10-CM

## 2021-10-13 DIAGNOSIS — C91.11 CHRONIC LYMPHOID LEUKEMIA IN REMISSION (HCC): ICD-10-CM

## 2021-10-13 DIAGNOSIS — D75.1 ERYTHROCYTOSIS DUE TO HEPATOMA (HCC): ICD-10-CM

## 2021-10-18 ENCOUNTER — HOSPITAL ENCOUNTER (OUTPATIENT)
Dept: LAB | Facility: MEDICAL CENTER | Age: 71
End: 2021-10-18
Attending: INTERNAL MEDICINE
Payer: MEDICARE

## 2021-10-18 LAB
ALBUMIN SERPL BCP-MCNC: 4.7 G/DL (ref 3.2–4.9)
ALBUMIN/GLOB SERPL: 2 G/DL
ALP SERPL-CCNC: 55 U/L (ref 30–99)
ALT SERPL-CCNC: 30 U/L (ref 2–50)
ANION GAP SERPL CALC-SCNC: 12 MMOL/L (ref 7–16)
AST SERPL-CCNC: 25 U/L (ref 12–45)
BASOPHILS # BLD AUTO: 0.7 % (ref 0–1.8)
BASOPHILS # BLD: 0.03 K/UL (ref 0–0.12)
BILIRUB SERPL-MCNC: 1.7 MG/DL (ref 0.1–1.5)
BUN SERPL-MCNC: 21 MG/DL (ref 8–22)
CALCIUM SERPL-MCNC: 10.4 MG/DL (ref 8.5–10.5)
CHLORIDE SERPL-SCNC: 101 MMOL/L (ref 96–112)
CO2 SERPL-SCNC: 24 MMOL/L (ref 20–33)
CREAT SERPL-MCNC: 0.8 MG/DL (ref 0.5–1.4)
EOSINOPHIL # BLD AUTO: 0.09 K/UL (ref 0–0.51)
EOSINOPHIL NFR BLD: 2 % (ref 0–6.9)
ERYTHROCYTE [DISTWIDTH] IN BLOOD BY AUTOMATED COUNT: 49.3 FL (ref 35.9–50)
GLOBULIN SER CALC-MCNC: 2.3 G/DL (ref 1.9–3.5)
GLUCOSE SERPL-MCNC: 230 MG/DL (ref 65–99)
HCT VFR BLD AUTO: 43.6 % (ref 42–52)
HGB BLD-MCNC: 15.5 G/DL (ref 14–18)
IMM GRANULOCYTES # BLD AUTO: 0.02 K/UL (ref 0–0.11)
IMM GRANULOCYTES NFR BLD AUTO: 0.5 % (ref 0–0.9)
INR PPP: 1.14 (ref 0.87–1.13)
LYMPHOCYTES # BLD AUTO: 1.31 K/UL (ref 1–4.8)
LYMPHOCYTES NFR BLD: 29.6 % (ref 22–41)
MCH RBC QN AUTO: 33.6 PG (ref 27–33)
MCHC RBC AUTO-ENTMCNC: 35.6 G/DL (ref 33.7–35.3)
MCV RBC AUTO: 94.6 FL (ref 81.4–97.8)
MONOCYTES # BLD AUTO: 0.45 K/UL (ref 0–0.85)
MONOCYTES NFR BLD AUTO: 10.2 % (ref 0–13.4)
NEUTROPHILS # BLD AUTO: 2.53 K/UL (ref 1.82–7.42)
NEUTROPHILS NFR BLD: 57 % (ref 44–72)
NRBC # BLD AUTO: 0 K/UL
NRBC BLD-RTO: 0 /100 WBC
PLATELET # BLD AUTO: 90 K/UL (ref 164–446)
PMV BLD AUTO: 10.3 FL (ref 9–12.9)
POTASSIUM SERPL-SCNC: 4.2 MMOL/L (ref 3.6–5.5)
PROT SERPL-MCNC: 7 G/DL (ref 6–8.2)
PROTHROMBIN TIME: 14.3 SEC (ref 12–14.6)
RBC # BLD AUTO: 4.61 M/UL (ref 4.7–6.1)
SODIUM SERPL-SCNC: 137 MMOL/L (ref 135–145)
WBC # BLD AUTO: 4.4 K/UL (ref 4.8–10.8)

## 2021-10-18 PROCEDURE — 36415 COLL VENOUS BLD VENIPUNCTURE: CPT

## 2021-10-18 PROCEDURE — 80053 COMPREHEN METABOLIC PANEL: CPT

## 2021-10-18 PROCEDURE — 82107 ALPHA-FETOPROTEIN L3: CPT

## 2021-10-18 PROCEDURE — 85025 COMPLETE CBC W/AUTO DIFF WBC: CPT

## 2021-10-18 PROCEDURE — 85610 PROTHROMBIN TIME: CPT

## 2021-10-18 PROCEDURE — 83951 ONCOPROTEIN DCP: CPT

## 2021-10-21 DIAGNOSIS — E11.9 TYPE 2 DIABETES MELLITUS WITHOUT COMPLICATION, WITHOUT LONG-TERM CURRENT USE OF INSULIN (HCC): ICD-10-CM

## 2021-10-21 LAB
ACARBOXYPROTHROMBIN SERPL-MCNC: 1.6 NG/ML (ref 0–7.4)
AFP L3 MFR SERPL: 9.7 % (ref 0–9.9)
AFP SERPL-MCNC: 6 NG/ML (ref 0–15)

## 2021-10-22 RX ORDER — METFORMIN HYDROCHLORIDE 500 MG/1
TABLET, EXTENDED RELEASE ORAL
Qty: 180 TABLET | Refills: 1 | Status: SHIPPED | OUTPATIENT
Start: 2021-10-22 | End: 2022-05-19 | Stop reason: SDUPTHER

## 2021-10-25 ENCOUNTER — OFFICE VISIT (OUTPATIENT)
Dept: MEDICAL GROUP | Facility: PHYSICIAN GROUP | Age: 71
End: 2021-10-25
Payer: MEDICARE

## 2021-10-25 VITALS
OXYGEN SATURATION: 97 % | BODY MASS INDEX: 34.14 KG/M2 | DIASTOLIC BLOOD PRESSURE: 62 MMHG | SYSTOLIC BLOOD PRESSURE: 104 MMHG | HEART RATE: 86 BPM | TEMPERATURE: 97.8 F | WEIGHT: 218 LBS

## 2021-10-25 DIAGNOSIS — E11.9 TYPE 2 DIABETES MELLITUS WITHOUT COMPLICATION, WITHOUT LONG-TERM CURRENT USE OF INSULIN (HCC): ICD-10-CM

## 2021-10-25 DIAGNOSIS — I95.9 HYPOTENSION, UNSPECIFIED HYPOTENSION TYPE: ICD-10-CM

## 2021-10-25 DIAGNOSIS — I15.2 HYPERTENSION SECONDARY TO ENDOCRINE DISORDERS: ICD-10-CM

## 2021-10-25 DIAGNOSIS — C22.8 PRIMARY MALIGNANT NEOPLASM OF LIVER (HCC): ICD-10-CM

## 2021-10-25 LAB
HBA1C MFR BLD: 7 % (ref 0–5.6)
INT CON NEG: NEGATIVE
INT CON POS: POSITIVE

## 2021-10-25 PROCEDURE — 83036 HEMOGLOBIN GLYCOSYLATED A1C: CPT | Performed by: FAMILY MEDICINE

## 2021-10-25 PROCEDURE — 99214 OFFICE O/P EST MOD 30 MIN: CPT | Performed by: FAMILY MEDICINE

## 2021-10-25 ASSESSMENT — FIBROSIS 4 INDEX: FIB4 SCORE: 3.55

## 2021-10-26 NOTE — PROGRESS NOTES
CC: Low blood pressure    HISTORY OF THE PRESENT ILLNESS: Patient is a 70 y.o. male.     Patient is here today for routine follow-up on chronic health issues including hypertension, type 2 diabetes.    Allergies: Patient has no known allergies.    Current Outpatient Medications Ordered in Epic   Medication Sig Dispense Refill   • metFORMIN ER (GLUCOPHAGE XR) 500 MG TABLET SR 24 HR TAKE ONE TABLET BY MOUTH TWICE A  Tablet 1   • ondansetron (ZOFRAN) 4 MG Tab tablet TAKE ONE TABLET BY MOUTH EVERY 4 HOURS AS NEEDED FOR NAUSEA AND VOMITING 30 tablet 3   • Vitamin D, Cholecalciferol, 25 MCG (1000 UT) Cap Take 1,000 Units by mouth every day.     • cetirizine (ZYRTEC) 10 MG Tab Take 10 mg by mouth every morning.     • Multiple Vitamins-Minerals (MULTIVITAMIN PO) Take 1 Tab by mouth every morning.     • Polyethylene Glycol 3350 (MIRALAX PO) Take 1 Cap by mouth every morning.     • fluticasone (FLONASE ALLERGY RELIEF) 50 MCG/ACT nasal spray Spray 1 Spray in nose 2 times a day. 1 Bottle 0     No current Epic-ordered facility-administered medications on file.       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 Stopped both medications because blood pressure readings were low, /82 today and has been low at home since stopping medications about 5 days ago. Last reading at home 117/72    • Indigestion    • Liver cancer (HCC)    • Liver tumor    • Plantar fasciitis of right foot 8/12/2019   • Rosacea    • Snoring     no sleep study       Past Surgical History:   Procedure  "Laterality Date   • WOUND EXPLORATION ORTHO Right 8/5/2020    Procedure: EXPLORATION, WOUND- THUMB;  Surgeon: Yumiko Griffiths M.D.;  Location: SURGERY SAME DAY [facility];  Service: Orthopedics   • TENDON REPAIR  8/5/2020    Procedure: REPAIR, TENDON- EXTENSOR;  Surgeon: Yumiko Griffiths M.D.;  Location: SURGERY SAME DAY [facility];  Service: Orthopedics   • HEPATIC ABLATION LAPAROSCOPIC  6/28/2018    Procedure: HEPATIC ABLATION LAPAROSCOPIC. SEGMENT 4B, HEPATOMA, REPAIR OF INCARCERATED UMBILICAL HERNIA;  Surgeon: Feliberto Burgos M.D.;  Location: SURGERY [facility] [place];  Service: General   • BRACHY THERAPY         Social History     Tobacco Use   • Smoking status: Never Smoker   • Smokeless tobacco: Former User     Types: Snuff   Vaping Use   • Vaping Use: Never used   Substance Use Topics   • Alcohol use: Not Currently     Alcohol/week: 16.8 oz     Types: 28 Cans of beer per week   • Drug use: Yes     Types: Marijuana, Inhaled, Oral     Comment: \"Marijuana Use\" daily        Social History     Social History Narrative    No known exposure to asbestos, dyes, or chemicals, however he was exposed to pesticides.  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  1 child, alive and well.  He also has a step-child.        Family History   Problem Relation Age of Onset   • Cancer Father 81        Bladder   • Cancer Brother         Prostate & CLL (s/p 8-10 years ago)   • Hyperlipidemia Sister    • Lung Disease Neg Hx    • Diabetes Neg Hx    • Heart Disease Neg Hx    • Hypertension Neg Hx    • Stroke Neg Hx    • Alcohol/Drug Neg Hx      Labs: Labs reviewed from October 18, 2021.  Point-of-care A1c 7.0 in clinic today.      Exam: /62   Pulse 86   Temp 36.6 °C (97.8 °F)   Wt 98.9 kg (218 lb)   SpO2 97%  Body mass index is 34.14 kg/m².    General: Well appearing, NAD  Pulmonary: Clear to ausculation.  Normal effort. No rales, ronchi, or " wheezing.  Cardiovascular: Regular rate and rhythm without murmur, rubs or gallop.   Skin: Warm and dry.  No obvious lesions.  Musculoskeletal:  No extremity cyanosis, clubbing, or edema.  Psych: Normal mood and affect. Alert and oriented. Judgment and insight is normal.    Please note that this dictation was created using voice recognition software. I have made every reasonable attempt to correct obvious errors, but I expect that there are errors of grammar and possibly content that I did not discover before finalizing the note.      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  He notes recent episode about 10 days ago where blood pressure dropped extremely low and he fainted.  He reports that blood pressure on home cuff was 45/30, and slowly increased.  Additionally, he notes that he has had these episodes on a yearly basis for maybe 6 to 7 years or blood pressure goes extremely well and he passes out.  He notes he has been seen in the emergency department for this issue with negative work-ups.  Blood pressure is pretty low in clinic today as well at 104/62.  At this time I am going to discontinue his losartan.  Recommend goal blood pressure of less than 140/90.  He can continue home monitoring of blood pressure and let me know if it begins to go upwards again.  Additionally, I am concerned about possible underlying serious cardiac issue such as arrhythmia with blood pressure dropping as low as what he reports.  At this time, given patient's comorbidities of liver cancer he does not desire further cardiac work-up but would be possibly open to it if it happened again.    Type 2 diabetes mellitus without complication, without long-term current use of insulin (HCC)  Chronic issue.  Currently on Metformin.  A1c has gone up a bit since his  last visit, still well controlled at 7.0 today.  He reports that he has been eating more sweets than usual, but has cut out alcohol.  We will continue current dose of Metformin for now.  -     POCT  A1C    Primary malignant neoplasm of liver (HCC)  Ongoing issue.  He continues to follow with Dr. Servin.  He has upcoming MRI.  He notes that right now there may be a decision between surgery and going on chemotherapy for the rest of his life based on MRI results.  We will continue to monitor.      Follow-up in about 6 months or sooner if needed.    Oliva Alegre,   [facility]

## 2021-12-08 ENCOUNTER — HOSPITAL ENCOUNTER (OUTPATIENT)
Dept: RADIOLOGY | Facility: MEDICAL CENTER | Age: 71
End: 2021-12-08
Attending: INTERNAL MEDICINE
Payer: MEDICARE

## 2021-12-08 PROCEDURE — A9576 INJ PROHANCE MULTIPACK: HCPCS | Performed by: INTERNAL MEDICINE

## 2021-12-08 PROCEDURE — 74183 MRI ABD W/O CNTR FLWD CNTR: CPT | Mod: MH

## 2021-12-08 PROCEDURE — 700117 HCHG RX CONTRAST REV CODE 255: Performed by: INTERNAL MEDICINE

## 2021-12-08 RX ADMIN — GADOTERIDOL 20 ML: 279.3 INJECTION, SOLUTION INTRAVENOUS at 11:05

## 2022-02-18 ENCOUNTER — HOSPITAL ENCOUNTER (OUTPATIENT)
Dept: LAB | Facility: MEDICAL CENTER | Age: 72
End: 2022-02-18
Attending: INTERNAL MEDICINE
Payer: MEDICARE

## 2022-02-18 LAB
ALBUMIN SERPL BCP-MCNC: 5 G/DL (ref 3.2–4.9)
ALBUMIN/GLOB SERPL: 2.3 G/DL
ALP SERPL-CCNC: 63 U/L (ref 30–99)
ALT SERPL-CCNC: 34 U/L (ref 2–50)
ANION GAP SERPL CALC-SCNC: 15 MMOL/L (ref 7–16)
AST SERPL-CCNC: 36 U/L (ref 12–45)
BASOPHILS # BLD AUTO: 0.8 % (ref 0–1.8)
BASOPHILS # BLD: 0.04 K/UL (ref 0–0.12)
BILIRUB SERPL-MCNC: 1.5 MG/DL (ref 0.1–1.5)
BUN SERPL-MCNC: 22 MG/DL (ref 8–22)
CALCIUM SERPL-MCNC: 10.3 MG/DL (ref 8.5–10.5)
CHLORIDE SERPL-SCNC: 98 MMOL/L (ref 96–112)
CO2 SERPL-SCNC: 23 MMOL/L (ref 20–33)
CREAT SERPL-MCNC: 0.84 MG/DL (ref 0.5–1.4)
EOSINOPHIL # BLD AUTO: 0.05 K/UL (ref 0–0.51)
EOSINOPHIL NFR BLD: 1 % (ref 0–6.9)
ERYTHROCYTE [DISTWIDTH] IN BLOOD BY AUTOMATED COUNT: 46.7 FL (ref 35.9–50)
GLOBULIN SER CALC-MCNC: 2.2 G/DL (ref 1.9–3.5)
GLUCOSE SERPL-MCNC: 337 MG/DL (ref 65–99)
HCT VFR BLD AUTO: 47.9 % (ref 42–52)
HGB BLD-MCNC: 17 G/DL (ref 14–18)
IMM GRANULOCYTES # BLD AUTO: 0.02 K/UL (ref 0–0.11)
IMM GRANULOCYTES NFR BLD AUTO: 0.4 % (ref 0–0.9)
INR PPP: 1.16 (ref 0.87–1.13)
LYMPHOCYTES # BLD AUTO: 1.14 K/UL (ref 1–4.8)
LYMPHOCYTES NFR BLD: 22.6 % (ref 22–41)
MCH RBC QN AUTO: 33.7 PG (ref 27–33)
MCHC RBC AUTO-ENTMCNC: 35.5 G/DL (ref 33.7–35.3)
MCV RBC AUTO: 94.9 FL (ref 81.4–97.8)
MONOCYTES # BLD AUTO: 0.39 K/UL (ref 0–0.85)
MONOCYTES NFR BLD AUTO: 7.7 % (ref 0–13.4)
NEUTROPHILS # BLD AUTO: 3.41 K/UL (ref 1.82–7.42)
NEUTROPHILS NFR BLD: 67.5 % (ref 44–72)
NRBC # BLD AUTO: 0 K/UL
NRBC BLD-RTO: 0 /100 WBC
PLATELET # BLD AUTO: 92 K/UL (ref 164–446)
PMV BLD AUTO: 10.8 FL (ref 9–12.9)
POTASSIUM SERPL-SCNC: 4.5 MMOL/L (ref 3.6–5.5)
PROT SERPL-MCNC: 7.2 G/DL (ref 6–8.2)
PROTHROMBIN TIME: 14.5 SEC (ref 12–14.6)
RBC # BLD AUTO: 5.05 M/UL (ref 4.7–6.1)
SODIUM SERPL-SCNC: 136 MMOL/L (ref 135–145)
WBC # BLD AUTO: 5.1 K/UL (ref 4.8–10.8)

## 2022-02-18 PROCEDURE — 82107 ALPHA-FETOPROTEIN L3: CPT

## 2022-02-18 PROCEDURE — 85610 PROTHROMBIN TIME: CPT

## 2022-02-18 PROCEDURE — 85025 COMPLETE CBC W/AUTO DIFF WBC: CPT

## 2022-02-18 PROCEDURE — 80053 COMPREHEN METABOLIC PANEL: CPT

## 2022-02-18 PROCEDURE — 83951 ONCOPROTEIN DCP: CPT

## 2022-02-18 PROCEDURE — 36415 COLL VENOUS BLD VENIPUNCTURE: CPT

## 2022-02-21 LAB — ACARBOXYPROTHROMBIN SERPL-MCNC: 2.1 NG/ML (ref 0–7.4)

## 2022-02-23 LAB
AFP L3 MFR SERPL: 13.8 % (ref 0–9.9)
AFP SERPL-MCNC: 9 NG/ML (ref 0–15)

## 2022-05-06 ENCOUNTER — HOSPITAL ENCOUNTER (OUTPATIENT)
Dept: LAB | Facility: MEDICAL CENTER | Age: 72
End: 2022-05-06
Attending: INTERNAL MEDICINE
Payer: MEDICARE

## 2022-05-06 DIAGNOSIS — E11.9 TYPE 2 DIABETES MELLITUS WITHOUT COMPLICATION, WITHOUT LONG-TERM CURRENT USE OF INSULIN (HCC): ICD-10-CM

## 2022-05-06 LAB
BUN SERPL-MCNC: 15 MG/DL (ref 8–22)
CREAT SERPL-MCNC: 0.81 MG/DL (ref 0.5–1.4)
GFR SERPLBLD CREATININE-BSD FMLA CKD-EPI: 94 ML/MIN/1.73 M 2

## 2022-05-06 PROCEDURE — 82565 ASSAY OF CREATININE: CPT

## 2022-05-06 PROCEDURE — 36415 COLL VENOUS BLD VENIPUNCTURE: CPT

## 2022-05-06 PROCEDURE — 84520 ASSAY OF UREA NITROGEN: CPT

## 2022-05-10 ENCOUNTER — HOSPITAL ENCOUNTER (OUTPATIENT)
Dept: RADIOLOGY | Facility: MEDICAL CENTER | Age: 72
End: 2022-05-10
Attending: INTERNAL MEDICINE
Payer: MEDICARE

## 2022-05-10 ENCOUNTER — HOSPITAL ENCOUNTER (OUTPATIENT)
Dept: RADIOLOGY | Facility: MEDICAL CENTER | Age: 72
End: 2022-05-10

## 2022-05-10 DIAGNOSIS — K57.30 DIVERTICULOSIS OF LARGE INTESTINE WITHOUT DIVERTICULITIS: ICD-10-CM

## 2022-05-10 DIAGNOSIS — K29.70 GASTRITIS WITHOUT BLEEDING, UNSPECIFIED CHRONICITY, UNSPECIFIED GASTRITIS TYPE: ICD-10-CM

## 2022-05-10 DIAGNOSIS — K64.8 PROLAPSED HEMORRHOIDS: ICD-10-CM

## 2022-05-10 DIAGNOSIS — I95.89 CHRONIC HYPOTENSION: ICD-10-CM

## 2022-05-10 DIAGNOSIS — E55.9 VITAMIN D INSUFFICIENCY: ICD-10-CM

## 2022-05-10 DIAGNOSIS — E50.9 AVITAMINOSIS A: ICD-10-CM

## 2022-05-10 DIAGNOSIS — C22.0 LIVER CARCINOMA (HCC): ICD-10-CM

## 2022-05-10 DIAGNOSIS — D50.0 IRON DEFICIENCY ANEMIA SECONDARY TO BLOOD LOSS (CHRONIC): ICD-10-CM

## 2022-05-10 DIAGNOSIS — Z79.1 ENCOUNTER FOR LONG-TERM (CURRENT) USE OF NON-STEROIDAL ANTI-INFLAMMATORIES: ICD-10-CM

## 2022-05-10 DIAGNOSIS — D72.829 LEUKOCYTOSIS, UNSPECIFIED TYPE: ICD-10-CM

## 2022-05-10 DIAGNOSIS — K63.5 HYPERPLASTIC POLYP OF INTESTINE: ICD-10-CM

## 2022-05-10 DIAGNOSIS — I85.00 ESOPHAGEAL VARICES WITHOUT BLEEDING, UNSPECIFIED ESOPHAGEAL VARICES TYPE (HCC): ICD-10-CM

## 2022-05-10 DIAGNOSIS — K92.1 MELENA: ICD-10-CM

## 2022-05-10 DIAGNOSIS — K31.89 GASTROPTOSIS: ICD-10-CM

## 2022-05-10 PROCEDURE — A9581 GADOXETATE DISODIUM INJ: HCPCS | Performed by: INTERNAL MEDICINE

## 2022-05-10 PROCEDURE — 700117 HCHG RX CONTRAST REV CODE 255: Performed by: INTERNAL MEDICINE

## 2022-05-10 PROCEDURE — 74183 MRI ABD W/O CNTR FLWD CNTR: CPT | Mod: MH

## 2022-05-10 RX ADMIN — GADOXETATE DISODIUM 10 ML: 181.43 INJECTION, SOLUTION INTRAVENOUS at 10:27

## 2022-05-19 ENCOUNTER — HOSPITAL ENCOUNTER (OUTPATIENT)
Facility: MEDICAL CENTER | Age: 72
End: 2022-05-19
Payer: MEDICARE

## 2022-05-19 ENCOUNTER — OFFICE VISIT (OUTPATIENT)
Dept: MEDICAL GROUP | Facility: PHYSICIAN GROUP | Age: 72
End: 2022-05-19
Payer: MEDICARE

## 2022-05-19 VITALS
BODY MASS INDEX: 35.44 KG/M2 | HEART RATE: 72 BPM | TEMPERATURE: 97.5 F | DIASTOLIC BLOOD PRESSURE: 82 MMHG | HEIGHT: 67 IN | SYSTOLIC BLOOD PRESSURE: 132 MMHG | WEIGHT: 225.8 LBS | OXYGEN SATURATION: 98 %

## 2022-05-19 DIAGNOSIS — I95.9 HYPOTENSION, UNSPECIFIED HYPOTENSION TYPE: ICD-10-CM

## 2022-05-19 DIAGNOSIS — C91.10 CLL (CHRONIC LYMPHOCYTIC LEUKEMIA) (HCC): ICD-10-CM

## 2022-05-19 DIAGNOSIS — Z00.00 ENCOUNTER FOR MEDICAL EXAMINATION TO ESTABLISH CARE: ICD-10-CM

## 2022-05-19 DIAGNOSIS — G62.9 NEUROPATHY: ICD-10-CM

## 2022-05-19 DIAGNOSIS — C22.8 PRIMARY MALIGNANT NEOPLASM OF LIVER (HCC): ICD-10-CM

## 2022-05-19 DIAGNOSIS — E11.9 TYPE 2 DIABETES MELLITUS WITHOUT COMPLICATION, WITHOUT LONG-TERM CURRENT USE OF INSULIN (HCC): ICD-10-CM

## 2022-05-19 DIAGNOSIS — J30.2 SEASONAL ALLERGIES: ICD-10-CM

## 2022-05-19 DIAGNOSIS — K76.6 PORTAL HYPERTENSION (HCC): ICD-10-CM

## 2022-05-19 DIAGNOSIS — Z12.5 PROSTATE CANCER SCREENING: ICD-10-CM

## 2022-05-19 LAB
CREAT UR-MCNC: 243.02 MG/DL
HBA1C MFR BLD: 8.4 % (ref 0–5.6)
INT CON NEG: ABNORMAL
INT CON POS: ABNORMAL
MICROALBUMIN UR-MCNC: 2 MG/DL
MICROALBUMIN/CREAT UR: 8 MG/G (ref 0–30)

## 2022-05-19 PROCEDURE — 82570 ASSAY OF URINE CREATININE: CPT

## 2022-05-19 PROCEDURE — 83036 HEMOGLOBIN GLYCOSYLATED A1C: CPT

## 2022-05-19 PROCEDURE — 99214 OFFICE O/P EST MOD 30 MIN: CPT

## 2022-05-19 PROCEDURE — 82043 UR ALBUMIN QUANTITATIVE: CPT

## 2022-05-19 RX ORDER — METFORMIN HYDROCHLORIDE 500 MG/1
1000 TABLET, EXTENDED RELEASE ORAL 2 TIMES DAILY
Qty: 180 TABLET | Refills: 1 | Status: SHIPPED | OUTPATIENT
Start: 2022-05-19 | End: 2022-06-14 | Stop reason: SDUPTHER

## 2022-05-19 RX ORDER — FUROSEMIDE 20 MG/1
20 TABLET ORAL DAILY
COMMUNITY
End: 2024-02-20 | Stop reason: SDUPTHER

## 2022-05-19 RX ORDER — SPIRONOLACTONE 50 MG/1
50 TABLET, FILM COATED ORAL DAILY
COMMUNITY
End: 2024-02-20 | Stop reason: SDUPTHER

## 2022-05-19 ASSESSMENT — FIBROSIS 4 INDEX: FIB4 SCORE: 4.76

## 2022-05-19 ASSESSMENT — PATIENT HEALTH QUESTIONNAIRE - PHQ9: CLINICAL INTERPRETATION OF PHQ2 SCORE: 0

## 2022-05-19 NOTE — ASSESSMENT & PLAN NOTE
This is a chronic condition, controlled.  Patient states he has numbness and tingling in his bilateral lower extremities.  Monofilament exam conducted today.  Monofilament testing with a 10 gram force: sensation intact: intact bilaterally  Visual Inspection: Feet without maceration, ulcers, fissures.  Pedal pulses: intact bilaterally.  Denies any pain.  Patient educated on checking his feet frequently.  Continue annual foot checks in clinic.

## 2022-05-19 NOTE — ASSESSMENT & PLAN NOTE
Patient has known hypertension and has been on losartan 50 mg long-term.  He notes recent episode about 10 days ago where blood pressure dropped extremely low and he fainted.  He reports that blood pressure on home cuff was 45/30, and slowly increased.  Additionally, he notes that he has had these episodes on a yearly basis for maybe 6 to 7 years or blood pressure goes extremely well and he passes out.  He notes he has been seen in the emergency department for this issue with negative work-ups.  Blood pressure is pretty low in clinic today as well at 104/62.  At this time I am going to discontinue his losartan.  Recommend goal blood pressure of less than 140/90.  He can continue home monitoring of blood pressure and let me know if it begins to go upwards again.  Additionally, I am concerned about possible underlying serious cardiac issue such as arrhythmia with blood pressure dropping as low as what he reports.  At this time, given patient's comorbidities of liver cancer he does not desire further cardiac work-up but would be possibly open to it if it happened again.

## 2022-05-19 NOTE — ASSESSMENT & PLAN NOTE
This is a chronic condition, not controlled.  Patient is being followed by GI.  He recently got an MRI on 5/10/2022 which showed the existing lesion on his liver is growing in size.  He was directed by GI to get a repeat MRI in 3 months.  Patient states his last visit with his oncologist, Dr. Servin, was more than a year ago.  At that time, he was awaiting MRI results to determine whether the patient would get surgery or chemo.  Patient states he has not heard anything from him since and is not sure if he received a copy of his recent MRI scan.  I recommended he call to follow-up.  He said he can do that, however, he would prefer to be seen by an oncologist with [facility].  I told the patient I would be happy to place a referral, however, I think it still important for him to call 's office and find out the status of his plan of care.  Referral placed today.

## 2022-05-19 NOTE — ASSESSMENT & PLAN NOTE
This is a chronic condition, controlled.  Patient was diagnosed in June 2014.  He received 5 treatments of chemo and states he appears to be in remission.  He was being followed by Dr. Servin but states he has not been seen by him in over a year.  He is interested in being followed by an oncologist at [facility].  Referral was placed today.

## 2022-05-19 NOTE — PROGRESS NOTES
CC:   Chief Complaint   Patient presents with   • Establish Care     Establish care       HPI:  Sohail is a pleasant 71 y.o. male here today to establish care. His prior PCP is Dr. Oliva Alegre.    Patient Active Problem List   Diagnosis   • Type 2 diabetes mellitus without complication, without long-term current use of insulin (HCC)   • CLL (chronic lymphocytic leukemia) (HCC)   • Seasonal allergies   • Obesity (BMI 30-39.9)   • Primary malignant neoplasm of liver (HCC)   • Portal hypertension (HCC)   • Hypotension   • Neuropathy     Current Outpatient Medications   Medication Sig Dispense Refill   • metFORMIN ER (GLUCOPHAGE XR) 500 MG TABLET SR 24 HR Take 2 Tablets by mouth 2 times a day. TAKE ONE TABLET BY MOUTH TWICE A  Tablet 1   • spironolactone (ALDACTONE) 50 MG Tab Take 50 mg by mouth every day.     • furosemide (LASIX) 20 MG Tab Take 20 mg by mouth 2 times a day.     • ondansetron (ZOFRAN) 4 MG Tab tablet TAKE ONE TABLET BY MOUTH EVERY 4 HOURS AS NEEDED FOR NAUSEA AND VOMITING 30 tablet 3   • Vitamin D, Cholecalciferol, 25 MCG (1000 UT) Cap Take 1,000 Units by mouth every day.     • Multiple Vitamins-Minerals (MULTIVITAMIN PO) Take 1 Tab by mouth every morning.     • Polyethylene Glycol 3350 (MIRALAX PO) Take 1 Cap by mouth every morning.     • fluticasone (FLONASE ALLERGY RELIEF) 50 MCG/ACT nasal spray Spray 1 Spray in nose 2 times a day. 1 Bottle 0     No current facility-administered medications for this visit.     Allergies as of 05/19/2022   • (No Known Allergies)      Social History     Socioeconomic History   • Marital status:      Spouse name: Not on file   • Number of children: 1   • Years of education: Not on file   • Highest education level: Not on file   Occupational History   • Occupation: fertilizer sales   Tobacco Use   • Smoking status: Never Smoker   • Smokeless tobacco: Former User     Types: Snuff     Quit date: 3/29/2015   Vaping Use   • Vaping Use: Never used  "  Substance and Sexual Activity   • Alcohol use: Not Currently     Alcohol/week: 7.2 oz     Types: 12 Shots of liquor per week   • Drug use: Yes     Types: Marijuana, Inhaled, Oral     Comment: \"Marijuana Use\" daily    • Sexual activity: Never   • Not on file   Social History Narrative    No known exposure to asbestos, dyes, or chemicals, however he was exposed to pesticides.  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  1 child, alive and well.  He also has a step-child.      Family History   Problem Relation Age of Onset   • Cancer Father 81        Bladder   • Cancer Brother         Prostate & CLL (s/p 8-10 years ago)   • Hyperlipidemia Sister    • Lung Disease Neg Hx    • Diabetes Neg Hx    • Heart Disease Neg Hx    • Hypertension Neg Hx    • Stroke Neg Hx    • Alcohol/Drug Neg Hx      Past Medical History:   Diagnosis Date   • Anemia    • Arthritis 08/04/2020    Thumbs   • Cancer (HCC) 2004    Prostate - did brachytherapy at [place]   • Cancer (HCC) 2018    Liver   • Cirrhosis (HCC)    • CLL (chronic lymphocytic leukemia) (HCC) 2014   • CMV (cytomegalovirus infection) (HCC) 1990    Treated for 6 weeks   • Diabetes (HCC)     oral medication   • Heart burn    • 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 Stopped both medications because blood pressure readings were low, /82 today and has been low at home since stopping medications about 5 days ago. Last reading at home 117/72    • Indigestion    • Liver cancer (HCC)    • Liver tumor    • Plantar fasciitis of right foot 8/12/2019   • Rosacea    • Snoring     no sleep study        Past Surgical History:   Procedure Laterality Date   • WOUND EXPLORATION ORTHO Right 8/5/2020    Procedure: EXPLORATION, WOUND- THUMB;  Surgeon: Yumiko Griffiths M.D.;  Location: SURGERY SAME DAY [facility];  Service: Orthopedics   • TENDON " REPAIR  8/5/2020    Procedure: REPAIR, TENDON- EXTENSOR;  Surgeon: Yumiko Griffiths M.D.;  Location: SURGERY SAME DAY [facility];  Service: Orthopedics   • HEPATIC ABLATION LAPAROSCOPIC  6/28/2018    Procedure: HEPATIC ABLATION LAPAROSCOPIC. SEGMENT 4B, HEPATOMA, REPAIR OF INCARCERATED UMBILICAL HERNIA;  Surgeon: Feliberto Burgos M.D.;  Location: SURGERY [facility];  Service: General   • BRACHY THERAPY         Labs: Reviewed from 2/18/2022.    ROS:  Gen: no fevers/chills, no changes in weight  Eyes: no changes in vision  ENT: no sore throat, no hearing loss, no bloody nose  Pulm: no sob, no cough  CV: no chest pain, no palpitations  GI: no nausea/vomiting, no diarrhea  : no dysuria  MSk: no myalgias  Skin: no rash  Neuro: no headaches, no numbness/tingling  Heme/Lymph: no easy bruising      Exam:   Vitals:    05/19/22 1008   BP: 132/82   Pulse: 72   Temp: 36.4 °C (97.5 °F)   SpO2: 98%     Body mass index is 35.37 kg/m².    General: Normal appearing. No distress.  Head: Normocephalic.  Atraumatic.  Eyes: conjunctiva clear, pupils equal and reactive to light accommodation,  Ears: normal shape and contour, canals are clear bilaterally, tympanic membranes are benign  Throat: Oropharynx is without erythema, edema or exudates.   Neck: Supple without JVD or bruit.Thyroid is not enlarged.  Pulmonary: Clear to ausculation.  Normal effort. No rales, ronchi, or wheezing.  Cardiovascular: Regular rate and rhythm without murmur. Carotid and radial pulses are intact and equal bilaterally.  Pedal pulses within normal limits.  Abdomen: Soft, nontender, nondistended.   Neurologic: Grossly intact.  Sensation intact.  Lymph: No cervical or supraclavicular lymph nodes are palpable.  Skin: Warm and dry.  No obvious lesions.  Musculoskeletal: No extremity cyanosis, clubbing, or edema.  Strength 5+ and equal bilaterally in all extremities.  Psych: Normal mood and affect. Alert and oriented x3. Judgment and insight is  normal.      Assessment and Plan.   71 y.o. male presenting with the following.     1. Encounter for medical examination to establish care  Health conditions and medications reviewed and updated. All screenings discussed and up-to-date. Health maintenance completed.     2. Type 2 diabetes mellitus without complication, without long-term current use of insulin (HCC)  This is a chronic condition, not controlled.  Patient currently takes metformin 500 mg twice a day.  This is a decreased dose due to his blood sugars being so well controlled.  However, on his last visit on 10/25/2021, his A1c had increased to 7.  His prior PCP was going to monitor this.  Today in clinic, his A1c is increased again at 8.4. Patient does not check his blood sugar at home but he does endorse eating more sweets. I will increase his metformin to 1000 mg twice daily to see if this will help him regain control.  He will return in 3 months for a follow-up of his A1c.    - POCT  A1C  - MICROALBUMIN CREAT RATIO URINE; Future  - Diabetic Monofilament LE Exam  - Lipid Profile; Future  - metFORMIN ER (GLUCOPHAGE XR) 500 MG TABLET SR 24 HR; Take 2 Tablets by mouth 2 times a day. TAKE ONE TABLET BY MOUTH TWICE A DAY  Dispense: 180 Tablet; Refill: 1    3. Neuropathy  This is a chronic condition, controlled.  Patient states he has numbness and tingling in his bilateral lower extremities.  Monofilament exam conducted today.  Monofilament testing with a 10 gram force: sensation intact: intact bilaterally  Visual Inspection: Feet without maceration, ulcers, fissures.  Pedal pulses: intact bilaterally.  Denies any pain.  Patient educated on checking his feet frequently.  Continue annual foot checks in clinic.    4. Portal hypertension (HCC)  This is a chronic condition, controlled.  Patient is being managed by GI (Dr. Senior) for portal hypertension.  Patient is taking spironolactone 50 mg daily and furosemide 20 mg daily, however, 1 day a week he will  double his dose and take 100 mg daily and 40 mg daily.  He states this combination works well for him and Dr. Coronado told him to continue with this regimen.  Patient does have a history of hypotension and has had 4 episodes of syncope.  His a was not taking losartan at that time, which has since been discontinued.  Patient denies side effects with current diuretic regimen.  Continue close follow-up with GI and current management.    5. Hypotension, unspecified hypotension type  This is a chronic condition, controlled.  Patient was on losartan, however, it was discontinued due to episodes of hypotension and syncope.  Patient states since the losartan was discontinued he has not had any issues.  He is now being managed by GI for portal hypertension and is on diuretics for this.  He denies side effects including lightheadedness or dizziness.  Continue close follow-up with GI and current management.    6. Primary malignant neoplasm of liver (HCC)  This is a chronic condition, not controlled.  Patient is being followed by GI.  He recently got an MRI on 5/10/2022 which showed the existing lesion on his liver is growing in size.  He was directed by GI to get a repeat MRI in 3 months.  Patient states his last visit with his oncologist, Dr. Servin, was more than a year ago.  At that time, he was awaiting MRI results to determine whether the patient would get surgery or chemo.  Patient states he has not heard anything from him since and is not sure if he received a copy of his recent MRI scan.  I recommended he call to follow-up.  He said he can do that, however, he would prefer to be seen by an oncologist with [facility].  I told the patient I would be happy to place a referral, however, I think it still important for him to call 's office and find out the status of his plan of care.  Referral placed today.  - Referral to Hematology Oncology    7. CLL (chronic lymphocytic leukemia) (HCC)  This is a chronic  condition, controlled.  Patient was diagnosed in June 2014.  He received 5 treatments of chemo and states he appears to be in remission.  He was being followed by Dr. Servin but states he has not been seen by him in over a year.  He is interested in being followed by an oncologist at [facility].  Referral was placed today.    8. Seasonal allergies  This is a chronic condition, controlled.  Patient states he normally takes over-the-counter Benadryl daily.  He denies feeling drowsy and states he gets good results from this.  He also uses a fluticasone nasal spray.  He says his symptoms are controlled.  Continue current management with over-the-counter symptom support.    9. Prostate cancer screening    - PSA TOTAL + %FREE; Future    Educated in proper administration of medication(s) ordered today including safety, possible SE, risks, benefits, rationale and alternatives to therapy.   Supportive care, differential diagnoses, and indications for immediate follow-up discussed with patient.    Pathogenesis of diagnosis discussed including typical length and natural progression.    Instructed to return to clinic or nearest emergency department for any change in condition, further concerns, or worsening of symptoms.  Patient states understanding of the plan of care and discharge instructions.    Return in about 3 months (around 8/19/2022) for Lab Review.    I spent a total of 39 minutes with record review, exam, and communication with the patient, communication with other providers, and documentation of this encounter. This does not include time spent on separately billable procedures/tests.    I have placed the above orders and discussed them with an approved delegating provider.  The MA is performing the below orders under the direction of Dr. eJnkins.    Please note that this dictation was created using voice recognition software. I have worked with consultants from the vendor as well as technical experts from [facility] to  optimize the interface. I have made every reasonable attempt to correct obvious errors, but I expect that there are errors of grammar and possibly content that I did not discover before finalizing the note.

## 2022-05-19 NOTE — ASSESSMENT & PLAN NOTE
This is a chronic condition, controlled.  Patient states he normally takes over-the-counter Benadryl daily.  He denies feeling drowsy and states he gets good results from this.  He also uses a fluticasone nasal spray.  He says his symptoms are controlled.  Continue current management with over-the-counter symptom support.

## 2022-05-19 NOTE — ASSESSMENT & PLAN NOTE
This is a chronic condition, controlled.  Patient is being managed by GI (Dr. Senior) for portal hypertension.  Patient is taking spironolactone 50 mg daily and furosemide 20 mg daily, however, 1 day a week he will double his dose and take 100 mg daily and 40 mg daily.  He states this combination works well for him and Dr. Coronado told him to continue with this regimen.  Patient does have a history of hypotension and has had 4 episodes of syncope.  His a was not taking losartan at that time, which has since been discontinued.  Patient denies side effects with current diuretic regimen.  Continue close follow-up with GI and current management.

## 2022-05-19 NOTE — ASSESSMENT & PLAN NOTE
This is a chronic condition, not controlled.  Patient currently takes metformin 500 mg twice a day.  This is a decreased dose due to his blood sugars being so well controlled.  However, on his last visit on 10/25/2021, his A1c had increased to 7.  His prior PCP was going to monitor this.  Today in clinic, his A1c is increased again at 8.4. Patient does not check his blood sugar at home but he does endorse eating more sweets. I will increase his metformin to 1000 mg twice daily to see if this will help him regain control.  He will return in 3 months for a follow-up of his A1c.

## 2022-05-19 NOTE — ASSESSMENT & PLAN NOTE
This is a chronic condition, controlled.  Patient was on losartan, however, it was discontinued due to episodes of hypotension and syncope.  Patient states since the losartan was discontinued he has not had any issues.  He is now being managed by GI for portal hypertension and is on diuretics for this.  He denies side effects including lightheadedness or dizziness.  Continue close follow-up with GI and current management.

## 2022-06-02 ENCOUNTER — HOSPITAL ENCOUNTER (OUTPATIENT)
Dept: LAB | Facility: MEDICAL CENTER | Age: 72
End: 2022-06-02
Payer: MEDICARE

## 2022-06-02 ENCOUNTER — HOSPITAL ENCOUNTER (OUTPATIENT)
Dept: LAB | Facility: MEDICAL CENTER | Age: 72
End: 2022-06-02
Attending: INTERNAL MEDICINE
Payer: MEDICARE

## 2022-06-02 DIAGNOSIS — E11.9 TYPE 2 DIABETES MELLITUS WITHOUT COMPLICATION, WITHOUT LONG-TERM CURRENT USE OF INSULIN (HCC): ICD-10-CM

## 2022-06-02 DIAGNOSIS — Z12.5 PROSTATE CANCER SCREENING: ICD-10-CM

## 2022-06-02 LAB
ALBUMIN SERPL BCP-MCNC: 4.4 G/DL (ref 3.2–4.9)
ALBUMIN/GLOB SERPL: 1.8 G/DL
ALP SERPL-CCNC: 62 U/L (ref 30–99)
ALT SERPL-CCNC: 40 U/L (ref 2–50)
ANION GAP SERPL CALC-SCNC: 14 MMOL/L (ref 7–16)
AST SERPL-CCNC: 35 U/L (ref 12–45)
BASOPHILS # BLD AUTO: 1 % (ref 0–1.8)
BASOPHILS # BLD: 0.04 K/UL (ref 0–0.12)
BILIRUB SERPL-MCNC: 1.2 MG/DL (ref 0.1–1.5)
BUN SERPL-MCNC: 14 MG/DL (ref 8–22)
CALCIUM SERPL-MCNC: 9.9 MG/DL (ref 8.5–10.5)
CHLORIDE SERPL-SCNC: 101 MMOL/L (ref 96–112)
CHOLEST SERPL-MCNC: 130 MG/DL (ref 100–199)
CO2 SERPL-SCNC: 23 MMOL/L (ref 20–33)
CREAT SERPL-MCNC: 0.74 MG/DL (ref 0.5–1.4)
EOSINOPHIL # BLD AUTO: 0.11 K/UL (ref 0–0.51)
EOSINOPHIL NFR BLD: 2.8 % (ref 0–6.9)
ERYTHROCYTE [DISTWIDTH] IN BLOOD BY AUTOMATED COUNT: 48 FL (ref 35.9–50)
FASTING STATUS PATIENT QL REPORTED: NORMAL
GFR SERPLBLD CREATININE-BSD FMLA CKD-EPI: 97 ML/MIN/1.73 M 2
GLOBULIN SER CALC-MCNC: 2.4 G/DL (ref 1.9–3.5)
GLUCOSE SERPL-MCNC: 190 MG/DL (ref 65–99)
HCT VFR BLD AUTO: 47.1 % (ref 42–52)
HDLC SERPL-MCNC: 58 MG/DL
HGB BLD-MCNC: 16.6 G/DL (ref 14–18)
IMM GRANULOCYTES # BLD AUTO: 0.02 K/UL (ref 0–0.11)
IMM GRANULOCYTES NFR BLD AUTO: 0.5 % (ref 0–0.9)
LDLC SERPL CALC-MCNC: 51 MG/DL
LYMPHOCYTES # BLD AUTO: 1.08 K/UL (ref 1–4.8)
LYMPHOCYTES NFR BLD: 27.8 % (ref 22–41)
MCH RBC QN AUTO: 34.5 PG (ref 27–33)
MCHC RBC AUTO-ENTMCNC: 35.2 G/DL (ref 33.7–35.3)
MCV RBC AUTO: 97.9 FL (ref 81.4–97.8)
MONOCYTES # BLD AUTO: 0.39 K/UL (ref 0–0.85)
MONOCYTES NFR BLD AUTO: 10.1 % (ref 0–13.4)
NEUTROPHILS # BLD AUTO: 2.24 K/UL (ref 1.82–7.42)
NEUTROPHILS NFR BLD: 57.8 % (ref 44–72)
NRBC # BLD AUTO: 0 K/UL
NRBC BLD-RTO: 0 /100 WBC
PLATELET # BLD AUTO: 89 K/UL (ref 164–446)
PMV BLD AUTO: 10.7 FL (ref 9–12.9)
POTASSIUM SERPL-SCNC: 4.5 MMOL/L (ref 3.6–5.5)
PROT SERPL-MCNC: 6.8 G/DL (ref 6–8.2)
RBC # BLD AUTO: 4.81 M/UL (ref 4.7–6.1)
SODIUM SERPL-SCNC: 138 MMOL/L (ref 135–145)
TRIGL SERPL-MCNC: 104 MG/DL (ref 0–149)
WBC # BLD AUTO: 3.9 K/UL (ref 4.8–10.8)

## 2022-06-02 PROCEDURE — 82105 ALPHA-FETOPROTEIN SERUM: CPT

## 2022-06-02 PROCEDURE — 80061 LIPID PANEL: CPT

## 2022-06-02 PROCEDURE — 83951 ONCOPROTEIN DCP: CPT

## 2022-06-02 PROCEDURE — 36415 COLL VENOUS BLD VENIPUNCTURE: CPT

## 2022-06-02 PROCEDURE — 85025 COMPLETE CBC W/AUTO DIFF WBC: CPT

## 2022-06-02 PROCEDURE — 80053 COMPREHEN METABOLIC PANEL: CPT

## 2022-06-03 ENCOUNTER — HOSPITAL ENCOUNTER (OUTPATIENT)
Dept: RADIOLOGY | Facility: MEDICAL CENTER | Age: 72
End: 2022-06-03
Attending: INTERNAL MEDICINE
Payer: MEDICARE

## 2022-06-03 DIAGNOSIS — C91.11 CHRONIC LYMPHOID LEUKEMIA IN REMISSION (HCC): ICD-10-CM

## 2022-06-03 DIAGNOSIS — C22.0 LIVER CARCINOMA (HCC): ICD-10-CM

## 2022-06-03 DIAGNOSIS — Z79.899 OTHER LONG TERM (CURRENT) DRUG THERAPY: ICD-10-CM

## 2022-06-03 PROCEDURE — 71260 CT THORAX DX C+: CPT | Mod: MH

## 2022-06-03 PROCEDURE — 74160 CT ABDOMEN W/CONTRAST: CPT | Mod: MH

## 2022-06-03 PROCEDURE — 700117 HCHG RX CONTRAST REV CODE 255: Performed by: INTERNAL MEDICINE

## 2022-06-03 RX ADMIN — IOHEXOL 100 ML: 350 INJECTION, SOLUTION INTRAVENOUS at 11:23

## 2022-06-05 LAB
ACARBOXYPROTHROMBIN SERPL-MCNC: 3.7 NG/ML (ref 0–7.4)
AFP-TM SERPL-MCNC: 8 NG/ML (ref 0–9)

## 2022-06-14 DIAGNOSIS — E11.9 TYPE 2 DIABETES MELLITUS WITHOUT COMPLICATION, WITHOUT LONG-TERM CURRENT USE OF INSULIN (HCC): ICD-10-CM

## 2022-06-14 RX ORDER — METFORMIN HYDROCHLORIDE 500 MG/1
1000 TABLET, EXTENDED RELEASE ORAL 2 TIMES DAILY
Qty: 180 TABLET | Refills: 1 | Status: SHIPPED | OUTPATIENT
Start: 2022-06-14 | End: 2022-07-27 | Stop reason: SDUPTHER

## 2022-06-17 ENCOUNTER — PATIENT OUTREACH (OUTPATIENT)
Dept: OTHER | Facility: MEDICAL CENTER | Age: 72
End: 2022-06-17
Payer: MEDICARE

## 2022-06-17 NOTE — PROGRESS NOTES
ZOILA received a call from the patient regarding his concern that his doctors would not be able to see his recent scans he had done at [facility]..  ZOILA explained that Dr. Servin and Dr. Gonzalez are able to view his scans in our electronic system.  He states that he has an up and coming appointment with Malathi.  ZOILA encouraged him to communicate with me about what the plan is for him because the patient feels that he has been lost in the cracks.  ZOILA explained that if he communicated with me the plans I can help him keep on track.

## 2022-07-12 ENCOUNTER — HOSPITAL ENCOUNTER (OUTPATIENT)
Facility: MEDICAL CENTER | Age: 72
End: 2022-07-12
Attending: NURSE PRACTITIONER
Payer: MEDICARE

## 2022-07-12 PROCEDURE — 86706 HEP B SURFACE ANTIBODY: CPT | Mod: GA

## 2022-07-12 PROCEDURE — 87340 HEPATITIS B SURFACE AG IA: CPT | Mod: GA

## 2022-07-12 PROCEDURE — 82107 ALPHA-FETOPROTEIN L3: CPT

## 2022-07-12 PROCEDURE — 86704 HEP B CORE ANTIBODY TOTAL: CPT | Mod: GA

## 2022-07-13 LAB
HBV CORE AB SERPL QL IA: NONREACTIVE
HBV SURFACE AB SERPL IA-ACNC: <3.5 MIU/ML (ref 0–10)
HBV SURFACE AG SER QL: NORMAL

## 2022-07-14 LAB
AFP L3 MFR SERPL: 14.3 % (ref 0–9.9)
AFP SERPL-MCNC: 11 NG/ML (ref 0–15)

## 2022-07-27 ENCOUNTER — PATIENT MESSAGE (OUTPATIENT)
Dept: MEDICAL GROUP | Facility: PHYSICIAN GROUP | Age: 72
End: 2022-07-27
Payer: MEDICARE

## 2022-07-27 DIAGNOSIS — E11.9 TYPE 2 DIABETES MELLITUS WITHOUT COMPLICATION, WITHOUT LONG-TERM CURRENT USE OF INSULIN (HCC): ICD-10-CM

## 2022-07-28 RX ORDER — METFORMIN HYDROCHLORIDE 500 MG/1
1000 TABLET, EXTENDED RELEASE ORAL 2 TIMES DAILY
Qty: 360 TABLET | Refills: 3 | Status: SHIPPED | OUTPATIENT
Start: 2022-07-28 | End: 2023-08-21

## 2022-08-02 ENCOUNTER — HOSPITAL ENCOUNTER (OUTPATIENT)
Dept: LAB | Facility: MEDICAL CENTER | Age: 72
End: 2022-08-02
Attending: INTERNAL MEDICINE
Payer: MEDICARE

## 2022-08-02 LAB
ALBUMIN SERPL BCP-MCNC: 4.8 G/DL (ref 3.2–4.9)
ALBUMIN/GLOB SERPL: 2.2 G/DL
ALP SERPL-CCNC: 64 U/L (ref 30–99)
ALT SERPL-CCNC: 40 U/L (ref 2–50)
ANION GAP SERPL CALC-SCNC: 13 MMOL/L (ref 7–16)
AST SERPL-CCNC: 41 U/L (ref 12–45)
BASOPHILS # BLD AUTO: 1.3 % (ref 0–1.8)
BASOPHILS # BLD: 0.06 K/UL (ref 0–0.12)
BILIRUB SERPL-MCNC: 1.5 MG/DL (ref 0.1–1.5)
BUN SERPL-MCNC: 20 MG/DL (ref 8–22)
CALCIUM SERPL-MCNC: 9.9 MG/DL (ref 8.5–10.5)
CHLORIDE SERPL-SCNC: 102 MMOL/L (ref 96–112)
CO2 SERPL-SCNC: 24 MMOL/L (ref 20–33)
CREAT SERPL-MCNC: 1.08 MG/DL (ref 0.5–1.4)
EOSINOPHIL # BLD AUTO: 0.16 K/UL (ref 0–0.51)
EOSINOPHIL NFR BLD: 3.4 % (ref 0–6.9)
ERYTHROCYTE [DISTWIDTH] IN BLOOD BY AUTOMATED COUNT: 46.7 FL (ref 35.9–50)
GFR SERPLBLD CREATININE-BSD FMLA CKD-EPI: 73 ML/MIN/1.73 M 2
GLOBULIN SER CALC-MCNC: 2.2 G/DL (ref 1.9–3.5)
GLUCOSE SERPL-MCNC: 206 MG/DL (ref 65–99)
HCT VFR BLD AUTO: 47.8 % (ref 42–52)
HGB BLD-MCNC: 17.3 G/DL (ref 14–18)
IMM GRANULOCYTES # BLD AUTO: 0.03 K/UL (ref 0–0.11)
IMM GRANULOCYTES NFR BLD AUTO: 0.6 % (ref 0–0.9)
LYMPHOCYTES # BLD AUTO: 1.42 K/UL (ref 1–4.8)
LYMPHOCYTES NFR BLD: 30.1 % (ref 22–41)
MCH RBC QN AUTO: 34.5 PG (ref 27–33)
MCHC RBC AUTO-ENTMCNC: 36.2 G/DL (ref 33.7–35.3)
MCV RBC AUTO: 95.4 FL (ref 81.4–97.8)
MONOCYTES # BLD AUTO: 0.44 K/UL (ref 0–0.85)
MONOCYTES NFR BLD AUTO: 9.3 % (ref 0–13.4)
NEUTROPHILS # BLD AUTO: 2.61 K/UL (ref 1.82–7.42)
NEUTROPHILS NFR BLD: 55.3 % (ref 44–72)
NRBC # BLD AUTO: 0 K/UL
NRBC BLD-RTO: 0 /100 WBC
PLATELET # BLD AUTO: 92 K/UL (ref 164–446)
PMV BLD AUTO: 10.7 FL (ref 9–12.9)
POTASSIUM SERPL-SCNC: 4.7 MMOL/L (ref 3.6–5.5)
PROT SERPL-MCNC: 7 G/DL (ref 6–8.2)
RBC # BLD AUTO: 5.01 M/UL (ref 4.7–6.1)
SODIUM SERPL-SCNC: 139 MMOL/L (ref 135–145)
TSH SERPL DL<=0.005 MIU/L-ACNC: 2.38 UIU/ML (ref 0.38–5.33)
WBC # BLD AUTO: 4.7 K/UL (ref 4.8–10.8)

## 2022-08-02 PROCEDURE — 80053 COMPREHEN METABOLIC PANEL: CPT

## 2022-08-02 PROCEDURE — 36415 COLL VENOUS BLD VENIPUNCTURE: CPT

## 2022-08-02 PROCEDURE — 84443 ASSAY THYROID STIM HORMONE: CPT

## 2022-08-02 PROCEDURE — 85025 COMPLETE CBC W/AUTO DIFF WBC: CPT

## 2022-08-17 ENCOUNTER — OFFICE VISIT (OUTPATIENT)
Dept: MEDICAL GROUP | Facility: PHYSICIAN GROUP | Age: 72
End: 2022-08-17
Payer: MEDICARE

## 2022-08-17 VITALS
TEMPERATURE: 97 F | SYSTOLIC BLOOD PRESSURE: 136 MMHG | HEART RATE: 71 BPM | DIASTOLIC BLOOD PRESSURE: 78 MMHG | WEIGHT: 218.8 LBS | OXYGEN SATURATION: 98 % | BODY MASS INDEX: 34.34 KG/M2 | HEIGHT: 67 IN

## 2022-08-17 DIAGNOSIS — E11.9 TYPE 2 DIABETES MELLITUS WITHOUT COMPLICATION, WITHOUT LONG-TERM CURRENT USE OF INSULIN (HCC): ICD-10-CM

## 2022-08-17 DIAGNOSIS — I15.2 HYPERTENSION SECONDARY TO ENDOCRINE DISORDERS: ICD-10-CM

## 2022-08-17 LAB
HBA1C MFR BLD: 7.2 % (ref 0–5.6)
INT CON NEG: ABNORMAL
INT CON POS: ABNORMAL

## 2022-08-17 PROCEDURE — 99214 OFFICE O/P EST MOD 30 MIN: CPT

## 2022-08-17 PROCEDURE — 83036 HEMOGLOBIN GLYCOSYLATED A1C: CPT

## 2022-08-17 ASSESSMENT — FIBROSIS 4 INDEX: FIB4 SCORE: 5

## 2022-08-17 NOTE — ASSESSMENT & PLAN NOTE
Chronic, stable  He monitors it at home.  He is managed on furosemide and spironolactone.  He denies swelling in his legs or pain.  His blood pressure in clinic today is 136/78.  K+ stable. No concerns.

## 2022-08-17 NOTE — ASSESSMENT & PLAN NOTE
Chronic, controlled.  A1C in clinic is 7.2.   Managed on Metformin 1000 twice daily.   Will repeat A1C in three months.

## 2022-08-17 NOTE — PROGRESS NOTES
"Subjective:     CC:   Chief Complaint   Patient presents with    Lab Results       HISTORY OF THE PRESENT ILLNESS: Sohail is a pleasant 71 y.o. male here today for a follow-up on his diabetes and a check of his A1C.     Patient also shared that he is seeing oncology again for his CLL and is undergoing immunotherapy.  He has 2 sessions left and is feeling positive about the plan.    Health Maintenance: Completed    ROS:  All systems negative expect as addressed in assessment and plan.     Objective:     Exam:  /78 (BP Location: Right arm, Patient Position: Sitting, BP Cuff Size: Adult)   Pulse 71   Temp 36.1 °C (97 °F) (Temporal)   Ht 1.702 m (5' 7\")   Wt 99.2 kg (218 lb 12.8 oz)   SpO2 98%   BMI 34.27 kg/m²  Body mass index is 34.27 kg/m².    Physical Exam  Constitutional:       Appearance: Normal appearance.   Cardiovascular:      Rate and Rhythm: Normal rate.   Pulmonary:      Effort: Pulmonary effort is normal.   Neurological:      Mental Status: He is alert. Mental status is at baseline.   Psychiatric:         Mood and Affect: Mood normal.         Behavior: Behavior normal.     Labs: Results reviewed from 08/02/22    Assessment & Plan:     71 y.o. male with the following -    1. Type 2 diabetes mellitus without complication, without long-term current use of insulin ([facility])  Chronic, controlled.  A1C in clinic is 7.2.   Managed on Metformin 1000 twice daily.  Patient is eager to titrate his medications and down.  Will repeat A1C in three months.  I let him know if we can get two consistent readings less than 7, we can discuss lowering his metformin dose.    - POCT  A1C  - HEMOGLOBIN A1C; Future    2. 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  He is managed on furosemide and spironolactone.  He denies swelling in his legs or pain.  His blood pressure in clinic today is 136/78.  K+ stable. No concerns.    Patient was educated in proper administration of " medication(s) ordered today including safety, possible SE, risks, benefits, rationale and alternatives to therapy.   Supportive care, differential diagnoses, and indications for immediate follow-up discussed with patient.    Pathogenesis of diagnosis discussed including typical length and natural progression.    Instructed to return to clinic or nearest emergency department for any change in condition, further concerns, or worsening of symptoms.  Patient states understanding of the plan of care and discharge instructions.    Return in about 6 months (around 2/17/2023) for A1C.    I have placed the above orders and discussed them with an approved delegating provider.  The MA is performing the below orders under the direction of Dr. Jenkins.    Please note that this dictation was created using voice recognition software. I have worked with consultants from the vendor as well as technical experts from [employer] to optimize the interface. I have made every reasonable attempt to correct obvious errors, but I expect that there are errors of grammar and possibly content that I did not discover before finalizing the note.

## 2022-08-22 ENCOUNTER — HOSPITAL ENCOUNTER (OUTPATIENT)
Dept: LAB | Facility: MEDICAL CENTER | Age: 72
End: 2022-08-22
Attending: INTERNAL MEDICINE
Payer: MEDICARE

## 2022-08-22 LAB
ALBUMIN SERPL BCP-MCNC: 4.9 G/DL (ref 3.2–4.9)
ALBUMIN/GLOB SERPL: 2.2 G/DL
ALP SERPL-CCNC: 66 U/L (ref 30–99)
ALT SERPL-CCNC: 33 U/L (ref 2–50)
ANION GAP SERPL CALC-SCNC: 15 MMOL/L (ref 7–16)
AST SERPL-CCNC: 32 U/L (ref 12–45)
BASOPHILS # BLD AUTO: 1.2 % (ref 0–1.8)
BASOPHILS # BLD: 0.06 K/UL (ref 0–0.12)
BILIRUB SERPL-MCNC: 1.2 MG/DL (ref 0.1–1.5)
BUN SERPL-MCNC: 19 MG/DL (ref 8–22)
CALCIUM SERPL-MCNC: 10.4 MG/DL (ref 8.5–10.5)
CHLORIDE SERPL-SCNC: 99 MMOL/L (ref 96–112)
CO2 SERPL-SCNC: 22 MMOL/L (ref 20–33)
CREAT SERPL-MCNC: 1.02 MG/DL (ref 0.5–1.4)
EOSINOPHIL # BLD AUTO: 0.15 K/UL (ref 0–0.51)
EOSINOPHIL NFR BLD: 2.9 % (ref 0–6.9)
ERYTHROCYTE [DISTWIDTH] IN BLOOD BY AUTOMATED COUNT: 44.6 FL (ref 35.9–50)
GFR SERPLBLD CREATININE-BSD FMLA CKD-EPI: 78 ML/MIN/1.73 M 2
GLOBULIN SER CALC-MCNC: 2.2 G/DL (ref 1.9–3.5)
GLUCOSE SERPL-MCNC: 182 MG/DL (ref 65–99)
HCT VFR BLD AUTO: 46.2 % (ref 42–52)
HGB BLD-MCNC: 17 G/DL (ref 14–18)
IMM GRANULOCYTES # BLD AUTO: 0.02 K/UL (ref 0–0.11)
IMM GRANULOCYTES NFR BLD AUTO: 0.4 % (ref 0–0.9)
LYMPHOCYTES # BLD AUTO: 1.48 K/UL (ref 1–4.8)
LYMPHOCYTES NFR BLD: 28.4 % (ref 22–41)
MCH RBC QN AUTO: 34.6 PG (ref 27–33)
MCHC RBC AUTO-ENTMCNC: 36.8 G/DL (ref 33.7–35.3)
MCV RBC AUTO: 94.1 FL (ref 81.4–97.8)
MONOCYTES # BLD AUTO: 0.43 K/UL (ref 0–0.85)
MONOCYTES NFR BLD AUTO: 8.3 % (ref 0–13.4)
NEUTROPHILS # BLD AUTO: 3.07 K/UL (ref 1.82–7.42)
NEUTROPHILS NFR BLD: 58.8 % (ref 44–72)
NRBC # BLD AUTO: 0 K/UL
NRBC BLD-RTO: 0 /100 WBC
PLATELET # BLD AUTO: 99 K/UL (ref 164–446)
PMV BLD AUTO: 10.6 FL (ref 9–12.9)
POTASSIUM SERPL-SCNC: 4.8 MMOL/L (ref 3.6–5.5)
PROT SERPL-MCNC: 7.1 G/DL (ref 6–8.2)
RBC # BLD AUTO: 4.91 M/UL (ref 4.7–6.1)
SODIUM SERPL-SCNC: 136 MMOL/L (ref 135–145)
TSH SERPL DL<=0.005 MIU/L-ACNC: 2.82 UIU/ML (ref 0.38–5.33)
WBC # BLD AUTO: 5.2 K/UL (ref 4.8–10.8)

## 2022-08-22 PROCEDURE — 80053 COMPREHEN METABOLIC PANEL: CPT

## 2022-08-22 PROCEDURE — 36415 COLL VENOUS BLD VENIPUNCTURE: CPT

## 2022-08-22 PROCEDURE — 84443 ASSAY THYROID STIM HORMONE: CPT

## 2022-08-22 PROCEDURE — 85025 COMPLETE CBC W/AUTO DIFF WBC: CPT

## 2022-08-25 ENCOUNTER — HOSPITAL ENCOUNTER (OUTPATIENT)
Facility: MEDICAL CENTER | Age: 72
End: 2022-08-25
Attending: NURSE PRACTITIONER
Payer: MEDICARE

## 2022-08-25 PROCEDURE — 87086 URINE CULTURE/COLONY COUNT: CPT

## 2022-08-28 LAB
BACTERIA UR CULT: NORMAL
SIGNIFICANT IND 70042: NORMAL
SITE SITE: NORMAL
SOURCE SOURCE: NORMAL

## 2022-09-12 ENCOUNTER — HOSPITAL ENCOUNTER (OUTPATIENT)
Dept: LAB | Facility: MEDICAL CENTER | Age: 72
End: 2022-09-12
Attending: INTERNAL MEDICINE
Payer: MEDICARE

## 2022-09-12 LAB
ALBUMIN SERPL BCP-MCNC: 4.7 G/DL (ref 3.2–4.9)
ALBUMIN SERPL BCP-MCNC: 4.7 G/DL (ref 3.2–4.9)
ALBUMIN/GLOB SERPL: 2 G/DL
ALBUMIN/GLOB SERPL: 2 G/DL
ALP SERPL-CCNC: 64 U/L (ref 30–99)
ALP SERPL-CCNC: 64 U/L (ref 30–99)
ALT SERPL-CCNC: 31 U/L (ref 2–50)
ALT SERPL-CCNC: 32 U/L (ref 2–50)
ANION GAP SERPL CALC-SCNC: 11 MMOL/L (ref 7–16)
ANION GAP SERPL CALC-SCNC: 14 MMOL/L (ref 7–16)
AST SERPL-CCNC: 33 U/L (ref 12–45)
AST SERPL-CCNC: 35 U/L (ref 12–45)
BASOPHILS # BLD AUTO: 1.1 % (ref 0–1.8)
BASOPHILS # BLD AUTO: 1.1 % (ref 0–1.8)
BASOPHILS # BLD: 0.05 K/UL (ref 0–0.12)
BASOPHILS # BLD: 0.05 K/UL (ref 0–0.12)
BILIRUB SERPL-MCNC: 1.4 MG/DL (ref 0.1–1.5)
BILIRUB SERPL-MCNC: 1.4 MG/DL (ref 0.1–1.5)
BUN SERPL-MCNC: 19 MG/DL (ref 8–22)
BUN SERPL-MCNC: 19 MG/DL (ref 8–22)
CALCIUM SERPL-MCNC: 9.8 MG/DL (ref 8.5–10.5)
CALCIUM SERPL-MCNC: 9.8 MG/DL (ref 8.5–10.5)
CHLORIDE SERPL-SCNC: 100 MMOL/L (ref 96–112)
CHLORIDE SERPL-SCNC: 100 MMOL/L (ref 96–112)
CO2 SERPL-SCNC: 24 MMOL/L (ref 20–33)
CO2 SERPL-SCNC: 26 MMOL/L (ref 20–33)
CREAT SERPL-MCNC: 1.14 MG/DL (ref 0.5–1.4)
CREAT SERPL-MCNC: 1.22 MG/DL (ref 0.5–1.4)
EOSINOPHIL # BLD AUTO: 0.15 K/UL (ref 0–0.51)
EOSINOPHIL # BLD AUTO: 0.15 K/UL (ref 0–0.51)
EOSINOPHIL NFR BLD: 3.4 % (ref 0–6.9)
EOSINOPHIL NFR BLD: 3.4 % (ref 0–6.9)
ERYTHROCYTE [DISTWIDTH] IN BLOOD BY AUTOMATED COUNT: 47.6 FL (ref 35.9–50)
ERYTHROCYTE [DISTWIDTH] IN BLOOD BY AUTOMATED COUNT: 48.1 FL (ref 35.9–50)
GFR SERPLBLD CREATININE-BSD FMLA CKD-EPI: 63 ML/MIN/1.73 M 2
GFR SERPLBLD CREATININE-BSD FMLA CKD-EPI: 68 ML/MIN/1.73 M 2
GLOBULIN SER CALC-MCNC: 2.3 G/DL (ref 1.9–3.5)
GLOBULIN SER CALC-MCNC: 2.4 G/DL (ref 1.9–3.5)
GLUCOSE SERPL-MCNC: 224 MG/DL (ref 65–99)
GLUCOSE SERPL-MCNC: 225 MG/DL (ref 65–99)
HCT VFR BLD AUTO: 48.2 % (ref 42–52)
HCT VFR BLD AUTO: 49 % (ref 42–52)
HGB BLD-MCNC: 17.5 G/DL (ref 14–18)
HGB BLD-MCNC: 17.5 G/DL (ref 14–18)
IMM GRANULOCYTES # BLD AUTO: 0.01 K/UL (ref 0–0.11)
IMM GRANULOCYTES # BLD AUTO: 0.02 K/UL (ref 0–0.11)
IMM GRANULOCYTES NFR BLD AUTO: 0.2 % (ref 0–0.9)
IMM GRANULOCYTES NFR BLD AUTO: 0.5 % (ref 0–0.9)
INR PPP: 1.14 (ref 0.87–1.13)
LYMPHOCYTES # BLD AUTO: 1.36 K/UL (ref 1–4.8)
LYMPHOCYTES # BLD AUTO: 1.37 K/UL (ref 1–4.8)
LYMPHOCYTES NFR BLD: 30.9 % (ref 22–41)
LYMPHOCYTES NFR BLD: 30.9 % (ref 22–41)
MCH RBC QN AUTO: 34.5 PG (ref 27–33)
MCH RBC QN AUTO: 35 PG (ref 27–33)
MCHC RBC AUTO-ENTMCNC: 35.7 G/DL (ref 33.7–35.3)
MCHC RBC AUTO-ENTMCNC: 36.3 G/DL (ref 33.7–35.3)
MCV RBC AUTO: 96.4 FL (ref 81.4–97.8)
MCV RBC AUTO: 96.6 FL (ref 81.4–97.8)
MONOCYTES # BLD AUTO: 0.39 K/UL (ref 0–0.85)
MONOCYTES # BLD AUTO: 0.4 K/UL (ref 0–0.85)
MONOCYTES NFR BLD AUTO: 8.9 % (ref 0–13.4)
MONOCYTES NFR BLD AUTO: 9 % (ref 0–13.4)
NEUTROPHILS # BLD AUTO: 2.43 K/UL (ref 1.82–7.42)
NEUTROPHILS # BLD AUTO: 2.46 K/UL (ref 1.82–7.42)
NEUTROPHILS NFR BLD: 55.2 % (ref 44–72)
NEUTROPHILS NFR BLD: 55.4 % (ref 44–72)
NRBC # BLD AUTO: 0 K/UL
NRBC # BLD AUTO: 0 K/UL
NRBC BLD-RTO: 0 /100 WBC
NRBC BLD-RTO: 0 /100 WBC
PLATELET # BLD AUTO: 76 K/UL (ref 164–446)
PLATELET # BLD AUTO: 79 K/UL (ref 164–446)
PMV BLD AUTO: 10.7 FL (ref 9–12.9)
PMV BLD AUTO: 10.9 FL (ref 9–12.9)
POTASSIUM SERPL-SCNC: 4.5 MMOL/L (ref 3.6–5.5)
POTASSIUM SERPL-SCNC: 4.6 MMOL/L (ref 3.6–5.5)
PROT SERPL-MCNC: 7 G/DL (ref 6–8.2)
PROT SERPL-MCNC: 7.1 G/DL (ref 6–8.2)
PROTHROMBIN TIME: 14.5 SEC (ref 12–14.6)
RBC # BLD AUTO: 5 M/UL (ref 4.7–6.1)
RBC # BLD AUTO: 5.07 M/UL (ref 4.7–6.1)
SODIUM SERPL-SCNC: 137 MMOL/L (ref 135–145)
SODIUM SERPL-SCNC: 138 MMOL/L (ref 135–145)
WBC # BLD AUTO: 4.4 K/UL (ref 4.8–10.8)
WBC # BLD AUTO: 4.4 K/UL (ref 4.8–10.8)

## 2022-09-12 PROCEDURE — 83951 ONCOPROTEIN DCP: CPT

## 2022-09-12 PROCEDURE — 84443 ASSAY THYROID STIM HORMONE: CPT

## 2022-09-12 PROCEDURE — 80053 COMPREHEN METABOLIC PANEL: CPT | Mod: 91

## 2022-09-12 PROCEDURE — 80053 COMPREHEN METABOLIC PANEL: CPT

## 2022-09-12 PROCEDURE — 36415 COLL VENOUS BLD VENIPUNCTURE: CPT

## 2022-09-12 PROCEDURE — 85025 COMPLETE CBC W/AUTO DIFF WBC: CPT

## 2022-09-12 PROCEDURE — 82107 ALPHA-FETOPROTEIN L3: CPT

## 2022-09-12 PROCEDURE — 85025 COMPLETE CBC W/AUTO DIFF WBC: CPT | Mod: 91

## 2022-09-12 PROCEDURE — 85610 PROTHROMBIN TIME: CPT

## 2022-09-13 LAB — TSH SERPL DL<=0.005 MIU/L-ACNC: 3.14 UIU/ML (ref 0.38–5.33)

## 2022-09-15 LAB
ACARBOXYPROTHROMBIN SERPL-MCNC: 2.7 NG/ML (ref 0–7.4)
AFP L3 MFR SERPL: 6 % (ref 0–9.9)
AFP SERPL-MCNC: 7 NG/ML (ref 0–15)

## 2022-10-03 ENCOUNTER — HOSPITAL ENCOUNTER (OUTPATIENT)
Dept: RADIOLOGY | Facility: MEDICAL CENTER | Age: 72
End: 2022-10-03
Attending: INTERNAL MEDICINE
Payer: MEDICARE

## 2022-10-03 ENCOUNTER — HOSPITAL ENCOUNTER (OUTPATIENT)
Dept: LAB | Facility: MEDICAL CENTER | Age: 72
End: 2022-10-03
Attending: INTERNAL MEDICINE
Payer: MEDICARE

## 2022-10-03 DIAGNOSIS — C22.0 ERYTHROCYTOSIS DUE TO HEPATOMA (HCC): ICD-10-CM

## 2022-10-03 DIAGNOSIS — C91.11 CHRONIC LYMPHOID LEUKEMIA IN REMISSION (HCC): ICD-10-CM

## 2022-10-03 DIAGNOSIS — D75.1 ERYTHROCYTOSIS DUE TO HEPATOMA (HCC): ICD-10-CM

## 2022-10-03 LAB
ALBUMIN SERPL BCP-MCNC: 4.8 G/DL (ref 3.2–4.9)
ALBUMIN/GLOB SERPL: 1.8 G/DL
ALP SERPL-CCNC: 75 U/L (ref 30–99)
ALT SERPL-CCNC: 35 U/L (ref 2–50)
ANION GAP SERPL CALC-SCNC: 14 MMOL/L (ref 7–16)
AST SERPL-CCNC: 35 U/L (ref 12–45)
BASOPHILS # BLD AUTO: 1 % (ref 0–1.8)
BASOPHILS # BLD: 0.06 K/UL (ref 0–0.12)
BILIRUB SERPL-MCNC: 1.5 MG/DL (ref 0.1–1.5)
BUN SERPL-MCNC: 23 MG/DL (ref 8–22)
CALCIUM SERPL-MCNC: 10 MG/DL (ref 8.4–10.2)
CHLORIDE SERPL-SCNC: 101 MMOL/L (ref 96–112)
CO2 SERPL-SCNC: 22 MMOL/L (ref 20–33)
CREAT SERPL-MCNC: 1.15 MG/DL (ref 0.5–1.4)
EOSINOPHIL # BLD AUTO: 0.15 K/UL (ref 0–0.51)
EOSINOPHIL NFR BLD: 2.6 % (ref 0–6.9)
ERYTHROCYTE [DISTWIDTH] IN BLOOD BY AUTOMATED COUNT: 47.8 FL (ref 35.9–50)
GFR SERPLBLD CREATININE-BSD FMLA CKD-EPI: 68 ML/MIN/1.73 M 2
GLOBULIN SER CALC-MCNC: 2.7 G/DL (ref 1.9–3.5)
GLUCOSE SERPL-MCNC: 163 MG/DL (ref 65–99)
HCT VFR BLD AUTO: 47.6 % (ref 42–52)
HGB BLD-MCNC: 17.3 G/DL (ref 14–18)
IMM GRANULOCYTES # BLD AUTO: 0.02 K/UL (ref 0–0.11)
IMM GRANULOCYTES NFR BLD AUTO: 0.3 % (ref 0–0.9)
LYMPHOCYTES # BLD AUTO: 1.96 K/UL (ref 1–4.8)
LYMPHOCYTES NFR BLD: 34.3 % (ref 22–41)
MCH RBC QN AUTO: 34.1 PG (ref 27–33)
MCHC RBC AUTO-ENTMCNC: 36.3 G/DL (ref 33.7–35.3)
MCV RBC AUTO: 93.9 FL (ref 81.4–97.8)
MONOCYTES # BLD AUTO: 0.53 K/UL (ref 0–0.85)
MONOCYTES NFR BLD AUTO: 9.3 % (ref 0–13.4)
NEUTROPHILS # BLD AUTO: 3 K/UL (ref 1.82–7.42)
NEUTROPHILS NFR BLD: 52.5 % (ref 44–72)
NRBC # BLD AUTO: 0 K/UL
NRBC BLD-RTO: 0 /100 WBC
PLATELET # BLD AUTO: 99 K/UL (ref 164–446)
PMV BLD AUTO: 10.1 FL (ref 9–12.9)
POTASSIUM SERPL-SCNC: 4.5 MMOL/L (ref 3.6–5.5)
PROT SERPL-MCNC: 7.5 G/DL (ref 6–8.2)
RBC # BLD AUTO: 5.07 M/UL (ref 4.7–6.1)
SODIUM SERPL-SCNC: 137 MMOL/L (ref 135–145)
TSH SERPL DL<=0.005 MIU/L-ACNC: 2.19 UIU/ML (ref 0.38–5.33)
WBC # BLD AUTO: 5.7 K/UL (ref 4.8–10.8)

## 2022-10-03 PROCEDURE — 700117 HCHG RX CONTRAST REV CODE 255: Performed by: INTERNAL MEDICINE

## 2022-10-03 PROCEDURE — 74177 CT ABD & PELVIS W/CONTRAST: CPT

## 2022-10-03 PROCEDURE — 80053 COMPREHEN METABOLIC PANEL: CPT

## 2022-10-03 PROCEDURE — 85025 COMPLETE CBC W/AUTO DIFF WBC: CPT

## 2022-10-03 PROCEDURE — 36415 COLL VENOUS BLD VENIPUNCTURE: CPT

## 2022-10-03 PROCEDURE — 84443 ASSAY THYROID STIM HORMONE: CPT

## 2022-10-03 RX ADMIN — IOHEXOL 100 ML: 350 INJECTION, SOLUTION INTRAVENOUS at 16:04

## 2022-10-24 ENCOUNTER — HOSPITAL ENCOUNTER (OUTPATIENT)
Dept: LAB | Facility: MEDICAL CENTER | Age: 72
End: 2022-10-24
Attending: INTERNAL MEDICINE
Payer: MEDICARE

## 2022-10-24 LAB
ALBUMIN SERPL BCP-MCNC: 4.6 G/DL (ref 3.2–4.9)
ALBUMIN/GLOB SERPL: 1.8 G/DL
ALP SERPL-CCNC: 68 U/L (ref 30–99)
ALT SERPL-CCNC: 29 U/L (ref 2–50)
ANION GAP SERPL CALC-SCNC: 15 MMOL/L (ref 7–16)
AST SERPL-CCNC: 29 U/L (ref 12–45)
BASOPHILS # BLD AUTO: 0.7 % (ref 0–1.8)
BASOPHILS # BLD: 0.04 K/UL (ref 0–0.12)
BILIRUB SERPL-MCNC: 1.1 MG/DL (ref 0.1–1.5)
BUN SERPL-MCNC: 25 MG/DL (ref 8–22)
CALCIUM SERPL-MCNC: 10.1 MG/DL (ref 8.5–10.5)
CHLORIDE SERPL-SCNC: 99 MMOL/L (ref 96–112)
CO2 SERPL-SCNC: 23 MMOL/L (ref 20–33)
CREAT SERPL-MCNC: 1.09 MG/DL (ref 0.5–1.4)
EOSINOPHIL # BLD AUTO: 0.08 K/UL (ref 0–0.51)
EOSINOPHIL NFR BLD: 1.5 % (ref 0–6.9)
ERYTHROCYTE [DISTWIDTH] IN BLOOD BY AUTOMATED COUNT: 47.9 FL (ref 35.9–50)
GFR SERPLBLD CREATININE-BSD FMLA CKD-EPI: 72 ML/MIN/1.73 M 2
GLOBULIN SER CALC-MCNC: 2.6 G/DL (ref 1.9–3.5)
GLUCOSE SERPL-MCNC: 189 MG/DL (ref 65–99)
HCT VFR BLD AUTO: 48.9 % (ref 42–52)
HGB BLD-MCNC: 17.4 G/DL (ref 14–18)
IMM GRANULOCYTES # BLD AUTO: 0.02 K/UL (ref 0–0.11)
IMM GRANULOCYTES NFR BLD AUTO: 0.4 % (ref 0–0.9)
LYMPHOCYTES # BLD AUTO: 1.69 K/UL (ref 1–4.8)
LYMPHOCYTES NFR BLD: 30.8 % (ref 22–41)
MCH RBC QN AUTO: 34.1 PG (ref 27–33)
MCHC RBC AUTO-ENTMCNC: 35.6 G/DL (ref 33.7–35.3)
MCV RBC AUTO: 95.7 FL (ref 81.4–97.8)
MONOCYTES # BLD AUTO: 0.6 K/UL (ref 0–0.85)
MONOCYTES NFR BLD AUTO: 10.9 % (ref 0–13.4)
NEUTROPHILS # BLD AUTO: 3.05 K/UL (ref 1.82–7.42)
NEUTROPHILS NFR BLD: 55.7 % (ref 44–72)
NRBC # BLD AUTO: 0 K/UL
NRBC BLD-RTO: 0 /100 WBC
PLATELET # BLD AUTO: 100 K/UL (ref 164–446)
PMV BLD AUTO: 10.3 FL (ref 9–12.9)
POTASSIUM SERPL-SCNC: 4.4 MMOL/L (ref 3.6–5.5)
PROT SERPL-MCNC: 7.2 G/DL (ref 6–8.2)
RBC # BLD AUTO: 5.11 M/UL (ref 4.7–6.1)
SODIUM SERPL-SCNC: 137 MMOL/L (ref 135–145)
TSH SERPL DL<=0.005 MIU/L-ACNC: 2.07 UIU/ML (ref 0.38–5.33)
WBC # BLD AUTO: 5.5 K/UL (ref 4.8–10.8)

## 2022-10-24 PROCEDURE — 84443 ASSAY THYROID STIM HORMONE: CPT

## 2022-10-24 PROCEDURE — 36415 COLL VENOUS BLD VENIPUNCTURE: CPT

## 2022-10-24 PROCEDURE — 80053 COMPREHEN METABOLIC PANEL: CPT

## 2022-10-24 PROCEDURE — 85025 COMPLETE CBC W/AUTO DIFF WBC: CPT

## 2022-11-04 ENCOUNTER — PATIENT MESSAGE (OUTPATIENT)
Dept: HEALTH INFORMATION MANAGEMENT | Facility: OTHER | Age: 72
End: 2022-11-04

## 2022-11-15 ENCOUNTER — HOSPITAL ENCOUNTER (OUTPATIENT)
Dept: LAB | Facility: MEDICAL CENTER | Age: 72
End: 2022-11-15
Attending: INTERNAL MEDICINE
Payer: MEDICARE

## 2022-11-15 LAB
ALBUMIN SERPL BCP-MCNC: 4.6 G/DL (ref 3.2–4.9)
ALBUMIN/GLOB SERPL: 1.8 G/DL
ALP SERPL-CCNC: 83 U/L (ref 30–99)
ALT SERPL-CCNC: 31 U/L (ref 2–50)
ANION GAP SERPL CALC-SCNC: 11 MMOL/L (ref 7–16)
AST SERPL-CCNC: 26 U/L (ref 12–45)
BASOPHILS # BLD AUTO: 0.8 % (ref 0–1.8)
BASOPHILS # BLD: 0.04 K/UL (ref 0–0.12)
BILIRUB SERPL-MCNC: 1.5 MG/DL (ref 0.1–1.5)
BUN SERPL-MCNC: 27 MG/DL (ref 8–22)
CALCIUM SERPL-MCNC: 9.7 MG/DL (ref 8.5–10.5)
CHLORIDE SERPL-SCNC: 97 MMOL/L (ref 96–112)
CO2 SERPL-SCNC: 24 MMOL/L (ref 20–33)
CREAT SERPL-MCNC: 1.02 MG/DL (ref 0.5–1.4)
EOSINOPHIL # BLD AUTO: 0.11 K/UL (ref 0–0.51)
EOSINOPHIL NFR BLD: 2.1 % (ref 0–6.9)
ERYTHROCYTE [DISTWIDTH] IN BLOOD BY AUTOMATED COUNT: 49.7 FL (ref 35.9–50)
GFR SERPLBLD CREATININE-BSD FMLA CKD-EPI: 78 ML/MIN/1.73 M 2
GLOBULIN SER CALC-MCNC: 2.5 G/DL (ref 1.9–3.5)
GLUCOSE SERPL-MCNC: 265 MG/DL (ref 65–99)
HCT VFR BLD AUTO: 47.7 % (ref 42–52)
HGB BLD-MCNC: 16.9 G/DL (ref 14–18)
IMM GRANULOCYTES # BLD AUTO: 0.02 K/UL (ref 0–0.11)
IMM GRANULOCYTES NFR BLD AUTO: 0.4 % (ref 0–0.9)
LYMPHOCYTES # BLD AUTO: 1.49 K/UL (ref 1–4.8)
LYMPHOCYTES NFR BLD: 28.2 % (ref 22–41)
MCH RBC QN AUTO: 34.3 PG (ref 27–33)
MCHC RBC AUTO-ENTMCNC: 35.4 G/DL (ref 33.7–35.3)
MCV RBC AUTO: 96.8 FL (ref 81.4–97.8)
MONOCYTES # BLD AUTO: 0.57 K/UL (ref 0–0.85)
MONOCYTES NFR BLD AUTO: 10.8 % (ref 0–13.4)
NEUTROPHILS # BLD AUTO: 3.05 K/UL (ref 1.82–7.42)
NEUTROPHILS NFR BLD: 57.7 % (ref 44–72)
NRBC # BLD AUTO: 0 K/UL
NRBC BLD-RTO: 0 /100 WBC
PLATELET # BLD AUTO: 89 K/UL (ref 164–446)
PMV BLD AUTO: 10.3 FL (ref 9–12.9)
POTASSIUM SERPL-SCNC: 4.8 MMOL/L (ref 3.6–5.5)
PROT SERPL-MCNC: 7.1 G/DL (ref 6–8.2)
RBC # BLD AUTO: 4.93 M/UL (ref 4.7–6.1)
SODIUM SERPL-SCNC: 132 MMOL/L (ref 135–145)
TSH SERPL DL<=0.005 MIU/L-ACNC: 3.32 UIU/ML (ref 0.38–5.33)
WBC # BLD AUTO: 5.3 K/UL (ref 4.8–10.8)

## 2022-11-15 PROCEDURE — 85025 COMPLETE CBC W/AUTO DIFF WBC: CPT

## 2022-11-15 PROCEDURE — 36415 COLL VENOUS BLD VENIPUNCTURE: CPT

## 2022-11-15 PROCEDURE — 84443 ASSAY THYROID STIM HORMONE: CPT

## 2022-11-15 PROCEDURE — 80053 COMPREHEN METABOLIC PANEL: CPT

## 2022-11-21 ENCOUNTER — HOSPITAL ENCOUNTER (OUTPATIENT)
Dept: LAB | Facility: MEDICAL CENTER | Age: 72
End: 2022-11-21
Payer: MEDICARE

## 2022-11-21 DIAGNOSIS — E11.9 TYPE 2 DIABETES MELLITUS WITHOUT COMPLICATION, WITHOUT LONG-TERM CURRENT USE OF INSULIN (HCC): ICD-10-CM

## 2022-11-21 LAB
EST. AVERAGE GLUCOSE BLD GHB EST-MCNC: 134 MG/DL
HBA1C MFR BLD: 6.3 % (ref 4–5.6)

## 2022-11-21 PROCEDURE — 36415 COLL VENOUS BLD VENIPUNCTURE: CPT | Mod: GA

## 2022-11-21 PROCEDURE — 83036 HEMOGLOBIN GLYCOSYLATED A1C: CPT | Mod: GA

## 2022-12-05 ENCOUNTER — HOSPITAL ENCOUNTER (OUTPATIENT)
Dept: LAB | Facility: MEDICAL CENTER | Age: 72
End: 2022-12-05
Attending: INTERNAL MEDICINE
Payer: MEDICARE

## 2022-12-05 LAB
ALBUMIN SERPL BCP-MCNC: 4.8 G/DL (ref 3.2–4.9)
ALBUMIN/GLOB SERPL: 1.7 G/DL
ALP SERPL-CCNC: 77 U/L (ref 30–99)
ALT SERPL-CCNC: 33 U/L (ref 2–50)
ANION GAP SERPL CALC-SCNC: 14 MMOL/L (ref 7–16)
AST SERPL-CCNC: 38 U/L (ref 12–45)
BASOPHILS # BLD AUTO: 1.2 % (ref 0–1.8)
BASOPHILS # BLD: 0.07 K/UL (ref 0–0.12)
BILIRUB SERPL-MCNC: 2.1 MG/DL (ref 0.1–1.5)
BUN SERPL-MCNC: 29 MG/DL (ref 8–22)
CALCIUM SERPL-MCNC: 10.3 MG/DL (ref 8.5–10.5)
CHLORIDE SERPL-SCNC: 99 MMOL/L (ref 96–112)
CO2 SERPL-SCNC: 24 MMOL/L (ref 20–33)
CREAT SERPL-MCNC: 1.25 MG/DL (ref 0.5–1.4)
EOSINOPHIL # BLD AUTO: 0.14 K/UL (ref 0–0.51)
EOSINOPHIL NFR BLD: 2.4 % (ref 0–6.9)
ERYTHROCYTE [DISTWIDTH] IN BLOOD BY AUTOMATED COUNT: 51.6 FL (ref 35.9–50)
GFR SERPLBLD CREATININE-BSD FMLA CKD-EPI: 61 ML/MIN/1.73 M 2
GLOBULIN SER CALC-MCNC: 2.9 G/DL (ref 1.9–3.5)
GLUCOSE SERPL-MCNC: 182 MG/DL (ref 65–99)
HCT VFR BLD AUTO: 50.8 % (ref 42–52)
HGB BLD-MCNC: 17.8 G/DL (ref 14–18)
IMM GRANULOCYTES # BLD AUTO: 0.03 K/UL (ref 0–0.11)
IMM GRANULOCYTES NFR BLD AUTO: 0.5 % (ref 0–0.9)
LYMPHOCYTES # BLD AUTO: 1.87 K/UL (ref 1–4.8)
LYMPHOCYTES NFR BLD: 32.6 % (ref 22–41)
MCH RBC QN AUTO: 34.6 PG (ref 27–33)
MCHC RBC AUTO-ENTMCNC: 35 G/DL (ref 33.7–35.3)
MCV RBC AUTO: 98.6 FL (ref 81.4–97.8)
MONOCYTES # BLD AUTO: 0.55 K/UL (ref 0–0.85)
MONOCYTES NFR BLD AUTO: 9.6 % (ref 0–13.4)
NEUTROPHILS # BLD AUTO: 3.07 K/UL (ref 1.82–7.42)
NEUTROPHILS NFR BLD: 53.7 % (ref 44–72)
NRBC # BLD AUTO: 0 K/UL
NRBC BLD-RTO: 0 /100 WBC
PLATELET # BLD AUTO: 94 K/UL (ref 164–446)
PMV BLD AUTO: 10.3 FL (ref 9–12.9)
POTASSIUM SERPL-SCNC: 4.5 MMOL/L (ref 3.6–5.5)
PROT SERPL-MCNC: 7.7 G/DL (ref 6–8.2)
RBC # BLD AUTO: 5.15 M/UL (ref 4.7–6.1)
SODIUM SERPL-SCNC: 137 MMOL/L (ref 135–145)
TSH SERPL DL<=0.005 MIU/L-ACNC: 3.92 UIU/ML (ref 0.38–5.33)
WBC # BLD AUTO: 5.7 K/UL (ref 4.8–10.8)

## 2022-12-05 PROCEDURE — 36415 COLL VENOUS BLD VENIPUNCTURE: CPT

## 2022-12-05 PROCEDURE — 85025 COMPLETE CBC W/AUTO DIFF WBC: CPT

## 2022-12-05 PROCEDURE — 84443 ASSAY THYROID STIM HORMONE: CPT

## 2022-12-05 PROCEDURE — 80053 COMPREHEN METABOLIC PANEL: CPT

## 2022-12-27 ENCOUNTER — HOSPITAL ENCOUNTER (OUTPATIENT)
Dept: LAB | Facility: MEDICAL CENTER | Age: 72
End: 2022-12-27
Attending: INTERNAL MEDICINE
Payer: MEDICARE

## 2022-12-27 LAB
ALBUMIN SERPL BCP-MCNC: 4.6 G/DL (ref 3.2–4.9)
ALBUMIN/GLOB SERPL: 1.8 G/DL
ALP SERPL-CCNC: 70 U/L (ref 30–99)
ALT SERPL-CCNC: 28 U/L (ref 2–50)
ANION GAP SERPL CALC-SCNC: 14 MMOL/L (ref 7–16)
AST SERPL-CCNC: 29 U/L (ref 12–45)
BASOPHILS # BLD AUTO: 1 % (ref 0–1.8)
BASOPHILS # BLD: 0.04 K/UL (ref 0–0.12)
BILIRUB SERPL-MCNC: 1.2 MG/DL (ref 0.1–1.5)
BUN SERPL-MCNC: 27 MG/DL (ref 8–22)
CALCIUM ALBUM COR SERPL-MCNC: 9.4 MG/DL (ref 8.5–10.5)
CALCIUM SERPL-MCNC: 9.9 MG/DL (ref 8.5–10.5)
CHLORIDE SERPL-SCNC: 101 MMOL/L (ref 96–112)
CO2 SERPL-SCNC: 23 MMOL/L (ref 20–33)
CREAT SERPL-MCNC: 1.02 MG/DL (ref 0.5–1.4)
EOSINOPHIL # BLD AUTO: 0.08 K/UL (ref 0–0.51)
EOSINOPHIL NFR BLD: 1.9 % (ref 0–6.9)
ERYTHROCYTE [DISTWIDTH] IN BLOOD BY AUTOMATED COUNT: 53.4 FL (ref 35.9–50)
GFR SERPLBLD CREATININE-BSD FMLA CKD-EPI: 78 ML/MIN/1.73 M 2
GLOBULIN SER CALC-MCNC: 2.5 G/DL (ref 1.9–3.5)
GLUCOSE SERPL-MCNC: 141 MG/DL (ref 65–99)
HCT VFR BLD AUTO: 47 % (ref 42–52)
HGB BLD-MCNC: 16.3 G/DL (ref 14–18)
IMM GRANULOCYTES # BLD AUTO: 0.02 K/UL (ref 0–0.11)
IMM GRANULOCYTES NFR BLD AUTO: 0.5 % (ref 0–0.9)
LYMPHOCYTES # BLD AUTO: 1.29 K/UL (ref 1–4.8)
LYMPHOCYTES NFR BLD: 31.4 % (ref 22–41)
MCH RBC QN AUTO: 34.6 PG (ref 27–33)
MCHC RBC AUTO-ENTMCNC: 34.7 G/DL (ref 33.7–35.3)
MCV RBC AUTO: 99.8 FL (ref 81.4–97.8)
MONOCYTES # BLD AUTO: 0.39 K/UL (ref 0–0.85)
MONOCYTES NFR BLD AUTO: 9.5 % (ref 0–13.4)
NEUTROPHILS # BLD AUTO: 2.29 K/UL (ref 1.82–7.42)
NEUTROPHILS NFR BLD: 55.7 % (ref 44–72)
NRBC # BLD AUTO: 0 K/UL
NRBC BLD-RTO: 0 /100 WBC
PLATELET # BLD AUTO: 79 K/UL (ref 164–446)
PMV BLD AUTO: 10.3 FL (ref 9–12.9)
POTASSIUM SERPL-SCNC: 4.2 MMOL/L (ref 3.6–5.5)
PROT SERPL-MCNC: 7.1 G/DL (ref 6–8.2)
RBC # BLD AUTO: 4.71 M/UL (ref 4.7–6.1)
SODIUM SERPL-SCNC: 138 MMOL/L (ref 135–145)
TSH SERPL DL<=0.005 MIU/L-ACNC: 2.77 UIU/ML (ref 0.38–5.33)
WBC # BLD AUTO: 4.1 K/UL (ref 4.8–10.8)

## 2022-12-27 PROCEDURE — 85025 COMPLETE CBC W/AUTO DIFF WBC: CPT

## 2022-12-27 PROCEDURE — 36415 COLL VENOUS BLD VENIPUNCTURE: CPT

## 2022-12-27 PROCEDURE — 84443 ASSAY THYROID STIM HORMONE: CPT

## 2022-12-27 PROCEDURE — 80053 COMPREHEN METABOLIC PANEL: CPT

## 2023-01-06 ENCOUNTER — HOSPITAL ENCOUNTER (OUTPATIENT)
Dept: RADIOLOGY | Facility: MEDICAL CENTER | Age: 73
End: 2023-01-06
Attending: INTERNAL MEDICINE
Payer: MEDICARE

## 2023-01-06 DIAGNOSIS — C91.11 CHRONIC LYMPHOID LEUKEMIA IN REMISSION (HCC): ICD-10-CM

## 2023-01-06 PROCEDURE — 74183 MRI ABD W/O CNTR FLWD CNTR: CPT

## 2023-01-06 PROCEDURE — 700117 HCHG RX CONTRAST REV CODE 255: Performed by: INTERNAL MEDICINE

## 2023-01-06 PROCEDURE — A9579 GAD-BASE MR CONTRAST NOS,1ML: HCPCS | Performed by: INTERNAL MEDICINE

## 2023-01-06 RX ADMIN — GADOTERIDOL 20 ML: 279.3 INJECTION, SOLUTION INTRAVENOUS at 12:18

## 2023-01-16 ENCOUNTER — HOSPITAL ENCOUNTER (OUTPATIENT)
Dept: LAB | Facility: MEDICAL CENTER | Age: 73
End: 2023-01-16
Attending: INTERNAL MEDICINE
Payer: MEDICARE

## 2023-01-16 LAB
ALBUMIN SERPL BCP-MCNC: 4.5 G/DL (ref 3.2–4.9)
ALBUMIN/GLOB SERPL: 1.7 G/DL
ALP SERPL-CCNC: 74 U/L (ref 30–99)
ALT SERPL-CCNC: 27 U/L (ref 2–50)
ANION GAP SERPL CALC-SCNC: 15 MMOL/L (ref 7–16)
AST SERPL-CCNC: 32 U/L (ref 12–45)
BASOPHILS # BLD AUTO: 0.8 % (ref 0–1.8)
BASOPHILS # BLD: 0.04 K/UL (ref 0–0.12)
BILIRUB SERPL-MCNC: 1.3 MG/DL (ref 0.1–1.5)
BUN SERPL-MCNC: 22 MG/DL (ref 8–22)
CALCIUM ALBUM COR SERPL-MCNC: 9.9 MG/DL (ref 8.5–10.5)
CALCIUM SERPL-MCNC: 10.3 MG/DL (ref 8.5–10.5)
CHLORIDE SERPL-SCNC: 107 MMOL/L (ref 96–112)
CO2 SERPL-SCNC: 23 MMOL/L (ref 20–33)
CREAT SERPL-MCNC: 0.98 MG/DL (ref 0.5–1.4)
EOSINOPHIL # BLD AUTO: 0.09 K/UL (ref 0–0.51)
EOSINOPHIL NFR BLD: 1.8 % (ref 0–6.9)
ERYTHROCYTE [DISTWIDTH] IN BLOOD BY AUTOMATED COUNT: 49.6 FL (ref 35.9–50)
GFR SERPLBLD CREATININE-BSD FMLA CKD-EPI: 82 ML/MIN/1.73 M 2
GLOBULIN SER CALC-MCNC: 2.6 G/DL (ref 1.9–3.5)
GLUCOSE SERPL-MCNC: 133 MG/DL (ref 65–99)
HCT VFR BLD AUTO: 48.3 % (ref 42–52)
HGB BLD-MCNC: 17.1 G/DL (ref 14–18)
IMM GRANULOCYTES # BLD AUTO: 0.02 K/UL (ref 0–0.11)
IMM GRANULOCYTES NFR BLD AUTO: 0.4 % (ref 0–0.9)
LYMPHOCYTES # BLD AUTO: 1.72 K/UL (ref 1–4.8)
LYMPHOCYTES NFR BLD: 34.3 % (ref 22–41)
MCH RBC QN AUTO: 34.3 PG (ref 27–33)
MCHC RBC AUTO-ENTMCNC: 35.4 G/DL (ref 33.7–35.3)
MCV RBC AUTO: 97 FL (ref 81.4–97.8)
MONOCYTES # BLD AUTO: 0.41 K/UL (ref 0–0.85)
MONOCYTES NFR BLD AUTO: 8.2 % (ref 0–13.4)
NEUTROPHILS # BLD AUTO: 2.73 K/UL (ref 1.82–7.42)
NEUTROPHILS NFR BLD: 54.5 % (ref 44–72)
NRBC # BLD AUTO: 0 K/UL
NRBC BLD-RTO: 0 /100 WBC
PLATELET # BLD AUTO: 87 K/UL (ref 164–446)
PMV BLD AUTO: 10.4 FL (ref 9–12.9)
POTASSIUM SERPL-SCNC: 4.5 MMOL/L (ref 3.6–5.5)
PROT SERPL-MCNC: 7.1 G/DL (ref 6–8.2)
RBC # BLD AUTO: 4.98 M/UL (ref 4.7–6.1)
SODIUM SERPL-SCNC: 145 MMOL/L (ref 135–145)
TSH SERPL DL<=0.005 MIU/L-ACNC: 5.31 UIU/ML (ref 0.38–5.33)
WBC # BLD AUTO: 5 K/UL (ref 4.8–10.8)

## 2023-01-16 PROCEDURE — 84443 ASSAY THYROID STIM HORMONE: CPT

## 2023-01-16 PROCEDURE — 80053 COMPREHEN METABOLIC PANEL: CPT

## 2023-01-16 PROCEDURE — 36415 COLL VENOUS BLD VENIPUNCTURE: CPT

## 2023-01-16 PROCEDURE — 85025 COMPLETE CBC W/AUTO DIFF WBC: CPT

## 2023-02-07 ENCOUNTER — HOSPITAL ENCOUNTER (OUTPATIENT)
Dept: LAB | Facility: MEDICAL CENTER | Age: 73
End: 2023-02-07
Attending: INTERNAL MEDICINE
Payer: MEDICARE

## 2023-02-07 LAB
APPEARANCE UR: CLEAR
BACTERIA #/AREA URNS HPF: NEGATIVE /HPF
BASOPHILS # BLD AUTO: 0.7 % (ref 0–1.8)
BASOPHILS # BLD: 0.03 K/UL (ref 0–0.12)
BILIRUB UR QL STRIP.AUTO: NEGATIVE
COLOR UR: YELLOW
EOSINOPHIL # BLD AUTO: 0.05 K/UL (ref 0–0.51)
EOSINOPHIL NFR BLD: 1.2 % (ref 0–6.9)
EPI CELLS #/AREA URNS HPF: NEGATIVE /HPF
ERYTHROCYTE [DISTWIDTH] IN BLOOD BY AUTOMATED COUNT: 51.7 FL (ref 35.9–50)
GLUCOSE UR STRIP.AUTO-MCNC: NEGATIVE MG/DL
HCT VFR BLD AUTO: 45.9 % (ref 42–52)
HGB BLD-MCNC: 16 G/DL (ref 14–18)
HYALINE CASTS #/AREA URNS LPF: ABNORMAL /LPF
IMM GRANULOCYTES # BLD AUTO: 0.01 K/UL (ref 0–0.11)
IMM GRANULOCYTES NFR BLD AUTO: 0.2 % (ref 0–0.9)
KETONES UR STRIP.AUTO-MCNC: NEGATIVE MG/DL
LEUKOCYTE ESTERASE UR QL STRIP.AUTO: ABNORMAL
LYMPHOCYTES # BLD AUTO: 1.05 K/UL (ref 1–4.8)
LYMPHOCYTES NFR BLD: 26.1 % (ref 22–41)
MCH RBC QN AUTO: 35 PG (ref 27–33)
MCHC RBC AUTO-ENTMCNC: 34.9 G/DL (ref 33.7–35.3)
MCV RBC AUTO: 100.4 FL (ref 81.4–97.8)
MICRO URNS: ABNORMAL
MONOCYTES # BLD AUTO: 0.39 K/UL (ref 0–0.85)
MONOCYTES NFR BLD AUTO: 9.7 % (ref 0–13.4)
NEUTROPHILS # BLD AUTO: 2.5 K/UL (ref 1.82–7.42)
NEUTROPHILS NFR BLD: 62.1 % (ref 44–72)
NITRITE UR QL STRIP.AUTO: NEGATIVE
NRBC # BLD AUTO: 0 K/UL
NRBC BLD-RTO: 0 /100 WBC
PH UR STRIP.AUTO: 7.5 [PH] (ref 5–8)
PLATELET # BLD AUTO: 68 K/UL (ref 164–446)
PMV BLD AUTO: 10.5 FL (ref 9–12.9)
PROT UR QL STRIP: NEGATIVE MG/DL
RBC # BLD AUTO: 4.57 M/UL (ref 4.7–6.1)
RBC # URNS HPF: ABNORMAL /HPF
RBC UR QL AUTO: NEGATIVE
SP GR UR STRIP.AUTO: 1.02
UROBILINOGEN UR STRIP.AUTO-MCNC: 2 MG/DL
WBC # BLD AUTO: 4 K/UL (ref 4.8–10.8)
WBC #/AREA URNS HPF: ABNORMAL /HPF

## 2023-02-07 PROCEDURE — 85025 COMPLETE CBC W/AUTO DIFF WBC: CPT

## 2023-02-07 PROCEDURE — 81001 URINALYSIS AUTO W/SCOPE: CPT

## 2023-02-07 PROCEDURE — 80053 COMPREHEN METABOLIC PANEL: CPT

## 2023-02-07 PROCEDURE — 36415 COLL VENOUS BLD VENIPUNCTURE: CPT

## 2023-02-08 LAB
ALBUMIN SERPL BCP-MCNC: 4.3 G/DL (ref 3.2–4.9)
ALBUMIN/GLOB SERPL: 2 G/DL
ALP SERPL-CCNC: 68 U/L (ref 30–99)
ALT SERPL-CCNC: 23 U/L (ref 2–50)
ANION GAP SERPL CALC-SCNC: 12 MMOL/L (ref 7–16)
AST SERPL-CCNC: 32 U/L (ref 12–45)
BILIRUB SERPL-MCNC: 1.2 MG/DL (ref 0.1–1.5)
BUN SERPL-MCNC: 30 MG/DL (ref 8–22)
CALCIUM ALBUM COR SERPL-MCNC: 9.6 MG/DL (ref 8.5–10.5)
CALCIUM SERPL-MCNC: 9.8 MG/DL (ref 8.5–10.5)
CHLORIDE SERPL-SCNC: 103 MMOL/L (ref 96–112)
CO2 SERPL-SCNC: 24 MMOL/L (ref 20–33)
CREAT SERPL-MCNC: 1.05 MG/DL (ref 0.5–1.4)
GFR SERPLBLD CREATININE-BSD FMLA CKD-EPI: 75 ML/MIN/1.73 M 2
GLOBULIN SER CALC-MCNC: 2.1 G/DL (ref 1.9–3.5)
GLUCOSE SERPL-MCNC: 153 MG/DL (ref 65–99)
POTASSIUM SERPL-SCNC: 4.4 MMOL/L (ref 3.6–5.5)
PROT SERPL-MCNC: 6.4 G/DL (ref 6–8.2)
SODIUM SERPL-SCNC: 139 MMOL/L (ref 135–145)

## 2023-02-14 NOTE — TELEPHONE ENCOUNTER
Has upcoming appt w/ PCP. Will send 3 months of fills to pharmacy.    
Was the patient seen in the last year in this department? Yes     Does patient have an active prescription for medications requested? No     Received Request Via: Pharmacy      Pt met protocol?: Yes, labs and ov 4/17 a1c 5.3 appt with pcp 7/3/17     
10 (severe pain)

## 2023-02-21 ENCOUNTER — OFFICE VISIT (OUTPATIENT)
Dept: MEDICAL GROUP | Facility: PHYSICIAN GROUP | Age: 73
End: 2023-02-21
Payer: MEDICARE

## 2023-02-21 VITALS
BODY MASS INDEX: 33.27 KG/M2 | DIASTOLIC BLOOD PRESSURE: 72 MMHG | WEIGHT: 212 LBS | TEMPERATURE: 96.9 F | HEIGHT: 67 IN | OXYGEN SATURATION: 100 % | SYSTOLIC BLOOD PRESSURE: 112 MMHG | HEART RATE: 68 BPM

## 2023-02-21 DIAGNOSIS — E11.9 TYPE 2 DIABETES MELLITUS WITHOUT COMPLICATION, WITHOUT LONG-TERM CURRENT USE OF INSULIN (HCC): ICD-10-CM

## 2023-02-21 DIAGNOSIS — H60.01 ABSCESS OF RIGHT EARLOBE: ICD-10-CM

## 2023-02-21 DIAGNOSIS — Q18.1 CYST ON EAR: ICD-10-CM

## 2023-02-21 PROBLEM — R18.8 ASCITES: Status: ACTIVE | Noted: 2023-02-21

## 2023-02-21 PROBLEM — E50.9 VITAMIN A DEFICIENCY: Status: ACTIVE | Noted: 2023-02-21

## 2023-02-21 PROBLEM — D12.5 BENIGN NEOPLASM OF SIGMOID COLON: Status: ACTIVE | Noted: 2017-11-06

## 2023-02-21 PROBLEM — K31.89 OTHER DISEASES OF STOMACH AND DUODENUM: Status: ACTIVE | Noted: 2019-11-12

## 2023-02-21 PROBLEM — D50.0 IRON DEFICIENCY ANEMIA SECONDARY TO BLOOD LOSS (CHRONIC): Status: ACTIVE | Noted: 2023-02-21

## 2023-02-21 PROBLEM — K57.30 DVRTCLOS OF LG INT W/O PERFORATION OR ABSCESS W/O BLEEDING: Status: ACTIVE | Noted: 2017-11-06

## 2023-02-21 PROBLEM — D72.829 LEUKOCYTOSIS: Status: ACTIVE | Noted: 2023-02-21

## 2023-02-21 PROBLEM — I10 ESSENTIAL (PRIMARY) HYPERTENSION: Status: ACTIVE | Noted: 2023-02-21

## 2023-02-21 PROBLEM — Z86.010 PERSONAL HISTORY OF COLONIC POLYPS: Status: ACTIVE | Noted: 2023-02-21

## 2023-02-21 PROBLEM — C22.0 LIVER CELL CARCINOMA (HCC): Status: ACTIVE | Noted: 2023-02-21

## 2023-02-21 PROBLEM — K64.8 OTHER HEMORRHOIDS: Status: ACTIVE | Noted: 2017-11-06

## 2023-02-21 PROBLEM — M10.9 GOUT: Status: ACTIVE | Noted: 2023-02-21

## 2023-02-21 PROBLEM — Z86.0100 PERSONAL HISTORY OF COLONIC POLYPS: Status: ACTIVE | Noted: 2023-02-21

## 2023-02-21 PROCEDURE — 99214 OFFICE O/P EST MOD 30 MIN: CPT

## 2023-02-21 RX ORDER — SULFAMETHOXAZOLE AND TRIMETHOPRIM 800; 160 MG/1; MG/1
1 TABLET ORAL 2 TIMES DAILY
Qty: 10 TABLET | Refills: 0 | Status: SHIPPED | OUTPATIENT
Start: 2023-02-21 | End: 2023-02-26

## 2023-02-21 ASSESSMENT — FIBROSIS 4 INDEX: FIB4 SCORE: 7.06

## 2023-02-21 NOTE — PROGRESS NOTES
"Subjective:     CC:   Chief Complaint   Patient presents with    Diabetes Follow-up       HISTORY OF THE PRESENT ILLNESS: Sohail is a pleasant 72 y.o. male here today to follow-up on his A1c as well concerns about a possible infection on his earlobe.    Patient states he has had a cyst on his right earlobe for years.  Over the last 4 days, the cyst has become red, inflamed and very painful.    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    ROS:  All systems negative expect as addressed in assessment and plan.     Objective:     Exam:  /72   Pulse 68   Temp 36.1 °C (96.9 °F)   Ht 1.702 m (5' 7\")   Wt 96.2 kg (212 lb)   SpO2 100%   BMI 33.20 kg/m²  Body mass index is 33.2 kg/m².    Physical Exam  HENT:      Ears:      Comments: +Right ear lobe - large erythematous mass, tender to touch, warm to touch    Assessment & Plan:     72 y.o. male with the following -    1. Cyst on ear  2. Abscess of right earlobe  This is a chronic condition, worsening.  Upon exam, ear does look infected.  It is red and warm to touch.  Patient does have increased pain with touch.  He also complains of pain that extends behind the ear along the jawline. Considered referral to dermatology, however, will refer to ENT due to location of lesion and pain that extends along jaw and neck.  Will order antibiotics x5 days.  Will consider extended course if symptoms persist.  - Referral to ENT  - sulfamethoxazole-trimethoprim (BACTRIM DS) 800-160 MG tablet; Take 1 Tablet by mouth 2 times a day for 5 days.  Dispense: 10 Tablet; Refill: 0    3. Type 2 diabetes mellitus without complication, without long-term current use of insulin (HCC)  Chronic, controlled.  Current A1C 6.2  Continue current management on metformin 1000 mg twice daily.  Patient to return in 3 months for follow-up.    Patient was educated in proper administration of medication(s) ordered today including safety, possible SE, risks, " benefits, rationale and alternatives to therapy.   Supportive care, differential diagnoses, and indications for immediate follow-up discussed with patient.    Pathogenesis of diagnosis discussed including typical length and natural progression.    Instructed to return to clinic or nearest emergency department for any change in condition, further concerns, or worsening of symptoms.  Patient states understanding of the plan of care and discharge instructions.    Return in about 3 months (around 5/21/2023) for A1C.    I have placed the above orders and discussed them with an approved delegating provider.  The MA is performing the below orders under the direction of Dr. Jenkins.    Please note that this dictation was created using voice recognition software. I have worked with consultants from the vendor as well as technical experts from [employer] [employer] to optimize the interface. I have made every reasonable attempt to correct obvious errors, but I expect that there are errors of grammar and possibly content that I did not discover before finalizing the note.

## 2023-02-21 NOTE — ASSESSMENT & PLAN NOTE
Chronic, controlled.  Current A1C 6.2  Continue current management on metformin 1000 mg twice daily.  Patient to return in 3 months for follow-up.

## 2023-02-28 ENCOUNTER — HOSPITAL ENCOUNTER (OUTPATIENT)
Dept: LAB | Facility: MEDICAL CENTER | Age: 73
End: 2023-02-28
Attending: INTERNAL MEDICINE
Payer: MEDICARE

## 2023-02-28 LAB
ALBUMIN SERPL BCP-MCNC: 4.7 G/DL (ref 3.2–4.9)
ALBUMIN/GLOB SERPL: 1.9 G/DL
ALP SERPL-CCNC: 80 U/L (ref 30–99)
ALT SERPL-CCNC: 22 U/L (ref 2–50)
ANION GAP SERPL CALC-SCNC: 12 MMOL/L (ref 7–16)
APPEARANCE UR: CLEAR
AST SERPL-CCNC: 31 U/L (ref 12–45)
BACTERIA #/AREA URNS HPF: NEGATIVE /HPF
BASOPHILS # BLD AUTO: 0.9 % (ref 0–1.8)
BASOPHILS # BLD: 0.04 K/UL (ref 0–0.12)
BILIRUB SERPL-MCNC: 1.1 MG/DL (ref 0.1–1.5)
BILIRUB UR QL STRIP.AUTO: ABNORMAL
BUN SERPL-MCNC: 30 MG/DL (ref 8–22)
CALCIUM ALBUM COR SERPL-MCNC: 9.7 MG/DL (ref 8.5–10.5)
CALCIUM SERPL-MCNC: 10.3 MG/DL (ref 8.5–10.5)
CHLORIDE SERPL-SCNC: 101 MMOL/L (ref 96–112)
CO2 SERPL-SCNC: 23 MMOL/L (ref 20–33)
COLOR UR: ABNORMAL
CREAT SERPL-MCNC: 1.63 MG/DL (ref 0.5–1.4)
EOSINOPHIL # BLD AUTO: 0.07 K/UL (ref 0–0.51)
EOSINOPHIL NFR BLD: 1.6 % (ref 0–6.9)
EPI CELLS #/AREA URNS HPF: ABNORMAL /HPF
ERYTHROCYTE [DISTWIDTH] IN BLOOD BY AUTOMATED COUNT: 52.8 FL (ref 35.9–50)
GFR SERPLBLD CREATININE-BSD FMLA CKD-EPI: 44 ML/MIN/1.73 M 2
GLOBULIN SER CALC-MCNC: 2.5 G/DL (ref 1.9–3.5)
GLUCOSE SERPL-MCNC: 130 MG/DL (ref 65–99)
GLUCOSE UR STRIP.AUTO-MCNC: NEGATIVE MG/DL
HCT VFR BLD AUTO: 48.4 % (ref 42–52)
HGB BLD-MCNC: 17 G/DL (ref 14–18)
HYALINE CASTS #/AREA URNS LPF: ABNORMAL /LPF
IMM GRANULOCYTES # BLD AUTO: 0.02 K/UL (ref 0–0.11)
IMM GRANULOCYTES NFR BLD AUTO: 0.4 % (ref 0–0.9)
KETONES UR STRIP.AUTO-MCNC: ABNORMAL MG/DL
LEUKOCYTE ESTERASE UR QL STRIP.AUTO: ABNORMAL
LYMPHOCYTES # BLD AUTO: 1.14 K/UL (ref 1–4.8)
LYMPHOCYTES NFR BLD: 25.6 % (ref 22–41)
MCH RBC QN AUTO: 34.7 PG (ref 27–33)
MCHC RBC AUTO-ENTMCNC: 35.1 G/DL (ref 33.7–35.3)
MCV RBC AUTO: 98.8 FL (ref 81.4–97.8)
MICRO URNS: ABNORMAL
MONOCYTES # BLD AUTO: 0.37 K/UL (ref 0–0.85)
MONOCYTES NFR BLD AUTO: 8.3 % (ref 0–13.4)
NEUTROPHILS # BLD AUTO: 2.82 K/UL (ref 1.82–7.42)
NEUTROPHILS NFR BLD: 63.2 % (ref 44–72)
NITRITE UR QL STRIP.AUTO: NEGATIVE
NRBC # BLD AUTO: 0 K/UL
NRBC BLD-RTO: 0 /100 WBC
PH UR STRIP.AUTO: 5.5 [PH] (ref 5–8)
PLATELET # BLD AUTO: 94 K/UL (ref 164–446)
PMV BLD AUTO: 10.5 FL (ref 9–12.9)
POTASSIUM SERPL-SCNC: 4.8 MMOL/L (ref 3.6–5.5)
PROT SERPL-MCNC: 7.2 G/DL (ref 6–8.2)
PROT UR QL STRIP: NEGATIVE MG/DL
RBC # BLD AUTO: 4.9 M/UL (ref 4.7–6.1)
RBC # URNS HPF: ABNORMAL /HPF
RBC UR QL AUTO: NEGATIVE
SODIUM SERPL-SCNC: 136 MMOL/L (ref 135–145)
SP GR UR STRIP.AUTO: 1.03
UROBILINOGEN UR STRIP.AUTO-MCNC: 1 MG/DL
WBC # BLD AUTO: 4.5 K/UL (ref 4.8–10.8)
WBC #/AREA URNS HPF: ABNORMAL /HPF

## 2023-02-28 PROCEDURE — 80053 COMPREHEN METABOLIC PANEL: CPT

## 2023-02-28 PROCEDURE — 81001 URINALYSIS AUTO W/SCOPE: CPT

## 2023-02-28 PROCEDURE — 85025 COMPLETE CBC W/AUTO DIFF WBC: CPT

## 2023-02-28 PROCEDURE — 36415 COLL VENOUS BLD VENIPUNCTURE: CPT

## 2023-03-02 ENCOUNTER — HOSPITAL ENCOUNTER (OUTPATIENT)
Facility: MEDICAL CENTER | Age: 73
End: 2023-03-02
Attending: INTERNAL MEDICINE
Payer: MEDICARE

## 2023-03-02 LAB — TSH SERPL DL<=0.005 MIU/L-ACNC: 5.4 UIU/ML (ref 0.38–5.33)

## 2023-03-02 PROCEDURE — 84443 ASSAY THYROID STIM HORMONE: CPT

## 2023-03-02 PROCEDURE — 80048 BASIC METABOLIC PNL TOTAL CA: CPT

## 2023-03-03 LAB
ANION GAP SERPL CALC-SCNC: 15 MMOL/L (ref 7–16)
BUN SERPL-MCNC: 31 MG/DL (ref 8–22)
CALCIUM SERPL-MCNC: 10.2 MG/DL (ref 8.5–10.5)
CHLORIDE SERPL-SCNC: 102 MMOL/L (ref 96–112)
CO2 SERPL-SCNC: 19 MMOL/L (ref 20–33)
CREAT SERPL-MCNC: 1.33 MG/DL (ref 0.5–1.4)
GFR SERPLBLD CREATININE-BSD FMLA CKD-EPI: 57 ML/MIN/1.73 M 2
GLUCOSE SERPL-MCNC: 125 MG/DL (ref 65–99)
POTASSIUM SERPL-SCNC: 6.4 MMOL/L (ref 3.6–5.5)
SODIUM SERPL-SCNC: 136 MMOL/L (ref 135–145)

## 2023-03-21 ENCOUNTER — HOSPITAL ENCOUNTER (OUTPATIENT)
Dept: LAB | Facility: MEDICAL CENTER | Age: 73
End: 2023-03-21
Attending: INTERNAL MEDICINE
Payer: MEDICARE

## 2023-03-21 LAB
ALBUMIN SERPL BCP-MCNC: 4.3 G/DL (ref 3.2–4.9)
ALBUMIN/GLOB SERPL: 1.6 G/DL
ALP SERPL-CCNC: 73 U/L (ref 30–99)
ALT SERPL-CCNC: 26 U/L (ref 2–50)
ANION GAP SERPL CALC-SCNC: 14 MMOL/L (ref 7–16)
APPEARANCE UR: CLEAR
AST SERPL-CCNC: 34 U/L (ref 12–45)
BACTERIA #/AREA URNS HPF: NEGATIVE /HPF
BASOPHILS # BLD AUTO: 0.7 % (ref 0–1.8)
BASOPHILS # BLD: 0.03 K/UL (ref 0–0.12)
BILIRUB SERPL-MCNC: 1.1 MG/DL (ref 0.1–1.5)
BILIRUB UR QL STRIP.AUTO: NEGATIVE
BUN SERPL-MCNC: 24 MG/DL (ref 8–22)
CALCIUM ALBUM COR SERPL-MCNC: 9.5 MG/DL (ref 8.5–10.5)
CALCIUM SERPL-MCNC: 9.7 MG/DL (ref 8.5–10.5)
CAOX CRY #/AREA URNS HPF: ABNORMAL /HPF
CHLORIDE SERPL-SCNC: 102 MMOL/L (ref 96–112)
CO2 SERPL-SCNC: 23 MMOL/L (ref 20–33)
COLOR UR: ABNORMAL
CREAT SERPL-MCNC: 0.99 MG/DL (ref 0.5–1.4)
EOSINOPHIL # BLD AUTO: 0.06 K/UL (ref 0–0.51)
EOSINOPHIL NFR BLD: 1.5 % (ref 0–6.9)
EPI CELLS #/AREA URNS HPF: ABNORMAL /HPF
ERYTHROCYTE [DISTWIDTH] IN BLOOD BY AUTOMATED COUNT: 54 FL (ref 35.9–50)
GFR SERPLBLD CREATININE-BSD FMLA CKD-EPI: 81 ML/MIN/1.73 M 2
GLOBULIN SER CALC-MCNC: 2.7 G/DL (ref 1.9–3.5)
GLUCOSE SERPL-MCNC: 158 MG/DL (ref 65–99)
GLUCOSE UR STRIP.AUTO-MCNC: NEGATIVE MG/DL
HCT VFR BLD AUTO: 47 % (ref 42–52)
HGB BLD-MCNC: 16.1 G/DL (ref 14–18)
HYALINE CASTS #/AREA URNS LPF: ABNORMAL /LPF
IMM GRANULOCYTES # BLD AUTO: 0.02 K/UL (ref 0–0.11)
IMM GRANULOCYTES NFR BLD AUTO: 0.5 % (ref 0–0.9)
KETONES UR STRIP.AUTO-MCNC: ABNORMAL MG/DL
LEUKOCYTE ESTERASE UR QL STRIP.AUTO: ABNORMAL
LYMPHOCYTES # BLD AUTO: 1.38 K/UL (ref 1–4.8)
LYMPHOCYTES NFR BLD: 33.5 % (ref 22–41)
MCH RBC QN AUTO: 34.8 PG (ref 27–33)
MCHC RBC AUTO-ENTMCNC: 34.3 G/DL (ref 33.7–35.3)
MCV RBC AUTO: 101.7 FL (ref 81.4–97.8)
MICRO URNS: ABNORMAL
MONOCYTES # BLD AUTO: 0.38 K/UL (ref 0–0.85)
MONOCYTES NFR BLD AUTO: 9.2 % (ref 0–13.4)
NEUTROPHILS # BLD AUTO: 2.25 K/UL (ref 1.82–7.42)
NEUTROPHILS NFR BLD: 54.6 % (ref 44–72)
NITRITE UR QL STRIP.AUTO: NEGATIVE
NRBC # BLD AUTO: 0 K/UL
NRBC BLD-RTO: 0 /100 WBC
PH UR STRIP.AUTO: 5.5 [PH] (ref 5–8)
PLATELET # BLD AUTO: 66 K/UL (ref 164–446)
PMV BLD AUTO: 10.4 FL (ref 9–12.9)
POTASSIUM SERPL-SCNC: 4.4 MMOL/L (ref 3.6–5.5)
PROT SERPL-MCNC: 7 G/DL (ref 6–8.2)
PROT UR QL STRIP: 30 MG/DL
RBC # BLD AUTO: 4.62 M/UL (ref 4.7–6.1)
RBC # URNS HPF: ABNORMAL /HPF
RBC UR QL AUTO: NEGATIVE
SODIUM SERPL-SCNC: 139 MMOL/L (ref 135–145)
SP GR UR STRIP.AUTO: 1.02
UROBILINOGEN UR STRIP.AUTO-MCNC: 1 MG/DL
WBC # BLD AUTO: 4.1 K/UL (ref 4.8–10.8)
WBC #/AREA URNS HPF: ABNORMAL /HPF

## 2023-03-21 PROCEDURE — 36415 COLL VENOUS BLD VENIPUNCTURE: CPT

## 2023-03-21 PROCEDURE — 85025 COMPLETE CBC W/AUTO DIFF WBC: CPT

## 2023-03-21 PROCEDURE — 81001 URINALYSIS AUTO W/SCOPE: CPT

## 2023-03-21 PROCEDURE — 80053 COMPREHEN METABOLIC PANEL: CPT

## 2023-04-11 ENCOUNTER — HOSPITAL ENCOUNTER (OUTPATIENT)
Dept: LAB | Facility: MEDICAL CENTER | Age: 73
End: 2023-04-11
Attending: INTERNAL MEDICINE
Payer: MEDICARE

## 2023-04-11 LAB
ALBUMIN SERPL BCP-MCNC: 4.4 G/DL (ref 3.2–4.9)
ALBUMIN/GLOB SERPL: 1.6 G/DL
ALP SERPL-CCNC: 74 U/L (ref 30–99)
ALT SERPL-CCNC: 28 U/L (ref 2–50)
ANION GAP SERPL CALC-SCNC: 15 MMOL/L (ref 7–16)
APPEARANCE UR: ABNORMAL
AST SERPL-CCNC: 32 U/L (ref 12–45)
BACTERIA #/AREA URNS HPF: NEGATIVE /HPF
BASOPHILS # BLD AUTO: 0.6 % (ref 0–1.8)
BASOPHILS # BLD: 0.03 K/UL (ref 0–0.12)
BILIRUB SERPL-MCNC: 1.5 MG/DL (ref 0.1–1.5)
BILIRUB UR QL STRIP.AUTO: NEGATIVE
BUN SERPL-MCNC: 25 MG/DL (ref 8–22)
CALCIUM ALBUM COR SERPL-MCNC: 9.2 MG/DL (ref 8.5–10.5)
CALCIUM SERPL-MCNC: 9.5 MG/DL (ref 8.5–10.5)
CHLORIDE SERPL-SCNC: 104 MMOL/L (ref 96–112)
CO2 SERPL-SCNC: 20 MMOL/L (ref 20–33)
COLOR UR: ABNORMAL
CREAT SERPL-MCNC: 0.85 MG/DL (ref 0.5–1.4)
EOSINOPHIL # BLD AUTO: 0.07 K/UL (ref 0–0.51)
EOSINOPHIL NFR BLD: 1.5 % (ref 0–6.9)
EPI CELLS #/AREA URNS HPF: NEGATIVE /HPF
ERYTHROCYTE [DISTWIDTH] IN BLOOD BY AUTOMATED COUNT: 52.6 FL (ref 35.9–50)
GFR SERPLBLD CREATININE-BSD FMLA CKD-EPI: 92 ML/MIN/1.73 M 2
GLOBULIN SER CALC-MCNC: 2.7 G/DL (ref 1.9–3.5)
GLUCOSE SERPL-MCNC: 180 MG/DL (ref 65–99)
GLUCOSE UR STRIP.AUTO-MCNC: NEGATIVE MG/DL
HCT VFR BLD AUTO: 48.2 % (ref 42–52)
HGB BLD-MCNC: 16.4 G/DL (ref 14–18)
HYALINE CASTS #/AREA URNS LPF: ABNORMAL /LPF
IMM GRANULOCYTES # BLD AUTO: 0.02 K/UL (ref 0–0.11)
IMM GRANULOCYTES NFR BLD AUTO: 0.4 % (ref 0–0.9)
KETONES UR STRIP.AUTO-MCNC: NEGATIVE MG/DL
LEUKOCYTE ESTERASE UR QL STRIP.AUTO: ABNORMAL
LYMPHOCYTES # BLD AUTO: 1.37 K/UL (ref 1–4.8)
LYMPHOCYTES NFR BLD: 29 % (ref 22–41)
MCH RBC QN AUTO: 34.4 PG (ref 27–33)
MCHC RBC AUTO-ENTMCNC: 34 G/DL (ref 33.7–35.3)
MCV RBC AUTO: 101 FL (ref 81.4–97.8)
MICRO URNS: ABNORMAL
MONOCYTES # BLD AUTO: 0.38 K/UL (ref 0–0.85)
MONOCYTES NFR BLD AUTO: 8 % (ref 0–13.4)
NEUTROPHILS # BLD AUTO: 2.86 K/UL (ref 1.82–7.42)
NEUTROPHILS NFR BLD: 60.5 % (ref 44–72)
NITRITE UR QL STRIP.AUTO: NEGATIVE
NRBC # BLD AUTO: 0 K/UL
NRBC BLD-RTO: 0 /100 WBC
PH UR STRIP.AUTO: 6 [PH] (ref 5–8)
PLATELET # BLD AUTO: 76 K/UL (ref 164–446)
PMV BLD AUTO: 10.7 FL (ref 9–12.9)
POTASSIUM SERPL-SCNC: 4.4 MMOL/L (ref 3.6–5.5)
PROT SERPL-MCNC: 7.1 G/DL (ref 6–8.2)
PROT UR QL STRIP: NEGATIVE MG/DL
RBC # BLD AUTO: 4.77 M/UL (ref 4.7–6.1)
RBC # URNS HPF: ABNORMAL /HPF
RBC UR QL AUTO: NEGATIVE
SODIUM SERPL-SCNC: 139 MMOL/L (ref 135–145)
SP GR UR STRIP.AUTO: 1.03
UROBILINOGEN UR STRIP.AUTO-MCNC: 1 MG/DL
WBC # BLD AUTO: 4.7 K/UL (ref 4.8–10.8)
WBC #/AREA URNS HPF: ABNORMAL /HPF

## 2023-04-11 PROCEDURE — 85025 COMPLETE CBC W/AUTO DIFF WBC: CPT

## 2023-04-11 PROCEDURE — 81001 URINALYSIS AUTO W/SCOPE: CPT

## 2023-04-11 PROCEDURE — 80053 COMPREHEN METABOLIC PANEL: CPT

## 2023-04-11 PROCEDURE — 36415 COLL VENOUS BLD VENIPUNCTURE: CPT

## 2023-04-24 ENCOUNTER — HOSPITAL ENCOUNTER (INPATIENT)
Facility: MEDICAL CENTER | Age: 73
LOS: 1 days | DRG: 442 | End: 2023-04-25
Attending: EMERGENCY MEDICINE | Admitting: INTERNAL MEDICINE
Payer: MEDICARE

## 2023-04-24 ENCOUNTER — APPOINTMENT (OUTPATIENT)
Dept: RADIOLOGY | Facility: MEDICAL CENTER | Age: 73
DRG: 442 | End: 2023-04-24
Attending: EMERGENCY MEDICINE
Payer: MEDICARE

## 2023-04-24 DIAGNOSIS — K92.1 GASTROINTESTINAL HEMORRHAGE WITH MELENA: ICD-10-CM

## 2023-04-24 DIAGNOSIS — K92.2 GASTROINTESTINAL HEMORRHAGE, UNSPECIFIED GASTROINTESTINAL HEMORRHAGE TYPE: ICD-10-CM

## 2023-04-24 DIAGNOSIS — R11.2 NAUSEA AND VOMITING, UNSPECIFIED VOMITING TYPE: ICD-10-CM

## 2023-04-24 LAB
ABO + RH BLD: NORMAL
ABO GROUP BLD: NORMAL
ALBUMIN SERPL BCP-MCNC: 4.2 G/DL (ref 3.2–4.9)
ALBUMIN/GLOB SERPL: 1.8 G/DL
ALP SERPL-CCNC: 65 U/L (ref 30–99)
ALT SERPL-CCNC: 28 U/L (ref 2–50)
ANION GAP SERPL CALC-SCNC: 14 MMOL/L (ref 7–16)
ANISOCYTOSIS BLD QL SMEAR: ABNORMAL
APPEARANCE UR: CLEAR
APTT PPP: 26.7 SEC (ref 24.7–36)
AST SERPL-CCNC: 33 U/L (ref 12–45)
BACTERIA #/AREA URNS HPF: NEGATIVE /HPF
BASOPHILS # BLD AUTO: 0 % (ref 0–1.8)
BASOPHILS # BLD: 0 K/UL (ref 0–0.12)
BILIRUB SERPL-MCNC: 1.1 MG/DL (ref 0.1–1.5)
BILIRUB UR QL STRIP.AUTO: NEGATIVE
BLD GP AB SCN SERPL QL: NORMAL
BUN SERPL-MCNC: 76 MG/DL (ref 8–22)
CALCIUM ALBUM COR SERPL-MCNC: 9.4 MG/DL (ref 8.5–10.5)
CALCIUM SERPL-MCNC: 9.6 MG/DL (ref 8.5–10.5)
CFT BLD TEG: 4.7 MIN (ref 4.6–9.1)
CFT P HPASE BLD TEG: 5.4 MIN (ref 4.3–8.3)
CHLORIDE SERPL-SCNC: 102 MMOL/L (ref 96–112)
CLOT ANGLE BLD TEG: 71 DEGREES (ref 63–78)
CO2 SERPL-SCNC: 19 MMOL/L (ref 20–33)
COLOR UR: YELLOW
CREAT SERPL-MCNC: 1.17 MG/DL (ref 0.5–1.4)
CT.EXTRINSIC BLD ROTEM: 1.7 MIN (ref 0.8–2.1)
EKG IMPRESSION: NORMAL
EOSINOPHIL # BLD AUTO: 0 K/UL (ref 0–0.51)
EOSINOPHIL NFR BLD: 0 % (ref 0–6.9)
EPI CELLS #/AREA URNS HPF: NEGATIVE /HPF
ERYTHROCYTE [DISTWIDTH] IN BLOOD BY AUTOMATED COUNT: 49.9 FL (ref 35.9–50)
GFR SERPLBLD CREATININE-BSD FMLA CKD-EPI: 66 ML/MIN/1.73 M 2
GLOBULIN SER CALC-MCNC: 2.3 G/DL (ref 1.9–3.5)
GLUCOSE BLD STRIP.AUTO-MCNC: 166 MG/DL (ref 65–99)
GLUCOSE SERPL-MCNC: 190 MG/DL (ref 65–99)
GLUCOSE UR STRIP.AUTO-MCNC: NEGATIVE MG/DL
HCT VFR BLD AUTO: 35.3 % (ref 42–52)
HGB BLD-MCNC: 10.7 G/DL (ref 14–18)
HGB BLD-MCNC: 12.5 G/DL (ref 14–18)
HYALINE CASTS #/AREA URNS LPF: ABNORMAL /LPF
INR PPP: 1.16 (ref 0.87–1.13)
KETONES UR STRIP.AUTO-MCNC: ABNORMAL MG/DL
LACTATE SERPL-SCNC: 2.1 MMOL/L (ref 0.5–2)
LACTATE SERPL-SCNC: 2.4 MMOL/L (ref 0.5–2)
LEUKOCYTE ESTERASE UR QL STRIP.AUTO: ABNORMAL
LIPASE SERPL-CCNC: 22 U/L (ref 11–82)
LYMPHOCYTES # BLD AUTO: 3.66 K/UL (ref 1–4.8)
LYMPHOCYTES NFR BLD: 27.3 % (ref 22–41)
MACROCYTES BLD QL SMEAR: ABNORMAL
MANUAL DIFF BLD: NORMAL
MCF BLD TEG: 54.3 MM (ref 52–69)
MCF.PLATELET INHIB BLD ROTEM: 17.5 MM (ref 15–32)
MCH RBC QN AUTO: 34.3 PG (ref 27–33)
MCHC RBC AUTO-ENTMCNC: 35.4 G/DL (ref 33.7–35.3)
MCV RBC AUTO: 97 FL (ref 81.4–97.8)
MICRO URNS: ABNORMAL
MICROCYTES BLD QL SMEAR: ABNORMAL
MONOCYTES # BLD AUTO: 0.35 K/UL (ref 0–0.85)
MONOCYTES NFR BLD AUTO: 2.6 % (ref 0–13.4)
MORPHOLOGY BLD-IMP: NORMAL
NEUTROPHILS # BLD AUTO: 9.39 K/UL (ref 1.82–7.42)
NEUTROPHILS NFR BLD: 70.1 % (ref 44–72)
NITRITE UR QL STRIP.AUTO: NEGATIVE
NRBC # BLD AUTO: 0 K/UL
NRBC BLD-RTO: 0 /100 WBC
PH UR STRIP.AUTO: 5 [PH] (ref 5–8)
PLATELET # BLD AUTO: 129 K/UL (ref 164–446)
PLATELET BLD QL SMEAR: NORMAL
PMV BLD AUTO: 10 FL (ref 9–12.9)
POTASSIUM SERPL-SCNC: 5.8 MMOL/L (ref 3.6–5.5)
PROT SERPL-MCNC: 6.5 G/DL (ref 6–8.2)
PROT UR QL STRIP: NEGATIVE MG/DL
PROTHROMBIN TIME: 14.7 SEC (ref 12–14.6)
RBC # BLD AUTO: 3.64 M/UL (ref 4.7–6.1)
RBC # URNS HPF: ABNORMAL /HPF
RBC BLD AUTO: PRESENT
RBC UR QL AUTO: NEGATIVE
RH BLD: NORMAL
SODIUM SERPL-SCNC: 135 MMOL/L (ref 135–145)
SP GR UR STRIP.AUTO: 1.02
TEG ALGORITHM TGALG: NORMAL
TROPONIN T SERPL-MCNC: 55 NG/L (ref 6–19)
UROBILINOGEN UR STRIP.AUTO-MCNC: 1 MG/DL
WBC # BLD AUTO: 13.4 K/UL (ref 4.8–10.8)
WBC #/AREA URNS HPF: ABNORMAL /HPF

## 2023-04-24 PROCEDURE — C9113 INJ PANTOPRAZOLE SODIUM, VIA: HCPCS | Performed by: INTERNAL MEDICINE

## 2023-04-24 PROCEDURE — 99223 1ST HOSP IP/OBS HIGH 75: CPT | Mod: AI | Performed by: INTERNAL MEDICINE

## 2023-04-24 PROCEDURE — 96375 TX/PRO/DX INJ NEW DRUG ADDON: CPT

## 2023-04-24 PROCEDURE — 700111 HCHG RX REV CODE 636 W/ 250 OVERRIDE (IP): Performed by: INTERNAL MEDICINE

## 2023-04-24 PROCEDURE — C9113 INJ PANTOPRAZOLE SODIUM, VIA: HCPCS | Performed by: EMERGENCY MEDICINE

## 2023-04-24 PROCEDURE — 86900 BLOOD TYPING SEROLOGIC ABO: CPT

## 2023-04-24 PROCEDURE — 85018 HEMOGLOBIN: CPT

## 2023-04-24 PROCEDURE — 700117 HCHG RX CONTRAST REV CODE 255: Performed by: EMERGENCY MEDICINE

## 2023-04-24 PROCEDURE — 85730 THROMBOPLASTIN TIME PARTIAL: CPT

## 2023-04-24 PROCEDURE — 85384 FIBRINOGEN ACTIVITY: CPT

## 2023-04-24 PROCEDURE — 93005 ELECTROCARDIOGRAM TRACING: CPT

## 2023-04-24 PROCEDURE — 770020 HCHG ROOM/CARE - TELE (206)

## 2023-04-24 PROCEDURE — 36415 COLL VENOUS BLD VENIPUNCTURE: CPT

## 2023-04-24 PROCEDURE — 87040 BLOOD CULTURE FOR BACTERIA: CPT

## 2023-04-24 PROCEDURE — 700101 HCHG RX REV CODE 250: Performed by: EMERGENCY MEDICINE

## 2023-04-24 PROCEDURE — 85025 COMPLETE CBC W/AUTO DIFF WBC: CPT

## 2023-04-24 PROCEDURE — 85007 BL SMEAR W/DIFF WBC COUNT: CPT

## 2023-04-24 PROCEDURE — 86901 BLOOD TYPING SEROLOGIC RH(D): CPT

## 2023-04-24 PROCEDURE — 99285 EMERGENCY DEPT VISIT HI MDM: CPT

## 2023-04-24 PROCEDURE — 82962 GLUCOSE BLOOD TEST: CPT

## 2023-04-24 PROCEDURE — A9270 NON-COVERED ITEM OR SERVICE: HCPCS | Performed by: EMERGENCY MEDICINE

## 2023-04-24 PROCEDURE — 85610 PROTHROMBIN TIME: CPT

## 2023-04-24 PROCEDURE — 700105 HCHG RX REV CODE 258: Performed by: EMERGENCY MEDICINE

## 2023-04-24 PROCEDURE — 99283 EMERGENCY DEPT VISIT LOW MDM: CPT | Performed by: INTERNAL MEDICINE

## 2023-04-24 PROCEDURE — 85576 BLOOD PLATELET AGGREGATION: CPT

## 2023-04-24 PROCEDURE — 700102 HCHG RX REV CODE 250 W/ 637 OVERRIDE(OP): Performed by: NURSE PRACTITIONER

## 2023-04-24 PROCEDURE — 76705 ECHO EXAM OF ABDOMEN: CPT

## 2023-04-24 PROCEDURE — 85347 COAGULATION TIME ACTIVATED: CPT

## 2023-04-24 PROCEDURE — 96365 THER/PROPH/DIAG IV INF INIT: CPT

## 2023-04-24 PROCEDURE — 700102 HCHG RX REV CODE 250 W/ 637 OVERRIDE(OP): Performed by: INTERNAL MEDICINE

## 2023-04-24 PROCEDURE — 96367 TX/PROPH/DG ADDL SEQ IV INF: CPT

## 2023-04-24 PROCEDURE — 74177 CT ABD & PELVIS W/CONTRAST: CPT

## 2023-04-24 PROCEDURE — 80053 COMPREHEN METABOLIC PANEL: CPT

## 2023-04-24 PROCEDURE — 84484 ASSAY OF TROPONIN QUANT: CPT

## 2023-04-24 PROCEDURE — 700105 HCHG RX REV CODE 258: Performed by: INTERNAL MEDICINE

## 2023-04-24 PROCEDURE — 86850 RBC ANTIBODY SCREEN: CPT

## 2023-04-24 PROCEDURE — 81001 URINALYSIS AUTO W/SCOPE: CPT

## 2023-04-24 PROCEDURE — 700102 HCHG RX REV CODE 250 W/ 637 OVERRIDE(OP): Performed by: EMERGENCY MEDICINE

## 2023-04-24 PROCEDURE — 83605 ASSAY OF LACTIC ACID: CPT

## 2023-04-24 PROCEDURE — 96376 TX/PRO/DX INJ SAME DRUG ADON: CPT

## 2023-04-24 PROCEDURE — 93005 ELECTROCARDIOGRAM TRACING: CPT | Performed by: EMERGENCY MEDICINE

## 2023-04-24 PROCEDURE — A9270 NON-COVERED ITEM OR SERVICE: HCPCS | Performed by: NURSE PRACTITIONER

## 2023-04-24 PROCEDURE — 83690 ASSAY OF LIPASE: CPT

## 2023-04-24 PROCEDURE — 700111 HCHG RX REV CODE 636 W/ 250 OVERRIDE (IP): Performed by: EMERGENCY MEDICINE

## 2023-04-24 RX ORDER — AMOXICILLIN 250 MG
2 CAPSULE ORAL 2 TIMES DAILY
Status: DISCONTINUED | OUTPATIENT
Start: 2023-04-24 | End: 2023-04-25 | Stop reason: HOSPADM

## 2023-04-24 RX ORDER — METRONIDAZOLE 500 MG/100ML
500 INJECTION, SOLUTION INTRAVENOUS ONCE
Status: COMPLETED | OUTPATIENT
Start: 2023-04-24 | End: 2023-04-24

## 2023-04-24 RX ORDER — ONDANSETRON 2 MG/ML
4 INJECTION INTRAMUSCULAR; INTRAVENOUS EVERY 4 HOURS PRN
Status: DISCONTINUED | OUTPATIENT
Start: 2023-04-24 | End: 2023-04-25 | Stop reason: HOSPADM

## 2023-04-24 RX ORDER — OCTREOTIDE ACETATE 100 UG/ML
50 INJECTION, SOLUTION INTRAVENOUS; SUBCUTANEOUS ONCE
Status: COMPLETED | OUTPATIENT
Start: 2023-04-24 | End: 2023-04-24

## 2023-04-24 RX ORDER — ONDANSETRON 2 MG/ML
4 INJECTION INTRAMUSCULAR; INTRAVENOUS ONCE
Status: COMPLETED | OUTPATIENT
Start: 2023-04-24 | End: 2023-04-24

## 2023-04-24 RX ORDER — OCTREOTIDE ACETATE 100 UG/ML
50 INJECTION, SOLUTION INTRAVENOUS; SUBCUTANEOUS ONCE
Status: DISCONTINUED | OUTPATIENT
Start: 2023-04-24 | End: 2023-04-24

## 2023-04-24 RX ORDER — SODIUM CHLORIDE 9 MG/ML
INJECTION, SOLUTION INTRAVENOUS CONTINUOUS
Status: DISCONTINUED | OUTPATIENT
Start: 2023-04-24 | End: 2023-04-25 | Stop reason: HOSPADM

## 2023-04-24 RX ORDER — FLUTICASONE PROPIONATE 50 MCG
1 SPRAY, SUSPENSION (ML) NASAL 2 TIMES DAILY
COMMUNITY

## 2023-04-24 RX ORDER — BISACODYL 10 MG
10 SUPPOSITORY, RECTAL RECTAL
Status: DISCONTINUED | OUTPATIENT
Start: 2023-04-24 | End: 2023-04-25 | Stop reason: HOSPADM

## 2023-04-24 RX ORDER — ONDANSETRON 4 MG/1
4 TABLET, ORALLY DISINTEGRATING ORAL EVERY 4 HOURS PRN
Status: DISCONTINUED | OUTPATIENT
Start: 2023-04-24 | End: 2023-04-25 | Stop reason: HOSPADM

## 2023-04-24 RX ORDER — POLYETHYLENE GLYCOL 3350 17 G/17G
1 POWDER, FOR SOLUTION ORAL
Status: DISCONTINUED | OUTPATIENT
Start: 2023-04-24 | End: 2023-04-25 | Stop reason: HOSPADM

## 2023-04-24 RX ORDER — FLUTICASONE PROPIONATE 50 MCG
1 SPRAY, SUSPENSION (ML) NASAL DAILY
Status: DISCONTINUED | OUTPATIENT
Start: 2023-04-25 | End: 2023-04-25 | Stop reason: HOSPADM

## 2023-04-24 RX ORDER — CEFTRIAXONE 2 G/1
2000 INJECTION, POWDER, FOR SOLUTION INTRAMUSCULAR; INTRAVENOUS ONCE
Status: COMPLETED | OUTPATIENT
Start: 2023-04-24 | End: 2023-04-24

## 2023-04-24 RX ORDER — ACETAMINOPHEN 500 MG
500 TABLET ORAL EVERY 6 HOURS PRN
Status: DISCONTINUED | OUTPATIENT
Start: 2023-04-24 | End: 2023-04-25 | Stop reason: HOSPADM

## 2023-04-24 RX ORDER — PANTOPRAZOLE SODIUM 40 MG/10ML
40 INJECTION, POWDER, LYOPHILIZED, FOR SOLUTION INTRAVENOUS 2 TIMES DAILY
Status: DISCONTINUED | OUTPATIENT
Start: 2023-04-24 | End: 2023-04-25 | Stop reason: HOSPADM

## 2023-04-24 RX ORDER — M-VIT,TX,IRON,MINS/CALC/FOLIC 27MG-0.4MG
1 TABLET ORAL DAILY
COMMUNITY

## 2023-04-24 RX ORDER — LORATADINE 10 MG/1
10 TABLET ORAL DAILY
Status: ON HOLD | COMMUNITY
End: 2023-08-16

## 2023-04-24 RX ORDER — SODIUM CHLORIDE, SODIUM LACTATE, POTASSIUM CHLORIDE, CALCIUM CHLORIDE 600; 310; 30; 20 MG/100ML; MG/100ML; MG/100ML; MG/100ML
500 INJECTION, SOLUTION INTRAVENOUS ONCE
Status: COMPLETED | OUTPATIENT
Start: 2023-04-24 | End: 2023-04-24

## 2023-04-24 RX ADMIN — PANTOPRAZOLE SODIUM 40 MG: 40 INJECTION, POWDER, LYOPHILIZED, FOR SOLUTION INTRAVENOUS at 18:24

## 2023-04-24 RX ADMIN — ONDANSETRON 4 MG: 2 INJECTION INTRAMUSCULAR; INTRAVENOUS at 21:11

## 2023-04-24 RX ADMIN — SODIUM CHLORIDE: 9 INJECTION, SOLUTION INTRAVENOUS at 18:13

## 2023-04-24 RX ADMIN — ACETAMINOPHEN 500 MG: 500 TABLET, FILM COATED ORAL at 21:11

## 2023-04-24 RX ADMIN — METRONIDAZOLE 500 MG: 5 INJECTION, SOLUTION INTRAVENOUS at 16:51

## 2023-04-24 RX ADMIN — OCTREOTIDE ACETATE 50 MCG: 100 INJECTION, SOLUTION INTRAVENOUS; SUBCUTANEOUS at 17:18

## 2023-04-24 RX ADMIN — CEFTRIAXONE SODIUM 2000 MG: 2 INJECTION, POWDER, FOR SOLUTION INTRAMUSCULAR; INTRAVENOUS at 16:39

## 2023-04-24 RX ADMIN — ONDANSETRON HYDROCHLORIDE 4 MG: 2 SOLUTION INTRAMUSCULAR; INTRAVENOUS at 12:16

## 2023-04-24 RX ADMIN — OCTREOTIDE ACETATE 50 MCG/HR: 200 INJECTION, SOLUTION INTRAVENOUS; SUBCUTANEOUS at 17:22

## 2023-04-24 RX ADMIN — IOHEXOL 100 ML: 350 INJECTION, SOLUTION INTRAVENOUS at 14:21

## 2023-04-24 RX ADMIN — OCTREOTIDE ACETATE 50 MCG/HR: 200 INJECTION, SOLUTION INTRAVENOUS; SUBCUTANEOUS at 21:30

## 2023-04-24 RX ADMIN — SODIUM CHLORIDE 80 MG: 9 INJECTION, SOLUTION INTRAVENOUS at 13:22

## 2023-04-24 RX ADMIN — OCTREOTIDE ACETATE 50 MCG/HR: 200 INJECTION, SOLUTION INTRAVENOUS; SUBCUTANEOUS at 18:11

## 2023-04-24 RX ADMIN — ONDANSETRON HYDROCHLORIDE 4 MG: 2 SOLUTION INTRAMUSCULAR; INTRAVENOUS at 16:44

## 2023-04-24 RX ADMIN — SODIUM CHLORIDE, POTASSIUM CHLORIDE, SODIUM LACTATE AND CALCIUM CHLORIDE 500 ML: 600; 310; 30; 20 INJECTION, SOLUTION INTRAVENOUS at 16:39

## 2023-04-24 RX ADMIN — LIDOCAINE HYDROCHLORIDE 30 ML: 20 SOLUTION OROPHARYNGEAL at 12:16

## 2023-04-24 ASSESSMENT — ENCOUNTER SYMPTOMS
ABDOMINAL PAIN: 1
NAUSEA: 1
RESPIRATORY NEGATIVE: 1
CONSTITUTIONAL NEGATIVE: 1
PSYCHIATRIC NEGATIVE: 1
VOMITING: 1
CARDIOVASCULAR NEGATIVE: 1
MUSCULOSKELETAL NEGATIVE: 1
CONSTIPATION: 0
NEUROLOGICAL NEGATIVE: 1
BLOOD IN STOOL: 0
EYES NEGATIVE: 1

## 2023-04-24 ASSESSMENT — COGNITIVE AND FUNCTIONAL STATUS - GENERAL
DAILY ACTIVITIY SCORE: 24
SUGGESTED CMS G CODE MODIFIER DAILY ACTIVITY: CH

## 2023-04-24 ASSESSMENT — PATIENT HEALTH QUESTIONNAIRE - PHQ9
1. LITTLE INTEREST OR PLEASURE IN DOING THINGS: NOT AT ALL
SUM OF ALL RESPONSES TO PHQ9 QUESTIONS 1 AND 2: 0
2. FEELING DOWN, DEPRESSED, IRRITABLE, OR HOPELESS: NOT AT ALL
2. FEELING DOWN, DEPRESSED, IRRITABLE, OR HOPELESS: NOT AT ALL
1. LITTLE INTEREST OR PLEASURE IN DOING THINGS: NOT AT ALL
SUM OF ALL RESPONSES TO PHQ9 QUESTIONS 1 AND 2: 0

## 2023-04-24 ASSESSMENT — FIBROSIS 4 INDEX: FIB4 SCORE: 5.73

## 2023-04-24 ASSESSMENT — PAIN DESCRIPTION - PAIN TYPE: TYPE: CHRONIC PAIN

## 2023-04-24 NOTE — ED TRIAGE NOTES
"Chief Complaint   Patient presents with    Abdominal Pain     Upper and lower abdominal x 3 days    Constipation     Black hard stool      Pt to triage for above complaints. Pt states his abdominal pain is mainly epigastric but also lower as well. Pt has not had a regular BM in 4 days. Pt states hes taken Miralax 3 times with minimal relief.     Pt is alert and oriented, speaking in full sentences, follows commands and responds appropriately to questions.      Pt placed in lobby. Pt educated on triage process and apologized for wait time. Pt encouraged to alert staff for any changes.     Patient and staff wearing appropriate PPE      /79   Pulse 82   Temp 36.4 °C (97.6 °F) (Temporal)   Resp 16   Ht 1.702 m (5' 7\")   Wt 96.2 kg (212 lb)   SpO2 99%       "

## 2023-04-24 NOTE — ED NOTES
Pt ambulatory to room with wife. Pt changed into gown, connected to monitor, and given call light. Chart up for ERP

## 2023-04-24 NOTE — ED NOTES
Med rec updated and complete. Allergies reviewed . Confirmed  name and date of birth.  Interviewed pt at bedside.      No antibiotic use in last 30 days.      [place] [facility] [phone]

## 2023-04-24 NOTE — ED PROVIDER NOTES
ED PHYSICIAN NOTE    CHIEF COMPLAINT  Chief Complaint   Patient presents with    Abdominal Pain     Upper and lower abdominal x 3 days    Constipation     Black hard stool        EXTERNAL RECORDS REVIEWED  Office encounter for management of diabetes 2/21/2023    HPI/ROS    OUTSIDE HISTORIAN(S):  Wife reports patient has been vomiting a lot    Sohail Duvall is a 72 y.o. male who presents with abdominal pain.  This is localized mostly in the upper abdomen but is throughout the abdomen.  Feels bloating sensation.  Has nausea vomiting.  Feels like he cannot keep anything and has not been eating anything.  Only had a banana yesterday.  He has not had any hematemesis, but today he had 3 small black Jonas appearing stools.  Denies dysuria hematuria.  No chest pain shortness of breath.    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 Stopped both medications because blood pressure readings were low, /82 today and has been low at home since stopping medications about 5 days ago. Last reading at home 117/72     Indigestion     Liver cancer (HCC)     Liver tumor     Plantar fasciitis of right foot 8/12/2019    Rosacea     Snoring     no sleep study       SOCIAL HISTORY  Social History     Tobacco Use    Smoking status: Never    Smokeless tobacco: Former     Types: Snuff     Quit date: 3/29/2015   Vaping Use    Vaping Use: Never used   Substance Use Topics    Alcohol use: Not Currently     Alcohol/week: 7.2 oz     Types: 12 Shots of liquor per week     "Drug use: Yes     Types: Marijuana, Inhaled, Oral     Comment: \"Marijuana Use\" daily        CURRENT MEDICATIONS  Home Medications       Reviewed by Jacqueline Maya R.N. (Registered Nurse) on 04/24/23 at 1059  Med List Status: Not Addressed     Medication Last Dose Status   fluticasone (FLONASE ALLERGY RELIEF) 50 MCG/ACT nasal spray  Active   furosemide (LASIX) 20 MG Tab  Active   metFORMIN ER (GLUCOPHAGE XR) 500 MG TABLET SR 24 HR  Active   Multiple Vitamins-Minerals (MULTIVITAMIN PO)  Active   ondansetron (ZOFRAN) 4 MG Tab tablet  Active   Polyethylene Glycol 3350 (MIRALAX PO)  Active   spironolactone (ALDACTONE) 50 MG Tab  Active   Vitamin D, Cholecalciferol, 25 MCG (1000 UT) Cap  Active                    ALLERGIES  No Known Allergies    PHYSICAL EXAM  VITAL SIGNS: BP (!) 141/70   Pulse 70   Temp 36.4 °C (97.6 °F) (Temporal)   Resp 20   Ht 1.702 m (5' 7\")   Wt 96.2 kg (212 lb)   SpO2 99%   BMI 33.20 kg/m²    Constitutional: Awake and alert.  Diaphoretic.  Ill-appearing  HENT: Normal inspection  Eyes: Normal inspection  Neck: Grossly normal range of motion.  Cardiovascular: Normal heart rate, Normal rhythm.  Symmetric peripheral pulses.   Thorax & Lungs: No respiratory distress, No wheezing, No rales, No rhonchi, No chest tenderness.   Abdomen: + distention with diffuse tenderness  Skin: No obvious rash.  Back: No tenderness, No CVA tenderness.   Extremities: No clubbing, cyanosis, edema, no Homans or cords.  Neurologic: Grossly normal   Psychiatric: Normal for situation     DIAGNOSTIC STUDIES / PROCEDURES  LABS/EKG  Results for orders placed or performed during the hospital encounter of 04/24/23   CBC WITH DIFFERENTIAL   Result Value Ref Range    WBC 13.4 (H) 4.8 - 10.8 K/uL    RBC 3.64 (L) 4.70 - 6.10 M/uL    Hemoglobin 12.5 (L) 14.0 - 18.0 g/dL    Hematocrit 35.3 (L) 42.0 - 52.0 %    MCV 97.0 81.4 - 97.8 fL    MCH 34.3 (H) 27.0 - 33.0 pg    MCHC 35.4 (H) 33.7 - 35.3 g/dL    RDW 49.9 35.9 - 50.0 fL    " Platelet Count 129 (L) 164 - 446 K/uL    MPV 10.0 9.0 - 12.9 fL    Neutrophils-Polys 70.10 44.00 - 72.00 %    Lymphocytes 27.30 22.00 - 41.00 %    Monocytes 2.60 0.00 - 13.40 %    Eosinophils 0.00 0.00 - 6.90 %    Basophils 0.00 0.00 - 1.80 %    Nucleated RBC 0.00 /100 WBC    Neutrophils (Absolute) 9.39 (H) 1.82 - 7.42 K/uL    Lymphs (Absolute) 3.66 1.00 - 4.80 K/uL    Monos (Absolute) 0.35 0.00 - 0.85 K/uL    Eos (Absolute) 0.00 0.00 - 0.51 K/uL    Baso (Absolute) 0.00 0.00 - 0.12 K/uL    NRBC (Absolute) 0.00 K/uL    Anisocytosis 1+     Macrocytosis 1+     Microcytosis 1+    COMP METABOLIC PANEL   Result Value Ref Range    Sodium 135 135 - 145 mmol/L    Potassium 5.8 (H) 3.6 - 5.5 mmol/L    Chloride 102 96 - 112 mmol/L    Co2 19 (L) 20 - 33 mmol/L    Anion Gap 14.0 7.0 - 16.0    Glucose 190 (H) 65 - 99 mg/dL    Bun 76 (H) 8 - 22 mg/dL    Creatinine 1.17 0.50 - 1.40 mg/dL    Calcium 9.6 8.5 - 10.5 mg/dL    AST(SGOT) 33 12 - 45 U/L    ALT(SGPT) 28 2 - 50 U/L    Alkaline Phosphatase 65 30 - 99 U/L    Total Bilirubin 1.1 0.1 - 1.5 mg/dL    Albumin 4.2 3.2 - 4.9 g/dL    Total Protein 6.5 6.0 - 8.2 g/dL    Globulin 2.3 1.9 - 3.5 g/dL    A-G Ratio 1.8 g/dL   LIPASE   Result Value Ref Range    Lipase 22 11 - 82 U/L   URINALYSIS    Specimen: Urine   Result Value Ref Range    Color Yellow     Character Clear     Specific Gravity 1.017 <1.035    Ph 5.0 5.0 - 8.0    Glucose Negative Negative mg/dL    Ketones Trace (A) Negative mg/dL    Protein Negative Negative mg/dL    Bilirubin Negative Negative    Urobilinogen, Urine 1.0 Negative    Nitrite Negative Negative    Leukocyte Esterase Trace (A) Negative    Occult Blood Negative Negative    Micro Urine Req Microscopic    ESTIMATED GFR   Result Value Ref Range    GFR (CKD-EPI) 66 >60 mL/min/1.73 m 2   APTT   Result Value Ref Range    APTT 26.7 24.7 - 36.0 sec   PROTHROMBIN TIME (INR)   Result Value Ref Range    PT 14.7 (H) 12.0 - 14.6 sec    INR 1.16 (H) 0.87 - 1.13   COD (ADULT)    Result Value Ref Range    ABO Grouping Only A     Rh Grouping Only POS     Antibody Screen-Cod NEG    CORRECTED CALCIUM   Result Value Ref Range    Correct Calcium 9.4 8.5 - 10.5 mg/dL   TROPONIN   Result Value Ref Range    Troponin T 55 (H) 6 - 19 ng/L   URINE MICROSCOPIC (W/UA)   Result Value Ref Range    WBC 0-2 (A) /hpf    RBC 0-2 (A) /hpf    Bacteria Negative None /hpf    Epithelial Cells Negative /hpf    Hyaline Cast 0-2 /lpf   DIFFERENTIAL MANUAL   Result Value Ref Range    Manual Diff Status PERFORMED    PERIPHERAL SMEAR REVIEW   Result Value Ref Range    Peripheral Smear Review see below    PLATELET ESTIMATE   Result Value Ref Range    Plt Estimation Decreased    MORPHOLOGY   Result Value Ref Range    RBC Morphology Present    EKG   Result Value Ref Range    Report       [facility] Emergency Dept.    Test Date:  2023  Pt Name:    LUIS KAYE          Department: ER  MRN:        [id number]                      Room:  Gender:     Male                         Technician: [id number]  :        [id number]                   Requested By:ER TRIAGE PROTOCOL  Order #:    [id number]                    Reading MD: JUANCARLOS JANE MD    Measurements  Intervals                                Axis  Rate:       69                           P:          -25  KS:         224                          QRS:        -21  QRSD:       89                           T:          30  QT:         394  QTc:        422    Interpretive Statements  Sinus rhythm  Prolonged KS interval  Borderline left axis deviation  Low voltage, precordial leads  Abnormal R-wave progression, late transition  Compared to ECG 2020 10:12:27  Low QRS voltage now present  Sinus bradycardia no longer present  Electronically Signed On 2023 12:07:28 PDT by JUANCARLOS JANE MD        I have independently interpreted this EKG  Rhythm strip interpretation-sinus rhythm    RADIOLOGY  I have independently interpreted the  diagnostic imaging associated with this visit and am waiting the final reading from the radiologist.   My preliminary interpretation is as follows: CT scan abdomen pelvis without free air  Radiologist interpretation:   CT-ABDOMEN-PELVIS WITH   Final Result      1.  No acute inflammatory process in the abdomen or pelvis.   2.  Small amount of stool in the colon.   3.  Cirrhosis and portal hypertension, including splenomegaly. No ascites.   4.  Three out of five hepatic lesions are visualized on today's CT entire stable to slightly smaller in size compared to prior MRI. No new hepatic lesions.   5.  No new metastatic disease in the abdomen or pelvis.   6.  Cholelithiasis.   7.  Colonic diverticulosis.      US-RUQ    (Results Pending)         COURSE & MEDICAL DECISION MAKING    INITIAL ASSESSMENT, COURSE AND PLAN  Care Narrative: Patient presents with complaints as above. Presentation is complex and multiple life-threatening conditions are considered.  Patient requires emergency diagnostic testing.  Will need to evaluate for bowel obstruction, pancreatitis, Kayla cystitis, appendicitis, diverticulitis, perforated viscus, gastrointestinal hemorrhage, ACS etc.  Work-up is initiated.  Treat with small bolus of IV fluids 500 cc administered.  Treat with Zofran and GI cocktail.  Give Protonix bolus 80 mg IV    Laboratory data returned demonstrating leukocytosis.  At 2:30 PM infection was suspected.  Sepsis pathway was initiated.  Ordered ceftriaxone and Flagyl empirically.    CMP with mild hyperkalemia.  No EKG changes.  BUN is remarkably elevated and GI bleeding is suspected.  Continue with PPI.  I added Protonix infusion as well as octreotide bolus and infusion given reports of esophageal varices    CT scan of the abdomen and pelvis without acute inflammatory process.  Patient has gallstones and cirrhosis with portal hypertension.  Given gallstones order right upper quadrant ultrasound.      Patient will need to be  admitted to the hospital for further treatment.  Hospitalist paged for admission.  Paged gastroenterology.    I discussed case with Dr. duarte, gastroenterology.  Advise continue with PPI and octreotide.  Plan for endoscopy tomorrow morning.    HYDRATION: Based on the patient's presentation of Sepsis the patient was given IV fluids. IV Hydration was used because oral hydration was not adequate alone. Upon recheck following hydration, the patient was critically ill.      ADDITIONAL PROBLEM LIST  Esophageal varices  Cirrhosis    DISPOSITION AND DISCUSSIONS  I have discussed management of the patient with the following physicians and GAL's: Dr. Gómez    Discussion of management with other [facility] or appropriate source(s): Pharmacy reviewed medications    FINAL IMPRESSION  1.  Gastrointestinal hemorrhage  2.  Nausea and vomiting    CRITICAL CARE TIME 35 minutes  There was a very real possibility of deterioration of the patient's condition.  This patient required the highest level of care.  I provided critical care services which included: review of the medical record, treatment orders, ordering and reviewing test results, frequent reevaluation of the patient's condition and response to treatment, as well as discussing the case with appropriate personnel and various consultants. The critical care time associated with the care of this patient is exclusive of any procedures or specific interventions.    This dictation was created using voice recognition software. The accuracy of the dictation is limited to the abilities of the software. I expect there may be some errors of grammar and possibly content. The nursing notes were reviewed and certain aspects of this information were incorporated into this note.    Electronically signed by: Shane Gross M.D., 4/24/2023

## 2023-04-24 NOTE — CONSULTS
Date of Consultation:  4/24/2023    Patient: : Sohail Duvall  MRN: [id number]    Referring Physician:  Dr. Shane Gross    GI:Tricia Hernandez M.D.     Reason for Consultation: r/o GI bleeding.     History of Present Illness:   The patient is 72 years old gentleman with medical history of cirrhosis, hepatocellular carcinoma s/p ablation, CLL receiving medication from oncologist, presented to GI inpatient service for melena and a hemoglobin drop.    The patient was a GI see patient before, however he is in the process to change her to a GI provider.  Last upper GI scope July 2022, s/p varices ligation, last colonoscopy more than 5 years ago.    At emergency, the patient has mild discomfort from the epigastric area.  No difficulty breathing.  Total 3 bowel movement in the last 24 hours, which were dark.  Not taking diuretics today.  Denies overuse of NSAID.    The patient's labs show hemoglobin drop about 20% from the baseline.  The CAT scan today shows stable liver cancer s/p treatment.          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 Stopped both medications because blood pressure readings were low, /82 today and has been low at home since stopping medications about 5 days ago. Last reading at home 117/72     CLL (chronic lymphocytic leukemia) (HCC) 2014    Cancer (HCC) 2004    Prostate - did brachytherapy at [place]    CMV (cytomegalovirus infection) (HCC) 1990    Treated for 6 weeks    Anemia     Cirrhosis (HCC)     Diabetes (HCC)     oral medication    Heart burn     Indigestion     Liver cancer (HCC)     Liver tumor     Rosacea     Snoring     no sleep study         Past Surgical History:   Procedure Laterality Date    WOUND EXPLORATION ORTHO Right 8/5/2020    Procedure:  "EXPLORATION, WOUND- THUMB;  Surgeon: Yumiko Griffiths M.D.;  Location: SURGERY SAME DAY [facility] ORS;  Service: Orthopedics    TENDON REPAIR  8/5/2020    Procedure: REPAIR, TENDON- EXTENSOR;  Surgeon: Yumiko Griffiths M.D.;  Location: SURGERY SAME DAY [facility];  Service: Orthopedics    HEPATIC ABLATION LAPAROSCOPIC  6/28/2018    Procedure: HEPATIC ABLATION LAPAROSCOPIC. SEGMENT 4B, HEPATOMA, REPAIR OF INCARCERATED UMBILICAL HERNIA;  Surgeon: Feliberto Burgos M.D.;  Location: SURGERY [facility];  Service: General    BRACHY THERAPY         Family History   Problem Relation Age of Onset    Cancer Father 81        Bladder    Cancer Brother         Prostate & CLL (s/p 8-10 years ago)    Hyperlipidemia Sister     Lung Disease Neg Hx     Diabetes Neg Hx     Heart Disease Neg Hx     Hypertension Neg Hx     Stroke Neg Hx     Alcohol/Drug Neg Hx        Social History     Socioeconomic History    Marital status:     Number of children: 1   Occupational History    Occupation: fertilizer sales   Tobacco Use    Smoking status: Never    Smokeless tobacco: Former     Types: Snuff     Quit date: 3/29/2015   Vaping Use    Vaping Use: Never used   Substance and Sexual Activity    Alcohol use: Not Currently     Alcohol/week: 7.2 oz     Types: 12 Shots of liquor per week    Drug use: Yes     Types: Marijuana, Inhaled, Oral     Comment: \"Marijuana Use\" daily     Sexual activity: Never   Social History Narrative    No known exposure to asbestos, dyes, or chemicals, however he was exposed to pesticides.  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  1 child, alive and well.  He also has a step-child.            Physical Exam:  Vitals:    04/24/23 1056 04/24/23 1143 04/24/23 1303 04/24/23 1408   BP:  137/88 125/73 (!) 141/70   Pulse:  71 66 70   Resp:  20     Temp:       TempSrc:       SpO2:  98% 98% 99%   Weight: 96.2 kg (212 lb)      Height:     "     Constitutional: Denies fevers.  Eyes: Denies symptoms.   Ears/Nose/Throat/Mouth: Denies choking.  Cardiovascular: Denies chest pain.   Respiratory: Denies shortness of breath.  Gastrointestinal/Hepatic: Per H/P.   Genitourinary: Denies burning.  Musculoskeletal/Rheum: No complaints   Skin/Breast: Denies rash   Psychiatric: No complaints    PE limited, he was receiving multiple bedside procedure at the time we visited.           Labs:  Recent Labs     04/24/23  1128   WBC 13.4*   RBC 3.64*   HEMOGLOBIN 12.5*   HEMATOCRIT 35.3*   MCV 97.0   MCH 34.3*   MCHC 35.4*   RDW 49.9   PLATELETCT 129*   MPV 10.0     Recent Labs     04/24/23  1128   SODIUM 135   POTASSIUM 5.8*   CHLORIDE 102   CO2 19*   GLUCOSE 190*   BUN 76*     Recent Labs     04/24/23  1128   APTT 26.7   INR 1.16*       Recent Labs     04/24/23  1128   ASTSGOT 33   ALTSGPT 28   TBILIRUBIN 1.1   ALKPHOSPHAT 65   GLOBULIN 2.3   INR 1.16*         Imaging:  CT-ABDOMEN-PELVIS WITH  Narrative: 4/24/2023 1:40 PM    HISTORY/REASON FOR EXAM:  Abdominal pain, constipation, history of HCC status post laparoscopic ablation.    TECHNIQUE/EXAM DESCRIPTION:   CT scan of the abdomen and pelvis with contrast.    Contrast-enhanced helical scanning was obtained from the diaphragmatic domes through the pubic symphysis following the bolus administration of nonionic contrast without complication.    100 mL of Omnipaque 350 nonionic contrast was administered without complication.    Low dose optimization technique was utilized for this CT exam including automated exposure control and adjustment of the mA and/or kV according to patient size.    COMPARISON: 1/6/2023.    FINDINGS:    Lower chest: Partially visualized intrapulmonary node along the right minor fissure, benign. Coronary calcifications.    Liver: Cirrhotic. 3 hepatic lesions are redemonstrated. Segment 4 hepatic lesion shows arterial enhancement on prior MRI study measures 1.6 x 1.3 cm, stable to slightly smaller.  Lesion in the inferior right hepatic lobe is stable, measuring 1.4 x 1.3 cm.   Lesion abutting the IVC is also stable, measuring 2 x 1.2 cm. Other lesions seen on the prior MRI are not well visualized on the CT. No new hepatic lesions.    Gallbladder: No mural thickening. Cholelithiasis.    Common bile duct: Nondilated.    Pancreas: Unremarkable.    Spleen: Measures 16.7 cm.    Adrenals: Stable left adrenal myelolipoma..    Kidneys: No suspicious mass lesions. No hydronephrosis.    Stomach, small bowel, colon: No bowel wall thickening or obstruction. Colonic diverticulosis.    Peritoneal cavity: No ascites.    Lymph nodes: No enlarged nodes by size criteria.    Aorta: No aneurysm.    Pelvic organs: Radiation seeds in the prostate..    Musculoskeletal structures: No acute fracture or destructive lesion.  Impression: 1.  No acute inflammatory process in the abdomen or pelvis.  2.  Small amount of stool in the colon.  3.  Cirrhosis and portal hypertension, including splenomegaly. No ascites.  4.  Three out of five hepatic lesions are visualized on today's CT entire stable to slightly smaller in size compared to prior MRI. No new hepatic lesions.  5.  No new metastatic disease in the abdomen or pelvis.  6.  Cholelithiasis.  7.  Colonic diverticulosis.            Impressions:  72 years old gentleman with medical history of cirrhosis with portal hypertension s/p multiple esophageal varices ligation, HCC s/p ablation, CLL receiving possible immunotherapy from outpatient oncologist presented to GI inpatient service for melena and hemoglobin drop.    Based on presentation, less likely to be varices bleeding however portal hypertensive gastropathy, erosion, ulcer or possible at this time.  Less likely to be lower GI but not completely excluded yet.    The GI team will suggest IV PPI twice daily, octreotide, antibiotic IV for cirrhotic bleeding, ultrasound with Doppler, supportive care with fluid, n.p.o. midnight and possible  upper GI scope tomorrow morning.    Diagnosis: upper gastrointestinal bleeding.   Procedure: Esophagogastroduodenoscopy.       Inpatient GI team will continue to follow up.           This note was generated using voice recognition software which has a small chance of producing errors of grammar and possibly content. I have made every reasonable attempt to find and correct any obvious errors, but expect that some may not be found prior to finalization of this note.

## 2023-04-25 ENCOUNTER — APPOINTMENT (OUTPATIENT)
Dept: RADIOLOGY | Facility: MEDICAL CENTER | Age: 73
DRG: 442 | End: 2023-04-25
Attending: INTERNAL MEDICINE
Payer: MEDICARE

## 2023-04-25 ENCOUNTER — ANESTHESIA (OUTPATIENT)
Dept: SURGERY | Facility: MEDICAL CENTER | Age: 73
DRG: 442 | End: 2023-04-25
Payer: MEDICARE

## 2023-04-25 ENCOUNTER — ANESTHESIA EVENT (OUTPATIENT)
Dept: SURGERY | Facility: MEDICAL CENTER | Age: 73
DRG: 442 | End: 2023-04-25
Payer: MEDICARE

## 2023-04-25 VITALS
OXYGEN SATURATION: 98 % | RESPIRATION RATE: 18 BRPM | TEMPERATURE: 97.6 F | DIASTOLIC BLOOD PRESSURE: 68 MMHG | WEIGHT: 212 LBS | BODY MASS INDEX: 33.27 KG/M2 | HEIGHT: 67 IN | HEART RATE: 66 BPM | SYSTOLIC BLOOD PRESSURE: 128 MMHG

## 2023-04-25 LAB
ALBUMIN SERPL BCP-MCNC: 3.8 G/DL (ref 3.2–4.9)
ALBUMIN/GLOB SERPL: 2 G/DL
ALP SERPL-CCNC: 56 U/L (ref 30–99)
ALT SERPL-CCNC: 27 U/L (ref 2–50)
ANION GAP SERPL CALC-SCNC: 14 MMOL/L (ref 7–16)
AST SERPL-CCNC: 32 U/L (ref 12–45)
BASOPHILS # BLD AUTO: 0.7 % (ref 0–1.8)
BASOPHILS # BLD: 0.05 K/UL (ref 0–0.12)
BILIRUB SERPL-MCNC: 0.8 MG/DL (ref 0.1–1.5)
BUN SERPL-MCNC: 69 MG/DL (ref 8–22)
CALCIUM ALBUM COR SERPL-MCNC: 9.1 MG/DL (ref 8.5–10.5)
CALCIUM SERPL-MCNC: 8.9 MG/DL (ref 8.5–10.5)
CHLORIDE SERPL-SCNC: 106 MMOL/L (ref 96–112)
CO2 SERPL-SCNC: 19 MMOL/L (ref 20–33)
CREAT SERPL-MCNC: 1.38 MG/DL (ref 0.5–1.4)
EOSINOPHIL # BLD AUTO: 0.06 K/UL (ref 0–0.51)
EOSINOPHIL NFR BLD: 0.8 % (ref 0–6.9)
ERYTHROCYTE [DISTWIDTH] IN BLOOD BY AUTOMATED COUNT: 50.4 FL (ref 35.9–50)
GFR SERPLBLD CREATININE-BSD FMLA CKD-EPI: 54 ML/MIN/1.73 M 2
GLOBULIN SER CALC-MCNC: 1.9 G/DL (ref 1.9–3.5)
GLUCOSE BLD STRIP.AUTO-MCNC: 145 MG/DL (ref 65–99)
GLUCOSE BLD STRIP.AUTO-MCNC: 151 MG/DL (ref 65–99)
GLUCOSE BLD STRIP.AUTO-MCNC: 151 MG/DL (ref 65–99)
GLUCOSE BLD STRIP.AUTO-MCNC: 160 MG/DL (ref 65–99)
GLUCOSE SERPL-MCNC: 162 MG/DL (ref 65–99)
HCT VFR BLD AUTO: 29.9 % (ref 42–52)
HGB BLD-MCNC: 10.3 G/DL (ref 14–18)
HGB BLD-MCNC: 10.5 G/DL (ref 14–18)
IMM GRANULOCYTES # BLD AUTO: 0.04 K/UL (ref 0–0.11)
IMM GRANULOCYTES NFR BLD AUTO: 0.5 % (ref 0–0.9)
LYMPHOCYTES # BLD AUTO: 2.4 K/UL (ref 1–4.8)
LYMPHOCYTES NFR BLD: 32.6 % (ref 22–41)
MAGNESIUM SERPL-MCNC: 2.1 MG/DL (ref 1.5–2.5)
MCH RBC QN AUTO: 34.1 PG (ref 27–33)
MCHC RBC AUTO-ENTMCNC: 34.4 G/DL (ref 33.7–35.3)
MCV RBC AUTO: 99 FL (ref 81.4–97.8)
MONOCYTES # BLD AUTO: 0.53 K/UL (ref 0–0.85)
MONOCYTES NFR BLD AUTO: 7.2 % (ref 0–13.4)
NEUTROPHILS # BLD AUTO: 4.29 K/UL (ref 1.82–7.42)
NEUTROPHILS NFR BLD: 58.2 % (ref 44–72)
NRBC # BLD AUTO: 0 K/UL
NRBC BLD-RTO: 0 /100 WBC
PLATELET # BLD AUTO: 104 K/UL (ref 164–446)
PMV BLD AUTO: 10.4 FL (ref 9–12.9)
POTASSIUM SERPL-SCNC: 4.9 MMOL/L (ref 3.6–5.5)
PROT SERPL-MCNC: 5.7 G/DL (ref 6–8.2)
RBC # BLD AUTO: 3.02 M/UL (ref 4.7–6.1)
SODIUM SERPL-SCNC: 139 MMOL/L (ref 135–145)
WBC # BLD AUTO: 7.4 K/UL (ref 4.8–10.8)

## 2023-04-25 PROCEDURE — 160002 HCHG RECOVERY MINUTES (STAT): Performed by: INTERNAL MEDICINE

## 2023-04-25 PROCEDURE — 82962 GLUCOSE BLOOD TEST: CPT | Mod: 91

## 2023-04-25 PROCEDURE — 99239 HOSP IP/OBS DSCHRG MGMT >30: CPT | Mod: GC | Performed by: INTERNAL MEDICINE

## 2023-04-25 PROCEDURE — 160009 HCHG ANES TIME/MIN: Performed by: INTERNAL MEDICINE

## 2023-04-25 PROCEDURE — 85018 HEMOGLOBIN: CPT

## 2023-04-25 PROCEDURE — 00731 ANES UPR GI NDSC PX NOS: CPT | Performed by: ANESTHESIOLOGY

## 2023-04-25 PROCEDURE — 700111 HCHG RX REV CODE 636 W/ 250 OVERRIDE (IP): Performed by: INTERNAL MEDICINE

## 2023-04-25 PROCEDURE — 80053 COMPREHEN METABOLIC PANEL: CPT

## 2023-04-25 PROCEDURE — 0DJ08ZZ INSPECTION OF UPPER INTESTINAL TRACT, VIA NATURAL OR ARTIFICIAL OPENING ENDOSCOPIC: ICD-10-PCS | Performed by: INTERNAL MEDICINE

## 2023-04-25 PROCEDURE — 700101 HCHG RX REV CODE 250: Performed by: ANESTHESIOLOGY

## 2023-04-25 PROCEDURE — 160035 HCHG PACU - 1ST 60 MINS PHASE I: Performed by: INTERNAL MEDICINE

## 2023-04-25 PROCEDURE — 76705 ECHO EXAM OF ABDOMEN: CPT

## 2023-04-25 PROCEDURE — C9113 INJ PANTOPRAZOLE SODIUM, VIA: HCPCS | Performed by: INTERNAL MEDICINE

## 2023-04-25 PROCEDURE — 700102 HCHG RX REV CODE 250 W/ 637 OVERRIDE(OP): Performed by: INTERNAL MEDICINE

## 2023-04-25 PROCEDURE — 700105 HCHG RX REV CODE 258: Performed by: ANESTHESIOLOGY

## 2023-04-25 PROCEDURE — 160048 HCHG OR STATISTICAL LEVEL 1-5: Performed by: INTERNAL MEDICINE

## 2023-04-25 PROCEDURE — 83735 ASSAY OF MAGNESIUM: CPT

## 2023-04-25 PROCEDURE — A9270 NON-COVERED ITEM OR SERVICE: HCPCS | Performed by: INTERNAL MEDICINE

## 2023-04-25 PROCEDURE — 160203 HCHG ENDO MINUTES - 1ST 30 MINS LEVEL 4: Performed by: INTERNAL MEDICINE

## 2023-04-25 PROCEDURE — 99100 ANES PT EXTEME AGE<1 YR&>70: CPT | Performed by: ANESTHESIOLOGY

## 2023-04-25 PROCEDURE — 700111 HCHG RX REV CODE 636 W/ 250 OVERRIDE (IP): Performed by: ANESTHESIOLOGY

## 2023-04-25 PROCEDURE — 85025 COMPLETE CBC W/AUTO DIFF WBC: CPT

## 2023-04-25 PROCEDURE — 43235 EGD DIAGNOSTIC BRUSH WASH: CPT | Performed by: INTERNAL MEDICINE

## 2023-04-25 RX ORDER — PROPRANOLOL HYDROCHLORIDE 10 MG/1
10 TABLET ORAL 2 TIMES DAILY
Qty: 60 TABLET | Refills: 0 | Status: ON HOLD
Start: 2023-04-25 | End: 2023-08-16

## 2023-04-25 RX ORDER — HYDROMORPHONE HYDROCHLORIDE 1 MG/ML
1 INJECTION, SOLUTION INTRAMUSCULAR; INTRAVENOUS; SUBCUTANEOUS
Status: DISCONTINUED | OUTPATIENT
Start: 2023-04-25 | End: 2023-04-25 | Stop reason: HOSPADM

## 2023-04-25 RX ORDER — MEPERIDINE HYDROCHLORIDE 25 MG/ML
12.5 INJECTION INTRAMUSCULAR; INTRAVENOUS; SUBCUTANEOUS
Status: DISCONTINUED | OUTPATIENT
Start: 2023-04-25 | End: 2023-04-25 | Stop reason: HOSPADM

## 2023-04-25 RX ORDER — PANTOPRAZOLE SODIUM 40 MG/1
40 TABLET, DELAYED RELEASE ORAL 2 TIMES DAILY
Qty: 60 TABLET | Refills: 0 | Status: ON HOLD
Start: 2023-04-25 | End: 2023-08-16

## 2023-04-25 RX ORDER — SODIUM CHLORIDE, SODIUM GLUCONATE, SODIUM ACETATE, POTASSIUM CHLORIDE AND MAGNESIUM CHLORIDE 526; 502; 368; 37; 30 MG/100ML; MG/100ML; MG/100ML; MG/100ML; MG/100ML
500 INJECTION, SOLUTION INTRAVENOUS CONTINUOUS
Status: DISCONTINUED | OUTPATIENT
Start: 2023-04-25 | End: 2023-04-25 | Stop reason: HOSPADM

## 2023-04-25 RX ORDER — SODIUM CHLORIDE, SODIUM LACTATE, POTASSIUM CHLORIDE, CALCIUM CHLORIDE 600; 310; 30; 20 MG/100ML; MG/100ML; MG/100ML; MG/100ML
INJECTION, SOLUTION INTRAVENOUS CONTINUOUS
Status: DISCONTINUED | OUTPATIENT
Start: 2023-04-25 | End: 2023-04-25 | Stop reason: HOSPADM

## 2023-04-25 RX ORDER — OXYCODONE HCL 5 MG/5 ML
10 SOLUTION, ORAL ORAL
Status: DISCONTINUED | OUTPATIENT
Start: 2023-04-25 | End: 2023-04-25 | Stop reason: HOSPADM

## 2023-04-25 RX ORDER — SODIUM CHLORIDE, SODIUM LACTATE, POTASSIUM CHLORIDE, CALCIUM CHLORIDE 600; 310; 30; 20 MG/100ML; MG/100ML; MG/100ML; MG/100ML
INJECTION, SOLUTION INTRAVENOUS
Status: DISCONTINUED | OUTPATIENT
Start: 2023-04-25 | End: 2023-04-25 | Stop reason: SURG

## 2023-04-25 RX ORDER — HYDROMORPHONE HYDROCHLORIDE 1 MG/ML
0.2 INJECTION, SOLUTION INTRAMUSCULAR; INTRAVENOUS; SUBCUTANEOUS
Status: DISCONTINUED | OUTPATIENT
Start: 2023-04-25 | End: 2023-04-25 | Stop reason: HOSPADM

## 2023-04-25 RX ORDER — HALOPERIDOL 5 MG/ML
1 INJECTION INTRAMUSCULAR
Status: DISCONTINUED | OUTPATIENT
Start: 2023-04-25 | End: 2023-04-25 | Stop reason: HOSPADM

## 2023-04-25 RX ORDER — ONDANSETRON 2 MG/ML
4 INJECTION INTRAMUSCULAR; INTRAVENOUS
Status: DISCONTINUED | OUTPATIENT
Start: 2023-04-25 | End: 2023-04-25 | Stop reason: HOSPADM

## 2023-04-25 RX ORDER — LIDOCAINE HYDROCHLORIDE 20 MG/ML
INJECTION, SOLUTION EPIDURAL; INFILTRATION; INTRACAUDAL; PERINEURAL PRN
Status: DISCONTINUED | OUTPATIENT
Start: 2023-04-25 | End: 2023-04-25 | Stop reason: SURG

## 2023-04-25 RX ORDER — HYDROMORPHONE HYDROCHLORIDE 1 MG/ML
0.4 INJECTION, SOLUTION INTRAMUSCULAR; INTRAVENOUS; SUBCUTANEOUS
Status: DISCONTINUED | OUTPATIENT
Start: 2023-04-25 | End: 2023-04-25 | Stop reason: HOSPADM

## 2023-04-25 RX ORDER — MIDAZOLAM HYDROCHLORIDE 1 MG/ML
1 INJECTION INTRAMUSCULAR; INTRAVENOUS
Status: DISCONTINUED | OUTPATIENT
Start: 2023-04-25 | End: 2023-04-25 | Stop reason: HOSPADM

## 2023-04-25 RX ORDER — DIPHENHYDRAMINE HYDROCHLORIDE 50 MG/ML
12.5 INJECTION INTRAMUSCULAR; INTRAVENOUS
Status: DISCONTINUED | OUTPATIENT
Start: 2023-04-25 | End: 2023-04-25 | Stop reason: HOSPADM

## 2023-04-25 RX ORDER — OXYCODONE HCL 5 MG/5 ML
5 SOLUTION, ORAL ORAL
Status: DISCONTINUED | OUTPATIENT
Start: 2023-04-25 | End: 2023-04-25 | Stop reason: HOSPADM

## 2023-04-25 RX ORDER — IPRATROPIUM BROMIDE AND ALBUTEROL SULFATE 2.5; .5 MG/3ML; MG/3ML
3 SOLUTION RESPIRATORY (INHALATION)
Status: DISCONTINUED | OUTPATIENT
Start: 2023-04-25 | End: 2023-04-25 | Stop reason: HOSPADM

## 2023-04-25 RX ADMIN — DOCUSATE SODIUM 50 MG AND SENNOSIDES 8.6 MG 2 TABLET: 8.6; 5 TABLET, FILM COATED ORAL at 05:17

## 2023-04-25 RX ADMIN — PROPOFOL 100 MG: 10 INJECTION, EMULSION INTRAVENOUS at 11:54

## 2023-04-25 RX ADMIN — PROPOFOL 50 MG: 10 INJECTION, EMULSION INTRAVENOUS at 11:57

## 2023-04-25 RX ADMIN — LIDOCAINE HYDROCHLORIDE 50 MG: 20 INJECTION, SOLUTION EPIDURAL; INFILTRATION; INTRACAUDAL at 11:54

## 2023-04-25 RX ADMIN — PANTOPRAZOLE SODIUM 40 MG: 40 INJECTION, POWDER, LYOPHILIZED, FOR SOLUTION INTRAVENOUS at 05:17

## 2023-04-25 RX ADMIN — FLUTICASONE PROPIONATE 50 MCG: 50 SPRAY, METERED NASAL at 05:17

## 2023-04-25 RX ADMIN — SODIUM CHLORIDE, POTASSIUM CHLORIDE, SODIUM LACTATE AND CALCIUM CHLORIDE: 600; 310; 30; 20 INJECTION, SOLUTION INTRAVENOUS at 11:46

## 2023-04-25 ASSESSMENT — PATIENT HEALTH QUESTIONNAIRE - PHQ9
1. LITTLE INTEREST OR PLEASURE IN DOING THINGS: NOT AT ALL
SUM OF ALL RESPONSES TO PHQ9 QUESTIONS 1 AND 2: 0
2. FEELING DOWN, DEPRESSED, IRRITABLE, OR HOPELESS: NOT AT ALL

## 2023-04-25 ASSESSMENT — PAIN SCALES - GENERAL: PAIN_LEVEL: 7

## 2023-04-25 NOTE — OR NURSING
1330: assumed care from Leatha BLACKBURN. Pt ready to be transported bact to S163. Off the floor accompanied by this RN.    1338: Handoff to inpatient team. No personal belongings. Tele box given to inpatient RN.

## 2023-04-25 NOTE — ASSESSMENT & PLAN NOTE
Holding spironolactone and Lasix due to GI bleeding  Close monitoring and started medication if needed

## 2023-04-25 NOTE — PROGRESS NOTES
PHG bleeding likely the reason.   Not much other inpatient intervention for now.     Beta blocker, avoid fluid overload, and prn transfusion by outpatient GI or PCP.     The patient is a [facility] patient, and he reports he might change to other group.     If the PHG bleeding recurs and become refractory, next step will be TIPS, but we would leave this conversation to his outpatient GI team.     GI signs off.

## 2023-04-25 NOTE — ANESTHESIA TIME REPORT
Anesthesia Start and Stop Event Times     Date Time Event    4/25/2023 1136 Ready for Procedure     1146 Anesthesia Start     1207 Anesthesia Stop        Responsible Staff  04/25/23    Name Role Begin End    Lars Anderson III, M.D. Anesth 1146 1207        Overtime Reason:  no overtime (within assigned shift)    Comments:

## 2023-04-25 NOTE — ASSESSMENT & PLAN NOTE
History of alcoholic cirrhosis, developed HCC  Patient had ablation and immunotherapy  GI was consulted

## 2023-04-25 NOTE — PROCEDURES
.OPERATIVE REPORT    PATIENT:   Sohail Duvall   1950       PREOPERATIVE DIAGNOSES/INDICATIONS:   Melena  Post hemorrhagic anemia  Cirrhosis of the liver  History of esophageal varices    POSTOPERATIVE DIAGNOSIS:   LA grade B esophagitis. No significant varices noted.  Severe portal hypertensive gastropathy.  Normal duodenum.  No active bleeding.    PROCEDURE:  ESOPHAGOGASTRODUODENOSCOPY    PHYSICIAN:  Mir Rodriguez MD    ANESTHESIA:  Per anesthesiologist.    LOCATION: [facility]    CONSENT:  OBTAINED. The risks, benefits and alternatives of the procedure were discussed in details. The risks include and are not limited to bleeding, infection, perforation, missed lesions, and sedations risks (cardiopulmonary compromise and allergic reaction to medications).    DESCRIPTION: The patient presented to the procedure room.  After routine checkup was performed, patient was brought into the endoscopy suite.  Patient was placed on his left lateral decubitus position. A bite block was placed in patient's mouth. Patient was sedated by anesthesia.  Vital signs were monitored throughout the procedure.  Oxygenation support was provided throughout the procedure. Upper endoscope was inserted into patient's mouth and advanced to the second portion of the duodenum under direct visualization.  Once the site was reached and examined, the upper endoscope was withdrawn.  Retroflexion was made within the stomach.  The stomach was decompressed, scope was withdrawn and the procedure was terminated. The patient tolerated the procedure well.  There were no immediate complications.    OPERATIVE FINDINGS:  1.   LA grade B esophagitis. No significant varices noted.  2.   Severe portal hypertensive gastropathy.  3.   Normal duodenum.  4.   No active bleeding.        RECOMMENDATIONS:  Bleeding is likely secondary to severe portal hypertensive gastropathy. There was no significant varices to band. No gastric varices.  Treat with PPI  bid.  Non selective Bblockers.  Advance diet as tolerated.  Will sign off. Please call back as needed.    This note has been transcribed with digital voice recognition software and although it has been reviewed may contain grammatical or word errors

## 2023-04-25 NOTE — PROGRESS NOTES
Pt transferred from Y56 to S136 via gurney on the zoll without incident. Pt placed on the remote tele box upon arrival to S163. Bed alarm placed and plugged in. CNA at the bedside obtaining vitals.

## 2023-04-25 NOTE — H&P
Hospital Medicine History & Physical Note    Date of Service  4/24/2023    Primary Care Physician  XAVI Antoine    Consultants  GI    Code Status  Full Code    Chief Complaint  Chief Complaint   Patient presents with    Abdominal Pain     Upper and lower abdominal x 3 days    Constipation     Black hard stool        History of Presenting Illness    72-year-old male with history of alcoholic cirrhosis, portal hypertension with multiple esophageal varices ligation.  HCC with ablation and CLL presented 4/24 with melena.  Started couple days before the admission with a black stool and he developed epigastric pain with nausea and vomiting however no coffee-ground emesis.  No fever or chills and no significant chest pain.  Patient states he is taking ibuprofen daily, he quit drinking long time ago.  On admission vital signs around baseline.  However hemoglobin dropped from 15 to 12.  IV fluid with octreotide and PPI were started.  GI was consulted and planning for scope tomorrow.      I discussed the plan of care with patient, family, bedside RN, and ED physician .    Review of Systems  Review of Systems   Constitutional: Negative.    HENT: Negative.     Eyes: Negative.    Respiratory: Negative.     Cardiovascular: Negative.    Gastrointestinal:  Positive for abdominal pain, melena, nausea and vomiting. Negative for blood in stool and constipation.   Musculoskeletal: Negative.    Neurological: Negative.    Psychiatric/Behavioral: Negative.       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    Surgical History   has a past  surgical history that includes brachy therapy; hepatic ablation laparoscopic (6/28/2018); wound exploration ortho (Right, 8/5/2020); and tendon repair (8/5/2020).     Family History  family history includes Cancer in his brother; Cancer (age of onset: 81) in his father; Hyperlipidemia in his sister.   Family history reviewed with patient. There is no family history that is pertinent to the chief complaint.     Social History   reports that he has never smoked. He quit smokeless tobacco use about 8 years ago.  His smokeless tobacco use included snuff. He reports that he does not currently use alcohol after a past usage of about 7.2 oz per week. He reports current drug use. Drugs: Marijuana, Inhaled, and Oral.    Allergies  No Known Allergies    Medications  Prior to Admission Medications   Prescriptions Last Dose Informant Patient Reported? Taking?   Vitamin D, Cholecalciferol, 25 MCG (1000 UT) Cap 4/23/2023 at 0900 Patient Yes No   Sig: Take 1,000 Units by mouth every day.   fluticasone (FLONASE) 50 MCG/ACT nasal spray 4/23/2023 at 0900 Patient Yes Yes   Sig: Administer 1 Spray into affected nostril(S) every day.   furosemide (LASIX) 20 MG Tab 4/23/2023 at 0900 Patient Yes No   Sig: Take 20 mg by mouth 2 times a day.   loratadine (CLARITIN) 10 MG Tab 4/23/2023 at 0900 Patient Yes Yes   Sig: Take 10 mg by mouth every day.   metFORMIN ER (GLUCOPHAGE XR) 500 MG TABLET SR 24 HR 4/23/2023 at 0900 Patient No No   Sig: Take 2 Tablets by mouth 2 times a day. TAKE ONE TABLET BY MOUTH TWICE A DAY   spironolactone (ALDACTONE) 50 MG Tab 4/23/2023 at 0900 Patient Yes No   Sig: Take 50 mg by mouth every day.   therapeutic multivitamin-minerals (THERAGRAN-M) Tab 4/23/2023 at 0900 Patient Yes Yes   Sig: Take 1 Tablet by mouth every day.      Facility-Administered Medications: None       Physical Exam  Temp:  [36.4 °C (97.5 °F)-36.6 °C (97.8 °F)] 36.4 °C (97.5 °F)  Pulse:  [66-82] 70  Resp:  [16-20] 18  BP: (123-141)/(61-88)  126/62  SpO2:  [95 %-99 %] 95 %  Blood Pressure : 123/78   Temperature: 36.4 °C (97.6 °F)   Pulse: 76   Respiration: 20   Pulse Oximetry: 97 %       Physical Exam  Constitutional:       General: He is not in acute distress.     Appearance: He is obese. He is not ill-appearing.   Eyes:      General: No scleral icterus.  Cardiovascular:      Rate and Rhythm: Normal rate.      Heart sounds: No murmur heard.    No gallop.   Pulmonary:      Effort: No respiratory distress.      Breath sounds: No wheezing.   Abdominal:      General: There is no distension.      Tenderness: There is no abdominal tenderness. There is no right CVA tenderness, left CVA tenderness or guarding.   Musculoskeletal:      Right lower leg: No edema.      Left lower leg: No edema.   Lymphadenopathy:      Cervical: No cervical adenopathy.   Skin:     Coloration: Skin is not jaundiced.      Findings: No bruising, lesion or rash.   Neurological:      General: No focal deficit present.      Mental Status: He is alert and oriented to person, place, and time. Mental status is at baseline.      Cranial Nerves: No cranial nerve deficit.      Motor: No weakness.      Gait: Gait normal.       Laboratory:  Recent Labs     04/24/23  1128   WBC 13.4*   RBC 3.64*   HEMOGLOBIN 12.5*   HEMATOCRIT 35.3*   MCV 97.0   MCH 34.3*   MCHC 35.4*   RDW 49.9   PLATELETCT 129*   MPV 10.0     Recent Labs     04/24/23  1128   SODIUM 135   POTASSIUM 5.8*   CHLORIDE 102   CO2 19*   GLUCOSE 190*   BUN 76*   CREATININE 1.17   CALCIUM 9.6     Recent Labs     04/24/23  1128   ALTSGPT 28   ASTSGOT 33   ALKPHOSPHAT 65   TBILIRUBIN 1.1   LIPASE 22   GLUCOSE 190*     Recent Labs     04/24/23  1128   APTT 26.7   INR 1.16*     No results for input(s): NTPROBNP in the last 72 hours.      Recent Labs     04/24/23  1128   TROPONINT 55*       Imaging:  US-RUQ   Final Result      1.  There is cholelithiasis no sonographic evidence of acute cholecystitis   2.  Heterogeneous liver with known  hepatic masses better visualized on CT performed earlier on the same day.      CT-ABDOMEN-PELVIS WITH   Final Result      1.  No acute inflammatory process in the abdomen or pelvis.   2.  Small amount of stool in the colon.   3.  Cirrhosis and portal hypertension, including splenomegaly. No ascites.   4.  Three out of five hepatic lesions are visualized on today's CT entire stable to slightly smaller in size compared to prior MRI. No new hepatic lesions.   5.  No new metastatic disease in the abdomen or pelvis.   6.  Cholelithiasis.   7.  Colonic diverticulosis.      US-AORTA/ILIACS DUPLEX COMPLETE    (Results Pending)       X-Ray:  I have personally reviewed the images and compared with prior images.  EKG:  I have personally reviewed the images and compared with prior images.    Assessment/Plan:  Justification for Admission Status  I anticipate this patient will require at least two midnights for appropriate medical management, necessitating inpatient admission because hemoglobin dropped from 15 to 12, anticipate more midnight monitoring hemoglobin and possible blood transfusion if needed.    Patient will need a Telemetry bed on CARDIOLOGY service .  The need is secondary to patient is on octreotide.    * GI bleed- (present on admission)  Assessment & Plan  Patient has history of cirrhosis and varices  Patient is using ibuprofen and NSAID  Came with melena hemoglobin dropped  To 12 from 15  Monitor hemoglobin every 8 hours and transfusion if needed  IV fluid, n.p.o. at midnight for scope tomorrow  Octreotide and PPI  Ceftriaxone for SBP prophylaxis  GI was consulted, appreciate their recommendations.    Essential (primary) hypertension- (present on admission)  Assessment & Plan  Holding spironolactone and Lasix due to GI bleeding  Close monitoring and started medication if needed    Portal hypertension (HCC)- (present on admission)  Assessment & Plan  Came with upper GI bleeding  Octreotide and PPI  Ultrasound  Doppler was ordered    Primary malignant neoplasm of liver (HCC)- (present on admission)  Assessment & Plan  History of alcoholic cirrhosis, developed HCC  Patient had ablation and immunotherapy  GI was consulted    CLL (chronic lymphocytic leukemia) (HCC)- (present on admission)  Assessment & Plan  CBC around baseline  Follow-up with oncologist as outpatient    Type 2 diabetes mellitus without complication, without long-term current use of insulin (HCC)- (present on admission)  Assessment & Plan  Holding metformin  Sliding scale  Last A1c of 6.3        VTE prophylaxis: SCDs/TEDs and pharmacologic prophylaxis contraindicated due to GI bleeding

## 2023-04-25 NOTE — OR NURSING
1205 Patient arrived to PACU from OR. Report received. Patient attached to monitoring. VSS. Patient oxygenating well on 2 L nasal cannula.     1235 Patient tolerating sips of water.     1247 Report given to Jose Luis BLACKBURN. Patient awaiting transport.

## 2023-04-25 NOTE — CARE PLAN
The patient is Stable - Low risk of patient condition declining or worsening    Shift Goals  Clinical Goals: EGD, hydrate, BM  Patient Goals: N/V cessation  Family Goals: EGD    Patient is progressing towards the following goals:    Problem: Fluid Volume  Goal: Fluid volume balance will be maintained  Outcome: Progressing   Monitoring I&O and managing fluids, pt verbalizes understanding and agrees to comply.     Problem: Bowel Elimination  Goal: Establish and maintain regular bowel function  Outcome: Progressing   Bowel protocol in place, pt educated on importance, pt educated on constipation, pt verbalizes understanding and agrees to comply.         Patient is not progressing towards the following goals:    Problem: Nutrition  Goal: Patient's nutritional and fluid intake will be adequate or improve  Outcome: Not Met   Patient unable to tolerate oral food and water without N/V. Will do EDG today.

## 2023-04-25 NOTE — ASSESSMENT & PLAN NOTE
Patient has history of cirrhosis and varices  Patient is using ibuprofen and NSAID  Came with melena hemoglobin dropped  To 12 from 15  Monitor hemoglobin every 8 hours and transfusion if needed  IV fluid, n.p.o. at midnight for scope tomorrow  Octreotide and PPI  Ceftriaxone for SBP prophylaxis  GI was consulted, appreciate their recommendations.

## 2023-04-25 NOTE — DISCHARGE PLANNING
Case Management Discharge Planning    Admission Date: 4/24/2023  GMLOS: 3  ALOS: 1    6-Clicks ADL Score: 24  6-Clicks Mobility Score:        Anticipated Discharge Dispo: Discharge Disposition: Discharged to home/self care (01)    DME Needed: No    Action(s) Taken: Updated Provider/Nurse on Discharge Plan and OTHER    Escalations Completed: None    Medically Clear: Yes    Next Steps: None    Barriers to Discharge: None    CM completed chart review and attended IDT rounds.  Patient to DC home once medications are delivered to DC Lounge.  No CM needs at this time.

## 2023-04-25 NOTE — DISCHARGE SUMMARY
[facility] Internal Medicine Discharge Summary    Attending: Perez Gómez M.d.  Senior Resident: Dr. Lorenzo Durham  Intern:  Dr. Molina Conway  Contact Number: [phone]    CHIEF COMPLAINT ON ADMISSION  Chief Complaint   Patient presents with    Abdominal Pain     Upper and lower abdominal x 3 days    Constipation     Black hard stool        Reason for Admission  Abd Pain/Dark Stool     Admission Date  4/24/2023    CODE STATUS  Full Code    HPI & HOSPITAL COURSE  72M presented 04/24 with melena for a few days, epigastric pain with nausea and vomiting while on ibuprofen daily.  PMHx significant for alcoholic cirrhosis, portal hypertension with multiple variceal ligations, HCC with ablation, CLL.    At the time of admission, vital signs were stable.  Hemoglobin was 12.5 and stayed above 10 throughout hospital stay.  He was evaluated by gastroenterology in the emergency department and placed on octreotide and a proton pump inhibitor.  He was also resuscitated with IV fluids.  On 04/25, an EGD was performed and portal hypertensive gastropathy was identified as the likely reason for bleeding.  He was started on daily propranolol prophylactic therapy and twice daily PPI therapy.  He expressed understanding for the need of close outpatient GI follow-up to both the primary team and gastroenterology physician.  He was also strongly counseled against further NSAID use for increased risk of bleeding and expressed understanding.  An appointment for outpatient GI follow-up was established and he expressed understanding to obtain ordered laboratory work including CBC and CMP prior to this visit.    Therefore, he is discharged in good and stable condition to home with close outpatient follow-up.    The patient recovered much more quickly than anticipated on admission.    Discharge Date  04/25/2023    Physical Exam on Day of Discharge  Physical Exam  Constitutional:       General: He is not in acute distress.     Appearance: He is  obese. He is not ill-appearing.   Eyes:      General: No scleral icterus.  Cardiovascular:      Rate and Rhythm: Normal rate.      Heart sounds: No murmur heard.    No gallop.   Pulmonary:      Effort: No respiratory distress.      Breath sounds: No wheezing.   Abdominal:      General: There is no distension.      Tenderness: There is no abdominal tenderness. There is no right CVA tenderness, left CVA tenderness or guarding.   Musculoskeletal:      Right lower leg: No edema.      Left lower leg: No edema.   Lymphadenopathy:      Cervical: No cervical adenopathy.   Skin:     Coloration: Skin is not jaundiced.      Findings: No bruising, lesion or rash.   Neurological:      General: No focal deficit present.      Mental Status: He is alert and oriented to person, place, and time. Mental status is at baseline.      Cranial Nerves: No cranial nerve deficit.      Motor: No weakness.      Gait: Gait normal.     FOLLOW UP ITEMS POST DISCHARGE  See HPI.    DISCHARGE DIAGNOSES  Principal Problem:    GI bleed POA: Yes  Active Problems:    Type 2 diabetes mellitus without complication, without long-term current use of insulin (HCC) POA: Yes      Overview: Last A1C: 6.3            Managed on:      Metformin 1,000 mg BID                      CLL (chronic lymphocytic leukemia) (HCC) POA: Yes      Overview: Diagnosed in June 2014, was treated by Dr. Lang, then followed by Dr. Glass every 4 months.       Followed by oncology. Next appt to establish with new oncologist later       this month.                 Primary malignant neoplasm of liver (HCC) POA: Yes    Portal hypertension (HCC) POA: Yes    Essential (primary) hypertension POA: Yes  Resolved Problems:    * No resolved hospital problems. *      FOLLOW UP  Future Appointments   Date Time Provider Department Center   5/30/2023 10:00 AM XAVI Antoine [facility]     [facility] [address]  [id number] [phone]  Follow up in 1 week(s)      [facility]  [id number]  [place] [address]  [phone]          MEDICATIONS ON DISCHARGE     Medication List        START taking these medications        Instructions   pantoprazole 40 MG Tbec  Commonly known as: PROTONIX   Take 1 Tablet by mouth 2 times a day.  Dose: 40 mg     propranolol 10 MG Tabs  Commonly known as: INDERAL   Doctor's comments: Portal hypertensive gastropathy.  Take 1 Tablet by mouth 2 times a day.  Dose: 10 mg            CONTINUE taking these medications        Instructions   fluticasone 50 MCG/ACT nasal spray  Commonly known as: FLONASE   Administer 1 Spray into affected nostril(S) every day.  Dose: 1 Spray     furosemide 20 MG Tabs  Commonly known as: LASIX   Take 20 mg by mouth 2 times a day.  Dose: 20 mg     loratadine 10 MG Tabs  Commonly known as: CLARITIN   Take 10 mg by mouth every day.  Dose: 10 mg     metFORMIN  MG Tb24  Commonly known as: GLUCOPHAGE XR   Take 2 Tablets by mouth 2 times a day. TAKE ONE TABLET BY MOUTH TWICE A DAY  Dose: 1,000 mg     spironolactone 50 MG Tabs  Commonly known as: ALDACTONE   Take 50 mg by mouth every day.  Dose: 50 mg     therapeutic multivitamin-minerals Tabs   Take 1 Tablet by mouth every day.  Dose: 1 Tablet     Vitamin D (Cholecalciferol) 25 MCG (1000 UT) Caps   Take 1,000 Units by mouth every day.  Dose: 1,000 Units              Allergies  No Known Allergies    DIET  Orders Placed This Encounter   Procedures    Diet NPO Restrict to: Sips with Medications     Standing Status:   Standing     Number of Occurrences:   1     Order Specific Question:   Diet NPO Restrict to:     Answer:   Sips with Medications [3]       ACTIVITY  As tolerated.  Weight bearing as tolerated    CONSULTATIONS  Gastroenterology.    PROCEDURES  EGD (04/25/2023).    LABORATORY  Lab Results   Component Value Date    SODIUM 139 04/25/2023    POTASSIUM 4.9 04/25/2023    CHLORIDE 106  04/25/2023    CO2 19 (L) 04/25/2023    GLUCOSE 162 (H) 04/25/2023    BUN 69 (H) 04/25/2023    CREATININE 1.38 04/25/2023        Lab Results   Component Value Date    WBC 7.4 04/25/2023    HEMOGLOBIN 10.5 (L) 04/25/2023    HEMATOCRIT 29.9 (L) 04/25/2023    PLATELETCT 104 (L) 04/25/2023        Total time of the discharge process exceeds 30 minutes.

## 2023-04-25 NOTE — ANESTHESIA PREPROCEDURE EVALUATION
Case: [id number] Anesthesia Start Date/Time: 04/25/23 1146    Procedure: GASTROSCOPY (Esophagus)    Anesthesia type: MAC    Pre-op diagnosis: r/o GI bleeding    Location: [facility] ROOM 26 / SURGERY SAME DAY [facility]    Surgeons: Mir Rodriguez M.D.          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   (positive) Other hemorrhoids   (positive) Portal hypertension (HCC)         (positive) Alcoholic cirrhosis of liver without ascites (HCC)   (positive) Hepatoma (HCC)   (positive) Liver cell carcinoma (HCC)   (positive) Primary malignant neoplasm of liver (HCC)      ENDO   (positive) Type 2 diabetes mellitus without complication, without long-term current use of insulin (HCC)      Other   (positive) CLL (chronic lymphocytic leukemia) (HCC)       Physical Exam    Airway   Mallampati: III  TM distance: >3 FB  Neck ROM: limited       Cardiovascular - normal exam  Rhythm: regular  Rate: normal  (-) murmur     Dental - normal exam           Pulmonary - normal exam  Breath sounds clear to auscultation     Abdominal   (+) obese     Neurological - normal exam                 Anesthesia Plan    ASA 3       Plan - general       Airway plan will be natural airway          Induction: intravenous    Postoperative Plan: Postoperative administration of opioids is intended.    Pertinent diagnostic labs and testing reviewed    Informed Consent:    Anesthetic plan and risks discussed with patient.    Use of blood products discussed with: patient whom consented to blood products.

## 2023-04-26 ENCOUNTER — PATIENT OUTREACH (OUTPATIENT)
Dept: MEDICAL GROUP | Facility: PHYSICIAN GROUP | Age: 73
End: 2023-04-26
Payer: MEDICARE

## 2023-04-26 NOTE — PROGRESS NOTES
Transitional Care Management    Discharge Questions  Discharge Date: 4/25/23  Outreach call: Date 04/26/23  Time: 10:18 AM   Now that you are home, how are you feeling?  Good  Did you receive any new prescriptions? Yes, Were you able to get them filled?  No    Reason:  Will be picking up from pharmacy today   Do you have any questions about your current medications or new medications (Review Med Rec)?  No  Do you have a follow up appointment scheduled with your PCP?  Yes Date/Time - 05/02/23 @ 1300  Any issues or paperwork you wish to discuss with your PCP? Yes , discuss follow-up with GI  Does this patient qualify for the CCM program?  No    Transitional Care  Number of attempts: 1  Current or previous attempts competed within two business days of discharge?  Yes  Provided education regarding treatment plan, medications, self-management, ADLs?  Yes  Has patient completed an Advanced Directive?  No  Care Manager phone number provided? Yes  Is there anything else I can help you with?  No    Discharge Summary   Chief Complaint:  Abdominal pain; Constipation   Admitting Dx:  Abdominal pain; Dark stool   Discharge Dx:  GI bleed

## 2023-04-29 LAB
BACTERIA BLD CULT: NORMAL
BACTERIA BLD CULT: NORMAL
SIGNIFICANT IND 70042: NORMAL
SIGNIFICANT IND 70042: NORMAL
SITE SITE: NORMAL
SITE SITE: NORMAL
SOURCE SOURCE: NORMAL
SOURCE SOURCE: NORMAL

## 2023-05-02 ENCOUNTER — HOSPITAL ENCOUNTER (OUTPATIENT)
Dept: LAB | Facility: MEDICAL CENTER | Age: 73
End: 2023-05-02
Attending: INTERNAL MEDICINE | Admitting: INTERNAL MEDICINE
Payer: MEDICARE

## 2023-05-02 LAB
ALBUMIN SERPL BCP-MCNC: 4.4 G/DL (ref 3.2–4.9)
ALBUMIN/GLOB SERPL: 2.1 G/DL
ALP SERPL-CCNC: 90 U/L (ref 30–99)
ALT SERPL-CCNC: 30 U/L (ref 2–50)
ANION GAP SERPL CALC-SCNC: 12 MMOL/L (ref 7–16)
APPEARANCE UR: CLEAR
AST SERPL-CCNC: 36 U/L (ref 12–45)
BACTERIA #/AREA URNS HPF: NEGATIVE /HPF
BASOPHILS # BLD AUTO: 0.5 % (ref 0–1.8)
BASOPHILS # BLD: 0.04 K/UL (ref 0–0.12)
BILIRUB SERPL-MCNC: 1.4 MG/DL (ref 0.1–1.5)
BILIRUB UR QL STRIP.AUTO: ABNORMAL
BUN SERPL-MCNC: 23 MG/DL (ref 8–22)
CALCIUM ALBUM COR SERPL-MCNC: 9.2 MG/DL (ref 8.5–10.5)
CALCIUM SERPL-MCNC: 9.5 MG/DL (ref 8.5–10.5)
CHLORIDE SERPL-SCNC: 107 MMOL/L (ref 96–112)
CO2 SERPL-SCNC: 22 MMOL/L (ref 20–33)
COLOR UR: ABNORMAL
CREAT SERPL-MCNC: 1.11 MG/DL (ref 0.5–1.4)
EOSINOPHIL # BLD AUTO: 0.12 K/UL (ref 0–0.51)
EOSINOPHIL NFR BLD: 1.5 % (ref 0–6.9)
EPI CELLS #/AREA URNS HPF: ABNORMAL /HPF
ERYTHROCYTE [DISTWIDTH] IN BLOOD BY AUTOMATED COUNT: 59.5 FL (ref 35.9–50)
GFR SERPLBLD CREATININE-BSD FMLA CKD-EPI: 70 ML/MIN/1.73 M 2
GLOBULIN SER CALC-MCNC: 2.1 G/DL (ref 1.9–3.5)
GLUCOSE SERPL-MCNC: 157 MG/DL (ref 65–99)
GLUCOSE UR STRIP.AUTO-MCNC: NEGATIVE MG/DL
HCT VFR BLD AUTO: 33.7 % (ref 42–52)
HGB BLD-MCNC: 11.3 G/DL (ref 14–18)
HYALINE CASTS #/AREA URNS LPF: ABNORMAL /LPF
IMM GRANULOCYTES # BLD AUTO: 0.03 K/UL (ref 0–0.11)
IMM GRANULOCYTES NFR BLD AUTO: 0.4 % (ref 0–0.9)
KETONES UR STRIP.AUTO-MCNC: ABNORMAL MG/DL
LEUKOCYTE ESTERASE UR QL STRIP.AUTO: ABNORMAL
LYMPHOCYTES # BLD AUTO: 2.97 K/UL (ref 1–4.8)
LYMPHOCYTES NFR BLD: 37.1 % (ref 22–41)
MCH RBC QN AUTO: 34.1 PG (ref 27–33)
MCHC RBC AUTO-ENTMCNC: 33.5 G/DL (ref 33.7–35.3)
MCV RBC AUTO: 101.8 FL (ref 81.4–97.8)
MICRO URNS: ABNORMAL
MONOCYTES # BLD AUTO: 0.76 K/UL (ref 0–0.85)
MONOCYTES NFR BLD AUTO: 9.5 % (ref 0–13.4)
NEUTROPHILS # BLD AUTO: 4.09 K/UL (ref 1.82–7.42)
NEUTROPHILS NFR BLD: 51 % (ref 44–72)
NITRITE UR QL STRIP.AUTO: POSITIVE
NRBC # BLD AUTO: 0 K/UL
NRBC BLD-RTO: 0 /100 WBC
PH UR STRIP.AUTO: 5 [PH] (ref 5–8)
PLATELET # BLD AUTO: 150 K/UL (ref 164–446)
PMV BLD AUTO: 10 FL (ref 9–12.9)
POTASSIUM SERPL-SCNC: 5.4 MMOL/L (ref 3.6–5.5)
PROT SERPL-MCNC: 6.5 G/DL (ref 6–8.2)
PROT UR QL STRIP: 30 MG/DL
RBC # BLD AUTO: 3.31 M/UL (ref 4.7–6.1)
RBC # URNS HPF: ABNORMAL /HPF
RBC UR QL AUTO: NEGATIVE
SODIUM SERPL-SCNC: 141 MMOL/L (ref 135–145)
SP GR UR STRIP.AUTO: 1.03
SPERM #/AREA URNS HPF: ABNORMAL /HPF
UROBILINOGEN UR STRIP.AUTO-MCNC: 1 MG/DL
WBC # BLD AUTO: 8 K/UL (ref 4.8–10.8)
WBC #/AREA URNS HPF: ABNORMAL /HPF

## 2023-05-02 PROCEDURE — 85025 COMPLETE CBC W/AUTO DIFF WBC: CPT

## 2023-05-02 PROCEDURE — 81001 URINALYSIS AUTO W/SCOPE: CPT

## 2023-05-02 PROCEDURE — 80053 COMPREHEN METABOLIC PANEL: CPT

## 2023-05-02 PROCEDURE — 36415 COLL VENOUS BLD VENIPUNCTURE: CPT

## 2023-05-03 NOTE — DOCUMENTATION QUERY
"                                                                         [facility]                                                                       Query Response Note      PATIENT:               LUIS KAYE  ACCT #:                  [id number]  MRN:                     [id number]  :                      1950  ADMIT DATE:       2023 11:28 AM  DISCH DATE:        2023 4:25 PM  RESPONDING  PROVIDER #:        [id number]           QUERY TEXT:    Anemia due to acute blood loss is documented in the medical record.  Can further clarification be determined?  NOTE:  If an appropriate response is not listed below, please respond with a new note.      The patient's Clinical Indicators include:  71yo with dx of melena, cirrhosis with HCC and esophageal varices, CLL, DM2     Hernandez consult \"The patient's labs show hemoglobin drop about 20% from the baseline\"   Ammar procedure note \" Melena, Acute posthemorrhagic anemia\"    Ammar procedure note \"Bleeding is likely secondary to severe portal hypertensive gastropathy. There was no significant varices to band. No gastric varices.\"   D/C note \"Hemoglobin dropped from 16- to 10\"     HGB 12.5   HGB 10.3    Risk factors: advanced age, melena, liver cirrhosis, CLL, drop in hemoglobin  Treatments: labwork, EGD, specialty consultation, monitoring  Options provided:   -- Acute blood loss anemia is a relevant diagnosis   -- Acute on chronic blood loss anemia   -- Other explanation:other explanation-, please specify   -- Unable to determine      Query created by: Nogueira, April on 5/3/2023 6:26 AM    RESPONSE TEXT:    Acute on chronic blood loss anemia          Electronically signed by:  MARCUS GUZMAN MD 5/3/2023 2:05 PM              "

## 2023-05-04 ENCOUNTER — HOSPITAL ENCOUNTER (OUTPATIENT)
Facility: MEDICAL CENTER | Age: 73
End: 2023-05-04
Attending: NURSE PRACTITIONER
Payer: MEDICARE

## 2023-05-04 LAB
FERRITIN SERPL-MCNC: 65.9 NG/ML (ref 22–322)
IRON SATN MFR SERPL: 21 % (ref 15–55)
IRON SERPL-MCNC: 73 UG/DL (ref 50–180)
TIBC SERPL-MCNC: 356 UG/DL (ref 250–450)
UIBC SERPL-MCNC: 283 UG/DL (ref 110–370)

## 2023-05-04 PROCEDURE — 83550 IRON BINDING TEST: CPT

## 2023-05-04 PROCEDURE — 83540 ASSAY OF IRON: CPT

## 2023-05-04 PROCEDURE — 82728 ASSAY OF FERRITIN: CPT

## 2023-05-05 ENCOUNTER — OFFICE VISIT (OUTPATIENT)
Dept: MEDICAL GROUP | Facility: PHYSICIAN GROUP | Age: 73
End: 2023-05-05
Payer: MEDICARE

## 2023-05-05 ENCOUNTER — HOSPITAL ENCOUNTER (OUTPATIENT)
Facility: MEDICAL CENTER | Age: 73
End: 2023-05-05
Payer: MEDICARE

## 2023-05-05 VITALS
WEIGHT: 209 LBS | HEART RATE: 54 BPM | HEIGHT: 67 IN | SYSTOLIC BLOOD PRESSURE: 110 MMHG | BODY MASS INDEX: 32.8 KG/M2 | DIASTOLIC BLOOD PRESSURE: 58 MMHG | TEMPERATURE: 96.6 F | OXYGEN SATURATION: 100 %

## 2023-05-05 DIAGNOSIS — K76.6 PORTAL HYPERTENSION (HCC): ICD-10-CM

## 2023-05-05 DIAGNOSIS — E11.9 TYPE 2 DIABETES MELLITUS WITHOUT COMPLICATION, WITHOUT LONG-TERM CURRENT USE OF INSULIN (HCC): ICD-10-CM

## 2023-05-05 DIAGNOSIS — D50.0 IRON DEFICIENCY ANEMIA DUE TO CHRONIC BLOOD LOSS: ICD-10-CM

## 2023-05-05 DIAGNOSIS — R11.0 NAUSEA: ICD-10-CM

## 2023-05-05 DIAGNOSIS — E55.9 VITAMIN D INSUFFICIENCY: ICD-10-CM

## 2023-05-05 DIAGNOSIS — R79.89 ELEVATED TSH: ICD-10-CM

## 2023-05-05 DIAGNOSIS — Z09 HOSPITAL DISCHARGE FOLLOW-UP: ICD-10-CM

## 2023-05-05 DIAGNOSIS — K29.51 GASTROINTESTINAL HEMORRHAGE ASSOCIATED WITH CHRONIC GASTRITIS: ICD-10-CM

## 2023-05-05 LAB
CREAT UR-MCNC: 16.46 MG/DL
HBA1C MFR BLD: 5.5 % (ref ?–5.8)
MICROALBUMIN UR-MCNC: <1.2 MG/DL
MICROALBUMIN/CREAT UR: NORMAL MG/G (ref 0–30)
POCT INT CON NEG: NEGATIVE
POCT INT CON POS: POSITIVE

## 2023-05-05 PROCEDURE — 82043 UR ALBUMIN QUANTITATIVE: CPT

## 2023-05-05 PROCEDURE — 99215 OFFICE O/P EST HI 40 MIN: CPT

## 2023-05-05 PROCEDURE — 82570 ASSAY OF URINE CREATININE: CPT

## 2023-05-05 PROCEDURE — 83036 HEMOGLOBIN GLYCOSYLATED A1C: CPT

## 2023-05-05 RX ORDER — ONDANSETRON 4 MG/1
4 TABLET, FILM COATED ORAL EVERY 4 HOURS PRN
Qty: 90 TABLET | Refills: 2 | Status: SHIPPED | OUTPATIENT
Start: 2023-05-05 | End: 2023-08-03

## 2023-05-05 ASSESSMENT — FIBROSIS 4 INDEX: FIB4 SCORE: 3.15

## 2023-05-05 NOTE — ASSESSMENT & PLAN NOTE
Chronic, controlled.  Goal A1C <7.  A1C 5.5 in clinic today.    Continue Metformin 1,000mg twice daily.  Will return in 6 months for A1C follow-up.

## 2023-05-05 NOTE — PROGRESS NOTES
"Subjective:     CC:   Chief Complaint   Patient presents with    Transitional Care Management Hospital Follow-up       HISTORY OF THE PRESENT ILLNESS: Sohail is a pleasant 72 y.o. male here today to follow-up after being in admitted to the hospital for GI bleed.  He originally presented with melena and nausea and vomiting.  He did have a drop in his hemoglobin.  He underwent an EGD and showed LA grade B esophagitis with no significant varices and severe portal hypertension gastropathy with no active bleeding.  GI cleared him for discharge and had him continue with PPI (Protonix) twice daily as well as nonselective beta-blockers (Propranolol) for portal hypertension.    Patient is also due for an A1c and a follow-up on his diabetes.      Health Maintenance: Completed    ROS:  All systems negative expect as addressed in assessment and plan.     Objective:     Exam:  /58 (BP Location: Right arm, Patient Position: Sitting, BP Cuff Size: Adult)   Pulse (!) 54   Temp 35.9 °C (96.6 °F) (Temporal)   Ht 1.702 m (5' 7\")   Wt 94.8 kg (209 lb)   SpO2 100%   BMI 32.73 kg/m²  Body mass index is 32.73 kg/m².    Physical Exam  Constitutional:       Appearance: Normal appearance.   Cardiovascular:      Rate and Rhythm: Normal rate.   Pulmonary:      Effort: Pulmonary effort is normal.   Neurological:      Mental Status: He is alert. Mental status is at baseline.   Psychiatric:         Mood and Affect: Mood normal.         Behavior: Behavior normal.       Labs: Results reviewed from 05/02/23    Assessment & Plan:     72 y.o. male with the following -    1. Hospital discharge follow-up  2. Gastrointestinal hemorrhage associated with chronic gastritis  3. Iron deficiency anemia due to chronic blood loss  Patient states overall, he feels well.  He is being followed closely by his oncologist who recently rechecked his CBC and iron.  His blood counts are improving and his iron is stable.  He is continuing with Protonix 40 mg " twice daily.  Patient has a follow-up on Monday with GI.    4. Portal hypertension (HCC)  Chronic, ongoing.  Patient was started on propanolol 10 mg twice daily.  He thought he was told he should only take it for 30 days.  I let him know he will need to continue taking this.  He would like to hold off on a refill for now and discuss with his GI specialist on Monday.    5. Type 2 diabetes mellitus without complication, without long-term current use of insulin (HCC)  Chronic, controlled.  Goal A1C <7.  A1C 5.5 in clinic today.  Continue Metformin 1,000mg twice daily.  Will return in 6 months for A1C follow-up.  - POCT  A1C  - Microalbumin Creat Ratio Urine (Clinic Collect); Future  - Lipid Profile; Future    6. Elevated TSH  - TSH+FREE T4    7. Vitamin D insufficiency  - VITAMIN D 25-HYDROXY    8. Nausea  - ondansetron (ZOFRAN) 4 MG Tab tablet; Take 1 Tablet by mouth every four hours as needed for Nausea/Vomiting for up to 90 days.  Dispense: 90 Tablet; Refill: 2    Patient was educated in proper administration of medication(s) ordered today including safety, possible SE, risks, benefits, rationale and alternatives to therapy.   Supportive care, differential diagnoses, and indications for immediate follow-up discussed with patient.    Pathogenesis of diagnosis discussed including typical length and natural progression.    Instructed to return to clinic or nearest emergency department for any change in condition, further concerns, or worsening of symptoms.  Patient states understanding of the plan of care and discharge instructions.    Return in about 6 months (around 11/5/2023) for A1C.    I spent a total of 40 minutes with record review, exam, and communication with the patient, communication with other providers, and documentation of this encounter. This does not include time spent on separately billable procedures/tests.    I have placed the above orders and discussed them with an approved delegating provider.  The  MA is performing the below orders under the direction of Dr. Jenkins.    Please note that this dictation was created using voice recognition software. I have worked with consultants from the vendor as well as technical experts from [facility] to optimize the interface. I have made every reasonable attempt to correct obvious errors, but I expect that there are errors of grammar and possibly content that I did not discover before finalizing the note.

## 2023-05-12 DIAGNOSIS — R11.0 NAUSEA: ICD-10-CM

## 2023-05-15 RX ORDER — ONDANSETRON 4 MG/1
4 TABLET, FILM COATED ORAL EVERY 4 HOURS PRN
Qty: 90 TABLET | Refills: 2 | OUTPATIENT
Start: 2023-05-15 | End: 2023-08-13

## 2023-05-22 ENCOUNTER — HOSPITAL ENCOUNTER (OUTPATIENT)
Dept: LAB | Facility: MEDICAL CENTER | Age: 73
End: 2023-05-22
Attending: PHYSICIAN ASSISTANT
Payer: MEDICARE

## 2023-05-22 ENCOUNTER — HOSPITAL ENCOUNTER (OUTPATIENT)
Dept: LAB | Facility: MEDICAL CENTER | Age: 73
End: 2023-05-22
Attending: INTERNAL MEDICINE | Admitting: INTERNAL MEDICINE
Payer: MEDICARE

## 2023-05-22 LAB
ALBUMIN SERPL BCP-MCNC: 4.5 G/DL (ref 3.2–4.9)
ALBUMIN SERPL BCP-MCNC: 4.6 G/DL (ref 3.2–4.9)
ALBUMIN/GLOB SERPL: 2 G/DL
ALBUMIN/GLOB SERPL: 2 G/DL
ALP SERPL-CCNC: 75 U/L (ref 30–99)
ALP SERPL-CCNC: 75 U/L (ref 30–99)
ALT SERPL-CCNC: 17 U/L (ref 2–50)
ALT SERPL-CCNC: 17 U/L (ref 2–50)
AMMONIA PLAS-SCNC: 35 UMOL/L (ref 11–45)
ANION GAP SERPL CALC-SCNC: 10 MMOL/L (ref 7–16)
ANION GAP SERPL CALC-SCNC: 12 MMOL/L (ref 7–16)
APTT PPP: 26.9 SEC (ref 24.7–36)
AST SERPL-CCNC: 27 U/L (ref 12–45)
AST SERPL-CCNC: 29 U/L (ref 12–45)
BASOPHILS # BLD AUTO: 1 % (ref 0–1.8)
BASOPHILS # BLD AUTO: 1.5 % (ref 0–1.8)
BASOPHILS # BLD: 0.04 K/UL (ref 0–0.12)
BASOPHILS # BLD: 0.06 K/UL (ref 0–0.12)
BILIRUB CONJ SERPL-MCNC: 0.4 MG/DL (ref 0.1–0.5)
BILIRUB INDIRECT SERPL-MCNC: 1 MG/DL (ref 0–1)
BILIRUB SERPL-MCNC: 1.4 MG/DL (ref 0.1–1.5)
BILIRUB SERPL-MCNC: 1.4 MG/DL (ref 0.1–1.5)
BUN SERPL-MCNC: 20 MG/DL (ref 8–22)
BUN SERPL-MCNC: 20 MG/DL (ref 8–22)
CALCIUM ALBUM COR SERPL-MCNC: 9.1 MG/DL (ref 8.5–10.5)
CALCIUM ALBUM COR SERPL-MCNC: 9.3 MG/DL (ref 8.5–10.5)
CALCIUM SERPL-MCNC: 9.6 MG/DL (ref 8.5–10.5)
CALCIUM SERPL-MCNC: 9.7 MG/DL (ref 8.5–10.5)
CHLORIDE SERPL-SCNC: 107 MMOL/L (ref 96–112)
CHLORIDE SERPL-SCNC: 107 MMOL/L (ref 96–112)
CO2 SERPL-SCNC: 23 MMOL/L (ref 20–33)
CO2 SERPL-SCNC: 24 MMOL/L (ref 20–33)
CREAT SERPL-MCNC: 1.08 MG/DL (ref 0.5–1.4)
CREAT SERPL-MCNC: 1.14 MG/DL (ref 0.5–1.4)
EOSINOPHIL # BLD AUTO: 0.11 K/UL (ref 0–0.51)
EOSINOPHIL # BLD AUTO: 0.12 K/UL (ref 0–0.51)
EOSINOPHIL NFR BLD: 2.8 % (ref 0–6.9)
EOSINOPHIL NFR BLD: 3.1 % (ref 0–6.9)
ERYTHROCYTE [DISTWIDTH] IN BLOOD BY AUTOMATED COUNT: 53.2 FL (ref 35.9–50)
ERYTHROCYTE [DISTWIDTH] IN BLOOD BY AUTOMATED COUNT: 53.5 FL (ref 35.9–50)
GFR SERPLBLD CREATININE-BSD FMLA CKD-EPI: 68 ML/MIN/1.73 M 2
GFR SERPLBLD CREATININE-BSD FMLA CKD-EPI: 73 ML/MIN/1.73 M 2
GLOBULIN SER CALC-MCNC: 2.3 G/DL (ref 1.9–3.5)
GLOBULIN SER CALC-MCNC: 2.3 G/DL (ref 1.9–3.5)
GLUCOSE SERPL-MCNC: 118 MG/DL (ref 65–99)
GLUCOSE SERPL-MCNC: 119 MG/DL (ref 65–99)
HCT VFR BLD AUTO: 38.9 % (ref 42–52)
HCT VFR BLD AUTO: 39.8 % (ref 42–52)
HGB BLD-MCNC: 12.6 G/DL (ref 14–18)
HGB BLD-MCNC: 12.7 G/DL (ref 14–18)
IMM GRANULOCYTES # BLD AUTO: 0 K/UL (ref 0–0.11)
IMM GRANULOCYTES # BLD AUTO: 0.01 K/UL (ref 0–0.11)
IMM GRANULOCYTES NFR BLD AUTO: 0 % (ref 0–0.9)
IMM GRANULOCYTES NFR BLD AUTO: 0.3 % (ref 0–0.9)
INR PPP: 1.13 (ref 0.87–1.13)
LYMPHOCYTES # BLD AUTO: 1.16 K/UL (ref 1–4.8)
LYMPHOCYTES # BLD AUTO: 1.18 K/UL (ref 1–4.8)
LYMPHOCYTES NFR BLD: 29.8 % (ref 22–41)
LYMPHOCYTES NFR BLD: 29.8 % (ref 22–41)
MCH RBC QN AUTO: 31.9 PG (ref 27–33)
MCH RBC QN AUTO: 31.9 PG (ref 27–33)
MCHC RBC AUTO-ENTMCNC: 31.9 G/DL (ref 32.3–36.5)
MCHC RBC AUTO-ENTMCNC: 32.4 G/DL (ref 32.3–36.5)
MCV RBC AUTO: 100 FL (ref 81.4–97.8)
MCV RBC AUTO: 98.5 FL (ref 81.4–97.8)
MONOCYTES # BLD AUTO: 0.37 K/UL (ref 0–0.85)
MONOCYTES # BLD AUTO: 0.4 K/UL (ref 0–0.85)
MONOCYTES NFR BLD AUTO: 10.1 % (ref 0–13.4)
MONOCYTES NFR BLD AUTO: 9.5 % (ref 0–13.4)
NEUTROPHILS # BLD AUTO: 2.2 K/UL (ref 1.82–7.42)
NEUTROPHILS # BLD AUTO: 2.2 K/UL (ref 1.82–7.42)
NEUTROPHILS NFR BLD: 55.5 % (ref 44–72)
NEUTROPHILS NFR BLD: 56.6 % (ref 44–72)
NRBC # BLD AUTO: 0 K/UL
NRBC # BLD AUTO: 0 K/UL
NRBC BLD-RTO: 0 /100 WBC (ref 0–0.2)
NRBC BLD-RTO: 0 /100 WBC (ref 0–0.2)
PLATELET # BLD AUTO: 103 K/UL (ref 164–446)
PLATELET # BLD AUTO: 108 K/UL (ref 164–446)
PMV BLD AUTO: 10.5 FL (ref 9–12.9)
PMV BLD AUTO: 10.6 FL (ref 9–12.9)
POTASSIUM SERPL-SCNC: 5.5 MMOL/L (ref 3.6–5.5)
POTASSIUM SERPL-SCNC: 5.7 MMOL/L (ref 3.6–5.5)
PROT SERPL-MCNC: 6.8 G/DL (ref 6–8.2)
PROT SERPL-MCNC: 6.9 G/DL (ref 6–8.2)
PROTHROMBIN TIME: 14.4 SEC (ref 12–14.6)
RBC # BLD AUTO: 3.95 M/UL (ref 4.7–6.1)
RBC # BLD AUTO: 3.98 M/UL (ref 4.7–6.1)
SODIUM SERPL-SCNC: 141 MMOL/L (ref 135–145)
SODIUM SERPL-SCNC: 142 MMOL/L (ref 135–145)
WBC # BLD AUTO: 3.9 K/UL (ref 4.8–10.8)
WBC # BLD AUTO: 4 K/UL (ref 4.8–10.8)

## 2023-05-22 PROCEDURE — 80053 COMPREHEN METABOLIC PANEL: CPT | Mod: 91

## 2023-05-22 PROCEDURE — 80053 COMPREHEN METABOLIC PANEL: CPT

## 2023-05-22 PROCEDURE — 85025 COMPLETE CBC W/AUTO DIFF WBC: CPT | Mod: 91

## 2023-05-22 PROCEDURE — 85730 THROMBOPLASTIN TIME PARTIAL: CPT

## 2023-05-22 PROCEDURE — 81001 URINALYSIS AUTO W/SCOPE: CPT

## 2023-05-22 PROCEDURE — 36415 COLL VENOUS BLD VENIPUNCTURE: CPT

## 2023-05-22 PROCEDURE — 85610 PROTHROMBIN TIME: CPT

## 2023-05-22 PROCEDURE — 85025 COMPLETE CBC W/AUTO DIFF WBC: CPT

## 2023-05-22 PROCEDURE — 82140 ASSAY OF AMMONIA: CPT

## 2023-05-22 PROCEDURE — 82248 BILIRUBIN DIRECT: CPT

## 2023-05-23 LAB
APPEARANCE UR: CLEAR
BACTERIA #/AREA URNS HPF: ABNORMAL /HPF
BILIRUB UR QL STRIP.AUTO: ABNORMAL
COLOR UR: ABNORMAL
EPI CELLS #/AREA URNS HPF: NEGATIVE /HPF
GLUCOSE UR STRIP.AUTO-MCNC: NEGATIVE MG/DL
HYALINE CASTS #/AREA URNS LPF: ABNORMAL /LPF
KETONES UR STRIP.AUTO-MCNC: ABNORMAL MG/DL
LEUKOCYTE ESTERASE UR QL STRIP.AUTO: ABNORMAL
MICRO URNS: ABNORMAL
MUCOUS THREADS #/AREA URNS HPF: ABNORMAL /HPF
NITRITE UR QL STRIP.AUTO: NEGATIVE
PH UR STRIP.AUTO: 6 [PH] (ref 5–8)
PROT UR QL STRIP: NEGATIVE MG/DL
RBC # URNS HPF: ABNORMAL /HPF
RBC UR QL AUTO: NEGATIVE
SP GR UR STRIP.AUTO: 1.03
SPERM #/AREA URNS HPF: ABNORMAL /HPF
UROBILINOGEN UR STRIP.AUTO-MCNC: 1 MG/DL
WBC #/AREA URNS HPF: ABNORMAL /HPF

## 2023-06-13 ENCOUNTER — HOSPITAL ENCOUNTER (OUTPATIENT)
Dept: LAB | Facility: MEDICAL CENTER | Age: 73
End: 2023-06-13
Attending: INTERNAL MEDICINE
Payer: MEDICARE

## 2023-06-13 ENCOUNTER — HOSPITAL ENCOUNTER (OUTPATIENT)
Dept: LAB | Facility: MEDICAL CENTER | Age: 73
End: 2023-06-13
Attending: PHYSICIAN ASSISTANT
Payer: MEDICARE

## 2023-06-13 LAB
ALBUMIN SERPL BCP-MCNC: 4.4 G/DL (ref 3.2–4.9)
ALBUMIN/GLOB SERPL: 1.9 G/DL
ALP SERPL-CCNC: 73 U/L (ref 30–99)
ALT SERPL-CCNC: 22 U/L (ref 2–50)
ANION GAP SERPL CALC-SCNC: 15 MMOL/L (ref 7–16)
ANION GAP SERPL CALC-SCNC: 15 MMOL/L (ref 7–16)
AST SERPL-CCNC: 31 U/L (ref 12–45)
BASOPHILS # BLD AUTO: 0.8 % (ref 0–1.8)
BASOPHILS # BLD: 0.04 K/UL (ref 0–0.12)
BILIRUB SERPL-MCNC: 1.2 MG/DL (ref 0.1–1.5)
BUN SERPL-MCNC: 21 MG/DL (ref 8–22)
BUN SERPL-MCNC: 21 MG/DL (ref 8–22)
CALCIUM ALBUM COR SERPL-MCNC: 9.6 MG/DL (ref 8.5–10.5)
CALCIUM SERPL-MCNC: 9.8 MG/DL (ref 8.5–10.5)
CALCIUM SERPL-MCNC: 9.9 MG/DL (ref 8.5–10.5)
CHLORIDE SERPL-SCNC: 105 MMOL/L (ref 96–112)
CHLORIDE SERPL-SCNC: 106 MMOL/L (ref 96–112)
CO2 SERPL-SCNC: 21 MMOL/L (ref 20–33)
CO2 SERPL-SCNC: 21 MMOL/L (ref 20–33)
CREAT SERPL-MCNC: 1.05 MG/DL (ref 0.5–1.4)
CREAT SERPL-MCNC: 1.07 MG/DL (ref 0.5–1.4)
EOSINOPHIL # BLD AUTO: 0.11 K/UL (ref 0–0.51)
EOSINOPHIL NFR BLD: 2.1 % (ref 0–6.9)
ERYTHROCYTE [DISTWIDTH] IN BLOOD BY AUTOMATED COUNT: 51.1 FL (ref 35.9–50)
GFR SERPLBLD CREATININE-BSD FMLA CKD-EPI: 73 ML/MIN/1.73 M 2
GFR SERPLBLD CREATININE-BSD FMLA CKD-EPI: 75 ML/MIN/1.73 M 2
GLOBULIN SER CALC-MCNC: 2.3 G/DL (ref 1.9–3.5)
GLUCOSE SERPL-MCNC: 154 MG/DL (ref 65–99)
GLUCOSE SERPL-MCNC: 160 MG/DL (ref 65–99)
HCT VFR BLD AUTO: 40.8 % (ref 42–52)
HGB BLD-MCNC: 13 G/DL (ref 14–18)
IMM GRANULOCYTES # BLD AUTO: 0.01 K/UL (ref 0–0.11)
IMM GRANULOCYTES NFR BLD AUTO: 0.2 % (ref 0–0.9)
LYMPHOCYTES # BLD AUTO: 1.86 K/UL (ref 1–4.8)
LYMPHOCYTES NFR BLD: 35.6 % (ref 22–41)
MCH RBC QN AUTO: 30.2 PG (ref 27–33)
MCHC RBC AUTO-ENTMCNC: 31.9 G/DL (ref 32.3–36.5)
MCV RBC AUTO: 94.7 FL (ref 81.4–97.8)
MONOCYTES # BLD AUTO: 0.51 K/UL (ref 0–0.85)
MONOCYTES NFR BLD AUTO: 9.8 % (ref 0–13.4)
NEUTROPHILS # BLD AUTO: 2.69 K/UL (ref 1.82–7.42)
NEUTROPHILS NFR BLD: 51.5 % (ref 44–72)
NRBC # BLD AUTO: 0 K/UL
NRBC BLD-RTO: 0 /100 WBC (ref 0–0.2)
PLATELET # BLD AUTO: 113 K/UL (ref 164–446)
PMV BLD AUTO: 10.9 FL (ref 9–12.9)
POTASSIUM SERPL-SCNC: 4.6 MMOL/L (ref 3.6–5.5)
POTASSIUM SERPL-SCNC: 4.6 MMOL/L (ref 3.6–5.5)
PROT SERPL-MCNC: 6.7 G/DL (ref 6–8.2)
RBC # BLD AUTO: 4.31 M/UL (ref 4.7–6.1)
SODIUM SERPL-SCNC: 141 MMOL/L (ref 135–145)
SODIUM SERPL-SCNC: 142 MMOL/L (ref 135–145)
T4 FREE SERPL-MCNC: 1.32 NG/DL (ref 0.93–1.7)
TSH SERPL DL<=0.005 MIU/L-ACNC: 2.76 UIU/ML (ref 0.38–5.33)
WBC # BLD AUTO: 5.2 K/UL (ref 4.8–10.8)

## 2023-06-13 PROCEDURE — 80053 COMPREHEN METABOLIC PANEL: CPT

## 2023-06-13 PROCEDURE — 84443 ASSAY THYROID STIM HORMONE: CPT

## 2023-06-13 PROCEDURE — 84439 ASSAY OF FREE THYROXINE: CPT

## 2023-06-13 PROCEDURE — 85025 COMPLETE CBC W/AUTO DIFF WBC: CPT

## 2023-06-13 PROCEDURE — 36415 COLL VENOUS BLD VENIPUNCTURE: CPT

## 2023-06-13 PROCEDURE — 80048 BASIC METABOLIC PNL TOTAL CA: CPT

## 2023-07-05 ENCOUNTER — HOSPITAL ENCOUNTER (OUTPATIENT)
Dept: LAB | Facility: MEDICAL CENTER | Age: 73
End: 2023-07-05
Attending: INTERNAL MEDICINE
Payer: MEDICARE

## 2023-07-05 LAB
ALBUMIN SERPL BCP-MCNC: 4.3 G/DL (ref 3.2–4.9)
ALBUMIN/GLOB SERPL: 1.9 G/DL
ALP SERPL-CCNC: 72 U/L (ref 30–99)
ALT SERPL-CCNC: 22 U/L (ref 2–50)
ANION GAP SERPL CALC-SCNC: 12 MMOL/L (ref 7–16)
AST SERPL-CCNC: 29 U/L (ref 12–45)
BASOPHILS # BLD AUTO: 1.1 % (ref 0–1.8)
BASOPHILS # BLD: 0.04 K/UL (ref 0–0.12)
BILIRUB SERPL-MCNC: 0.8 MG/DL (ref 0.1–1.5)
BUN SERPL-MCNC: 21 MG/DL (ref 8–22)
CALCIUM ALBUM COR SERPL-MCNC: 9.3 MG/DL (ref 8.5–10.5)
CALCIUM SERPL-MCNC: 9.5 MG/DL (ref 8.5–10.5)
CHLORIDE SERPL-SCNC: 104 MMOL/L (ref 96–112)
CO2 SERPL-SCNC: 23 MMOL/L (ref 20–33)
CREAT SERPL-MCNC: 0.98 MG/DL (ref 0.5–1.4)
EOSINOPHIL # BLD AUTO: 0.11 K/UL (ref 0–0.51)
EOSINOPHIL NFR BLD: 3.1 % (ref 0–6.9)
ERYTHROCYTE [DISTWIDTH] IN BLOOD BY AUTOMATED COUNT: 52 FL (ref 35.9–50)
GFR SERPLBLD CREATININE-BSD FMLA CKD-EPI: 82 ML/MIN/1.73 M 2
GLOBULIN SER CALC-MCNC: 2.3 G/DL (ref 1.9–3.5)
GLUCOSE SERPL-MCNC: 220 MG/DL (ref 65–99)
HCT VFR BLD AUTO: 40.7 % (ref 42–52)
HGB BLD-MCNC: 12.8 G/DL (ref 14–18)
IMM GRANULOCYTES # BLD AUTO: 0.01 K/UL (ref 0–0.11)
IMM GRANULOCYTES NFR BLD AUTO: 0.3 % (ref 0–0.9)
LYMPHOCYTES # BLD AUTO: 0.97 K/UL (ref 1–4.8)
LYMPHOCYTES NFR BLD: 27.5 % (ref 22–41)
MCH RBC QN AUTO: 29.2 PG (ref 27–33)
MCHC RBC AUTO-ENTMCNC: 31.4 G/DL (ref 32.3–36.5)
MCV RBC AUTO: 92.7 FL (ref 81.4–97.8)
MONOCYTES # BLD AUTO: 0.33 K/UL (ref 0–0.85)
MONOCYTES NFR BLD AUTO: 9.3 % (ref 0–13.4)
NEUTROPHILS # BLD AUTO: 2.07 K/UL (ref 1.82–7.42)
NEUTROPHILS NFR BLD: 58.7 % (ref 44–72)
NRBC # BLD AUTO: 0 K/UL
NRBC BLD-RTO: 0 /100 WBC (ref 0–0.2)
PLATELET # BLD AUTO: 81 K/UL (ref 164–446)
PLATELETS.RETICULATED NFR BLD AUTO: 5.1 % (ref 0.6–13.1)
PMV BLD AUTO: 11.2 FL (ref 9–12.9)
POTASSIUM SERPL-SCNC: 4.9 MMOL/L (ref 3.6–5.5)
PROT SERPL-MCNC: 6.6 G/DL (ref 6–8.2)
RBC # BLD AUTO: 4.39 M/UL (ref 4.7–6.1)
SODIUM SERPL-SCNC: 139 MMOL/L (ref 135–145)
T4 FREE SERPL-MCNC: 1.36 NG/DL (ref 0.93–1.7)
TSH SERPL DL<=0.005 MIU/L-ACNC: 2.9 UIU/ML (ref 0.38–5.33)
WBC # BLD AUTO: 3.5 K/UL (ref 4.8–10.8)

## 2023-07-05 PROCEDURE — 84443 ASSAY THYROID STIM HORMONE: CPT

## 2023-07-05 PROCEDURE — 85055 RETICULATED PLATELET ASSAY: CPT

## 2023-07-05 PROCEDURE — 80053 COMPREHEN METABOLIC PANEL: CPT

## 2023-07-05 PROCEDURE — 36415 COLL VENOUS BLD VENIPUNCTURE: CPT

## 2023-07-05 PROCEDURE — 85025 COMPLETE CBC W/AUTO DIFF WBC: CPT

## 2023-07-05 PROCEDURE — 84439 ASSAY OF FREE THYROXINE: CPT

## 2023-07-06 ENCOUNTER — HOSPITAL ENCOUNTER (OUTPATIENT)
Facility: MEDICAL CENTER | Age: 73
End: 2023-07-06
Attending: NURSE PRACTITIONER
Payer: MEDICARE

## 2023-07-06 PROCEDURE — 82107 ALPHA-FETOPROTEIN L3: CPT

## 2023-07-06 PROCEDURE — 83951 ONCOPROTEIN DCP: CPT

## 2023-07-08 LAB — ACARBOXYPROTHROMBIN SERPL-MCNC: 2.1 NG/ML (ref 0–7.4)

## 2023-07-09 LAB
AFP L3 MFR SERPL: 8.5 % (ref 0–9.9)
AFP SERPL-MCNC: 6 NG/ML (ref 0–15)

## 2023-07-13 ENCOUNTER — HOSPITAL ENCOUNTER (OUTPATIENT)
Dept: RADIOLOGY | Facility: MEDICAL CENTER | Age: 73
End: 2023-07-13
Attending: INTERNAL MEDICINE
Payer: MEDICARE

## 2023-07-13 DIAGNOSIS — C91.11 CHRONIC LYMPHOID LEUKEMIA IN REMISSION (HCC): ICD-10-CM

## 2023-07-13 DIAGNOSIS — C22.0 CARCINOMA OF LIVER (HCC): ICD-10-CM

## 2023-07-13 PROCEDURE — 74183 MRI ABD W/O CNTR FLWD CNTR: CPT

## 2023-07-13 PROCEDURE — 700117 HCHG RX CONTRAST REV CODE 255: Performed by: INTERNAL MEDICINE

## 2023-07-13 PROCEDURE — A9579 GAD-BASE MR CONTRAST NOS,1ML: HCPCS | Performed by: INTERNAL MEDICINE

## 2023-07-13 RX ADMIN — GADOTERIDOL 20 ML: 279.3 INJECTION, SOLUTION INTRAVENOUS at 14:08

## 2023-07-25 ENCOUNTER — HOSPITAL ENCOUNTER (OUTPATIENT)
Dept: LAB | Facility: MEDICAL CENTER | Age: 73
End: 2023-07-25
Attending: INTERNAL MEDICINE
Payer: MEDICARE

## 2023-07-25 LAB
ALBUMIN SERPL BCP-MCNC: 4.5 G/DL (ref 3.2–4.9)
ALBUMIN/GLOB SERPL: 2 G/DL
ALP SERPL-CCNC: 85 U/L (ref 30–99)
ALT SERPL-CCNC: 27 U/L (ref 2–50)
ANION GAP SERPL CALC-SCNC: 11 MMOL/L (ref 7–16)
AST SERPL-CCNC: 36 U/L (ref 12–45)
BASOPHILS # BLD AUTO: 1 % (ref 0–1.8)
BASOPHILS # BLD: 0.05 K/UL (ref 0–0.12)
BILIRUB SERPL-MCNC: 1.1 MG/DL (ref 0.1–1.5)
BUN SERPL-MCNC: 20 MG/DL (ref 8–22)
CALCIUM ALBUM COR SERPL-MCNC: 9.2 MG/DL (ref 8.5–10.5)
CALCIUM SERPL-MCNC: 9.6 MG/DL (ref 8.5–10.5)
CHLORIDE SERPL-SCNC: 102 MMOL/L (ref 96–112)
CO2 SERPL-SCNC: 21 MMOL/L (ref 20–33)
CREAT SERPL-MCNC: 1 MG/DL (ref 0.5–1.4)
EOSINOPHIL # BLD AUTO: 0.13 K/UL (ref 0–0.51)
EOSINOPHIL NFR BLD: 2.7 % (ref 0–6.9)
ERYTHROCYTE [DISTWIDTH] IN BLOOD BY AUTOMATED COUNT: 54.1 FL (ref 35.9–50)
GFR SERPLBLD CREATININE-BSD FMLA CKD-EPI: 80 ML/MIN/1.73 M 2
GLOBULIN SER CALC-MCNC: 2.3 G/DL (ref 1.9–3.5)
GLUCOSE SERPL-MCNC: 156 MG/DL (ref 65–99)
HCT VFR BLD AUTO: 41.3 % (ref 42–52)
HGB BLD-MCNC: 13.4 G/DL (ref 14–18)
IMM GRANULOCYTES # BLD AUTO: 0.01 K/UL (ref 0–0.11)
IMM GRANULOCYTES NFR BLD AUTO: 0.2 % (ref 0–0.9)
LYMPHOCYTES # BLD AUTO: 1.54 K/UL (ref 1–4.8)
LYMPHOCYTES NFR BLD: 32.3 % (ref 22–41)
MCH RBC QN AUTO: 28.9 PG (ref 27–33)
MCHC RBC AUTO-ENTMCNC: 32.4 G/DL (ref 32.3–36.5)
MCV RBC AUTO: 89 FL (ref 81.4–97.8)
MONOCYTES # BLD AUTO: 0.53 K/UL (ref 0–0.85)
MONOCYTES NFR BLD AUTO: 11.1 % (ref 0–13.4)
NEUTROPHILS # BLD AUTO: 2.51 K/UL (ref 1.82–7.42)
NEUTROPHILS NFR BLD: 52.7 % (ref 44–72)
NRBC # BLD AUTO: 0 K/UL
NRBC BLD-RTO: 0 /100 WBC (ref 0–0.2)
PLATELET # BLD AUTO: 100 K/UL (ref 164–446)
PLATELETS.RETICULATED NFR BLD AUTO: 4.8 % (ref 0.6–13.1)
PMV BLD AUTO: 10.1 FL (ref 9–12.9)
POTASSIUM SERPL-SCNC: 4.4 MMOL/L (ref 3.6–5.5)
PROT SERPL-MCNC: 6.8 G/DL (ref 6–8.2)
RBC # BLD AUTO: 4.64 M/UL (ref 4.7–6.1)
SODIUM SERPL-SCNC: 134 MMOL/L (ref 135–145)
T4 FREE SERPL-MCNC: 1.29 NG/DL (ref 0.93–1.7)
TSH SERPL DL<=0.005 MIU/L-ACNC: 2.41 UIU/ML (ref 0.38–5.33)
WBC # BLD AUTO: 4.8 K/UL (ref 4.8–10.8)

## 2023-07-25 PROCEDURE — 36415 COLL VENOUS BLD VENIPUNCTURE: CPT

## 2023-07-25 PROCEDURE — 84439 ASSAY OF FREE THYROXINE: CPT

## 2023-07-25 PROCEDURE — 84443 ASSAY THYROID STIM HORMONE: CPT

## 2023-07-25 PROCEDURE — 85025 COMPLETE CBC W/AUTO DIFF WBC: CPT

## 2023-07-25 PROCEDURE — 80053 COMPREHEN METABOLIC PANEL: CPT

## 2023-07-25 PROCEDURE — 85055 RETICULATED PLATELET ASSAY: CPT

## 2023-07-31 NOTE — PROGRESS NOTES
"Interventional Oncology Consultation      Re: Sohail Duvall     MRN: [id number]   : 1950    Sohail Duvall was referred by Alfonzo Servin MD. He is a 72 y.o. male seen in clinic for evaluation and possible intervention of unresectable hepatocellular carcinoma. He was initially referred to our service by Feliberto Senior MD. He does not have a PCP or current GI doctor.    History of Present Illness:   was last evaluated in our practice in 2021. He has a history of CLL and alcoholic cirrhosis. Imaging revealed hepatomas in 4A and 4B and he underwent the following interventions at [facility]:  18 ablation 4B lesion (Nadeen)  19 Hepatic angiogram \"mapping\" (Myrna)  18 Y90 SIRT left lobe 0.7 gb ( 19 mCi)  (Myrna)  18 YUNIER TACE caudate lesion (Myrna)  21 YUNIER TACE (Myrna)    We subsequently referred him to radiation oncology and medical oncology after poor treatment effect with liver directed therapy in .     Interval history 23:  Mr. Duvall has been on systemic therapy with Atezolizumab and bevacizumab. In 2023 he developed blood in his stool (black stools), heartburn, dysphagia. It is similar to his prior symptoms in 2018. He presented to ED and had an EGD. He was noted to have esophagitis but no active bleeding. He was also noted to have hypertensive gastropathy. Avastin was subsequently discontinued. His GI specialist has moved and he does not currently have a GI doctor. He now has disease progression in the liver and returns for consideration of catheter directed therapy. Today, he denies acute complaints. He denies any significant changes in his health since his last IR procedures. He tolerated his prior therapies well. He lives in [place] and is retired from [employer]. He is accompanied by a support person to today's consultation.    He is seen today for review of imaging studies and discussion of possible intervention with " image-guided ablation, drug eluting bead chemoembolization, or Y90  radioembolization.     Past Medical History:   Diagnosis Date    Anemia     Arthritis 08/04/2020    Thumbs    Cancer (HCC) 2004    Prostate - did brachytherapy at [facility] (HCC) 2018    Liver    Cirrhosis (HCC)     CLL (chronic lymphocytic leukemia) (HCC) 2014    CMV (cytomegalovirus infection) (HCC) 1990    Treated for 6 weeks    Diabetes (HCC)     oral medication    Heart burn     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 Stopped both medications because blood pressure readings were low, /82 today and has been low at home since stopping medications about 5 days ago. Last reading at home 117/72     Indigestion     Liver cancer (HCC)     Liver tumor     Plantar fasciitis of right foot 8/12/2019    Rosacea     Snoring     no sleep study     Past Surgical History:   Procedure Laterality Date    ND UPPER GI ENDOSCOPY,DIAGNOSIS N/A 4/25/2023    Procedure: GASTROSCOPY;  Surgeon: Mir Rodriguez M.D.;  Location: SURGERY SAME DAY [facility];  Service: Gastroenterology    WOUND EXPLORATION ORTHO Right 8/5/2020    Procedure: EXPLORATION, WOUND- THUMB;  Surgeon: Yumiko Griffiths M.D.;  Location: SURGERY SAME DAY [facility];  Service: Orthopedics    TENDON REPAIR  8/5/2020    Procedure: REPAIR, TENDON- EXTENSOR;  Surgeon: Yumiko Griffiths M.D.;  Location: SURGERY SAME DAY [facility];  Service: Orthopedics    HEPATIC ABLATION LAPAROSCOPIC  6/28/2018    Procedure: HEPATIC ABLATION LAPAROSCOPIC. SEGMENT 4B, HEPATOMA, REPAIR OF INCARCERATED UMBILICAL HERNIA;  Surgeon: Feliberto Burgos M.D.;  Location: SURGERY [facility];  Service: General    BRACHY THERAPY       Social History     Socioeconomic History    Marital status:      Spouse name: Not on file    Number of children: 1    Years of education: Not on file    Highest education level: Not on file  "  Occupational History    Occupation: fertilizer sales   Tobacco Use    Smoking status: Never    Smokeless tobacco: Former     Types: Snuff     Quit date: 3/29/2015   Vaping Use    Vaping Use: Never used   Substance and Sexual Activity    Alcohol use: Not Currently     Alcohol/week: 7.2 oz     Types: 12 Shots of liquor per week    Drug use: Yes     Types: Marijuana, Inhaled, Oral     Comment: \"Marijuana Use\" daily     Sexual activity: Never   Other Topics Concern    Not on file   Social History Narrative    No known exposure to asbestos, dyes, or chemicals, however he was exposed to pesticides.  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  1 child, alive and well.  He also has a step-child.      Social Determinants of Health     Financial Resource Strain: Not on file   Food Insecurity: Not on file   Transportation Needs: Not on file   Physical Activity: Not on file   Stress: Not on file   Social Connections: Not on file   Intimate Partner Violence: Not on file   Housing Stability: Not on file     Family History   Problem Relation Age of Onset    Cancer Father 81        Bladder    Cancer Brother         Prostate & CLL (s/p 8-10 years ago)    Hyperlipidemia Sister     Lung Disease Neg Hx     Diabetes Neg Hx     Heart Disease Neg Hx     Hypertension Neg Hx     Stroke Neg Hx     Alcohol/Drug Neg Hx        Review of Systems   Constitutional: Negative.  Negative for chills, diaphoresis, fever, malaise/fatigue and weight loss.   Respiratory: Negative.     Cardiovascular: Negative.    Gastrointestinal:  Positive for melena.   Skin: Negative.    Neurological:  Negative for sensory change, speech change, focal weakness and weakness.   Psychiatric/Behavioral:  Negative for substance abuse.        A comprehensive 14-point review of systems was negative except as described above.     Labs:    Latest Reference Range & Units 06/13/23 13:38 07/05/23 09:10 07/25/23 " 13:40   WBC 4.8 - 10.8 K/uL 5.2 3.5 (L) 4.8   RBC 4.70 - 6.10 M/uL 4.31 (L) 4.39 (L) 4.64 (L)   Hemoglobin 14.0 - 18.0 g/dL 13.0 (L) 12.8 (L) 13.4 (L)   Hematocrit 42.0 - 52.0 % 40.8 (L) 40.7 (L) 41.3 (L)   MCV 81.4 - 97.8 fL 94.7 92.7 89.0   MCH 27.0 - 33.0 pg 30.2 29.2 28.9   MCHC 32.3 - 36.5 g/dL 31.9 (L) 31.4 (L) 32.4   RDW 35.9 - 50.0 fL 51.1 (H) 52.0 (H) 54.1 (H)   Platelet Count 164 - 446 K/uL 113 (L) 81 (L) 100 (L)   MPV 9.0 - 12.9 fL 10.9 11.2 10.1   Neutrophils-Polys 44.00 - 72.00 % 51.50 58.70 52.70   Neutrophils (Absolute) 1.82 - 7.42 K/uL 2.69 2.07 2.51   Lymphocytes 22.00 - 41.00 % 35.60 27.50 32.30   Lymphs (Absolute) 1.00 - 4.80 K/uL 1.86 0.97 (L) 1.54   Monocytes 0.00 - 13.40 % 9.80 9.30 11.10   Monos (Absolute) 0.00 - 0.85 K/uL 0.51 0.33 0.53   Eosinophils 0.00 - 6.90 % 2.10 3.10 2.70   Eos (Absolute) 0.00 - 0.51 K/uL 0.11 0.11 0.13   Basophils 0.00 - 1.80 % 0.80 1.10 1.00   Baso (Absolute) 0.00 - 0.12 K/uL 0.04 0.04 0.05   Immature Granulocytes 0.00 - 0.90 % 0.20 0.30 0.20   Immature Granulocytes (abs) 0.00 - 0.11 K/uL 0.01 0.01 0.01   Nucleated RBC 0.00 - 0.20 /100 WBC 0.00 0.00 0.00   NRBC (Absolute) K/uL 0.00 0.00 0.00   Imm. Plt Fraction 0.6 - 13.1 %  5.1 4.8   Sodium 135 - 145 mmol/L 141 139 134 (L)   Potassium 3.6 - 5.5 mmol/L 4.6 4.9 4.4   Chloride 96 - 112 mmol/L 105 104 102   Co2 20 - 33 mmol/L 21 23 21   Anion Gap 7.0 - 16.0  15.0 12.0 11.0   Glucose 65 - 99 mg/dL 160 (H) 220 (H) 156 (H)   Bun 8 - 22 mg/dL 21 21 20   Creatinine 0.50 - 1.40 mg/dL 1.05 0.98 1.00   GFR (CKD-EPI) >60 mL/min/1.73 m 2 75 82 80   Calcium 8.5 - 10.5 mg/dL 9.9 9.5 9.6   Correct Calcium 8.5 - 10.5 mg/dL 9.6 9.3 9.2   AST(SGOT) 12 - 45 U/L 31 29 36   ALT(SGPT) 2 - 50 U/L 22 22 27   Alkaline Phosphatase 30 - 99 U/L 73 72 85   Total Bilirubin 0.1 - 1.5 mg/dL 1.2 0.8 1.1   Albumin 3.2 - 4.9 g/dL 4.4 4.3 4.5   Total Protein 6.0 - 8.2 g/dL 6.7 6.6 6.8   Globulin 1.9 - 3.5 g/dL 2.3 2.3 2.3   A-G Ratio g/dL 1.9 1.9 2.0       Latest Reference Range & Units 05/22/23 14:05   PT 12.0 - 14.6 sec 14.4   INR 0.87 - 1.13  1.13   APTT 24.7 - 36.0 sec 26.9      Latest Reference Range & Units 06/02/22 11:11 09/12/22 11:10 07/06/23 12:30   Jordon-gamma-carboxy Prothrombin 0.0 - 7.4 ng/mL 3.7 2.7 2.1      Latest Reference Range & Units 09/12/22 11:10 05/04/23 09:45 07/06/23 12:30   AFP L3% 0.0 - 9.9 % 6.0  8.5   Alpha Fetoprotein 0 - 15 ng/mL 7  6   Ferritin 22.0 - 322.0 ng/mL  65.9      Child [place] Class A  JIM Grade 1  NLR 1.63    Pathology:  None available for review    Radiology:   MRI abdomen 7/13/23 at [facility]:  1.  There are morphologic changes of cirrhosis with splenomegaly and collateral vessels consistent with portal hypertension.  2.  Interval disease progression with at least 2 and possibly 3 arterial phase enhancing lesions with washout and restricted diffusion the largest in the medial segment left lobe which is slightly increased in size consistent with multifocal HCC. LI-RADS   5:  3.  Several other subcentimeter arterial phase enhancing areas are indeterminate for HCC as these could be areas of altered arterial perfusion. LR 3.  4.  Additional right and left lobe hepatic lesions only visible on delayed imaging possibly treated HCC.  5.  There is cholelithiasis.  6.  Stable pancreatic side branch IPMN's.  7.  Stable left adrenal myelolipoma.      LR-5: definitely HCC    USD liver 4/25/23 at [facility]:  Unremarkable liver Doppler sonogram.    USD RUQ 4/25/23 at [facility]:  1.  There is cholelithiasis no sonographic evidence of acute cholecystitis  2.  Heterogeneous liver with known hepatic masses better visualized on CT performed earlier on the same day.    CT AP 4/24/23 at [facility]:  1.  No acute inflammatory process in the abdomen or pelvis.  2.  Small amount of stool in the colon.  3.  Cirrhosis and portal hypertension, including splenomegaly. No ascites.  4.  Three out of five hepatic lesions are visualized on today's CT entire stable to  slightly smaller in size compared to prior MRI. No new hepatic lesions.  5.  No new metastatic disease in the abdomen or pelvis.  6.  Cholelithiasis.  7.  Colonic diverticulosis.    MRI abdomen 1/6/23 at [facility]:  1.  Cirrhosis and portal hypertension, including splenomegaly and a few portosystemic collaterals.  2.  Only one 1.9 cm HCC in hepatic segment 4 shows arterial enhancement and washout on today's study. LI-RADS 5. This HCC is stable in size since prior CT.  3.  Four additional lesions in both right and left hepatic lobes are all hypointense on the delayed phase but show no arterial enhancement. They may represent treated HCCs. Absence of arterial enhancement suggests that they contain no viable tumor.  4.  No new arterially enhancing liver lesions.  5.  Cholelithiasis.  6.  Stable pancreatic side branch IPMNs. They do not require further follow-up.  7.  Stable left adrenal myelolipoma.    CT abdomen 10/3/22 at [facility]:  1.  Ablation cavity segment 4 of the liver is again identified.  2.  Other arterial phase enhancing liver lesions seen on the prior CT are poorly visualized on the current exam which is performed without arterial phase enhancement. These lesions are likely present on the current exam but are difficult to visualize on the portal venous phase. Largest superiorly in the right lobe appears to be present with some washout as expected on these portal venous phase images. This lesion is similar in size to the prior exam.  3.  Nodular liver surface consistent with cirrhosis.  4.  Splenomegaly and enlarged portal vein.  5.  Recanalized umbilical vein.  6.  Cholelithiasis.    CT chest 6/3/22 at [facility]:  1.  No metastatic disease in the chest.  2.  A 4.1 x 4 cm ascending aortic aneurysm.  3.  Abdomen is reported separately.    CT liver mass protocol 6/3/22 at [facility]:  1.  More than 8 bilobar hepatic lesions. Three dominant lesions all measure greater than 2 cm and demonstrate classic arterial  hyperenhancement and washout, consistent with HCCs.  2.  The vast majority of the smaller arterially hyperenhancing lesions have increased in size, and some of these lesions are new. While they do not definitively demonstrate washout, they are highly concerning for hepatomas as well. LR-4 and LR-5.  3.  Treated lesion in the caudate lobe shows no viable tumor.  4.  Cirrhosis and splenomegaly. No ascites.  5.  Cholelithiasis.  6.  Colonic diverticulosis.  7.  Stable left adrenal adenoma.    MRI abdomen 5/10/22 at [facility]:  1.  There is severe respiratory motion artifact on the current exam which was not present on the prior exam. Images better most severely affected are the arterial phase and portal venous phase images. Therefore direct comparison of these lesions which   were best defined on the arterial phase images on the prior exam is difficult on the current exam. Most of the measurements on the current exam required measurement of the mass is on the delayed images or portal venous phase images.  2.  The new lesion seen at the anterior edge of segment 5 on the prior MRI does appear to have enlarged significantly on the current exam. Current measurement is 1.9 x 1.8 cm based on the washout portion of the mass. Arterial phase measurement on the prior exam was 1.2 x 1.3 cm.  3.  Other masses throughout the liver appears similar to the prior exam.  4.  Small masses measuring less than 1 cm on the arterial phase images described in the prior exam are poorly visualized on the current exam due to the severe respiratory motion artifact. However, these lesions are probably unchanged.  5.  Splenomegaly is again noted. Spleen measures 16.8 cm.  6.  No change in left adrenal mass likely representing adenoma.  7.  Portal veins and hepatic veins are within normal limits.  8.  No ascites or adenopathy identified.      LR-5: definitely HCC    MRI abdomen 12/8/21 at [facility]:  1. New 1.3 cm HCC in segment 5.  2. HCC's described on  the prior MRI study are either stable or show minimal subthreshold growth. They measure up to 2.1 cm.  3. Approximately 5 additional subcentimeter arterially enhancing lesions in the right and left hepatic lobes, without definitive correlates on any other phases. Some are more conspicuous, and some may be new since prior study. LR-3.  4. Previously treated lesions in the caudate lobe and left hepatic lobe show no viable tumor. LR-treated.  5. Cirrhosis and splenomegaly.  6. Cholelithiasis.  7. Benign left adrenal adenoma. It does not require follow-up.  8. Colonic diverticulosis.    Nuc Med bone study 9/20/21 at [facility]:  No scintigraphic evidence of active osseous metastatic disease.    CT chest 8/12/21 at [facility]:  1.  5 mm nodule along the right minor fissure, new from the 2018 exam. Follow-up per oncology protocol.  2.  Morphologic characteristics of cirrhosis with arterially enhancing foci in the partially visualized liver, consistent with known multifocal hepatocellular carcinoma  3.  Cholelithiasis  4.  Left adrenal adenoma, previously characterized.  5.  Splenomegaly     Fleischner Society pulmonary nodule recommendations:  Not Applicable    MRI abdomen 6/9/21 at [facility]:  1.  Morphologic characteristics of cirrhosis with evidence of portal hypertension including splenomegaly, trace ascites, and varices.  2.  Observation in the medial left hepatic lobe demonstrates interval enlargement, subthreshold.LR-5  3.  Observation bordering segments 2 and 3 is stable. LR-5  4.  Observation in segment 2 is stable. LR-3  5.  Observation in the hepatic dome has increased in size from the prior exam, subthreshold growth. LR-5  6.  Caudate lobe lesion remains avascular status post TACE. LR-treated nonviable  7.  Ablation cavity in the left hepatic lobe remains avascular. LR-treated nonviable  8.  Benign left adrenal adenoma.  9.  Cholelithiasis.     CT abdomen 3/19/21 at [facility]:  Enhancing lesion at the hepatic dome  anteriorly is likely not significantly changed, worrisome for hepatocellular carcinoma.     Enhancing lesion within segment 4 of the liver is also likely not significantly changed, also worrisome for hepatocellular carcinoma.     Small 6 mm enhancing focus within the segment 4 of the liver is not significantly changed.     Enhancing 6 mm focus within the left lobe of the liver is stable.     6.5 mm enhancing focus near the intrahepatic IVC was not definitively seen previously.     Posttreatment changes are again noted in the caudate lobe and in the anterior right lobe of the liver.     Findings of cirrhosis and portal hypertension.     Left adrenal adenoma.     Colonic diverticulosis.     Cholelithiasis     Atherosclerotic plaque.     Interventional Radiology procedure 1/6/21 at [facility]:  1.  DEBTACE procedure with total dose doxorubicin 150 mg administered as 100 mu Oncozene beads.  2.  The patient is returned to the same day surgery reese for post procedure observation.     MRI abdomen 11/18/2020 at [facility]:  1. Previously treated lesions show no viable tumor. LR-treated.  2. Corresponding to the lesion detected on the ultrasound, there is an 8 mm lesion at the junction of anterior and medial hepatic segments. It is new since prior MRI, and meets criteria for HCC (LR-5).  3. A 1.6 cm segment 4 lesion seen on the prior MR study is stable in size. It shows mild arterial enhancement and mild washout, and likely represents an HCC which is stable since prior study. LR-5.  4. No additional lesions are detected on today's study.  5. Cirrhosis and portal hypertension. No ascites.  6. Cholelithiasis.  7. Stable 1 cm cystic lesion in the pancreatic tail, likely a side branch IPMN.  8. Colonic diverticulosis.         MRI abdomen 3/11/20 [facility]:   1.  There are morphologic changes of the liver consistent with cirrhosis.  2.  There are stable posttreatment changes in the caudate lobe and superior medial segment left lobe and  segment 2. LR treated  3.  Stable 10 mm focus of arterial enhancement without washout or restricted diffusion, likely in segment 8. LR 3.  4.  There is a 9 mm arterial enhancing focus inferiorly in segment 8 or possibly 4a without washout or diffusion. LR 3  5.  There is a questionable 6 mm arterial focus of enhancement at the junction of segment 8/5 without washout or restricted diffusion. LR 3  6.  Stable splenomegaly, possibly related to cirrhosis versus underlying CLL.  7.  Stable cholelithiasis without biliary dilatation.  8.  No change in 9 mm pancreatic tail simple appearing cyst.  9.  No change in left adrenal gland adenoma.        LR-3: intermediate probability and LR treated lesions     MRI abdomen on July 1, 2019 at [facility]:  1.  Stable wedge-shaped treated lesion in the anterior medial hepatic segment.  2.  Stable treated lesion in the caudate lobe, with unchanged linear enhancement in its periphery.  3.  Segment 2 lesion described on the prior study is not seen on today's study. No focal arterial enhancement in segment 2.  4.  New 1 cm arterially enhancing focus in segment 8 has no correlate on other images. It is indeterminate. LR-3.  5.  Cirrhosis and portal hypertension, with postembolization changes in the left hepatic lobe. Splenomegaly. No ascites.  6.  Cholelithiasis.  7.  Stable left adrenal lesion, likely adenoma.  8.  Stable pancreatic tail cyst.    MRI abdomen on January 29, 2019 at [facility]:  1.  No new arterial enhancing mass is identified.  2.  Previously described mass in the caudate lobe is now avascular with peripheral enhancement, consistent with recent chemoembolization. LR-treated  3.  Ablation cavity in the left hepatic lobe remains avascular.  4.  8 mm arterial enhancing observation in segment 4A is not as pronounced as on the prior exam and grossly stable. LR-3  5.  Stable peripheral enhancing lesion in segment 2. LR-treated  6.  Previously described rim-enhancing observation in the  superior aspect of segment 2 is not visualized on the current exam, likely related to differences in technique.  7.  Morphologic characteristics of cirrhosis and evidence of portal hypertension including splenomegaly  8.  Cholelithiasis.  9.  Left adrenal adenoma  10.  Stable 8 mm pancreatic tail cyst         Interventional radiology procedure December 14, 2018, at [facility]:  1.  DEBTACE procedure with total dose doxorubicin 100 mg administered as 100 mu Oncozene beads. (Caudate lobe)  2.  The patient is returned to the same day surgery reese for post procedure observation.       MRI abdomen November 10, 2018 at [facility]:  1.  Morphologic characteristics of cirrhosis with evidence of portal hypertension including trace ascites, splenomegaly, and portal venous dilatation.  2.  Interval enlargement in the mass in segment 4 adjacent to the caudate lobe. LR-5  3.  Previously described hyper enhancing masses in the left hepatic lobe demonstrate rim enhancement, consistent with recent Y 90 treatment. LR-treated.  4.  Ablation cavity in the left hepatic lobe is a vascular  5.  8 mm arterial enhancing focus in segment 4A is better defined than on the prior exam secondary to decreased motion. Size is not significantly changed.LR-3  6.  Stable 9 mm cystic mass in the pancreatic tail.  7.  Stable benign left adrenal adenoma  8.  Cholelithiasis. Dependent hypointense foci in the common bile duct may represent tiny nonobstructing calculi.       Interventional radiology procedure Y90 SIRT left lobe September 7, 2018, at [facility]:  1.  Technically successful Y90 radioembolization of the left hepatic lobe. The patient will followup in approximately 10 days for laboratory and clinical evaluation and 4-6 weeks for imaging evaluation.  2.  Immediately following the procedure, the patient was transported to nuclear medicine for SPECT imaging which demonstrates Bremsstrahlung emission from the left of the liver. There is no definite evidence  of extrahepatic deposition of radioembolic   Material.     Nuclear medicine bremsstrahlung study on September 7, 2018 at [facility]:  The prescribed dose was  0.68 gb ( 18.4 mCi). The drawn dose was 0.78 gb (21mCi). Delivered dose after residual was measured at 0.7 gb ( 19 mCi). This represents  103% of the prescribed dose and  90% of the drawn dose. Bremsstrahlung images obtained   after the Y-90 radioembolization, confirm proper delivery to the targeted region. There is no uptake outside the planned treatment area. NOTE: The patient will be followed by interventional radiology and oncology.       Interventional radiology procedure August 8, 2018 at [facility]:  1.  Superior mesenteric arteriogram replaced common hepatic artery.  2.  Celiac arteriogram demonstrate no evidence of common hepatic arterial anatomy.  3.  Common hepatic arteriogram demonstrate no evidence of variant hepatic arterial anatomy.  4.  Selective catheterization and embolization of a small branch originating from the proximal aspect of the right hepatic artery which appeared to supply either a small subsegmental region of the right hepatic lobe or a small amount of the hepatic   flexure of the colon.  5.  Proper hepatic arteriogram demonstrating 2 small branches originating from the proximal aspect of the right hepatic artery one of which appear to represent either a small subsegmental hepatic artery versus hepatic flexure colon branch and a small   cystic artery.  6.  Intra-arterial administration of 4.4 mCi technetium 99m labeled MAA into the hepatic artery demonstrated a hepatic pulmonary shunt fraction of 4%.        CT abdomen with and without August 8, 2018 at [facility]:  1.  Cirrhosis  2.  Interval ablation of a segment 4A hepatic nodule without evidence of residual tumor in that field  3.  Multiple additional hepatic nodules and masses as described above, mostly unchanged in size however the dominant central RIGHT hepatic mass has increased in  size. These are likely hepatomas.  4.  Splenomegaly and enlarged portal vein, in keeping with the patient's known portal hypertension  5.  Cholelithiasis     Nuclear medicine quantitative lung study August 28, 2018 at [facility]:  Total hepatopulmonary shunt percentage was 4%. No evidence of extrahepatic radiotracer deposition is identified.     MRI abdomen on June 8, 2018 at [facility]:  Examination is limited by patient motion.  2.1 x 1.9 cm arterial hyperenhancing lesion within the left lobe of the liver appears compatible with hepatocellular carcinoma. LR-5    Hyperenhancing lesion within segment 4 of the liver bordering the caudate lobe measures 3 x 2 cm and is increased in size compared to prior. This lesion is compatible with hepatocellular carcinoma. LR-5    10 mm hyperenhancing lesion within segment 4 may be slightly decreased or unchanged in size compared to prior. LR-3    3 other small hyperenhancing lesions are seen within the right lobe of the liver measuring up to 1 cm. LR-3    Heterogeneous wedge-shaped area of delayed hypo-enhancement in the anterior liver is likely related to variable perfusion.    Findings of cirrhosis and portal hypertension.    Mildly prominent lymph nodes in the cardiophrenic fat are not significantly changed.    9 mm cystic lesion in the pancreatic tail could represent a small side branch IPMN.    Cholelithiasis. No biliary ductal dilatation is seen.    Subcarinal lymphadenopathy is partially imaged.    2 cm left adrenal nodule is unchanged and likely an adrenal adenoma.    Trace free fluid in the abdomen.    Findings discussed with Dr. Senior         CT thorax April 17, 2018 at [facility]:  1.  Mediastinal lymphadenopathy with markedly enlarged subcarinal lymph node could be due to metastatic disease or lymphoma. Reactive lymph nodes or lymphadenopathy due to granulomatous disease seems less likely.  2.  Coarse coronary artery calcifications.  3.  Cirrhosis with hypervascular  "hepatic lesions and splenomegaly.  4.  Cholelithiasis.     Nuclear medicine bone study April 17, 2018 at [facility]:  No scintigraphic evidence of bony metastatic disease     Portal pressure gradient April 4, 2018 at [facility]:  1.  Hepatic venography showing a patent right hepatic vein.  2.  Free and wedged right hepatic vein wedge pressures rendering a mean Hepatic Venous Pressure Gradient (HVPG) of 9.67 mmHg. This is consistent with portal hypertension.  (HVPG >= 10mmHg considered \"clinically significant\" portal HTN**  3.  Transjugular Liver biopsy was not performed at this time..  **Reference: Hepatic Venous Pressure Gradient in 2010:Optimal Measurement is Jose. Anya MCINTYRE, Wanda U. HEPATOLOGY, Vol. 51, No. 6, 2010     CT abdomen December 6, 2017 at [facility]:  1.  2.4 cm arterial enhancing mass in segment 4 of the liver demonstrates washout. LR-5.  2.  1.3 cm arterial enhancing lesion in the hepatic dome is similar to the recent MRI. No definite washout or capsule appreciated. LR-3  3.  Morphologic characteristics of cirrhosis with evidence of portal hypertension including splenomegaly.  4.  Cholelithiasis.  5.  Stable left adrenal nodule, likely a benign adenoma.    Current Outpatient Medications   Medication Sig Dispense Refill    ondansetron (ZOFRAN) 4 MG Tab tablet Take 1 Tablet by mouth every four hours as needed for Nausea/Vomiting for up to 90 days. 90 Tablet 2    pantoprazole (PROTONIX) 40 MG Tablet Delayed Response Take 1 Tablet by mouth 2 times a day. 60 Tablet 0    propranolol (INDERAL) 10 MG Tab Take 1 Tablet by mouth 2 times a day. 60 Tablet 0    fluticasone (FLONASE) 50 MCG/ACT nasal spray Administer 1 Spray into affected nostril(S) every day.      therapeutic multivitamin-minerals (THERAGRAN-M) Tab Take 1 Tablet by mouth every day.      loratadine (CLARITIN) 10 MG Tab Take 10 mg by mouth every day.      metFORMIN ER (GLUCOPHAGE XR) 500 MG TABLET SR 24 HR Take 2 Tablets by mouth 2 times a day. TAKE " ONE TABLET BY MOUTH TWICE A  Tablet 3    spironolactone (ALDACTONE) 50 MG Tab Take 50 mg by mouth every day.      furosemide (LASIX) 20 MG Tab Take 20 mg by mouth 2 times a day.      Vitamin D, Cholecalciferol, 25 MCG (1000 UT) Cap Take 1,000 Units by mouth every day.       No current facility-administered medications for this visit.       No Known Allergies    Physical Exam  Constitutional:       General: He is not in acute distress.     Appearance: He is not diaphoretic.   HENT:      Head: Normocephalic.   Eyes:      General: No scleral icterus.  Pulmonary:      Effort: Pulmonary effort is normal. No respiratory distress.   Abdominal:      General: There is no distension.   Skin:     General: Skin is warm and dry.      Coloration: Skin is not jaundiced or pale.      Findings: No erythema or rash.   Neurological:      General: No focal deficit present.      Mental Status: He is alert and oriented to person, place, and time. He is not disoriented.      Cranial Nerves: No cranial nerve deficit or facial asymmetry.      Sensory: Sensation is intact.      Motor: No weakness or tremor.      Coordination: Coordination normal.      Gait: Gait is intact. Gait normal.   Psychiatric:         Mood and Affect: Mood and affect normal.         Behavior: Behavior normal.         Thought Content: Thought content normal.         Cognition and Memory: Memory normal.         Judgment: Judgment normal.       ECOG Performance Status 0    Impression:   1. Unresectable multifocal hepatocellular carcinoma status post left lobe radioembolization and chemoembolization of multifocal hepatomas.  2. 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  3. Cirrhosis.  4. Portal hypertension.  5. Thrombocytopenia.  6. Splenomegaly.  7. Chronic lymphocytic leukemia.   8. Diabetes mellitus.  9. Hyperbilirubinemia.  10. History of alcohol use.    Plan:   Joe Norris MD has reviewed 's history and imaging studies,  examined the patient, and discussed treatment options.  has disease progression of his HCC and his systemic options are limited due to his recent bleeding episode. We discussed that we now have the ability to do both lobar and selective Y90 TARE, which was not available at the time of his last IR clinic evaluation. We will to re-map with angiography with the intent to treat the entire right lobe with Y90 TARE and if possible, selectively target the segment 4B lesion. We discussed the method of the procedure at length including angiography and embolization as well as the expected clinical course with the possibility of post-embolization syndrome nausea and pain and hospitalization. We explained the difference in palliative and curative procedures. We discussed the possibility of tumor recurrence and development of future tumors. We explained that cirrhosis is a serious disease and can lead to hepatic decompensation, and if there is evidence of deterioration in liver function we would not be able to perform a procedure due to risk of liver failure. We additionally discussed the risks, including bleeding and infection, damage to the arteries or adjacent tissue, reaction to any medications given during the procedure, potential side effects of contrast administration including renal damage, non-target embolization, radiation-induced ulcers or liver disease in the setting of radioembolization, liver failure, and death. There is a risk the procedure will not be effective. We discussed alternatives to the procedure including surveillance with no intervention and continuing with systemic therapy. The patient verbalizes understanding of risks, benefits, and alternatives to IR intervention and elects to proceed. All questions were answered. Written pre- and post- procedure care instructions were provided. We explained that the patient will need to have ongoing surveillance after the procedure, which will be  managed by the treating team, and that future treatment depends on multiple factors including lab studies, imaging, and performance status.    The plan is as follows, pending insurance authorization:  Continue pantoprazole.  Do not restart Avastin until after catheter directed therapy is completed.  Hold metformin on procedure day + 1 day after for each procedure.  Consult radiation oncology/ physics team for complex treatment planning.  Mapping 8/16/23.  Y90 lobar right liver with Theraspheres 8/22/23.   Y90 selective target segment 4A HCC after 3-4 weeks.  Imaging 6-8 weeks.    GINA Grewal with Joe Norris MD  Interventional Radiology   [facility]   [id number])  [place] [address]  [phone]

## 2023-08-01 ENCOUNTER — HOSPITAL ENCOUNTER (OUTPATIENT)
Dept: RADIOLOGY | Facility: MEDICAL CENTER | Age: 73
End: 2023-08-01
Attending: INTERNAL MEDICINE
Payer: MEDICARE

## 2023-08-01 DIAGNOSIS — C22.0 LIVER CARCINOMA (HCC): ICD-10-CM

## 2023-08-01 DIAGNOSIS — C91.11 CHRONIC LYMPHOID LEUKEMIA IN REMISSION (HCC): ICD-10-CM

## 2023-08-01 ASSESSMENT — LIFESTYLE VARIABLES: SUBSTANCE_ABUSE: 0

## 2023-08-01 ASSESSMENT — ENCOUNTER SYMPTOMS
FOCAL WEAKNESS: 0
CONSTITUTIONAL NEGATIVE: 1
SPEECH CHANGE: 0
CARDIOVASCULAR NEGATIVE: 1
RESPIRATORY NEGATIVE: 1
CHILLS: 0
SENSORY CHANGE: 0
WEAKNESS: 0
FEVER: 0
WEIGHT LOSS: 0
DIAPHORESIS: 0

## 2023-08-02 ENCOUNTER — APPOINTMENT (OUTPATIENT)
Dept: ADMISSIONS | Facility: MEDICAL CENTER | Age: 73
End: 2023-08-02
Attending: RADIOLOGY
Payer: MEDICARE

## 2023-08-08 ENCOUNTER — PRE-ADMISSION TESTING (OUTPATIENT)
Dept: ADMISSIONS | Facility: MEDICAL CENTER | Age: 73
End: 2023-08-08
Attending: RADIOLOGY
Payer: MEDICARE

## 2023-08-08 RX ORDER — ONDANSETRON 4 MG/1
4 TABLET, FILM COATED ORAL EVERY 4 HOURS PRN
COMMUNITY

## 2023-08-08 NOTE — OR NURSING
Patient plans to complete regularly scheduled lab work for Dr. Servin on 8/15/23 at [place]. Will complete any additional outstanding lab work for IR procedure on 8/16 when presenting for the procedure. Second procedure scheduled for 8/22, lab work requested to be completed with 7 days of procedure, if proceeding as scheduled, no additional lab work should need to be completed prior to second IR procedure.

## 2023-08-14 ENCOUNTER — HOSPITAL ENCOUNTER (OUTPATIENT)
Dept: RADIATION ONCOLOGY | Facility: MEDICAL CENTER | Age: 73
End: 2023-08-31
Attending: RADIOLOGY
Payer: MEDICARE

## 2023-08-15 ENCOUNTER — HOSPITAL ENCOUNTER (OUTPATIENT)
Dept: LAB | Facility: MEDICAL CENTER | Age: 73
End: 2023-08-15
Attending: INTERNAL MEDICINE
Payer: MEDICARE

## 2023-08-15 LAB
BASOPHILS # BLD AUTO: 0.9 % (ref 0–1.8)
BASOPHILS # BLD: 0.04 K/UL (ref 0–0.12)
EOSINOPHIL # BLD AUTO: 0.13 K/UL (ref 0–0.51)
EOSINOPHIL NFR BLD: 3 % (ref 0–6.9)
ERYTHROCYTE [DISTWIDTH] IN BLOOD BY AUTOMATED COUNT: 62 FL (ref 35.9–50)
HCT VFR BLD AUTO: 44.5 % (ref 42–52)
HGB BLD-MCNC: 14 G/DL (ref 14–18)
IMM GRANULOCYTES # BLD AUTO: 0 K/UL (ref 0–0.11)
IMM GRANULOCYTES NFR BLD AUTO: 0 % (ref 0–0.9)
LYMPHOCYTES # BLD AUTO: 1.45 K/UL (ref 1–4.8)
LYMPHOCYTES NFR BLD: 33.3 % (ref 22–41)
MCH RBC QN AUTO: 29.5 PG (ref 27–33)
MCHC RBC AUTO-ENTMCNC: 31.5 G/DL (ref 32.3–36.5)
MCV RBC AUTO: 93.7 FL (ref 81.4–97.8)
MONOCYTES # BLD AUTO: 0.45 K/UL (ref 0–0.85)
MONOCYTES NFR BLD AUTO: 10.3 % (ref 0–13.4)
NEUTROPHILS # BLD AUTO: 2.29 K/UL (ref 1.82–7.42)
NEUTROPHILS NFR BLD: 52.5 % (ref 44–72)
NRBC # BLD AUTO: 0 K/UL
NRBC BLD-RTO: 0 /100 WBC (ref 0–0.2)
PLATELET # BLD AUTO: 86 K/UL (ref 164–446)
PLATELETS.RETICULATED NFR BLD AUTO: 4.8 % (ref 0.6–13.1)
PMV BLD AUTO: 10.5 FL (ref 9–12.9)
RBC # BLD AUTO: 4.75 M/UL (ref 4.7–6.1)
T4 FREE SERPL-MCNC: 1.28 NG/DL (ref 0.93–1.7)
TSH SERPL DL<=0.005 MIU/L-ACNC: 2.14 UIU/ML (ref 0.38–5.33)
WBC # BLD AUTO: 4.4 K/UL (ref 4.8–10.8)

## 2023-08-15 PROCEDURE — 80053 COMPREHEN METABOLIC PANEL: CPT

## 2023-08-15 PROCEDURE — 84439 ASSAY OF FREE THYROXINE: CPT

## 2023-08-15 PROCEDURE — 85055 RETICULATED PLATELET ASSAY: CPT

## 2023-08-15 PROCEDURE — 84443 ASSAY THYROID STIM HORMONE: CPT

## 2023-08-15 PROCEDURE — 85025 COMPLETE CBC W/AUTO DIFF WBC: CPT

## 2023-08-15 PROCEDURE — 36415 COLL VENOUS BLD VENIPUNCTURE: CPT

## 2023-08-16 ENCOUNTER — APPOINTMENT (OUTPATIENT)
Dept: RADIOLOGY | Facility: MEDICAL CENTER | Age: 73
End: 2023-08-16
Attending: RADIOLOGY
Payer: MEDICARE

## 2023-08-16 ENCOUNTER — HOSPITAL ENCOUNTER (OUTPATIENT)
Facility: MEDICAL CENTER | Age: 73
End: 2023-08-16
Attending: RADIOLOGY | Admitting: RADIOLOGY
Payer: MEDICARE

## 2023-08-16 VITALS
DIASTOLIC BLOOD PRESSURE: 66 MMHG | TEMPERATURE: 96.3 F | BODY MASS INDEX: 31.07 KG/M2 | HEIGHT: 68 IN | WEIGHT: 205.03 LBS | OXYGEN SATURATION: 97 % | RESPIRATION RATE: 16 BRPM | SYSTOLIC BLOOD PRESSURE: 149 MMHG | HEART RATE: 51 BPM

## 2023-08-16 DIAGNOSIS — C22.0 LIVER CELL CARCINOMA (HCC): ICD-10-CM

## 2023-08-16 DIAGNOSIS — C22.0 HEPATOCELLULAR CARCINOMA (HCC): ICD-10-CM

## 2023-08-16 LAB
ALBUMIN SERPL BCP-MCNC: 4.5 G/DL (ref 3.2–4.9)
ALBUMIN/GLOB SERPL: 1.9 G/DL
ALP SERPL-CCNC: 100 U/L (ref 30–99)
ALT SERPL-CCNC: 30 U/L (ref 2–50)
ANION GAP SERPL CALC-SCNC: 16 MMOL/L (ref 7–16)
AST SERPL-CCNC: 44 U/L (ref 12–45)
BILIRUB SERPL-MCNC: 1.2 MG/DL (ref 0.1–1.5)
BUN SERPL-MCNC: 17 MG/DL (ref 8–22)
CALCIUM ALBUM COR SERPL-MCNC: 9.5 MG/DL (ref 8.5–10.5)
CALCIUM SERPL-MCNC: 9.9 MG/DL (ref 8.5–10.5)
CHLORIDE SERPL-SCNC: 105 MMOL/L (ref 96–112)
CO2 SERPL-SCNC: 22 MMOL/L (ref 20–33)
CREAT SERPL-MCNC: 0.89 MG/DL (ref 0.5–1.4)
GFR SERPLBLD CREATININE-BSD FMLA CKD-EPI: 91 ML/MIN/1.73 M 2
GLOBULIN SER CALC-MCNC: 2.4 G/DL (ref 1.9–3.5)
GLUCOSE SERPL-MCNC: 127 MG/DL (ref 65–99)
INR PPP: 1.16 (ref 0.87–1.13)
POTASSIUM SERPL-SCNC: 4.9 MMOL/L (ref 3.6–5.5)
PROT SERPL-MCNC: 6.9 G/DL (ref 6–8.2)
PROTHROMBIN TIME: 14.6 SEC (ref 12–14.6)
SODIUM SERPL-SCNC: 143 MMOL/L (ref 135–145)

## 2023-08-16 PROCEDURE — 700102 HCHG RX REV CODE 250 W/ 637 OVERRIDE(OP): Performed by: RADIOLOGY

## 2023-08-16 PROCEDURE — 78803 RP LOCLZJ TUM SPECT 1 AREA: CPT

## 2023-08-16 PROCEDURE — 160046 HCHG PACU - 1ST 60 MINS PHASE II

## 2023-08-16 PROCEDURE — 160036 HCHG PACU - EA ADDL 30 MINS PHASE I

## 2023-08-16 PROCEDURE — 700117 HCHG RX CONTRAST REV CODE 255: Performed by: RADIOLOGY

## 2023-08-16 PROCEDURE — 700111 HCHG RX REV CODE 636 W/ 250 OVERRIDE (IP): Performed by: RADIOLOGY

## 2023-08-16 PROCEDURE — A9270 NON-COVERED ITEM OR SERVICE: HCPCS | Performed by: RADIOLOGY

## 2023-08-16 PROCEDURE — 700111 HCHG RX REV CODE 636 W/ 250 OVERRIDE (IP): Mod: JZ

## 2023-08-16 PROCEDURE — 160035 HCHG PACU - 1ST 60 MINS PHASE I

## 2023-08-16 PROCEDURE — 77470 SPECIAL RADIATION TREATMENT: CPT | Mod: 26 | Performed by: RADIOLOGY

## 2023-08-16 PROCEDURE — 99153 MOD SED SAME PHYS/QHP EA: CPT

## 2023-08-16 PROCEDURE — 85610 PROTHROMBIN TIME: CPT

## 2023-08-16 PROCEDURE — 36248 INS CATH ABD/L-EXT ART ADDL: CPT

## 2023-08-16 PROCEDURE — 160002 HCHG RECOVERY MINUTES (STAT)

## 2023-08-16 PROCEDURE — 77263 THER RADIOLOGY TX PLNG CPLX: CPT | Performed by: RADIOLOGY

## 2023-08-16 RX ORDER — OXYCODONE HYDROCHLORIDE 5 MG/1
10 TABLET ORAL
Status: DISCONTINUED | OUTPATIENT
Start: 2023-08-16 | End: 2023-08-16 | Stop reason: HOSPADM

## 2023-08-16 RX ORDER — MORPHINE SULFATE 4 MG/ML
4 INJECTION INTRAVENOUS
Status: DISCONTINUED | OUTPATIENT
Start: 2023-08-16 | End: 2023-08-16 | Stop reason: HOSPADM

## 2023-08-16 RX ORDER — SODIUM CHLORIDE 9 MG/ML
500 INJECTION, SOLUTION INTRAVENOUS
Status: ACTIVE | OUTPATIENT
Start: 2023-08-16 | End: 2023-08-16

## 2023-08-16 RX ORDER — ONDANSETRON 2 MG/ML
4 INJECTION INTRAMUSCULAR; INTRAVENOUS PRN
Status: ACTIVE | OUTPATIENT
Start: 2023-08-16 | End: 2023-08-16

## 2023-08-16 RX ORDER — CETIRIZINE HYDROCHLORIDE 10 MG/1
10 TABLET ORAL DAILY
COMMUNITY

## 2023-08-16 RX ORDER — ONDANSETRON 2 MG/ML
4 INJECTION INTRAMUSCULAR; INTRAVENOUS EVERY 8 HOURS PRN
Status: DISCONTINUED | OUTPATIENT
Start: 2023-08-16 | End: 2023-08-16 | Stop reason: HOSPADM

## 2023-08-16 RX ORDER — PROPRANOLOL HYDROCHLORIDE 10 MG/1
10 TABLET ORAL DAILY
COMMUNITY
End: 2024-02-20 | Stop reason: SDUPTHER

## 2023-08-16 RX ORDER — MIDAZOLAM HYDROCHLORIDE 1 MG/ML
.5-2 INJECTION INTRAMUSCULAR; INTRAVENOUS PRN
Status: ACTIVE | OUTPATIENT
Start: 2023-08-16 | End: 2023-08-16

## 2023-08-16 RX ORDER — MIDAZOLAM HYDROCHLORIDE 1 MG/ML
INJECTION INTRAMUSCULAR; INTRAVENOUS
Status: COMPLETED
Start: 2023-08-16 | End: 2023-08-16

## 2023-08-16 RX ORDER — OXYCODONE HYDROCHLORIDE 5 MG/1
5 TABLET ORAL
Status: DISCONTINUED | OUTPATIENT
Start: 2023-08-16 | End: 2023-08-16 | Stop reason: HOSPADM

## 2023-08-16 RX ADMIN — FENTANYL CITRATE 50 MCG: 50 INJECTION, SOLUTION INTRAMUSCULAR; INTRAVENOUS at 11:50

## 2023-08-16 RX ADMIN — MIDAZOLAM 1.5 MG: 1 INJECTION, SOLUTION INTRAMUSCULAR; INTRAVENOUS at 11:25

## 2023-08-16 RX ADMIN — FENTANYL CITRATE 50 MCG: 50 INJECTION, SOLUTION INTRAMUSCULAR; INTRAVENOUS at 11:25

## 2023-08-16 RX ADMIN — OXYCODONE HYDROCHLORIDE 10 MG: 5 TABLET ORAL at 12:59

## 2023-08-16 RX ADMIN — ONDANSETRON 4 MG: 2 INJECTION INTRAMUSCULAR; INTRAVENOUS at 12:56

## 2023-08-16 RX ADMIN — IOHEXOL 75 ML: 300 INJECTION, SOLUTION INTRAVENOUS at 13:15

## 2023-08-16 RX ADMIN — MIDAZOLAM 0.5 MG: 1 INJECTION, SOLUTION INTRAMUSCULAR; INTRAVENOUS at 11:50

## 2023-08-16 ASSESSMENT — PAIN DESCRIPTION - PAIN TYPE
TYPE: SURGICAL PAIN

## 2023-08-16 ASSESSMENT — FIBROSIS 4 INDEX: FIB4 SCORE: 6.73

## 2023-08-16 NOTE — PROGRESS NOTES
Pt presents to IR 2. Pt was consented by MD at bedside, confirmed by this RN and consent at bedside. Patient underwent a Y-90 mapping with possible embolization by Dr. Norris. Site marked and initialed by MD prior to procedure starting and verified by patient and procedure team. Pedal pulses prior to case Right +2, marked and Left +2, marked. Patient was placed in a supine position. Right femoral artery was accessed. Vitals were taken every 5 minutes and remained stable during procedure (see doc flow sheet for results). CO2 waveform capnography was monitored and remained WNL throughout procedure. Starclose was used to achieve hemostasis. Report called to ALEXEY Faith. Pt transported via gurney with RN to Nuclear Medicine and then to PACU 6 B in a stable condition.    Starclose vascular closure system 6 Fr  REF: [id number]  LOT: [id number]  EXP: 2025-01-01

## 2023-08-16 NOTE — DISCHARGE INSTRUCTIONS
If any questions arise, call your provider.  If your provider is not available, please feel free to call the [facility] at [phone].    MEDICATIONS: Resume taking daily medication.  Take prescribed pain medication with food.  If no medication is prescribed, you may take non-aspirin pain medication if needed.  PAIN MEDICATION CAN BE VERY CONSTIPATING.  Take a stool softener or laxative such as senokot, pericolace, or milk of magnesia if needed.    Last pain medication given at 12:59 pm    What to Expect Post Anesthesia    Rest and take it easy for the first 24 hours.  A responsible adult is recommended to remain with you during that time.  It is normal to feel sleepy.  We encourage you to not do anything that requires balance, judgment or coordination.    FOR 24 HOURS DO NOT:  Drive, operate machinery or run household appliances.  Drink beer or alcoholic beverages.  Make important decisions or sign legal documents.    To avoid nausea, slowly advance diet as tolerated, avoiding spicy or greasy foods for the first day.  Add more substantial food to your diet according to your provider's instructions.  INCREASE FLUIDS AND FIBER TO AVOID CONSTIPATION.    MILD FLU-LIKE SYMPTOMS ARE NORMAL.  YOU MAY EXPERIENCE GENERALIZED MUSCLE ACHES, THROAT IRRITATION, HEADACHE AND/OR SOME NAUSEA.    Diet    Resume your normal diet as tolerated.  A diet low in cholesterol, fat, and sodium is recommended for good health.     POST ANGIOGRAM  General Care Instructions  Maintain a bandage over the incision site for 24 hours.  It's normal to find a small bruise or dime-sized lump at the insertion site. This should disappear within a few weeks.  Do not apply lotions or powders to the site.  Do not immerse the catheter insertion site in water (bathtub/swimming) for five days. It is ok to shower 24 hours after the procedure.  You may resume your normal diet immediately; on the day of your procedure, drink 6-10 glasses of water to help  flush the contrast liquid out of your system.  If the doctor inserted the catheter in through your groin:  Walking short distances on a flat surface is OK. Limit going up/down stairs for the first 2 days.  DO NOT do yard work, drive, squat, lift heavy objects, or play sports for 2 days; or until your health care provider tells you it is OK.    When to call your healthcare provider   The insertion site has increasing pain, swelling, redness, bleeding, or drainage.   Your arm or leg below where the insertion site changes color, is cool, or is numb.   You have chest pain or shortness of breath that does not go away with rest.   Your pulse feels irregular -- very slow (less than 60 beats/minute) or very fast (over 100 beats/minute).   You have dizziness, fainting, or you are very tired.   You are coughing up blood or yellow or green mucus.   You have chills or a fever over 101°F (38.3°C).    If there is bleeding at the catheter insertion site, apply pressure for 10 minutes.  If bleeding persists, call 911, and continue to hold pressure until advanced medical support arrives.

## 2023-08-16 NOTE — OR SURGEON
Immediate Post- Operative Note        Findings: Multifocal HCC.      Procedure(s): R lobe mapping and MAA shunt study.      Estimated Blood Loss: Less than 5 ml        Complications: None            8/16/2023     1210 PM     Joe Norris M.D.

## 2023-08-17 ENCOUNTER — HOSPITAL ENCOUNTER (OUTPATIENT)
Dept: RADIATION ONCOLOGY | Facility: MEDICAL CENTER | Age: 73
End: 2023-08-17

## 2023-08-17 PROCEDURE — 77290 THER RAD SIMULAJ FIELD CPLX: CPT | Performed by: RADIOLOGY

## 2023-08-17 NOTE — RADIATION COMPLETION NOTES
DATE OF SERVICE: 8/17/2023    DIAGNOSIS:  Liver cancer    DATE OF SERVICE: 8/17/2023    TYPE OF SIMULATION: Liver y90 mapping    GOAL OF TREATMENT:   [x] Curative  [] Palliative  [] Oligometastatic    CONTRAST:    [] IV Contrast*  [] Oral Contrast                POSITION:    [x]  Supine  [] Prone     COMPLEX:  [] Complex Blocking   []Arcs  [] Custom Blocks  [] >3 Sites    PROCEDURE: Patient positioned on CT table. CT acquired thorough the entire volume of interest.  Images reviewed and exported to treatment planning system.    I have personally reviewed the relevant data, performed the target localization, and determined all relevant factors for this patient’s simulation.

## 2023-08-18 DIAGNOSIS — E11.9 TYPE 2 DIABETES MELLITUS WITHOUT COMPLICATION, WITHOUT LONG-TERM CURRENT USE OF INSULIN (HCC): ICD-10-CM

## 2023-08-21 RX ORDER — METFORMIN HYDROCHLORIDE 500 MG/1
1000 TABLET, EXTENDED RELEASE ORAL 2 TIMES DAILY
Qty: 360 TABLET | Refills: 3 | Status: SHIPPED | OUTPATIENT
Start: 2023-08-21 | End: 2023-08-30 | Stop reason: SDUPTHER

## 2023-08-22 ENCOUNTER — APPOINTMENT (OUTPATIENT)
Dept: RADIOLOGY | Facility: MEDICAL CENTER | Age: 73
End: 2023-08-22
Attending: RADIOLOGY
Payer: MEDICARE

## 2023-08-22 ENCOUNTER — HOSPITAL ENCOUNTER (OUTPATIENT)
Facility: MEDICAL CENTER | Age: 73
End: 2023-08-22
Attending: RADIOLOGY | Admitting: RADIOLOGY
Payer: MEDICARE

## 2023-08-22 VITALS
OXYGEN SATURATION: 99 % | HEART RATE: 51 BPM | RESPIRATION RATE: 16 BRPM | SYSTOLIC BLOOD PRESSURE: 126 MMHG | HEIGHT: 68 IN | TEMPERATURE: 97.1 F | DIASTOLIC BLOOD PRESSURE: 70 MMHG | BODY MASS INDEX: 31.04 KG/M2 | WEIGHT: 204.81 LBS

## 2023-08-22 DIAGNOSIS — C22.0 HEPATOCELLULAR CARCINOMA (HCC): ICD-10-CM

## 2023-08-22 PROCEDURE — 99153 MOD SED SAME PHYS/QHP EA: CPT

## 2023-08-22 PROCEDURE — 700117 HCHG RX CONTRAST REV CODE 255: Performed by: RADIOLOGY

## 2023-08-22 PROCEDURE — 160002 HCHG RECOVERY MINUTES (STAT)

## 2023-08-22 PROCEDURE — 700111 HCHG RX REV CODE 636 W/ 250 OVERRIDE (IP): Mod: JZ

## 2023-08-22 PROCEDURE — 700111 HCHG RX REV CODE 636 W/ 250 OVERRIDE (IP): Mod: JZ | Performed by: RADIOLOGY

## 2023-08-22 PROCEDURE — 160035 HCHG PACU - 1ST 60 MINS PHASE I

## 2023-08-22 PROCEDURE — 160046 HCHG PACU - 1ST 60 MINS PHASE II

## 2023-08-22 PROCEDURE — 78803 RP LOCLZJ TUM SPECT 1 AREA: CPT | Mod: XU

## 2023-08-22 RX ORDER — ONDANSETRON 2 MG/ML
4 INJECTION INTRAMUSCULAR; INTRAVENOUS PRN
Status: ACTIVE | OUTPATIENT
Start: 2023-08-22 | End: 2023-08-22

## 2023-08-22 RX ORDER — ONDANSETRON 4 MG/1
4 TABLET, FILM COATED ORAL EVERY 4 HOURS PRN
Qty: 20 TABLET | Refills: 0 | Status: SHIPPED | OUTPATIENT
Start: 2023-08-22 | End: 2023-08-27

## 2023-08-22 RX ORDER — SODIUM CHLORIDE 9 MG/ML
500 INJECTION, SOLUTION INTRAVENOUS
Status: ACTIVE | OUTPATIENT
Start: 2023-08-22 | End: 2023-08-22

## 2023-08-22 RX ORDER — OXYCODONE HYDROCHLORIDE 5 MG/1
5 TABLET ORAL EVERY 4 HOURS PRN
Qty: 30 TABLET | Refills: 0 | Status: SHIPPED | OUTPATIENT
Start: 2023-08-22 | End: 2023-08-27

## 2023-08-22 RX ORDER — ACETAMINOPHEN 500 MG
1000 TABLET ORAL EVERY 6 HOURS PRN
COMMUNITY

## 2023-08-22 RX ORDER — OXYCODONE HYDROCHLORIDE 5 MG/1
10 TABLET ORAL
Status: DISCONTINUED | OUTPATIENT
Start: 2023-08-22 | End: 2023-08-22 | Stop reason: HOSPADM

## 2023-08-22 RX ORDER — SODIUM CHLORIDE 9 MG/ML
1000 INJECTION, SOLUTION INTRAVENOUS
Status: DISCONTINUED | OUTPATIENT
Start: 2023-08-22 | End: 2023-08-22 | Stop reason: HOSPADM

## 2023-08-22 RX ORDER — MIDAZOLAM HYDROCHLORIDE 1 MG/ML
INJECTION INTRAMUSCULAR; INTRAVENOUS
Status: COMPLETED
Start: 2023-08-22 | End: 2023-08-22

## 2023-08-22 RX ORDER — MIDAZOLAM HYDROCHLORIDE 1 MG/ML
.5-2 INJECTION INTRAMUSCULAR; INTRAVENOUS PRN
Status: ACTIVE | OUTPATIENT
Start: 2023-08-22 | End: 2023-08-22

## 2023-08-22 RX ORDER — ONDANSETRON 2 MG/ML
8 INJECTION INTRAMUSCULAR; INTRAVENOUS
Status: COMPLETED | OUTPATIENT
Start: 2023-08-22 | End: 2023-08-22

## 2023-08-22 RX ORDER — OXYCODONE HYDROCHLORIDE 5 MG/1
5 TABLET ORAL
Status: DISCONTINUED | OUTPATIENT
Start: 2023-08-22 | End: 2023-08-22 | Stop reason: HOSPADM

## 2023-08-22 RX ORDER — MORPHINE SULFATE 4 MG/ML
4 INJECTION INTRAVENOUS
Status: DISCONTINUED | OUTPATIENT
Start: 2023-08-22 | End: 2023-08-22 | Stop reason: HOSPADM

## 2023-08-22 RX ADMIN — FENTANYL CITRATE 25 MCG: 50 INJECTION, SOLUTION INTRAMUSCULAR; INTRAVENOUS at 10:41

## 2023-08-22 RX ADMIN — ONDANSETRON 8 MG: 2 INJECTION INTRAMUSCULAR; INTRAVENOUS at 09:11

## 2023-08-22 RX ADMIN — IOHEXOL 55 ML: 300 INJECTION, SOLUTION INTRAVENOUS at 11:30

## 2023-08-22 RX ADMIN — MIDAZOLAM 0.5 MG: 1 INJECTION, SOLUTION INTRAMUSCULAR; INTRAVENOUS at 10:41

## 2023-08-22 RX ADMIN — MIDAZOLAM 0.5 MG: 1 INJECTION, SOLUTION INTRAMUSCULAR; INTRAVENOUS at 11:15

## 2023-08-22 RX ADMIN — MIDAZOLAM 0.5 MG: 1 INJECTION, SOLUTION INTRAMUSCULAR; INTRAVENOUS at 10:56

## 2023-08-22 ASSESSMENT — FIBROSIS 4 INDEX: FIB4 SCORE: 6.73

## 2023-08-22 NOTE — OR SURGEON
Immediate Post- Operative Note        Findings: Multifocal HCC.       Procedure(s): L hepatic lobe Y90.      Estimated Blood Loss: Less than 5 ml        Complications: None            8/22/2023     1134 AM     Joe Norris M.D.

## 2023-08-22 NOTE — Clinical Note
Closure device placed in the right femoral artery. Right femoral artery  [employer] Starclose SE vascular closure system (6Fr)  REF: [id number]  LOT: [id number]  EXP: 2025-01-01

## 2023-08-22 NOTE — DISCHARGE INSTRUCTIONS
HOME CARE INSTRUCTIONS    ACTIVITY: Rest and take it easy for the first 24 hours.  A responsible adult is recommended to remain with you during that time.  It is normal to feel sleepy.  We encourage you to not do anything that requires balance, judgment or coordination.    FOR 24 HOURS DO NOT:  Drive, operate machinery or run household appliances.  Drink beer or alcoholic beverages.  Make important decisions or sign legal documents.    SPECIAL INSTRUCTIONS:   Hepatic Artery Radioembolization, Care After  The following information offers guidance on how to care for yourself after your procedure. Your health care provider may also give you more specific instructions. If you have problems or questions, contact your health care provider.  What can I expect after the procedure?  After the procedure, it is possible to have:  A slight fever for 7 to 10 days. This may be accompanied by pain, nausea, or vomiting, which is referred to as post-embolization syndrome. You may be given medicine to help relieve these symptoms. If your fever gets worse, tell your health care provider.  Tiredness (fatigue).  Loss of appetite. This should gradually improve after about 1 week.  Abdominal pain on your right side.  Soreness and tenderness in your groin area where the needle and catheter were placed (puncture site).  Follow these instructions at home:    Puncture site care    Follow instructions from your health care provider about how to take care of the puncture site. Make sure you:  Wash your hands with soap and water for at least 20 seconds before and after you change your bandage (dressing). If soap and water are not available, use hand .  Change your dressing as told by your health care provider.  Check your puncture site every day for signs of infection. Check for:  More redness, swelling, or pain.  Fluid or blood.  Warmth.  Pus or a bad smell.  Activity  Rest as told by your health care provider.  Return to your normal  activities as told by your health care provider. Ask your health care provider what activities are safe for you.  Avoid sitting for a long time without moving. Get up to take short walks every 1-2 hours. This is important to improve blood flow and breathing. Ask for help if you feel weak or unsteady.  If you were given a sedative during the procedure, it can affect you for several hours. Do not drive or operate machinery until your health care provider says that it is safe.  Do not lift anything that is heavier than 10 lb (4.5 kg), or the limit that you are told, until your health care provider says that it is safe.  Medicines  Take over-the-counter and prescription medicines only as told by your health care provider.  Ask your health care provider if the medicine prescribed to you:  Requires you to avoid driving or using machinery.  Can cause constipation. You may need to take these actions to prevent or treat constipation:  Drink enough fluid to keep your urine pale yellow.  Take over-the-counter or prescription medicines.  Eat foods that are high in fiber, such as beans, whole grains, and fresh fruits and vegetables.  Limit foods that are high in fat and processed sugars, such as fried or sweet foods.  Radiation precautions  For up to a week after your procedure, there will be a small amount of radioactivity near your liver. This is not especially dangerous to other people. However, as told by your health care provider, you should follow these precautions for 7 days:  Do not come in close contact with people.  Do not sleep in the same bed as someone else.  Do not hold children or babies.  Do not have contact with pregnant women.  General instructions  Eat frequent, small meals until your appetite returns. Follow instructions from your health care provider about eating or drinking restrictions.  Do not take baths, swim, or use a hot tub until your health care provider approves. You may take showers. Wash your  puncture site with mild soap and water, and pat the area dry.  Wear compression stockings as told by your health care provider. These stockings help to prevent blood clots and reduce swelling in your legs.  Keep all follow-up visits. This is important. You may need to have blood tests and imaging tests.  Contact a health care provider if:  You have any of these signs of infection:  More redness, swelling, or pain around your puncture site.  Fluid or blood coming from your puncture site.  Warmth coming from your puncture site.  Pus or a bad smell coming from your puncture site.  You have pain that:  Gets worse.  Does not get better with medicine.  Feels like very bad heartburn.  Is in the middle of your abdomen, above your belly button.  You have any signs of infection or liver failure, such as:  Your skin or the white parts of your eyes turn yellow (jaundice).  The color of your urine changes to dark brown.  The color of your stool (feces) changes to light yellow.  Your abdominal measurement (girth) increases in a short period of time.  You gain more than 5 lb (2.3 kg) in a short period of time.  Get help right away if:  You have a fever that lasts more than 10 days or is higher than what your health care provider told you to expect.  You develop any of the following in your legs:  Pain.  Swelling.  Skin that is cold or pale or turns blue.  You have chest pain.  You have blood in your vomit, saliva, or stool.  You have trouble breathing.  These symptoms may represent a serious problem that is an emergency. Do not wait to see if the symptoms will go away. Get medical help right away. Call your local emergency services (911 in the [place] Do not drive yourself to the hospital.  Summary  After the procedure, it is possible to have a slight fever for up to 7-10 days, tiredness, loss of appetite, abdominal pain on the right side, and groin tenderness where the catheter was placed.  Do not come in close contact with people  for up to a week after your procedure, as told by your health care provider.  Follow instructions from your health care provider about how to take care of the puncture site.  Contact a health care provider if you have any signs of infection.  Get help right away if you develop pain or swelling in your legs or if your legs feel cool or look pale.  This information is not intended to replace advice given to you by your health care provider. Make sure you discuss any questions you have with your health care provider.        POST ANGIOGRAM  General Care Instructions  Maintain a bandage over the incision site for 24 hours.  It's normal to find a small bruise or dime-sized lump at the insertion site. This should disappear within a few weeks.  Do not apply lotions or powders to the site.  Do not immerse the catheter insertion site in water (bathtub/swimming) for five days. It is ok to shower 24 hours after the procedure.  You may resume your normal diet immediately; on the day of your procedure, drink 6-10 glasses of water to help flush the contrast liquid out of your system.  If the doctor inserted the catheter in through your groin:  Walking short distances on a flat surface is OK. Limit going up/down stairs for the first 2 days.  DO NOT do yard work, drive, squat, lift heavy objects, or play sports for 2 days; or until your health care provider tells you it is OK.    DIET: To avoid nausea, slowly advance diet as tolerated, avoiding spicy or greasy foods for the first day.  Add more substantial food to your diet according to your physician's instructions.  INCREASE FLUIDS AND FIBER TO AVOID CONSTIPATION.    MEDICATIONS: Resume taking daily medication.  Take prescribed pain medication with food.  If no medication is prescribed, you may take non-aspirin pain medication if needed.  PAIN MEDICATION CAN BE VERY CONSTIPATING.  Take a stool softener or laxative such as senokot, pericolace, or milk of magnesia if  needed.    Prescription given for oxycodone.     A follow-up appointment should be arranged with your doctor ; call to schedule.    You should CALL YOUR PHYSICIAN if you develop:  Fever greater than 101 degrees F.  Pain not relieved by medication, or persistent nausea or vomiting.  Excessive bleeding (blood soaking through dressing) or unexpected drainage from the wound.  Extreme redness or swelling around the incision site, drainage of pus or foul smelling drainage.  Inability to urinate or empty your bladder within 8 hours.  Problems with breathing or chest pain.    You should call 911 if you develop problems with breathing or chest pain.  If you are unable to contact your doctor or surgical center, you should go to the nearest emergency room or urgent care center.  Physician's telephone #: [phone]    MILD FLU-LIKE SYMPTOMS ARE NORMAL.  YOU MAY EXPERIENCE GENERALIZED MUSCLE ACHES, THROAT IRRITATION, HEADACHE AND/OR SOME NAUSEA.    If any questions arise, call your doctor.  If your doctor is not available, please feel free to call the Surgical Center at [phone].  The Center is open Monday through Friday from 7AM to 7PM.      A registered nurse may call you a few days after your surgery to see how you are doing after your procedure.    You may also receive a survey in the mail within the next two weeks and we ask that you take a few moments to complete the survey and return it to us.  Our goal is to provide you with very good care and we value your comments.     Depression / Suicide Risk    As you are discharged from this [facility] facility, it is important to learn how to keep safe from harming yourself.    Recognize the warning signs:  Abrupt changes in personality, positive or negative- including increase in energy   Giving away possessions  Change in eating patterns- significant weight changes-  positive or negative  Change in sleeping patterns- unable to sleep or sleeping all the  time   Unwillingness or inability to communicate  Depression  Unusual sadness, discouragement and loneliness  Talk of wanting to die  Neglect of personal appearance   Rebelliousness- reckless behavior  Withdrawal from people/activities they love  Confusion- inability to concentrate     If you or a loved one observes any of these behaviors or has concerns about self-harm, here's what you can do:  Talk about it- your feelings and reasons for harming yourself  Remove any means that you might use to hurt yourself (examples: pills, rope, extension cords, firearm)  Get professional help from the community (Mental Health, Substance Abuse, psychological counseling)  Do not be alone:Call your Safe Contact- someone whom you trust who will be there for you.  Call your local CRISIS HOTLINE 986-2100 or 487-312-4594  Call your local Children's Mobile Crisis Response Team [place] (665) 067-6284 or www.[employer]  Call the toll free National Suicide Prevention Hotlines   National Suicide Prevention Lifeline 761-709-YLJF (6722)  [place] [facility] 800-SUICIDE (137-3549)    I acknowledge receipt and understanding of these Home Care instructions.

## 2023-08-22 NOTE — OR NURSING
Pt's VSS; denies N/V; states pain is at tolerable level. Dressing CDI to R groin. D/c orders received. IV dc'd. Pt changed into clothing with assistance. Pt up and ambulated to BR, steady gait, voided adequately. Discharge instructions given as well as IY90 precautions; pt and family verbalized understanding and questions answered. Patient states ready to d/c home. No prescriptions given. Pt dc'd in w/c with wife in stable condition.

## 2023-08-22 NOTE — PROGRESS NOTES
Pt presents to IR-2.   Pt was consented by MD in preop, confirmed by this RN.   Pt transferred to IR table in supine position.   Patient underwent a Yttrium 90 Transarterial radioembolization of liver by .  Pt placed on monitor, prepped and draped in a sterile fashion.  Procedure site was marked by MD and verified using imaging guidance.   Vitals were taken every 5 minutes and remained stable during procedure (see doc flow sheet for results). CO2 waveform capnography was monitored and remained WNL throughout procedure.   B-pedal pulses palp, (+)2  Pt to [facility] med  Bedside handoff report given to Imer BLACKBURN. Pt transported on monitor by stretcher with RN to T-PACU 17B.       Right femoral artery  [employer] Star[employer] SE vascular closure system (6Fr)  Deployed @ 1125  REF: [id number]  LOT: [id number]  EXP: 2025-01-01

## 2023-08-23 PROCEDURE — 77295 3-D RADIOTHERAPY PLAN: CPT | Mod: 26 | Performed by: RADIOLOGY

## 2023-08-23 PROCEDURE — 77295 3-D RADIOTHERAPY PLAN: CPT | Performed by: RADIOLOGY

## 2023-08-23 PROCEDURE — 77370 RADIATION PHYSICS CONSULT: CPT | Performed by: RADIOLOGY

## 2023-08-24 ENCOUNTER — TELEPHONE (OUTPATIENT)
Dept: RADIATION ONCOLOGY | Facility: MEDICAL CENTER | Age: 73
End: 2023-08-24
Payer: MEDICARE

## 2023-08-30 DIAGNOSIS — E11.9 TYPE 2 DIABETES MELLITUS WITHOUT COMPLICATION, WITHOUT LONG-TERM CURRENT USE OF INSULIN (HCC): ICD-10-CM

## 2023-08-30 RX ORDER — METFORMIN HYDROCHLORIDE 500 MG/1
1000 TABLET, EXTENDED RELEASE ORAL 2 TIMES DAILY
Qty: 360 TABLET | Refills: 3 | Status: SHIPPED | OUTPATIENT
Start: 2023-08-30 | End: 2023-10-18 | Stop reason: SDUPTHER

## 2023-08-31 DIAGNOSIS — C22.0 LIVER CARCINOMA (HCC): ICD-10-CM

## 2023-09-05 ENCOUNTER — HOSPITAL ENCOUNTER (OUTPATIENT)
Dept: LAB | Facility: MEDICAL CENTER | Age: 73
End: 2023-09-05
Attending: INTERNAL MEDICINE
Payer: MEDICARE

## 2023-09-05 LAB
ALBUMIN SERPL BCP-MCNC: 4.6 G/DL (ref 3.2–4.9)
ALBUMIN/GLOB SERPL: 1.7 G/DL
ALP SERPL-CCNC: 133 U/L (ref 30–99)
ALT SERPL-CCNC: 32 U/L (ref 2–50)
ANION GAP SERPL CALC-SCNC: 12 MMOL/L (ref 7–16)
AST SERPL-CCNC: 45 U/L (ref 12–45)
BASOPHILS # BLD AUTO: 1.1 % (ref 0–1.8)
BASOPHILS # BLD: 0.06 K/UL (ref 0–0.12)
BILIRUB SERPL-MCNC: 1 MG/DL (ref 0.1–1.5)
BUN SERPL-MCNC: 21 MG/DL (ref 8–22)
CALCIUM ALBUM COR SERPL-MCNC: 10 MG/DL (ref 8.5–10.5)
CALCIUM SERPL-MCNC: 10.5 MG/DL (ref 8.5–10.5)
CHLORIDE SERPL-SCNC: 108 MMOL/L (ref 96–112)
CO2 SERPL-SCNC: 25 MMOL/L (ref 20–33)
CREAT SERPL-MCNC: 1 MG/DL (ref 0.5–1.4)
EOSINOPHIL # BLD AUTO: 0.14 K/UL (ref 0–0.51)
EOSINOPHIL NFR BLD: 2.7 % (ref 0–6.9)
ERYTHROCYTE [DISTWIDTH] IN BLOOD BY AUTOMATED COUNT: 57.1 FL (ref 35.9–50)
GFR SERPLBLD CREATININE-BSD FMLA CKD-EPI: 80 ML/MIN/1.73 M 2
GLOBULIN SER CALC-MCNC: 2.7 G/DL (ref 1.9–3.5)
GLUCOSE SERPL-MCNC: 175 MG/DL (ref 65–99)
HCT VFR BLD AUTO: 44.7 % (ref 42–52)
HGB BLD-MCNC: 14.8 G/DL (ref 14–18)
IMM GRANULOCYTES # BLD AUTO: 0.02 K/UL (ref 0–0.11)
IMM GRANULOCYTES NFR BLD AUTO: 0.4 % (ref 0–0.9)
LYMPHOCYTES # BLD AUTO: 1.3 K/UL (ref 1–4.8)
LYMPHOCYTES NFR BLD: 24.9 % (ref 22–41)
MCH RBC QN AUTO: 30.1 PG (ref 27–33)
MCHC RBC AUTO-ENTMCNC: 33.1 G/DL (ref 32.3–36.5)
MCV RBC AUTO: 91 FL (ref 81.4–97.8)
MONOCYTES # BLD AUTO: 0.5 K/UL (ref 0–0.85)
MONOCYTES NFR BLD AUTO: 9.6 % (ref 0–13.4)
NEUTROPHILS # BLD AUTO: 3.2 K/UL (ref 1.82–7.42)
NEUTROPHILS NFR BLD: 61.3 % (ref 44–72)
NRBC # BLD AUTO: 0 K/UL
NRBC BLD-RTO: 0 /100 WBC (ref 0–0.2)
PLATELET # BLD AUTO: 122 K/UL (ref 164–446)
PLATELET BLD QL SMEAR: NORMAL
PMV BLD AUTO: 9.8 FL (ref 9–12.9)
POTASSIUM SERPL-SCNC: 5.6 MMOL/L (ref 3.6–5.5)
PROT SERPL-MCNC: 7.3 G/DL (ref 6–8.2)
RBC # BLD AUTO: 4.91 M/UL (ref 4.7–6.1)
SODIUM SERPL-SCNC: 145 MMOL/L (ref 135–145)
T4 FREE SERPL-MCNC: 1.39 NG/DL (ref 0.93–1.7)
TSH SERPL DL<=0.005 MIU/L-ACNC: 2.2 UIU/ML (ref 0.38–5.33)
WBC # BLD AUTO: 5.2 K/UL (ref 4.8–10.8)

## 2023-09-05 PROCEDURE — 84439 ASSAY OF FREE THYROXINE: CPT

## 2023-09-05 PROCEDURE — 80053 COMPREHEN METABOLIC PANEL: CPT

## 2023-09-05 PROCEDURE — 84443 ASSAY THYROID STIM HORMONE: CPT

## 2023-09-05 PROCEDURE — 36415 COLL VENOUS BLD VENIPUNCTURE: CPT

## 2023-09-05 PROCEDURE — 85025 COMPLETE CBC W/AUTO DIFF WBC: CPT

## 2023-09-13 ENCOUNTER — HOSPITAL ENCOUNTER (OUTPATIENT)
Facility: MEDICAL CENTER | Age: 73
End: 2023-09-13
Attending: FAMILY MEDICINE
Payer: MEDICARE

## 2023-09-13 ENCOUNTER — OFFICE VISIT (OUTPATIENT)
Dept: URGENT CARE | Facility: PHYSICIAN GROUP | Age: 73
End: 2023-09-13
Payer: MEDICARE

## 2023-09-13 ENCOUNTER — HOSPITAL ENCOUNTER (OUTPATIENT)
Dept: RADIOLOGY | Facility: MEDICAL CENTER | Age: 73
End: 2023-09-13
Attending: FAMILY MEDICINE
Payer: MEDICARE

## 2023-09-13 ENCOUNTER — APPOINTMENT (OUTPATIENT)
Dept: RADIOLOGY | Facility: IMAGING CENTER | Age: 73
End: 2023-09-13
Attending: FAMILY MEDICINE
Payer: MEDICARE

## 2023-09-13 VITALS
TEMPERATURE: 97.7 F | SYSTOLIC BLOOD PRESSURE: 122 MMHG | HEART RATE: 61 BPM | OXYGEN SATURATION: 96 % | DIASTOLIC BLOOD PRESSURE: 64 MMHG | WEIGHT: 200 LBS | BODY MASS INDEX: 31.39 KG/M2 | HEIGHT: 67 IN | RESPIRATION RATE: 20 BRPM

## 2023-09-13 DIAGNOSIS — R31.9 HEMATURIA, UNSPECIFIED TYPE: ICD-10-CM

## 2023-09-13 DIAGNOSIS — R10.9 LEFT FLANK PAIN: ICD-10-CM

## 2023-09-13 DIAGNOSIS — C22.0 LIVER CELL CARCINOMA (HCC): ICD-10-CM

## 2023-09-13 DIAGNOSIS — I70.90 ARTERIOLOSCLEROSES: ICD-10-CM

## 2023-09-13 LAB
APPEARANCE UR: CLEAR
BILIRUB UR STRIP-MCNC: NEGATIVE MG/DL
COLOR UR AUTO: YELLOW
GLUCOSE UR STRIP.AUTO-MCNC: NEGATIVE MG/DL
KETONES UR STRIP.AUTO-MCNC: NEGATIVE MG/DL
LEUKOCYTE ESTERASE UR QL STRIP.AUTO: NEGATIVE
NITRITE UR QL STRIP.AUTO: NEGATIVE
PH UR STRIP.AUTO: 5.5 [PH] (ref 5–8)
PROT UR QL STRIP: NEGATIVE MG/DL
RBC UR QL AUTO: NORMAL
SP GR UR STRIP.AUTO: 1.03
UROBILINOGEN UR STRIP-MCNC: 0.2 MG/DL

## 2023-09-13 PROCEDURE — 3078F DIAST BP <80 MM HG: CPT | Performed by: FAMILY MEDICINE

## 2023-09-13 PROCEDURE — 99215 OFFICE O/P EST HI 40 MIN: CPT | Performed by: FAMILY MEDICINE

## 2023-09-13 PROCEDURE — 74176 CT ABD & PELVIS W/O CONTRAST: CPT

## 2023-09-13 PROCEDURE — 3074F SYST BP LT 130 MM HG: CPT | Performed by: FAMILY MEDICINE

## 2023-09-13 PROCEDURE — 87086 URINE CULTURE/COLONY COUNT: CPT

## 2023-09-13 PROCEDURE — 81002 URINALYSIS NONAUTO W/O SCOPE: CPT | Performed by: FAMILY MEDICINE

## 2023-09-13 ASSESSMENT — FIBROSIS 4 INDEX: FIB4 SCORE: 4.69

## 2023-09-13 NOTE — PROGRESS NOTES
"Chief Complaint   Patient presents with    Back Pain     Lower back pain,possible kidney stone,x3 weeks             Flank Pain      C/o left flank, lower back pain.   Pain is intermittent.         This is a new problem. The current episode started 3 wks ago     Denies hematuria    He does have hx kidney stones.     . The onset quality is sudden. The problem occurs constantly. The pain is unchanged. The pain is located in the left flank region. The pain is moderate. The quality of the pain is described as aching. The pain does not radiate.   Pertinent negatives include no belching, constipation, diarrhea, dysuria, fever, hematochezia, hematuria, nausea or sore throat. Nothing relieves the symptoms. Past treatments include nothing.           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 Stopped both medications because blood pressure readings were low, /82 today and has been low at home since stopping medications about 5 days ago. Last reading at home 117/72     Indigestion     Liver cancer (HCC)     Liver tumor     Plantar fasciitis of right foot 8/12/2019    Rosacea     Snoring     no sleep study         Social History     Tobacco Use    Smoking status: Never    Smokeless tobacco: Former     Types: Snuff     Quit date: 3/29/2015   Vaping Use    Vaping Use: Never used   Substance Use Topics    Alcohol use: Not Currently     Alcohol/week: 7.2 oz     Types: 12 Shots of liquor per week    Drug use: Yes     Types: Marijuana, Inhaled, Oral     Comment: \"Marijuana Use\" daily  " "          Family History   Problem Relation Age of Onset    Cancer Father 81        Bladder    Cancer Brother         Prostate & CLL (s/p 8-10 years ago)    Hyperlipidemia Sister     Lung Disease Neg Hx     Diabetes Neg Hx     Heart Disease Neg Hx     Hypertension Neg Hx     Stroke Neg Hx     Alcohol/Drug Neg Hx          Review of Systems   Constitutional: Negative for fever, chills and malaise/fatigue.   Eyes: Negative for vision changes, d/c.    Respiratory: Negative for cough and sputum production.    Cardiovascular: Negative for chest pain and palpitations.   Gastrointestinal: Negative for nausea, vomiting,  diarrhea and constipation.   Genitourinary: + for dysuria    Skin: Negative for rash or  itching.   Neurological: Negative for dizziness and tingling.   Psychiatric/Behavioral: Negative for depression.   Hematologic/lymphatic - denies bruising or excessive bleeding  All other systems reviewed and are negative.         Objective:     /64 (BP Location: Left arm, Patient Position: Sitting, BP Cuff Size: Adult)   Pulse 61   Temp 36.5 °C (97.7 °F) (Temporal)   Resp 20   Ht 1.702 m (5' 7\")   Wt 90.7 kg (200 lb)   SpO2 96%         Physical Exam   Constitutional: pt appears well-developed. No distress.   HENT:   Nose: No nasal discharge.   Mouth/Throat: Mucous membranes are moist. Oropharynx is clear.   Eyes: Conjunctivae and EOM are normal. Pupils are equal, round, and reactive to light. Right eye exhibits no discharge. Left eye exhibits no discharge.   Neck: Neck supple.   Cardiovascular: Normal rate, regular rhythm, S1 normal and S2 normal.    Pulmonary/Chest: Effort normal and breath sounds normal. There is normal air entry. No respiratory distress.   Abdominal: Soft. There is tenderness in the left flank area. bowel sounds are present.   No liver or spleen enlargement .  No rebound and no guarding.   There is no pain over McBurney's point  Lymphadenopathy:     Pt has no  adenopathy.   Neurological: " pt is alert and orientated x3 . No cranial nerve deficit.   Skin: Skin is warm and moist. No petechiae and no rash noted.   not diaphoretic. No jaundice.   Nursing note and vitals reviewed.    Details    Reading Physician Reading Date Result Priority   Joe Norris M.D.  [phone] 9/13/2023 Stat-Call Report     Narrative & Impression     9/13/2023 5:35 PM     HISTORY/REASON FOR EXAM:  HEMATURIA.        TECHNIQUE/EXAM DESCRIPTION AND NUMBER OF VIEWS:  CT scan renal/colic without contrast.     5 mm helical images of the abdomen and pelvis were obtained from the diaphragmatic domes through the pubic symphysis.     Low dose optimization technique was utilized for this CT exam including automated exposure control and adjustment of the mA and/or kV according to patient size.     COMPARISON: 4/24/2023 CT abdomen and pelvis.     FINDINGS:  Please note that noncontrast CT is significantly limited in sensitivity for detecting solid organ abnormalities.     The lung bases are clear. There are coronary artery calcifications.     There is diffuse nodularity of the liver surface contour consistent with cirrhosis. Patient's known small hepatic masses are not visualized. The liver is enlarged measuring 18.7 cm.     Multiple calcified gallstones are seen. The unenhanced pancreas is unremarkable. The spleen is enlarged measuring 17.2 cm. There is a stable left adrenal nodule.     There is no evidence of nephroureterolithiasis or obstructive uropathy.     There is no free air or fluid. The large and small bowel demonstrate normal course and caliber.     There are atherosclerotic calcifications of the aorta and its branch vessels.     There is no free pelvic fluid. Brachytherapy seeds are present in the prostate. The urinary bladder appears unremarkable. There is no pelvic, retroperitoneal or mesenteric adenopathy.     IMPRESSION:     1.  No evidence of nephroureterolithiasis or obstructive uropathy.  2.  Cirrhosis.  3.   Hepatosplenomegaly.  4.  Cholelithiasis.  5.  Atherosclerosis with coronary artery disease.           Exam Ended: 09/13/23  5:36 PM Last Resulted: 09/13/23  6:03 PM                   Assessment/Plan:       1. Left flank pain   Exact etiology unclear, but at least kidney stone ruled out.    Pain is possibly muscular.      - diclofenac sodium (VOLTAREN) 1 % Gel; Apply 4 g topically in the morning, at noon, and at bedtime.  Dispense: 50 g; Refill: 0    Follow up in one week if no improvement, sooner if symptoms worsen.       2. Hematuria, unspecified type  Unclear ethology    CT personally reviewed - no kidney stones.        - URINE CULTURE(NEW); Future  - Referral to Urology    3. Liver cell carcinoma (HCC)   F/u oncology   Liver appears cirrhotic on CT     4. Arteriolosclerosis  Found incidentally on CT  F/u PCP      My total time spent caring for the patient on the day of the encounter was 40 minutes.   This does not include time spent on separately billable procedures/tests.

## 2023-09-14 DIAGNOSIS — R31.9 HEMATURIA, UNSPECIFIED TYPE: ICD-10-CM

## 2023-09-16 LAB
BACTERIA UR CULT: NORMAL
SIGNIFICANT IND 70042: NORMAL
SITE SITE: NORMAL
SOURCE SOURCE: NORMAL

## 2023-10-02 ENCOUNTER — HOSPITAL ENCOUNTER (OUTPATIENT)
Dept: RADIOLOGY | Facility: MEDICAL CENTER | Age: 73
End: 2023-10-02
Attending: NURSE PRACTITIONER
Payer: MEDICARE

## 2023-10-02 DIAGNOSIS — C22.0 LIVER CARCINOMA (HCC): ICD-10-CM

## 2023-10-02 PROCEDURE — A9579 GAD-BASE MR CONTRAST NOS,1ML: HCPCS | Performed by: NURSE PRACTITIONER

## 2023-10-02 PROCEDURE — 74183 MRI ABD W/O CNTR FLWD CNTR: CPT

## 2023-10-02 PROCEDURE — 700117 HCHG RX CONTRAST REV CODE 255: Performed by: NURSE PRACTITIONER

## 2023-10-02 RX ADMIN — GADOTERIDOL 20 ML: 279.3 INJECTION, SOLUTION INTRAVENOUS at 15:21

## 2023-10-09 ENCOUNTER — DOCUMENTATION (OUTPATIENT)
Dept: HEALTH INFORMATION MANAGEMENT | Facility: OTHER | Age: 73
End: 2023-10-09
Payer: MEDICARE

## 2023-10-12 ASSESSMENT — ENCOUNTER SYMPTOMS
CHILLS: 0
DIAPHORESIS: 0
WEIGHT LOSS: 0
FEVER: 0
CONSTITUTIONAL NEGATIVE: 1

## 2023-10-12 ASSESSMENT — LIFESTYLE VARIABLES: SUBSTANCE_ABUSE: 0

## 2023-10-12 NOTE — PROGRESS NOTES
"  Telephone Appointment Visit   This telephone visit was initiated by the patient and they verbally consented.    Reason for Call:   imaging follow up    HPI:    Sohail Duvall was referred by Alfonzo Servin MD. He is a 72 y.o. male seen in clinic for evaluation and possible intervention of unresectable hepatocellular carcinoma. He was initially referred to our service by Feliberto Senior MD. He does not have a PCP or current GI doctor.    History of Present Illness:   was last evaluated in our practice in April 2021. He has a history of CLL and alcoholic cirrhosis. Imaging revealed hepatomas in 4A and 4B and he underwent the following interventions at [facility]:  6/28/18 ablation 4B lesion (Nadeen)  8/28/19 Hepatic angiogram \"mapping\" (Myrna)  9/7/18 Y90 SIRT left lobe 0.7 gb ( 19 mCi)  (Myrna)  12/14/18 YUNIER TACE caudate lesion (Myrna)  1/6/21 YUNIER TACE (Myrna)  8/16/23 hepatic angiogram \"mapping\"/ lung shunt study 0.94% ([facility], Myrna  8/22/23 Y90 TARE seg 4A/4B, 160 Gy, Myrna Puckett    We subsequently referred him to radiation oncology and medical oncology after poor treatment effect with liver directed therapy in 2021.     Interval history 8/1/23:  Mr. Duvall has been on systemic therapy with Atezolizumab and bevacizumab. In April 2023 he developed blood in his stool (black stools), heartburn, dysphagia. It is similar to his prior symptoms in 2018. He presented to ED and had an EGD. He was noted to have esophagitis but no active bleeding. He was also noted to have hypertensive gastropathy. Avastin was subsequently discontinued. His GI specialist has moved and he does not currently have a GI doctor. He now has disease progression in the liver and returns for consideration of catheter directed therapy. Today, he denies acute complaints. He denies any significant changes in his health since his last IR procedures. He tolerated his prior therapies well. He lives in " [place] and is retired from [employer]. He is accompanied by a support person to today's consultation.    Interval history 10/13/23: Mr. Duvall underwent uncomplicated Y90 TARE to the seg 4A/4B hepatoma as well as the dome lesion. He tolerated the procedure well. He had an episode of left flank pain and microscopic hematuria and had a CT on 9/13/23 that was unremarkable. He is seen today to review post procedure imaging and lab results. No updated tumor markers are available. Our initial plan was to selectively target 4A/4B and treat the remaining right liver disease with lobar Y90 TARE, however we obtained imaging to evaluate treatment effect prior to proceeding. He has been off systemic therapy for his IR procedures. Today, he feels well with no complaints.     Past Medical History:   Diagnosis Date    Anemia     Arthritis 08/04/2020    Thumbs    Cancer (HCC) 2004    Prostate - did brachytherapy at [facility] (HCC) 2018    Liver    Cirrhosis (HCC)     CLL (chronic lymphocytic leukemia) (HCC) 2014    CMV (cytomegalovirus infection) (HCC) 1990    Treated for 6 weeks    Diabetes (HCC)     oral medication    Heart burn     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 Stopped both medications because blood pressure readings were low, /82 today and has been low at home since stopping medications about 5 days ago. Last reading at home 117/72     Indigestion     Liver cancer (HCC)     Liver tumor     Plantar fasciitis of right foot 8/12/2019    Rosacea     Snoring     no sleep study     Past Surgical History:   Procedure Laterality Date    NV UPPER GI ENDOSCOPY,DIAGNOSIS N/A 4/25/2023    Procedure: GASTROSCOPY;  Surgeon: Mir Rodriguez M.D.;  Location: SURGERY SAME DAY [facility];  Service: Gastroenterology    WOUND EXPLORATION ORTHO Right 8/5/2020    Procedure: EXPLORATION, WOUND- THUMB;  Surgeon: Yumiko Griffiths M.D.;  Location: SURGERY  "SAME DAY [facility];  Service: Orthopedics    TENDON REPAIR  8/5/2020    Procedure: REPAIR, TENDON- EXTENSOR;  Surgeon: Yumiko Griffiths M.D.;  Location: SURGERY SAME DAY [facility];  Service: Orthopedics    HEPATIC ABLATION LAPAROSCOPIC  6/28/2018    Procedure: HEPATIC ABLATION LAPAROSCOPIC. SEGMENT 4B, HEPATOMA, REPAIR OF INCARCERATED UMBILICAL HERNIA;  Surgeon: Feliberto Burgos M.D.;  Location: SURGERY [facility];  Service: General    BRACHY THERAPY       Social History     Socioeconomic History    Marital status:      Spouse name: Not on file    Number of children: 1    Years of education: Not on file    Highest education level: Not on file   Occupational History    Occupation: fertilizer sales   Tobacco Use    Smoking status: Never    Smokeless tobacco: Former     Types: Snuff     Quit date: 3/29/2015   Vaping Use    Vaping Use: Never used   Substance and Sexual Activity    Alcohol use: Not Currently     Alcohol/week: 7.2 oz     Types: 12 Shots of liquor per week    Drug use: Yes     Types: Marijuana, Inhaled, Oral     Comment: \"Marijuana Use\" daily     Sexual activity: Never   Other Topics Concern    Not on file   Social History Narrative    No known exposure to asbestos, dyes, or chemicals, however he was exposed to pesticides.  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  1 child, alive and well.  He also has a step-child.      Social Determinants of Health     Financial Resource Strain: Not on file   Food Insecurity: Not on file   Transportation Needs: Not on file   Physical Activity: Not on file   Stress: Not on file   Social Connections: Not on file   Intimate Partner Violence: Not on file   Housing Stability: Not on file     Family History   Problem Relation Age of Onset    Cancer Father 81        Bladder    Cancer Brother         Prostate & CLL (s/p 8-10 years ago)    Hyperlipidemia Sister     Lung Disease Neg " Hx     Diabetes Neg Hx     Heart Disease Neg Hx     Hypertension Neg Hx     Stroke Neg Hx     Alcohol/Drug Neg Hx        Review of Systems   Constitutional: Negative.  Negative for chills, diaphoresis, fever, malaise/fatigue and weight loss.   Skin: Negative.    Psychiatric/Behavioral:  Negative for substance abuse.      A comprehensive 14-point review of systems was negative except as described above.       Labs / Images Reviewed:   Labs:    Latest Reference Range & Units 07/25/23 13:40 08/15/23 11:33 09/05/23 10:46   WBC 4.8 - 10.8 K/uL 4.8 4.4 (L) 5.2   RBC 4.70 - 6.10 M/uL 4.64 (L) 4.75 4.91   Hemoglobin 14.0 - 18.0 g/dL 13.4 (L) 14.0 14.8   Hematocrit 42.0 - 52.0 % 41.3 (L) 44.5 44.7   MCV 81.4 - 97.8 fL 89.0 93.7 91.0   MCH 27.0 - 33.0 pg 28.9 29.5 30.1   MCHC 32.3 - 36.5 g/dL 32.4 31.5 (L) 33.1   RDW 35.9 - 50.0 fL 54.1 (H) 62.0 (H) 57.1 (H)   Platelet Count 164 - 446 K/uL 100 (L) 86 (L) 122 (L)   MPV 9.0 - 12.9 fL 10.1 10.5 9.8   Neutrophils-Polys 44.00 - 72.00 % 52.70 52.50 61.30   Neutrophils (Absolute) 1.82 - 7.42 K/uL 2.51 2.29 3.20   Lymphocytes 22.00 - 41.00 % 32.30 33.30 24.90   Lymphs (Absolute) 1.00 - 4.80 K/uL 1.54 1.45 1.30   Monocytes 0.00 - 13.40 % 11.10 10.30 9.60   Monos (Absolute) 0.00 - 0.85 K/uL 0.53 0.45 0.50   Eosinophils 0.00 - 6.90 % 2.70 3.00 2.70   Eos (Absolute) 0.00 - 0.51 K/uL 0.13 0.13 0.14   Basophils 0.00 - 1.80 % 1.00 0.90 1.10   Baso (Absolute) 0.00 - 0.12 K/uL 0.05 0.04 0.06   Immature Granulocytes 0.00 - 0.90 % 0.20 0.00 0.40   Immature Granulocytes (abs) 0.00 - 0.11 K/uL 0.01 0.00 0.02   Nucleated RBC 0.00 - 0.20 /100 WBC 0.00 0.00 0.00   NRBC (Absolute) K/uL 0.00 0.00 0.00   Plt Estimation    Decreased   Imm. Plt Fraction 0.6 - 13.1 % 4.8 4.8    Sodium 135 - 145 mmol/L 134 (L) 143 145   Potassium 3.6 - 5.5 mmol/L 4.4 4.9 5.6 (H)   Chloride 96 - 112 mmol/L 102 105 108   Co2 20 - 33 mmol/L 21 22 25   Anion Gap 7.0 - 16.0  11.0 16.0 12.0   Glucose 65 - 99 mg/dL 156 (H) 127  (H) 175 (H)   Bun 8 - 22 mg/dL 20 17 21   Creatinine 0.50 - 1.40 mg/dL 1.00 0.89 1.00   GFR (CKD-EPI) >60 mL/min/1.73 m 2 80 91 80   Calcium 8.5 - 10.5 mg/dL 9.6 9.9 10.5   Correct Calcium 8.5 - 10.5 mg/dL 9.2 9.5 10.0   AST(SGOT) 12 - 45 U/L 36 44 45   ALT(SGPT) 2 - 50 U/L 27 30 32   Alkaline Phosphatase 30 - 99 U/L 85 100 (H) 133 (H)   Total Bilirubin 0.1 - 1.5 mg/dL 1.1 1.2 1.0   Albumin 3.2 - 4.9 g/dL 4.5 4.5 4.6   Total Protein 6.0 - 8.2 g/dL 6.8 6.9 7.3   Globulin 1.9 - 3.5 g/dL 2.3 2.4 2.7   A-G Ratio g/dL 2.0 1.9 1.7   (L): Data is abnormally low  (H): Data is abnormally high   Latest Reference Range & Units 08/16/23 09:00   INR 0.87 - 1.13  1.16 (H)   PT 12.0 - 14.6 sec 14.6   (H): Data is abnormally high   Latest Reference Range & Units 10/18/21 10:25 02/18/22 14:33 06/02/22 11:11 09/12/22 11:10 07/06/23 12:30   Jordon-gamma-carboxy Prothrombin 0.0 - 7.4 ng/mL 1.6 2.1 3.7 2.7 2.1        Latest Reference Range & Units 09/12/22 11:10 05/04/23 09:45 07/06/23 12:30   AFP L3% 0.0 - 9.9 % 6.0  8.5   Alpha Fetoprotein 0 - 15 ng/mL 7  6   Ferritin 22.0 - 322.0 ng/mL  65.9      Child Galloway Class A  JIM Grade 1  NLR 2.46    Pathology:  None available for review    Radiology:   MRI abdomen 10/2/23 at [facility]:  1. Positive partial response to treatment. There are several lesions which are stable in size, but now do not demonstrate enhancement. However, there are several small foci of arterial enhancement within both hepatic lobes, which are suspicious for   residual multifocal HCC. As detailed above.  2. Cholelithiasis, choledocholithiasis, severe cirrhosis, splenomegaly, small pancreatic tail cyst, left adrenal nodule, small simple left renal cyst are again noted.        LR-5: definitely HCC    CT renal colic 9/13/23 at [facility]:  1.  No evidence of nephroureterolithiasis or obstructive uropathy.  2.  Cirrhosis.  3.  Hepatosplenomegaly.  4.  Cholelithiasis.  5.  Atherosclerosis with coronary artery disease.    Post Y90  BHAVANA null, [place] [facility]:        Interventional radiology procedure 8/22/23 at [facility] (Y90 seg 4A/B), Myrna SOSA:  Impression:  1. Successful radioembolization of segments 4A/4B.  2. Patient will return to interventional radiology clinic in 4-6 weeks for follow-up four-phase liver MRI.    Nuclear medicine Bremsstrahlung study 8/22/23 at [facility]:  The prescribed dose was 150 gray. Delivered dose after residual was measured at 160. This represents  106.8% of the prescribed dose and  98.2% of the drawn dose. Bremsstrahlung images obtained after the Y-90 radioembolization, confirm proper delivery to   the targeted region. There is no uptake outside the planned treatment area. NOTE: The patient will be followed by interventional radiology and oncology.    Lung shunt calculations (CAROLIN Report):      Interventional radiology procedure 8/16/23 at [facility] (pre Y90 mapping):  1. Y90 mapping hepatic arteriogram.  2. Dense tumor vascularity arising from the middle hepatic artery distribution.  3. 4.3 mCi technetium 99 MAA for nuclear medicine lung shunt study.    Nuclear medicine lung shunt study 8/16/23 at [facility]:  FINDINGS:  Total hepatopulmonary shunt percentage was 3.8%. No evidence of extrahepatic radiotracer deposition is identified.     IMPRESSION:     Quantitative lung scan as described.    MRI abdomen 7/13/23 at [facility]:  1.  There are morphologic changes of cirrhosis with splenomegaly and collateral vessels consistent with portal hypertension.  2.  Interval disease progression with at least 2 and possibly 3 arterial phase enhancing lesions with washout and restricted diffusion the largest in the medial segment left lobe which is slightly increased in size consistent with multifocal HCC. LI-RADS   5:  3.  Several other subcentimeter arterial phase enhancing areas are indeterminate for HCC as these could be areas of altered arterial perfusion. LR 3.  4.  Additional right and left lobe hepatic lesions only visible on  delayed imaging possibly treated HCC.  5.  There is cholelithiasis.  6.  Stable pancreatic side branch IPMN's.  7.  Stable left adrenal myelolipoma.      LR-5: definitely HCC    USD liver 4/25/23 at [facility]:  Unremarkable liver Doppler sonogram.    USD RUQ 4/25/23 at [facility]:  1.  There is cholelithiasis no sonographic evidence of acute cholecystitis  2.  Heterogeneous liver with known hepatic masses better visualized on CT performed earlier on the same day.    CT AP 4/24/23 at [facility]:  1.  No acute inflammatory process in the abdomen or pelvis.  2.  Small amount of stool in the colon.  3.  Cirrhosis and portal hypertension, including splenomegaly. No ascites.  4.  Three out of five hepatic lesions are visualized on today's CT entire stable to slightly smaller in size compared to prior MRI. No new hepatic lesions.  5.  No new metastatic disease in the abdomen or pelvis.  6.  Cholelithiasis.  7.  Colonic diverticulosis.    MRI abdomen 1/6/23 at [facility]:  1.  Cirrhosis and portal hypertension, including splenomegaly and a few portosystemic collaterals.  2.  Only one 1.9 cm HCC in hepatic segment 4 shows arterial enhancement and washout on today's study. LI-RADS 5. This HCC is stable in size since prior CT.  3.  Four additional lesions in both right and left hepatic lobes are all hypointense on the delayed phase but show no arterial enhancement. They may represent treated HCCs. Absence of arterial enhancement suggests that they contain no viable tumor.  4.  No new arterially enhancing liver lesions.  5.  Cholelithiasis.  6.  Stable pancreatic side branch IPMNs. They do not require further follow-up.  7.  Stable left adrenal myelolipoma.    CT abdomen 10/3/22 at [facility]:  1.  Ablation cavity segment 4 of the liver is again identified.  2.  Other arterial phase enhancing liver lesions seen on the prior CT are poorly visualized on the current exam which is performed without arterial phase enhancement. These lesions are likely  present on the current exam but are difficult to visualize on the portal venous phase. Largest superiorly in the right lobe appears to be present with some washout as expected on these portal venous phase images. This lesion is similar in size to the prior exam.  3.  Nodular liver surface consistent with cirrhosis.  4.  Splenomegaly and enlarged portal vein.  5.  Recanalized umbilical vein.  6.  Cholelithiasis.    CT chest 6/3/22 at [facility]:  1.  No metastatic disease in the chest.  2.  A 4.1 x 4 cm ascending aortic aneurysm.  3.  Abdomen is reported separately.    CT liver mass protocol 6/3/22 at [facility]:  1.  More than 8 bilobar hepatic lesions. Three dominant lesions all measure greater than 2 cm and demonstrate classic arterial hyperenhancement and washout, consistent with HCCs.  2.  The vast majority of the smaller arterially hyperenhancing lesions have increased in size, and some of these lesions are new. While they do not definitively demonstrate washout, they are highly concerning for hepatomas as well. LR-4 and LR-5.  3.  Treated lesion in the caudate lobe shows no viable tumor.  4.  Cirrhosis and splenomegaly. No ascites.  5.  Cholelithiasis.  6.  Colonic diverticulosis.  7.  Stable left adrenal adenoma.    MRI abdomen 5/10/22 at [facility]:  1.  There is severe respiratory motion artifact on the current exam which was not present on the prior exam. Images better most severely affected are the arterial phase and portal venous phase images. Therefore direct comparison of these lesions which   were best defined on the arterial phase images on the prior exam is difficult on the current exam. Most of the measurements on the current exam required measurement of the mass is on the delayed images or portal venous phase images.  2.  The new lesion seen at the anterior edge of segment 5 on the prior MRI does appear to have enlarged significantly on the current exam. Current measurement is 1.9 x 1.8 cm based on the  washout portion of the mass. Arterial phase measurement on the prior exam was 1.2 x 1.3 cm.  3.  Other masses throughout the liver appears similar to the prior exam.  4.  Small masses measuring less than 1 cm on the arterial phase images described in the prior exam are poorly visualized on the current exam due to the severe respiratory motion artifact. However, these lesions are probably unchanged.  5.  Splenomegaly is again noted. Spleen measures 16.8 cm.  6.  No change in left adrenal mass likely representing adenoma.  7.  Portal veins and hepatic veins are within normal limits.  8.  No ascites or adenopathy identified.      LR-5: definitely HCC    MRI abdomen 12/8/21 at [facility]:  1. New 1.3 cm HCC in segment 5.  2. HCC's described on the prior MRI study are either stable or show minimal subthreshold growth. They measure up to 2.1 cm.  3. Approximately 5 additional subcentimeter arterially enhancing lesions in the right and left hepatic lobes, without definitive correlates on any other phases. Some are more conspicuous, and some may be new since prior study. LR-3.  4. Previously treated lesions in the caudate lobe and left hepatic lobe show no viable tumor. LR-treated.  5. Cirrhosis and splenomegaly.  6. Cholelithiasis.  7. Benign left adrenal adenoma. It does not require follow-up.  8. Colonic diverticulosis.    Nuc Med bone study 9/20/21 at [facility]:  No scintigraphic evidence of active osseous metastatic disease.    CT chest 8/12/21 at [facility]:  1.  5 mm nodule along the right minor fissure, new from the 2018 exam. Follow-up per oncology protocol.  2.  Morphologic characteristics of cirrhosis with arterially enhancing foci in the partially visualized liver, consistent with known multifocal hepatocellular carcinoma  3.  Cholelithiasis  4.  Left adrenal adenoma, previously characterized.  5.  Splenomegaly     Fleischner Society pulmonary nodule recommendations:  Not Applicable    MRI abdomen 6/9/21 at [facility]:  1.   Morphologic characteristics of cirrhosis with evidence of portal hypertension including splenomegaly, trace ascites, and varices.  2.  Observation in the medial left hepatic lobe demonstrates interval enlargement, subthreshold.LR-5  3.  Observation bordering segments 2 and 3 is stable. LR-5  4.  Observation in segment 2 is stable. LR-3  5.  Observation in the hepatic dome has increased in size from the prior exam, subthreshold growth. LR-5  6.  Caudate lobe lesion remains avascular status post TACE. LR-treated nonviable  7.  Ablation cavity in the left hepatic lobe remains avascular. LR-treated nonviable  8.  Benign left adrenal adenoma.  9.  Cholelithiasis.     CT abdomen 3/19/21 at [facility]:  Enhancing lesion at the hepatic dome anteriorly is likely not significantly changed, worrisome for hepatocellular carcinoma.     Enhancing lesion within segment 4 of the liver is also likely not significantly changed, also worrisome for hepatocellular carcinoma.     Small 6 mm enhancing focus within the segment 4 of the liver is not significantly changed.     Enhancing 6 mm focus within the left lobe of the liver is stable.     6.5 mm enhancing focus near the intrahepatic IVC was not definitively seen previously.     Posttreatment changes are again noted in the caudate lobe and in the anterior right lobe of the liver.     Findings of cirrhosis and portal hypertension.     Left adrenal adenoma.     Colonic diverticulosis.     Cholelithiasis     Atherosclerotic plaque.     Interventional Radiology procedure 1/6/21 at [facility]:  1.  DEBTACE procedure with total dose doxorubicin 150 mg administered as 100 mu Oncozene beads.  2.  The patient is returned to the same day surgery reese for post procedure observation.     MRI abdomen 11/18/2020 at [facility]:  1. Previously treated lesions show no viable tumor. LR-treated.  2. Corresponding to the lesion detected on the ultrasound, there is an 8 mm lesion at the junction of anterior and medial  hepatic segments. It is new since prior MRI, and meets criteria for HCC (LR-5).  3. A 1.6 cm segment 4 lesion seen on the prior MR study is stable in size. It shows mild arterial enhancement and mild washout, and likely represents an HCC which is stable since prior study. LR-5.  4. No additional lesions are detected on today's study.  5. Cirrhosis and portal hypertension. No ascites.  6. Cholelithiasis.  7. Stable 1 cm cystic lesion in the pancreatic tail, likely a side branch IPMN.  8. Colonic diverticulosis.         MRI abdomen 3/11/20 at [facility]:   1.  There are morphologic changes of the liver consistent with cirrhosis.  2.  There are stable posttreatment changes in the caudate lobe and superior medial segment left lobe and segment 2. LR treated  3.  Stable 10 mm focus of arterial enhancement without washout or restricted diffusion, likely in segment 8. LR 3.  4.  There is a 9 mm arterial enhancing focus inferiorly in segment 8 or possibly 4a without washout or diffusion. LR 3  5.  There is a questionable 6 mm arterial focus of enhancement at the junction of segment 8/5 without washout or restricted diffusion. LR 3  6.  Stable splenomegaly, possibly related to cirrhosis versus underlying CLL.  7.  Stable cholelithiasis without biliary dilatation.  8.  No change in 9 mm pancreatic tail simple appearing cyst.  9.  No change in left adrenal gland adenoma.        LR-3: intermediate probability and LR treated lesions     MRI abdomen on July 1, 2019 at [facility]:  1.  Stable wedge-shaped treated lesion in the anterior medial hepatic segment.  2.  Stable treated lesion in the caudate lobe, with unchanged linear enhancement in its periphery.  3.  Segment 2 lesion described on the prior study is not seen on today's study. No focal arterial enhancement in segment 2.  4.  New 1 cm arterially enhancing focus in segment 8 has no correlate on other images. It is indeterminate. LR-3.  5.  Cirrhosis and portal hypertension, with  postembolization changes in the left hepatic lobe. Splenomegaly. No ascites.  6.  Cholelithiasis.  7.  Stable left adrenal lesion, likely adenoma.  8.  Stable pancreatic tail cyst.    MRI abdomen on January 29, 2019 at [facility]:  1.  No new arterial enhancing mass is identified.  2.  Previously described mass in the caudate lobe is now avascular with peripheral enhancement, consistent with recent chemoembolization. LR-treated  3.  Ablation cavity in the left hepatic lobe remains avascular.  4.  8 mm arterial enhancing observation in segment 4A is not as pronounced as on the prior exam and grossly stable. LR-3  5.  Stable peripheral enhancing lesion in segment 2. LR-treated  6.  Previously described rim-enhancing observation in the superior aspect of segment 2 is not visualized on the current exam, likely related to differences in technique.  7.  Morphologic characteristics of cirrhosis and evidence of portal hypertension including splenomegaly  8.  Cholelithiasis.  9.  Left adrenal adenoma  10.  Stable 8 mm pancreatic tail cyst         Interventional radiology procedure December 14, 2018, at [facility]:  1.  DEBTACE procedure with total dose doxorubicin 100 mg administered as 100 mu Oncozene beads. (Caudate lobe)  2.  The patient is returned to the same day surgery reese for post procedure observation.       MRI abdomen November 10, 2018 at [facility]:  1.  Morphologic characteristics of cirrhosis with evidence of portal hypertension including trace ascites, splenomegaly, and portal venous dilatation.  2.  Interval enlargement in the mass in segment 4 adjacent to the caudate lobe. LR-5  3.  Previously described hyper enhancing masses in the left hepatic lobe demonstrate rim enhancement, consistent with recent Y 90 treatment. LR-treated.  4.  Ablation cavity in the left hepatic lobe is a vascular  5.  8 mm arterial enhancing focus in segment 4A is better defined than on the prior exam secondary to decreased motion. Size is not  significantly changed.LR-3  6.  Stable 9 mm cystic mass in the pancreatic tail.  7.  Stable benign left adrenal adenoma  8.  Cholelithiasis. Dependent hypointense foci in the common bile duct may represent tiny nonobstructing calculi.       Interventional radiology procedure Y90 SIRT left lobe September 7, 2018, at [facility]:  1.  Technically successful Y90 radioembolization of the left hepatic lobe. The patient will followup in approximately 10 days for laboratory and clinical evaluation and 4-6 weeks for imaging evaluation.  2.  Immediately following the procedure, the patient was transported to nuclear medicine for SPECT imaging which demonstrates Bremsstrahlung emission from the left of the liver. There is no definite evidence of extrahepatic deposition of radioembolic   Material.     Nuclear medicine bremsstrahlung study on September 7, 2018 at [facility]:  The prescribed dose was  0.68 gb ( 18.4 mCi). The drawn dose was 0.78 gb (21mCi). Delivered dose after residual was measured at 0.7 gb ( 19 mCi). This represents  103% of the prescribed dose and  90% of the drawn dose. Bremsstrahlung images obtained   after the Y-90 radioembolization, confirm proper delivery to the targeted region. There is no uptake outside the planned treatment area. NOTE: The patient will be followed by interventional radiology and oncology.       Interventional radiology procedure August 8, 2018 at [facility]:  1.  Superior mesenteric arteriogram replaced common hepatic artery.  2.  Celiac arteriogram demonstrate no evidence of common hepatic arterial anatomy.  3.  Common hepatic arteriogram demonstrate no evidence of variant hepatic arterial anatomy.  4.  Selective catheterization and embolization of a small branch originating from the proximal aspect of the right hepatic artery which appeared to supply either a small subsegmental region of the right hepatic lobe or a small amount of the hepatic   flexure of the colon.  5.  Proper hepatic  arteriogram demonstrating 2 small branches originating from the proximal aspect of the right hepatic artery one of which appear to represent either a small subsegmental hepatic artery versus hepatic flexure colon branch and a small   cystic artery.  6.  Intra-arterial administration of 4.4 mCi technetium 99m labeled MAA into the hepatic artery demonstrated a hepatic pulmonary shunt fraction of 4%.        CT abdomen with and without August 8, 2018 at [facility]:  1.  Cirrhosis  2.  Interval ablation of a segment 4A hepatic nodule without evidence of residual tumor in that field  3.  Multiple additional hepatic nodules and masses as described above, mostly unchanged in size however the dominant central RIGHT hepatic mass has increased in size. These are likely hepatomas.  4.  Splenomegaly and enlarged portal vein, in keeping with the patient's known portal hypertension  5.  Cholelithiasis     Nuclear medicine quantitative lung study August 28, 2018 at [facility]:  Total hepatopulmonary shunt percentage was 4%. No evidence of extrahepatic radiotracer deposition is identified.     MRI abdomen on June 8, 2018 at [facility]:  Examination is limited by patient motion.  2.1 x 1.9 cm arterial hyperenhancing lesion within the left lobe of the liver appears compatible with hepatocellular carcinoma. LR-5    Hyperenhancing lesion within segment 4 of the liver bordering the caudate lobe measures 3 x 2 cm and is increased in size compared to prior. This lesion is compatible with hepatocellular carcinoma. LR-5    10 mm hyperenhancing lesion within segment 4 may be slightly decreased or unchanged in size compared to prior. LR-3    3 other small hyperenhancing lesions are seen within the right lobe of the liver measuring up to 1 cm. LR-3    Heterogeneous wedge-shaped area of delayed hypo-enhancement in the anterior liver is likely related to variable perfusion.    Findings of cirrhosis and portal hypertension.    Mildly prominent lymph nodes in  "the cardiophrenic fat are not significantly changed.    9 mm cystic lesion in the pancreatic tail could represent a small side branch IPMN.    Cholelithiasis. No biliary ductal dilatation is seen.    Subcarinal lymphadenopathy is partially imaged.    2 cm left adrenal nodule is unchanged and likely an adrenal adenoma.    Trace free fluid in the abdomen.    Findings discussed with Dr. Senior         CT thorax April 17, 2018 at [facility]:  1.  Mediastinal lymphadenopathy with markedly enlarged subcarinal lymph node could be due to metastatic disease or lymphoma. Reactive lymph nodes or lymphadenopathy due to granulomatous disease seems less likely.  2.  Coarse coronary artery calcifications.  3.  Cirrhosis with hypervascular hepatic lesions and splenomegaly.  4.  Cholelithiasis.     Nuclear medicine bone study April 17, 2018 at [facility]:  No scintigraphic evidence of bony metastatic disease     Portal pressure gradient April 4, 2018 at [facility]:  1.  Hepatic venography showing a patent right hepatic vein.  2.  Free and wedged right hepatic vein wedge pressures rendering a mean Hepatic Venous Pressure Gradient (HVPG) of 9.67 mmHg. This is consistent with portal hypertension.  (HVPG >= 10mmHg considered \"clinically significant\" portal HTN**  3.  Transjugular Liver biopsy was not performed at this time..  **Reference: Hepatic Venous Pressure Gradient in 2010:Optimal Measurement is Jose. Anya AK, Wanda U. HEPATOLOGY, Vol. 51, No. 6, 2010     CT abdomen December 6, 2017 at [facility]:  1.  2.4 cm arterial enhancing mass in segment 4 of the liver demonstrates washout. LR-5.  2.  1.3 cm arterial enhancing lesion in the hepatic dome is similar to the recent MRI. No definite washout or capsule appreciated. LR-3  3.  Morphologic characteristics of cirrhosis with evidence of portal hypertension including splenomegaly.  4.  Cholelithiasis.  5.  Stable left adrenal nodule, likely a benign adenoma.    Current Outpatient " Medications   Medication Sig Dispense Refill    diclofenac sodium (VOLTAREN) 1 % Gel Apply 4 g topically in the morning, at noon, and at bedtime. 50 g 0    metFORMIN ER (GLUCOPHAGE XR) 500 MG TABLET SR 24 HR Take 2 Tablets by mouth 2 times a day. 360 Tablet 3    acetaminophen (TYLENOL) 500 MG Tab Take 1,000 mg by mouth every 6 hours as needed for Moderate Pain.      cetirizine (ZYRTEC) 10 MG Tab Take 10 mg by mouth every day.      propranolol (INDERAL) 10 MG Tab Take 10 mg by mouth every day at 6 PM.      ondansetron (ZOFRAN) 4 MG Tab tablet Take 4 mg by mouth every four hours as needed for Nausea/Vomiting.      fluticasone (FLONASE) 50 MCG/ACT nasal spray Administer 1 Spray into affected nostril(S) 2 times a day.      therapeutic multivitamin-minerals (THERAGRAN-M) Tab Take 1 Tablet by mouth every day.      spironolactone (ALDACTONE) 50 MG Tab Take 50 mg by mouth every day.      furosemide (LASIX) 20 MG Tab Take 20 mg by mouth every day.      Vitamin D, Cholecalciferol, 25 MCG (1000 UT) Cap Take 1,000 Units by mouth every day.       No current facility-administered medications for this visit.       No Known Allergies    Physical Exam  Neurological:      Mental Status: He is alert and oriented to person, place, and time.   Psychiatric:         Mood and Affect: Mood normal.         Cognition and Memory: Memory normal.         Judgment: Judgment normal.     ECOG Performance Status 0    Assessment and Plan:   Impression:   1. Unresectable multifocal hepatocellular carcinoma status post multiple interventions.  2. 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  3. Cirrhosis.  4. Portal hypertension.  5. Thrombocytopenia.  6. Splenomegaly.  7. Chronic lymphocytic leukemia.   8. Diabetes mellitus.  9. Hyperbilirubinemia.  10. History of alcohol use.    Plan:   Joe Norris MD has reviewed 's history and imaging studies, examined the patient, and discussed treatment options.   has recovered from his Y90 TARE in which the 4A/4B and dome lesions were treated. Initial imaging shows good treatment effect. There are new small HCCs noted in both lobes. We are not able to address the left lobe tumors with Y90 as he has had this lobar therapy in the past. The right liver tumors are small and at this point we recommend close surveillance, as y90 TARE carries the risk for radiation induced liver failure and the disease burden is low. His systemic options are limited due to his recent bleeding episode, however he can resume systemic therapy at the discretion of his oncologist during the surveillance interval. Future treatment depends on multiple factors including lab studies, imaging, and performance status. The patient was instructed to continue to follow up with all other physicians on the care team. We will obtain a MRI in 3 months with labs repeated at that time unless otherwise determined by the patient's other treating physicians.    Follow-up: MRI and laboratory studies in 3 months. Follow up with medical oncologist for systemic therapy options in the interim.    Total Call Time Spent (mins): 5    GINA Grewal with Joe Norris MD  Interventional Radiology   [facility]   [id number]  [place] [address]  [phone]

## 2023-10-13 ENCOUNTER — HOSPITAL ENCOUNTER (OUTPATIENT)
Dept: RADIOLOGY | Facility: MEDICAL CENTER | Age: 73
End: 2023-10-13
Attending: NURSE PRACTITIONER
Payer: MEDICARE

## 2023-10-13 DIAGNOSIS — C22.0 LIVER CARCINOMA (HCC): ICD-10-CM

## 2023-10-18 ENCOUNTER — OFFICE VISIT (OUTPATIENT)
Dept: MEDICAL GROUP | Facility: PHYSICIAN GROUP | Age: 73
End: 2023-10-18
Payer: MEDICARE

## 2023-10-18 VITALS
HEIGHT: 67 IN | BODY MASS INDEX: 32.8 KG/M2 | TEMPERATURE: 98.6 F | DIASTOLIC BLOOD PRESSURE: 68 MMHG | SYSTOLIC BLOOD PRESSURE: 122 MMHG | HEART RATE: 64 BPM | RESPIRATION RATE: 16 BRPM | OXYGEN SATURATION: 96 % | WEIGHT: 209 LBS

## 2023-10-18 DIAGNOSIS — E66.9 OBESITY (BMI 30-39.9): ICD-10-CM

## 2023-10-18 DIAGNOSIS — F10.21 ALCOHOLISM IN REMISSION (HCC): ICD-10-CM

## 2023-10-18 DIAGNOSIS — E11.9 TYPE 2 DIABETES MELLITUS WITHOUT COMPLICATION, WITHOUT LONG-TERM CURRENT USE OF INSULIN (HCC): ICD-10-CM

## 2023-10-18 DIAGNOSIS — G62.9 NEUROPATHY: ICD-10-CM

## 2023-10-18 DIAGNOSIS — Z12.12 SCREENING FOR COLORECTAL CANCER: ICD-10-CM

## 2023-10-18 DIAGNOSIS — Z91.81 RISK FOR FALLS: ICD-10-CM

## 2023-10-18 DIAGNOSIS — Z76.89 ENCOUNTER TO ESTABLISH CARE: ICD-10-CM

## 2023-10-18 DIAGNOSIS — J30.2 SEASONAL ALLERGIES: ICD-10-CM

## 2023-10-18 DIAGNOSIS — K70.30 ALCOHOLIC CIRRHOSIS OF LIVER WITHOUT ASCITES (HCC): ICD-10-CM

## 2023-10-18 DIAGNOSIS — Z12.11 SCREENING FOR COLORECTAL CANCER: ICD-10-CM

## 2023-10-18 PROBLEM — I95.9 HYPOTENSION: Status: RESOLVED | Noted: 2022-05-19 | Resolved: 2023-10-18

## 2023-10-18 PROCEDURE — 3074F SYST BP LT 130 MM HG: CPT

## 2023-10-18 PROCEDURE — 3078F DIAST BP <80 MM HG: CPT

## 2023-10-18 PROCEDURE — 99214 OFFICE O/P EST MOD 30 MIN: CPT

## 2023-10-18 RX ORDER — METFORMIN HYDROCHLORIDE 500 MG/1
1000 TABLET, EXTENDED RELEASE ORAL 2 TIMES DAILY
Qty: 400 TABLET | Refills: 3 | Status: SHIPPED | OUTPATIENT
Start: 2023-10-18

## 2023-10-18 ASSESSMENT — ENCOUNTER SYMPTOMS
NAUSEA: 1
ABDOMINAL PAIN: 0
VOMITING: 1

## 2023-10-18 ASSESSMENT — FIBROSIS 4 INDEX: FIB4 SCORE: 4.69

## 2023-10-18 NOTE — PROGRESS NOTES
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    Patient presents today to establish care with me.  Prior PCP Daniel Falcon    Requesting refill on metformin.     HPI:   Sohail presents today with    Problem   Risk for Falls   Neuropathy    Denies pain     Alcoholic Cirrhosis of Liver Without Ascites (Hcc)   Seasonal Allergies    Eye and nasal/sinus symptoms. Managed with flonase bid.     Type 2 Diabetes Mellitus Without Complication, Without Long-Term Current Use of Insulin (Hcc)    This is a stable chronic condition.  Current medications:  Insulin:   Biguanide: Metformin ER 1000 mg twice daily.  GLP1-RA:    SGLT-2i:      DPP4-I:   TZD:   Jamil:  Sulfonyluria:     Last A1c: 5.5% 5/5/23  Last Microalb/Cr ratio: 5/5/23  Fasting sugars:  Last diabetic foot exam: 10/18/23  Last retinal eye exam: 3/6/23  ACEi/ARB?   Statin?   Aspirin?   Concomitant HTN? Propranolol 10 mg daily; spironolactone 50 mg daily; furosemide 20 mg daily  Nightly foot checks?           Alcoholism in remission (HCC)    3-5-2014: .1, SGOT 98, SGPT 78.     Hypotension (Resolved)    Patient has known hypertension and has been on losartan 50 mg long-term.  He notes recent episode about 10 days ago where blood pressure dropped extremely low and he fainted.  He reports that blood pressure on home cuff was 45/30, and slowly increased.  Additionally, he notes that he has had these episodes on a yearly basis for maybe 6 to 7 years or blood pressure goes extremely well and he passes out.  He notes he has been seen in the emergency department for this issue with negative work-ups.  Blood pressure is pretty low in clinic today  "as well at 104/62.  At this time I am going to discontinue his losartan.  Recommend goal blood pressure of less than 140/90.  He can continue home monitoring of blood pressure and let me know if it begins to go upwards again.  Additionally, I am concerned about possible underlying serious cardiac issue such as arrhythmia with blood pressure dropping as low as what he reports.  At this time, given patient's comorbidities of liver cancer he does not desire further cardiac work-up but would be possibly open to it if it happened again.     Tobacco Dependence in Remission (Resolved)    No longer uses snuff         ROS:  Review of Systems   Gastrointestinal:  Positive for nausea and vomiting. Negative for abdominal pain.   All other systems reviewed and are negative.      Objective:     Exam:  /68 (BP Location: Left arm, Patient Position: Sitting, BP Cuff Size: Small adult)   Pulse 64   Temp 37 °C (98.6 °F) (Temporal)   Resp 16   Ht 1.702 m (5' 7\")   Wt 94.8 kg (209 lb)   SpO2 96%   BMI 32.73 kg/m²  Body mass index is 32.73 kg/m².    Physical Exam  Vitals reviewed.   Constitutional:       General: He is not in acute distress.     Appearance: Normal appearance. He is not ill-appearing.   HENT:      Head: Normocephalic and atraumatic.      Right Ear: Tympanic membrane, ear canal and external ear normal.      Left Ear: Tympanic membrane, ear canal and external ear normal.   Cardiovascular:      Rate and Rhythm: Normal rate and regular rhythm.      Pulses: Normal pulses.           Dorsalis pedis pulses are 2+ on the right side and 2+ on the left side.      Heart sounds: Normal heart sounds.   Pulmonary:      Effort: Pulmonary effort is normal. No respiratory distress.      Breath sounds: Normal breath sounds.   Abdominal:      General: Bowel sounds are normal.   Feet:      Right foot:      Protective Sensation: 10 sites tested.  5 sites sensed.      Skin integrity: Skin integrity normal.      Toenail Condition: " Right toenails are abnormally thick. Fungal disease present.     Left foot:      Protective Sensation: 10 sites tested.  5 sites sensed.      Skin integrity: Skin integrity normal.      Toenail Condition: Left toenails are abnormally thick. Fungal disease present.     Comments: Monofilament testing with a 10 gram force: sensation intact: decreased bilaterally  Visual Inspection: Feet without maceration, ulcers, fissures.  Pedal pulses: intact bilaterally    Skin:     General: Skin is warm and dry.      Findings: No rash.   Neurological:      General: No focal deficit present.      Mental Status: He is alert and oriented to person, place, and time.   Psychiatric:         Mood and Affect: Mood normal.         Behavior: Behavior normal.       Labs: reviewed labs from 5/5/23-present    Assessment & Plan:     72 y.o. male with the following -     Problem List Items Addressed This Visit       Type 2 diabetes mellitus without complication, without long-term current use of insulin (HCC)     Chronic, stable.  Continue metformin ER 1000 mg twice daily.  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 Reports last ETOH 10-12 weeks ago.          Seasonal allergies     Chronic, stable.  Continue fluticasone nasal spray twice daily, Zyrtec 10 mg daily         Obesity (BMI 30-39.9)    Relevant Medications    metFORMIN ER (GLUCOPHAGE XR) 500 MG TABLET SR 24 HR    Other Relevant Orders    Patient identified as having weight management issue.  Appropriate orders and counseling given.    Alcoholic cirrhosis of liver without ascites (HCC)     Chronic, ongoing.  This is being managed by oncology and GI consultants.           Neuropathy     Chronic, ongoing.  Reports numbness and tingling to lower extremities bilaterally         Risk for falls     Relevant Orders    Patient identified as fall risk.  Appropriate orders and counseling given.     Other Visit Diagnoses       Encounter to establish care        Screening for colorectal cancer        Relevant Orders    Referral to GI for Colonoscopy          Patient was educated in proper administration of medication(s) ordered today including safety, possible SE, risks, benefits, rationale and alternatives to therapy.   Supportive care, differential diagnoses, and indications for immediate follow-up discussed with patient.    Pathogenesis of diagnosis discussed including typical length and natural progression.    Instructed to return to clinic or nearest emergency department for any change in condition, further concerns, or worsening of symptoms.  Patient states understanding of the plan of care and discharge instructions.    Return in about 3 months (around 1/18/2024) for A1C.    Please note that this dictation was created using voice recognition software. I have made every reasonable attempt to correct obvious errors, but I expect that there are errors of grammar and possibly content that I did not discover before finalizing the note.

## 2023-10-30 ENCOUNTER — HOSPITAL ENCOUNTER (OUTPATIENT)
Dept: LAB | Facility: MEDICAL CENTER | Age: 73
End: 2023-10-30
Attending: INTERNAL MEDICINE
Payer: MEDICARE

## 2023-10-30 LAB
ALBUMIN SERPL BCP-MCNC: 4.2 G/DL (ref 3.2–4.9)
ALBUMIN/GLOB SERPL: 1.8 G/DL
ALP SERPL-CCNC: 116 U/L (ref 30–99)
ALT SERPL-CCNC: 37 U/L (ref 2–50)
ANION GAP SERPL CALC-SCNC: 10 MMOL/L (ref 7–16)
AST SERPL-CCNC: 43 U/L (ref 12–45)
BASOPHILS # BLD AUTO: 0.7 % (ref 0–1.8)
BASOPHILS # BLD: 0.03 K/UL (ref 0–0.12)
BILIRUB SERPL-MCNC: 0.9 MG/DL (ref 0.1–1.5)
BUN SERPL-MCNC: 21 MG/DL (ref 8–22)
CALCIUM ALBUM COR SERPL-MCNC: 9.7 MG/DL (ref 8.5–10.5)
CALCIUM SERPL-MCNC: 9.9 MG/DL (ref 8.5–10.5)
CHLORIDE SERPL-SCNC: 107 MMOL/L (ref 96–112)
CO2 SERPL-SCNC: 23 MMOL/L (ref 20–33)
CREAT SERPL-MCNC: 0.91 MG/DL (ref 0.5–1.4)
EOSINOPHIL # BLD AUTO: 0.1 K/UL (ref 0–0.51)
EOSINOPHIL NFR BLD: 2.3 % (ref 0–6.9)
ERYTHROCYTE [DISTWIDTH] IN BLOOD BY AUTOMATED COUNT: 57.3 FL (ref 35.9–50)
GFR SERPLBLD CREATININE-BSD FMLA CKD-EPI: 89 ML/MIN/1.73 M 2
GLOBULIN SER CALC-MCNC: 2.4 G/DL (ref 1.9–3.5)
GLUCOSE SERPL-MCNC: 226 MG/DL (ref 65–99)
HCT VFR BLD AUTO: 44.8 % (ref 42–52)
HGB BLD-MCNC: 14.4 G/DL (ref 14–18)
IMM GRANULOCYTES # BLD AUTO: 0.02 K/UL (ref 0–0.11)
IMM GRANULOCYTES NFR BLD AUTO: 0.5 % (ref 0–0.9)
LYMPHOCYTES # BLD AUTO: 1.27 K/UL (ref 1–4.8)
LYMPHOCYTES NFR BLD: 29.4 % (ref 22–41)
MCH RBC QN AUTO: 30.9 PG (ref 27–33)
MCHC RBC AUTO-ENTMCNC: 32.1 G/DL (ref 32.3–36.5)
MCV RBC AUTO: 96.1 FL (ref 81.4–97.8)
MONOCYTES # BLD AUTO: 0.49 K/UL (ref 0–0.85)
MONOCYTES NFR BLD AUTO: 11.3 % (ref 0–13.4)
NEUTROPHILS # BLD AUTO: 2.41 K/UL (ref 1.82–7.42)
NEUTROPHILS NFR BLD: 55.8 % (ref 44–72)
NRBC # BLD AUTO: 0 K/UL
NRBC BLD-RTO: 0 /100 WBC (ref 0–0.2)
PLATELET # BLD AUTO: 90 K/UL (ref 164–446)
PLATELETS.RETICULATED NFR BLD AUTO: 4.7 % (ref 0.6–13.1)
PMV BLD AUTO: 10.4 FL (ref 9–12.9)
POTASSIUM SERPL-SCNC: 5.3 MMOL/L (ref 3.6–5.5)
PROT SERPL-MCNC: 6.6 G/DL (ref 6–8.2)
RBC # BLD AUTO: 4.66 M/UL (ref 4.7–6.1)
SODIUM SERPL-SCNC: 140 MMOL/L (ref 135–145)
T4 FREE SERPL-MCNC: 1.23 NG/DL (ref 0.93–1.7)
TSH SERPL DL<=0.005 MIU/L-ACNC: 2.67 UIU/ML (ref 0.38–5.33)
WBC # BLD AUTO: 4.3 K/UL (ref 4.8–10.8)

## 2023-10-30 PROCEDURE — 85025 COMPLETE CBC W/AUTO DIFF WBC: CPT

## 2023-10-30 PROCEDURE — 36415 COLL VENOUS BLD VENIPUNCTURE: CPT

## 2023-10-30 PROCEDURE — 80053 COMPREHEN METABOLIC PANEL: CPT

## 2023-10-30 PROCEDURE — 85055 RETICULATED PLATELET ASSAY: CPT

## 2023-10-30 PROCEDURE — 84439 ASSAY OF FREE THYROXINE: CPT

## 2023-10-30 PROCEDURE — 84443 ASSAY THYROID STIM HORMONE: CPT

## 2023-11-14 ENCOUNTER — HOSPITAL ENCOUNTER (OUTPATIENT)
Dept: LAB | Facility: MEDICAL CENTER | Age: 73
End: 2023-11-14
Attending: PHYSICIAN ASSISTANT
Payer: MEDICARE

## 2023-11-14 LAB
ALBUMIN SERPL BCP-MCNC: 5 G/DL (ref 3.2–4.9)
ALBUMIN/GLOB SERPL: 2.2 G/DL
ALP SERPL-CCNC: 107 U/L (ref 30–99)
ALT SERPL-CCNC: 36 U/L (ref 2–50)
ANION GAP SERPL CALC-SCNC: 8 MMOL/L (ref 7–16)
AST SERPL-CCNC: 46 U/L (ref 12–45)
BASOPHILS # BLD AUTO: 1.3 % (ref 0–1.8)
BASOPHILS # BLD: 0.06 K/UL (ref 0–0.12)
BILIRUB CONJ SERPL-MCNC: 0.4 MG/DL (ref 0.1–0.5)
BILIRUB INDIRECT SERPL-MCNC: 1 MG/DL (ref 0–1)
BILIRUB SERPL-MCNC: 1.4 MG/DL (ref 0.1–1.5)
BUN SERPL-MCNC: 17 MG/DL (ref 8–22)
CALCIUM ALBUM COR SERPL-MCNC: 9.4 MG/DL (ref 8.5–10.5)
CALCIUM SERPL-MCNC: 10.2 MG/DL (ref 8.5–10.5)
CHLORIDE SERPL-SCNC: 104 MMOL/L (ref 96–112)
CO2 SERPL-SCNC: 27 MMOL/L (ref 20–33)
CREAT SERPL-MCNC: 0.75 MG/DL (ref 0.5–1.4)
EOSINOPHIL # BLD AUTO: 0.09 K/UL (ref 0–0.51)
EOSINOPHIL NFR BLD: 2 % (ref 0–6.9)
ERYTHROCYTE [DISTWIDTH] IN BLOOD BY AUTOMATED COUNT: 55.3 FL (ref 35.9–50)
FERRITIN SERPL-MCNC: 32.1 NG/ML (ref 22–322)
GFR SERPLBLD CREATININE-BSD FMLA CKD-EPI: 95 ML/MIN/1.73 M 2
GLOBULIN SER CALC-MCNC: 2.3 G/DL (ref 1.9–3.5)
GLUCOSE SERPL-MCNC: 225 MG/DL (ref 65–99)
HCT VFR BLD AUTO: 45.4 % (ref 42–52)
HGB BLD-MCNC: 15.4 G/DL (ref 14–18)
IMM GRANULOCYTES # BLD AUTO: 0.02 K/UL (ref 0–0.11)
IMM GRANULOCYTES NFR BLD AUTO: 0.4 % (ref 0–0.9)
INR PPP: 1.11 (ref 0.87–1.13)
IRON SATN MFR SERPL: 23 % (ref 15–55)
IRON SERPL-MCNC: 110 UG/DL (ref 50–180)
LYMPHOCYTES # BLD AUTO: 1.52 K/UL (ref 1–4.8)
LYMPHOCYTES NFR BLD: 33.6 % (ref 22–41)
MCH RBC QN AUTO: 31.8 PG (ref 27–33)
MCHC RBC AUTO-ENTMCNC: 33.9 G/DL (ref 32.3–36.5)
MCV RBC AUTO: 93.8 FL (ref 81.4–97.8)
MONOCYTES # BLD AUTO: 0.43 K/UL (ref 0–0.85)
MONOCYTES NFR BLD AUTO: 9.5 % (ref 0–13.4)
NEUTROPHILS # BLD AUTO: 2.4 K/UL (ref 1.82–7.42)
NEUTROPHILS NFR BLD: 53.2 % (ref 44–72)
NRBC # BLD AUTO: 0 K/UL
NRBC BLD-RTO: 0 /100 WBC (ref 0–0.2)
PLATELET # BLD AUTO: 96 K/UL (ref 164–446)
PLATELETS.RETICULATED NFR BLD AUTO: 4.4 % (ref 0.6–13.1)
PMV BLD AUTO: 10.6 FL (ref 9–12.9)
POTASSIUM SERPL-SCNC: 4.8 MMOL/L (ref 3.6–5.5)
PROT SERPL-MCNC: 7.3 G/DL (ref 6–8.2)
PROTHROMBIN TIME: 14.5 SEC (ref 12–14.6)
RBC # BLD AUTO: 4.84 M/UL (ref 4.7–6.1)
SODIUM SERPL-SCNC: 139 MMOL/L (ref 135–145)
TIBC SERPL-MCNC: 485 UG/DL (ref 250–450)
UIBC SERPL-MCNC: 375 UG/DL (ref 110–370)
WBC # BLD AUTO: 4.5 K/UL (ref 4.8–10.8)

## 2023-11-14 PROCEDURE — 85610 PROTHROMBIN TIME: CPT

## 2023-11-14 PROCEDURE — 82728 ASSAY OF FERRITIN: CPT

## 2023-11-14 PROCEDURE — 82105 ALPHA-FETOPROTEIN SERUM: CPT

## 2023-11-14 PROCEDURE — 85055 RETICULATED PLATELET ASSAY: CPT

## 2023-11-14 PROCEDURE — 85025 COMPLETE CBC W/AUTO DIFF WBC: CPT

## 2023-11-14 PROCEDURE — 83540 ASSAY OF IRON: CPT

## 2023-11-14 PROCEDURE — 36415 COLL VENOUS BLD VENIPUNCTURE: CPT

## 2023-11-14 PROCEDURE — 80053 COMPREHEN METABOLIC PANEL: CPT

## 2023-11-14 PROCEDURE — 82248 BILIRUBIN DIRECT: CPT

## 2023-11-14 PROCEDURE — 83550 IRON BINDING TEST: CPT

## 2023-11-16 LAB — AFP-TM SERPL-MCNC: 5 NG/ML (ref 0–9)

## 2023-12-08 DIAGNOSIS — C22.0 LIVER CARCINOMA (HCC): ICD-10-CM

## 2023-12-26 ENCOUNTER — HOSPITAL ENCOUNTER (OUTPATIENT)
Dept: LAB | Facility: MEDICAL CENTER | Age: 73
End: 2023-12-26
Attending: INTERNAL MEDICINE
Payer: MEDICARE

## 2023-12-26 LAB
ALBUMIN SERPL BCP-MCNC: 4.4 G/DL (ref 3.2–4.9)
ALBUMIN/GLOB SERPL: 1.8 G/DL
ALP SERPL-CCNC: 82 U/L (ref 30–99)
ALT SERPL-CCNC: 30 U/L (ref 2–50)
ANION GAP SERPL CALC-SCNC: 14 MMOL/L (ref 7–16)
AST SERPL-CCNC: 38 U/L (ref 12–45)
BILIRUB SERPL-MCNC: 1.1 MG/DL (ref 0.1–1.5)
BUN SERPL-MCNC: 22 MG/DL (ref 8–22)
CALCIUM ALBUM COR SERPL-MCNC: 9.5 MG/DL (ref 8.5–10.5)
CALCIUM SERPL-MCNC: 9.8 MG/DL (ref 8.5–10.5)
CHLORIDE SERPL-SCNC: 105 MMOL/L (ref 96–112)
CO2 SERPL-SCNC: 20 MMOL/L (ref 20–33)
CREAT SERPL-MCNC: 0.86 MG/DL (ref 0.5–1.4)
GFR SERPLBLD CREATININE-BSD FMLA CKD-EPI: 91 ML/MIN/1.73 M 2
GLOBULIN SER CALC-MCNC: 2.4 G/DL (ref 1.9–3.5)
GLUCOSE SERPL-MCNC: 230 MG/DL (ref 65–99)
POTASSIUM SERPL-SCNC: 5.1 MMOL/L (ref 3.6–5.5)
PROT SERPL-MCNC: 6.8 G/DL (ref 6–8.2)
SODIUM SERPL-SCNC: 139 MMOL/L (ref 135–145)
TSH SERPL DL<=0.005 MIU/L-ACNC: 2.8 UIU/ML (ref 0.38–5.33)

## 2023-12-26 PROCEDURE — 85025 COMPLETE CBC W/AUTO DIFF WBC: CPT

## 2023-12-26 PROCEDURE — 80053 COMPREHEN METABOLIC PANEL: CPT

## 2023-12-26 PROCEDURE — 36415 COLL VENOUS BLD VENIPUNCTURE: CPT

## 2023-12-26 PROCEDURE — 84443 ASSAY THYROID STIM HORMONE: CPT

## 2024-01-16 ENCOUNTER — HOSPITAL ENCOUNTER (OUTPATIENT)
Dept: LAB | Facility: MEDICAL CENTER | Age: 74
End: 2024-01-16
Attending: INTERNAL MEDICINE
Payer: MEDICARE

## 2024-01-16 LAB
ALBUMIN SERPL BCP-MCNC: 4.6 G/DL (ref 3.2–4.9)
ALBUMIN/GLOB SERPL: 1.9 G/DL
ALP SERPL-CCNC: 81 U/L (ref 30–99)
ALT SERPL-CCNC: 31 U/L (ref 2–50)
ANION GAP SERPL CALC-SCNC: 13 MMOL/L (ref 7–16)
AST SERPL-CCNC: 39 U/L (ref 12–45)
BASOPHILS # BLD AUTO: 0.9 % (ref 0–1.8)
BASOPHILS # BLD: 0.05 K/UL (ref 0–0.12)
BILIRUB SERPL-MCNC: 1.6 MG/DL (ref 0.1–1.5)
BUN SERPL-MCNC: 20 MG/DL (ref 8–22)
CALCIUM ALBUM COR SERPL-MCNC: 9.6 MG/DL (ref 8.5–10.5)
CALCIUM SERPL-MCNC: 10.1 MG/DL (ref 8.5–10.5)
CHLORIDE SERPL-SCNC: 104 MMOL/L (ref 96–112)
CO2 SERPL-SCNC: 23 MMOL/L (ref 20–33)
CREAT SERPL-MCNC: 0.76 MG/DL (ref 0.5–1.4)
EOSINOPHIL # BLD AUTO: 0.15 K/UL (ref 0–0.51)
EOSINOPHIL NFR BLD: 2.7 % (ref 0–6.9)
ERYTHROCYTE [DISTWIDTH] IN BLOOD BY AUTOMATED COUNT: 55.8 FL (ref 35.9–50)
GFR SERPLBLD CREATININE-BSD FMLA CKD-EPI: 95 ML/MIN/1.73 M 2
GLOBULIN SER CALC-MCNC: 2.4 G/DL (ref 1.9–3.5)
GLUCOSE SERPL-MCNC: 177 MG/DL (ref 65–99)
HCT VFR BLD AUTO: 47.2 % (ref 42–52)
HGB BLD-MCNC: 15.4 G/DL (ref 14–18)
IMM GRANULOCYTES # BLD AUTO: 0.01 K/UL (ref 0–0.11)
IMM GRANULOCYTES NFR BLD AUTO: 0.2 % (ref 0–0.9)
LYMPHOCYTES # BLD AUTO: 1.67 K/UL (ref 1–4.8)
LYMPHOCYTES NFR BLD: 30.5 % (ref 22–41)
MCH RBC QN AUTO: 32 PG (ref 27–33)
MCHC RBC AUTO-ENTMCNC: 32.6 G/DL (ref 32.3–36.5)
MCV RBC AUTO: 97.9 FL (ref 81.4–97.8)
MONOCYTES # BLD AUTO: 0.59 K/UL (ref 0–0.85)
MONOCYTES NFR BLD AUTO: 10.8 % (ref 0–13.4)
NEUTROPHILS # BLD AUTO: 3.01 K/UL (ref 1.82–7.42)
NEUTROPHILS NFR BLD: 54.9 % (ref 44–72)
NRBC # BLD AUTO: 0 K/UL
NRBC BLD-RTO: 0 /100 WBC (ref 0–0.2)
PLATELET # BLD AUTO: 87 K/UL (ref 164–446)
PLATELETS.RETICULATED NFR BLD AUTO: 5.6 % (ref 0.6–13.1)
PMV BLD AUTO: 10.9 FL (ref 9–12.9)
POTASSIUM SERPL-SCNC: 5.2 MMOL/L (ref 3.6–5.5)
PROT SERPL-MCNC: 7 G/DL (ref 6–8.2)
RBC # BLD AUTO: 4.82 M/UL (ref 4.7–6.1)
SODIUM SERPL-SCNC: 140 MMOL/L (ref 135–145)
TSH SERPL DL<=0.005 MIU/L-ACNC: 1.85 UIU/ML (ref 0.38–5.33)
WBC # BLD AUTO: 5.5 K/UL (ref 4.8–10.8)

## 2024-01-16 PROCEDURE — 36415 COLL VENOUS BLD VENIPUNCTURE: CPT

## 2024-01-16 PROCEDURE — 85025 COMPLETE CBC W/AUTO DIFF WBC: CPT

## 2024-01-16 PROCEDURE — 80053 COMPREHEN METABOLIC PANEL: CPT

## 2024-01-16 PROCEDURE — 84443 ASSAY THYROID STIM HORMONE: CPT

## 2024-01-16 PROCEDURE — 85055 RETICULATED PLATELET ASSAY: CPT

## 2024-01-22 ENCOUNTER — HOSPITAL ENCOUNTER (OUTPATIENT)
Dept: RADIOLOGY | Facility: MEDICAL CENTER | Age: 74
End: 2024-01-22
Attending: NURSE PRACTITIONER
Payer: MEDICARE

## 2024-01-22 DIAGNOSIS — C22.0 LIVER CARCINOMA (HCC): ICD-10-CM

## 2024-01-22 PROCEDURE — 74183 MRI ABD W/O CNTR FLWD CNTR: CPT

## 2024-01-22 PROCEDURE — A9579 GAD-BASE MR CONTRAST NOS,1ML: HCPCS

## 2024-01-22 PROCEDURE — 700117 HCHG RX CONTRAST REV CODE 255

## 2024-01-22 RX ADMIN — GADOTERIDOL 20 ML: 279.3 INJECTION, SOLUTION INTRAVENOUS at 14:08

## 2024-02-02 ENCOUNTER — HOSPITAL ENCOUNTER (OUTPATIENT)
Dept: RADIOLOGY | Facility: MEDICAL CENTER | Age: 74
End: 2024-02-02
Attending: NURSE PRACTITIONER
Payer: MEDICARE

## 2024-02-02 DIAGNOSIS — C22.0 LIVER CARCINOMA (HCC): ICD-10-CM

## 2024-02-02 ASSESSMENT — LIFESTYLE VARIABLES: SUBSTANCE_ABUSE: 0

## 2024-02-02 ASSESSMENT — ENCOUNTER SYMPTOMS
WEIGHT LOSS: 0
FEVER: 0
DIAPHORESIS: 0
CONSTITUTIONAL NEGATIVE: 1
CHILLS: 0

## 2024-02-02 NOTE — PROGRESS NOTES
"Interventional Oncology Service Consultation       Sohail Duvall is a 73 y.o. male seen in clinic for evaluation after multiple interventions for unresectable hepatocellular carcinoma.     PCP: Lashaun FUENTES  Surgical oncologist: Feliberto Senior MD  Radiation oncologist: Quique Keita MD  Medical oncologist: Alfonzo Servin MD    HPI:      History of Present Illness:   was last evaluated in our practice in April 2021. He has a history of CLL and alcoholic cirrhosis. Imaging revealed hepatomas in 4A and 4B and he underwent the following interventions at [facility]:  6/28/18 ablation 4B lesion (Nadeen)  8/28/19 Hepatic angiogram \"mapping\" (Myrna)  9/7/18 Y90 SIRT left lobe 0.7 gb ( 19 mCi)  (Myrna)  12/14/18 YUNIER TACE caudate lesion ([facility])  1/6/21 YUNIER TACE (Myrna)  8/16/23 hepatic angiogram \"mapping\"/ lung shunt study 0.94% ([facility], Myrna  8/22/23 Y90 TARE seg 4A/4B, 160 Gy, Myrna [place]    We subsequently referred him to radiation oncology and medical oncology after poor treatment effect with liver directed therapy in 2021.     Interval history 8/1/23:  Mr. Duvall has been on systemic therapy with Atezolizumab and bevacizumab. In April 2023 he developed blood in his stool (black stools), heartburn, dysphagia. It is similar to his prior symptoms in 2018. He presented to ED and had an EGD. He was noted to have esophagitis but no active bleeding. He was also noted to have hypertensive gastropathy. Avastin was subsequently discontinued. His GI specialist has moved and he does not currently have a GI doctor. He now has disease progression in the liver and returns for consideration of catheter directed therapy. Today, he denies acute complaints. He denies any significant changes in his health since his last IR procedures. He tolerated his prior therapies well. He lives in [place] and is retired from [employer]. He is accompanied by a support person to today's " consultation.    Interval history 10/13/23: Mr. Duvall underwent uncomplicated Y90 TARE to the seg 4A/4B hepatoma as well as the dome lesion. He tolerated the procedure well. He had an episode of left flank pain and microscopic hematuria and had a CT on 9/13/23 that was unremarkable. He is seen today to review post procedure imaging and lab results. No updated tumor markers are available. Our initial plan was to selectively target 4A/4B and treat the remaining right liver disease with lobar Y90 TARE, however we obtained imaging to evaluate treatment effect prior to proceeding. He has been off systemic therapy for his IR procedures. Today, he feels well with no complaints.     Interval history 2/2/24: He is seen today for review of surveillance imaging and lab tests. He is back on Avastin therapy with his next infusion appointment next week. He had lost about 10 pounds but has regained 6 pounds, does not think he has done anything different. He has seen his MRI results on [facility].        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 Stopped both medications because blood pressure readings were low, /82 today and has been low at home since stopping medications about 5 days ago. Last reading at home 117/72     Hypotension 5/19/2022    Patient has known hypertension and has been on losartan 50 mg long-term.  He notes recent episode about 10 days ago where blood pressure dropped extremely low and he fainted.  He reports that blood pressure on home cuff was 45/30, and slowly  "increased.  Additionally, he notes that he has had these episodes on a yearly basis for maybe 6 to 7 years or blood pressure goes extremely well and he passes    Indigestion     Liver cancer (HCC)     Liver tumor     Plantar fasciitis of right foot 8/12/2019    Rosacea     Snoring     no sleep study     Past Surgical History:   Procedure Laterality Date    WI UPPER GI ENDOSCOPY,DIAGNOSIS N/A 4/25/2023    Procedure: GASTROSCOPY;  Surgeon: Mir Rodriguez M.D.;  Location: SURGERY SAME DAY [facility];  Service: Gastroenterology    WOUND EXPLORATION ORTHO Right 8/5/2020    Procedure: EXPLORATION, WOUND- THUMB;  Surgeon: Yumiko Griffiths M.D.;  Location: SURGERY SAME DAY [facility] ORS;  Service: Orthopedics    TENDON REPAIR  8/5/2020    Procedure: REPAIR, TENDON- EXTENSOR;  Surgeon: Yumiko Griffiths M.D.;  Location: SURGERY SAME DAY [facility] ORS;  Service: Orthopedics    HEPATIC ABLATION LAPAROSCOPIC  6/28/2018    Procedure: HEPATIC ABLATION LAPAROSCOPIC. SEGMENT 4B, HEPATOMA, REPAIR OF INCARCERATED UMBILICAL HERNIA;  Surgeon: Feliberto Burgos M.D.;  Location: SURGERY [facility];  Service: General    BRACHY THERAPY       Social History     Socioeconomic History    Marital status:      Spouse name: Not on file    Number of children: 1    Years of education: Not on file    Highest education level: Not on file   Occupational History    Occupation: fertilizer sales   Tobacco Use    Smoking status: Never    Smokeless tobacco: Current     Types: Snuff, Chew     Last attempt to quit: 3/29/2015   Vaping Use    Vaping Use: Never used   Substance and Sexual Activity    Alcohol use: Not Currently     Alcohol/week: 7.2 oz     Types: 12 Shots of liquor per week    Drug use: Yes     Types: Marijuana, Inhaled, Oral     Comment: \"Marijuana Use\" daily     Sexual activity: Never   Other Topics Concern    Not on file   Social History Narrative    No known exposure to asbestos, dyes, or chemicals, however he was exposed " to pesticides.  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  1 child, alive and well.  He also has a step-child.      Social Determinants of Health     Financial Resource Strain: Not on file   Food Insecurity: Not on file   Transportation Needs: Not on file   Physical Activity: Not on file   Stress: Not on file   Social Connections: Not on file   Intimate Partner Violence: Not on file   Housing Stability: Not on file     Family History   Problem Relation Age of Onset    Cancer Father 81        Bladder    Cancer Brother         Prostate & CLL (s/p 8-10 years ago)    Hyperlipidemia Sister     Lung Disease Neg Hx     Diabetes Neg Hx     Heart Disease Neg Hx     Hypertension Neg Hx     Stroke Neg Hx     Alcohol/Drug Neg Hx        Review of Systems   Constitutional: Negative.  Negative for chills, diaphoresis, fever, malaise/fatigue and weight loss.   Skin: Negative.    Psychiatric/Behavioral:  Negative for substance abuse.      A comprehensive 14-point review of systems was negative except as described above.       Labs / Images Reviewed:   Labs:    Latest Reference Range & Units 11/14/23 10:58 01/16/24 12:31   WBC 4.8 - 10.8 K/uL 4.5 (L) 5.5   RBC 4.70 - 6.10 M/uL 4.84 4.82   Hemoglobin 14.0 - 18.0 g/dL 15.4 15.4   Hematocrit 42.0 - 52.0 % 45.4 47.2   MCV 81.4 - 97.8 fL 93.8 97.9 (H)   MCH 27.0 - 33.0 pg 31.8 32.0   MCHC 32.3 - 36.5 g/dL 33.9 32.6   RDW 35.9 - 50.0 fL 55.3 (H) 55.8 (H)   Platelet Count 164 - 446 K/uL 96 (L) 87 (L)   MPV 9.0 - 12.9 fL 10.6 10.9   Neutrophils-Polys 44.00 - 72.00 % 53.20 54.90   Neutrophils (Absolute) 1.82 - 7.42 K/uL 2.40 3.01   Lymphocytes 22.00 - 41.00 % 33.60 30.50   Lymphs (Absolute) 1.00 - 4.80 K/uL 1.52 1.67   Monocytes 0.00 - 13.40 % 9.50 10.80   Monos (Absolute) 0.00 - 0.85 K/uL 0.43 0.59   Eosinophils 0.00 - 6.90 % 2.00 2.70   Eos (Absolute) 0.00 - 0.51 K/uL 0.09 0.15   Basophils 0.00 - 1.80 % 1.30 0.90   Baso  (Absolute) 0.00 - 0.12 K/uL 0.06 0.05   Immature Granulocytes 0.00 - 0.90 % 0.40 0.20   Immature Granulocytes (abs) 0.00 - 0.11 K/uL 0.02 0.01   Nucleated RBC 0.00 - 0.20 /100 WBC 0.00 0.00   NRBC (Absolute) K/uL 0.00 0.00   Imm. Plt Fraction 0.6 - 13.1 % 4.4 5.6      Latest Reference Range & Units 11/14/23 10:58 12/26/23 10:18 01/16/24 12:31   Sodium 135 - 145 mmol/L 139 139 140   Potassium 3.6 - 5.5 mmol/L 4.8 5.1 5.2   Chloride 96 - 112 mmol/L 104 105 104   Co2 20 - 33 mmol/L 27 20 23   Anion Gap 7.0 - 16.0  8.0 14.0 13.0   Glucose 65 - 99 mg/dL 225 (H) 230 (H) 177 (H)   Bun 8 - 22 mg/dL 17 22 20   Creatinine 0.50 - 1.40 mg/dL 0.75 0.86 0.76   GFR (CKD-EPI) >60 mL/min/1.73 m 2 95 91 95   Calcium 8.5 - 10.5 mg/dL 10.2 9.8 10.1   Correct Calcium 8.5 - 10.5 mg/dL 9.4 9.5 9.6   AST(SGOT) 12 - 45 U/L 46 (H) 38 39   ALT(SGPT) 2 - 50 U/L 36 30 31   Alkaline Phosphatase 30 - 99 U/L 107 (H) 82 81   Total Bilirubin 0.1 - 1.5 mg/dL 1.4 1.1 1.6 (H)   Direct Bilirubin 0.1 - 0.5 mg/dL 0.4     Indirect Bilirubin 0.0 - 1.0 mg/dL 1.0     Albumin 3.2 - 4.9 g/dL 5.0 (H) 4.4 4.6   Total Protein 6.0 - 8.2 g/dL 7.3 6.8 7.0   Globulin 1.9 - 3.5 g/dL 2.3 2.4 2.4   A-G Ratio g/dL 2.2 1.8 1.9   Iron 50 - 180 ug/dL 110     Total Iron Binding 250 - 450 ug/dL 485 (H)     % Saturation 15 - 55 % 23     Unsat Iron Binding 110 - 370 ug/dL 375 (H)     (H): Data is abnormally high   Latest Reference Range & Units 11/14/23 10:58   PT 12.0 - 14.6 sec 14.5   INR 0.87 - 1.13  1.11      Latest Reference Range & Units 07/06/23 12:30 11/14/23 10:58   AFP L3% 0.0 - 9.9 % 8.5    Alpha Fetoprotein 0 - 9 ng/mL 6 5   Ferritin 22.0 - 322.0 ng/mL  32.1       Child [place] Class A  JIM Grade 1  NLR 1.80    Pathology:  None available for review    Radiology:   MRI abdomen 1/22/24 at [facility]:  1. Stable treated lesions within both hepatic lobes, consistent with complete response to treatment.  2. Stable multifocal subcentimeter arterial foci of enhancement within  both hepatic lobes, which are suspicious for early HCC.  3. Stable additional chronic findings, as above.    Comparison of MRI post treatment, pretreatment, and Y90 targets:        MRI abdomen 10/2/23 at [facility]:  1. Positive partial response to treatment. There are several lesions which are stable in size, but now do not demonstrate enhancement. However, there are several small foci of arterial enhancement within both hepatic lobes, which are suspicious for   residual multifocal HCC. As detailed above.  2. Cholelithiasis, choledocholithiasis, severe cirrhosis, splenomegaly, small pancreatic tail cyst, left adrenal nodule, small simple left renal cyst are again noted.        LR-5: definitely HCC    CT renal colic 9/13/23 at [facility]:  1.  No evidence of nephroureterolithiasis or obstructive uropathy.  2.  Cirrhosis.  3.  Hepatosplenomegaly.  4.  Cholelithiasis.  5.  Atherosclerosis with coronary artery disease.    Post Y90 TARE  calculations, [facility]:        Interventional radiology procedure 8/22/23 at [facility] (Y90 seg 4A/B), [facility]:  Impression:  1. Successful radioembolization of segments 4A/4B.  2. Patient will return to interventional radiology clinic in 4-6 weeks for follow-up four-phase liver MRI.    Nuclear medicine Bremsstrahlung study 8/22/23 at [facility]:  The prescribed dose was 150 gray. Delivered dose after residual was measured at 160. This represents  106.8% of the prescribed dose and  98.2% of the drawn dose. Bremsstrahlung images obtained after the Y-90 radioembolization, confirm proper delivery to   the targeted region. There is no uptake outside the planned treatment area. NOTE: The patient will be followed by interventional radiology and oncology.    Lung shunt calculations (CAROLIN Report):      Interventional radiology procedure 8/16/23 at [facility] (pre Y90 mapping):  1. Y90 mapping hepatic arteriogram.  2. Dense tumor vascularity arising from the middle hepatic artery distribution.  3. 4.3 mCi  technetium 99 MAA for nuclear medicine lung shunt study.    Nuclear medicine lung shunt study 8/16/23 at [facility]:  FINDINGS:  Total hepatopulmonary shunt percentage was 3.8%. No evidence of extrahepatic radiotracer deposition is identified.     IMPRESSION:     Quantitative lung scan as described.    MRI abdomen 7/13/23 at [facility]:  1.  There are morphologic changes of cirrhosis with splenomegaly and collateral vessels consistent with portal hypertension.  2.  Interval disease progression with at least 2 and possibly 3 arterial phase enhancing lesions with washout and restricted diffusion the largest in the medial segment left lobe which is slightly increased in size consistent with multifocal HCC. LI-RADS   5:  3.  Several other subcentimeter arterial phase enhancing areas are indeterminate for HCC as these could be areas of altered arterial perfusion. LR 3.  4.  Additional right and left lobe hepatic lesions only visible on delayed imaging possibly treated HCC.  5.  There is cholelithiasis.  6.  Stable pancreatic side branch IPMN's.  7.  Stable left adrenal myelolipoma.      LR-5: definitely HCC    USD liver 4/25/23 at [facility]:  Unremarkable liver Doppler sonogram.    USD RUQ 4/25/23 at [facility]:  1.  There is cholelithiasis no sonographic evidence of acute cholecystitis  2.  Heterogeneous liver with known hepatic masses better visualized on CT performed earlier on the same day.    CT AP 4/24/23 at [facility]:  1.  No acute inflammatory process in the abdomen or pelvis.  2.  Small amount of stool in the colon.  3.  Cirrhosis and portal hypertension, including splenomegaly. No ascites.  4.  Three out of five hepatic lesions are visualized on today's CT entire stable to slightly smaller in size compared to prior MRI. No new hepatic lesions.  5.  No new metastatic disease in the abdomen or pelvis.  6.  Cholelithiasis.  7.  Colonic diverticulosis.    MRI abdomen 1/6/23 at [facility]:  1.  Cirrhosis and portal hypertension,  including splenomegaly and a few portosystemic collaterals.  2.  Only one 1.9 cm HCC in hepatic segment 4 shows arterial enhancement and washout on today's study. LI-RADS 5. This HCC is stable in size since prior CT.  3.  Four additional lesions in both right and left hepatic lobes are all hypointense on the delayed phase but show no arterial enhancement. They may represent treated HCCs. Absence of arterial enhancement suggests that they contain no viable tumor.  4.  No new arterially enhancing liver lesions.  5.  Cholelithiasis.  6.  Stable pancreatic side branch IPMNs. They do not require further follow-up.  7.  Stable left adrenal myelolipoma.    CT abdomen 10/3/22 at [facility]:  1.  Ablation cavity segment 4 of the liver is again identified.  2.  Other arterial phase enhancing liver lesions seen on the prior CT are poorly visualized on the current exam which is performed without arterial phase enhancement. These lesions are likely present on the current exam but are difficult to visualize on the portal venous phase. Largest superiorly in the right lobe appears to be present with some washout as expected on these portal venous phase images. This lesion is similar in size to the prior exam.  3.  Nodular liver surface consistent with cirrhosis.  4.  Splenomegaly and enlarged portal vein.  5.  Recanalized umbilical vein.  6.  Cholelithiasis.    CT chest 6/3/22 at [facility]:  1.  No metastatic disease in the chest.  2.  A 4.1 x 4 cm ascending aortic aneurysm.  3.  Abdomen is reported separately.    CT liver mass protocol 6/3/22 at [facility]:  1.  More than 8 bilobar hepatic lesions. Three dominant lesions all measure greater than 2 cm and demonstrate classic arterial hyperenhancement and washout, consistent with HCCs.  2.  The vast majority of the smaller arterially hyperenhancing lesions have increased in size, and some of these lesions are new. While they do not definitively demonstrate washout, they are highly concerning  for hepatomas as well. LR-4 and LR-5.  3.  Treated lesion in the caudate lobe shows no viable tumor.  4.  Cirrhosis and splenomegaly. No ascites.  5.  Cholelithiasis.  6.  Colonic diverticulosis.  7.  Stable left adrenal adenoma.    MRI abdomen 5/10/22 at [facility]:  1.  There is severe respiratory motion artifact on the current exam which was not present on the prior exam. Images better most severely affected are the arterial phase and portal venous phase images. Therefore direct comparison of these lesions which   were best defined on the arterial phase images on the prior exam is difficult on the current exam. Most of the measurements on the current exam required measurement of the mass is on the delayed images or portal venous phase images.  2.  The new lesion seen at the anterior edge of segment 5 on the prior MRI does appear to have enlarged significantly on the current exam. Current measurement is 1.9 x 1.8 cm based on the washout portion of the mass. Arterial phase measurement on the prior exam was 1.2 x 1.3 cm.  3.  Other masses throughout the liver appears similar to the prior exam.  4.  Small masses measuring less than 1 cm on the arterial phase images described in the prior exam are poorly visualized on the current exam due to the severe respiratory motion artifact. However, these lesions are probably unchanged.  5.  Splenomegaly is again noted. Spleen measures 16.8 cm.  6.  No change in left adrenal mass likely representing adenoma.  7.  Portal veins and hepatic veins are within normal limits.  8.  No ascites or adenopathy identified.      LR-5: definitely HCC    MRI abdomen 12/8/21 at [facility]:  1. New 1.3 cm HCC in segment 5.  2. HCC's described on the prior MRI study are either stable or show minimal subthreshold growth. They measure up to 2.1 cm.  3. Approximately 5 additional subcentimeter arterially enhancing lesions in the right and left hepatic lobes, without definitive correlates on any other  phases. Some are more conspicuous, and some may be new since prior study. LR-3.  4. Previously treated lesions in the caudate lobe and left hepatic lobe show no viable tumor. LR-treated.  5. Cirrhosis and splenomegaly.  6. Cholelithiasis.  7. Benign left adrenal adenoma. It does not require follow-up.  8. Colonic diverticulosis.    Nuc Med bone study 9/20/21 at [facility]:  No scintigraphic evidence of active osseous metastatic disease.    CT chest 8/12/21 at [facility]:  1.  5 mm nodule along the right minor fissure, new from the 2018 exam. Follow-up per oncology protocol.  2.  Morphologic characteristics of cirrhosis with arterially enhancing foci in the partially visualized liver, consistent with known multifocal hepatocellular carcinoma  3.  Cholelithiasis  4.  Left adrenal adenoma, previously characterized.  5.  Splenomegaly     Fleischner Society pulmonary nodule recommendations:  Not Applicable    MRI abdomen 6/9/21 at [facility]:  1.  Morphologic characteristics of cirrhosis with evidence of portal hypertension including splenomegaly, trace ascites, and varices.  2.  Observation in the medial left hepatic lobe demonstrates interval enlargement, subthreshold.LR-5  3.  Observation bordering segments 2 and 3 is stable. LR-5  4.  Observation in segment 2 is stable. LR-3  5.  Observation in the hepatic dome has increased in size from the prior exam, subthreshold growth. LR-5  6.  Caudate lobe lesion remains avascular status post TACE. LR-treated nonviable  7.  Ablation cavity in the left hepatic lobe remains avascular. LR-treated nonviable  8.  Benign left adrenal adenoma.  9.  Cholelithiasis.     CT abdomen 3/19/21 at [facility]:  Enhancing lesion at the hepatic dome anteriorly is likely not significantly changed, worrisome for hepatocellular carcinoma.     Enhancing lesion within segment 4 of the liver is also likely not significantly changed, also worrisome for hepatocellular carcinoma.     Small 6 mm enhancing focus within  the segment 4 of the liver is not significantly changed.     Enhancing 6 mm focus within the left lobe of the liver is stable.     6.5 mm enhancing focus near the intrahepatic IVC was not definitively seen previously.     Posttreatment changes are again noted in the caudate lobe and in the anterior right lobe of the liver.     Findings of cirrhosis and portal hypertension.     Left adrenal adenoma.     Colonic diverticulosis.     Cholelithiasis     Atherosclerotic plaque.     Interventional Radiology procedure 1/6/21 at [facility]:  1.  DEBTACE procedure with total dose doxorubicin 150 mg administered as 100 mu Oncozene beads.  2.  The patient is returned to the same day surgery reese for post procedure observation.     MRI abdomen 11/18/2020 at [facility]:  1. Previously treated lesions show no viable tumor. LR-treated.  2. Corresponding to the lesion detected on the ultrasound, there is an 8 mm lesion at the junction of anterior and medial hepatic segments. It is new since prior MRI, and meets criteria for HCC (LR-5).  3. A 1.6 cm segment 4 lesion seen on the prior MR study is stable in size. It shows mild arterial enhancement and mild washout, and likely represents an HCC which is stable since prior study. LR-5.  4. No additional lesions are detected on today's study.  5. Cirrhosis and portal hypertension. No ascites.  6. Cholelithiasis.  7. Stable 1 cm cystic lesion in the pancreatic tail, likely a side branch IPMN.  8. Colonic diverticulosis.         MRI abdomen 3/11/20 at [facility]:   1.  There are morphologic changes of the liver consistent with cirrhosis.  2.  There are stable posttreatment changes in the caudate lobe and superior medial segment left lobe and segment 2. LR treated  3.  Stable 10 mm focus of arterial enhancement without washout or restricted diffusion, likely in segment 8. LR 3.  4.  There is a 9 mm arterial enhancing focus inferiorly in segment 8 or possibly 4a without washout or diffusion. LR 3  5.   There is a questionable 6 mm arterial focus of enhancement at the junction of segment 8/5 without washout or restricted diffusion. LR 3  6.  Stable splenomegaly, possibly related to cirrhosis versus underlying CLL.  7.  Stable cholelithiasis without biliary dilatation.  8.  No change in 9 mm pancreatic tail simple appearing cyst.  9.  No change in left adrenal gland adenoma.        LR-3: intermediate probability and LR treated lesions     MRI abdomen on July 1, 2019 [facility]:  1.  Stable wedge-shaped treated lesion in the anterior medial hepatic segment.  2.  Stable treated lesion in the caudate lobe, with unchanged linear enhancement in its periphery.  3.  Segment 2 lesion described on the prior study is not seen on today's study. No focal arterial enhancement in segment 2.  4.  New 1 cm arterially enhancing focus in segment 8 has no correlate on other images. It is indeterminate. LR-3.  5.  Cirrhosis and portal hypertension, with postembolization changes in the left hepatic lobe. Splenomegaly. No ascites.  6.  Cholelithiasis.  7.  Stable left adrenal lesion, likely adenoma.  8.  Stable pancreatic tail cyst.    MRI abdomen on January 29, 2019 at [facility]:  1.  No new arterial enhancing mass is identified.  2.  Previously described mass in the caudate lobe is now avascular with peripheral enhancement, consistent with recent chemoembolization. LR-treated  3.  Ablation cavity in the left hepatic lobe remains avascular.  4.  8 mm arterial enhancing observation in segment 4A is not as pronounced as on the prior exam and grossly stable. LR-3  5.  Stable peripheral enhancing lesion in segment 2. LR-treated  6.  Previously described rim-enhancing observation in the superior aspect of segment 2 is not visualized on the current exam, likely related to differences in technique.  7.  Morphologic characteristics of cirrhosis and evidence of portal hypertension including splenomegaly  8.  Cholelithiasis.  9.  Left adrenal  adenoma  10.  Stable 8 mm pancreatic tail cyst         Interventional radiology procedure December 14, 2018, at [facility]:  1.  DEBTACE procedure with total dose doxorubicin 100 mg administered as 100 mu Oncozene beads. (Caudate lobe)  2.  The patient is returned to the same day surgery reese for post procedure observation.       MRI abdomen November 10, 2018 at [facility]:  1.  Morphologic characteristics of cirrhosis with evidence of portal hypertension including trace ascites, splenomegaly, and portal venous dilatation.  2.  Interval enlargement in the mass in segment 4 adjacent to the caudate lobe. LR-5  3.  Previously described hyper enhancing masses in the left hepatic lobe demonstrate rim enhancement, consistent with recent Y 90 treatment. LR-treated.  4.  Ablation cavity in the left hepatic lobe is a vascular  5.  8 mm arterial enhancing focus in segment 4A is better defined than on the prior exam secondary to decreased motion. Size is not significantly changed.LR-3  6.  Stable 9 mm cystic mass in the pancreatic tail.  7.  Stable benign left adrenal adenoma  8.  Cholelithiasis. Dependent hypointense foci in the common bile duct may represent tiny nonobstructing calculi.       Interventional radiology procedure Y90 SIRT left lobe September 7, 2018, at [facility]:  1.  Technically successful Y90 radioembolization of the left hepatic lobe. The patient will followup in approximately 10 days for laboratory and clinical evaluation and 4-6 weeks for imaging evaluation.  2.  Immediately following the procedure, the patient was transported to nuclear medicine for SPECT imaging which demonstrates Bremsstrahlung emission from the left of the liver. There is no definite evidence of extrahepatic deposition of radioembolic   Material.     Nuclear medicine bremsstrahlung study on September 7, 2018 at [facility]:  The prescribed dose was  0.68 gb ( 18.4 mCi). The drawn dose was 0.78 gb (21mCi). Delivered dose after residual was measured at  0.7 gb ( 19 mCi). This represents  103% of the prescribed dose and  90% of the drawn dose. Bremsstrahlung images obtained   after the Y-90 radioembolization, confirm proper delivery to the targeted region. There is no uptake outside the planned treatment area. NOTE: The patient will be followed by interventional radiology and oncology.       Interventional radiology procedure August 8, 2018 at [facility]:  1.  Superior mesenteric arteriogram replaced common hepatic artery.  2.  Celiac arteriogram demonstrate no evidence of common hepatic arterial anatomy.  3.  Common hepatic arteriogram demonstrate no evidence of variant hepatic arterial anatomy.  4.  Selective catheterization and embolization of a small branch originating from the proximal aspect of the right hepatic artery which appeared to supply either a small subsegmental region of the right hepatic lobe or a small amount of the hepatic   flexure of the colon.  5.  Proper hepatic arteriogram demonstrating 2 small branches originating from the proximal aspect of the right hepatic artery one of which appear to represent either a small subsegmental hepatic artery versus hepatic flexure colon branch and a small   cystic artery.  6.  Intra-arterial administration of 4.4 mCi technetium 99m labeled MAA into the hepatic artery demonstrated a hepatic pulmonary shunt fraction of 4%.        CT abdomen with and without August 8, 2018 at [facility]:  1.  Cirrhosis  2.  Interval ablation of a segment 4A hepatic nodule without evidence of residual tumor in that field  3.  Multiple additional hepatic nodules and masses as described above, mostly unchanged in size however the dominant central RIGHT hepatic mass has increased in size. These are likely hepatomas.  4.  Splenomegaly and enlarged portal vein, in keeping with the patient's known portal hypertension  5.  Cholelithiasis     Nuclear medicine quantitative lung study August 28, 2018 at [facility]:  Total hepatopulmonary shunt  percentage was 4%. No evidence of extrahepatic radiotracer deposition is identified.     MRI abdomen on June 8, 2018 at [facility]:  Examination is limited by patient motion.  2.1 x 1.9 cm arterial hyperenhancing lesion within the left lobe of the liver appears compatible with hepatocellular carcinoma. LR-5    Hyperenhancing lesion within segment 4 of the liver bordering the caudate lobe measures 3 x 2 cm and is increased in size compared to prior. This lesion is compatible with hepatocellular carcinoma. LR-5    10 mm hyperenhancing lesion within segment 4 may be slightly decreased or unchanged in size compared to prior. LR-3    3 other small hyperenhancing lesions are seen within the right lobe of the liver measuring up to 1 cm. LR-3    Heterogeneous wedge-shaped area of delayed hypo-enhancement in the anterior liver is likely related to variable perfusion.    Findings of cirrhosis and portal hypertension.    Mildly prominent lymph nodes in the cardiophrenic fat are not significantly changed.    9 mm cystic lesion in the pancreatic tail could represent a small side branch IPMN.    Cholelithiasis. No biliary ductal dilatation is seen.    Subcarinal lymphadenopathy is partially imaged.    2 cm left adrenal nodule is unchanged and likely an adrenal adenoma.    Trace free fluid in the abdomen.    Findings discussed with Dr. Senior         CT thorax April 17, 2018 at [facility]:  1.  Mediastinal lymphadenopathy with markedly enlarged subcarinal lymph node could be due to metastatic disease or lymphoma. Reactive lymph nodes or lymphadenopathy due to granulomatous disease seems less likely.  2.  Coarse coronary artery calcifications.  3.  Cirrhosis with hypervascular hepatic lesions and splenomegaly.  4.  Cholelithiasis.     Nuclear medicine bone study April 17, 2018 at [facility]:  No scintigraphic evidence of bony metastatic disease     Portal pressure gradient April 4, 2018 at [facility]:  1.  Hepatic venography showing a patent  "right hepatic vein.  2.  Free and wedged right hepatic vein wedge pressures rendering a mean Hepatic Venous Pressure Gradient (HVPG) of 9.67 mmHg. This is consistent with portal hypertension.  (HVPG >= 10mmHg considered \"clinically significant\" portal HTN**  3.  Transjugular Liver biopsy was not performed at this time..  **Reference: Hepatic Venous Pressure Gradient in 2010:Optimal Measurement is Jose. Anya MCINTYRE, Wanda RODRIGUEZ. HEPATOLOGY, Vol. 51, No. 6, 2010     CT abdomen December 6, 2017 at [facility]:  1.  2.4 cm arterial enhancing mass in segment 4 of the liver demonstrates washout. LR-5.  2.  1.3 cm arterial enhancing lesion in the hepatic dome is similar to the recent MRI. No definite washout or capsule appreciated. LR-3  3.  Morphologic characteristics of cirrhosis with evidence of portal hypertension including splenomegaly.  4.  Cholelithiasis.  5.  Stable left adrenal nodule, likely a benign adenoma.    Current Outpatient Medications   Medication Sig Dispense Refill    metFORMIN ER (GLUCOPHAGE XR) 500 MG TABLET SR 24 HR Take 2 Tablets by mouth 2 times a day. 400 Tablet 3    diclofenac sodium (VOLTAREN) 1 % Gel Apply 4 g topically in the morning, at noon, and at bedtime. 50 g 0    acetaminophen (TYLENOL) 500 MG Tab Take 1,000 mg by mouth every 6 hours as needed for Moderate Pain.      cetirizine (ZYRTEC) 10 MG Tab Take 10 mg by mouth every day.      propranolol (INDERAL) 10 MG Tab Take 10 mg by mouth every day at 6 PM.      ondansetron (ZOFRAN) 4 MG Tab tablet Take 4 mg by mouth every four hours as needed for Nausea/Vomiting.      fluticasone (FLONASE) 50 MCG/ACT nasal spray Administer 1 Spray into affected nostril(S) 2 times a day.      therapeutic multivitamin-minerals (THERAGRAN-M) Tab Take 1 Tablet by mouth every day.      spironolactone (ALDACTONE) 50 MG Tab Take 50 mg by mouth every day.      furosemide (LASIX) 20 MG Tab Take 20 mg by mouth every day.      Vitamin D, Cholecalciferol, 25 MCG (1000 UT) " Cap Take 1,000 Units by mouth every day.       No current facility-administered medications for this encounter.       No Known Allergies    Physical Exam  Constitutional:       General: He is not in acute distress.     Appearance: He is not diaphoretic.   HENT:      Head: Normocephalic.   Eyes:      General: No scleral icterus.  Pulmonary:      Effort: Pulmonary effort is normal. No respiratory distress.   Abdominal:      General: There is no distension.   Skin:     General: Skin is warm and dry.      Coloration: Skin is not pale.      Findings: No erythema or rash.   Neurological:      General: No focal deficit present.      Mental Status: He is alert and oriented to person, place, and time. He is not disoriented.      Cranial Nerves: No cranial nerve deficit or facial asymmetry.      Sensory: Sensation is intact.      Motor: No weakness or tremor.      Coordination: Coordination normal.      Gait: Gait is intact. Gait normal.   Psychiatric:         Mood and Affect: Mood and affect normal.         Behavior: Behavior normal.         Thought Content: Thought content normal.         Cognition and Memory: Memory normal.         Judgment: Judgment normal.     ECOG Performance Status 0    Assessment and Plan:   Impression:   1. Unresectable multifocal hepatocellular carcinoma status post multiple interventions.  2. 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  3. Cirrhosis.  4. Portal hypertension.  5. Thrombocytopenia.  6. Splenomegaly.  7. Chronic lymphocytic leukemia.   8. Diabetes mellitus.  9. Hyperbilirubinemia.  10. History of alcohol use.    Plan:   Joe Norris MD has reviewed 's history and imaging studies, examined the patient, and discussed treatment options.  has recovered from his Y90 TARE in which the 4A/4B and dome lesions were treated. Imaging continues to show good treatment effect. There are small indeterminate lesions in both lobes that should continue to  be monitored. We explained that we will likely be unable to address any future tumors with Y90 TARE due to dose limits. He is on systemic therapy and we recommend continuing at the discretion of his medical oncologist. Future treatment depends on multiple factors including lab studies, imaging, and performance status. The patient was instructed to continue to follow up with all other physicians on the care team. We will obtain a MRI in 3 months with labs repeated at that time unless his oncologist has ordered surveillance. All questions were answered.    GINA Grewal with Joe Norris MD  Interventional Radiology   [place] [id number] [facility] ([id number])  [place] [address]  [phone]

## 2024-02-05 ENCOUNTER — HOSPITAL ENCOUNTER (OUTPATIENT)
Dept: LAB | Facility: MEDICAL CENTER | Age: 74
End: 2024-02-05
Attending: INTERNAL MEDICINE
Payer: MEDICARE

## 2024-02-05 LAB
ALBUMIN SERPL BCP-MCNC: 4.2 G/DL (ref 3.2–4.9)
ALBUMIN/GLOB SERPL: 1.8 G/DL
ALP SERPL-CCNC: 73 U/L (ref 30–99)
ALT SERPL-CCNC: 26 U/L (ref 2–50)
ANION GAP SERPL CALC-SCNC: 11 MMOL/L (ref 7–16)
AST SERPL-CCNC: 29 U/L (ref 12–45)
BASOPHILS # BLD AUTO: 0.8 % (ref 0–1.8)
BASOPHILS # BLD: 0.04 K/UL (ref 0–0.12)
BILIRUB SERPL-MCNC: 1.3 MG/DL (ref 0.1–1.5)
BUN SERPL-MCNC: 18 MG/DL (ref 8–22)
CALCIUM ALBUM COR SERPL-MCNC: 9.5 MG/DL (ref 8.5–10.5)
CALCIUM SERPL-MCNC: 9.7 MG/DL (ref 8.5–10.5)
CHLORIDE SERPL-SCNC: 105 MMOL/L (ref 96–112)
CO2 SERPL-SCNC: 23 MMOL/L (ref 20–33)
CREAT SERPL-MCNC: 0.98 MG/DL (ref 0.5–1.4)
EOSINOPHIL # BLD AUTO: 0.14 K/UL (ref 0–0.51)
EOSINOPHIL NFR BLD: 2.7 % (ref 0–6.9)
ERYTHROCYTE [DISTWIDTH] IN BLOOD BY AUTOMATED COUNT: 53.1 FL (ref 35.9–50)
GFR SERPLBLD CREATININE-BSD FMLA CKD-EPI: 81 ML/MIN/1.73 M 2
GLOBULIN SER CALC-MCNC: 2.3 G/DL (ref 1.9–3.5)
GLUCOSE SERPL-MCNC: 197 MG/DL (ref 65–99)
HCT VFR BLD AUTO: 45.8 % (ref 42–52)
HGB BLD-MCNC: 15.4 G/DL (ref 14–18)
IMM GRANULOCYTES # BLD AUTO: 0.01 K/UL (ref 0–0.11)
IMM GRANULOCYTES NFR BLD AUTO: 0.2 % (ref 0–0.9)
LYMPHOCYTES # BLD AUTO: 1.84 K/UL (ref 1–4.8)
LYMPHOCYTES NFR BLD: 36.1 % (ref 22–41)
MCH RBC QN AUTO: 32 PG (ref 27–33)
MCHC RBC AUTO-ENTMCNC: 33.6 G/DL (ref 32.3–36.5)
MCV RBC AUTO: 95 FL (ref 81.4–97.8)
MONOCYTES # BLD AUTO: 0.48 K/UL (ref 0–0.85)
MONOCYTES NFR BLD AUTO: 9.4 % (ref 0–13.4)
NEUTROPHILS # BLD AUTO: 2.59 K/UL (ref 1.82–7.42)
NEUTROPHILS NFR BLD: 50.8 % (ref 44–72)
NRBC # BLD AUTO: 0 K/UL
NRBC BLD-RTO: 0 /100 WBC (ref 0–0.2)
PLATELET # BLD AUTO: 83 K/UL (ref 164–446)
PLATELETS.RETICULATED NFR BLD AUTO: 4.8 % (ref 0.6–13.1)
PMV BLD AUTO: 11.2 FL (ref 9–12.9)
POTASSIUM SERPL-SCNC: 5 MMOL/L (ref 3.6–5.5)
PROT SERPL-MCNC: 6.5 G/DL (ref 6–8.2)
RBC # BLD AUTO: 4.82 M/UL (ref 4.7–6.1)
SODIUM SERPL-SCNC: 139 MMOL/L (ref 135–145)
TSH SERPL DL<=0.005 MIU/L-ACNC: 2.27 UIU/ML (ref 0.38–5.33)
WBC # BLD AUTO: 5.1 K/UL (ref 4.8–10.8)

## 2024-02-05 PROCEDURE — 85055 RETICULATED PLATELET ASSAY: CPT

## 2024-02-05 PROCEDURE — 80053 COMPREHEN METABOLIC PANEL: CPT

## 2024-02-05 PROCEDURE — 85025 COMPLETE CBC W/AUTO DIFF WBC: CPT

## 2024-02-05 PROCEDURE — 84443 ASSAY THYROID STIM HORMONE: CPT

## 2024-02-05 PROCEDURE — 36415 COLL VENOUS BLD VENIPUNCTURE: CPT

## 2024-02-20 ENCOUNTER — OFFICE VISIT (OUTPATIENT)
Dept: MEDICAL GROUP | Facility: PHYSICIAN GROUP | Age: 74
End: 2024-02-20
Payer: MEDICARE

## 2024-02-20 ENCOUNTER — PATIENT OUTREACH (OUTPATIENT)
Dept: HEALTH INFORMATION MANAGEMENT | Facility: OTHER | Age: 74
End: 2024-02-20

## 2024-02-20 VITALS
DIASTOLIC BLOOD PRESSURE: 70 MMHG | TEMPERATURE: 97.3 F | HEIGHT: 67 IN | BODY MASS INDEX: 33.71 KG/M2 | OXYGEN SATURATION: 100 % | RESPIRATION RATE: 18 BRPM | WEIGHT: 214.8 LBS | SYSTOLIC BLOOD PRESSURE: 132 MMHG | HEART RATE: 54 BPM

## 2024-02-20 DIAGNOSIS — E26.1 SECONDARY HYPERALDOSTERONISM (HCC): ICD-10-CM

## 2024-02-20 DIAGNOSIS — E11.9 TYPE 2 DIABETES MELLITUS WITHOUT COMPLICATION, WITHOUT LONG-TERM CURRENT USE OF INSULIN (HCC): ICD-10-CM

## 2024-02-20 DIAGNOSIS — C22.8 PRIMARY MALIGNANT NEOPLASM OF LIVER (HCC): ICD-10-CM

## 2024-02-20 DIAGNOSIS — C22.0 LIVER CELL CARCINOMA (HCC): ICD-10-CM

## 2024-02-20 DIAGNOSIS — C22.0 HEPATOMA (HCC): ICD-10-CM

## 2024-02-20 DIAGNOSIS — I85.00 ESOPHAGEAL VARICES WITHOUT BLEEDING, UNSPECIFIED ESOPHAGEAL VARICES TYPE (HCC): ICD-10-CM

## 2024-02-20 DIAGNOSIS — K76.6 PORTAL HYPERTENSION (HCC): ICD-10-CM

## 2024-02-20 DIAGNOSIS — C91.10 CLL (CHRONIC LYMPHOCYTIC LEUKEMIA) (HCC): ICD-10-CM

## 2024-02-20 DIAGNOSIS — F10.21 ALCOHOLISM IN REMISSION (HCC): ICD-10-CM

## 2024-02-20 DIAGNOSIS — K70.30 ALCOHOLIC CIRRHOSIS OF LIVER WITHOUT ASCITES (HCC): ICD-10-CM

## 2024-02-20 LAB
HBA1C MFR BLD: 6.7 % (ref ?–5.8)
POCT INT CON NEG: NEGATIVE
POCT INT CON POS: POSITIVE

## 2024-02-20 PROCEDURE — 3075F SYST BP GE 130 - 139MM HG: CPT

## 2024-02-20 PROCEDURE — 83036 HEMOGLOBIN GLYCOSYLATED A1C: CPT

## 2024-02-20 PROCEDURE — 99214 OFFICE O/P EST MOD 30 MIN: CPT

## 2024-02-20 PROCEDURE — 3078F DIAST BP <80 MM HG: CPT

## 2024-02-20 RX ORDER — ATEZOLIZUMAB 1200 MG/20ML
INJECTION, SOLUTION INTRAVENOUS
COMMUNITY

## 2024-02-20 RX ORDER — CETIRIZINE HYDROCHLORIDE 10 MG/1
TABLET, CHEWABLE ORAL
COMMUNITY

## 2024-02-20 RX ORDER — PROPRANOLOL HYDROCHLORIDE 10 MG/1
10 TABLET ORAL DAILY
Qty: 100 TABLET | Refills: 3 | Status: SHIPPED | OUTPATIENT
Start: 2024-02-20

## 2024-02-20 RX ORDER — SPIRONOLACTONE 50 MG/1
50 TABLET, FILM COATED ORAL DAILY
Qty: 100 TABLET | Refills: 3 | Status: SHIPPED | OUTPATIENT
Start: 2024-02-20

## 2024-02-20 RX ORDER — BEVACIZUMAB 1.25MG/.05
SYRINGE (ML) INTRAOCULAR
COMMUNITY

## 2024-02-20 RX ORDER — DIPHENHYDRAMINE HCL 25 MG
CAPSULE ORAL
COMMUNITY

## 2024-02-20 RX ORDER — FUROSEMIDE 20 MG/1
20 TABLET ORAL DAILY
Qty: 100 TABLET | Refills: 3 | Status: SHIPPED | OUTPATIENT
Start: 2024-02-20

## 2024-02-20 ASSESSMENT — PATIENT HEALTH QUESTIONNAIRE - PHQ9: CLINICAL INTERPRETATION OF PHQ2 SCORE: 0

## 2024-02-20 ASSESSMENT — FIBROSIS 4 INDEX: FIB4 SCORE: 5

## 2024-02-20 NOTE — PROGRESS NOTES
Spoke with Sohail before his pc appt. Introduced self and CCM program. Sohail kindly declined offer to enroll in CCM program. Encouraged him to reach out if he changes his mind.

## 2024-02-20 NOTE — PROGRESS NOTES
"Subjective:     CC: Diagnoses of Type 2 diabetes mellitus without complication, without long-term current use of insulin (HCC), Secondary hyperaldosteronism (HCC), CLL (chronic lymphocytic leukemia) (HCC), Portal hypertension (HCC), Esophageal varices without bleeding, unspecified esophageal varices type (HCC), Alcoholic cirrhosis of liver without ascites (HCC), Alcoholism in remission (HCC), Liver cell carcinoma (HCC), Hepatoma (HCC), and Primary malignant neoplasm of liver (HCC) were pertinent to this visit.    HPI:   Sohail presents today with    Problem   Secondary Hyperaldosteronism (Hcc)   Type 2 Diabetes Mellitus Without Complication, Without Long-Term Current Use of Insulin (Hcc)    This is a stable chronic condition.  Current medications:  Insulin:   Biguanide: Metformin ER 1000 mg twice daily.  GLP1-RA:    SGLT-2i:      DPP4-I:   TZD:   Jamil:  Sulfonyluria:     Last A1c: 6.7% 2/20/24; 5.5% 5/5/23  Last Microalb/Cr ratio: 5/5/23  Fasting sugars:  Last diabetic foot exam: 10/18/23  Last retinal eye exam: 3/6/23  ACEi/ARB?   Statin?   Aspirin?   Concomitant HTN? Propranolol 10 mg daily; spironolactone 50 mg daily; furosemide 20 mg daily  Nightly foot checks?             ROS:  Review of Systems   All other systems reviewed and are negative.      Objective:     Exam:  /70 (BP Location: Right arm, Patient Position: Sitting, BP Cuff Size: Adult)   Pulse (!) 54   Temp 36.3 °C (97.3 °F) (Temporal)   Resp 18   Ht 1.702 m (5' 7\")   Wt 97.4 kg (214 lb 12.8 oz)   SpO2 100%   BMI 33.64 kg/m²  Body mass index is 33.64 kg/m².    Physical Exam    Labs:    Latest Reference Range & Units 02/05/24 13:26 02/20/24 11:30   WBC 4.8 - 10.8 K/uL 5.1    RBC 4.70 - 6.10 M/uL 4.82    Hemoglobin 14.0 - 18.0 g/dL 15.4    Hematocrit 42.0 - 52.0 % 45.8    MCV 81.4 - 97.8 fL 95.0    MCH 27.0 - 33.0 pg 32.0    MCHC 32.3 - 36.5 g/dL 33.6    RDW 35.9 - 50.0 fL 53.1 (H)    Platelet Count 164 - 446 K/uL 83 (L)    MPV 9.0 - 12.9 fL " 11.2    Neutrophils-Polys 44.00 - 72.00 % 50.80    Neutrophils (Absolute) 1.82 - 7.42 K/uL 2.59    Lymphocytes 22.00 - 41.00 % 36.10    Lymphs (Absolute) 1.00 - 4.80 K/uL 1.84    Monocytes 0.00 - 13.40 % 9.40    Monos (Absolute) 0.00 - 0.85 K/uL 0.48    Eosinophils 0.00 - 6.90 % 2.70    Eos (Absolute) 0.00 - 0.51 K/uL 0.14    Basophils 0.00 - 1.80 % 0.80    Baso (Absolute) 0.00 - 0.12 K/uL 0.04    Immature Granulocytes 0.00 - 0.90 % 0.20    Immature Granulocytes (abs) 0.00 - 0.11 K/uL 0.01    Nucleated RBC 0.00 - 0.20 /100 WBC 0.00    NRBC (Absolute) K/uL 0.00    Imm. Plt Fraction 0.6 - 13.1 % 4.8    Sodium 135 - 145 mmol/L 139    Potassium 3.6 - 5.5 mmol/L 5.0    Chloride 96 - 112 mmol/L 105    Co2 20 - 33 mmol/L 23    Anion Gap 7.0 - 16.0  11.0    Glucose 65 - 99 mg/dL 197 (H)    Bun 8 - 22 mg/dL 18    Creatinine 0.50 - 1.40 mg/dL 0.98    GFR (CKD-EPI) >60 mL/min/1.73 m 2 81    Calcium 8.5 - 10.5 mg/dL 9.7    Correct Calcium 8.5 - 10.5 mg/dL 9.5    AST(SGOT) 12 - 45 U/L 29    ALT(SGPT) 2 - 50 U/L 26    Alkaline Phosphatase 30 - 99 U/L 73    Total Bilirubin 0.1 - 1.5 mg/dL 1.3    Albumin 3.2 - 4.9 g/dL 4.2    Total Protein 6.0 - 8.2 g/dL 6.5    Globulin 1.9 - 3.5 g/dL 2.3    A-G Ratio g/dL 1.8    Glycohemoglobin 5.8 %  6.7 !   TSH 0.380 - 5.330 uIU/mL 2.270    (H): Data is abnormally high  (L): Data is abnormally low  !: Data is abnormal    Assessment & Plan:     73 y.o. male with the following -     Problem List Items Addressed This Visit       Type 2 diabetes mellitus without complication, without long-term current use of insulin (HCC)     Chronic, stable. Continue metformin er 1000 mg BID. Continue healthy lifestyle recommendations.          Relevant Orders    POCT A1C (Completed)    Alcoholism in remission (HCC)    CLL (chronic lymphocytic leukemia) (HCC)    Hepatoma (HCC)    Esophageal varices (HCC)    Primary malignant neoplasm of liver (HCC)    Portal hypertension (HCC)    Alcoholic cirrhosis of liver  without ascites (HCC)    Liver cell carcinoma (HCC)    Secondary hyperaldosteronism (HCC)     HCC Gap Form    Diagnosis to address: C91.10 - CLL (chronic lymphocytic leukemia) (HCC)  Assessment and plan: Chronic, stable, as based on today's assessment and impact on other conditions evaluated today. Continue with current treatment plan: blood work q 3 weeks, oncology as directed, continue immunotherapy:avastin consentric.  Follow-up with specialist as directed, but at least annually.  Diagnosis: E11.9 - Type 2 diabetes mellitus without complication, without long-term current use of insulin (HCC)  Assessment and plan: Chronic, stable. Continue with current defined treatment plan: continue metformin 1000 mg BID. Follow-up at least annually.  Diagnosis: K76.6 - Portal hypertension (HCC)  Assessment and plan: Chronic, stable. Continue with current defined treatment plan: continue spironolactone, lasix as directed. Follow-up at least annually.  Diagnosis: I85.00 - Esophageal varices without bleeding, unspecified esophageal varices type (HCC)  Assessment and plan: Chronic, stable. Continue with current defined treatment plan: continue spironolactone 50 mg daily, propranolol 10 mg daily, furosemide, continue to follow up with hepatology as scheduled. Follow-up at least annually.  Diagnosis: K70.30 - Alcoholic cirrhosis of liver without ascites (HCC)  Assessment and plan: Chronic, stable. Continue with current defined treatment plan: continue etoh cessation, continue propranolol 10 mg daily, spironolactone, lasix as ordered.. Follow-up at least annually.  Diagnosis: F10.21 - Alcoholism in remission (HCC)  Assessment and plan: Chronic, stable. Continue with current defined treatment plan: continue etoh cessation. Follow-up at least annually.  Diagnosis: C22.0 - Liver cell carcinoma (HCC)  Assessment and plan: Chronic, stable, as based on today's assessment and impact on other conditions evaluated today. Continue with current  treatment plan: continue medications: immunotherapy as ordered. Follow-up with specialist as directed, but at least annually.  Diagnosis: C22.0 - Hepatoma (HCC)  Assessment and plan: Chronic, stable, as based on today's assessment and impact on other conditions evaluated today. Continue with current treatment plan: continue f/u with oncology as directed. Follow-up with specialist as directed, but at least annually.  Diagnosis: C22.8 - Primary malignant neoplasm of liver (HCC)  Assessment and plan: Chronic, stable, as based on today's assessment and impact on other conditions evaluated today. Continue with current treatment plan: cotninue f/u with onology as directed. Follow-up with specialist as directed, but at least annually.  Diagnosis: E26.1 - Secondary hyperaldosteronism (HCC)  Assessment and plan: Chronic, stable. Continue with current defined treatment plan: continue spironolactone 50 mg daily, lasix 20 mg daily.. Follow-up at least annually.  Last edited 02/20/24 11:35 PST by Lashaun Miller, [email or web address].         Patient was educated in proper administration of medication(s) ordered today including safety, possible SE, risks, benefits, rationale and alternatives to therapy.   Supportive care, differential diagnoses, and indications for immediate follow-up discussed with patient.    Pathogenesis of diagnosis discussed including typical length and natural progression.    Instructed to return to clinic or nearest emergency department for any change in condition, further concerns, or worsening of symptoms.  Patient states understanding of the plan of care and discharge instructions.    Return in about 6 months (around 8/20/2024), or if symptoms worsen or fail to improve, for Diabetes, A1C.    Please note that this dictation was created using voice recognition software. I have made every reasonable attempt to correct obvious errors, but I expect that there are errors of grammar and possibly content that I did  not discover before finalizing the note.

## 2024-02-27 ENCOUNTER — HOSPITAL ENCOUNTER (OUTPATIENT)
Dept: LAB | Facility: MEDICAL CENTER | Age: 74
End: 2024-02-27
Attending: INTERNAL MEDICINE
Payer: MEDICARE

## 2024-02-27 LAB
ALBUMIN SERPL BCP-MCNC: 4.1 G/DL (ref 3.2–4.9)
ALBUMIN/GLOB SERPL: 1.5 G/DL
ALP SERPL-CCNC: 74 U/L (ref 30–99)
ALT SERPL-CCNC: 26 U/L (ref 2–50)
ANION GAP SERPL CALC-SCNC: 14 MMOL/L (ref 7–16)
AST SERPL-CCNC: 35 U/L (ref 12–45)
BASOPHILS # BLD AUTO: 0.9 % (ref 0–1.8)
BASOPHILS # BLD: 0.04 K/UL (ref 0–0.12)
BILIRUB SERPL-MCNC: 1.4 MG/DL (ref 0.1–1.5)
BUN SERPL-MCNC: 23 MG/DL (ref 8–22)
CALCIUM ALBUM COR SERPL-MCNC: 9.8 MG/DL (ref 8.5–10.5)
CALCIUM SERPL-MCNC: 9.9 MG/DL (ref 8.5–10.5)
CHLORIDE SERPL-SCNC: 104 MMOL/L (ref 96–112)
CO2 SERPL-SCNC: 23 MMOL/L (ref 20–33)
CREAT SERPL-MCNC: 1.1 MG/DL (ref 0.5–1.4)
EOSINOPHIL # BLD AUTO: 0.09 K/UL (ref 0–0.51)
EOSINOPHIL NFR BLD: 2 % (ref 0–6.9)
ERYTHROCYTE [DISTWIDTH] IN BLOOD BY AUTOMATED COUNT: 56.4 FL (ref 35.9–50)
GFR SERPLBLD CREATININE-BSD FMLA CKD-EPI: 71 ML/MIN/1.73 M 2
GLOBULIN SER CALC-MCNC: 2.8 G/DL (ref 1.9–3.5)
GLUCOSE SERPL-MCNC: 183 MG/DL (ref 65–99)
HCT VFR BLD AUTO: 45.9 % (ref 42–52)
HGB BLD-MCNC: 15.6 G/DL (ref 14–18)
IMM GRANULOCYTES # BLD AUTO: 0.01 K/UL (ref 0–0.11)
IMM GRANULOCYTES NFR BLD AUTO: 0.2 % (ref 0–0.9)
LYMPHOCYTES # BLD AUTO: 1.52 K/UL (ref 1–4.8)
LYMPHOCYTES NFR BLD: 33.9 % (ref 22–41)
MCH RBC QN AUTO: 32.6 PG (ref 27–33)
MCHC RBC AUTO-ENTMCNC: 34 G/DL (ref 32.3–36.5)
MCV RBC AUTO: 96 FL (ref 81.4–97.8)
MONOCYTES # BLD AUTO: 0.41 K/UL (ref 0–0.85)
MONOCYTES NFR BLD AUTO: 9.2 % (ref 0–13.4)
NEUTROPHILS # BLD AUTO: 2.41 K/UL (ref 1.82–7.42)
NEUTROPHILS NFR BLD: 53.8 % (ref 44–72)
NRBC # BLD AUTO: 0 K/UL
NRBC BLD-RTO: 0 /100 WBC (ref 0–0.2)
PLATELET # BLD AUTO: 71 K/UL (ref 164–446)
PLATELETS.RETICULATED NFR BLD AUTO: 5.1 % (ref 0.6–13.1)
PMV BLD AUTO: 10.4 FL (ref 9–12.9)
POTASSIUM SERPL-SCNC: 5.3 MMOL/L (ref 3.6–5.5)
PROT SERPL-MCNC: 6.9 G/DL (ref 6–8.2)
RBC # BLD AUTO: 4.78 M/UL (ref 4.7–6.1)
SODIUM SERPL-SCNC: 141 MMOL/L (ref 135–145)
TSH SERPL DL<=0.005 MIU/L-ACNC: 2.42 UIU/ML (ref 0.38–5.33)
WBC # BLD AUTO: 4.5 K/UL (ref 4.8–10.8)

## 2024-02-27 PROCEDURE — 36415 COLL VENOUS BLD VENIPUNCTURE: CPT

## 2024-02-27 PROCEDURE — 85025 COMPLETE CBC W/AUTO DIFF WBC: CPT

## 2024-02-27 PROCEDURE — 85055 RETICULATED PLATELET ASSAY: CPT

## 2024-02-27 PROCEDURE — 84443 ASSAY THYROID STIM HORMONE: CPT

## 2024-02-27 PROCEDURE — 80053 COMPREHEN METABOLIC PANEL: CPT

## 2024-03-19 ENCOUNTER — HOSPITAL ENCOUNTER (OUTPATIENT)
Dept: LAB | Facility: MEDICAL CENTER | Age: 74
End: 2024-03-19
Attending: NURSE PRACTITIONER
Payer: MEDICARE

## 2024-03-19 LAB
ALBUMIN SERPL BCP-MCNC: 4.4 G/DL (ref 3.2–4.9)
ALBUMIN/GLOB SERPL: 1.8 G/DL
ALP SERPL-CCNC: 66 U/L (ref 30–99)
ALT SERPL-CCNC: 25 U/L (ref 2–50)
ANION GAP SERPL CALC-SCNC: 11 MMOL/L (ref 7–16)
AST SERPL-CCNC: 36 U/L (ref 12–45)
BASOPHILS # BLD AUTO: 1 % (ref 0–1.8)
BASOPHILS # BLD: 0.04 K/UL (ref 0–0.12)
BILIRUB SERPL-MCNC: 1.7 MG/DL (ref 0.1–1.5)
BUN SERPL-MCNC: 20 MG/DL (ref 8–22)
CALCIUM ALBUM COR SERPL-MCNC: 9.4 MG/DL (ref 8.5–10.5)
CALCIUM SERPL-MCNC: 9.7 MG/DL (ref 8.5–10.5)
CHLORIDE SERPL-SCNC: 106 MMOL/L (ref 96–112)
CO2 SERPL-SCNC: 24 MMOL/L (ref 20–33)
CREAT SERPL-MCNC: 0.94 MG/DL (ref 0.5–1.4)
EOSINOPHIL # BLD AUTO: 0.1 K/UL (ref 0–0.51)
EOSINOPHIL NFR BLD: 2.4 % (ref 0–6.9)
ERYTHROCYTE [DISTWIDTH] IN BLOOD BY AUTOMATED COUNT: 57.1 FL (ref 35.9–50)
GFR SERPLBLD CREATININE-BSD FMLA CKD-EPI: 85 ML/MIN/1.73 M 2
GLOBULIN SER CALC-MCNC: 2.4 G/DL (ref 1.9–3.5)
GLUCOSE SERPL-MCNC: 136 MG/DL (ref 65–99)
HCT VFR BLD AUTO: 45.6 % (ref 42–52)
HGB BLD-MCNC: 15.6 G/DL (ref 14–18)
IMM GRANULOCYTES # BLD AUTO: 0.01 K/UL (ref 0–0.11)
IMM GRANULOCYTES NFR BLD AUTO: 0.2 % (ref 0–0.9)
LYMPHOCYTES # BLD AUTO: 1.44 K/UL (ref 1–4.8)
LYMPHOCYTES NFR BLD: 34.2 % (ref 22–41)
MCH RBC QN AUTO: 32.8 PG (ref 27–33)
MCHC RBC AUTO-ENTMCNC: 34.2 G/DL (ref 32.3–36.5)
MCV RBC AUTO: 95.8 FL (ref 81.4–97.8)
MONOCYTES # BLD AUTO: 0.37 K/UL (ref 0–0.85)
MONOCYTES NFR BLD AUTO: 8.8 % (ref 0–13.4)
NEUTROPHILS # BLD AUTO: 2.25 K/UL (ref 1.82–7.42)
NEUTROPHILS NFR BLD: 53.4 % (ref 44–72)
NRBC # BLD AUTO: 0 K/UL
NRBC BLD-RTO: 0 /100 WBC (ref 0–0.2)
PLATELET # BLD AUTO: 65 K/UL (ref 164–446)
PLATELETS.RETICULATED NFR BLD AUTO: 4.6 % (ref 0.6–13.1)
PMV BLD AUTO: 10 FL (ref 9–12.9)
POTASSIUM SERPL-SCNC: 4.7 MMOL/L (ref 3.6–5.5)
PROT SERPL-MCNC: 6.8 G/DL (ref 6–8.2)
RBC # BLD AUTO: 4.76 M/UL (ref 4.7–6.1)
SODIUM SERPL-SCNC: 141 MMOL/L (ref 135–145)
TSH SERPL DL<=0.005 MIU/L-ACNC: 1.87 UIU/ML (ref 0.38–5.33)
WBC # BLD AUTO: 4.2 K/UL (ref 4.8–10.8)

## 2024-03-19 PROCEDURE — 80053 COMPREHEN METABOLIC PANEL: CPT

## 2024-03-19 PROCEDURE — 36415 COLL VENOUS BLD VENIPUNCTURE: CPT

## 2024-03-19 PROCEDURE — 85025 COMPLETE CBC W/AUTO DIFF WBC: CPT

## 2024-03-19 PROCEDURE — 85055 RETICULATED PLATELET ASSAY: CPT

## 2024-03-19 PROCEDURE — 84443 ASSAY THYROID STIM HORMONE: CPT

## 2024-04-09 ENCOUNTER — HOSPITAL ENCOUNTER (OUTPATIENT)
Dept: LAB | Facility: MEDICAL CENTER | Age: 74
End: 2024-04-09
Attending: INTERNAL MEDICINE
Payer: MEDICARE

## 2024-04-09 LAB
ALBUMIN SERPL BCP-MCNC: 4.4 G/DL (ref 3.2–4.9)
ALBUMIN/GLOB SERPL: 1.8 G/DL
ALP SERPL-CCNC: 70 U/L (ref 30–99)
ALT SERPL-CCNC: 25 U/L (ref 2–50)
ANION GAP SERPL CALC-SCNC: 14 MMOL/L (ref 7–16)
APPEARANCE UR: CLEAR
AST SERPL-CCNC: 41 U/L (ref 12–45)
BASOPHILS # BLD AUTO: 1.2 % (ref 0–1.8)
BASOPHILS # BLD: 0.06 K/UL (ref 0–0.12)
BILIRUB SERPL-MCNC: 1.6 MG/DL (ref 0.1–1.5)
BILIRUB UR QL STRIP.AUTO: NEGATIVE
BUN SERPL-MCNC: 24 MG/DL (ref 8–22)
CALCIUM ALBUM COR SERPL-MCNC: 9.2 MG/DL (ref 8.5–10.5)
CALCIUM SERPL-MCNC: 9.5 MG/DL (ref 8.5–10.5)
CHLORIDE SERPL-SCNC: 103 MMOL/L (ref 96–112)
CO2 SERPL-SCNC: 21 MMOL/L (ref 20–33)
COLOR UR: NORMAL
CREAT SERPL-MCNC: 0.95 MG/DL (ref 0.5–1.4)
EOSINOPHIL # BLD AUTO: 0.17 K/UL (ref 0–0.51)
EOSINOPHIL NFR BLD: 3.4 % (ref 0–6.9)
ERYTHROCYTE [DISTWIDTH] IN BLOOD BY AUTOMATED COUNT: 58 FL (ref 35.9–50)
GFR SERPLBLD CREATININE-BSD FMLA CKD-EPI: 84 ML/MIN/1.73 M 2
GLOBULIN SER CALC-MCNC: 2.4 G/DL (ref 1.9–3.5)
GLUCOSE SERPL-MCNC: 230 MG/DL (ref 65–99)
GLUCOSE UR STRIP.AUTO-MCNC: NEGATIVE MG/DL
HCT VFR BLD AUTO: 47.9 % (ref 42–52)
HGB BLD-MCNC: 16.3 G/DL (ref 14–18)
IMM GRANULOCYTES # BLD AUTO: 0.01 K/UL (ref 0–0.11)
IMM GRANULOCYTES NFR BLD AUTO: 0.2 % (ref 0–0.9)
KETONES UR STRIP.AUTO-MCNC: NEGATIVE MG/DL
LEUKOCYTE ESTERASE UR QL STRIP.AUTO: NEGATIVE
LYMPHOCYTES # BLD AUTO: 1.8 K/UL (ref 1–4.8)
LYMPHOCYTES NFR BLD: 36.4 % (ref 22–41)
MCH RBC QN AUTO: 33.5 PG (ref 27–33)
MCHC RBC AUTO-ENTMCNC: 34 G/DL (ref 32.3–36.5)
MCV RBC AUTO: 98.4 FL (ref 81.4–97.8)
MICRO URNS: NORMAL
MONOCYTES # BLD AUTO: 0.43 K/UL (ref 0–0.85)
MONOCYTES NFR BLD AUTO: 8.7 % (ref 0–13.4)
NEUTROPHILS # BLD AUTO: 2.47 K/UL (ref 1.82–7.42)
NEUTROPHILS NFR BLD: 50.1 % (ref 44–72)
NITRITE UR QL STRIP.AUTO: NEGATIVE
NRBC # BLD AUTO: 0 K/UL
NRBC BLD-RTO: 0 /100 WBC (ref 0–0.2)
PH UR STRIP.AUTO: 6.5 [PH] (ref 5–8)
PLATELET # BLD AUTO: 66 K/UL (ref 164–446)
PLATELETS.RETICULATED NFR BLD AUTO: 4.6 % (ref 0.6–13.1)
PMV BLD AUTO: 10.7 FL (ref 9–12.9)
POTASSIUM SERPL-SCNC: 4.9 MMOL/L (ref 3.6–5.5)
PROT SERPL-MCNC: 6.8 G/DL (ref 6–8.2)
PROT UR QL STRIP: NEGATIVE MG/DL
RBC # BLD AUTO: 4.87 M/UL (ref 4.7–6.1)
RBC UR QL AUTO: NEGATIVE
SODIUM SERPL-SCNC: 138 MMOL/L (ref 135–145)
SP GR UR STRIP.AUTO: 1.02
TSH SERPL DL<=0.005 MIU/L-ACNC: 4.43 UIU/ML (ref 0.38–5.33)
UROBILINOGEN UR STRIP.AUTO-MCNC: 1 MG/DL
WBC # BLD AUTO: 4.9 K/UL (ref 4.8–10.8)

## 2024-04-09 PROCEDURE — 85055 RETICULATED PLATELET ASSAY: CPT

## 2024-04-09 PROCEDURE — 36415 COLL VENOUS BLD VENIPUNCTURE: CPT

## 2024-04-09 PROCEDURE — 81003 URINALYSIS AUTO W/O SCOPE: CPT

## 2024-04-09 PROCEDURE — 84443 ASSAY THYROID STIM HORMONE: CPT

## 2024-04-09 PROCEDURE — 85025 COMPLETE CBC W/AUTO DIFF WBC: CPT

## 2024-04-09 PROCEDURE — 80053 COMPREHEN METABOLIC PANEL: CPT

## 2024-04-30 ENCOUNTER — HOSPITAL ENCOUNTER (OUTPATIENT)
Dept: LAB | Facility: MEDICAL CENTER | Age: 74
End: 2024-04-30
Attending: INTERNAL MEDICINE
Payer: MEDICARE

## 2024-04-30 LAB
ALBUMIN SERPL BCP-MCNC: 4.2 G/DL (ref 3.2–4.9)
ALBUMIN/GLOB SERPL: 1.8 G/DL
ALP SERPL-CCNC: 69 U/L (ref 30–99)
ALT SERPL-CCNC: 29 U/L (ref 2–50)
ANION GAP SERPL CALC-SCNC: 13 MMOL/L (ref 7–16)
APPEARANCE UR: CLEAR
AST SERPL-CCNC: 38 U/L (ref 12–45)
BACTERIA #/AREA URNS HPF: ABNORMAL /HPF
BASOPHILS # BLD AUTO: 0.7 % (ref 0–1.8)
BASOPHILS # BLD: 0.04 K/UL (ref 0–0.12)
BILIRUB SERPL-MCNC: 1.6 MG/DL (ref 0.1–1.5)
BILIRUB UR QL STRIP.AUTO: NEGATIVE
BUN SERPL-MCNC: 25 MG/DL (ref 8–22)
CALCIUM ALBUM COR SERPL-MCNC: 9.5 MG/DL (ref 8.5–10.5)
CALCIUM SERPL-MCNC: 9.7 MG/DL (ref 8.5–10.5)
CHLORIDE SERPL-SCNC: 105 MMOL/L (ref 96–112)
CO2 SERPL-SCNC: 23 MMOL/L (ref 20–33)
COLOR UR: ABNORMAL
CREAT SERPL-MCNC: 1.15 MG/DL (ref 0.5–1.4)
EOSINOPHIL # BLD AUTO: 0.13 K/UL (ref 0–0.51)
EOSINOPHIL NFR BLD: 2.4 % (ref 0–6.9)
EPI CELLS #/AREA URNS HPF: ABNORMAL /HPF
ERYTHROCYTE [DISTWIDTH] IN BLOOD BY AUTOMATED COUNT: 57.1 FL (ref 35.9–50)
GFR SERPLBLD CREATININE-BSD FMLA CKD-EPI: 67 ML/MIN/1.73 M 2
GLOBULIN SER CALC-MCNC: 2.4 G/DL (ref 1.9–3.5)
GLUCOSE SERPL-MCNC: 111 MG/DL (ref 65–99)
GLUCOSE UR STRIP.AUTO-MCNC: NEGATIVE MG/DL
HCT VFR BLD AUTO: 44.1 % (ref 42–52)
HGB BLD-MCNC: 15.3 G/DL (ref 14–18)
HYALINE CASTS #/AREA URNS LPF: ABNORMAL /LPF
IMM GRANULOCYTES # BLD AUTO: 0.01 K/UL (ref 0–0.11)
IMM GRANULOCYTES NFR BLD AUTO: 0.2 % (ref 0–0.9)
KETONES UR STRIP.AUTO-MCNC: NEGATIVE MG/DL
LEUKOCYTE ESTERASE UR QL STRIP.AUTO: ABNORMAL
LYMPHOCYTES # BLD AUTO: 2.11 K/UL (ref 1–4.8)
LYMPHOCYTES NFR BLD: 38.5 % (ref 22–41)
MCH RBC QN AUTO: 33.8 PG (ref 27–33)
MCHC RBC AUTO-ENTMCNC: 34.7 G/DL (ref 32.3–36.5)
MCV RBC AUTO: 97.4 FL (ref 81.4–97.8)
MICRO URNS: ABNORMAL
MONOCYTES # BLD AUTO: 0.59 K/UL (ref 0–0.85)
MONOCYTES NFR BLD AUTO: 10.8 % (ref 0–13.4)
MUCOUS THREADS #/AREA URNS HPF: ABNORMAL /HPF
NEUTROPHILS # BLD AUTO: 2.6 K/UL (ref 1.82–7.42)
NEUTROPHILS NFR BLD: 47.4 % (ref 44–72)
NITRITE UR QL STRIP.AUTO: NEGATIVE
NRBC # BLD AUTO: 0 K/UL
NRBC BLD-RTO: 0 /100 WBC (ref 0–0.2)
PH UR STRIP.AUTO: 5.5 [PH] (ref 5–8)
PLATELET # BLD AUTO: 77 K/UL (ref 164–446)
PLATELETS.RETICULATED NFR BLD AUTO: 3.9 % (ref 0.6–13.1)
PMV BLD AUTO: 10.2 FL (ref 9–12.9)
POTASSIUM SERPL-SCNC: 4.8 MMOL/L (ref 3.6–5.5)
PROT SERPL-MCNC: 6.6 G/DL (ref 6–8.2)
PROT UR QL STRIP: NEGATIVE MG/DL
RBC # BLD AUTO: 4.53 M/UL (ref 4.7–6.1)
RBC # URNS HPF: ABNORMAL /HPF
RBC UR QL AUTO: NEGATIVE
SODIUM SERPL-SCNC: 141 MMOL/L (ref 135–145)
SP GR UR STRIP.AUTO: 1.02
TSH SERPL DL<=0.005 MIU/L-ACNC: 2.62 UIU/ML (ref 0.38–5.33)
UROBILINOGEN UR STRIP.AUTO-MCNC: 1 MG/DL
WBC # BLD AUTO: 5.5 K/UL (ref 4.8–10.8)
WBC #/AREA URNS HPF: ABNORMAL /HPF

## 2024-05-01 NOTE — PROGRESS NOTES
Chemotherapy Verification - SECONDARY RN       Height = 172cm  Weight = 101.6kg  BSA = 2.2       Medication: Bendeka/bendamustine  Dose: 70mg/m2  Calculated Dose: 154mg                             (In mg/m2, AUC, mg/kg)       I confirm that this process was performed independently.    Requested Prescriptions

## 2024-05-21 ENCOUNTER — HOSPITAL ENCOUNTER (OUTPATIENT)
Dept: LAB | Facility: MEDICAL CENTER | Age: 74
End: 2024-05-21
Attending: INTERNAL MEDICINE
Payer: MEDICARE

## 2024-05-21 LAB
APPEARANCE UR: CLEAR
BASOPHILS # BLD AUTO: 1 % (ref 0–1.8)
BASOPHILS # BLD: 0.06 K/UL (ref 0–0.12)
BILIRUB UR QL STRIP.AUTO: ABNORMAL
COLOR UR: ABNORMAL
EOSINOPHIL # BLD AUTO: 0.11 K/UL (ref 0–0.51)
EOSINOPHIL NFR BLD: 1.8 % (ref 0–6.9)
ERYTHROCYTE [DISTWIDTH] IN BLOOD BY AUTOMATED COUNT: 57.9 FL (ref 35.9–50)
GLUCOSE UR STRIP.AUTO-MCNC: NEGATIVE MG/DL
HCT VFR BLD AUTO: 47.5 % (ref 42–52)
HGB BLD-MCNC: 16.3 G/DL (ref 14–18)
IMM GRANULOCYTES # BLD AUTO: 0.01 K/UL (ref 0–0.11)
IMM GRANULOCYTES NFR BLD AUTO: 0.2 % (ref 0–0.9)
KETONES UR STRIP.AUTO-MCNC: ABNORMAL MG/DL
LEUKOCYTE ESTERASE UR QL STRIP.AUTO: NEGATIVE
LYMPHOCYTES # BLD AUTO: 2.31 K/UL (ref 1–4.8)
LYMPHOCYTES NFR BLD: 37.3 % (ref 22–41)
MCH RBC QN AUTO: 34.6 PG (ref 27–33)
MCHC RBC AUTO-ENTMCNC: 34.3 G/DL (ref 32.3–36.5)
MCV RBC AUTO: 100.8 FL (ref 81.4–97.8)
MICRO URNS: ABNORMAL
MONOCYTES # BLD AUTO: 0.53 K/UL (ref 0–0.85)
MONOCYTES NFR BLD AUTO: 8.5 % (ref 0–13.4)
NEUTROPHILS # BLD AUTO: 3.18 K/UL (ref 1.82–7.42)
NEUTROPHILS NFR BLD: 51.2 % (ref 44–72)
NITRITE UR QL STRIP.AUTO: NEGATIVE
NRBC # BLD AUTO: 0 K/UL
NRBC BLD-RTO: 0 /100 WBC (ref 0–0.2)
PH UR STRIP.AUTO: 5.5 [PH] (ref 5–8)
PLATELET # BLD AUTO: 78 K/UL (ref 164–446)
PLATELETS.RETICULATED NFR BLD AUTO: 4.4 % (ref 0.6–13.1)
PMV BLD AUTO: 10.6 FL (ref 9–12.9)
PROT UR QL STRIP: NEGATIVE MG/DL
RBC # BLD AUTO: 4.71 M/UL (ref 4.7–6.1)
RBC UR QL AUTO: NEGATIVE
SP GR UR STRIP.AUTO: 1.02
UROBILINOGEN UR STRIP.AUTO-MCNC: 1 MG/DL
WBC # BLD AUTO: 6.2 K/UL (ref 4.8–10.8)

## 2024-05-22 LAB
ALBUMIN SERPL BCP-MCNC: 4.3 G/DL (ref 3.2–4.9)
ALBUMIN/GLOB SERPL: 1.8 G/DL
ALP SERPL-CCNC: 73 U/L (ref 30–99)
ALT SERPL-CCNC: 29 U/L (ref 2–50)
ANION GAP SERPL CALC-SCNC: 13 MMOL/L (ref 7–16)
AST SERPL-CCNC: 40 U/L (ref 12–45)
BILIRUB SERPL-MCNC: 1.5 MG/DL (ref 0.1–1.5)
BUN SERPL-MCNC: 29 MG/DL (ref 8–22)
CALCIUM ALBUM COR SERPL-MCNC: 9.8 MG/DL (ref 8.5–10.5)
CALCIUM SERPL-MCNC: 10 MG/DL (ref 8.5–10.5)
CHLORIDE SERPL-SCNC: 103 MMOL/L (ref 96–112)
CO2 SERPL-SCNC: 22 MMOL/L (ref 20–33)
CREAT SERPL-MCNC: 1.11 MG/DL (ref 0.5–1.4)
GFR SERPLBLD CREATININE-BSD FMLA CKD-EPI: 70 ML/MIN/1.73 M 2
GLOBULIN SER CALC-MCNC: 2.4 G/DL (ref 1.9–3.5)
GLUCOSE SERPL-MCNC: 179 MG/DL (ref 65–99)
POTASSIUM SERPL-SCNC: 4.6 MMOL/L (ref 3.6–5.5)
PROT SERPL-MCNC: 6.7 G/DL (ref 6–8.2)
SODIUM SERPL-SCNC: 138 MMOL/L (ref 135–145)
TSH SERPL DL<=0.005 MIU/L-ACNC: 3.43 UIU/ML (ref 0.38–5.33)

## 2024-05-24 ENCOUNTER — HOSPITAL ENCOUNTER (OUTPATIENT)
Dept: LAB | Facility: MEDICAL CENTER | Age: 74
End: 2024-05-24
Attending: INTERNAL MEDICINE
Payer: MEDICARE

## 2024-05-24 LAB — TESTOST SERPL-MCNC: 442 NG/DL (ref 175–781)

## 2024-05-25 LAB — CORTIS SERPL-MCNC: 8.9 UG/DL (ref 0–23)

## 2024-05-26 LAB — ACTH PLAS-MCNC: 12.1 PG/ML (ref 7.2–63.3)

## 2024-05-30 LAB — DHEA SERPL-MCNC: 0.69 NG/ML (ref 0.63–4.7)

## 2024-06-03 ENCOUNTER — APPOINTMENT (OUTPATIENT)
Dept: MEDICAL GROUP | Facility: PHYSICIAN GROUP | Age: 74
End: 2024-06-03
Payer: MEDICARE

## 2024-06-03 VITALS
TEMPERATURE: 96.5 F | SYSTOLIC BLOOD PRESSURE: 130 MMHG | RESPIRATION RATE: 16 BRPM | WEIGHT: 210.6 LBS | DIASTOLIC BLOOD PRESSURE: 80 MMHG | OXYGEN SATURATION: 98 % | BODY MASS INDEX: 33.06 KG/M2 | HEART RATE: 54 BPM | HEIGHT: 67 IN

## 2024-06-03 DIAGNOSIS — K70.30 ALCOHOLIC CIRRHOSIS OF LIVER WITHOUT ASCITES (HCC): ICD-10-CM

## 2024-06-03 DIAGNOSIS — I10 ESSENTIAL (PRIMARY) HYPERTENSION: ICD-10-CM

## 2024-06-03 DIAGNOSIS — Z13.6 SCREENING FOR CARDIOVASCULAR CONDITION: ICD-10-CM

## 2024-06-03 DIAGNOSIS — E11.9 TYPE 2 DIABETES MELLITUS WITHOUT COMPLICATION, WITHOUT LONG-TERM CURRENT USE OF INSULIN (HCC): ICD-10-CM

## 2024-06-03 DIAGNOSIS — K76.6 PORTAL HYPERTENSION (HCC): ICD-10-CM

## 2024-06-03 DIAGNOSIS — C91.10 CLL (CHRONIC LYMPHOCYTIC LEUKEMIA) (HCC): ICD-10-CM

## 2024-06-03 PROCEDURE — 3075F SYST BP GE 130 - 139MM HG: CPT

## 2024-06-03 PROCEDURE — 3079F DIAST BP 80-89 MM HG: CPT

## 2024-06-03 PROCEDURE — 99214 OFFICE O/P EST MOD 30 MIN: CPT

## 2024-06-03 RX ORDER — SPIRONOLACTONE 50 MG/1
100 TABLET, FILM COATED ORAL DAILY
Qty: 200 TABLET | Refills: 3 | Status: SHIPPED | OUTPATIENT
Start: 2024-06-03

## 2024-06-03 RX ORDER — FUROSEMIDE 20 MG/1
40 TABLET ORAL DAILY
Qty: 200 TABLET | Refills: 3 | Status: SHIPPED | OUTPATIENT
Start: 2024-06-03

## 2024-06-03 ASSESSMENT — FIBROSIS 4 INDEX: FIB4 SCORE: 6.95

## 2024-06-03 NOTE — ASSESSMENT & PLAN NOTE
Chronic, stable. Continue propranolol 10 mg daily. Consider coreg   Reports consistently having lower blood pressures at home since stopping work. Continue healthy lifestyle recommendations. Continue spirololactone/lasix

## 2024-06-03 NOTE — ASSESSMENT & PLAN NOTE
Chronic, stable. Using lasix 20-40 mg daily,  mg daily.  Reports he is colleting daily weights, taking 40/100 with increased weight. Continue this, continue bp control, low sodium, etoh cessation

## 2024-06-11 ENCOUNTER — HOSPITAL ENCOUNTER (OUTPATIENT)
Dept: LAB | Facility: MEDICAL CENTER | Age: 74
End: 2024-06-11
Attending: INTERNAL MEDICINE
Payer: MEDICARE

## 2024-06-11 ENCOUNTER — HOSPITAL ENCOUNTER (OUTPATIENT)
Dept: LAB | Facility: MEDICAL CENTER | Age: 74
End: 2024-06-11
Payer: MEDICARE

## 2024-06-11 DIAGNOSIS — E11.9 TYPE 2 DIABETES MELLITUS WITHOUT COMPLICATION, WITHOUT LONG-TERM CURRENT USE OF INSULIN (HCC): ICD-10-CM

## 2024-06-11 DIAGNOSIS — Z13.6 SCREENING FOR CARDIOVASCULAR CONDITION: ICD-10-CM

## 2024-06-11 LAB
ALBUMIN SERPL BCP-MCNC: 4.6 G/DL (ref 3.2–4.9)
ALBUMIN/GLOB SERPL: 1.9 G/DL
ALP SERPL-CCNC: 95 U/L (ref 30–99)
ALT SERPL-CCNC: 34 U/L (ref 2–50)
ANION GAP SERPL CALC-SCNC: 13 MMOL/L (ref 7–16)
APPEARANCE UR: CLEAR
AST SERPL-CCNC: 45 U/L (ref 12–45)
BACTERIA #/AREA URNS HPF: NEGATIVE /HPF
BASOPHILS # BLD AUTO: 1 % (ref 0–1.8)
BASOPHILS # BLD: 0.07 K/UL (ref 0–0.12)
BILIRUB SERPL-MCNC: 1.8 MG/DL (ref 0.1–1.5)
BILIRUB UR QL STRIP.AUTO: ABNORMAL
BUN SERPL-MCNC: 32 MG/DL (ref 8–22)
CALCIUM ALBUM COR SERPL-MCNC: 9.7 MG/DL (ref 8.5–10.5)
CALCIUM SERPL-MCNC: 10.2 MG/DL (ref 8.5–10.5)
CHLORIDE SERPL-SCNC: 100 MMOL/L (ref 96–112)
CHOLEST SERPL-MCNC: 131 MG/DL (ref 100–199)
CO2 SERPL-SCNC: 23 MMOL/L (ref 20–33)
COLOR UR: ABNORMAL
CREAT SERPL-MCNC: 1.26 MG/DL (ref 0.5–1.4)
CREAT UR-MCNC: 169.74 MG/DL
EOSINOPHIL # BLD AUTO: 0.18 K/UL (ref 0–0.51)
EOSINOPHIL NFR BLD: 2.6 % (ref 0–6.9)
EPI CELLS #/AREA URNS HPF: NEGATIVE /HPF
ERYTHROCYTE [DISTWIDTH] IN BLOOD BY AUTOMATED COUNT: 56.5 FL (ref 35.9–50)
EST. AVERAGE GLUCOSE BLD GHB EST-MCNC: 126 MG/DL
FASTING STATUS PATIENT QL REPORTED: NORMAL
GFR SERPLBLD CREATININE-BSD FMLA CKD-EPI: 60 ML/MIN/1.73 M 2
GLOBULIN SER CALC-MCNC: 2.4 G/DL (ref 1.9–3.5)
GLUCOSE SERPL-MCNC: 150 MG/DL (ref 65–99)
GLUCOSE UR STRIP.AUTO-MCNC: NEGATIVE MG/DL
HBA1C MFR BLD: 6 % (ref 4–5.6)
HCT VFR BLD AUTO: 48.7 % (ref 42–52)
HDLC SERPL-MCNC: 51 MG/DL
HGB BLD-MCNC: 16.8 G/DL (ref 14–18)
HYALINE CASTS #/AREA URNS LPF: ABNORMAL /LPF
IMM GRANULOCYTES # BLD AUTO: 0.02 K/UL (ref 0–0.11)
IMM GRANULOCYTES NFR BLD AUTO: 0.3 % (ref 0–0.9)
KETONES UR STRIP.AUTO-MCNC: ABNORMAL MG/DL
LDLC SERPL CALC-MCNC: 56 MG/DL
LEUKOCYTE ESTERASE UR QL STRIP.AUTO: ABNORMAL
LYMPHOCYTES # BLD AUTO: 2.99 K/UL (ref 1–4.8)
LYMPHOCYTES NFR BLD: 43.6 % (ref 22–41)
MCH RBC QN AUTO: 34.4 PG (ref 27–33)
MCHC RBC AUTO-ENTMCNC: 34.5 G/DL (ref 32.3–36.5)
MCV RBC AUTO: 99.8 FL (ref 81.4–97.8)
MICRO URNS: ABNORMAL
MICROALBUMIN UR-MCNC: <1.2 MG/DL
MICROALBUMIN/CREAT UR: NORMAL MG/G (ref 0–30)
MONOCYTES # BLD AUTO: 0.57 K/UL (ref 0–0.85)
MONOCYTES NFR BLD AUTO: 8.3 % (ref 0–13.4)
NEUTROPHILS # BLD AUTO: 3.03 K/UL (ref 1.82–7.42)
NEUTROPHILS NFR BLD: 44.2 % (ref 44–72)
NITRITE UR QL STRIP.AUTO: NEGATIVE
NRBC # BLD AUTO: 0 K/UL
NRBC BLD-RTO: 0 /100 WBC (ref 0–0.2)
PH UR STRIP.AUTO: 6 [PH] (ref 5–8)
PLATELET # BLD AUTO: 77 K/UL (ref 164–446)
PLATELETS.RETICULATED NFR BLD AUTO: 3.9 % (ref 0.6–13.1)
PMV BLD AUTO: 10.1 FL (ref 9–12.9)
POTASSIUM SERPL-SCNC: 6.1 MMOL/L (ref 3.6–5.5)
PROT SERPL-MCNC: 7 G/DL (ref 6–8.2)
PROT UR QL STRIP: NEGATIVE MG/DL
RBC # BLD AUTO: 4.88 M/UL (ref 4.7–6.1)
RBC # URNS HPF: ABNORMAL /HPF
RBC UR QL AUTO: NEGATIVE
SODIUM SERPL-SCNC: 136 MMOL/L (ref 135–145)
SP GR UR STRIP.AUTO: 1.03
TRIGL SERPL-MCNC: 122 MG/DL (ref 0–149)
TSH SERPL DL<=0.005 MIU/L-ACNC: 7.33 UIU/ML (ref 0.38–5.33)
UROBILINOGEN UR STRIP.AUTO-MCNC: 1 MG/DL
WBC # BLD AUTO: 6.9 K/UL (ref 4.8–10.8)
WBC #/AREA URNS HPF: ABNORMAL /HPF

## 2024-06-11 PROCEDURE — 85055 RETICULATED PLATELET ASSAY: CPT

## 2024-06-11 PROCEDURE — 80053 COMPREHEN METABOLIC PANEL: CPT

## 2024-06-11 PROCEDURE — 82570 ASSAY OF URINE CREATININE: CPT

## 2024-06-11 PROCEDURE — 82043 UR ALBUMIN QUANTITATIVE: CPT

## 2024-06-11 PROCEDURE — 85025 COMPLETE CBC W/AUTO DIFF WBC: CPT

## 2024-06-11 PROCEDURE — 81001 URINALYSIS AUTO W/SCOPE: CPT

## 2024-06-11 PROCEDURE — 80061 LIPID PANEL: CPT

## 2024-06-11 PROCEDURE — 36415 COLL VENOUS BLD VENIPUNCTURE: CPT

## 2024-06-11 PROCEDURE — 83036 HEMOGLOBIN GLYCOSYLATED A1C: CPT | Mod: GA

## 2024-06-11 PROCEDURE — 84443 ASSAY THYROID STIM HORMONE: CPT

## 2024-06-24 ENCOUNTER — HOSPITAL ENCOUNTER (OUTPATIENT)
Dept: RADIOLOGY | Facility: MEDICAL CENTER | Age: 74
End: 2024-06-24
Attending: INTERNAL MEDICINE
Payer: MEDICARE

## 2024-06-24 DIAGNOSIS — Z79.899 OTHER LONG TERM (CURRENT) DRUG THERAPY: ICD-10-CM

## 2024-06-24 DIAGNOSIS — Z51.12 ENCOUNTER FOR ANTINEOPLASTIC IMMUNOTHERAPY: ICD-10-CM

## 2024-06-24 DIAGNOSIS — D63.0 ANEMIA IN NEOPLASTIC DISEASE: ICD-10-CM

## 2024-06-24 DIAGNOSIS — C91.11 CHRONIC LYMPHOID LEUKEMIA IN REMISSION (HCC): ICD-10-CM

## 2024-06-24 DIAGNOSIS — D72.819 LEUKOPENIA, UNSPECIFIED TYPE: ICD-10-CM

## 2024-06-24 DIAGNOSIS — C22.0 LIVER CELL CARCINOMA (HCC): ICD-10-CM

## 2024-06-24 PROCEDURE — A9579 GAD-BASE MR CONTRAST NOS,1ML: HCPCS

## 2024-06-24 PROCEDURE — 700117 HCHG RX CONTRAST REV CODE 255

## 2024-06-24 PROCEDURE — 74183 MRI ABD W/O CNTR FLWD CNTR: CPT

## 2024-06-24 RX ADMIN — GADOTERIDOL 20 ML: 279.3 INJECTION, SOLUTION INTRAVENOUS at 12:47

## 2024-07-01 ENCOUNTER — PATIENT MESSAGE (OUTPATIENT)
Dept: MEDICAL GROUP | Facility: PHYSICIAN GROUP | Age: 74
End: 2024-07-01
Payer: MEDICARE

## 2024-07-02 RX ORDER — ONDANSETRON 4 MG/1
4 TABLET, FILM COATED ORAL EVERY 4 HOURS PRN
Qty: 20 TABLET | Refills: 0 | Status: SHIPPED | OUTPATIENT
Start: 2024-07-02

## 2024-07-05 ENCOUNTER — HOSPITAL ENCOUNTER (OUTPATIENT)
Dept: LAB | Facility: MEDICAL CENTER | Age: 74
End: 2024-07-05
Attending: INTERNAL MEDICINE
Payer: MEDICARE

## 2024-07-05 LAB
ALBUMIN SERPL BCP-MCNC: 4.3 G/DL (ref 3.2–4.9)
ALBUMIN/GLOB SERPL: 2 G/DL
ALP SERPL-CCNC: 89 U/L (ref 30–99)
ALT SERPL-CCNC: 27 U/L (ref 2–50)
ANION GAP SERPL CALC-SCNC: 14 MMOL/L (ref 7–16)
APPEARANCE UR: CLEAR
AST SERPL-CCNC: 35 U/L (ref 12–45)
BASOPHILS # BLD AUTO: 0.6 % (ref 0–1.8)
BASOPHILS # BLD: 0.03 K/UL (ref 0–0.12)
BILIRUB SERPL-MCNC: 1.1 MG/DL (ref 0.1–1.5)
BILIRUB UR QL STRIP.AUTO: NEGATIVE
BUN SERPL-MCNC: 36 MG/DL (ref 8–22)
CALCIUM ALBUM COR SERPL-MCNC: 9.2 MG/DL (ref 8.5–10.5)
CALCIUM SERPL-MCNC: 9.4 MG/DL (ref 8.5–10.5)
CHLORIDE SERPL-SCNC: 99 MMOL/L (ref 96–112)
CO2 SERPL-SCNC: 22 MMOL/L (ref 20–33)
COLOR UR: ABNORMAL
CREAT SERPL-MCNC: 1.49 MG/DL (ref 0.5–1.4)
EOSINOPHIL # BLD AUTO: 0.12 K/UL (ref 0–0.51)
EOSINOPHIL NFR BLD: 2.5 % (ref 0–6.9)
ERYTHROCYTE [DISTWIDTH] IN BLOOD BY AUTOMATED COUNT: 60.4 FL (ref 35.9–50)
GFR SERPLBLD CREATININE-BSD FMLA CKD-EPI: 49 ML/MIN/1.73 M 2
GLOBULIN SER CALC-MCNC: 2.1 G/DL (ref 1.9–3.5)
GLUCOSE SERPL-MCNC: 270 MG/DL (ref 65–99)
GLUCOSE UR STRIP.AUTO-MCNC: NEGATIVE MG/DL
HCT VFR BLD AUTO: 45.5 % (ref 42–52)
HGB BLD-MCNC: 15.5 G/DL (ref 14–18)
IMM GRANULOCYTES # BLD AUTO: 0.01 K/UL (ref 0–0.11)
IMM GRANULOCYTES NFR BLD AUTO: 0.2 % (ref 0–0.9)
KETONES UR STRIP.AUTO-MCNC: ABNORMAL MG/DL
LEUKOCYTE ESTERASE UR QL STRIP.AUTO: NEGATIVE
LYMPHOCYTES # BLD AUTO: 1.88 K/UL (ref 1–4.8)
LYMPHOCYTES NFR BLD: 39.8 % (ref 22–41)
MCH RBC QN AUTO: 35.1 PG (ref 27–33)
MCHC RBC AUTO-ENTMCNC: 34.1 G/DL (ref 32.3–36.5)
MCV RBC AUTO: 103.2 FL (ref 81.4–97.8)
MICRO URNS: ABNORMAL
MONOCYTES # BLD AUTO: 0.42 K/UL (ref 0–0.85)
MONOCYTES NFR BLD AUTO: 8.9 % (ref 0–13.4)
NEUTROPHILS # BLD AUTO: 2.26 K/UL (ref 1.82–7.42)
NEUTROPHILS NFR BLD: 48 % (ref 44–72)
NITRITE UR QL STRIP.AUTO: NEGATIVE
NRBC # BLD AUTO: 0 K/UL
NRBC BLD-RTO: 0 /100 WBC (ref 0–0.2)
PH UR STRIP.AUTO: 5.5 [PH] (ref 5–8)
PLATELET # BLD AUTO: 59 K/UL (ref 164–446)
PLATELETS.RETICULATED NFR BLD AUTO: 3.6 % (ref 0.6–13.1)
PMV BLD AUTO: 10.5 FL (ref 9–12.9)
POTASSIUM SERPL-SCNC: 5.4 MMOL/L (ref 3.6–5.5)
PROT SERPL-MCNC: 6.4 G/DL (ref 6–8.2)
PROT UR QL STRIP: NEGATIVE MG/DL
RBC # BLD AUTO: 4.41 M/UL (ref 4.7–6.1)
RBC UR QL AUTO: NEGATIVE
SODIUM SERPL-SCNC: 135 MMOL/L (ref 135–145)
SP GR UR STRIP.AUTO: 1.03
TSH SERPL DL<=0.005 MIU/L-ACNC: 3.8 UIU/ML (ref 0.38–5.33)
UROBILINOGEN UR STRIP.AUTO-MCNC: 1 MG/DL
WBC # BLD AUTO: 4.7 K/UL (ref 4.8–10.8)

## 2024-07-05 PROCEDURE — 80053 COMPREHEN METABOLIC PANEL: CPT

## 2024-07-05 PROCEDURE — 81003 URINALYSIS AUTO W/O SCOPE: CPT

## 2024-07-05 PROCEDURE — 85055 RETICULATED PLATELET ASSAY: CPT

## 2024-07-05 PROCEDURE — 85025 COMPLETE CBC W/AUTO DIFF WBC: CPT

## 2024-07-05 PROCEDURE — 36415 COLL VENOUS BLD VENIPUNCTURE: CPT

## 2024-07-05 PROCEDURE — 84443 ASSAY THYROID STIM HORMONE: CPT

## 2024-07-19 ENCOUNTER — HOSPITAL ENCOUNTER (OUTPATIENT)
Dept: LAB | Facility: MEDICAL CENTER | Age: 74
End: 2024-07-19
Attending: INTERNAL MEDICINE
Payer: MEDICARE

## 2024-07-19 LAB
ALBUMIN SERPL BCP-MCNC: 4.3 G/DL (ref 3.2–4.9)
ALBUMIN/GLOB SERPL: 1.9 G/DL
ALP SERPL-CCNC: 95 U/L (ref 30–99)
ALT SERPL-CCNC: 37 U/L (ref 2–50)
ANION GAP SERPL CALC-SCNC: 16 MMOL/L (ref 7–16)
APPEARANCE UR: CLEAR
AST SERPL-CCNC: 46 U/L (ref 12–45)
BACTERIA #/AREA URNS HPF: NEGATIVE /HPF
BASOPHILS # BLD AUTO: 0.8 % (ref 0–1.8)
BASOPHILS # BLD: 0.04 K/UL (ref 0–0.12)
BILIRUB SERPL-MCNC: 1.9 MG/DL (ref 0.1–1.5)
BILIRUB UR QL STRIP.AUTO: ABNORMAL
BUN SERPL-MCNC: 38 MG/DL (ref 8–22)
CALCIUM ALBUM COR SERPL-MCNC: 9.5 MG/DL (ref 8.5–10.5)
CALCIUM SERPL-MCNC: 9.7 MG/DL (ref 8.5–10.5)
CHLORIDE SERPL-SCNC: 100 MMOL/L (ref 96–112)
CO2 SERPL-SCNC: 20 MMOL/L (ref 20–33)
COLOR UR: ABNORMAL
CREAT SERPL-MCNC: 1.38 MG/DL (ref 0.5–1.4)
EOSINOPHIL # BLD AUTO: 0.11 K/UL (ref 0–0.51)
EOSINOPHIL NFR BLD: 2.2 % (ref 0–6.9)
EPI CELLS #/AREA URNS HPF: NEGATIVE /HPF
ERYTHROCYTE [DISTWIDTH] IN BLOOD BY AUTOMATED COUNT: 60.2 FL (ref 35.9–50)
GFR SERPLBLD CREATININE-BSD FMLA CKD-EPI: 54 ML/MIN/1.73 M 2
GLOBULIN SER CALC-MCNC: 2.3 G/DL (ref 1.9–3.5)
GLUCOSE SERPL-MCNC: 267 MG/DL (ref 65–99)
GLUCOSE UR STRIP.AUTO-MCNC: NEGATIVE MG/DL
HCT VFR BLD AUTO: 46.3 % (ref 42–52)
HGB BLD-MCNC: 15.6 G/DL (ref 14–18)
HYALINE CASTS #/AREA URNS LPF: ABNORMAL /LPF
IMM GRANULOCYTES # BLD AUTO: 0.01 K/UL (ref 0–0.11)
IMM GRANULOCYTES NFR BLD AUTO: 0.2 % (ref 0–0.9)
KETONES UR STRIP.AUTO-MCNC: ABNORMAL MG/DL
LEUKOCYTE ESTERASE UR QL STRIP.AUTO: ABNORMAL
LYMPHOCYTES # BLD AUTO: 2.31 K/UL (ref 1–4.8)
LYMPHOCYTES NFR BLD: 45.7 % (ref 22–41)
MCH RBC QN AUTO: 35 PG (ref 27–33)
MCHC RBC AUTO-ENTMCNC: 33.7 G/DL (ref 32.3–36.5)
MCV RBC AUTO: 103.8 FL (ref 81.4–97.8)
MICRO URNS: ABNORMAL
MONOCYTES # BLD AUTO: 0.38 K/UL (ref 0–0.85)
MONOCYTES NFR BLD AUTO: 7.5 % (ref 0–13.4)
NEUTROPHILS # BLD AUTO: 2.21 K/UL (ref 1.82–7.42)
NEUTROPHILS NFR BLD: 43.6 % (ref 44–72)
NITRITE UR QL STRIP.AUTO: NEGATIVE
NRBC # BLD AUTO: 0 K/UL
NRBC BLD-RTO: 0 /100 WBC (ref 0–0.2)
PH UR STRIP.AUTO: 5.5 [PH] (ref 5–8)
PLATELET # BLD AUTO: 72 K/UL (ref 164–446)
PLATELETS.RETICULATED NFR BLD AUTO: 3.6 % (ref 0.6–13.1)
PMV BLD AUTO: 10 FL (ref 9–12.9)
POTASSIUM SERPL-SCNC: 5 MMOL/L (ref 3.6–5.5)
PROT SERPL-MCNC: 6.6 G/DL (ref 6–8.2)
PROT UR QL STRIP: NEGATIVE MG/DL
RBC # BLD AUTO: 4.46 M/UL (ref 4.7–6.1)
RBC # URNS HPF: ABNORMAL /HPF
RBC UR QL AUTO: NEGATIVE
SODIUM SERPL-SCNC: 136 MMOL/L (ref 135–145)
SP GR UR STRIP.AUTO: 1.03
TSH SERPL-ACNC: 4.09 UIU/ML (ref 0.35–5.5)
UROBILINOGEN UR STRIP.AUTO-MCNC: 1 MG/DL
WBC # BLD AUTO: 5.1 K/UL (ref 4.8–10.8)
WBC #/AREA URNS HPF: ABNORMAL /HPF

## 2024-07-19 PROCEDURE — 36415 COLL VENOUS BLD VENIPUNCTURE: CPT

## 2024-07-19 PROCEDURE — 80053 COMPREHEN METABOLIC PANEL: CPT

## 2024-07-19 PROCEDURE — 84443 ASSAY THYROID STIM HORMONE: CPT

## 2024-07-19 PROCEDURE — 81001 URINALYSIS AUTO W/SCOPE: CPT

## 2024-07-19 PROCEDURE — 85025 COMPLETE CBC W/AUTO DIFF WBC: CPT

## 2024-07-19 PROCEDURE — 85055 RETICULATED PLATELET ASSAY: CPT

## 2024-08-22 ENCOUNTER — HOSPITAL ENCOUNTER (OUTPATIENT)
Dept: LAB | Facility: MEDICAL CENTER | Age: 74
End: 2024-08-22
Attending: NURSE PRACTITIONER
Payer: MEDICARE

## 2024-08-22 LAB
ALBUMIN SERPL BCP-MCNC: 4.3 G/DL (ref 3.2–4.9)
ALBUMIN/GLOB SERPL: 1.8 G/DL
ALP SERPL-CCNC: 78 U/L (ref 30–99)
ALT SERPL-CCNC: 24 U/L (ref 2–50)
ANION GAP SERPL CALC-SCNC: 16 MMOL/L (ref 7–16)
APPEARANCE UR: CLEAR
AST SERPL-CCNC: 41 U/L (ref 12–45)
BACTERIA #/AREA URNS HPF: NEGATIVE /HPF
BASOPHILS # BLD AUTO: 0.9 % (ref 0–1.8)
BASOPHILS # BLD: 0.05 K/UL (ref 0–0.12)
BILIRUB SERPL-MCNC: 1.7 MG/DL (ref 0.1–1.5)
BILIRUB UR QL STRIP.AUTO: ABNORMAL
BUN SERPL-MCNC: 32 MG/DL (ref 8–22)
CALCIUM ALBUM COR SERPL-MCNC: 9.8 MG/DL (ref 8.5–10.5)
CALCIUM SERPL-MCNC: 10 MG/DL (ref 8.5–10.5)
CHLORIDE SERPL-SCNC: 100 MMOL/L (ref 96–112)
CO2 SERPL-SCNC: 20 MMOL/L (ref 20–33)
COLOR UR: ABNORMAL
CREAT SERPL-MCNC: 1.3 MG/DL (ref 0.5–1.4)
EOSINOPHIL # BLD AUTO: 0.1 K/UL (ref 0–0.51)
EOSINOPHIL NFR BLD: 1.9 % (ref 0–6.9)
EPI CELLS #/AREA URNS HPF: NEGATIVE /HPF
ERYTHROCYTE [DISTWIDTH] IN BLOOD BY AUTOMATED COUNT: 62.5 FL (ref 35.9–50)
GFR SERPLBLD CREATININE-BSD FMLA CKD-EPI: 58 ML/MIN/1.73 M 2
GLOBULIN SER CALC-MCNC: 2.4 G/DL (ref 1.9–3.5)
GLUCOSE SERPL-MCNC: 260 MG/DL (ref 65–99)
GLUCOSE UR STRIP.AUTO-MCNC: NEGATIVE MG/DL
HCT VFR BLD AUTO: 47.3 % (ref 42–52)
HGB BLD-MCNC: 16 G/DL (ref 14–18)
HYALINE CASTS #/AREA URNS LPF: ABNORMAL /LPF
IMM GRANULOCYTES # BLD AUTO: 0.02 K/UL (ref 0–0.11)
IMM GRANULOCYTES NFR BLD AUTO: 0.4 % (ref 0–0.9)
KETONES UR STRIP.AUTO-MCNC: ABNORMAL MG/DL
LEUKOCYTE ESTERASE UR QL STRIP.AUTO: ABNORMAL
LYMPHOCYTES # BLD AUTO: 2.4 K/UL (ref 1–4.8)
LYMPHOCYTES NFR BLD: 45.5 % (ref 22–41)
MCH RBC QN AUTO: 36 PG (ref 27–33)
MCHC RBC AUTO-ENTMCNC: 33.8 G/DL (ref 32.3–36.5)
MCV RBC AUTO: 106.3 FL (ref 81.4–97.8)
MICRO URNS: ABNORMAL
MONOCYTES # BLD AUTO: 0.49 K/UL (ref 0–0.85)
MONOCYTES NFR BLD AUTO: 9.3 % (ref 0–13.4)
NEUTROPHILS # BLD AUTO: 2.21 K/UL (ref 1.82–7.42)
NEUTROPHILS NFR BLD: 42 % (ref 44–72)
NITRITE UR QL STRIP.AUTO: NEGATIVE
NRBC # BLD AUTO: 0 K/UL
NRBC BLD-RTO: 0 /100 WBC (ref 0–0.2)
PH UR STRIP.AUTO: 6 [PH] (ref 5–8)
PLATELET # BLD AUTO: 70 K/UL (ref 164–446)
PLATELETS.RETICULATED NFR BLD AUTO: 3.4 % (ref 0.6–13.1)
PMV BLD AUTO: 10 FL (ref 9–12.9)
POTASSIUM SERPL-SCNC: 5.3 MMOL/L (ref 3.6–5.5)
PROT SERPL-MCNC: 6.7 G/DL (ref 6–8.2)
PROT UR QL STRIP: NEGATIVE MG/DL
RBC # BLD AUTO: 4.45 M/UL (ref 4.7–6.1)
RBC # URNS HPF: ABNORMAL /HPF
RBC UR QL AUTO: NEGATIVE
SODIUM SERPL-SCNC: 136 MMOL/L (ref 135–145)
SP GR UR STRIP.AUTO: 1.03
TSH SERPL-ACNC: 4.55 UIU/ML (ref 0.35–5.5)
UROBILINOGEN UR STRIP.AUTO-MCNC: 1 MG/DL
WBC # BLD AUTO: 5.3 K/UL (ref 4.8–10.8)
WBC #/AREA URNS HPF: ABNORMAL /HPF

## 2024-08-22 PROCEDURE — 36415 COLL VENOUS BLD VENIPUNCTURE: CPT

## 2024-08-22 PROCEDURE — 84443 ASSAY THYROID STIM HORMONE: CPT

## 2024-08-22 PROCEDURE — 85055 RETICULATED PLATELET ASSAY: CPT

## 2024-08-22 PROCEDURE — 80053 COMPREHEN METABOLIC PANEL: CPT

## 2024-08-22 PROCEDURE — 81001 URINALYSIS AUTO W/SCOPE: CPT

## 2024-08-22 PROCEDURE — 85025 COMPLETE CBC W/AUTO DIFF WBC: CPT

## 2024-08-22 NOTE — ASSESSMENT & PLAN NOTE
Managed with losartan 25 mg and HCTZ 12.5 mg.  Stopped both medications because blood pressure readings were low, /82 today and has been low at home since stopping medications about 5 days ago. Last reading at home 117/72

## 2024-09-12 ENCOUNTER — HOSPITAL ENCOUNTER (OUTPATIENT)
Dept: LAB | Facility: MEDICAL CENTER | Age: 74
End: 2024-09-12
Attending: INTERNAL MEDICINE
Payer: MEDICARE

## 2024-09-12 LAB
ALBUMIN SERPL BCP-MCNC: 4.2 G/DL (ref 3.2–4.9)
ALBUMIN/GLOB SERPL: 1.7 G/DL
ALP SERPL-CCNC: 70 U/L (ref 30–99)
ALT SERPL-CCNC: 26 U/L (ref 2–50)
AMORPH CRY #/AREA URNS HPF: PRESENT /HPF
ANION GAP SERPL CALC-SCNC: 12 MMOL/L (ref 7–16)
APPEARANCE UR: CLEAR
AST SERPL-CCNC: 38 U/L (ref 12–45)
BACTERIA #/AREA URNS HPF: NEGATIVE /HPF
BASOPHILS # BLD AUTO: 1 % (ref 0–1.8)
BASOPHILS # BLD: 0.06 K/UL (ref 0–0.12)
BILIRUB SERPL-MCNC: 1.9 MG/DL (ref 0.1–1.5)
BILIRUB UR QL STRIP.AUTO: ABNORMAL
BUN SERPL-MCNC: 41 MG/DL (ref 8–22)
CALCIUM ALBUM COR SERPL-MCNC: 9.5 MG/DL (ref 8.5–10.5)
CALCIUM SERPL-MCNC: 9.7 MG/DL (ref 8.5–10.5)
CHLORIDE SERPL-SCNC: 100 MMOL/L (ref 96–112)
CO2 SERPL-SCNC: 24 MMOL/L (ref 20–33)
COLOR UR: ABNORMAL
CREAT SERPL-MCNC: 1.77 MG/DL (ref 0.5–1.4)
EOSINOPHIL # BLD AUTO: 0.1 K/UL (ref 0–0.51)
EOSINOPHIL NFR BLD: 1.6 % (ref 0–6.9)
EPI CELLS #/AREA URNS HPF: NEGATIVE /HPF
ERYTHROCYTE [DISTWIDTH] IN BLOOD BY AUTOMATED COUNT: 61 FL (ref 35.9–50)
GFR SERPLBLD CREATININE-BSD FMLA CKD-EPI: 40 ML/MIN/1.73 M 2
GLOBULIN SER CALC-MCNC: 2.5 G/DL (ref 1.9–3.5)
GLUCOSE SERPL-MCNC: 269 MG/DL (ref 65–99)
GLUCOSE UR STRIP.AUTO-MCNC: NEGATIVE MG/DL
HCT VFR BLD AUTO: 45.8 % (ref 42–52)
HGB BLD-MCNC: 15.7 G/DL (ref 14–18)
HYALINE CASTS #/AREA URNS LPF: ABNORMAL /LPF
IMM GRANULOCYTES # BLD AUTO: 0.02 K/UL (ref 0–0.11)
IMM GRANULOCYTES NFR BLD AUTO: 0.3 % (ref 0–0.9)
KETONES UR STRIP.AUTO-MCNC: ABNORMAL MG/DL
LEUKOCYTE ESTERASE UR QL STRIP.AUTO: ABNORMAL
LYMPHOCYTES # BLD AUTO: 2.83 K/UL (ref 1–4.8)
LYMPHOCYTES NFR BLD: 45 % (ref 22–41)
MCH RBC QN AUTO: 36.7 PG (ref 27–33)
MCHC RBC AUTO-ENTMCNC: 34.3 G/DL (ref 32.3–36.5)
MCV RBC AUTO: 107 FL (ref 81.4–97.8)
MICRO URNS: ABNORMAL
MONOCYTES # BLD AUTO: 0.54 K/UL (ref 0–0.85)
MONOCYTES NFR BLD AUTO: 8.6 % (ref 0–13.4)
NEUTROPHILS # BLD AUTO: 2.74 K/UL (ref 1.82–7.42)
NEUTROPHILS NFR BLD: 43.5 % (ref 44–72)
NITRITE UR QL STRIP.AUTO: NEGATIVE
NRBC # BLD AUTO: 0 K/UL
NRBC BLD-RTO: 0 /100 WBC (ref 0–0.2)
PH UR STRIP.AUTO: 5.5 [PH] (ref 5–8)
PLATELET # BLD AUTO: 65 K/UL (ref 164–446)
PLATELETS.RETICULATED NFR BLD AUTO: 2.7 % (ref 0.6–13.1)
PMV BLD AUTO: 10 FL (ref 9–12.9)
POTASSIUM SERPL-SCNC: 6.1 MMOL/L (ref 3.6–5.5)
PROT SERPL-MCNC: 6.7 G/DL (ref 6–8.2)
PROT UR QL STRIP: NEGATIVE MG/DL
RBC # BLD AUTO: 4.28 M/UL (ref 4.7–6.1)
RBC # URNS HPF: ABNORMAL /HPF
RBC UR QL AUTO: NEGATIVE
SODIUM SERPL-SCNC: 136 MMOL/L (ref 135–145)
SP GR UR STRIP.AUTO: 1.03
UROBILINOGEN UR STRIP.AUTO-MCNC: 1 MG/DL
WBC # BLD AUTO: 6.3 K/UL (ref 4.8–10.8)
WBC #/AREA URNS HPF: ABNORMAL /HPF

## 2024-09-12 PROCEDURE — 84443 ASSAY THYROID STIM HORMONE: CPT

## 2024-09-12 PROCEDURE — 36415 COLL VENOUS BLD VENIPUNCTURE: CPT

## 2024-09-12 PROCEDURE — 85055 RETICULATED PLATELET ASSAY: CPT

## 2024-09-12 PROCEDURE — 81001 URINALYSIS AUTO W/SCOPE: CPT

## 2024-09-12 PROCEDURE — 80053 COMPREHEN METABOLIC PANEL: CPT

## 2024-09-12 PROCEDURE — 85025 COMPLETE CBC W/AUTO DIFF WBC: CPT

## 2024-09-13 ENCOUNTER — HOSPITAL ENCOUNTER (OUTPATIENT)
Facility: MEDICAL CENTER | Age: 74
End: 2024-09-13
Attending: NURSE PRACTITIONER
Payer: MEDICARE

## 2024-09-13 LAB
ANION GAP SERPL CALC-SCNC: 13 MMOL/L (ref 7–16)
BUN SERPL-MCNC: 36 MG/DL (ref 8–22)
CALCIUM SERPL-MCNC: 8.8 MG/DL (ref 8.5–10.5)
CHLORIDE SERPL-SCNC: 104 MMOL/L (ref 96–112)
CO2 SERPL-SCNC: 20 MMOL/L (ref 20–33)
CREAT SERPL-MCNC: 1.31 MG/DL (ref 0.5–1.4)
GFR SERPLBLD CREATININE-BSD FMLA CKD-EPI: 57 ML/MIN/1.73 M 2
GLUCOSE SERPL-MCNC: 107 MG/DL (ref 65–99)
POTASSIUM SERPL-SCNC: 5.8 MMOL/L (ref 3.6–5.5)
SODIUM SERPL-SCNC: 137 MMOL/L (ref 135–145)
TSH SERPL-ACNC: 2.91 UIU/ML (ref 0.35–5.5)

## 2024-09-13 PROCEDURE — 80048 BASIC METABOLIC PNL TOTAL CA: CPT

## 2024-10-03 ENCOUNTER — OFFICE VISIT (OUTPATIENT)
Dept: MEDICAL GROUP | Facility: PHYSICIAN GROUP | Age: 74
End: 2024-10-03
Payer: MEDICARE

## 2024-10-03 VITALS
WEIGHT: 206 LBS | BODY MASS INDEX: 32.33 KG/M2 | SYSTOLIC BLOOD PRESSURE: 118 MMHG | RESPIRATION RATE: 18 BRPM | OXYGEN SATURATION: 100 % | HEART RATE: 58 BPM | TEMPERATURE: 96.8 F | HEIGHT: 67 IN | DIASTOLIC BLOOD PRESSURE: 80 MMHG

## 2024-10-03 DIAGNOSIS — E11.59 HYPERTENSION ASSOCIATED WITH TYPE 2 DIABETES MELLITUS (HCC): ICD-10-CM

## 2024-10-03 DIAGNOSIS — C91.10 CLL (CHRONIC LYMPHOCYTIC LEUKEMIA) (HCC): ICD-10-CM

## 2024-10-03 DIAGNOSIS — E11.9 TYPE 2 DIABETES MELLITUS WITHOUT COMPLICATION, WITHOUT LONG-TERM CURRENT USE OF INSULIN (HCC): ICD-10-CM

## 2024-10-03 DIAGNOSIS — R11.0 NAUSEA: ICD-10-CM

## 2024-10-03 DIAGNOSIS — K44.9 HIATAL HERNIA WITH GERD: ICD-10-CM

## 2024-10-03 DIAGNOSIS — I15.2 HYPERTENSION ASSOCIATED WITH TYPE 2 DIABETES MELLITUS (HCC): ICD-10-CM

## 2024-10-03 DIAGNOSIS — K21.9 HIATAL HERNIA WITH GERD: ICD-10-CM

## 2024-10-03 DIAGNOSIS — N18.31 STAGE 3A CHRONIC KIDNEY DISEASE: ICD-10-CM

## 2024-10-03 PROCEDURE — 99214 OFFICE O/P EST MOD 30 MIN: CPT

## 2024-10-03 PROCEDURE — 3079F DIAST BP 80-89 MM HG: CPT

## 2024-10-03 PROCEDURE — 3074F SYST BP LT 130 MM HG: CPT

## 2024-10-03 RX ORDER — PANTOPRAZOLE SODIUM 20 MG/1
20 TABLET, DELAYED RELEASE ORAL DAILY
Qty: 100 TABLET | Refills: 3 | Status: SHIPPED | OUTPATIENT
Start: 2024-10-03

## 2024-10-03 RX ORDER — METFORMIN HYDROCHLORIDE 500 MG/1
1000 TABLET, EXTENDED RELEASE ORAL 2 TIMES DAILY
Qty: 400 TABLET | Refills: 3 | Status: SHIPPED | OUTPATIENT
Start: 2024-10-03

## 2024-10-03 RX ORDER — ONDANSETRON 4 MG/1
4 TABLET, FILM COATED ORAL EVERY 4 HOURS PRN
Qty: 90 TABLET | Refills: 2 | Status: SHIPPED | OUTPATIENT
Start: 2024-10-03 | End: 2025-01-01

## 2024-10-03 ASSESSMENT — ENCOUNTER SYMPTOMS
NAUSEA: 1
HEARTBURN: 1

## 2024-10-03 ASSESSMENT — FIBROSIS 4 INDEX: FIB4 SCORE: 8.37

## 2024-10-04 ENCOUNTER — HOSPITAL ENCOUNTER (OUTPATIENT)
Dept: LAB | Facility: MEDICAL CENTER | Age: 74
End: 2024-10-04
Attending: INTERNAL MEDICINE
Payer: MEDICARE

## 2024-10-04 LAB
ALBUMIN SERPL BCP-MCNC: 4.4 G/DL (ref 3.2–4.9)
ALBUMIN/GLOB SERPL: 1.6 G/DL
ALP SERPL-CCNC: 89 U/L (ref 30–99)
ALT SERPL-CCNC: 26 U/L (ref 2–50)
ANION GAP SERPL CALC-SCNC: 16 MMOL/L (ref 7–16)
AST SERPL-CCNC: 37 U/L (ref 12–45)
BASOPHILS # BLD AUTO: 0.7 % (ref 0–1.8)
BASOPHILS # BLD: 0.05 K/UL (ref 0–0.12)
BILIRUB SERPL-MCNC: 2 MG/DL (ref 0.1–1.5)
BUN SERPL-MCNC: 35 MG/DL (ref 8–22)
CALCIUM ALBUM COR SERPL-MCNC: 9.5 MG/DL (ref 8.5–10.5)
CALCIUM SERPL-MCNC: 9.8 MG/DL (ref 8.5–10.5)
CHLORIDE SERPL-SCNC: 101 MMOL/L (ref 96–112)
CO2 SERPL-SCNC: 21 MMOL/L (ref 20–33)
CREAT SERPL-MCNC: 1.18 MG/DL (ref 0.5–1.4)
EOSINOPHIL # BLD AUTO: 0.11 K/UL (ref 0–0.51)
EOSINOPHIL NFR BLD: 1.5 % (ref 0–6.9)
ERYTHROCYTE [DISTWIDTH] IN BLOOD BY AUTOMATED COUNT: 59.9 FL (ref 35.9–50)
GFR SERPLBLD CREATININE-BSD FMLA CKD-EPI: 65 ML/MIN/1.73 M 2
GLOBULIN SER CALC-MCNC: 2.7 G/DL (ref 1.9–3.5)
GLUCOSE SERPL-MCNC: 128 MG/DL (ref 65–99)
HCT VFR BLD AUTO: 46.7 % (ref 42–52)
HGB BLD-MCNC: 15.6 G/DL (ref 14–18)
IMM GRANULOCYTES # BLD AUTO: 0.02 K/UL (ref 0–0.11)
IMM GRANULOCYTES NFR BLD AUTO: 0.3 % (ref 0–0.9)
LYMPHOCYTES # BLD AUTO: 3.85 K/UL (ref 1–4.8)
LYMPHOCYTES NFR BLD: 51.7 % (ref 22–41)
MCH RBC QN AUTO: 35.4 PG (ref 27–33)
MCHC RBC AUTO-ENTMCNC: 33.4 G/DL (ref 32.3–36.5)
MCV RBC AUTO: 105.9 FL (ref 81.4–97.8)
MONOCYTES # BLD AUTO: 0.6 K/UL (ref 0–0.85)
MONOCYTES NFR BLD AUTO: 8.1 % (ref 0–13.4)
NEUTROPHILS # BLD AUTO: 2.82 K/UL (ref 1.82–7.42)
NEUTROPHILS NFR BLD: 37.7 % (ref 44–72)
NRBC # BLD AUTO: 0 K/UL
NRBC BLD-RTO: 0 /100 WBC (ref 0–0.2)
PLATELET # BLD AUTO: 82 K/UL (ref 164–446)
PLATELETS.RETICULATED NFR BLD AUTO: 3.1 % (ref 0.6–13.1)
PMV BLD AUTO: 9.5 FL (ref 9–12.9)
POTASSIUM SERPL-SCNC: 4.9 MMOL/L (ref 3.6–5.5)
PROT SERPL-MCNC: 7.1 G/DL (ref 6–8.2)
RBC # BLD AUTO: 4.41 M/UL (ref 4.7–6.1)
SODIUM SERPL-SCNC: 138 MMOL/L (ref 135–145)
TSH SERPL-ACNC: 2.44 UIU/ML (ref 0.35–5.5)
WBC # BLD AUTO: 7.5 K/UL (ref 4.8–10.8)

## 2024-10-04 PROCEDURE — 84443 ASSAY THYROID STIM HORMONE: CPT

## 2024-10-04 PROCEDURE — 36415 COLL VENOUS BLD VENIPUNCTURE: CPT

## 2024-10-04 PROCEDURE — 85055 RETICULATED PLATELET ASSAY: CPT

## 2024-10-04 PROCEDURE — 80053 COMPREHEN METABOLIC PANEL: CPT

## 2024-10-04 PROCEDURE — 85025 COMPLETE CBC W/AUTO DIFF WBC: CPT

## 2024-10-04 PROCEDURE — 81001 URINALYSIS AUTO W/SCOPE: CPT

## 2024-10-05 LAB
AMORPH CRY #/AREA URNS HPF: PRESENT /HPF
APPEARANCE UR: ABNORMAL
BACTERIA #/AREA URNS HPF: NEGATIVE /HPF
BILIRUB UR QL STRIP.AUTO: NEGATIVE
COLOR UR: ABNORMAL
EPI CELLS #/AREA URNS HPF: NEGATIVE /HPF
GLUCOSE UR STRIP.AUTO-MCNC: NEGATIVE MG/DL
HYALINE CASTS #/AREA URNS LPF: ABNORMAL /LPF
KETONES UR STRIP.AUTO-MCNC: ABNORMAL MG/DL
LEUKOCYTE ESTERASE UR QL STRIP.AUTO: NEGATIVE
MICRO URNS: ABNORMAL
NITRITE UR QL STRIP.AUTO: NEGATIVE
PH UR STRIP.AUTO: 5.5 [PH] (ref 5–8)
PROT UR QL STRIP: NEGATIVE MG/DL
RBC # URNS HPF: ABNORMAL /HPF
RBC UR QL AUTO: NEGATIVE
SP GR UR STRIP.AUTO: 1.03
UROBILINOGEN UR STRIP.AUTO-MCNC: 1 MG/DL
WBC #/AREA URNS HPF: ABNORMAL /HPF

## 2024-10-24 ENCOUNTER — APPOINTMENT (OUTPATIENT)
Dept: RADIOLOGY | Facility: MEDICAL CENTER | Age: 74
End: 2024-10-24
Attending: INTERNAL MEDICINE
Payer: MEDICARE

## 2024-10-26 ENCOUNTER — HOSPITAL ENCOUNTER (OUTPATIENT)
Dept: LAB | Facility: MEDICAL CENTER | Age: 74
End: 2024-10-26
Attending: INTERNAL MEDICINE
Payer: MEDICARE

## 2024-10-26 LAB
ALBUMIN SERPL BCP-MCNC: 4.5 G/DL (ref 3.2–4.9)
ALBUMIN/GLOB SERPL: 1.7 G/DL
ALP SERPL-CCNC: 79 U/L (ref 30–99)
ALT SERPL-CCNC: 30 U/L (ref 2–50)
ANION GAP SERPL CALC-SCNC: 11 MMOL/L (ref 7–16)
APPEARANCE UR: CLEAR
AST SERPL-CCNC: 37 U/L (ref 12–45)
BASOPHILS # BLD AUTO: 0.5 % (ref 0–1.8)
BASOPHILS # BLD: 0.04 K/UL (ref 0–0.12)
BILIRUB SERPL-MCNC: 2 MG/DL (ref 0.1–1.5)
BILIRUB UR QL STRIP.AUTO: NEGATIVE
BUN SERPL-MCNC: 30 MG/DL (ref 8–22)
CALCIUM ALBUM COR SERPL-MCNC: 9.6 MG/DL (ref 8.5–10.5)
CALCIUM SERPL-MCNC: 10 MG/DL (ref 8.5–10.5)
CHLORIDE SERPL-SCNC: 101 MMOL/L (ref 96–112)
CO2 SERPL-SCNC: 24 MMOL/L (ref 20–33)
COLOR UR: YELLOW
CREAT SERPL-MCNC: 1.42 MG/DL (ref 0.5–1.4)
EOSINOPHIL # BLD AUTO: 0.11 K/UL (ref 0–0.51)
EOSINOPHIL NFR BLD: 1.4 % (ref 0–6.9)
ERYTHROCYTE [DISTWIDTH] IN BLOOD BY AUTOMATED COUNT: 56 FL (ref 35.9–50)
GFR SERPLBLD CREATININE-BSD FMLA CKD-EPI: 52 ML/MIN/1.73 M 2
GLOBULIN SER CALC-MCNC: 2.7 G/DL (ref 1.9–3.5)
GLUCOSE SERPL-MCNC: 180 MG/DL (ref 65–99)
GLUCOSE UR STRIP.AUTO-MCNC: NEGATIVE MG/DL
HCT VFR BLD AUTO: 45.6 % (ref 42–52)
HGB BLD-MCNC: 15.9 G/DL (ref 14–18)
IMM GRANULOCYTES # BLD AUTO: 0.02 K/UL (ref 0–0.11)
IMM GRANULOCYTES NFR BLD AUTO: 0.2 % (ref 0–0.9)
KETONES UR STRIP.AUTO-MCNC: NEGATIVE MG/DL
LEUKOCYTE ESTERASE UR QL STRIP.AUTO: NEGATIVE
LYMPHOCYTES # BLD AUTO: 4.34 K/UL (ref 1–4.8)
LYMPHOCYTES NFR BLD: 54.2 % (ref 22–41)
MCH RBC QN AUTO: 36.1 PG (ref 27–33)
MCHC RBC AUTO-ENTMCNC: 34.9 G/DL (ref 32.3–36.5)
MCV RBC AUTO: 103.4 FL (ref 81.4–97.8)
MICRO URNS: NORMAL
MONOCYTES # BLD AUTO: 0.64 K/UL (ref 0–0.85)
MONOCYTES NFR BLD AUTO: 8 % (ref 0–13.4)
NEUTROPHILS # BLD AUTO: 2.86 K/UL (ref 1.82–7.42)
NEUTROPHILS NFR BLD: 35.7 % (ref 44–72)
NITRITE UR QL STRIP.AUTO: NEGATIVE
NRBC # BLD AUTO: 0 K/UL
NRBC BLD-RTO: 0 /100 WBC (ref 0–0.2)
PH UR STRIP.AUTO: 5.5 [PH] (ref 5–8)
PLATELET # BLD AUTO: 73 K/UL (ref 164–446)
PLATELETS.RETICULATED NFR BLD AUTO: 3.1 % (ref 0.6–13.1)
PMV BLD AUTO: 10.3 FL (ref 9–12.9)
POTASSIUM SERPL-SCNC: 5.2 MMOL/L (ref 3.6–5.5)
PROT SERPL-MCNC: 7.2 G/DL (ref 6–8.2)
PROT UR QL STRIP: NEGATIVE MG/DL
RBC # BLD AUTO: 4.41 M/UL (ref 4.7–6.1)
RBC UR QL AUTO: NEGATIVE
SODIUM SERPL-SCNC: 136 MMOL/L (ref 135–145)
SP GR UR STRIP.AUTO: 1.01
TSH SERPL-ACNC: 2.81 UIU/ML (ref 0.35–5.5)
UROBILINOGEN UR STRIP.AUTO-MCNC: 1 EU/DL
WBC # BLD AUTO: 8 K/UL (ref 4.8–10.8)

## 2024-10-26 PROCEDURE — 80053 COMPREHEN METABOLIC PANEL: CPT

## 2024-10-26 PROCEDURE — 81003 URINALYSIS AUTO W/O SCOPE: CPT

## 2024-10-26 PROCEDURE — 85055 RETICULATED PLATELET ASSAY: CPT

## 2024-10-26 PROCEDURE — 84443 ASSAY THYROID STIM HORMONE: CPT

## 2024-10-26 PROCEDURE — 85025 COMPLETE CBC W/AUTO DIFF WBC: CPT

## 2024-10-26 PROCEDURE — 36415 COLL VENOUS BLD VENIPUNCTURE: CPT

## 2024-11-15 ENCOUNTER — HOSPITAL ENCOUNTER (OUTPATIENT)
Dept: LAB | Facility: MEDICAL CENTER | Age: 74
End: 2024-11-15
Attending: INTERNAL MEDICINE
Payer: MEDICARE

## 2024-11-15 LAB
ALBUMIN SERPL BCP-MCNC: 4.2 G/DL (ref 3.2–4.9)
ALBUMIN/GLOB SERPL: 1.6 G/DL
ALP SERPL-CCNC: 84 U/L (ref 30–99)
ALT SERPL-CCNC: 22 U/L (ref 2–50)
ANION GAP SERPL CALC-SCNC: 11 MMOL/L (ref 7–16)
ANISOCYTOSIS BLD QL SMEAR: ABNORMAL
APPEARANCE UR: ABNORMAL
AST SERPL-CCNC: 39 U/L (ref 12–45)
BACTERIA #/AREA URNS HPF: ABNORMAL /HPF
BASOPHILS # BLD AUTO: 0 % (ref 0–1.8)
BASOPHILS # BLD: 0 K/UL (ref 0–0.12)
BILIRUB SERPL-MCNC: 1.6 MG/DL (ref 0.1–1.5)
BILIRUB UR QL STRIP.AUTO: NEGATIVE
BUN SERPL-MCNC: 35 MG/DL (ref 8–22)
CALCIUM ALBUM COR SERPL-MCNC: 9.8 MG/DL (ref 8.5–10.5)
CALCIUM SERPL-MCNC: 10 MG/DL (ref 8.5–10.5)
CASTS URNS QL MICRO: ABNORMAL /LPF (ref 0–2)
CHLORIDE SERPL-SCNC: 102 MMOL/L (ref 96–112)
CO2 SERPL-SCNC: 22 MMOL/L (ref 20–33)
COLOR UR: ABNORMAL
COMMENT NL1176: NORMAL
CREAT SERPL-MCNC: 1.33 MG/DL (ref 0.5–1.4)
EOSINOPHIL # BLD AUTO: 0.12 K/UL (ref 0–0.51)
EOSINOPHIL NFR BLD: 2.6 % (ref 0–6.9)
EPITHELIAL CELLS 1715: ABNORMAL /HPF (ref 0–5)
ERYTHROCYTE [DISTWIDTH] IN BLOOD BY AUTOMATED COUNT: 60.3 FL (ref 35.9–50)
GFR SERPLBLD CREATININE-BSD FMLA CKD-EPI: 56 ML/MIN/1.73 M 2
GLOBULIN SER CALC-MCNC: 2.7 G/DL (ref 1.9–3.5)
GLUCOSE SERPL-MCNC: 281 MG/DL (ref 65–99)
GLUCOSE UR STRIP.AUTO-MCNC: 100 MG/DL
HCT VFR BLD AUTO: 46.4 % (ref 42–52)
HGB BLD-MCNC: 15.6 G/DL (ref 14–18)
KETONES UR STRIP.AUTO-MCNC: ABNORMAL MG/DL
LEUKOCYTE ESTERASE UR QL STRIP.AUTO: NEGATIVE
LYMPHOCYTES # BLD AUTO: 2.9 K/UL (ref 1–4.8)
LYMPHOCYTES NFR BLD: 61.8 % (ref 22–41)
MACROCYTES BLD QL SMEAR: ABNORMAL
MANUAL DIFF BLD: NORMAL
MCH RBC QN AUTO: 35.7 PG (ref 27–33)
MCHC RBC AUTO-ENTMCNC: 33.6 G/DL (ref 32.3–36.5)
MCV RBC AUTO: 106.2 FL (ref 81.4–97.8)
MICRO URNS: ABNORMAL
MONOCYTES # BLD AUTO: 0.08 K/UL (ref 0–0.85)
MONOCYTES NFR BLD AUTO: 1.7 % (ref 0–13.4)
MORPHOLOGY BLD-IMP: NORMAL
NEUTROPHILS # BLD AUTO: 1.59 K/UL (ref 1.82–7.42)
NEUTROPHILS NFR BLD: 33.9 % (ref 44–72)
NITRITE UR QL STRIP.AUTO: NEGATIVE
NRBC # BLD AUTO: 0 K/UL
NRBC BLD-RTO: 0 /100 WBC (ref 0–0.2)
PH UR STRIP.AUTO: 6 [PH] (ref 5–8)
PLATELET # BLD AUTO: 69 K/UL (ref 164–446)
PLATELET BLD QL SMEAR: NORMAL
PLATELETS.RETICULATED NFR BLD AUTO: 3 % (ref 0.6–13.1)
PMV BLD AUTO: 9.6 FL (ref 9–12.9)
POTASSIUM SERPL-SCNC: 4.7 MMOL/L (ref 3.6–5.5)
PROT SERPL-MCNC: 6.9 G/DL (ref 6–8.2)
PROT UR QL STRIP: 30 MG/DL
RBC # BLD AUTO: 4.37 M/UL (ref 4.7–6.1)
RBC # URNS HPF: ABNORMAL /HPF (ref 0–2)
RBC BLD AUTO: PRESENT
RBC UR QL AUTO: NEGATIVE
SMUDGE CELLS BLD QL SMEAR: NORMAL
SODIUM SERPL-SCNC: 135 MMOL/L (ref 135–145)
SP GR UR STRIP.AUTO: 1.03
TSH SERPL-ACNC: 3.44 UIU/ML (ref 0.35–5.5)
UROBILINOGEN UR STRIP.AUTO-MCNC: 1 EU/DL
WBC # BLD AUTO: 4.7 K/UL (ref 4.8–10.8)
WBC #/AREA URNS HPF: ABNORMAL /HPF

## 2024-11-15 PROCEDURE — 85007 BL SMEAR W/DIFF WBC COUNT: CPT

## 2024-11-15 PROCEDURE — 85027 COMPLETE CBC AUTOMATED: CPT

## 2024-11-15 PROCEDURE — 84443 ASSAY THYROID STIM HORMONE: CPT

## 2024-11-15 PROCEDURE — 85055 RETICULATED PLATELET ASSAY: CPT

## 2024-11-15 PROCEDURE — 81001 URINALYSIS AUTO W/SCOPE: CPT

## 2024-11-15 PROCEDURE — 36415 COLL VENOUS BLD VENIPUNCTURE: CPT

## 2024-11-15 PROCEDURE — 80053 COMPREHEN METABOLIC PANEL: CPT

## 2024-11-20 NOTE — ASSESSMENT & PLAN NOTE
Patient was previously treated with losartan 25 mg daily and hydrochlorothiazide 12.5 mg daily. He states that ever since he lost 30 pounds, his blood pressures were running much better so he was taken off the medication. He is currently checking home readings about once a month. He states that blood pressure tends to run right around 120/80.   Patient Specific Counseling (Will Not Stick From Patient To Patient): Discussed different treatment options: surgery under general anesthesia vs. systemic medication vs. radiation Detail Level: Detailed

## 2024-12-05 ENCOUNTER — HOSPITAL ENCOUNTER (OUTPATIENT)
Dept: LAB | Facility: MEDICAL CENTER | Age: 74
End: 2024-12-05
Attending: INTERNAL MEDICINE
Payer: MEDICARE

## 2024-12-05 LAB
APPEARANCE UR: CLEAR
BASOPHILS # BLD AUTO: 1.1 % (ref 0–1.8)
BASOPHILS # BLD: 0.08 K/UL (ref 0–0.12)
BILIRUB UR QL STRIP.AUTO: NEGATIVE
COLOR UR: YELLOW
EOSINOPHIL # BLD AUTO: 0.25 K/UL (ref 0–0.51)
EOSINOPHIL NFR BLD: 3.3 % (ref 0–6.9)
ERYTHROCYTE [DISTWIDTH] IN BLOOD BY AUTOMATED COUNT: 59.2 FL (ref 35.9–50)
GLUCOSE UR STRIP.AUTO-MCNC: NEGATIVE MG/DL
HCT VFR BLD AUTO: 48.3 % (ref 42–52)
HGB BLD-MCNC: 16.4 G/DL (ref 14–18)
IMM GRANULOCYTES # BLD AUTO: 0.02 K/UL (ref 0–0.11)
IMM GRANULOCYTES NFR BLD AUTO: 0.3 % (ref 0–0.9)
KETONES UR STRIP.AUTO-MCNC: NEGATIVE MG/DL
LEUKOCYTE ESTERASE UR QL STRIP.AUTO: NEGATIVE
LYMPHOCYTES # BLD AUTO: 4.01 K/UL (ref 1–4.8)
LYMPHOCYTES NFR BLD: 53 % (ref 22–41)
MCH RBC QN AUTO: 35.7 PG (ref 27–33)
MCHC RBC AUTO-ENTMCNC: 34 G/DL (ref 32.3–36.5)
MCV RBC AUTO: 105.2 FL (ref 81.4–97.8)
MICRO URNS: NORMAL
MONOCYTES # BLD AUTO: 0.56 K/UL (ref 0–0.85)
MONOCYTES NFR BLD AUTO: 7.4 % (ref 0–13.4)
NEUTROPHILS # BLD AUTO: 2.65 K/UL (ref 1.82–7.42)
NEUTROPHILS NFR BLD: 34.9 % (ref 44–72)
NITRITE UR QL STRIP.AUTO: NEGATIVE
NRBC # BLD AUTO: 0 K/UL
NRBC BLD-RTO: 0 /100 WBC (ref 0–0.2)
PH UR STRIP.AUTO: 5.5 [PH] (ref 5–8)
PLATELET # BLD AUTO: 73 K/UL (ref 164–446)
PLATELETS.RETICULATED NFR BLD AUTO: 2.9 % (ref 0.6–13.1)
PMV BLD AUTO: 9.8 FL (ref 9–12.9)
PROT UR QL STRIP: NEGATIVE MG/DL
RBC # BLD AUTO: 4.59 M/UL (ref 4.7–6.1)
RBC UR QL AUTO: NEGATIVE
SP GR UR STRIP.AUTO: 1.01
UROBILINOGEN UR STRIP.AUTO-MCNC: 0.2 EU/DL
WBC # BLD AUTO: 7.6 K/UL (ref 4.8–10.8)

## 2024-12-05 PROCEDURE — 81003 URINALYSIS AUTO W/O SCOPE: CPT

## 2024-12-05 PROCEDURE — 84443 ASSAY THYROID STIM HORMONE: CPT

## 2024-12-05 PROCEDURE — 36415 COLL VENOUS BLD VENIPUNCTURE: CPT

## 2024-12-05 PROCEDURE — 85055 RETICULATED PLATELET ASSAY: CPT

## 2024-12-05 PROCEDURE — 85025 COMPLETE CBC W/AUTO DIFF WBC: CPT

## 2024-12-05 PROCEDURE — 80053 COMPREHEN METABOLIC PANEL: CPT

## 2024-12-06 LAB
ALBUMIN SERPL BCP-MCNC: 4.6 G/DL (ref 3.2–4.9)
ALBUMIN/GLOB SERPL: 1.6 G/DL
ALP SERPL-CCNC: 88 U/L (ref 30–99)
ALT SERPL-CCNC: 28 U/L (ref 2–50)
ANION GAP SERPL CALC-SCNC: 12 MMOL/L (ref 7–16)
AST SERPL-CCNC: 43 U/L (ref 12–45)
BILIRUB SERPL-MCNC: 2 MG/DL (ref 0.1–1.5)
BUN SERPL-MCNC: 34 MG/DL (ref 8–22)
CALCIUM ALBUM COR SERPL-MCNC: 9.7 MG/DL (ref 8.5–10.5)
CALCIUM SERPL-MCNC: 10.2 MG/DL (ref 8.5–10.5)
CHLORIDE SERPL-SCNC: 103 MMOL/L (ref 96–112)
CO2 SERPL-SCNC: 23 MMOL/L (ref 20–33)
CREAT SERPL-MCNC: 1.57 MG/DL (ref 0.5–1.4)
GFR SERPLBLD CREATININE-BSD FMLA CKD-EPI: 46 ML/MIN/1.73 M 2
GLOBULIN SER CALC-MCNC: 2.8 G/DL (ref 1.9–3.5)
GLUCOSE SERPL-MCNC: 154 MG/DL (ref 65–99)
POTASSIUM SERPL-SCNC: 5.2 MMOL/L (ref 3.6–5.5)
PROT SERPL-MCNC: 7.4 G/DL (ref 6–8.2)
SODIUM SERPL-SCNC: 138 MMOL/L (ref 135–145)
TSH SERPL-ACNC: 3.24 UIU/ML (ref 0.35–5.5)

## 2024-12-23 ENCOUNTER — HOSPITAL ENCOUNTER (OUTPATIENT)
Dept: RADIOLOGY | Facility: MEDICAL CENTER | Age: 74
End: 2024-12-23
Attending: INTERNAL MEDICINE
Payer: MEDICARE

## 2024-12-23 DIAGNOSIS — C91.11 CHRONIC LYMPHOCYTIC LEUKEMIA OF B-CELL TYPE IN REMISSION (HCC): ICD-10-CM

## 2024-12-23 DIAGNOSIS — D72.819 WHITE BLOOD CELLS DECREASED BY CHEMOTHERAPY OR MEDICATION: ICD-10-CM

## 2024-12-23 DIAGNOSIS — Z51.12 ENCOUNTER FOR ANTINEOPLASTIC IMMUNOTHERAPY: ICD-10-CM

## 2024-12-23 DIAGNOSIS — C22.0 LIVER CARCINOMA (HCC): ICD-10-CM

## 2024-12-23 DIAGNOSIS — D63.0 ANEMIA IN NEOPLASTIC DISEASE: ICD-10-CM

## 2024-12-23 DIAGNOSIS — Z79.899 LONG TERM USE OF DRUG: ICD-10-CM

## 2024-12-23 PROCEDURE — A9579 GAD-BASE MR CONTRAST NOS,1ML: HCPCS | Mod: JZ | Performed by: INTERNAL MEDICINE

## 2024-12-23 PROCEDURE — 700117 HCHG RX CONTRAST REV CODE 255: Mod: JZ | Performed by: INTERNAL MEDICINE

## 2024-12-23 PROCEDURE — 74183 MRI ABD W/O CNTR FLWD CNTR: CPT

## 2024-12-23 RX ADMIN — GADOTERIDOL 20 ML: 279.3 INJECTION, SOLUTION INTRAVENOUS at 13:34

## 2024-12-31 ENCOUNTER — HOSPITAL ENCOUNTER (OUTPATIENT)
Dept: LAB | Facility: MEDICAL CENTER | Age: 74
End: 2024-12-31
Attending: INTERNAL MEDICINE
Payer: MEDICARE

## 2024-12-31 LAB
ALBUMIN SERPL BCP-MCNC: 4.4 G/DL (ref 3.2–4.9)
ALBUMIN/GLOB SERPL: 1.5 G/DL
ALP SERPL-CCNC: 87 U/L (ref 30–99)
ALT SERPL-CCNC: 26 U/L (ref 2–50)
ANION GAP SERPL CALC-SCNC: 15 MMOL/L (ref 7–16)
APPEARANCE UR: CLEAR
AST SERPL-CCNC: 37 U/L (ref 12–45)
BACTERIA #/AREA URNS HPF: ABNORMAL /HPF
BASOPHILS # BLD AUTO: 0.7 % (ref 0–1.8)
BASOPHILS # BLD: 0.05 K/UL (ref 0–0.12)
BILIRUB SERPL-MCNC: 2 MG/DL (ref 0.1–1.5)
BILIRUB UR QL STRIP.AUTO: ABNORMAL
BUN SERPL-MCNC: 28 MG/DL (ref 8–22)
CALCIUM ALBUM COR SERPL-MCNC: 9.2 MG/DL (ref 8.5–10.5)
CALCIUM SERPL-MCNC: 9.5 MG/DL (ref 8.5–10.5)
CASTS URNS QL MICRO: ABNORMAL /LPF (ref 0–2)
CHLORIDE SERPL-SCNC: 98 MMOL/L (ref 96–112)
CO2 SERPL-SCNC: 22 MMOL/L (ref 20–33)
COLOR UR: ABNORMAL
CREAT SERPL-MCNC: 1.29 MG/DL (ref 0.5–1.4)
EOSINOPHIL # BLD AUTO: 0.12 K/UL (ref 0–0.51)
EOSINOPHIL NFR BLD: 1.6 % (ref 0–6.9)
EPITHELIAL CELLS 1715: ABNORMAL /HPF (ref 0–5)
ERYTHROCYTE [DISTWIDTH] IN BLOOD BY AUTOMATED COUNT: 59.8 FL (ref 35.9–50)
GFR SERPLBLD CREATININE-BSD FMLA CKD-EPI: 58 ML/MIN/1.73 M 2
GLOBULIN SER CALC-MCNC: 2.9 G/DL (ref 1.9–3.5)
GLUCOSE SERPL-MCNC: 177 MG/DL (ref 65–99)
GLUCOSE UR STRIP.AUTO-MCNC: NEGATIVE MG/DL
HCT VFR BLD AUTO: 48.3 % (ref 42–52)
HGB BLD-MCNC: 16.1 G/DL (ref 14–18)
IMM GRANULOCYTES # BLD AUTO: 0.02 K/UL (ref 0–0.11)
IMM GRANULOCYTES NFR BLD AUTO: 0.3 % (ref 0–0.9)
KETONES UR STRIP.AUTO-MCNC: ABNORMAL MG/DL
LEUKOCYTE ESTERASE UR QL STRIP.AUTO: ABNORMAL
LYMPHOCYTES # BLD AUTO: 3.54 K/UL (ref 1–4.8)
LYMPHOCYTES NFR BLD: 47.9 % (ref 22–41)
MCH RBC QN AUTO: 35.5 PG (ref 27–33)
MCHC RBC AUTO-ENTMCNC: 33.3 G/DL (ref 32.3–36.5)
MCV RBC AUTO: 106.4 FL (ref 81.4–97.8)
MICRO URNS: ABNORMAL
MONOCYTES # BLD AUTO: 0.58 K/UL (ref 0–0.85)
MONOCYTES NFR BLD AUTO: 7.8 % (ref 0–13.4)
NEUTROPHILS # BLD AUTO: 3.08 K/UL (ref 1.82–7.42)
NEUTROPHILS NFR BLD: 41.7 % (ref 44–72)
NITRITE UR QL STRIP.AUTO: NEGATIVE
NRBC # BLD AUTO: 0 K/UL
NRBC BLD-RTO: 0 /100 WBC (ref 0–0.2)
PH UR STRIP.AUTO: 6 [PH] (ref 5–8)
PLATELET # BLD AUTO: 62 K/UL (ref 164–446)
PLATELET BLD QL SMEAR: NORMAL
PLATELETS.RETICULATED NFR BLD AUTO: 4.1 % (ref 0.6–13.1)
PMV BLD AUTO: 9.8 FL (ref 9–12.9)
POTASSIUM SERPL-SCNC: 5.3 MMOL/L (ref 3.6–5.5)
PROT SERPL-MCNC: 7.3 G/DL (ref 6–8.2)
PROT UR QL STRIP: NEGATIVE MG/DL
RBC # BLD AUTO: 4.54 M/UL (ref 4.7–6.1)
RBC # URNS HPF: ABNORMAL /HPF (ref 0–2)
RBC UR QL AUTO: NEGATIVE
SODIUM SERPL-SCNC: 135 MMOL/L (ref 135–145)
SP GR UR STRIP.AUTO: 1.03
TSH SERPL-ACNC: 6.9 UIU/ML (ref 0.35–5.5)
UROBILINOGEN UR STRIP.AUTO-MCNC: 1 EU/DL
WBC # BLD AUTO: 7.4 K/UL (ref 4.8–10.8)
WBC #/AREA URNS HPF: ABNORMAL /HPF

## 2024-12-31 PROCEDURE — 85055 RETICULATED PLATELET ASSAY: CPT

## 2024-12-31 PROCEDURE — 36415 COLL VENOUS BLD VENIPUNCTURE: CPT

## 2024-12-31 PROCEDURE — 84443 ASSAY THYROID STIM HORMONE: CPT

## 2024-12-31 PROCEDURE — 80053 COMPREHEN METABOLIC PANEL: CPT

## 2024-12-31 PROCEDURE — 85025 COMPLETE CBC W/AUTO DIFF WBC: CPT

## 2024-12-31 PROCEDURE — 81001 URINALYSIS AUTO W/SCOPE: CPT

## 2025-01-03 ENCOUNTER — APPOINTMENT (OUTPATIENT)
Dept: MEDICAL GROUP | Facility: PHYSICIAN GROUP | Age: 75
End: 2025-01-03
Payer: MEDICARE

## 2025-01-20 ENCOUNTER — OFFICE VISIT (OUTPATIENT)
Dept: MEDICAL GROUP | Facility: PHYSICIAN GROUP | Age: 75
End: 2025-01-20
Payer: MEDICARE

## 2025-01-20 VITALS
HEART RATE: 68 BPM | TEMPERATURE: 97.4 F | HEIGHT: 67 IN | BODY MASS INDEX: 32.8 KG/M2 | OXYGEN SATURATION: 96 % | WEIGHT: 209 LBS | DIASTOLIC BLOOD PRESSURE: 66 MMHG | RESPIRATION RATE: 14 BRPM | SYSTOLIC BLOOD PRESSURE: 130 MMHG

## 2025-01-20 DIAGNOSIS — K44.9 HIATAL HERNIA WITH GERD: ICD-10-CM

## 2025-01-20 DIAGNOSIS — C22.0 LIVER CELL CARCINOMA (HCC): ICD-10-CM

## 2025-01-20 DIAGNOSIS — Z91.81 RISK FOR FALLS: ICD-10-CM

## 2025-01-20 DIAGNOSIS — E66.9 OBESITY (BMI 30-39.9): ICD-10-CM

## 2025-01-20 DIAGNOSIS — R11.0 NAUSEA: ICD-10-CM

## 2025-01-20 DIAGNOSIS — K21.9 HIATAL HERNIA WITH GERD: ICD-10-CM

## 2025-01-20 DIAGNOSIS — E11.9 TYPE 2 DIABETES MELLITUS WITHOUT COMPLICATION, WITHOUT LONG-TERM CURRENT USE OF INSULIN (HCC): ICD-10-CM

## 2025-01-20 DIAGNOSIS — I15.2 HYPERTENSION SECONDARY TO ENDOCRINE DISORDERS: ICD-10-CM

## 2025-01-20 LAB
HBA1C MFR BLD: 6.2 % (ref ?–5.8)
POCT INT CON NEG: NEGATIVE
POCT INT CON POS: POSITIVE

## 2025-01-20 PROCEDURE — 3075F SYST BP GE 130 - 139MM HG: CPT

## 2025-01-20 PROCEDURE — 3078F DIAST BP <80 MM HG: CPT

## 2025-01-20 PROCEDURE — G0438 PPPS, INITIAL VISIT: HCPCS

## 2025-01-20 PROCEDURE — 83036 HEMOGLOBIN GLYCOSYLATED A1C: CPT

## 2025-01-20 ASSESSMENT — PATIENT HEALTH QUESTIONNAIRE - PHQ9: CLINICAL INTERPRETATION OF PHQ2 SCORE: 0

## 2025-01-20 ASSESSMENT — FIBROSIS 4 INDEX: FIB4 SCORE: 8.66

## 2025-01-20 ASSESSMENT — ENCOUNTER SYMPTOMS: GENERAL WELL-BEING: FAIR

## 2025-01-20 ASSESSMENT — ACTIVITIES OF DAILY LIVING (ADL): BATHING_REQUIRES_ASSISTANCE: 0

## 2025-01-20 NOTE — ASSESSMENT & PLAN NOTE
Reports history of falling due to tripping. Denies injury. Denies dizziness, weakness, or joint instability. Discussed fall precaution.

## 2025-01-20 NOTE — ASSESSMENT & PLAN NOTE
Chronic, ongoing. MRI 12/23/2 IMPRESSION:   1.  Hepatic cirrhosis with multiple enhancing lesions again seen.  Lesion in segment 3 appears enlarged compared to prior exam.  Other lesions in the RIGHT and LEFT lobes appear unchanged.  No new lesion demonstrated.  2.  Splenomegaly, increased from prior.  3.  LEFT adrenal adenoma again seen.    Continue f/u with oncology.

## 2025-01-20 NOTE — PROGRESS NOTES
Chief Complaint   Patient presents with    Annual Wellness Visit       HPI:  Sohail Duvall is a 74 y.o. here for Medicare Annual Wellness Visit     Patient Active Problem List    Diagnosis Date Noted    Nausea 10/03/2024    Hiatal hernia with GERD 10/03/2024    Stage 3a chronic kidney disease 10/03/2024    Secondary hyperaldosteronism (HCC) 02/20/2024    Risk for falls 10/18/2023    GI bleed 04/24/2023    Leukocytosis 02/21/2023    Vitamin A deficiency 02/21/2023    History of colonic polyps 02/21/2023    Liver cell carcinoma (HCC) 02/21/2023    Iron deficiency anemia secondary to blood loss (chronic) 02/21/2023    Gout 02/21/2023    Hypertension associated with type 2 diabetes mellitus (HCC) 02/21/2023    Ascites 02/21/2023    Neuropathy 05/19/2022    Abdominal pain 08/03/2021    Portal hypertension (HCC) 08/03/2021    Alcoholic cirrhosis of liver without ascites (HCC) 08/03/2021    Primary malignant neoplasm of liver (HCC) 11/06/2020    Esophageal varices (HCC) 06/01/2020    Hypertension secondary to endocrine disorders 06/01/2020    Other diseases of stomach and duodenum 11/12/2019    Hepatoma (HCC) 07/09/2018    Secondary thrombocytopenia 03/20/2018    Obesity (BMI 30-39.9) 12/13/2017    Other hemorrhoids 11/06/2017    Dvrtclos of lg int w/o perforation or abscess w/o bleeding 11/06/2017    Benign neoplasm of sigmoid colon 11/06/2017    Seasonal allergies 05/04/2016    History of basal cell carcinoma (BCC) 11/23/2015    CLL (chronic lymphocytic leukemia) (HCC) 07/17/2014    Type 2 diabetes mellitus without complication, without long-term current use of insulin (HCC) 03/24/2014    Alcoholism in remission (HCC) 03/24/2014       Current Outpatient Medications   Medication Sig Dispense Refill    metFORMIN ER (GLUCOPHAGE XR) 500 MG TABLET SR 24 HR Take 2 Tablets by mouth 2 times a day. 400 Tablet 3    pantoprazole (PROTONIX) 20 MG tablet Take 1 Tablet by mouth every day. 100 Tablet 3    spironolactone  (ALDACTONE) 50 MG Tab Take 2 Tablets by mouth every day. 200 Tablet 3    furosemide (LASIX) 20 MG Tab Take 2 Tablets by mouth every day. 200 Tablet 3    atezolizumab (TECENTRIQ) 1200 MG/20ML Solution injection       diphenhydrAMINE (BENADRYL ALLERGY) 25 MG capsule 0      Bevacizumab 1.25 MG/0.05ML Solution Prefilled Syringe       propranolol (INDERAL) 10 MG Tab Take 1 Tablet by mouth every day at 6 PM. 100 Tablet 3    acetaminophen (TYLENOL) 500 MG Tab Take 1,000 mg by mouth every 6 hours as needed for Moderate Pain.      cetirizine (ZYRTEC) 10 MG Tab Take 10 mg by mouth every day.      fluticasone (FLONASE) 50 MCG/ACT nasal spray Administer 1 Spray into affected nostril(S) 2 times a day.      therapeutic multivitamin-minerals (THERAGRAN-M) Tab Take 1 Tablet by mouth every day.      Vitamin D, Cholecalciferol, 25 MCG (1000 UT) Cap Take 1,000 Units by mouth every day.       No current facility-administered medications for this visit.          Current supplements as per medication list.     Allergies: Patient has no known allergies.    Current social contact/activities: brice     He  reports that he has never smoked. His smokeless tobacco use includes snuff and chew. He reports that he does not currently use alcohol after a past usage of about 7.2 oz of alcohol per week. He reports current drug use. Drugs: Marijuana, Inhaled, and Oral.  Ready to quit: Not Answered  Counseling given: Not Answered      ROS:    Gait: Uses no assistive device  Ostomy: No  Other tubes: No  Amputations: No  Chronic oxygen use: No  Last eye exam: 2024  Wears hearing aids: No   : Denies any urinary leakage during the last 6 months    Screening:  Depression Screening  Little interest or pleasure in doing things?  0 - not at all  Feeling down, depressed , or hopeless? 0 - not at all  Patient Health Questionnaire Score: 0     If depressive symptoms identified deferred to follow up visit unless specifically addressed in assessment and  plan.    Interpretation of PHQ-9 Total Score   Score Severity   1-4 No Depression   5-9 Mild Depression   10-14 Moderate Depression   15-19 Moderately Severe Depression   20-27 Severe Depression    Screening for Cognitive Impairment  Do you or any of your friends or family members have any concern about your memory? No  Three Minute Recall (Leader, Season, Table) 3/3    Huber clock face with all 12 numbers and set the hands to show 10 minutes after 11.  Yes    Cognitive concerns identified deferred for follow up unless specifically addressed in assessment and plan.    Fall Risk Assessment  Has the patient had two or more falls in the last year or any fall with injury in the last year?  Yes    Safety Assessment  Do you always wear your seatbelt?  No  Any changes to home needed to function safely? No  Difficulty hearing.  Yes  Patient counseled about all safety risks that were identified.    Functional Assessment ADLs  Are there any barriers preventing you from cooking for yourself or meeting nutritional needs?  No.    Are there any barriers preventing you from driving safely or obtaining transportation?  No.    Are there any barriers preventing you from using a telephone or calling for help?  No    Are there any barriers preventing you from shopping?  No.    Are there any barriers preventing you from taking care of your own finances?  No    Are there any barriers preventing you from managing your medications?  No    Are there any barriers preventing you from showering, bathing or dressing yourself? No    Are there any barriers preventing you from doing housework or laundry? No  Are there any barriers preventing you from using the toilet?No  Are you currently engaging in any exercise or physical activity?  Yes.      Self-Assessment of Health  What is your perception of your health? Fair    Do you sleep more than six hours a night? Yes    In the past 7 days, how much did pain keep you from doing your normal work? None     Do you spend quality time with family or friends (virtually or in person)? Yes    Do you usually eat a heart healthy diet that constists of a variety of fruits, vegetables, whole grains and fiber? Yes    Do you eat foods high in fat and/or Fast Food more than three times per week? No    How concerned are you that your medical conditions are not being well managed? Not at all    Are you worried that in the next 2 months, you may not have stable housing that you own, rent, or stay in as part of a household? No      Advance Care Planning  Do you have an Advance Directive, Living Will, Durable Power of , or POLST? Yes    Living Will     is not on file - instructed patient to bring in a copy to scan into their chart      Health Maintenance Summary            Ordered - A1c Screening (Every 6 Months) Ordered on 1/20/2025 06/11/2024  HEMOGLOBIN A1C    02/20/2024  POCT A1C    05/05/2023  POCT  A1C    11/21/2022  HEMOGLOBIN A1C    08/17/2022  POCT  A1C    Only the first 5 history entries have been loaded, but more history exists.              Postponed - Zoster (Shingles) Vaccines (1 of 2) Postponed until 6/20/2025      No completion history exists for this topic.              Postponed - COVID-19 Vaccine (14 - 2024-25 season) Postponed until 1/20/2026 11/11/2024  Imm Admin: Covid-19 Mrna (Spikevax) Moderna 12+ Years    11/14/2023  Imm Admin: Covid-19 Mrna (Spikevax) Moderna 12+ Years    09/29/2022  Imm Admin: MODERNA BIVALENT BOOSTER SARS-COV-2 VACCINE (6+)    09/28/2022  Imm Admin: MODERNA BIVALENT BOOSTER SARS-COV-2 VACCINE (6+)    05/27/2022  Imm Admin: MODERNA SARS-COV-2 VACCINE (12+)    Only the first 5 history entries have been loaded, but more history exists.              Diabetes: Retinopathy Screening (Yearly) Next due on 3/8/2025      03/08/2024  AMB EXTERNAL RETINAL SCREENING RESULTS    03/06/2023  AMB EXTERNAL RETINAL SCREENING RESULTS    03/18/2020  REFERRAL FOR RETINAL SCREENING EXAM     03/04/2019  REFERRAL FOR RETINAL SCREENING EXAM    02/26/2018  REFERRAL FOR RETINAL SCREENING EXAM    Only the first 5 history entries have been loaded, but more history exists.              Fasting Lipid Profile (Yearly) Next due on 6/11/2025 06/11/2024  Lipid Profile    06/02/2022  Lipid Profile    01/19/2021  Lipid Profile    10/18/2016  LIPID PROFILE    04/22/2016  LIPID PROFILE    Only the first 5 history entries have been loaded, but more history exists.              Diabetes: Urine Protein Screening (Yearly) Next due on 6/11/2025 06/11/2024  MICROALBUMIN CREAT RATIO URINE    05/05/2023  Microalbumin Creat Ratio Urine (Clinic Collect)    05/19/2022  MICROALBUMIN CREAT RATIO URINE    04/07/2021  MICROALBUMIN CREAT RATIO URINE (CLINIC COLLECT)    10/28/2019  MICROALBUMIN CREAT RATIO URINE    Only the first 5 history entries have been loaded, but more history exists.              Diabetes: Monofilament / LE Exam (Yearly) Next due on 10/3/2025      10/03/2024  SmartData: WORKFLOW - DIABETES - DIABETIC FOOT EXAM PERFORMED    10/18/2023  SmartData: WORKFLOW - DIABETES - DIABETIC FOOT EXAM PERFORMED    10/18/2023  SmartData: FINDINGS - TESTS - NEUROLOGY - MONOFILAMENT TEST - LEFT FOOT - NUMBER OF SITES TESTED    10/18/2023  SmartData: FINDINGS - TESTS - NEUROLOGY - MONOFILAMENT TEST - RIGHT FOOT - NUMBER OF SITES TESTED    10/18/2023  Diabetic Monofilament Lower Extremity Exam    Only the first 5 history entries have been loaded, but more history exists.              SERUM CREATININE (Yearly) Next due on 12/31/2025 12/31/2024  Comp Metabolic Panel    12/05/2024  Comp Metabolic Panel    11/15/2024  Comp Metabolic Panel    10/26/2024  Comp Metabolic Panel    10/04/2024  Comp Metabolic Panel    Only the first 5 history entries have been loaded, but more history exists.              Annual Wellness Visit (Yearly) Next due on 1/20/2026 01/20/2025  Level of Service: [place] INITIAL ANNUAL WELLNESS  VISIT-INCLUDES PPPS              Colorectal Cancer Screening (Colonoscopy - Every 3 Years) Tentatively due on 1/17/2027 01/17/2024  AMB EXTERNAL COLONOSCOPY RESULTS    10/22/2020  REFERRAL TO GI FOR COLONOSCOPY    12/08/2017  Occult Blood Feces component of OCCULT BLOOD STOOL    11/06/2017  REFERRAL TO GI FOR COLONOSCOPY    09/23/2008  AMB REFERRAL TO GI FOR COLONOSCOPY    Only the first 5 history entries have been loaded, but more history exists.              IMM DTaP/Tdap/Td Vaccine (7 - Td or Tdap) Next due on 7/30/2030 07/30/2020  Imm Admin: Tdap Vaccine    07/29/2020  Imm Admin: Tdap Vaccine    07/28/2020  Imm Admin: Tdap Vaccine    01/08/2016  Imm Admin: Tdap Vaccine    01/07/2016  Imm Admin: Tdap Vaccine    Only the first 5 history entries have been loaded, but more history exists.              Hepatitis C Screening  Tentatively Complete      09/08/2017  Hepatitis C Antibody component of HEP C VIRUS ANTIBODY              Pneumococcal Vaccine: 65+ Years (Series Information) Completed      12/13/2017  Imm Admin: Pneumococcal polysaccharide vaccine (PPSV-23)    12/12/2017  Imm Admin: Pneumococcal polysaccharide vaccine (PPSV-23)    12/11/2017  Imm Admin: Pneumococcal polysaccharide vaccine (PPSV-23)    10/14/2016  Imm Admin: Pneumococcal Conjugate Vaccine (Prevnar/PCV-13)    10/13/2016  Imm Admin: Pneumococcal Conjugate Vaccine (Prevnar/PCV-13)    Only the first 5 history entries have been loaded, but more history exists.              Influenza Vaccine (Series Information) Completed      09/30/2024  Imm Admin: Influenza high-dose trivalent (PF)    10/30/2023  Imm Admin: Influenza Vaccine Adult HD    11/09/2022  Imm Admin: Influenza Vaccine Adult HD    11/08/2022  Imm Admin: Influenza Vaccine Adult HD    11/01/2022  Imm Admin: Influenza Vaccine-Flucelvax - HISTORICAL DATA    Only the first 5 history entries have been loaded, but more history exists.              HPV Vaccines (Series Information) Aged  "Out      No completion history exists for this topic.              Polio Vaccine (Inactivated Polio) (Series Information) Aged Out      No completion history exists for this topic.              Meningococcal Immunization (Series Information) Aged Out      No completion history exists for this topic.              Discontinued - Hepatitis A Vaccine (Hep A)  Discontinued      No completion history exists for this topic.              Discontinued - Hepatitis B Vaccine (Hep B)  Discontinued      No completion history exists for this topic.                    Patient Care Team:  XAVI Ayon as PCP - General (Nurse Practitioner Family)  Abiodun Howard M.D. (Dermatology)  Alfonzo Servin M.D. as [facility] (Medical Oncology)  Carol Gonzalez R.N. as Nurse Navigator  Bi Hightower D.O. (Gastroenterology)  Nazario Chambers M.D. (Ophthalmology)        Social History     Tobacco Use    Smoking status: Never    Smokeless tobacco: Current     Types: Snuff, Chew     Last attempt to quit: 3/29/2015   Vaping Use    Vaping status: Never Used   Substance Use Topics    Alcohol use: Not Currently     Alcohol/week: 7.2 oz     Types: 12 Shots of liquor per week    Drug use: Yes     Types: Marijuana, Inhaled, Oral     Comment: \"Marijuana Use\" daily      Family History   Problem Relation Age of Onset    Cancer Father 81        Bladder    Cancer Brother         Prostate & CLL (s/p 8-10 years ago)    Hyperlipidemia Sister     Lung Disease Neg Hx     Diabetes Neg Hx     Heart Disease Neg Hx     Hypertension Neg Hx     Stroke Neg Hx     Alcohol/Drug Neg Hx      He  has a past medical history of Anemia, Arthritis (08/04/2020), Cancer (HCC) (2004), Cancer (HCC) (2018), Cirrhosis (HCC), CLL (chronic lymphocytic leukemia) (HCC) (2014), CMV (cytomegalovirus infection) (HCC) (1990), Diabetes (HCC), Heart burn, Hypertension secondary to endocrine disorders (9/2/2014), Hypotension (5/19/2022), Indigestion, Liver " "cancer (HCC), Liver tumor, Plantar fasciitis of right foot (8/12/2019), Rosacea, and Snoring.   Past Surgical History:   Procedure Laterality Date    IL UPPER GI ENDOSCOPY,DIAGNOSIS N/A 4/25/2023    Procedure: GASTROSCOPY;  Surgeon: Mir Rodriguez M.D.;  Location: SURGERY SAME DAY [facility];  Service: Gastroenterology    WOUND EXPLORATION ORTHO Right 8/5/2020    Procedure: EXPLORATION, WOUND- THUMB;  Surgeon: Yumiko Griffiths M.D.;  Location: SURGERY SAME DAY [facility];  Service: Orthopedics    TENDON REPAIR  8/5/2020    Procedure: REPAIR, TENDON- EXTENSOR;  Surgeon: Yumiko Griffiths M.D.;  Location: SURGERY SAME DAY [facility];  Service: Orthopedics    HEPATIC ABLATION LAPAROSCOPIC  6/28/2018    Procedure: HEPATIC ABLATION LAPAROSCOPIC. SEGMENT 4B, HEPATOMA, REPAIR OF INCARCERATED UMBILICAL HERNIA;  Surgeon: Feliberto Burgos M.D.;  Location: SURGERY [facility];  Service: General    BRACHY THERAPY         Exam:   /66 (BP Location: Right arm, Patient Position: Sitting, BP Cuff Size: Adult)   Pulse 68   Temp 36.3 °C (97.4 °F) (Temporal)   Resp 14   Ht 1.702 m (5' 7\")   Wt 94.8 kg (209 lb)   SpO2 96%  Body mass index is 32.73 kg/m².    Hearing fair.    Dentition poor  Alert, oriented in no acute distress.  Eye contact is good, speech goal directed, affect calm    Assessment and Plan. The following treatment and monitoring plan is recommended:    Problem List Items Addressed This Visit       Type 2 diabetes mellitus without complication, without long-term current use of insulin (HCC)     Chronic, stable. Continue metformin ER 1000 mg BID  Poct A1c 6.2% today         Relevant Orders    POCT A1C    Obesity (BMI 30-39.9)     Chronic, stable. Discussed healthy lifestyle recommendations.            Relevant Orders    Patient identified as having weight management issue.  Appropriate orders and counseling given.    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 Continue " spironolactone 100 mg daily, propranolol 10 mg daily, lasix 40 mg dialy         Liver cell carcinoma (HCC)     Chronic, ongoing. MRI 12/23/2 IMPRESSION:   1.  Hepatic cirrhosis with multiple enhancing lesions again seen.  Lesion in segment 3 appears enlarged compared to prior exam.  Other lesions in the RIGHT and LEFT lobes appear unchanged.  No new lesion demonstrated.  2.  Splenomegaly, increased from prior.  3.  LEFT adrenal adenoma again seen.    Continue f/u with oncology.         Risk for falls     Reports history of falling due to tripping. Denies injury. Denies dizziness, weakness, or joint instability. Discussed fall precaution.          Relevant Orders    Patient identified as fall risk.  Appropriate orders and counseling given.    Nausea     Chronic, ongoing. Continue pantoprazole 20 mg daily,   Continue ondansetron every 4 hours as needed for nausea.   No prolonged Q-T noted on EKG from 2023. Reports taking this for years 2-3 times daily.           Hiatal hernia with GERD     Chronic, stable. Continue protonix 20 mg daily. Ondansetron as needed for nausea            Services suggested: No services needed at this time  Health Care Screening: Age-appropriate preventive services recommended by USPTF and ACIP covered by Medicare were discussed today. Services ordered if indicated and agreed upon by the patient.  Referrals offered: Community-based lifestyle interventions to reduce health risks and promote self-management and wellness, fall prevention, nutrition, physical activity, tobacco-use cessation, weight loss, and mental health services as per orders if indicated.    Discussion today about general wellness and lifestyle habits:    Prevent falls and reduce trip hazards; Cautioned about securing or removing rugs.  Have a working fire alarm and carbon monoxide detector;   Engage in regular physical activity and social activities     Follow-up: Return in about 6 months (around 7/20/2025), or if symptoms  worsen or fail to improve.

## 2025-01-20 NOTE — ASSESSMENT & PLAN NOTE
Chronic, ongoing. Continue pantoprazole 20 mg daily,   Continue ondansetron every 4 hours as needed for nausea.   No prolonged Q-T noted on EKG from 2023. Reports taking this for years 2-3 times daily.

## 2025-01-22 ENCOUNTER — HOSPITAL ENCOUNTER (OUTPATIENT)
Dept: LAB | Facility: MEDICAL CENTER | Age: 75
End: 2025-01-22
Attending: INTERNAL MEDICINE
Payer: MEDICARE

## 2025-01-22 LAB
ALBUMIN SERPL BCP-MCNC: 4.3 G/DL (ref 3.2–4.9)
ALBUMIN/GLOB SERPL: 1.6 G/DL
ALP SERPL-CCNC: 97 U/L (ref 30–99)
ALT SERPL-CCNC: 28 U/L (ref 2–50)
ANION GAP SERPL CALC-SCNC: 12 MMOL/L (ref 7–16)
APPEARANCE UR: CLEAR
AST SERPL-CCNC: 44 U/L (ref 12–45)
BACTERIA #/AREA URNS HPF: ABNORMAL /HPF
BASOPHILS # BLD AUTO: 0 % (ref 0–1.8)
BASOPHILS # BLD: 0 K/UL (ref 0–0.12)
BILIRUB SERPL-MCNC: 2 MG/DL (ref 0.1–1.5)
BILIRUB UR QL STRIP.AUTO: ABNORMAL
BUN SERPL-MCNC: 34 MG/DL (ref 8–22)
CALCIUM ALBUM COR SERPL-MCNC: 9.7 MG/DL (ref 8.5–10.5)
CALCIUM SERPL-MCNC: 9.9 MG/DL (ref 8.5–10.5)
CASTS URNS QL MICRO: ABNORMAL /LPF (ref 0–2)
CHLORIDE SERPL-SCNC: 100 MMOL/L (ref 96–112)
CO2 SERPL-SCNC: 22 MMOL/L (ref 20–33)
COLOR UR: ABNORMAL
CREAT SERPL-MCNC: 1.48 MG/DL (ref 0.5–1.4)
EOSINOPHIL # BLD AUTO: 0.06 K/UL (ref 0–0.51)
EOSINOPHIL NFR BLD: 0.8 % (ref 0–6.9)
EPITHELIAL CELLS 1715: ABNORMAL /HPF (ref 0–5)
ERYTHROCYTE [DISTWIDTH] IN BLOOD BY AUTOMATED COUNT: 57.5 FL (ref 35.9–50)
GFR SERPLBLD CREATININE-BSD FMLA CKD-EPI: 49 ML/MIN/1.73 M 2
GLOBULIN SER CALC-MCNC: 2.7 G/DL (ref 1.9–3.5)
GLUCOSE SERPL-MCNC: 274 MG/DL (ref 65–99)
GLUCOSE UR STRIP.AUTO-MCNC: NEGATIVE MG/DL
HCT VFR BLD AUTO: 46.6 % (ref 42–52)
HGB BLD-MCNC: 15.9 G/DL (ref 14–18)
KETONES UR STRIP.AUTO-MCNC: ABNORMAL MG/DL
LEUKOCYTE ESTERASE UR QL STRIP.AUTO: ABNORMAL
LYMPHOCYTES # BLD AUTO: 4.58 K/UL (ref 1–4.8)
LYMPHOCYTES NFR BLD: 66.4 % (ref 22–41)
MANUAL DIFF BLD: NORMAL
MCH RBC QN AUTO: 35.4 PG (ref 27–33)
MCHC RBC AUTO-ENTMCNC: 34.1 G/DL (ref 32.3–36.5)
MCV RBC AUTO: 103.8 FL (ref 81.4–97.8)
MICRO URNS: ABNORMAL
MONOCYTES # BLD AUTO: 0.18 K/UL (ref 0–0.85)
MONOCYTES NFR BLD AUTO: 2.6 % (ref 0–13.4)
MORPHOLOGY BLD-IMP: NORMAL
NEUTROPHILS # BLD AUTO: 2.08 K/UL (ref 1.82–7.42)
NEUTROPHILS NFR BLD: 30.2 % (ref 44–72)
NITRITE UR QL STRIP.AUTO: NEGATIVE
NRBC # BLD AUTO: 0 K/UL
NRBC BLD-RTO: 0 /100 WBC (ref 0–0.2)
OVALOCYTES BLD QL SMEAR: NORMAL
PH UR STRIP.AUTO: 6 [PH] (ref 5–8)
PLATELET # BLD AUTO: 66 K/UL (ref 164–446)
PLATELET BLD QL SMEAR: NORMAL
PLATELETS.RETICULATED NFR BLD AUTO: 3.6 % (ref 0.6–13.1)
PMV BLD AUTO: 10.3 FL (ref 9–12.9)
POIKILOCYTOSIS BLD QL SMEAR: NORMAL
POTASSIUM SERPL-SCNC: 6.2 MMOL/L (ref 3.6–5.5)
PROT SERPL-MCNC: 7 G/DL (ref 6–8.2)
PROT UR QL STRIP: NEGATIVE MG/DL
RBC # BLD AUTO: 4.49 M/UL (ref 4.7–6.1)
RBC # URNS HPF: ABNORMAL /HPF (ref 0–2)
RBC BLD AUTO: PRESENT
RBC UR QL AUTO: NEGATIVE
SMUDGE CELLS BLD QL SMEAR: NORMAL
SODIUM SERPL-SCNC: 134 MMOL/L (ref 135–145)
SP GR UR STRIP.AUTO: 1.02
TSH SERPL-ACNC: 3.32 UIU/ML (ref 0.35–5.5)
UROBILINOGEN UR STRIP.AUTO-MCNC: 2 EU/DL
WBC # BLD AUTO: 6.9 K/UL (ref 4.8–10.8)
WBC #/AREA URNS HPF: ABNORMAL /HPF

## 2025-01-22 PROCEDURE — 85027 COMPLETE CBC AUTOMATED: CPT

## 2025-01-22 PROCEDURE — 36415 COLL VENOUS BLD VENIPUNCTURE: CPT

## 2025-01-22 PROCEDURE — 85007 BL SMEAR W/DIFF WBC COUNT: CPT

## 2025-01-22 PROCEDURE — 81001 URINALYSIS AUTO W/SCOPE: CPT

## 2025-01-22 PROCEDURE — 84443 ASSAY THYROID STIM HORMONE: CPT

## 2025-01-22 PROCEDURE — 85055 RETICULATED PLATELET ASSAY: CPT

## 2025-01-22 PROCEDURE — 80053 COMPREHEN METABOLIC PANEL: CPT

## 2025-02-04 NOTE — PROGRESS NOTES
Subjective:   2/11/2025  9:12 AM  Primary care physician: XAVI Ayon  Referring Provider: Alfonzo Servin M.D.   Medical Oncologist: Alfonzo Servin M.D.     Chief Complaint:   Chief Complaint   Patient presents with    New Patient     HX CANCER, CLL  HCC S/P CHEMO,   Y90, TACE, CHEMO  MR 12/23        Diagnosis:   1. Primary malignant neoplasm of liver (HCC)  REFERRAL TO ONCOLOGY NURSE NAVIGATOR    US-ABDOMEN LIMITED (HISTOTRIPSY LIVER PRE-SCREEN)    RASHIDA-GAMMA-CARBOXY PROTHROMBIN    AFP SERUM TUMOR MARKER    CA 19-9      2. Benign neoplasm of sigmoid colon  REFERRAL TO ONCOLOGY NURSE NAVIGATOR    US-ABDOMEN LIMITED (HISTOTRIPSY LIVER PRE-SCREEN)    RASHIDA-GAMMA-CARBOXY PROTHROMBIN    AFP SERUM TUMOR MARKER    CA 19-9      3. Hiatal hernia with GERD  REFERRAL TO ONCOLOGY NURSE NAVIGATOR    US-ABDOMEN LIMITED (HISTOTRIPSY LIVER PRE-SCREEN)    RASHIDA-GAMMA-CARBOXY PROTHROMBIN    AFP SERUM TUMOR MARKER    CA 19-9      4. CLL (chronic lymphocytic leukemia) (HCC)  REFERRAL TO ONCOLOGY NURSE NAVIGATOR    US-ABDOMEN LIMITED (HISTOTRIPSY LIVER PRE-SCREEN)    RASHIDA-GAMMA-CARBOXY PROTHROMBIN    AFP SERUM TUMOR MARKER    CA 19-9      5. History of basal cell carcinoma (BCC)  REFERRAL TO ONCOLOGY NURSE NAVIGATOR    US-ABDOMEN LIMITED (HISTOTRIPSY LIVER PRE-SCREEN)    RASHIDA-GAMMA-CARBOXY PROTHROMBIN    AFP SERUM TUMOR MARKER    CA 19-9      6. Hepatoma (HCC)  REFERRAL TO ONCOLOGY NURSE NAVIGATOR    US-ABDOMEN LIMITED (HISTOTRIPSY LIVER PRE-SCREEN)    RASHIDA-GAMMA-CARBOXY PROTHROMBIN    AFP SERUM TUMOR MARKER    CA 19-9      7. Type 2 diabetes mellitus without complication, without long-term current use of insulin (HCC)  REFERRAL TO ONCOLOGY NURSE NAVIGATOR    US-ABDOMEN LIMITED (HISTOTRIPSY LIVER PRE-SCREEN)    RASHIDA-GAMMA-CARBOXY PROTHROMBIN    AFP SERUM TUMOR MARKER    CA 19-9      8. Liver tumor          History of presenting illness:    Sohail Duvall is a  pleasant 74 y.o. male with history including alcoholic cirrhosis, portal hypertension with multiple variceal ligations, Primary malignant neoplasm of liver HCC with ablation 4B lesion (Nadeen) Y90 SIRT in 2017 TACE 2018 s/p Rituxan/bendamustine  Y90 2023 TACE 2021, CLL, hiatal hernia, stage 3 kidney disease, gout, obesity, Type 2 diabetes, hepatoma, and prostate cancer  s/p brachytherapy. Referred by Dr Servin for concern liver cell carcinoma.    MR ABDOMEN on 12/23/24 noted hepatic cirrhosis with multiple enhancing lesions again seen.  Lesion in segment 3 appears enlarged compared to prior exam.  Other lesions in the RIGHT and LEFT lobes appear unchanged.  No new lesion demonstrated. Splenomegaly, increased from prior. LEFT adrenal adenoma again seen. LR-4: probably HCC    Note - patient was scheduled for ablation in 2022, but no showed for appointments with Dr Senior.    He is here today for evaluation what appears to be LI-RADS 4 lesion on the left lateral segment segment 3.  Based on imaging it has grown slightly from his last MRI from June 2024.  I reviewed his recent MRI from December 2024 and I am having difficulty seeing the exact lesion that they are talking about.  In any event, he does show significant areas of cirrhosis throughout his liver.  He is between a Darian A and a darian B patient.  His platelet count is in the 60s but he is suffering from chronic renal insufficiency as well.  His GFR is 49.  With a creatinine of 1.48.    In any event, I did an ablation of the patient back in 2017 and fortunately this ablation cavity shows no sign of recurrence.  He is here with his wife to discuss neck steps.    The patient did have cirrhosis related to alcohol use and he says he is not drinking anymore and that he does not smoke anymore.  He is not on any blood thinners.  He actually looks like he lost a little weight but is still obese.    He is on immunotherapies with Dr. Horn I did complete  his therapy for his CLL.  I have had to review his old records new records and his imaging.    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 Stopped both medications because blood pressure readings were low, /82 today and has been low at home since stopping medications about 5 days ago. Last reading at home 117/72     Hypotension 5/19/2022    Patient has known hypertension and has been on losartan 50 mg long-term.  He notes recent episode about 10 days ago where blood pressure dropped extremely low and he fainted.  He reports that blood pressure on home cuff was 45/30, and slowly increased.  Additionally, he notes that he has had these episodes on a yearly basis for maybe 6 to 7 years or blood pressure goes extremely well and he passes    Indigestion     Liver cancer (HCC)     Liver tumor     Plantar fasciitis of right foot 8/12/2019    Rosacea     Snoring     no sleep study     Past Surgical History:   Procedure Laterality Date    WA UPPER GI ENDOSCOPY,DIAGNOSIS N/A 4/25/2023    Procedure: GASTROSCOPY;  Surgeon: Mir Rodriguez M.D.;  Location: SURGERY SAME DAY [facility];  Service: Gastroenterology    WOUND EXPLORATION ORTHO Right 8/5/2020    Procedure: EXPLORATION, WOUND- THUMB;  Surgeon: Yumiko Griffiths M.D.;  Location: SURGERY SAME DAY [facility] [place];  Service: Orthopedics    TENDON REPAIR  8/5/2020    Procedure: REPAIR, TENDON- EXTENSOR;  Surgeon: Yumiko Griffiths M.D.;  Location: SURGERY SAME DAY [facility] [place];  Service: Orthopedics    HEPATIC ABLATION LAPAROSCOPIC   6/28/2018    Procedure: HEPATIC ABLATION LAPAROSCOPIC. SEGMENT 4B, HEPATOMA, REPAIR OF INCARCERATED UMBILICAL HERNIA;  Surgeon: Feliberto Burgos M.D.;  Location: SURGERY [facility];  Service: General    BRACHY THERAPY       No Known Allergies  Outpatient Encounter Medications as of 2/11/2025   Medication Sig Dispense Refill    metFORMIN ER (GLUCOPHAGE XR) 500 MG TABLET SR 24 HR Take 2 Tablets by mouth 2 times a day. 400 Tablet 3    pantoprazole (PROTONIX) 20 MG tablet Take 1 Tablet by mouth every day. 100 Tablet 3    spironolactone (ALDACTONE) 50 MG Tab Take 2 Tablets by mouth every day. 200 Tablet 3    furosemide (LASIX) 20 MG Tab Take 2 Tablets by mouth every day. 200 Tablet 3    atezolizumab (TECENTRIQ) 1200 MG/20ML Solution injection       diphenhydrAMINE (BENADRYL ALLERGY) 25 MG capsule 0      Bevacizumab 1.25 MG/0.05ML Solution Prefilled Syringe       propranolol (INDERAL) 10 MG Tab Take 1 Tablet by mouth every day at 6 PM. 100 Tablet 3    acetaminophen (TYLENOL) 500 MG Tab Take 1,000 mg by mouth every 6 hours as needed for Moderate Pain.      fluticasone (FLONASE) 50 MCG/ACT nasal spray Administer 1 Spray into affected nostril(S) 2 times a day.      therapeutic multivitamin-minerals (THERAGRAN-M) Tab Take 1 Tablet by mouth every day.      Vitamin D, Cholecalciferol, 25 MCG (1000 UT) Cap Take 1,000 Units by mouth every day.      cetirizine (ZYRTEC) 10 MG Tab Take 10 mg by mouth every day. (Patient not taking: Reported on 2/11/2025)       No facility-administered encounter medications on file as of 2/11/2025.     Social History     Socioeconomic History    Marital status:      Spouse name: Not on file    Number of children: 1    Years of education: Not on file    Highest education level: Not on file   Occupational History    Occupation: fertilizer sales   Tobacco Use    Smoking status: Never    Smokeless tobacco: Current     Types: Snuff, Chew     Last attempt to quit: 3/29/2015   Vaping  "Use    Vaping status: Never Used   Substance and Sexual Activity    Alcohol use: Not Currently     Alcohol/week: 7.2 oz     Types: 12 Shots of liquor per week    Drug use: Yes     Types: Marijuana, Inhaled, Oral     Comment: \"Marijuana Use\" daily     Sexual activity: Never   Other Topics Concern    Not on file   Social History Narrative    No known exposure to asbestos, dyes, or chemicals, however he was exposed to pesticides.  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  1 child, alive and well.  He also has a step-child.      Social Drivers of Health     Financial Resource Strain: Not on file   Food Insecurity: Not on file   Transportation Needs: Not on file   Physical Activity: Not on file   Stress: Not on file   Social Connections: Not on file   Intimate Partner Violence: Not on file   Housing Stability: Not on file      Social History     Tobacco Use   Smoking Status Never   Smokeless Tobacco Current    Types: Snuff, Chew    Last attempt to quit: 3/29/2015     Social History     Substance and Sexual Activity   Alcohol Use Not Currently    Alcohol/week: 7.2 oz    Types: 12 Shots of liquor per week     Social History     Substance and Sexual Activity   Drug Use Yes    Types: Marijuana, Inhaled, Oral    Comment: \"Marijuana Use\" daily       Family History   Problem Relation Age of Onset    Cancer Father 81        Bladder    Cancer Brother         Prostate & CLL (s/p 8-10 years ago)    Hyperlipidemia Sister     Lung Disease Neg Hx     Diabetes Neg Hx     Heart Disease Neg Hx     Hypertension Neg Hx     Stroke Neg Hx     Alcohol/Drug Neg Hx        Review of Systems   All other systems reviewed and are negative.       Objective:   /56 (BP Location: Left arm, Patient Position: Sitting, BP Cuff Size: Large adult long)   Pulse (!) 54   Temp 35.9 °C (96.7 °F) (Temporal)   Ht 1.702 m (5' 7\")   Wt 94.3 kg (208 lb)   SpO2 96%   BMI 32.58 kg/m² "     Physical Exam  Vitals and nursing note reviewed.   Constitutional:       Appearance: Normal appearance. He is obese.   HENT:      Head: Normocephalic.      Nose: Nose normal.      Mouth/Throat:      Mouth: Mucous membranes are moist.      Pharynx: Oropharynx is clear.   Eyes:      Extraocular Movements: Extraocular movements intact.      Conjunctiva/sclera: Conjunctivae normal.      Pupils: Pupils are equal, round, and reactive to light.   Cardiovascular:      Rate and Rhythm: Normal rate and regular rhythm.      Pulses: Normal pulses.      Heart sounds: Normal heart sounds.   Pulmonary:      Effort: Pulmonary effort is normal.      Breath sounds: Normal breath sounds.   Abdominal:      General: Abdomen is flat. Bowel sounds are normal.      Palpations: Abdomen is soft.   Musculoskeletal:         General: Normal range of motion.      Cervical back: Normal range of motion.   Skin:     General: Skin is warm.   Neurological:      General: No focal deficit present.      Mental Status: He is alert and oriented to person, place, and time. Mental status is at baseline.   Psychiatric:         Mood and Affect: Mood normal.         Behavior: Behavior normal.         Thought Content: Thought content normal.         Judgment: Judgment normal.         Labs   Latest Reference Range & Units 01/22/25 10:28   WBC 4.8 - 10.8 K/uL 6.9   RBC 4.70 - 6.10 M/uL 4.49 (L)   Hemoglobin 14.0 - 18.0 g/dL 15.9   Hematocrit 42.0 - 52.0 % 46.6   MCV 81.4 - 97.8 fL 103.8 (H)   MCH 27.0 - 33.0 pg 35.4 (H)   MCHC 32.3 - 36.5 g/dL 34.1   RDW 35.9 - 50.0 fL 57.5 (H)   Platelet Count 164 - 446 K/uL 66 (L)   MPV 9.0 - 12.9 fL 10.3   (L): Data is abnormally low  (H): Data is abnormally high     Latest Reference Range & Units 01/22/25 10:28   Sodium 135 - 145 mmol/L 134 (L)   Potassium 3.6 - 5.5 mmol/L 6.2 (H)   Chloride 96 - 112 mmol/L 100   Co2 20 - 33 mmol/L 22   Anion Gap 7.0 - 16.0  12.0   Glucose 65 - 99 mg/dL 274 (H)   Bun 8 - 22 mg/dL 34 (H)    Creatinine 0.50 - 1.40 mg/dL 1.48 (H)   GFR (CKD-EPI) >60 mL/min/1.73 m 2 49 !   Calcium 8.5 - 10.5 mg/dL 9.9   Correct Calcium 8.5 - 10.5 mg/dL 9.7   AST(SGOT) 12 - 45 U/L 44   ALT(SGPT) 2 - 50 U/L 28   Alkaline Phosphatase 30 - 99 U/L 97   Total Bilirubin 0.1 - 1.5 mg/dL 2.0 (H)   Albumin 3.2 - 4.9 g/dL 4.3   Total Protein 6.0 - 8.2 g/dL 7.0   Globulin 1.9 - 3.5 g/dL 2.7   A-G Ratio g/dL 1.6   (L): Data is abnormally low  (H): Data is abnormally high  !: Data is abnormal         Imaging  MR ABDOMEN (12/23/24)  IMPRESSION:  1.  Hepatic cirrhosis with multiple enhancing lesions again seen.  Lesion in segment 3 appears enlarged compared to prior exam.  Other lesions in the RIGHT and LEFT lobes appear unchanged.  No new lesion demonstrated.  2.  Splenomegaly, increased from prior.  3.  LEFT adrenal adenoma again seen.     LR-4: probably HCC    Pathology  N/A    Procedures  Left hepatic lobe Y90 (8/22/23) - Dr Norris  Ablation 4B lesion - Galanopoulos  Y90 SIRT 2017   TACE 2018   Y90 2023  TACE 2021    Diagnosis:     1. Primary malignant neoplasm of liver (HCC)  REFERRAL TO ONCOLOGY NURSE NAVIGATOR    US-ABDOMEN LIMITED (HISTOTRIPSY LIVER PRE-SCREEN)    RASHIDA-GAMMA-CARBOXY PROTHROMBIN    AFP SERUM TUMOR MARKER    CA 19-9      2. Benign neoplasm of sigmoid colon  REFERRAL TO ONCOLOGY NURSE NAVIGATOR    US-ABDOMEN LIMITED (HISTOTRIPSY LIVER PRE-SCREEN)    RASHIDA-GAMMA-CARBOXY PROTHROMBIN    AFP SERUM TUMOR MARKER    CA 19-9      3. Hiatal hernia with GERD  REFERRAL TO ONCOLOGY NURSE NAVIGATOR    US-ABDOMEN LIMITED (HISTOTRIPSY LIVER PRE-SCREEN)    RASHIDA-GAMMA-CARBOXY PROTHROMBIN    AFP SERUM TUMOR MARKER    CA 19-9      4. CLL (chronic lymphocytic leukemia) (HCC)  REFERRAL TO ONCOLOGY NURSE NAVIGATOR    US-ABDOMEN LIMITED (HISTOTRIPSY LIVER PRE-SCREEN)    RASHIDA-GAMMA-CARBOXY PROTHROMBIN    AFP SERUM TUMOR MARKER    CA 19-9      5. History of basal cell carcinoma (BCC)  REFERRAL TO ONCOLOGY NURSE NAVIGATOR    US-ABDOMEN LIMITED  (HISTOTRIPSY LIVER PRE-SCREEN)    RASHIDA-GAMMA-CARBOXY PROTHROMBIN    AFP SERUM TUMOR MARKER    CA 19-9      6. Hepatoma (HCC)  REFERRAL TO ONCOLOGY NURSE NAVIGATOR    US-ABDOMEN LIMITED (HISTOTRIPSY LIVER PRE-SCREEN)    RASHIDA-GAMMA-CARBOXY PROTHROMBIN    AFP SERUM TUMOR MARKER    CA 19-9      7. Type 2 diabetes mellitus without complication, without long-term current use of insulin (HCC)  REFERRAL TO ONCOLOGY NURSE NAVIGATOR    US-ABDOMEN LIMITED (HISTOTRIPSY LIVER PRE-SCREEN)    RASHIDA-GAMMA-CARBOXY PROTHROMBIN    AFP SERUM TUMOR MARKER    CA 19-9      8. Liver tumor            Medical Decision Making:  Today's Assessment / Status / Plan:     In light of the present findings, the patient has a growing lesion left lateral segment of the liver.  The rest of his tumors appear stable most likely responding to his bilateral Y90 treatment.  My plan is to get some tumor markers including an AFP and a DCP, also a CMP.  We are going to order a Histotripsy ultrasound to see if he is a candidate for Histotripsy.  They will see me after the ultrasound.    I, Dr. Senior have entered, reviewed and confirmed the above diagnoses related to this patient on this date of service, 2/11/2025  9:12 AM.    He agreed and verbalized his agreement and understanding with the current plan. I answered all questions and concerns he has at this time and advised him to call at any time in the interim with questions or concerns in regards to his care.    Thank you for allowing me to participate in his care, I will continue to follow closely.       Please note that this dictation was created using voice recognition software. I have made every reasonable attempt to correct obvious errors, but I expect that there are errors of grammar and possibly content that I did not discover before finalizing the note.     Thank you for this consultation and allowing me to participate in your patient's care. If I can be of further service please contact my  office.      I, Dr Senior, have entered, reviewed and confirmed the above diagnoses related to this patient on this date of service, as per the time and date noted at top of this note.

## 2025-02-11 ENCOUNTER — OFFICE VISIT (OUTPATIENT)
Dept: SURGICAL ONCOLOGY | Facility: MEDICAL CENTER | Age: 75
End: 2025-02-11
Payer: MEDICARE

## 2025-02-11 VITALS
WEIGHT: 208 LBS | HEIGHT: 67 IN | OXYGEN SATURATION: 96 % | SYSTOLIC BLOOD PRESSURE: 104 MMHG | TEMPERATURE: 96.7 F | BODY MASS INDEX: 32.65 KG/M2 | DIASTOLIC BLOOD PRESSURE: 56 MMHG | HEART RATE: 54 BPM

## 2025-02-11 DIAGNOSIS — C91.10 CLL (CHRONIC LYMPHOCYTIC LEUKEMIA) (HCC): ICD-10-CM

## 2025-02-11 DIAGNOSIS — C22.8 PRIMARY MALIGNANT NEOPLASM OF LIVER (HCC): ICD-10-CM

## 2025-02-11 DIAGNOSIS — K21.9 HIATAL HERNIA WITH GERD: ICD-10-CM

## 2025-02-11 DIAGNOSIS — E11.9 TYPE 2 DIABETES MELLITUS WITHOUT COMPLICATION, WITHOUT LONG-TERM CURRENT USE OF INSULIN (HCC): ICD-10-CM

## 2025-02-11 DIAGNOSIS — Z85.828 HISTORY OF BASAL CELL CARCINOMA (BCC): ICD-10-CM

## 2025-02-11 DIAGNOSIS — D49.0 LIVER TUMOR: ICD-10-CM

## 2025-02-11 DIAGNOSIS — K44.9 HIATAL HERNIA WITH GERD: ICD-10-CM

## 2025-02-11 DIAGNOSIS — C22.0 HEPATOMA (HCC): ICD-10-CM

## 2025-02-11 DIAGNOSIS — D12.5 BENIGN NEOPLASM OF SIGMOID COLON: ICD-10-CM

## 2025-02-11 PROCEDURE — 3074F SYST BP LT 130 MM HG: CPT | Performed by: SURGERY

## 2025-02-11 PROCEDURE — 3078F DIAST BP <80 MM HG: CPT | Performed by: SURGERY

## 2025-02-11 PROCEDURE — 99205 OFFICE O/P NEW HI 60 MIN: CPT | Performed by: SURGERY

## 2025-02-11 ASSESSMENT — FIBROSIS 4 INDEX: FIB4 SCORE: 9.32

## 2025-02-12 ENCOUNTER — HOSPITAL ENCOUNTER (OUTPATIENT)
Dept: RADIOLOGY | Facility: MEDICAL CENTER | Age: 75
End: 2025-02-12
Attending: SURGERY
Payer: MEDICARE

## 2025-02-12 ENCOUNTER — HOSPITAL ENCOUNTER (OUTPATIENT)
Dept: LAB | Facility: MEDICAL CENTER | Age: 75
End: 2025-02-12
Attending: INTERNAL MEDICINE
Payer: MEDICARE

## 2025-02-12 ENCOUNTER — HOSPITAL ENCOUNTER (OUTPATIENT)
Dept: LAB | Facility: MEDICAL CENTER | Age: 75
End: 2025-02-12
Attending: SURGERY
Payer: MEDICARE

## 2025-02-12 DIAGNOSIS — Z85.828 HISTORY OF BASAL CELL CARCINOMA (BCC): ICD-10-CM

## 2025-02-12 DIAGNOSIS — D12.5 BENIGN NEOPLASM OF SIGMOID COLON: ICD-10-CM

## 2025-02-12 DIAGNOSIS — K21.9 HIATAL HERNIA WITH GERD: ICD-10-CM

## 2025-02-12 DIAGNOSIS — C91.10 CLL (CHRONIC LYMPHOCYTIC LEUKEMIA) (HCC): ICD-10-CM

## 2025-02-12 DIAGNOSIS — K44.9 HIATAL HERNIA WITH GERD: ICD-10-CM

## 2025-02-12 DIAGNOSIS — E11.9 TYPE 2 DIABETES MELLITUS WITHOUT COMPLICATION, WITHOUT LONG-TERM CURRENT USE OF INSULIN (HCC): ICD-10-CM

## 2025-02-12 DIAGNOSIS — C22.8 PRIMARY MALIGNANT NEOPLASM OF LIVER (HCC): ICD-10-CM

## 2025-02-12 DIAGNOSIS — D49.0 LIVER TUMOR: ICD-10-CM

## 2025-02-12 DIAGNOSIS — C22.0 HEPATOMA (HCC): ICD-10-CM

## 2025-02-12 LAB
ALBUMIN SERPL BCP-MCNC: 4.3 G/DL (ref 3.2–4.9)
ALBUMIN SERPL BCP-MCNC: 4.4 G/DL (ref 3.2–4.9)
ALBUMIN/GLOB SERPL: 1.5 G/DL
ALBUMIN/GLOB SERPL: 1.6 G/DL
ALP SERPL-CCNC: 83 U/L (ref 30–99)
ALP SERPL-CCNC: 83 U/L (ref 30–99)
ALT SERPL-CCNC: 30 U/L (ref 2–50)
ALT SERPL-CCNC: 30 U/L (ref 2–50)
ANION GAP SERPL CALC-SCNC: 10 MMOL/L (ref 7–16)
ANION GAP SERPL CALC-SCNC: 10 MMOL/L (ref 7–16)
APPEARANCE UR: CLEAR
AST SERPL-CCNC: 40 U/L (ref 12–45)
AST SERPL-CCNC: 41 U/L (ref 12–45)
BACTERIA #/AREA URNS HPF: ABNORMAL /HPF
BASOPHILS # BLD AUTO: 0.9 % (ref 0–1.8)
BASOPHILS # BLD: 0.05 K/UL (ref 0–0.12)
BILIRUB SERPL-MCNC: 1.7 MG/DL (ref 0.1–1.5)
BILIRUB SERPL-MCNC: 1.7 MG/DL (ref 0.1–1.5)
BILIRUB UR QL STRIP.AUTO: ABNORMAL
BUN SERPL-MCNC: 39 MG/DL (ref 8–22)
BUN SERPL-MCNC: 39 MG/DL (ref 8–22)
CALCIUM ALBUM COR SERPL-MCNC: 10 MG/DL (ref 8.5–10.5)
CALCIUM ALBUM COR SERPL-MCNC: 9.9 MG/DL (ref 8.5–10.5)
CALCIUM SERPL-MCNC: 10.1 MG/DL (ref 8.5–10.5)
CALCIUM SERPL-MCNC: 10.3 MG/DL (ref 8.5–10.5)
CANCER AG19-9 SERPL-ACNC: 16.1 U/ML (ref 0–35)
CASTS URNS QL MICRO: ABNORMAL /LPF (ref 0–2)
CHLORIDE SERPL-SCNC: 101 MMOL/L (ref 96–112)
CHLORIDE SERPL-SCNC: 102 MMOL/L (ref 96–112)
CO2 SERPL-SCNC: 25 MMOL/L (ref 20–33)
CO2 SERPL-SCNC: 25 MMOL/L (ref 20–33)
COLOR UR: ABNORMAL
CREAT SERPL-MCNC: 1.72 MG/DL (ref 0.5–1.4)
CREAT SERPL-MCNC: 1.72 MG/DL (ref 0.5–1.4)
EOSINOPHIL # BLD AUTO: 0.09 K/UL (ref 0–0.51)
EOSINOPHIL NFR BLD: 1.5 % (ref 0–6.9)
EPITHELIAL CELLS 1715: ABNORMAL /HPF (ref 0–5)
ERYTHROCYTE [DISTWIDTH] IN BLOOD BY AUTOMATED COUNT: 57.7 FL (ref 35.9–50)
GFR SERPLBLD CREATININE-BSD FMLA CKD-EPI: 41 ML/MIN/1.73 M 2
GFR SERPLBLD CREATININE-BSD FMLA CKD-EPI: 41 ML/MIN/1.73 M 2
GLOBULIN SER CALC-MCNC: 2.7 G/DL (ref 1.9–3.5)
GLOBULIN SER CALC-MCNC: 2.8 G/DL (ref 1.9–3.5)
GLUCOSE SERPL-MCNC: 179 MG/DL (ref 65–99)
GLUCOSE SERPL-MCNC: 180 MG/DL (ref 65–99)
GLUCOSE UR STRIP.AUTO-MCNC: NEGATIVE MG/DL
HCT VFR BLD AUTO: 46.9 % (ref 42–52)
HGB BLD-MCNC: 16 G/DL (ref 14–18)
IMM GRANULOCYTES # BLD AUTO: 0.02 K/UL (ref 0–0.11)
IMM GRANULOCYTES NFR BLD AUTO: 0.3 % (ref 0–0.9)
KETONES UR STRIP.AUTO-MCNC: ABNORMAL MG/DL
LEUKOCYTE ESTERASE UR QL STRIP.AUTO: ABNORMAL
LYMPHOCYTES # BLD AUTO: 2.82 K/UL (ref 1–4.8)
LYMPHOCYTES NFR BLD: 48.5 % (ref 22–41)
MCH RBC QN AUTO: 35.2 PG (ref 27–33)
MCHC RBC AUTO-ENTMCNC: 34.1 G/DL (ref 32.3–36.5)
MCV RBC AUTO: 103.1 FL (ref 81.4–97.8)
MICRO URNS: ABNORMAL
MONOCYTES # BLD AUTO: 0.51 K/UL (ref 0–0.85)
MONOCYTES NFR BLD AUTO: 8.8 % (ref 0–13.4)
NEUTROPHILS # BLD AUTO: 2.32 K/UL (ref 1.82–7.42)
NEUTROPHILS NFR BLD: 40 % (ref 44–72)
NITRITE UR QL STRIP.AUTO: NEGATIVE
NRBC # BLD AUTO: 0 K/UL
NRBC BLD-RTO: 0 /100 WBC (ref 0–0.2)
PH UR STRIP.AUTO: 6 [PH] (ref 5–8)
PLATELET # BLD AUTO: 60 K/UL (ref 164–446)
PLATELETS.RETICULATED NFR BLD AUTO: 3.1 % (ref 0.6–13.1)
PMV BLD AUTO: 10.2 FL (ref 9–12.9)
POTASSIUM SERPL-SCNC: 5.9 MMOL/L (ref 3.6–5.5)
POTASSIUM SERPL-SCNC: 6 MMOL/L (ref 3.6–5.5)
PROT SERPL-MCNC: 7.1 G/DL (ref 6–8.2)
PROT SERPL-MCNC: 7.1 G/DL (ref 6–8.2)
PROT UR QL STRIP: NEGATIVE MG/DL
RBC # BLD AUTO: 4.55 M/UL (ref 4.7–6.1)
RBC # URNS HPF: ABNORMAL /HPF (ref 0–2)
RBC UR QL AUTO: NEGATIVE
SODIUM SERPL-SCNC: 136 MMOL/L (ref 135–145)
SODIUM SERPL-SCNC: 137 MMOL/L (ref 135–145)
SP GR UR STRIP.AUTO: 1.03
TSH SERPL-ACNC: 3.68 UIU/ML (ref 0.35–5.5)
UROBILINOGEN UR STRIP.AUTO-MCNC: 1 EU/DL
WBC # BLD AUTO: 5.8 K/UL (ref 4.8–10.8)
WBC #/AREA URNS HPF: ABNORMAL /HPF

## 2025-02-12 PROCEDURE — 81001 URINALYSIS AUTO W/SCOPE: CPT

## 2025-02-12 PROCEDURE — 85055 RETICULATED PLATELET ASSAY: CPT

## 2025-02-12 PROCEDURE — 80053 COMPREHEN METABOLIC PANEL: CPT

## 2025-02-12 PROCEDURE — 80053 COMPREHEN METABOLIC PANEL: CPT | Mod: 91

## 2025-02-12 PROCEDURE — 85025 COMPLETE CBC W/AUTO DIFF WBC: CPT

## 2025-02-12 PROCEDURE — 76705 ECHO EXAM OF ABDOMEN: CPT

## 2025-02-12 PROCEDURE — 36415 COLL VENOUS BLD VENIPUNCTURE: CPT

## 2025-02-12 PROCEDURE — 84443 ASSAY THYROID STIM HORMONE: CPT

## 2025-02-12 PROCEDURE — 86301 IMMUNOASSAY TUMOR CA 19-9: CPT | Mod: GA

## 2025-02-13 ENCOUNTER — PATIENT OUTREACH (OUTPATIENT)
Dept: ONCOLOGY | Facility: MEDICAL CENTER | Age: 75
End: 2025-02-13
Payer: MEDICARE

## 2025-02-13 NOTE — PROGRESS NOTES
Called pt.  Explained change in DST services from Carol Carter to Missy CARTER. Pt received briana this morning for scheduling US to complete work up for Histotripsy. Onn will mychart contact information. Pt agreeable to reach out if needed.

## 2025-02-13 NOTE — LETTER
[facility]    [address]  [place] [address]  Phone: [phone] - Fax: [phone]                 02/14/2025    Dear Titus,     It was my pleasure speaking with you today, after your recent appointment with Dr Senior. I am a Cancer Nurse Navigator, a certified oncology nurse. My role is to assess any needs you may have with education, guidance and support. I am available to you and your family through your treatment at [facility].       I am available to address your needs during your journey with the following services:     Care Coordination  I can assist you in facilitating communication between your cancer care treatment team to ensure timely treatment and follow-up.  I can also assist with transition of care back to your primary care provider, or other specialist, as needed.  My goal is to bridge gaps for you throughout the course of your active treatment.       Education Services  Understanding the recommended treatment course by your physician is key. I can provide educational resources personalized to your cancer diagnosis to help you understand your diagnosis and treatment. Please let me know if you would like to receive information about your diagnosis and treatment plan.  I am here to help.     Support Services/Resource Information  [facility] we offer a full scope of support services.  I can assist you with referral information to:  Cancer Clinical Trials & Research  Nutrition counseling  Support groups  Complementary Therapies such as Mind-Body Techniques Meditation  Patient Financial Advocates    [facility] Resource [place], an [employer] affiliate office, our volunteers can assist you with accessing our [facility] library, support services information, head coverings and comfort items  Community and national resources, included eligibility based luca assistance and pharmaceutical access programs, if you are in need of  additional information.     [facility] offers services that include:  Behavioral Health  Genetic counseling & testing  Acupuncture  Lymphedema prevention/treatment program  Palliative care services.        I hope you have an excellent patient experience.  Please feel free to share with me your comments regarding the care you have received- we value your feedback.    Sincerely,     Missy HOLT RN OCN   Cancer Nurse Navigator    Office Mainline: [phone]  Desk voice mail: [phone]  Email: [email or web address]     Office: [phone]   Email:

## 2025-02-14 ENCOUNTER — HOSPITAL ENCOUNTER (OUTPATIENT)
Dept: LAB | Facility: MEDICAL CENTER | Age: 75
End: 2025-02-14
Attending: INTERNAL MEDICINE
Payer: MEDICARE

## 2025-02-14 PROCEDURE — 82105 ALPHA-FETOPROTEIN SERUM: CPT

## 2025-02-14 PROCEDURE — 83951 ONCOPROTEIN DCP: CPT

## 2025-02-18 ENCOUNTER — APPOINTMENT (OUTPATIENT)
Dept: ADMISSIONS | Facility: MEDICAL CENTER | Age: 75
End: 2025-02-18
Attending: SURGERY
Payer: MEDICARE

## 2025-02-18 LAB
ACARBOXYPROTHROMBIN SERPL-MCNC: 10.2 NG/ML (ref 0–7.4)
AFP-TM SERPL-MCNC: 5 NG/ML (ref 0–9)

## 2025-02-20 NOTE — PROGRESS NOTES
Subjective:   2/25/2025  9:02 AM  Primary care physician: XAVI Ayon  Referring Provider: Alfonzo Servin M.D.   Medical Oncologist: Alfonzo Servin M.D.     Chief Complaint:   Chief Complaint   Patient presents with    Follow-Up     HX CANCER, CLL  HCC S/P CHEMO,   Y90, TACE, CHEMO  US 2/13  +DCP, -AFP, -        Diagnosis:   1. Primary malignant neoplasm of liver (HCC)        2. Benign neoplasm of sigmoid colon        3. Hiatal hernia with GERD        4. CLL (chronic lymphocytic leukemia) (HCC)        5. History of basal cell carcinoma (BCC)        6. Hepatoma (HCC)        7. Type 2 diabetes mellitus without complication, without long-term current use of insulin (HCC)          History of presenting illness:    Sohail Duvall is a pleasant 74 y.o. male with history including alcoholic cirrhosis, portal hypertension with multiple variceal ligations, Primary malignant neoplasm of liver HCC with ablation 4B lesion ([facility]) Y90 SIRT in 2017 TACE 2018 s/p Rituxan/bendamustine  Y90 2023 TACE 2021, CLL, hiatal hernia, stage 3 kidney disease, gout, obesity, Type 2 diabetes, hepatoma, and prostate cancer  s/p brachytherapy. Referred by Dr Servin for concern liver cell carcinoma.     MR ABDOMEN on 12/23/24 noted hepatic cirrhosis with multiple enhancing lesions again seen.  Lesion in segment 3 appears enlarged compared to prior exam.  Other lesions in the RIGHT and LEFT lobes appear unchanged.  No new lesion demonstrated. Splenomegaly, increased from prior. LEFT adrenal adenoma again seen. LR-4: probably HCC     Note - patient was scheduled for ablation in 2022, but no showed for appointments with Dr Senior.     He is here today for evaluation what appears to be LI-RADS 4 lesion on the left lateral segment segment 3.  Based on imaging it has grown slightly from his last MRI from June 2024.  I reviewed his recent MRI from December  2024 and I am having difficulty seeing the exact lesion that they are talking about.  In any event, he does show significant areas of cirrhosis throughout his liver.  He is between a Darian A and a darian B patient.  His platelet count is in the 60s but he is suffering from chronic renal insufficiency as well.  His GFR is 49.  With a creatinine of 1.48.     In any event, I did an ablation of the patient back in 2017 and fortunately this ablation cavity shows no sign of recurrence.  He is here with his wife to discuss next steps.     The patient did have cirrhosis related to alcohol use and he says he is not drinking anymore and that he does not smoke anymore.  He is not on any blood thinners.  He actually looks like he lost a little weight but is still obese.     He is on immunotherapies with Dr. Horn I did complete his therapy for his CLL.  I have had to review his old records new records and his imaging.    Update 2/25/2  US ABDOMEN on 2/13/25 noted hyperechoic left lobe 1.9 cm liver mass 4.9 cm from skin.    DCP on 2/14/25 was 10.2 elevated. AFP on 2/14/25 was 5, WNL. CA 19-9 on 2/12/25 was 16.1 WNL.    He is here today for follow-up status post Histotripsy ultrasound.  It is visible and its about 4.9 cm from the abdominal wall.  They are seeing it in the supine position.  So the patient is a candidate for Histotripsy.  We did spend time discussing the difference between a Histotripsy and an ablation using thermal energy when he has had in the past.  He has elected to proceed with a Histotripsy.  The tumor itself is 1.9 cm in segment 3 of the liver.  I reviewed imaging for second time during this visit.    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 Stopped both medications because blood pressure readings were low, /82 today and has been low at home since stopping medications about 5 days ago. Last reading at home 117/72     Hypotension 05/19/2022    Patient has known hypertension and has been on losartan 50 mg long-term.  He notes recent episode about 10 days ago where blood pressure dropped extremely low and he fainted.  He reports that blood pressure on home cuff was 45/30, and slowly increased.  Additionally, he notes that he has had these episodes on a yearly basis for maybe 6 to 7 years or blood pressure goes extremely well and he passes    Indigestion     Liver cancer (HCC)     Liver tumor     Plantar fasciitis of right foot 08/12/2019    Rosacea     Snoring     no sleep study     Past Surgical History:   Procedure Laterality Date    RI UPPER GI ENDOSCOPY,DIAGNOSIS N/A 4/25/2023    Procedure: GASTROSCOPY;  Surgeon: Mir Rodriguez M.D.;  Location: SURGERY SAME DAY [facility];  Service: Gastroenterology    WOUND EXPLORATION ORTHO Right 8/5/2020    Procedure: EXPLORATION, WOUND- THUMB;  Surgeon: Yumiko Griffiths M.D.;  Location: SURGERY SAME DAY [facility] ORS;  Service: Orthopedics    TENDON REPAIR  8/5/2020    Procedure: REPAIR, TENDON- EXTENSOR;  Surgeon: Yumiko Griffiths M.D.;  Location: SURGERY SAME DAY [facility] ORS;  Service: Orthopedics    HEPATIC ABLATION LAPAROSCOPIC  6/28/2018    Procedure: HEPATIC ABLATION LAPAROSCOPIC. SEGMENT 4B, HEPATOMA, REPAIR OF INCARCERATED UMBILICAL HERNIA;  Surgeon: Feliberto Burgos M.D.;  Location: SURGERY [facility];  Service: General    BRACHY THERAPY       Allergies   Allergen Reactions    Sagebrush      Runny nose, itchy eyes     Outpatient Encounter Medications as of 2/25/2025   Medication  Sig Dispense Refill    Cholecalciferol (VITAMIN D-3 PO) Take 2,000 Units by mouth every day.      metFORMIN ER (GLUCOPHAGE XR) 500 MG TABLET SR 24 HR Take 2 Tablets by mouth 2 times a day. 400 Tablet 3    pantoprazole (PROTONIX) 20 MG tablet Take 1 Tablet by mouth every day. 100 Tablet 3    spironolactone (ALDACTONE) 50 MG Tab Take 2 Tablets by mouth every day. 200 Tablet 3    furosemide (LASIX) 20 MG Tab Take 2 Tablets by mouth every day. 200 Tablet 3    atezolizumab (TECENTRIQ) 1200 MG/20ML Solution injection Infusion every 3 weeks      diphenhydrAMINE (BENADRYL ALLERGY) 25 MG capsule Take 25 mg by mouth every 6 hours as needed.      Bevacizumab 1.25 MG/0.05ML Solution Prefilled Syringe Infusion every 3 weeks      propranolol (INDERAL) 10 MG Tab Take 1 Tablet by mouth every day at 6 PM. 100 Tablet 3    acetaminophen (TYLENOL) 500 MG Tab Take 1,000 mg by mouth every 6 hours as needed for Moderate Pain.      fluticasone (FLONASE) 50 MCG/ACT nasal spray Administer 1 Spray into affected nostril(S) 2 times a day as needed.      therapeutic multivitamin-minerals (THERAGRAN-M) Tab Take 1 Tablet by mouth every day.      [DISCONTINUED] cetirizine (ZYRTEC) 10 MG Tab Take 10 mg by mouth every day. (Patient not taking: Reported on 2/11/2025)      [DISCONTINUED] Vitamin D, Cholecalciferol, 25 MCG (1000 UT) Cap Take 1,000 Units by mouth every day.       No facility-administered encounter medications on file as of 2/25/2025.     Social History     Socioeconomic History    Marital status:      Spouse name: Not on file    Number of children: 1    Years of education: Not on file    Highest education level: Not on file   Occupational History    Occupation: fertilizer sales   Tobacco Use    Smoking status: Never     Passive exposure: Current    Smokeless tobacco: Former     Types: Snuff, Chew     Quit date: 3/29/2015   Vaping Use    Vaping status: Never Used   Substance and Sexual Activity    Alcohol use: Not Currently      "Alcohol/week: 7.2 oz     Types: 12 Shots of liquor per week    Drug use: Yes     Types: Marijuana, Inhaled, Oral     Comment: \"Marijuana Use\" daily     Sexual activity: Never   Other Topics Concern    Not on file   Social History Narrative    No known exposure to asbestos, dyes, or chemicals, however he was exposed to pesticides.  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  1 child, alive and well.  He also has a step-child.      Social Drivers of Health     Financial Resource Strain: Not on file   Food Insecurity: Not on file   Transportation Needs: Not on file   Physical Activity: Not on file   Stress: Not on file   Social Connections: Not on file   Intimate Partner Violence: Not on file   Housing Stability: Not on file      Social History     Tobacco Use   Smoking Status Never    Passive exposure: Current   Smokeless Tobacco Former    Types: Snuff, Chew    Quit date: 3/29/2015     Social History     Substance and Sexual Activity   Alcohol Use Not Currently    Alcohol/week: 7.2 oz    Types: 12 Shots of liquor per week     Social History     Substance and Sexual Activity   Drug Use Yes    Types: Marijuana, Inhaled, Oral    Comment: \"Marijuana Use\" daily       Family History   Problem Relation Age of Onset    Cancer Father 81        Bladder    Cancer Brother         Prostate & CLL (s/p 8-10 years ago)    Hyperlipidemia Sister     Lung Disease Neg Hx     Diabetes Neg Hx     Heart Disease Neg Hx     Hypertension Neg Hx     Stroke Neg Hx     Alcohol/Drug Neg Hx      Review of Systems   All other systems reviewed and are negative.       Objective:   /62 (BP Location: Right arm, Patient Position: Sitting, BP Cuff Size: Large adult long)   Pulse (!) 52   Temp 36.3 °C (97.3 °F) (Temporal)   Ht 1.702 m (5' 7\")   Wt 95.5 kg (210 lb 8.6 oz)   SpO2 98%   BMI 32.98 kg/m²     Physical Exam  Vitals and nursing note reviewed.   Constitutional:       " Appearance: Normal appearance. He is obese.   HENT:      Head: Normocephalic.      Mouth/Throat:      Mouth: Mucous membranes are moist.      Pharynx: Oropharynx is clear.   Eyes:      Extraocular Movements: Extraocular movements intact.      Conjunctiva/sclera: Conjunctivae normal.      Pupils: Pupils are equal, round, and reactive to light.   Cardiovascular:      Rate and Rhythm: Normal rate and regular rhythm.      Pulses: Normal pulses.      Heart sounds: Normal heart sounds.   Pulmonary:      Effort: Pulmonary effort is normal.      Breath sounds: Normal breath sounds.   Abdominal:      General: Abdomen is flat. Bowel sounds are normal.      Palpations: Abdomen is soft.   Musculoskeletal:         General: Normal range of motion.      Cervical back: Normal range of motion.   Skin:     General: Skin is warm.   Neurological:      General: No focal deficit present.      Mental Status: He is alert and oriented to person, place, and time. Mental status is at baseline.   Psychiatric:         Mood and Affect: Mood normal.         Behavior: Behavior normal.         Thought Content: Thought content normal.         Judgment: Judgment normal.         Labs   Latest Reference Range & Units 02/12/25 10:33   WBC 4.8 - 10.8 K/uL 5.8   RBC 4.70 - 6.10 M/uL 4.55 (L)   Hemoglobin 14.0 - 18.0 g/dL 16.0   Hematocrit 42.0 - 52.0 % 46.9   MCV 81.4 - 97.8 fL 103.1 (H)   MCH 27.0 - 33.0 pg 35.2 (H)   MCHC 32.3 - 36.5 g/dL 34.1   RDW 35.9 - 50.0 fL 57.7 (H)   Platelet Count 164 - 446 K/uL 60 (L)   MPV 9.0 - 12.9 fL 10.2   (L): Data is abnormally low  (H): Data is abnormally high      Latest Reference Range & Units 02/12/25 10:33   Sodium 135 - 145 mmol/L  135 - 145 mmol/L 136  137   Potassium 3.6 - 5.5 mmol/L  3.6 - 5.5 mmol/L 5.9 (H)  6.0 (H)   Chloride 96 - 112 mmol/L  96 - 112 mmol/L 101  102   Co2 20 - 33 mmol/L  20 - 33 mmol/L 25  25   Anion Gap 7.0 - 16.0   7.0 - 16.0  10.0  10.0   Glucose 65 - 99 mg/dL  65 - 99 mg/dL 180 (H)  179  (H)   Bun 8 - 22 mg/dL  8 - 22 mg/dL 39 (H)  39 (H)   Creatinine 0.50 - 1.40 mg/dL  0.50 - 1.40 mg/dL 1.72 (H)  1.72 (H)   GFR (CKD-EPI) >60 mL/min/1.73 m 2  >60 mL/min/1.73 m 2 41 !  41 !   Calcium 8.5 - 10.5 mg/dL  8.5 - 10.5 mg/dL 10.3  10.1   Correct Calcium 8.5 - 10.5 mg/dL  8.5 - 10.5 mg/dL 10.0  9.9   AST(SGOT) 12 - 45 U/L  12 - 45 U/L 41  40   ALT(SGPT) 2 - 50 U/L  2 - 50 U/L 30  30   Alkaline Phosphatase 30 - 99 U/L  30 - 99 U/L 83  83   Total Bilirubin 0.1 - 1.5 mg/dL  0.1 - 1.5 mg/dL 1.7 (H)  1.7 (H)   Albumin 3.2 - 4.9 g/dL  3.2 - 4.9 g/dL 4.4  4.3   Total Protein 6.0 - 8.2 g/dL  6.0 - 8.2 g/dL 7.1  7.1   Globulin 1.9 - 3.5 g/dL  1.9 - 3.5 g/dL 2.7  2.8   A-G Ratio g/dL  g/dL 1.6  1.5   Urobilinogen, Urine <=1.0 EU/dL 1.0   (H): Data is abnormally high  !: Data is abnormal           Latest Reference Range & Units 02/12/25 10:33 02/14/25 11:45   Alpha Fetoprotein 0 - 9 ng/mL  5   Ca 19-9 0.0 - 35.0 U/mL 16.1        Imaging  US ABDOMEN (2/13/25)  FINDINGS:  Lesion 1:  Patient Position: Supine  Lesion (location): Left lobe segment 3  Size: 1.9 cm L x 1.7 cm TRV x 1.7 cm AP  Echogenicity: Hyperechoic  Measurement from muscle/subcutaneous fat interface to center of lesion  (?2.5 cm) 3.5 cm  Measurement from skin line to posterior lesion border : 4.9 cm  Is the lesion visible in the sagittal plane throughout a respiratory cycle (during quiet breathing)? Yes  Is the lesion visible with ?45 degree transducer angle (relative to perpendicular) with minimal transducer pressure? Yes  Is there bone, bowel, or lung in the acoustic pathway? No.     Cirrhotic changes of the liver partially visualized.     IMPRESSION:     Hyperechoic left lobe liver mass as described.    MR ABDOMEN (12/23/24)  IMPRESSION:  1.  Hepatic cirrhosis with multiple enhancing lesions again seen.  Lesion in segment 3 appears enlarged compared to prior exam.  Other lesions in the RIGHT and LEFT lobes appear unchanged.  No new lesion  demonstrated.  2.  Splenomegaly, increased from prior.  3.  LEFT adrenal adenoma again seen.     LR-4: probably HCC     Pathology  N/A     Procedures  Left hepatic lobe Y90 (8/22/23) - Dr Norris  Ablation 4B lesion - [facility]  Y90 SIRT 2017   TACE 2018   Y90 2023  TACE 2021    Diagnosis:     1. Primary malignant neoplasm of liver (HCC)        2. Benign neoplasm of sigmoid colon        3. Hiatal hernia with GERD        4. CLL (chronic lymphocytic leukemia) (HCC)        5. History of basal cell carcinoma (BCC)        6. Hepatoma (HCC)        7. Type 2 diabetes mellitus without complication, without long-term current use of insulin (HCC)            Medical Decision Making:  Today's Assessment / Status / Plan:     In light of the present findings, the patient has been scheduled for March 3 for a Histotripsy.  We discussed the risks and benefits of the procedure including bleeding, infection if he gets necrosis that were not using any incisions and there is a 3 to 5% recurrence rate and if so we would then follow-up with an ablation at a later date.  He also understands this is done under general anesthetic and those inherent risks for going to sleep.  He understood this and is agreed the above plan.    I, Dr. Senior have entered, reviewed and confirmed the above diagnoses related to this patient on this date of service, 2/25/2025  9:02 AM.    He agreed and verbalized his agreement and understanding with the current plan. I answered all questions and concerns he has at this time and advised him to call at any time in the interim with questions or concerns in regards to his care.    Thank you for allowing me to participate in his care, I will continue to follow closely.       Please note that this dictation was created using voice recognition software. I have made every reasonable attempt to correct obvious errors, but I expect that there are errors of grammar and possibly content that I did not discover before  finalizing the note.     Thank you for this consultation and allowing me to participate in your patient's care. If I can be of further service please contact my office.      I, Dr Senior, have entered, reviewed and confirmed the above diagnoses related to this patient on this date of service, as per the time and date noted at top of this note.

## 2025-02-24 ENCOUNTER — PRE-ADMISSION TESTING (OUTPATIENT)
Dept: ADMISSIONS | Facility: MEDICAL CENTER | Age: 75
End: 2025-02-24
Attending: SURGERY
Payer: MEDICARE

## 2025-02-24 NOTE — PREADMIT AVS NOTE
Current Medications   Medication Instructions    Cholecalciferol (VITAMIN D-3 PO) Stop 7 days before surgery    metFORMIN ER (GLUCOPHAGE XR) 500 MG TABLET SR 24 HR Hold medication day of procedure    pantoprazole (PROTONIX) 20 MG tablet Continue as prescribed    spironolactone (ALDACTONE) 50 MG Tab Hold medication day of procedure    furosemide (LASIX) 20 MG Tab Hold medication day of procedure    diphenhydrAMINE (BENADRYL ALLERGY) 25 MG capsule Continue as prescribed    propranolol (INDERAL) 10 MG Tab Continue as prescribed    acetaminophen (TYLENOL) 500 MG Tab Continue as prescribed    fluticasone (FLONASE) 50 MCG/ACT nasal spray Continue as prescribed    therapeutic multivitamin-minerals (THERAGRAN-M) Tab Stop 7 days before surgery

## 2025-02-25 ENCOUNTER — APPOINTMENT (OUTPATIENT)
Dept: ADMISSIONS | Facility: MEDICAL CENTER | Age: 75
End: 2025-02-25
Attending: SURGERY
Payer: MEDICARE

## 2025-02-25 ENCOUNTER — OFFICE VISIT (OUTPATIENT)
Facility: MEDICAL CENTER | Age: 75
End: 2025-02-25
Payer: MEDICARE

## 2025-02-25 VITALS
BODY MASS INDEX: 33.04 KG/M2 | DIASTOLIC BLOOD PRESSURE: 62 MMHG | HEART RATE: 52 BPM | HEIGHT: 67 IN | WEIGHT: 210.54 LBS | OXYGEN SATURATION: 98 % | SYSTOLIC BLOOD PRESSURE: 114 MMHG | TEMPERATURE: 97.3 F

## 2025-02-25 DIAGNOSIS — E11.9 TYPE 2 DIABETES MELLITUS WITHOUT COMPLICATION, WITHOUT LONG-TERM CURRENT USE OF INSULIN (HCC): ICD-10-CM

## 2025-02-25 DIAGNOSIS — C22.0 HEPATOMA (HCC): ICD-10-CM

## 2025-02-25 DIAGNOSIS — K44.9 HIATAL HERNIA WITH GERD: ICD-10-CM

## 2025-02-25 DIAGNOSIS — K21.9 HIATAL HERNIA WITH GERD: ICD-10-CM

## 2025-02-25 DIAGNOSIS — Z01.810 PRE-OPERATIVE CARDIOVASCULAR EXAMINATION: ICD-10-CM

## 2025-02-25 DIAGNOSIS — Z85.828 HISTORY OF BASAL CELL CARCINOMA (BCC): ICD-10-CM

## 2025-02-25 DIAGNOSIS — C22.8 PRIMARY MALIGNANT NEOPLASM OF LIVER (HCC): ICD-10-CM

## 2025-02-25 DIAGNOSIS — D12.5 BENIGN NEOPLASM OF SIGMOID COLON: ICD-10-CM

## 2025-02-25 DIAGNOSIS — C91.10 CLL (CHRONIC LYMPHOCYTIC LEUKEMIA) (HCC): ICD-10-CM

## 2025-02-25 LAB — EKG IMPRESSION: NORMAL

## 2025-02-25 PROCEDURE — 93010 ELECTROCARDIOGRAM REPORT: CPT | Performed by: INTERNAL MEDICINE

## 2025-02-25 PROCEDURE — 93005 ELECTROCARDIOGRAM TRACING: CPT | Mod: TC

## 2025-02-25 ASSESSMENT — FIBROSIS 4 INDEX: FIB4 SCORE: 9.23

## 2025-02-27 ENCOUNTER — PATIENT MESSAGE (OUTPATIENT)
Dept: MEDICAL GROUP | Facility: PHYSICIAN GROUP | Age: 75
End: 2025-02-27
Payer: MEDICARE

## 2025-02-27 DIAGNOSIS — C91.10 CLL (CHRONIC LYMPHOCYTIC LEUKEMIA) (HCC): ICD-10-CM

## 2025-02-27 DIAGNOSIS — R11.0 NAUSEA: ICD-10-CM

## 2025-02-27 NOTE — PATIENT COMMUNICATION
Received request via: Pharmacy    Was the patient seen in the last year in this department? Yes    Does the patient have an active prescription (recently filled or refills available) for medication(s) requested? No    Pharmacy Name:   [id number] - [place] - [address] [facility]  [address] [facility]  [place] [address]  Phone: [phone] Fax: [phone]       Does the patient have halfway Plus and need 100-day supply? (This applies to ALL medications) Patient does not have SCP

## 2025-02-28 RX ORDER — ONDANSETRON 4 MG/1
4 TABLET, FILM COATED ORAL EVERY 4 HOURS PRN
Qty: 90 TABLET | Refills: 2 | Status: SHIPPED | OUTPATIENT
Start: 2025-02-28 | End: 2025-05-29

## 2025-03-03 ENCOUNTER — ANESTHESIA (OUTPATIENT)
Dept: SURGERY | Facility: MEDICAL CENTER | Age: 75
End: 2025-03-03
Payer: MEDICARE

## 2025-03-03 ENCOUNTER — ANESTHESIA EVENT (OUTPATIENT)
Dept: SURGERY | Facility: MEDICAL CENTER | Age: 75
End: 2025-03-03
Payer: MEDICARE

## 2025-03-03 ENCOUNTER — HOSPITAL ENCOUNTER (OUTPATIENT)
Facility: MEDICAL CENTER | Age: 75
End: 2025-03-03
Attending: SURGERY | Admitting: SURGERY
Payer: MEDICARE

## 2025-03-03 VITALS
HEART RATE: 56 BPM | BODY MASS INDEX: 32.98 KG/M2 | DIASTOLIC BLOOD PRESSURE: 74 MMHG | OXYGEN SATURATION: 94 % | TEMPERATURE: 96.4 F | WEIGHT: 210.1 LBS | RESPIRATION RATE: 16 BRPM | SYSTOLIC BLOOD PRESSURE: 145 MMHG | HEIGHT: 67 IN

## 2025-03-03 DIAGNOSIS — K70.30 ALCOHOLIC CIRRHOSIS OF LIVER WITHOUT ASCITES (HCC): ICD-10-CM

## 2025-03-03 DIAGNOSIS — C22.8 PRIMARY MALIGNANT NEOPLASM OF LIVER (HCC): ICD-10-CM

## 2025-03-03 DIAGNOSIS — K76.6 PORTAL HYPERTENSION (HCC): ICD-10-CM

## 2025-03-03 DIAGNOSIS — F10.21 ALCOHOLISM IN REMISSION (HCC): ICD-10-CM

## 2025-03-03 DIAGNOSIS — E11.9 TYPE 2 DIABETES MELLITUS WITHOUT COMPLICATION, WITHOUT LONG-TERM CURRENT USE OF INSULIN (HCC): ICD-10-CM

## 2025-03-03 DIAGNOSIS — D69.59 SECONDARY THROMBOCYTOPENIA: ICD-10-CM

## 2025-03-03 DIAGNOSIS — C22.0 LIVER CELL CARCINOMA (HCC): ICD-10-CM

## 2025-03-03 LAB
ABO GROUP BLD: NORMAL
ANION GAP SERPL CALC-SCNC: 13 MMOL/L (ref 7–16)
BARCODED ABORH UBTYP: 5100
BARCODED PRD CODE UBPRD: NORMAL
BARCODED UNIT NUM UBUNT: NORMAL
BLD GP AB SCN SERPL QL: NORMAL
BUN SERPL-MCNC: 27 MG/DL (ref 8–22)
CALCIUM SERPL-MCNC: 8.9 MG/DL (ref 8.5–10.5)
CHLORIDE SERPL-SCNC: 104 MMOL/L (ref 96–112)
CO2 SERPL-SCNC: 21 MMOL/L (ref 20–33)
COMPONENT P 8504P: NORMAL
CREAT SERPL-MCNC: 1.14 MG/DL (ref 0.5–1.4)
ERYTHROCYTE [DISTWIDTH] IN BLOOD BY AUTOMATED COUNT: 57.5 FL (ref 35.9–50)
GFR SERPLBLD CREATININE-BSD FMLA CKD-EPI: 67 ML/MIN/1.73 M 2
GLUCOSE BLD STRIP.AUTO-MCNC: 131 MG/DL (ref 65–99)
GLUCOSE SERPL-MCNC: 149 MG/DL (ref 65–99)
HCT VFR BLD AUTO: 42.7 % (ref 42–52)
HGB BLD-MCNC: 14.4 G/DL (ref 14–18)
HOLDING TUBE BB 8507: NORMAL
INR PPP: 1.16 (ref 0.87–1.13)
MCH RBC QN AUTO: 35 PG (ref 27–33)
MCHC RBC AUTO-ENTMCNC: 33.7 G/DL (ref 32.3–36.5)
MCV RBC AUTO: 103.9 FL (ref 81.4–97.8)
PLATELET # BLD AUTO: 51 K/UL (ref 164–446)
PLATELETS.RETICULATED NFR BLD AUTO: 3 % (ref 0.6–13.1)
PMV BLD AUTO: 9.6 FL (ref 9–12.9)
POTASSIUM SERPL-SCNC: 4.9 MMOL/L (ref 3.6–5.5)
PRODUCT TYPE UPROD: NORMAL
PROTHROMBIN TIME: 14.8 SEC (ref 12–14.6)
RBC # BLD AUTO: 4.11 M/UL (ref 4.7–6.1)
RH BLD: NORMAL
SODIUM SERPL-SCNC: 138 MMOL/L (ref 135–145)
UNIT STATUS USTAT: NORMAL
WBC # BLD AUTO: 4 K/UL (ref 4.8–10.8)

## 2025-03-03 PROCEDURE — 700102 HCHG RX REV CODE 250 W/ 637 OVERRIDE(OP): Performed by: STUDENT IN AN ORGANIZED HEALTH CARE EDUCATION/TRAINING PROGRAM

## 2025-03-03 PROCEDURE — 160015 HCHG STAT PREOP MINUTES: Performed by: SURGERY

## 2025-03-03 PROCEDURE — 86901 BLOOD TYPING SEROLOGIC RH(D): CPT

## 2025-03-03 PROCEDURE — 160041 HCHG SURGERY MINUTES - EA ADDL 1 MIN LEVEL 4: Performed by: SURGERY

## 2025-03-03 PROCEDURE — 47370 LAPARO ABLATE LIVER TUMOR RF: CPT | Performed by: SURGERY

## 2025-03-03 PROCEDURE — 700105 HCHG RX REV CODE 258: Performed by: STUDENT IN AN ORGANIZED HEALTH CARE EDUCATION/TRAINING PROGRAM

## 2025-03-03 PROCEDURE — 160025 RECOVERY II MINUTES (STATS): Performed by: SURGERY

## 2025-03-03 PROCEDURE — 160048 HCHG OR STATISTICAL LEVEL 1-5: Performed by: SURGERY

## 2025-03-03 PROCEDURE — 700101 HCHG RX REV CODE 250: Performed by: SURGERY

## 2025-03-03 PROCEDURE — 85610 PROTHROMBIN TIME: CPT

## 2025-03-03 PROCEDURE — 86850 RBC ANTIBODY SCREEN: CPT

## 2025-03-03 PROCEDURE — 160046 HCHG PACU - 1ST 60 MINS PHASE II: Performed by: SURGERY

## 2025-03-03 PROCEDURE — 86900 BLOOD TYPING SEROLOGIC ABO: CPT

## 2025-03-03 PROCEDURE — 700111 HCHG RX REV CODE 636 W/ 250 OVERRIDE (IP): Performed by: STUDENT IN AN ORGANIZED HEALTH CARE EDUCATION/TRAINING PROGRAM

## 2025-03-03 PROCEDURE — 160029 HCHG SURGERY MINUTES - 1ST 30 MINS LEVEL 4: Performed by: SURGERY

## 2025-03-03 PROCEDURE — 36415 COLL VENOUS BLD VENIPUNCTURE: CPT

## 2025-03-03 PROCEDURE — 82962 GLUCOSE BLOOD TEST: CPT

## 2025-03-03 PROCEDURE — 160002 HCHG RECOVERY MINUTES (STAT): Performed by: SURGERY

## 2025-03-03 PROCEDURE — 700111 HCHG RX REV CODE 636 W/ 250 OVERRIDE (IP): Mod: JZ | Performed by: STUDENT IN AN ORGANIZED HEALTH CARE EDUCATION/TRAINING PROGRAM

## 2025-03-03 PROCEDURE — 160009 HCHG ANES TIME/MIN: Performed by: SURGERY

## 2025-03-03 PROCEDURE — 80048 BASIC METABOLIC PNL TOTAL CA: CPT

## 2025-03-03 PROCEDURE — A9270 NON-COVERED ITEM OR SERVICE: HCPCS | Performed by: STUDENT IN AN ORGANIZED HEALTH CARE EDUCATION/TRAINING PROGRAM

## 2025-03-03 PROCEDURE — 85055 RETICULATED PLATELET ASSAY: CPT

## 2025-03-03 PROCEDURE — 700101 HCHG RX REV CODE 250: Performed by: STUDENT IN AN ORGANIZED HEALTH CARE EDUCATION/TRAINING PROGRAM

## 2025-03-03 PROCEDURE — 160035 HCHG PACU - 1ST 60 MINS PHASE I: Performed by: SURGERY

## 2025-03-03 PROCEDURE — 85027 COMPLETE CBC AUTOMATED: CPT

## 2025-03-03 PROCEDURE — 160036 HCHG PACU - EA ADDL 30 MINS PHASE I: Performed by: SURGERY

## 2025-03-03 RX ORDER — ONDANSETRON 2 MG/ML
INJECTION INTRAMUSCULAR; INTRAVENOUS PRN
Status: DISCONTINUED | OUTPATIENT
Start: 2025-03-03 | End: 2025-03-03 | Stop reason: SURG

## 2025-03-03 RX ORDER — BUPIVACAINE HYDROCHLORIDE AND EPINEPHRINE 5; 5 MG/ML; UG/ML
INJECTION, SOLUTION EPIDURAL; INTRACAUDAL; PERINEURAL
Status: DISCONTINUED | OUTPATIENT
Start: 2025-03-03 | End: 2025-03-03 | Stop reason: HOSPADM

## 2025-03-03 RX ORDER — SODIUM CHLORIDE, SODIUM LACTATE, POTASSIUM CHLORIDE, CALCIUM CHLORIDE 600; 310; 30; 20 MG/100ML; MG/100ML; MG/100ML; MG/100ML
INJECTION, SOLUTION INTRAVENOUS
Status: DISCONTINUED | OUTPATIENT
Start: 2025-03-03 | End: 2025-03-03 | Stop reason: SURG

## 2025-03-03 RX ORDER — OXYCODONE HCL 5 MG/5 ML
5 SOLUTION, ORAL ORAL
Status: COMPLETED | OUTPATIENT
Start: 2025-03-03 | End: 2025-03-03

## 2025-03-03 RX ORDER — ROCURONIUM BROMIDE 10 MG/ML
INJECTION, SOLUTION INTRAVENOUS PRN
Status: DISCONTINUED | OUTPATIENT
Start: 2025-03-03 | End: 2025-03-03 | Stop reason: SURG

## 2025-03-03 RX ORDER — ONDANSETRON 2 MG/ML
4 INJECTION INTRAMUSCULAR; INTRAVENOUS
Status: DISCONTINUED | OUTPATIENT
Start: 2025-03-03 | End: 2025-03-03 | Stop reason: HOSPADM

## 2025-03-03 RX ORDER — EPHEDRINE SULFATE 50 MG/ML
5 INJECTION, SOLUTION INTRAVENOUS
Status: DISCONTINUED | OUTPATIENT
Start: 2025-03-03 | End: 2025-03-03 | Stop reason: HOSPADM

## 2025-03-03 RX ORDER — DIPHENHYDRAMINE HYDROCHLORIDE 50 MG/ML
12.5 INJECTION, SOLUTION INTRAMUSCULAR; INTRAVENOUS
Status: DISCONTINUED | OUTPATIENT
Start: 2025-03-03 | End: 2025-03-03 | Stop reason: HOSPADM

## 2025-03-03 RX ORDER — SODIUM CHLORIDE, SODIUM LACTATE, POTASSIUM CHLORIDE, CALCIUM CHLORIDE 600; 310; 30; 20 MG/100ML; MG/100ML; MG/100ML; MG/100ML
INJECTION, SOLUTION INTRAVENOUS CONTINUOUS
Status: DISCONTINUED | OUTPATIENT
Start: 2025-03-03 | End: 2025-03-03 | Stop reason: HOSPADM

## 2025-03-03 RX ORDER — LIDOCAINE HYDROCHLORIDE 20 MG/ML
INJECTION, SOLUTION EPIDURAL; INFILTRATION; INTRACAUDAL; PERINEURAL PRN
Status: DISCONTINUED | OUTPATIENT
Start: 2025-03-03 | End: 2025-03-03 | Stop reason: SURG

## 2025-03-03 RX ORDER — HYDROMORPHONE HYDROCHLORIDE 1 MG/ML
0.2 INJECTION, SOLUTION INTRAMUSCULAR; INTRAVENOUS; SUBCUTANEOUS
Status: DISCONTINUED | OUTPATIENT
Start: 2025-03-03 | End: 2025-03-03 | Stop reason: HOSPADM

## 2025-03-03 RX ORDER — HYDRALAZINE HYDROCHLORIDE 20 MG/ML
5 INJECTION INTRAMUSCULAR; INTRAVENOUS
Status: DISCONTINUED | OUTPATIENT
Start: 2025-03-03 | End: 2025-03-03 | Stop reason: HOSPADM

## 2025-03-03 RX ORDER — HYDROMORPHONE HYDROCHLORIDE 1 MG/ML
0.4 INJECTION, SOLUTION INTRAMUSCULAR; INTRAVENOUS; SUBCUTANEOUS
Status: DISCONTINUED | OUTPATIENT
Start: 2025-03-03 | End: 2025-03-03 | Stop reason: HOSPADM

## 2025-03-03 RX ORDER — CEFAZOLIN SODIUM 1 G/3ML
INJECTION, POWDER, FOR SOLUTION INTRAMUSCULAR; INTRAVENOUS PRN
Status: DISCONTINUED | OUTPATIENT
Start: 2025-03-03 | End: 2025-03-03 | Stop reason: SURG

## 2025-03-03 RX ORDER — TRAMADOL HYDROCHLORIDE 50 MG/1
50-100 TABLET ORAL EVERY 4 HOURS PRN
Qty: 40 TABLET | Refills: 0 | Status: SHIPPED
Start: 2025-03-03 | End: 2025-03-05

## 2025-03-03 RX ORDER — HALOPERIDOL 5 MG/ML
1 INJECTION INTRAMUSCULAR
Status: DISCONTINUED | OUTPATIENT
Start: 2025-03-03 | End: 2025-03-03 | Stop reason: HOSPADM

## 2025-03-03 RX ORDER — ONDANSETRON 4 MG/1
4 TABLET, ORALLY DISINTEGRATING ORAL EVERY 6 HOURS PRN
Qty: 10 TABLET | Refills: 0 | Status: SHIPPED | OUTPATIENT
Start: 2025-03-03

## 2025-03-03 RX ORDER — HYDROMORPHONE HYDROCHLORIDE 1 MG/ML
0.1 INJECTION, SOLUTION INTRAMUSCULAR; INTRAVENOUS; SUBCUTANEOUS
Status: DISCONTINUED | OUTPATIENT
Start: 2025-03-03 | End: 2025-03-03 | Stop reason: HOSPADM

## 2025-03-03 RX ORDER — OXYCODONE HCL 5 MG/5 ML
10 SOLUTION, ORAL ORAL
Status: COMPLETED | OUTPATIENT
Start: 2025-03-03 | End: 2025-03-03

## 2025-03-03 RX ORDER — CELECOXIB 200 MG/1
200 CAPSULE ORAL ONCE
Status: COMPLETED | OUTPATIENT
Start: 2025-03-03 | End: 2025-03-03

## 2025-03-03 RX ORDER — ALBUTEROL SULFATE 5 MG/ML
2.5 SOLUTION RESPIRATORY (INHALATION)
Status: DISCONTINUED | OUTPATIENT
Start: 2025-03-03 | End: 2025-03-03 | Stop reason: HOSPADM

## 2025-03-03 RX ORDER — DEXAMETHASONE SODIUM PHOSPHATE 4 MG/ML
INJECTION, SOLUTION INTRA-ARTICULAR; INTRALESIONAL; INTRAMUSCULAR; INTRAVENOUS; SOFT TISSUE PRN
Status: DISCONTINUED | OUTPATIENT
Start: 2025-03-03 | End: 2025-03-03 | Stop reason: SURG

## 2025-03-03 RX ADMIN — SUGAMMADEX 200 MG: 100 INJECTION, SOLUTION INTRAVENOUS at 14:49

## 2025-03-03 RX ADMIN — FENTANYL CITRATE 50 MCG: 50 INJECTION, SOLUTION INTRAMUSCULAR; INTRAVENOUS at 14:52

## 2025-03-03 RX ADMIN — ONDANSETRON 4 MG: 2 INJECTION INTRAMUSCULAR; INTRAVENOUS at 14:49

## 2025-03-03 RX ADMIN — CEFAZOLIN 2 G: 1 INJECTION, POWDER, FOR SOLUTION INTRAMUSCULAR; INTRAVENOUS at 13:47

## 2025-03-03 RX ADMIN — FENTANYL CITRATE 100 MCG: 50 INJECTION, SOLUTION INTRAMUSCULAR; INTRAVENOUS at 14:09

## 2025-03-03 RX ADMIN — DEXAMETHASONE SODIUM PHOSPHATE 4 MG: 4 INJECTION INTRA-ARTICULAR; INTRALESIONAL; INTRAMUSCULAR; INTRAVENOUS; SOFT TISSUE at 13:47

## 2025-03-03 RX ADMIN — SODIUM CHLORIDE, POTASSIUM CHLORIDE, SODIUM LACTATE AND CALCIUM CHLORIDE: 600; 310; 30; 20 INJECTION, SOLUTION INTRAVENOUS at 13:40

## 2025-03-03 RX ADMIN — OXYCODONE HYDROCHLORIDE 5 MG: 5 SOLUTION ORAL at 16:01

## 2025-03-03 RX ADMIN — ROCURONIUM BROMIDE 20 MG: 10 INJECTION INTRAVENOUS at 14:36

## 2025-03-03 RX ADMIN — LIDOCAINE HYDROCHLORIDE 60 MG: 20 INJECTION, SOLUTION EPIDURAL; INFILTRATION; INTRACAUDAL; PERINEURAL at 13:45

## 2025-03-03 RX ADMIN — FENTANYL CITRATE 50 MCG: 50 INJECTION, SOLUTION INTRAMUSCULAR; INTRAVENOUS at 13:45

## 2025-03-03 RX ADMIN — FENTANYL CITRATE 25 MCG: 50 INJECTION, SOLUTION INTRAMUSCULAR; INTRAVENOUS at 16:05

## 2025-03-03 RX ADMIN — FENTANYL CITRATE 25 MCG: 50 INJECTION, SOLUTION INTRAMUSCULAR; INTRAVENOUS at 16:00

## 2025-03-03 RX ADMIN — ROCURONIUM BROMIDE 50 MG: 10 INJECTION INTRAVENOUS at 13:45

## 2025-03-03 RX ADMIN — PROPOFOL 150 MG: 10 INJECTION, EMULSION INTRAVENOUS at 13:45

## 2025-03-03 RX ADMIN — FENTANYL CITRATE 50 MCG: 50 INJECTION, SOLUTION INTRAMUSCULAR; INTRAVENOUS at 14:36

## 2025-03-03 RX ADMIN — CELECOXIB 200 MG: 200 CAPSULE ORAL at 08:55

## 2025-03-03 ASSESSMENT — PAIN DESCRIPTION - PAIN TYPE
TYPE: SURGICAL PAIN
TYPE: CHRONIC PAIN;SURGICAL PAIN
TYPE: SURGICAL PAIN

## 2025-03-03 ASSESSMENT — PAIN SCALES - GENERAL: PAIN_LEVEL: 4

## 2025-03-03 ASSESSMENT — FIBROSIS 4 INDEX: FIB4 SCORE: 9.23

## 2025-03-03 NOTE — PROGRESS NOTES
After further review of the patient's MRI, he does not appear to have very similar lesion much higher in segment 2.  Unfortunately this would not be accessible with the histosonics machine.  After discussion with the patient our plan is to go forward with a laparoscopic microwave thermal ablation of both lesions.  The patient has had a laparoscopic ablation in the past.  We did discuss the risks and benefits including bleeding, infection, the need to given platelets if we are moving forward with the percutaneous portion of the procedure.  He understands there is a risk of recurrent hepatomas well.  He has agreed the above plan.

## 2025-03-03 NOTE — ANESTHESIA PREPROCEDURE EVALUATION
Case: [id number] Date/Time: 03/03/25 0945    Procedure: HISTOTRIPSY OF LIVER SEGMENT 3    Pre-op diagnosis: LIVER CANCER    Location: [facility] OR 10 / SURGERY [facility]    Surgeons: Feliberto Senior M.D.            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   (positive) Other hemorrhoids   (positive) Portal hypertension (HCC)      GI   (positive) Hiatal hernia with GERD         (positive) Alcoholic cirrhosis of liver without ascites (HCC)   (positive) Liver cell carcinoma (HCC)   (positive) Liver tumor   (positive) Primary malignant neoplasm of liver (HCC)   (positive) Stage 3a chronic kidney disease      ENDO   (positive) Type 2 diabetes mellitus without complication, without long-term current use of insulin (HCC)      Other   (positive) CLL (chronic lymphocytic leukemia) (HCC)       Physical Exam    Airway   Mallampati: II  TM distance: >3 FB  Neck ROM: full       Cardiovascular - normal exam  Rhythm: regular  Rate: normal  (-) murmur     Dental - normal exam           Pulmonary - normal exam  Breath sounds clear to auscultation     Abdominal    Neurological - normal exam                   Anesthesia Plan    ASA 3   ASA physical status 3 criteria: other (comment)    Plan - general       Airway plan will be ETT    (Liver failure)      Induction: intravenous    Postoperative Plan: Postoperative administration of opioids is intended.    Pertinent diagnostic labs and testing reviewed    Informed Consent:    Anesthetic plan and risks discussed with patient.    Use of blood products discussed with: patient whom consented to blood products.

## 2025-03-03 NOTE — ANESTHESIA POSTPROCEDURE EVALUATION
Patient: Sohail Duvall    Procedure Summary       Date: 03/03/25 Room / Location: [place] [address] / SURGERY [facility]    Anesthesia Start: 1340 Anesthesia Stop: 1517    Procedure: LAPAROSCOPIC MICROWAVE THERMAL ABLATION LIVER TUMORS (Abdomen) Diagnosis: (LIVER CANCER)    Surgeons: Feliberto Senior M.D. Responsible Provider: Gomez Flores D.O.    Anesthesia Type: general ASA Status: 3            Final Anesthesia Type: general  Last vitals  BP   Blood Pressure : 109/65    Temp   36.6 °C (97.9 °F)    Pulse   (!) 55   Resp   16    SpO2   97 %      Anesthesia Post Evaluation    Patient location during evaluation: PACU  Patient participation: complete - patient participated  Level of consciousness: awake and alert  Pain score: 4    Airway patency: patent  Anesthetic complications: no  Cardiovascular status: hemodynamically stable  Respiratory status: acceptable  Hydration status: euvolemic    PONV: none          No notable events documented.     Nurse Pain Score: 0 (NPRS)

## 2025-03-03 NOTE — ANESTHESIA PROCEDURE NOTES
Airway    Date/Time: 3/3/2025 1:46 PM    Performed by: Gomez Flores D.O.  Authorized by: Gomez Flores D.O.    Location:  OR  Urgency:  Elective  Difficult Airway: No    Indications for Airway Management:  Anesthesia      Spontaneous Ventilation: absent    Sedation Level:  Deep  Preoxygenated: Yes    Patient Position:  Sniffing  Mask Difficulty Assessment:  2 - vent by mask + OA or adjuvant +/- NMBA  Final Airway Type:  Endotracheal airway  Final Endotracheal Airway:  ETT  Cuffed: Yes    Technique Used for Successful ETT Placement:  Direct laryngoscopy    Insertion Site:  Oral  Blade Type:  Eliazar  Laryngoscope Blade/Videolaryngoscope Blade Size:  3  ETT Size (mm):  7.5  Measured from:  Teeth  ETT to Teeth (cm):  21  Placement Verified by: auscultation and capnometry    Cormack-Lehane Classification:  Grade I - full view of glottis  Number of Attempts at Approach:  1

## 2025-03-03 NOTE — ANESTHESIA TIME REPORT
Anesthesia Start and Stop Event Times       Date Time Event    3/3/2025 1313 Ready for Procedure     1340 Anesthesia Start     1517 Anesthesia Stop          Responsible Staff  03/03/25      Name Role Begin End    Gomez Flores D.O. Anesth 1340 1517          Overtime Reason:  overtime    Comments: 17 min overtime

## 2025-03-03 NOTE — PROGRESS NOTES
Medication history reviewed with PT at bedside    [facility] rec is complete per PT reporting    Allergies reviewed.     Patient denies any outpatient antibiotics in the last 30 days.     Patient is not taking anticoagulants.    Preferred pharmacy for this visit - [facility] [address] ([phone])

## 2025-03-03 NOTE — DISCHARGE INSTRUCTIONS
HOME CARE INSTRUCTIONS    ACTIVITY: Rest and take it easy for the first 24 hours.  A responsible adult is recommended to remain with you during that time.  It is normal to feel sleepy.  We encourage you to not do anything that requires balance, judgment or coordination.    FOR 24 HOURS DO NOT:  Drive, operate machinery or run household appliances.  Drink beer or alcoholic beverages.  Make important decisions or sign legal documents.    SPECIAL INSTRUCTIONS:  OK to shower in AM with dressings on DC dressing in 6 days Follow-up with office MA for staple removal after 10-14 days Follow up with Dr. Senior after completing 1 month  CT of liver imaging  OK to shower in AM with dressings on Call MD if temperature greater than 101.5 °F or worsening pain.     DIET: To avoid nausea, slowly advance diet as tolerated, avoiding spicy or greasy foods for the first day.  Add more substantial food to your diet according to your physician's instructions.  Babies can be fed formula or breast milk as soon as they are hungry.  INCREASE FLUIDS AND FIBER TO AVOID CONSTIPATION.    SURGICAL DRESSING/BATHING: Okay to shower starting tomorrow. No submerging into water for 2 weeks.     MEDICATIONS: Resume taking daily medication.  Take prescribed pain medication with food.  If no medication is prescribed, you may take non-aspirin pain medication if needed.  PAIN MEDICATION CAN BE VERY CONSTIPATING.  Take a stool softener or laxative such as senokot, pericolace, or milk of magnesia if needed.    Prescription given for Tramadol.  Last pain medication given at 4:00 pm.    A follow-up appointment should be arranged with your doctor in 1-2 weeks ; call to schedule.    You should CALL YOUR PHYSICIAN if you develop:  Fever greater than 101 degrees F.  Pain not relieved by medication, or persistent nausea or vomiting.  Excessive bleeding (blood soaking through dressing) or unexpected drainage from the wound.  Extreme redness or swelling around  the incision site, drainage of pus or foul smelling drainage.  Inability to urinate or empty your bladder within 8 hours.  Problems with breathing or chest pain.    You should call 911 if you develop problems with breathing or chest pain.  If you are unable to contact your doctor or surgical center, you should go to the nearest emergency room or urgent care center.  Physician's telephone #: [phone]     MILD FLU-LIKE SYMPTOMS ARE NORMAL.  YOU MAY EXPERIENCE GENERALIZED MUSCLE ACHES, THROAT IRRITATION, HEADACHE AND/OR SOME NAUSEA.    If any questions arise, call your doctor.  If your doctor is not available, please feel free to call the Surgical Center at [phone].  The Center is open Monday through Friday from 7AM to 7PM.      A registered nurse may call you a few days after your surgery to see how you are doing after your procedure.    You may also receive a survey in the mail within the next two weeks and we ask that you take a few moments to complete the survey and return it to us.  Our goal is to provide you with very good care and we value your comments.     Depression / Suicide Risk    As you are discharged from this [facility] facility, it is important to learn how to keep safe from harming yourself.    Recognize the warning signs:  Abrupt changes in personality, positive or negative- including increase in energy   Giving away possessions  Change in eating patterns- significant weight changes-  positive or negative  Change in sleeping patterns- unable to sleep or sleeping all the time   Unwillingness or inability to communicate  Depression  Unusual sadness, discouragement and loneliness  Talk of wanting to die  Neglect of personal appearance   Rebelliousness- reckless behavior  Withdrawal from people/activities they love  Confusion- inability to concentrate     If you or a loved one observes any of these behaviors or has concerns about self-harm, here's what you can do:  Talk about it- your  feelings and reasons for harming yourself  Remove any means that you might use to hurt yourself (examples: pills, rope, extension cords, firearm)  Get professional help from the community (Mental Health, Substance Abuse, psychological counseling)  Do not be alone:Call your Safe Contact- someone whom you trust who will be there for you.  Call your local CRISIS HOTLINE 971-4329 or 089-541-2204  Call your local Children's Mobile Crisis Response Team [place] (023) 702-3212 or www.[employer]  Call the toll free National Suicide Prevention Hotlines   National Suicide Prevention Lifeline 360-392-SWDY (0278)  National Hope Line Network 800-SUICIDE (311-6683)    I acknowledge receipt and understanding of these Home Care instructions.

## 2025-03-04 ENCOUNTER — TELEPHONE (OUTPATIENT)
Facility: MEDICAL CENTER | Age: 75
End: 2025-03-04
Payer: MEDICARE

## 2025-03-04 NOTE — TELEPHONE ENCOUNTER
1. Caller Name: Sohail                        Call Back Number: [phone]          Wound 08/05/20 Incision Hand Right xeroform, 4x4, soft roll, plaster, ace wrap, tape (Active)       Wound 03/03/25 Incision Abdomen Bilateral Lap sites x 3: Dermabond (Active)         Discharged on 03/03/2025.   Surgical procedure 03/03/2025 LAPAROSCOPIC MICROWAVE THERMAL ABLATION LIVER TUMORS.    The patient reports his pain is not being controlled by Tramadol and is requesting Oxycodone 5mg (Medication is usually filled by ). He reports he took 1 tramadol at 0800 followed by a 2nd at 0900 and in addition to that he took two tylenols. He did not get any relief until he took his oxycodone. The incision site does not have any signs of infection. He's eating, voiding, and able to perform his daily activities.       Patient denies SOB, N/V, fever, wound infection, chest pain, severe pain, numbness &  tingling.     The patient was advised to call the clinic or seek evaluation at the ER for any of the following:              - Fever >101 F      - Wound infection (increasing redness, swelling, drainage, pus)     - Severe pain not controlled with oral medications     - Severe nausea or vomiting, inability to tolerate PO intake     - Bleeding that does not stop withholding pressure to the area       - Yellowing of the skin or eyes     - Change in mental status (confusion or lethargy)      - New numbness or weakness      - Any other concerns.    Future Appointments   Date Time Provider Department Center   7/29/2025  9:40 AM XAVI Ayon [facility] EULA Marquez

## 2025-03-04 NOTE — OP REPORT
DATE OF SERVICE:  03/03/2025     INDICATIONS:  The patient is a 74-year-old male patient of Dr. Alfonzo Servin   as well as GINA Ayon, who is known to me after having   undergone an ablation several years ago for a liver hepatocellular carcinoma,   who now returns with an elevated DCP and on imaging it appears that a tumor in   the left lateral segment of the liver appears to be growing over the last   several surveillance imaging.  Due to the fact that he has cirrhosis and a new   tumor that is enlarging and an elevated DCP and AFP, the patient was elected   go ahead and proceed with a possible histotripsy, but unfortunately during the   evaluation preoperatively, he was found to have a secondary enhancing lesion   in segment 2, due to its location that would not be obtainable with a   histotripsy, so we elected to convert to a laparoscopic microwave thermal   ablation.     SURGEON:  Feliberto Senior MD     ASSISTANT:  GINA Moreno: The indication for a surgical assistant in this surgery were indicated due to the complexity of the procedure.  Their role included aiding in the incision, retraction, holding of devices including cameras for laparoscopic procedures and closure of wounds.     ANESTHESIOLOGIST:  Tyrell Flores MD     ANESTHESIA:  General and local anesthetic.     ESTIMATED BLOOD LOSS:  Less than 5 mL.     COMPLICATIONS:  No complications.     FINDINGS:  1.  Cirrhosis, portal hypertension, minimal ascites and a 2 cm hepatoma in   segment 3 of the liver.  2.  We could not identify the secondary lesion that was enhancing measured 1.5   cm in the left lobe of the liver, thus we only did a single ablation.     CASE CLASS:  Clean.     PROCEDURE:  Laparoscopic microwave thermal ablation of segment 3 lesion,   measuring 1.9 x 1.5 cm using a 140 flowers at 2 minutes giving us a 3.4 x 4 cm   ablation cavity.     DESCRIPTION OF PROCEDURE:  The patient was brought to OR,  placed under general   anesthetic, both arms out, shaved, prepped with chlorhexidine prep, covered   with sterile drapes, given preoperative antibiotics.  Timeout was done, which   was adequate.  At that point, he was given 5 mL of 0.5% Marcaine with   epinephrine in the previous umbilical hernia incision and between 2 Kochers   and Carol opened under direct vision atraumatically.  0 Vicryl suture placed   on each side of the fascia.  Keenan trocar was inserted.  Abdomen was   insufflated to 15 mm of pressure, but there was minimal ascites just above the   left lateral segment, but none floating around.  At that point, we placed a 5   mm port percutaneously under direct vision atraumatically in the left upper   quadrant after given local anesthetic in the mid axillary line.  At that   point, we then placed in reverse Trendelenburg.  The patient does have   macronodular regenerative changes due to his alcoholic cirrhosis and upon   inspection of the left lateral segment, we could easily identify the hepatoma   in segment 3, measuring almost 2 x 1.5 cm, cut a mid segment 3 and then we   looked for the secondary lesion, which looked like it was enhancing along the   falciform, but we could not identify an obvious tumor.  We did the   surveillance for approximately 10-15 minutes and based on ultrasound, we feel   this may have been a regenerative nodule that enhanced, but has not changed in   size over several surveillance imaging, so we did not proceed with that.  At   that point, we elected to go ahead and proceed with ablating the segment 3   lesion.  We placed 2 Raytec posterior segment 3 to separate pocket between the   stomach and the left lateral segment.  We then under laparoscopic guidance   and ultrasound guidance, we placed a 14 cm needle through the anterior   abdominal wall after given local anesthetic and followed it into the tumor.    At that point, we ran a 140 flowers at 2 minutes giving us 3.4 x 4 cm  ablation   cavity.  Once completed, we did a small track ablation of about 15 seconds.    Reinspection showed no bleeding, no bile leak.  As of note, the patient's   platelet count was 51 and the concern was that he might need platelet   transfusion, but we did not give him any platelets.  At that point, we   reinspected the area with ultrasound and could not identify the location of   the tumor after ablation.  We reinspected the rest of the left lateral   segment, no sign of ischemia or bleeding.  At that point, the abdomen was   desufflated.  He was placed in supine position.  We waited 5 minutes by the   clock, reinflated and inspected again, finding no sign of bleeding or bile   leaks.  At that point, we removed both Raytec, desufflated the abdomen, closed   the preplaced 0 Vicryl sutures and then gave a total of 30 mL of 0.5%   Marcaine with epinephrine throughout the area of 3 incision sites.  At that   point, the incisions were closed with 4-0 Monocryl in a running fashion and   Dermabond.  He was then awakened and transferred to recovery room.        ______________________________  MD LORI [place]/DAWN    DD:  03/03/2025 15:12  DT:  03/03/2025 16:12    Job#:  [id number]    CC:MD Lashaun Dial APRN

## 2025-03-04 NOTE — OR NURSING
Pt A&OX4. VSS. Pt on room air. Afebrile. Three lap sites to abd, dermabond closure. Pt states pain to mid to left side of abd is tolerable post pain medication administration. No complaints of nausea, pt tolerated sips of water. Report given to ALEXEY Aceves. Pt to Phase II via [facility].

## 2025-03-05 DIAGNOSIS — Z98.890 HISTORY OF ABLATION OF NEOPLASM OF LIVER: ICD-10-CM

## 2025-03-05 DIAGNOSIS — C22.8 PRIMARY MALIGNANT NEOPLASM OF LIVER (HCC): ICD-10-CM

## 2025-03-05 RX ORDER — OXYCODONE HYDROCHLORIDE 5 MG/1
5 TABLET ORAL EVERY 6 HOURS PRN
Qty: 28 TABLET | Refills: 0 | Status: SHIPPED | OUTPATIENT
Start: 2025-03-05 | End: 2025-03-12

## 2025-03-05 NOTE — TELEPHONE ENCOUNTER
Phone Number Called: [phone]    Message: Pt's wife informed his script was sent to pharmacy.

## 2025-03-05 NOTE — TELEPHONE ENCOUNTER
1. Caller Name: Kiya                        Call Back Number: [phone]     Pt's wife called to follow up on request below. She states the patient is in so much pain that he can't get of out of bed.

## 2025-03-06 NOTE — PROGRESS NOTES
Subjective:   3/18/2025 10:24 AM  Primary care physician: XAVI Ayon  Referring Provider: Alfonzo Servin M.D.   Medical Oncologist: Alfonzo Servin M.D.     Chief Complaint:   Chief Complaint   Patient presents with    Post-op     HX CANCER, CLL  HCC S/P CHEMO,   Y90, TACE, CHEMO  +DCP, -AFP, -  ABLATION 3/3        Diagnosis:   1. Primary malignant neoplasm of liver (HCC)        2. Benign neoplasm of sigmoid colon        3. History of ablation of neoplasm of liver        4. CLL (chronic lymphocytic leukemia) (HCC)        5. Hepatoma (HCC)        6. Type 2 diabetes mellitus without complication, without long-term current use of insulin (HCC)          History of presenting illness:    Sohail Duvall is a pleasant 74 y.o. male with history including alcoholic cirrhosis, portal hypertension with multiple variceal ligations, Primary malignant neoplasm of liver HCC with ablation 4B lesion ([place]) Y90 SIRT in 2017 TACE 2018 s/p Rituxan/bendamustine  Y90 2023 TACE 2021, CLL, hiatal hernia, stage 3 kidney disease, gout, obesity, Type 2 diabetes, hepatoma, and prostate cancer  s/p brachytherapy. Referred by Dr Servin for concern liver cell carcinoma.     MR ABDOMEN on 12/23/24 noted hepatic cirrhosis with multiple enhancing lesions again seen.  Lesion in segment 3 appears enlarged compared to prior exam.  Other lesions in the RIGHT and LEFT lobes appear unchanged.  No new lesion demonstrated. Splenomegaly, increased from prior. LEFT adrenal adenoma again seen. LR-4: probably HCC     Note - patient was scheduled for ablation in 2022, but no showed for appointments with Dr Senior.     He is here today for evaluation what appears to be LI-RADS 4 lesion on the left lateral segment segment 3.  Based on imaging it has grown slightly from his last MRI from June 2024.  I reviewed his recent MRI from December 2024 and I am having  difficulty seeing the exact lesion that they are talking about.  In any event, he does show significant areas of cirrhosis throughout his liver.  He is between a Darian A and a darian B patient.  His platelet count is in the 60s but he is suffering from chronic renal insufficiency as well.  His GFR is 49.  With a creatinine of 1.48.     In any event, I did an ablation of the patient back in 2017 and fortunately this ablation cavity shows no sign of recurrence.  He is here with his wife to discuss next steps.     The patient did have cirrhosis related to alcohol use and he says he is not drinking anymore and that he does not smoke anymore.  He is not on any blood thinners.  He actually looks like he lost a little weight but is still obese.     He is on immunotherapies with Dr. Horn I did complete his therapy for his CLL.  I have had to review his old records new records and his imaging.     Update 2/25/2  US ABDOMEN on 2/13/25 noted hyperechoic left lobe 1.9 cm liver mass 4.9 cm from skin.     DCP on 2/14/25 was 10.2 elevated. AFP on 2/14/25 was 5, WNL. CA 19-9 on 2/12/25 was 16.1 WNL.     He is here today for follow-up status post Histotripsy ultrasound.  It is visible and its about 4.9 cm from the abdominal wall.  They are seeing it in the supine position.  So the patient is a candidate for Histotripsy.  We did spend time discussing the difference between a Histotripsy and an ablation using thermal energy when he has had in the past.  He has elected to proceed with a Histotripsy.  The tumor itself is 1.9 cm in segment 3 of the liver.  I reviewed imaging for second time during this visit.    Update 3/18/25  On 3/3/25 he completed laparoscopic microwave thermal ablation of segment 3 lesion, by Dr Senior. He was discharged the same day    He is here today for follow up from recent surgery joined by his wife. Overall he appears to be doing well, tolerating a diet, and back to normal daily activities. He denies  pain and states the surgical wounds do not bother him.    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 Stopped both medications because blood pressure readings were low, /82 today and has been low at home since stopping medications about 5 days ago. Last reading at home 117/72     Hypotension 05/19/2022    Patient has known hypertension and has been on losartan 50 mg long-term.  He notes recent episode about 10 days ago where blood pressure dropped extremely low and he fainted.  He reports that blood pressure on home cuff was 45/30, and slowly increased.  Additionally, he notes that he has had these episodes on a yearly basis for maybe 6 to 7 years or blood pressure goes extremely well and he passes    Indigestion     Liver cancer (HCC)     Liver tumor     Plantar fasciitis of right foot 08/12/2019    Rosacea     Snoring     no sleep study     Past Surgical History:   Procedure Laterality Date    DC LAP,ABLAT 1+ LIVER TUMOR(S),RADIOFREQ N/A 3/3/2025    Procedure: LAPAROSCOPIC MICROWAVE THERMAL ABLATION LIVER TUMORS;  Surgeon: Feliberto Senior M.D.;  Location: SURGERY [facility];  Service: General    DC UPPER GI ENDOSCOPY,DIAGNOSIS N/A 4/25/2023    Procedure: GASTROSCOPY;  Surgeon: Mir Rodriguez M.D.;  Location: SURGERY SAME DAY [facility];  Service: Gastroenterology    WOUND EXPLORATION ORTHO Right 8/5/2020    Procedure: EXPLORATION, WOUND- THUMB;  Surgeon: Yumiko Griffiths M.D.;  Location: SURGERY SAME DAY [facility];  Service:  Orthopedics    TENDON REPAIR  2020    Procedure: REPAIR, TENDON- EXTENSOR;  Surgeon: Yumiko Griffiths M.D.;  Location: SURGERY SAME DAY [facility];  Service: Orthopedics    HEPATIC ABLATION LAPAROSCOPIC  2018    Procedure: HEPATIC ABLATION LAPAROSCOPIC. SEGMENT 4B, HEPATOMA, REPAIR OF INCARCERATED UMBILICAL HERNIA;  Surgeon: Feliberto Burgos M.D.;  Location: SURGERY [facility];  Service: General    BRACHY THERAPY       Allergies   Allergen Reactions    [place] Unspecified     Runny nose, itchy eyes     Outpatient Encounter Medications as of 3/18/2025   Medication Sig Dispense Refill    [] oxyCODONE immediate-release (ROXICODONE) 5 MG Tab Take 1 Tablet by mouth every 6 hours as needed for Severe Pain for up to 7 days. 28 Tablet 0    Naloxone (NARCAN) 4 MG/0.1ML Liquid One spray in one nostril for overdose and call 911. 1 Each 0    ondansetron (ZOFRAN ODT) 4 MG TABLET DISPERSIBLE Take 1 Tablet by mouth every 6 hours as needed for Nausea/Vomiting. 10 Tablet 0    ondansetron (ZOFRAN) 4 MG Tab tablet Take 1 Tablet by mouth every four hours as needed for Nausea/Vomiting for up to 90 days. 90 Tablet 2    Cholecalciferol (VITAMIN D-3 PO) Take 2,000 Units by mouth every day.      metFORMIN ER (GLUCOPHAGE XR) 500 MG TABLET SR 24 HR Take 2 Tablets by mouth 2 times a day. 400 Tablet 3    pantoprazole (PROTONIX) 20 MG tablet Take 1 Tablet by mouth every day. 100 Tablet 3    spironolactone (ALDACTONE) 50 MG Tab Take 2 Tablets by mouth every day. 200 Tablet 3    furosemide (LASIX) 20 MG Tab Take 2 Tablets by mouth every day. 200 Tablet 3    atezolizumab (TECENTRIQ) 1200 MG/20ML Solution injection 1,200 mg by Intrathecal Infusion route see administration instructions. Infusion every 3 weeks      diphenhydrAMINE (BENADRYL ALLERGY) 25 MG capsule Take 25 mg by mouth every 6 hours as needed.      Bevacizumab 1.25 MG/0.05ML Solution Prefilled Syringe 1.25 mcg by Subcutaneous Infusion route see  "administration instructions. Infusion every 3 weeks      propranolol (INDERAL) 10 MG Tab Take 1 Tablet by mouth every day at 6 PM. 100 Tablet 3    fluticasone (FLONASE) 50 MCG/ACT nasal spray Administer 1 Spray into affected nostril(S) 2 times a day as needed.      therapeutic multivitamin-minerals (THERAGRAN-M) Tab Take 1 Tablet by mouth every day.       No facility-administered encounter medications on file as of 3/18/2025.     Social History     Socioeconomic History    Marital status:      Spouse name: Not on file    Number of children: 1    Years of education: Not on file    Highest education level: Not on file   Occupational History    Occupation: fertilizer sales   Tobacco Use    Smoking status: Never     Passive exposure: Current    Smokeless tobacco: Former     Types: Snuff, Chew     Quit date: 3/29/2015   Vaping Use    Vaping status: Never Used   Substance and Sexual Activity    Alcohol use: Not Currently     Alcohol/week: 7.2 oz     Types: 12 Shots of liquor per week    Drug use: Yes     Types: Marijuana, Inhaled, Oral     Comment: \"Marijuana Use\" daily, last smoked 3/2    Sexual activity: Never   Other Topics Concern    Not on file   Social History Narrative    No known exposure to asbestos, dyes, or chemicals, however he was exposed to pesticides.  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  1 child, alive and well.  He also has a step-child.      Social Drivers of Health     Financial Resource Strain: Not on file   Food Insecurity: Not on file   Transportation Needs: Not on file   Physical Activity: Not on file   Stress: Not on file   Social Connections: Not on file   Intimate Partner Violence: Not on file   Housing Stability: Not on file      Social History     Tobacco Use   Smoking Status Never    Passive exposure: Current   Smokeless Tobacco Former    Types: Snuff, Chew    Quit date: 3/29/2015     Social History     Substance " "and Sexual Activity   Alcohol Use Not Currently    Alcohol/week: 7.2 oz    Types: 12 Shots of liquor per week     Social History     Substance and Sexual Activity   Drug Use Yes    Types: Marijuana, Inhaled, Oral    Comment: \"Marijuana Use\" daily, last smoked 3/2      Family History   Problem Relation Age of Onset    Cancer Father 81        Bladder    Cancer Brother         Prostate & CLL (s/p 8-10 years ago)    Hyperlipidemia Sister     Lung Disease Neg Hx     Diabetes Neg Hx     Heart Disease Neg Hx     Hypertension Neg Hx     Stroke Neg Hx     Alcohol/Drug Neg Hx        Review of Systems   Constitutional:  Negative for chills, fever, malaise/fatigue and weight loss.   Respiratory:  Negative for cough and shortness of breath.    Cardiovascular:  Negative for chest pain and leg swelling.   Gastrointestinal:  Negative for abdominal pain, diarrhea, nausea and vomiting.        Objective:   Pulse (!) 56   Temp 36.6 °C (97.8 °F) (Temporal)   Ht 1.702 m (5' 7\")   Wt 95.3 kg (210 lb)   SpO2 97%   BMI 32.89 kg/m²     Physical Exam  Vitals reviewed.   Constitutional:       General: He is not in acute distress.     Appearance: He is obese.   HENT:      Head: Normocephalic and atraumatic.      Nose: Nose normal.      Mouth/Throat:      Pharynx: Oropharynx is clear.   Eyes:      Conjunctiva/sclera: Conjunctivae normal.   Cardiovascular:      Rate and Rhythm: Bradycardia present.   Pulmonary:      Effort: Pulmonary effort is normal. No respiratory distress.   Abdominal:      General: There is distension.      Tenderness: There is no abdominal tenderness. There is no guarding.      Comments: 3 laparoscopic incisions, with some scabbing, no tenderness or discharge noted.   Musculoskeletal:      Comments: Ambulating well   Skin:     General: Skin is warm and dry.      Coloration: Skin is not jaundiced.   Neurological:      Mental Status: He is alert and oriented to person, place, and time.   Psychiatric:         Mood and " Affect: Mood normal.         Behavior: Behavior normal.         Labs    Latest Reference Range & Units 02/12/25 10:33   WBC 4.8 - 10.8 K/uL 5.8   RBC 4.70 - 6.10 M/uL 4.55 (L)   Hemoglobin 14.0 - 18.0 g/dL 16.0   Hematocrit 42.0 - 52.0 % 46.9   MCV 81.4 - 97.8 fL 103.1 (H)   MCH 27.0 - 33.0 pg 35.2 (H)   MCHC 32.3 - 36.5 g/dL 34.1   RDW 35.9 - 50.0 fL 57.7 (H)   Platelet Count 164 - 446 K/uL 60 (L)   MPV 9.0 - 12.9 fL 10.2   (L): Data is abnormally low  (H): Data is abnormally high       Latest Reference Range & Units 02/12/25 10:33   Sodium 135 - 145 mmol/L  135 - 145 mmol/L 136  137   Potassium 3.6 - 5.5 mmol/L  3.6 - 5.5 mmol/L 5.9 (H)  6.0 (H)   Chloride 96 - 112 mmol/L  96 - 112 mmol/L 101  102   Co2 20 - 33 mmol/L  20 - 33 mmol/L 25  25   Anion Gap 7.0 - 16.0   7.0 - 16.0  10.0  10.0   Glucose 65 - 99 mg/dL  65 - 99 mg/dL 180 (H)  179 (H)   Bun 8 - 22 mg/dL  8 - 22 mg/dL 39 (H)  39 (H)   Creatinine 0.50 - 1.40 mg/dL  0.50 - 1.40 mg/dL 1.72 (H)  1.72 (H)   GFR (CKD-EPI) >60 mL/min/1.73 m 2  >60 mL/min/1.73 m 2 41 !  41 !   Calcium 8.5 - 10.5 mg/dL  8.5 - 10.5 mg/dL 10.3  10.1   Correct Calcium 8.5 - 10.5 mg/dL  8.5 - 10.5 mg/dL 10.0  9.9   AST(SGOT) 12 - 45 U/L  12 - 45 U/L 41  40   ALT(SGPT) 2 - 50 U/L  2 - 50 U/L 30  30   Alkaline Phosphatase 30 - 99 U/L  30 - 99 U/L 83  83   Total Bilirubin 0.1 - 1.5 mg/dL  0.1 - 1.5 mg/dL 1.7 (H)  1.7 (H)   Albumin 3.2 - 4.9 g/dL  3.2 - 4.9 g/dL 4.4  4.3   Total Protein 6.0 - 8.2 g/dL  6.0 - 8.2 g/dL 7.1  7.1   Globulin 1.9 - 3.5 g/dL  1.9 - 3.5 g/dL 2.7  2.8   A-G Ratio g/dL  g/dL 1.6  1.5   Urobilinogen, Urine <=1.0 EU/dL 1.0   (H): Data is abnormally high  !: Data is abnormal            Latest Reference Range & Units 02/12/25 10:33 02/14/25 11:45   Alpha Fetoprotein 0 - 9 ng/mL   5   Ca 19-9 0.0 - 35.0 U/mL 16.1           Imaging  US ABDOMEN (2/13/25)  FINDINGS:  Lesion 1:  Patient Position: Supine  Lesion (location): Left lobe segment 3  Size: 1.9 cm L x 1.7 cm TRV x  1.7 cm AP  Echogenicity: Hyperechoic  Measurement from muscle/subcutaneous fat interface to center of lesion  (?2.5 cm) 3.5 cm  Measurement from skin line to posterior lesion border : 4.9 cm  Is the lesion visible in the sagittal plane throughout a respiratory cycle (during quiet breathing)? Yes  Is the lesion visible with ?45 degree transducer angle (relative to perpendicular) with minimal transducer pressure? Yes  Is there bone, bowel, or lung in the acoustic pathway? No.     Cirrhotic changes of the liver partially visualized.     IMPRESSION:     Hyperechoic left lobe liver mass as described.     MR ABDOMEN (12/23/24)  IMPRESSION:  1.  Hepatic cirrhosis with multiple enhancing lesions again seen.  Lesion in segment 3 appears enlarged compared to prior exam.  Other lesions in the RIGHT and LEFT lobes appear unchanged.  No new lesion demonstrated.  2.  Splenomegaly, increased from prior.  3.  LEFT adrenal adenoma again seen.     LR-4: probably HCC     Pathology  N/A     Procedures  SURGERY (3/3/25)  PROCEDURE:  Laparoscopic microwave thermal ablation of segment 3 lesion,   measuring 1.9 x 1.5 cm using a 140 flowers at 2 minutes giving us a 3.4 x 4 cm   ablation cavity.    Left hepatic lobe Y90 (8/22/23) - Dr Norris  Ablation 4B lesion - Galanopoulos  Y90 SIRT 2017   TACE 2018   Y90 2023  TACE 2021    Diagnosis:     1. Primary malignant neoplasm of liver (HCC)        2. Benign neoplasm of sigmoid colon        3. History of ablation of neoplasm of liver        4. CLL (chronic lymphocytic leukemia) (HCC)        5. Hepatoma (HCC)        6. Type 2 diabetes mellitus without complication, without long-term current use of insulin (HCC)            Medical Decision Making:  Today's Assessment / Status / Plan:     Overall, Sohail Duvall  appears to be recovering well from recent surgery. The patient is tolerating a regular diet and is back to basic daily activities.     Surgical pain appears to be controlled, and he  denies N/V. The surgical incisions appear to be healing as expected. Surgical incisions were closed with monocryl and glue and appear to be healing well.    The patient is scheduled for next follow up visit in 2 weeks with an updated MR ABDOMEN.    SHAE, Carter Palma NP, have entered, reviewed and confirmed the above diagnoses related to this patient on this date of service, as per the time and date noted at top of this note.

## 2025-03-18 ENCOUNTER — OFFICE VISIT (OUTPATIENT)
Facility: MEDICAL CENTER | Age: 75
End: 2025-03-18
Payer: MEDICARE

## 2025-03-18 VITALS
BODY MASS INDEX: 32.96 KG/M2 | OXYGEN SATURATION: 97 % | HEIGHT: 67 IN | HEART RATE: 56 BPM | TEMPERATURE: 97.8 F | WEIGHT: 210 LBS

## 2025-03-18 DIAGNOSIS — C22.0 HEPATOMA (HCC): ICD-10-CM

## 2025-03-18 DIAGNOSIS — C91.10 CLL (CHRONIC LYMPHOCYTIC LEUKEMIA) (HCC): ICD-10-CM

## 2025-03-18 DIAGNOSIS — D12.5 BENIGN NEOPLASM OF SIGMOID COLON: ICD-10-CM

## 2025-03-18 DIAGNOSIS — C22.8 PRIMARY MALIGNANT NEOPLASM OF LIVER (HCC): ICD-10-CM

## 2025-03-18 DIAGNOSIS — Z98.890 HISTORY OF ABLATION OF NEOPLASM OF LIVER: ICD-10-CM

## 2025-03-18 DIAGNOSIS — E11.9 TYPE 2 DIABETES MELLITUS WITHOUT COMPLICATION, WITHOUT LONG-TERM CURRENT USE OF INSULIN (HCC): ICD-10-CM

## 2025-03-18 PROCEDURE — 99024 POSTOP FOLLOW-UP VISIT: CPT | Performed by: NURSE PRACTITIONER

## 2025-03-18 ASSESSMENT — ENCOUNTER SYMPTOMS
ABDOMINAL PAIN: 0
CHILLS: 0
SHORTNESS OF BREATH: 0
DIARRHEA: 0
WEIGHT LOSS: 0
NAUSEA: 0
FEVER: 0
COUGH: 0
VOMITING: 0

## 2025-03-18 ASSESSMENT — FIBROSIS 4 INDEX: FIB4 SCORE: 10.86

## 2025-03-20 NOTE — PROGRESS NOTES
Subjective:   4/8/2025  9:15 AM  Primary care physician: XAVI Ayon  Referring Provider: Alfonzo Servin M.D.   Medical Oncologist: Alfonzo Servin M.D.     Chief Complaint:   Chief Complaint   Patient presents with    Follow-Up     HX CANCER, CLL  HCC S/P CHEMO,   Y90, TACE, CHEMO  +DCP, -AFP, -  ABLATION 3/3   DISCUSS MR 4/2        Diagnosis:   1. Primary malignant neoplasm of liver (HCC)  AFP SERUM TUMOR MARKER    Jordon-Gamma-Carboxy Prothrombin    CT-CHEST (THORAX) WITH      2. Benign neoplasm of sigmoid colon        3. History of ablation of neoplasm of liver  AFP SERUM TUMOR MARKER    Jordon-Gamma-Carboxy Prothrombin    CT-CHEST (THORAX) WITH      4. CLL (chronic lymphocytic leukemia) (HCC)        5. Hepatoma (HCC)        6. Type 2 diabetes mellitus without complication, without long-term current use of insulin (HCC)        7. History of basal cell carcinoma (BCC)          History of presenting illness:    Sohail Duvall is a pleasant 74 y.o. male with history including alcoholic cirrhosis, portal hypertension with multiple variceal ligations, Primary malignant neoplasm of liver HCC with ablation 4B lesion (Nadeen) Y90 SIRT in 2017 TACE 2018 s/p Rituxan/bendamustine  Y90 2023 TACE 2021, CLL, hiatal hernia, stage 3 kidney disease, gout, obesity, Type 2 diabetes, hepatoma, and prostate cancer  s/p brachytherapy. Referred by Dr eSrvin for concern liver cell carcinoma.     MR ABDOMEN on 12/23/24 noted hepatic cirrhosis with multiple enhancing lesions again seen.  Lesion in segment 3 appears enlarged compared to prior exam.  Other lesions in the RIGHT and LEFT lobes appear unchanged.  No new lesion demonstrated. Splenomegaly, increased from prior. LEFT adrenal adenoma again seen. LR-4: probably HCC     Note - patient was scheduled for ablation in 2022, but no showed for appointments with Dr Senior.     He is here today for  evaluation what appears to be LI-RADS 4 lesion on the left lateral segment segment 3.  Based on imaging it has grown slightly from his last MRI from June 2024.  I reviewed his recent MRI from December 2024 and I am having difficulty seeing the exact lesion that they are talking about.  In any event, he does show significant areas of cirrhosis throughout his liver.  He is between a Darian A and a darian B patient.  His platelet count is in the 60s but he is suffering from chronic renal insufficiency as well.  His GFR is 49.  With a creatinine of 1.48.     In any event, I did an ablation of the patient back in 2017 and fortunately this ablation cavity shows no sign of recurrence.  He is here with his wife to discuss next steps.     The patient did have cirrhosis related to alcohol use and he says he is not drinking anymore and that he does not smoke anymore.  He is not on any blood thinners.  He actually looks like he lost a little weight but is still obese.     He is on immunotherapies with Dr. Horn I did complete his therapy for his CLL.  I have had to review his old records new records and his imaging.     Update 2/25/2  US ABDOMEN on 2/13/25 noted hyperechoic left lobe 1.9 cm liver mass 4.9 cm from skin.     DCP on 2/14/25 was 10.2 elevated. AFP on 2/14/25 was 5, WNL. CA 19-9 on 2/12/25 was 16.1 WNL.     He is here today for follow-up status post Histotripsy ultrasound.  It is visible and its about 4.9 cm from the abdominal wall.  They are seeing it in the supine position.  So the patient is a candidate for Histotripsy.  We did spend time discussing the difference between a Histotripsy and an ablation using thermal energy when he has had in the past.  He has elected to proceed with a Histotripsy.  The tumor itself is 1.9 cm in segment 3 of the liver.  I reviewed imaging for second time during this visit.     Update 3/18/25  On 3/3/25 he completed laparoscopic microwave thermal ablation of segment 3 lesion, by   Nadeen. He was discharged the same day     He is here today for follow up from recent surgery joined by his wife. Overall he appears to be doing well, tolerating a diet, and back to normal daily activities. He denies pain and states the surgical wounds do not bother him.    Update 4/8/25  MR ABDOMEN on 4/2/25 noted liver pattern remains very difficult. Extensive nodularity remains with increasing nodularity and enhancing fibrosis especially in the left lobe since 12/23/2024. Segment 3 lesion slightly larger measuring 2.1 cm with minimal peripheral enhancement but less internal enhancement which may be a posttreatment configuration. Possible slight growth 2.2 cm segment 2 lesion near falciform ligament but cannot confirm early enhancement today with some peripheral enhancement on sequential subtraction images. Stable tiny 3 mm enhancing focus superiorly near falciform ligament. Stable 2.1 cm enhancing focus within right lobe and caudate lobe without internal enhancement. Small enhancing foci in the right lobe seen previously are not visible today. Stable left adrenal nodule and splenomegaly.    He is here today accompanied by his wife for post op follow up and discussion regarding imaging results. Overall he is doing well at home, is back to eating a regular diet, and is having regular formed BMs. Denies new or concerning symptoms such as fever, chills, abdominal pain, nausea, or vomiting. Extensive discussion by Dr. Lima regarding imaging results and next steps to further investigate presence of viable disease.    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 Stopped both medications because blood pressure readings were low, /82 today and has been low at home since stopping medications about 5 days ago. Last reading at home 117/72     Hypotension 05/19/2022    Patient has known hypertension and has been on losartan 50 mg long-term.  He notes recent episode about 10 days ago where blood pressure dropped extremely low and he fainted.  He reports that blood pressure on home cuff was 45/30, and slowly increased.  Additionally, he notes that he has had these episodes on a yearly basis for maybe 6 to 7 years or blood pressure goes extremely well and he passes    Indigestion     Liver cancer (HCC)     Liver tumor     Plantar fasciitis of right foot 08/12/2019    Rosacea     Snoring     no sleep study     Past Surgical History:   Procedure Laterality Date    AK LAP,ABLAT 1+ LIVER TUMOR(S),RADIOFREQ N/A 3/3/2025    Procedure: LAPAROSCOPIC MICROWAVE THERMAL ABLATION LIVER TUMORS;  Surgeon: Feliberto Senior M.D.;  [place];  Service: General    AK UPPER GI ENDOSCOPY,DIAGNOSIS N/A 4/25/2023    Procedure: GASTROSCOPY;  Surgeon: Mir Rodriguez M.D.;  Location: SURGERY SAME DAY [facility];  Service: Gastroenterology    WOUND EXPLORATION ORTHO Right 8/5/2020    Procedure: EXPLORATION, WOUND- THUMB;  Surgeon: Yumiko Griffiths M.D.;  Location: SURGERY SAME DAY [facility];  Service: Orthopedics    TENDON REPAIR  8/5/2020    Procedure: REPAIR, TENDON- EXTENSOR;  Surgeon: Yumiko Griffiths M.D.;  Location: SURGERY SAME DAY [facility];  Service: Orthopedics    HEPATIC ABLATION LAPAROSCOPIC  6/28/2018    Procedure: HEPATIC ABLATION LAPAROSCOPIC. SEGMENT 4B, HEPATOMA, REPAIR OF INCARCERATED UMBILICAL HERNIA;  Surgeon: Feliberto Burgos M.D.;  Location: [facility];  Service: General    BRACHY THERAPY       Allergies    Allergen Reactions    [place] Unspecified     Runny nose, itchy eyes     Outpatient Encounter Medications as of 4/8/2025   Medication Sig Dispense Refill    Naloxone (NARCAN) 4 MG/0.1ML Liquid One spray in one nostril for overdose and call 911. 1 Each 0    ondansetron (ZOFRAN ODT) 4 MG TABLET DISPERSIBLE Take 1 Tablet by mouth every 6 hours as needed for Nausea/Vomiting. 10 Tablet 0    ondansetron (ZOFRAN) 4 MG Tab tablet Take 1 Tablet by mouth every four hours as needed for Nausea/Vomiting for up to 90 days. 90 Tablet 2    Cholecalciferol (VITAMIN D-3 PO) Take 2,000 Units by mouth every day.      metFORMIN ER (GLUCOPHAGE XR) 500 MG TABLET SR 24 HR Take 2 Tablets by mouth 2 times a day. 400 Tablet 3    pantoprazole (PROTONIX) 20 MG tablet Take 1 Tablet by mouth every day. 100 Tablet 3    spironolactone (ALDACTONE) 50 MG Tab Take 2 Tablets by mouth every day. 200 Tablet 3    furosemide (LASIX) 20 MG Tab Take 2 Tablets by mouth every day. 200 Tablet 3    atezolizumab (TECENTRIQ) 1200 MG/20ML Solution injection 1,200 mg by Intrathecal Infusion route see administration instructions. Infusion every 3 weeks      diphenhydrAMINE (BENADRYL ALLERGY) 25 MG capsule Take 25 mg by mouth every 6 hours as needed.      Bevacizumab 1.25 MG/0.05ML Solution Prefilled Syringe 1.25 mcg by Subcutaneous Infusion route see administration instructions. Infusion every 3 weeks      propranolol (INDERAL) 10 MG Tab Take 1 Tablet by mouth every day at 6 PM. 100 Tablet 3    fluticasone (FLONASE) 50 MCG/ACT nasal spray Administer 1 Spray into affected nostril(S) 2 times a day as needed.      therapeutic multivitamin-minerals (THERAGRAN-M) Tab Take 1 Tablet by mouth every day.       No facility-administered encounter medications on file as of 4/8/2025.     Social History     Socioeconomic History    Marital status:      Spouse name: Not on file    Number of children: 1    Years of education: Not on file    Highest education level: Not on  "file   Occupational History    Occupation: fertilizer sales   Tobacco Use    Smoking status: Never     Passive exposure: Current    Smokeless tobacco: Former     Types: Snuff, Chew     Quit date: 3/29/2015   Vaping Use    Vaping status: Never Used   Substance and Sexual Activity    Alcohol use: Not Currently     Alcohol/week: 7.2 oz     Types: 12 Shots of liquor per week    Drug use: Yes     Types: Marijuana, Inhaled, Oral     Comment: \"Marijuana Use\" daily, last smoked 3/2    Sexual activity: Never   Other Topics Concern    Not on file   Social History Narrative    No known exposure to asbestos, dyes, or chemicals, however he was exposed to pesticides.  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  1 child, alive and well.  He also has a step-child.      Social Drivers of Health     Financial Resource Strain: Not on file   Food Insecurity: Not on file   Transportation Needs: Not on file   Physical Activity: Not on file   Stress: Not on file   Social Connections: Not on file   Intimate Partner Violence: Not on file   Housing Stability: Not on file      Social History     Tobacco Use   Smoking Status Never    Passive exposure: Current   Smokeless Tobacco Former    Types: Snuff, Chew    Quit date: 3/29/2015     Social History     Substance and Sexual Activity   Alcohol Use Not Currently    Alcohol/week: 7.2 oz    Types: 12 Shots of liquor per week     Social History     Substance and Sexual Activity   Drug Use Yes    Types: Marijuana, Inhaled, Oral    Comment: \"Marijuana Use\" daily, last smoked 3/2      Family History   Problem Relation Age of Onset    Cancer Father 81        Bladder    Cancer Brother         Prostate & CLL (s/p 8-10 years ago)    Hyperlipidemia Sister     Lung Disease Neg Hx     Diabetes Neg Hx     Heart Disease Neg Hx     Hypertension Neg Hx     Stroke Neg Hx     Alcohol/Drug Neg Hx      Review of Systems   Constitutional:  Negative for " "chills, fever and malaise/fatigue.   Respiratory:  Negative for cough and shortness of breath.    Cardiovascular:  Negative for chest pain.   Gastrointestinal:  Negative for abdominal pain, constipation, diarrhea, nausea and vomiting.        Objective:   Pulse (!) 57   Temp 35.9 °C (96.6 °F) (Temporal)   Ht 1.702 m (5' 7\")   Wt 90 kg (198 lb 6.6 oz)   SpO2 98%   BMI 31.08 kg/m²     Physical Exam  Vitals and nursing note reviewed.   Constitutional:       General: He is not in acute distress.     Appearance: Normal appearance.   HENT:      Head: Normocephalic and atraumatic.   Eyes:      General: No scleral icterus.     Conjunctiva/sclera: Conjunctivae normal.   Cardiovascular:      Rate and Rhythm: Bradycardia present.   Pulmonary:      Effort: Pulmonary effort is normal. No respiratory distress.   Abdominal:      General: There is no distension.      Palpations: Abdomen is soft.      Tenderness: There is no abdominal tenderness. There is no guarding.      Comments: Well healed laparoscopic incision sites   Musculoskeletal:         General: Normal range of motion.   Skin:     General: Skin is warm and dry.      Coloration: Skin is not jaundiced.   Neurological:      General: No focal deficit present.      Mental Status: He is alert and oriented to person, place, and time.   Psychiatric:         Mood and Affect: Mood normal.         Behavior: Behavior normal.         Labs   Latest Reference Range & Units 03/03/25 08:51   WBC 4.8 - 10.8 K/uL 4.0 (L)   RBC 4.70 - 6.10 M/uL 4.11 (L)   Hemoglobin 14.0 - 18.0 g/dL 14.4   Hematocrit 42.0 - 52.0 % 42.7   MCV 81.4 - 97.8 fL 103.9 (H)   MCH 27.0 - 33.0 pg 35.0 (H)   MCHC 32.3 - 36.5 g/dL 33.7   RDW 35.9 - 50.0 fL 57.5 (H)   Platelet Count 164 - 446 K/uL 51 (L)   MPV 9.0 - 12.9 fL 9.6   (L): Data is abnormally low  (H): Data is abnormally high      Latest Reference Range & Units 02/12/25 10:33 03/03/25 09:10   Sodium 135 - 145 mmol/L 136  137 138   Potassium 3.6 - 5.5 " mmol/L 5.9 (H)  6.0 (H) 4.9   Chloride 96 - 112 mmol/L 101  102 104   Co2 20 - 33 mmol/L 25  25 21   Anion Gap 7.0 - 16.0  10.0  10.0 13.0   Glucose 65 - 99 mg/dL 180 (H)  179 (H) 149 (H)   Bun 8 - 22 mg/dL 39 (H)  39 (H) 27 (H)   Creatinine 0.50 - 1.40 mg/dL 1.72 (H)  1.72 (H) 1.14   GFR (CKD-EPI) >60 mL/min/1.73 m 2 41 !  41 ! 67   Calcium 8.5 - 10.5 mg/dL 10.3  10.1 8.9   Correct Calcium 8.5 - 10.5 mg/dL  8.5 - 10.5 mg/dL 10.0  9.9    AST(SGOT) 12 - 45 U/L  12 - 45 U/L 41  40    ALT(SGPT) 2 - 50 U/L  2 - 50 U/L 30  30    Alkaline Phosphatase 30 - 99 U/L  30 - 99 U/L 83  83    Total Bilirubin 0.1 - 1.5 mg/dL  0.1 - 1.5 mg/dL 1.7 (H)  1.7 (H)    Albumin 3.2 - 4.9 g/dL  3.2 - 4.9 g/dL 4.4  4.3    Total Protein 6.0 - 8.2 g/dL  6.0 - 8.2 g/dL 7.1  7.1    Globulin 1.9 - 3.5 g/dL  1.9 - 3.5 g/dL 2.7  2.8    A-G Ratio g/dL  g/dL 1.6  1.5    (H): Data is abnormally high  !: Data is abnormal            Latest Reference Range & Units 02/12/25 10:33 02/14/25 11:45   Alpha Fetoprotein 0 - 9 ng/mL   5   Ca 19-9 0.0 - 35.0 U/mL 16.1         Imaging  MR ABDOMEN (4/2/25)  IMPRESSION:  1. Liver pattern remains very difficult. Extensive nodularity remains with increasing nodularity and enhancing fibrosis especially in the left lobe since 12/23/2024.  2. Segment 3 lesion slightly larger measuring 2.1 cm with minimal peripheral enhancement but less internal enhancement. This may be a posttreatment configuration.  3. Possible slight growth 2.2 cm segment 2 lesion near falciform ligament but cannot confirm early enhancement today. Some peripheral enhancement on sequential subtraction images.  4. Stable tiny 3 mm enhancing focus superiorly near falciform ligament.  5. Stable 2.1 cm enhancing focus within right lobe and caudate lobe without internal enhancement.  6. The small enhancing foci in the right lobe seen previously are not visible today.  7. Stable left adrenal nodule. Splenomegaly.    US ABDOMEN (2/13/25)  FINDINGS:  Lesion  1:  Patient Position: Supine  Lesion (location): Left lobe segment 3  Size: 1.9 cm L x 1.7 cm TRV x 1.7 cm AP  Echogenicity: Hyperechoic  Measurement from muscle/subcutaneous fat interface to center of lesion  (?2.5 cm) 3.5 cm  Measurement from skin line to posterior lesion border : 4.9 cm  Is the lesion visible in the sagittal plane throughout a respiratory cycle (during quiet breathing)? Yes  Is the lesion visible with ?45 degree transducer angle (relative to perpendicular) with minimal transducer pressure? Yes  Is there bone, bowel, or lung in the acoustic pathway? No.     Cirrhotic changes of the liver partially visualized.     IMPRESSION:     Hyperechoic left lobe liver mass as described.     MR ABDOMEN (12/23/24)  IMPRESSION:  1.  Hepatic cirrhosis with multiple enhancing lesions again seen.  Lesion in segment 3 appears enlarged compared to prior exam.  Other lesions in the RIGHT and LEFT lobes appear unchanged.  No new lesion demonstrated.  2.  Splenomegaly, increased from prior.  3.  LEFT adrenal adenoma again seen.     LR-4: probably HCC     Pathology  N/A     Procedures  SURGERY (3/3/25)  PROCEDURE:  Laparoscopic microwave thermal ablation of segment 3 lesion,   measuring 1.9 x 1.5 cm using a 140 flowers at 2 minutes giving us a 3.4 x 4 cm   ablation cavity.     Left hepatic lobe Y90 (8/22/23) - Dr Norris  Ablation 4B lesion - Galanopoulos  Y90 SIRT 2017   TACE 2018   Y90 2023  TACE 2021    Diagnosis:     1. Primary malignant neoplasm of liver (HCC)  AFP SERUM TUMOR MARKER    Jordon-Gamma-Carboxy Prothrombin    CT-CHEST (THORAX) WITH      2. Benign neoplasm of sigmoid colon        3. History of ablation of neoplasm of liver  AFP SERUM TUMOR MARKER    Jordon-Gamma-Carboxy Prothrombin    CT-CHEST (THORAX) WITH      4. CLL (chronic lymphocytic leukemia) (HCC)        5. Hepatoma (HCC)        6. Type 2 diabetes mellitus without complication, without long-term current use of insulin (HCC)        7. History of basal cell  carcinoma (BCC)            Medical Decision Making:  Today's Assessment / Status / Plan:     Overall, Sohail Duvall appears to be recovering well from recent surgery. The patient is tolerating a regular diet and is back to basic daily activities.     Surgical pain appears to be controlled, and they deny N/V. The surgical incisions appear to be healing as expected.     The patient was also seen and assessed by Dr. Lima    Based on imaging it is unclear if there is still viable disease present. Ordered tumor markers and chest CT and will also add patient to the next interdisciplinary tumor board to further investigate and discuss.    Will call the patient for follow up appointment with Dr. Senior once the above workup is completed.    IHira, have entered, reviewed and confirmed the above diagnoses related to this patient on this date of service, as per the time and date noted at top of this note.

## 2025-04-01 ENCOUNTER — HOSPITAL ENCOUNTER (OUTPATIENT)
Dept: LAB | Facility: MEDICAL CENTER | Age: 75
End: 2025-04-01
Attending: INTERNAL MEDICINE
Payer: MEDICARE

## 2025-04-01 LAB
ALBUMIN SERPL BCP-MCNC: 4.3 G/DL (ref 3.2–4.9)
ALBUMIN/GLOB SERPL: 1.5 G/DL
ALP SERPL-CCNC: 152 U/L (ref 30–99)
ALT SERPL-CCNC: 30 U/L (ref 2–50)
ANION GAP SERPL CALC-SCNC: 15 MMOL/L (ref 7–16)
APPEARANCE UR: CLEAR
AST SERPL-CCNC: 44 U/L (ref 12–45)
BACTERIA #/AREA URNS HPF: ABNORMAL /HPF
BASOPHILS # BLD AUTO: 1.3 % (ref 0–1.8)
BASOPHILS # BLD: 0.08 K/UL (ref 0–0.12)
BILIRUB SERPL-MCNC: 2 MG/DL (ref 0.1–1.5)
BILIRUB UR QL STRIP.AUTO: ABNORMAL
BUN SERPL-MCNC: 30 MG/DL (ref 8–22)
CALCIUM ALBUM COR SERPL-MCNC: 10 MG/DL (ref 8.5–10.5)
CALCIUM SERPL-MCNC: 10.2 MG/DL (ref 8.5–10.5)
CASTS URNS QL MICRO: ABNORMAL /LPF (ref 0–2)
CHLORIDE SERPL-SCNC: 103 MMOL/L (ref 96–112)
CO2 SERPL-SCNC: 22 MMOL/L (ref 20–33)
COLOR UR: ABNORMAL
CREAT SERPL-MCNC: 1.32 MG/DL (ref 0.5–1.4)
EOSINOPHIL # BLD AUTO: 0.13 K/UL (ref 0–0.51)
EOSINOPHIL NFR BLD: 2.1 % (ref 0–6.9)
EPITHELIAL CELLS 1715: ABNORMAL /HPF (ref 0–5)
ERYTHROCYTE [DISTWIDTH] IN BLOOD BY AUTOMATED COUNT: 60.3 FL (ref 35.9–50)
GFR SERPLBLD CREATININE-BSD FMLA CKD-EPI: 56 ML/MIN/1.73 M 2
GLOBULIN SER CALC-MCNC: 2.8 G/DL (ref 1.9–3.5)
GLUCOSE SERPL-MCNC: 142 MG/DL (ref 65–99)
GLUCOSE UR STRIP.AUTO-MCNC: NEGATIVE MG/DL
HCT VFR BLD AUTO: 48.3 % (ref 42–52)
HGB BLD-MCNC: 15.7 G/DL (ref 14–18)
IMM GRANULOCYTES # BLD AUTO: 0.02 K/UL (ref 0–0.11)
IMM GRANULOCYTES NFR BLD AUTO: 0.3 % (ref 0–0.9)
KETONES UR STRIP.AUTO-MCNC: ABNORMAL MG/DL
LEUKOCYTE ESTERASE UR QL STRIP.AUTO: ABNORMAL
LYMPHOCYTES # BLD AUTO: 2.8 K/UL (ref 1–4.8)
LYMPHOCYTES NFR BLD: 44.2 % (ref 22–41)
MCH RBC QN AUTO: 33.9 PG (ref 27–33)
MCHC RBC AUTO-ENTMCNC: 32.5 G/DL (ref 32.3–36.5)
MCV RBC AUTO: 104.3 FL (ref 81.4–97.8)
MICRO URNS: ABNORMAL
MONOCYTES # BLD AUTO: 0.52 K/UL (ref 0–0.85)
MONOCYTES NFR BLD AUTO: 8.2 % (ref 0–13.4)
NEUTROPHILS # BLD AUTO: 2.78 K/UL (ref 1.82–7.42)
NEUTROPHILS NFR BLD: 43.9 % (ref 44–72)
NITRITE UR QL STRIP.AUTO: NEGATIVE
NRBC # BLD AUTO: 0.02 K/UL
NRBC BLD-RTO: 0.3 /100 WBC (ref 0–0.2)
PH UR STRIP.AUTO: 5.5 [PH] (ref 5–8)
PLATELET # BLD AUTO: 73 K/UL (ref 164–446)
PLATELETS.RETICULATED NFR BLD AUTO: 4.6 % (ref 0.6–13.1)
PMV BLD AUTO: 10.7 FL (ref 9–12.9)
POTASSIUM SERPL-SCNC: 5.7 MMOL/L (ref 3.6–5.5)
PROT SERPL-MCNC: 7.1 G/DL (ref 6–8.2)
PROT UR QL STRIP: NEGATIVE MG/DL
RBC # BLD AUTO: 4.63 M/UL (ref 4.7–6.1)
RBC # URNS HPF: ABNORMAL /HPF (ref 0–2)
RBC UR QL AUTO: NEGATIVE
SODIUM SERPL-SCNC: 140 MMOL/L (ref 135–145)
SP GR UR STRIP.AUTO: 1.03
TSH SERPL-ACNC: 2.7 UIU/ML (ref 0.35–5.5)
UROBILINOGEN UR STRIP.AUTO-MCNC: 1 EU/DL
WBC # BLD AUTO: 6.3 K/UL (ref 4.8–10.8)
WBC #/AREA URNS HPF: ABNORMAL /HPF

## 2025-04-01 PROCEDURE — 85055 RETICULATED PLATELET ASSAY: CPT

## 2025-04-01 PROCEDURE — 81001 URINALYSIS AUTO W/SCOPE: CPT

## 2025-04-01 PROCEDURE — 80053 COMPREHEN METABOLIC PANEL: CPT

## 2025-04-01 PROCEDURE — 85025 COMPLETE CBC W/AUTO DIFF WBC: CPT

## 2025-04-01 PROCEDURE — 36415 COLL VENOUS BLD VENIPUNCTURE: CPT

## 2025-04-01 PROCEDURE — 84443 ASSAY THYROID STIM HORMONE: CPT

## 2025-04-02 ENCOUNTER — HOSPITAL ENCOUNTER (OUTPATIENT)
Dept: RADIOLOGY | Facility: MEDICAL CENTER | Age: 75
End: 2025-04-02
Attending: SURGERY
Payer: MEDICARE

## 2025-04-02 DIAGNOSIS — K76.6 PORTAL HYPERTENSION (HCC): ICD-10-CM

## 2025-04-02 DIAGNOSIS — C22.8 PRIMARY MALIGNANT NEOPLASM OF LIVER (HCC): ICD-10-CM

## 2025-04-02 DIAGNOSIS — K70.30 ALCOHOLIC CIRRHOSIS OF LIVER WITHOUT ASCITES (HCC): ICD-10-CM

## 2025-04-02 DIAGNOSIS — E11.9 TYPE 2 DIABETES MELLITUS WITHOUT COMPLICATION, WITHOUT LONG-TERM CURRENT USE OF INSULIN (HCC): ICD-10-CM

## 2025-04-02 PROCEDURE — 700117 HCHG RX CONTRAST REV CODE 255: Mod: JZ | Performed by: SURGERY

## 2025-04-02 PROCEDURE — 74183 MRI ABD W/O CNTR FLWD CNTR: CPT

## 2025-04-02 PROCEDURE — A9579 GAD-BASE MR CONTRAST NOS,1ML: HCPCS | Mod: JZ | Performed by: SURGERY

## 2025-04-02 RX ADMIN — GADOTERIDOL 20 ML: 279.3 INJECTION, SOLUTION INTRAVENOUS at 11:29

## 2025-04-03 ENCOUNTER — APPOINTMENT (OUTPATIENT)
Dept: RADIOLOGY | Facility: MEDICAL CENTER | Age: 75
End: 2025-04-03
Attending: EMERGENCY MEDICINE
Payer: MEDICARE

## 2025-04-03 ENCOUNTER — OFFICE VISIT (OUTPATIENT)
Dept: URGENT CARE | Facility: PHYSICIAN GROUP | Age: 75
End: 2025-04-03
Payer: MEDICARE

## 2025-04-03 ENCOUNTER — HOSPITAL ENCOUNTER (EMERGENCY)
Facility: MEDICAL CENTER | Age: 75
End: 2025-04-03
Attending: EMERGENCY MEDICINE
Payer: MEDICARE

## 2025-04-03 VITALS
WEIGHT: 203.7 LBS | BODY MASS INDEX: 31.97 KG/M2 | SYSTOLIC BLOOD PRESSURE: 132 MMHG | TEMPERATURE: 96.9 F | HEART RATE: 56 BPM | OXYGEN SATURATION: 98 % | HEIGHT: 67 IN | RESPIRATION RATE: 16 BRPM | DIASTOLIC BLOOD PRESSURE: 60 MMHG

## 2025-04-03 VITALS
HEART RATE: 57 BPM | DIASTOLIC BLOOD PRESSURE: 65 MMHG | RESPIRATION RATE: 16 BRPM | SYSTOLIC BLOOD PRESSURE: 112 MMHG | OXYGEN SATURATION: 97 % | TEMPERATURE: 97.3 F

## 2025-04-03 DIAGNOSIS — S09.90XA CLOSED HEAD INJURY, INITIAL ENCOUNTER: ICD-10-CM
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
## 2025-04-03 DIAGNOSIS — D69.6 LOW PLATELET COUNT (HCC): ICD-10-CM

## 2025-04-03 DIAGNOSIS — S01.01XA LACERATION OF SCALP, INITIAL ENCOUNTER: ICD-10-CM

## 2025-04-03 DIAGNOSIS — S01.91XA: ICD-10-CM

## 2025-04-03 DIAGNOSIS — D69.6 THROMBOCYTOPENIA (HCC): ICD-10-CM

## 2025-04-03 LAB
ALBUMIN SERPL BCP-MCNC: 4.4 G/DL (ref 3.2–4.9)
ALBUMIN/GLOB SERPL: 1.5 G/DL
ALP SERPL-CCNC: 165 U/L (ref 30–99)
ALT SERPL-CCNC: 32 U/L (ref 2–50)
ANION GAP SERPL CALC-SCNC: 15 MMOL/L (ref 7–16)
APTT PPP: 25.8 SEC (ref 24.7–36)
AST SERPL-CCNC: 52 U/L (ref 12–45)
BASOPHILS # BLD AUTO: 0.9 % (ref 0–1.8)
BASOPHILS # BLD: 0.06 K/UL (ref 0–0.12)
BILIRUB SERPL-MCNC: 2.3 MG/DL (ref 0.1–1.5)
BUN SERPL-MCNC: 32 MG/DL (ref 8–22)
CALCIUM ALBUM COR SERPL-MCNC: 10 MG/DL (ref 8.5–10.5)
CALCIUM SERPL-MCNC: 10.3 MG/DL (ref 8.5–10.5)
CHLORIDE SERPL-SCNC: 99 MMOL/L (ref 96–112)
CO2 SERPL-SCNC: 22 MMOL/L (ref 20–33)
CREAT SERPL-MCNC: 1.65 MG/DL (ref 0.5–1.4)
EOSINOPHIL # BLD AUTO: 0.12 K/UL (ref 0–0.51)
EOSINOPHIL NFR BLD: 1.7 % (ref 0–6.9)
ERYTHROCYTE [DISTWIDTH] IN BLOOD BY AUTOMATED COUNT: 58.4 FL (ref 35.9–50)
GFR SERPLBLD CREATININE-BSD FMLA CKD-EPI: 43 ML/MIN/1.73 M 2
GLOBULIN SER CALC-MCNC: 2.9 G/DL (ref 1.9–3.5)
GLUCOSE SERPL-MCNC: 117 MG/DL (ref 65–99)
HCT VFR BLD AUTO: 47.6 % (ref 42–52)
HGB BLD-MCNC: 16.3 G/DL (ref 14–18)
IMM GRANULOCYTES # BLD AUTO: 0.02 K/UL (ref 0–0.11)
IMM GRANULOCYTES NFR BLD AUTO: 0.3 % (ref 0–0.9)
INR PPP: 1.1 (ref 0.87–1.13)
LYMPHOCYTES # BLD AUTO: 3.13 K/UL (ref 1–4.8)
LYMPHOCYTES NFR BLD: 45.6 % (ref 22–41)
MCH RBC QN AUTO: 34.4 PG (ref 27–33)
MCHC RBC AUTO-ENTMCNC: 34.2 G/DL (ref 32.3–36.5)
MCV RBC AUTO: 100.4 FL (ref 81.4–97.8)
MONOCYTES # BLD AUTO: 0.59 K/UL (ref 0–0.85)
MONOCYTES NFR BLD AUTO: 8.6 % (ref 0–13.4)
NEUTROPHILS # BLD AUTO: 2.94 K/UL (ref 1.82–7.42)
NEUTROPHILS NFR BLD: 42.9 % (ref 44–72)
NRBC # BLD AUTO: 0 K/UL
NRBC BLD-RTO: 0 /100 WBC (ref 0–0.2)
PLATELET # BLD AUTO: 68 K/UL (ref 164–446)
PLATELETS.RETICULATED NFR BLD AUTO: 3.7 % (ref 0.6–13.1)
PMV BLD AUTO: 9.6 FL (ref 9–12.9)
POTASSIUM SERPL-SCNC: 5.2 MMOL/L (ref 3.6–5.5)
PROT SERPL-MCNC: 7.3 G/DL (ref 6–8.2)
PROTHROMBIN TIME: 14.2 SEC (ref 12–14.6)
RBC # BLD AUTO: 4.74 M/UL (ref 4.7–6.1)
SODIUM SERPL-SCNC: 136 MMOL/L (ref 135–145)
WBC # BLD AUTO: 6.9 K/UL (ref 4.8–10.8)

## 2025-04-03 PROCEDURE — 304999 HCHG REPAIR-SIMPLE/INTERMED LEVEL 1

## 2025-04-03 PROCEDURE — 99284 EMERGENCY DEPT VISIT MOD MDM: CPT

## 2025-04-03 PROCEDURE — 85730 THROMBOPLASTIN TIME PARTIAL: CPT

## 2025-04-03 PROCEDURE — 700111 HCHG RX REV CODE 636 W/ 250 OVERRIDE (IP): Mod: JZ | Performed by: EMERGENCY MEDICINE

## 2025-04-03 PROCEDURE — 85055 RETICULATED PLATELET ASSAY: CPT

## 2025-04-03 PROCEDURE — 80053 COMPREHEN METABOLIC PANEL: CPT

## 2025-04-03 PROCEDURE — 85025 COMPLETE CBC W/AUTO DIFF WBC: CPT

## 2025-04-03 PROCEDURE — 70450 CT HEAD/BRAIN W/O DYE: CPT

## 2025-04-03 PROCEDURE — 305308 HCHG STAPLER,SKIN,DISP.

## 2025-04-03 PROCEDURE — 36415 COLL VENOUS BLD VENIPUNCTURE: CPT

## 2025-04-03 PROCEDURE — 304217 HCHG IRRIGATION SYSTEM

## 2025-04-03 PROCEDURE — 99215 OFFICE O/P EST HI 40 MIN: CPT

## 2025-04-03 PROCEDURE — 85610 PROTHROMBIN TIME: CPT

## 2025-04-03 RX ADMIN — LIDOCAINE HYDROCHLORIDE 10 ML: 10 INJECTION, SOLUTION EPIDURAL; INFILTRATION; INTRACAUDAL; PERINEURAL at 18:00

## 2025-04-03 ASSESSMENT — ENCOUNTER SYMPTOMS
FEVER: 0
VOMITING: 0
DIZZINESS: 0
WEAKNESS: 0
NAUSEA: 0
DIARRHEA: 0
FALLS: 1
SHORTNESS OF BREATH: 0
COUGH: 0

## 2025-04-03 ASSESSMENT — FIBROSIS 4 INDEX: FIB4 SCORE: 8.14

## 2025-04-03 ASSESSMENT — PAIN SCALES - GENERAL: PAINLEVEL_OUTOF10: 6=MODERATE PAIN

## 2025-04-03 NOTE — PROGRESS NOTES
"Subjective     Titus Duvall is a 74 y.o. male who presents with Laceration (To back of head happened about an hour ago patient triped over the kitchen mat fell backwards back of head on tile  ) and Headache (Took tylenol 30 mins ago )            Titus is here today with his wife after he tripped in the kitchen and fell backward and hit his head on the tile floor.  He notes that he had a headache after hitting his head and took some Tylenol and his headache is since improved.  He is currently undergoing treatment for cancer and has a low platelet count which was 73 on 4/1/2025.        Review of Systems   Constitutional:  Negative for fever and malaise/fatigue.   HENT:  Negative for congestion.    Respiratory:  Negative for cough and shortness of breath.    Cardiovascular:  Negative for chest pain.   Gastrointestinal:  Negative for diarrhea, nausea and vomiting.   Musculoskeletal:  Positive for falls.   Skin:  Negative for rash.   Neurological:  Negative for dizziness and weakness.   All other systems reviewed and are negative.             Objective     /60 (BP Location: Left arm, Patient Position: Sitting, BP Cuff Size: Adult)   Pulse (!) 56   Temp 36.1 °C (96.9 °F) (Temporal)   Resp 16   Ht 1.702 m (5' 7\")   Wt 92.4 kg (203 lb 11.2 oz)   SpO2 98%   BMI 31.90 kg/m²      Physical Exam  Vitals reviewed.   Constitutional:       Appearance: Normal appearance.   HENT:      Head: Normocephalic. Laceration present.        Comments: Approximately 3 cm laceration with surrounding hematoma.       Nose: Nose normal.   Eyes:      Conjunctiva/sclera: Conjunctivae normal.   Cardiovascular:      Rate and Rhythm: Normal rate.   Pulmonary:      Effort: Pulmonary effort is normal.   Musculoskeletal:         General: Normal range of motion.   Skin:     General: Skin is warm and dry.   Neurological:      Mental Status: He is alert and oriented to person, place, and time.   Psychiatric:         Behavior: Behavior " normal.         Judgment: Judgment normal.                                  Assessment & Plan  Fall, initial encounter         Laceration of head, initial encounter         Low platelet count (HCC)         Wound cleaned with Hibiclens and hemostasis achieved.  Nonadhesive gauze and a wrap applied around head.    Had a lengthy discussion with Titus that given his history and low platelets he should present to the ER for further workup and potential imaging.  He is agreeable to this plan and his wife will drive him to [facility].    RTOC called with report.

## 2025-04-04 NOTE — ED TRIAGE NOTES
Pt brought to charge desk via w/c c/o mglf +head strike -loc -blood thinners. Pt report low platelet count. Pt A&Ox4. Nad. Lac noted to back of head bleeding controlled.

## 2025-04-04 NOTE — ED NOTES
Patient discharged home per ERP. Discharge teaching, education, and POC discussed with patient who verbalizes understanding. Patient instructed to return to the ER if symptoms worsen. No other questions at this time.    PIV removed and dressing applied. Vitals are stable. Patient alert and oriented.  Pt ambulated off unit safely. Family left with patient.

## 2025-04-04 NOTE — ED PROVIDER NOTES
ED Provider Note    CHIEF COMPLAINT  Chief Complaint   Patient presents with    T-5000 Head Injury       EXTERNAL RECORDS REVIEWED  Reviewed outpatient PCP visits, oncology notes baseline laboratory studies baseline platelet studies    HPI/ROS  LIMITATION TO HISTORY   None  OUTSIDE HISTORIAN(S):  Wife provided additional history    Sohail Duvall is a 74 y.o. male who presents for evaluation mechanical ground-level fall with head injury.  The patient does have a history of thrombocytopenia.  He is not on any prescription anticoagulants.  He had a mechanical slip and fall and hit the back of his head.  He denies injury to the neck mid back upper or lower extremities chest abdomen or pelvis.  There is no loss of consciousness.  He has no neurological deficits such as numbness weakness tingling to the arms legs or face no nausea or vomiting.  He sustained a 3 cm laceration to the occipital area.  No bleeding from the nose or ears.  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    SURGICAL HISTORY   has a past surgical history that includes brachy therapy; hepatic ablation laparoscopic (6/28/2018); wound exploration ortho (Right, 8/5/2020); tendon repair (8/5/2020); upper gi endoscopy,diagnosis (N/A, 4/25/2023); and lap,ablat 1+ liver tumor(s),radiofreq (N/A, 3/3/2025).    FAMILY HISTORY  Family History   Problem Relation Age of Onset    Cancer Father 81        Bladder    Cancer Brother         Prostate & CLL (s/p 8-10 years ago)    Hyperlipidemia Sister     Lung Disease Neg  "Hx     Diabetes Neg Hx     Heart Disease Neg Hx     Hypertension Neg Hx     Stroke Neg Hx     Alcohol/Drug Neg Hx        SOCIAL HISTORY  Social History     Tobacco Use    Smoking status: Never     Passive exposure: Current    Smokeless tobacco: Former     Types: Snuff, Chew     Quit date: 3/29/2015   Vaping Use    Vaping status: Never Used   Substance and Sexual Activity    Alcohol use: Not Currently     Alcohol/week: 7.2 oz     Types: 12 Shots of liquor per week    Drug use: Yes     Types: Marijuana, Inhaled, Oral     Comment: \"Marijuana Use\" daily, last smoked 3/2    Sexual activity: Never     .  No IV drugs  CURRENT MEDICATIONS  Home Medications       Reviewed by Piper Flores R.N. (Registered Nurse) on 04/03/25 at 1756  Med List Status: Partial     Medication Last Dose Status   atezolizumab (TECENTRIQ) 1200 MG/20ML Solution injection  Active   Bevacizumab 1.25 MG/0.05ML Solution Prefilled Syringe  Active   Cholecalciferol (VITAMIN D-3 PO)  Active   diphenhydrAMINE (BENADRYL ALLERGY) 25 MG capsule  Active   fluticasone (FLONASE) 50 MCG/ACT nasal spray  Active   furosemide (LASIX) 20 MG Tab  Active   metFORMIN ER (GLUCOPHAGE XR) 500 MG TABLET SR 24 HR  Active   Naloxone (NARCAN) 4 MG/0.1ML Liquid  Active   ondansetron (ZOFRAN ODT) 4 MG TABLET DISPERSIBLE  Active   ondansetron (ZOFRAN) 4 MG Tab tablet  Active   pantoprazole (PROTONIX) 20 MG tablet  Active   propranolol (INDERAL) 10 MG Tab  Active   spironolactone (ALDACTONE) 50 MG Tab  Active   therapeutic multivitamin-minerals (THERAGRAN-M) Tab  Active                  Audit from Redirected Encounters    **Home medications have not yet been reviewed for this encounter**         ALLERGIES  Allergies   Allergen Reactions    Sagebrush Unspecified     Runny nose, itchy eyes       PHYSICAL EXAM  VITAL SIGNS: /65   Pulse (!) 57   Temp 36.3 °C (97.3 °F) (Temporal)   Resp 16   SpO2 97%    Pulse ox interpretation: I interpret this pulse ox as " normal.  Constitutional: Alert and oriented x 3, no acute distress  HEENT: No hemotympanum negative Russell sign 3 cm laceration over the occiput without exposed calvarium, pupils are equal round reactive to light extraocular movements are intact. The nares is clear, external ears are normal, mouth shows moist mucous membranes normal dentition for age  Neck: Supple, no JVD no tracheal deviation no midline bony tenderness  Cardiovascular: Regular rate and rhythm no murmur rub or gallop 2+ pulses peripherally x4  Thorax & Lungs: No respiratory distress, no wheezes rales or rhonchi, No chest tenderness.   GI: Soft nontender nondistended positive bowel sounds, no peritoneal signs no rebound guarding or rigidity  Skin: Warm dry no acute rash or lesion  Musculoskeletal: Moving all extremities with full range and 5 of 5 strength no acute  deformity no pain or deformities in all 4 extremities  Neurologic: Cranial nerves III through XII are grossly intact no sensory deficit no cerebellar dysfunction GCS 15 NIH stroke scale score is 0  Psychiatric: Appropriate affect for situation at this time          EKG/LABS  Results for orders placed or performed during the hospital encounter of 04/03/25   CBC WITH DIFFERENTIAL    Collection Time: 04/03/25  6:10 PM   Result Value Ref Range    WBC 6.9 4.8 - 10.8 K/uL    RBC 4.74 4.70 - 6.10 M/uL    Hemoglobin 16.3 14.0 - 18.0 g/dL    Hematocrit 47.6 42.0 - 52.0 %    .4 (H) 81.4 - 97.8 fL    MCH 34.4 (H) 27.0 - 33.0 pg    MCHC 34.2 32.3 - 36.5 g/dL    RDW 58.4 (H) 35.9 - 50.0 fL    Platelet Count 68 (L) 164 - 446 K/uL    MPV 9.6 9.0 - 12.9 fL    Neutrophils-Polys 42.90 (L) 44.00 - 72.00 %    Lymphocytes 45.60 (H) 22.00 - 41.00 %    Monocytes 8.60 0.00 - 13.40 %    Eosinophils 1.70 0.00 - 6.90 %    Basophils 0.90 0.00 - 1.80 %    Immature Granulocytes 0.30 0.00 - 0.90 %    Nucleated RBC 0.00 0.00 - 0.20 /100 WBC    Neutrophils (Absolute) 2.94 1.82 - 7.42 K/uL    Lymphs (Absolute) 3.13  1.00 - 4.80 K/uL    Monos (Absolute) 0.59 0.00 - 0.85 K/uL    Eos (Absolute) 0.12 0.00 - 0.51 K/uL    Baso (Absolute) 0.06 0.00 - 0.12 K/uL    Immature Granulocytes (abs) 0.02 0.00 - 0.11 K/uL    NRBC (Absolute) 0.00 K/uL   IMMATURE PLT FRACTION    Collection Time: 04/03/25  6:10 PM   Result Value Ref Range    Imm. Plt Fraction 3.7 0.6 - 13.1 %     *Note: Due to a large number of results and/or encounters for the requested time period, some results have not been displayed. A complete set of results can be found in Results Review.      I have independently interpreted this EKG    RADIOLOGY/PROCEDURES   I have independently interpreted the diagnostic imaging associated with this visit and am waiting the final reading from the radiologist.   My preliminary interpretation is as follows: No evidence of intracranial hemorrhage or skull fracture    Radiologist interpretation:  CT-HEAD W/O   Final Result      1.  No evidence of acute intracranial process.      2.  Cerebral atrophy as well as periventricular chronic small vessel ischemic change.                 Physician procedure: 3 cm scalp laceration repair.  The wound was anesthetized with a total of 5 cc of 1% lidocaine without epinephrine.  ER technician copious irrigated the wound with 500 cc of sterile saline.  I gently explored the wound.  There is no underlying foreign body or exposed calvarium.  A total of 5 staples were applied to close the scalp.  No complications    COURSE & MEDICAL DECISION MAKING    ASSESSMENT, COURSE AND PLAN  Care Narrative:     This is a very pleasant 74-year-old underwent chemical slip and fall.  He did strike his head.  Based on our protocol he was triaged according to TBI criteria.  He does not take anticoagulants but has known history of thrombocytopenia therefore he was immediately taken down for CT scan of the head.  Fortunately there is no evidence of skull fracture or intracranial hemorrhage.  Patient has a stable nonfocal  neurological exam.  Blood tests are notable for thrombocytopenia which is chronic but platelets are above 50,000 and there is no evidence of life-threatening or intracranial hemorrhage.  I feel that is appropriate for him to be discharged home.  Staple removal in 10 days          ADDITIONAL PROBLEMS MANAGED      DISPOSITION AND DISCUSSIONS  I have discussed management of the patient with the following physicians and GAL's: None    Discussion of management with other QHP or appropriate source(s): None    Escalation of care considered, and ultimately not performed: Considered platelet transfusion    Barriers to care at this time, including but not limited to: None.     Decision tools and prescription drugs considered including, but not limited to: NIH stroke scale score and GCS was utilized.    FINAL DIAGNOSIS  1. Closed head injury, initial encounter        2. Laceration of scalp, initial encounter        3. Thrombocytopenia (HCC)               Electronically signed by: Tyrell Hernandez M.D., 4/3/2025 5:46 PM

## 2025-04-08 ENCOUNTER — OFFICE VISIT (OUTPATIENT)
Facility: MEDICAL CENTER | Age: 75
End: 2025-04-08
Payer: MEDICARE

## 2025-04-08 VITALS
HEIGHT: 67 IN | BODY MASS INDEX: 31.14 KG/M2 | WEIGHT: 198.41 LBS | OXYGEN SATURATION: 98 % | HEART RATE: 57 BPM | TEMPERATURE: 96.6 F

## 2025-04-08 DIAGNOSIS — Z98.890 HISTORY OF ABLATION OF NEOPLASM OF LIVER: ICD-10-CM

## 2025-04-08 DIAGNOSIS — Z85.828 HISTORY OF BASAL CELL CARCINOMA (BCC): ICD-10-CM

## 2025-04-08 DIAGNOSIS — C22.0 HEPATOMA (HCC): ICD-10-CM

## 2025-04-08 DIAGNOSIS — D12.5 BENIGN NEOPLASM OF SIGMOID COLON: ICD-10-CM

## 2025-04-08 DIAGNOSIS — C22.8 PRIMARY MALIGNANT NEOPLASM OF LIVER (HCC): ICD-10-CM

## 2025-04-08 DIAGNOSIS — E11.9 TYPE 2 DIABETES MELLITUS WITHOUT COMPLICATION, WITHOUT LONG-TERM CURRENT USE OF INSULIN (HCC): ICD-10-CM

## 2025-04-08 DIAGNOSIS — C91.10 CLL (CHRONIC LYMPHOCYTIC LEUKEMIA) (HCC): ICD-10-CM

## 2025-04-08 PROCEDURE — 99024 POSTOP FOLLOW-UP VISIT: CPT

## 2025-04-08 ASSESSMENT — ENCOUNTER SYMPTOMS
VOMITING: 0
CONSTIPATION: 0
CHILLS: 0
COUGH: 0
FEVER: 0
ABDOMINAL PAIN: 0
NAUSEA: 0
DIARRHEA: 0
SHORTNESS OF BREATH: 0

## 2025-04-08 ASSESSMENT — FIBROSIS 4 INDEX: FIB4 SCORE: 10

## 2025-04-09 ENCOUNTER — TELEPHONE (OUTPATIENT)
Facility: MEDICAL CENTER | Age: 75
End: 2025-04-09
Payer: MEDICARE

## 2025-04-10 NOTE — TELEPHONE ENCOUNTER
Phone Number Called: [phone]    Message: Pt agrees with POC. He will contact this RN once imaging is scheduled.

## 2025-04-10 NOTE — TELEPHONE ENCOUNTER
Future Appointments   Date Time Provider Department Center   4/11/2025 11:00 AM [place] [facility] 1 [facility] [place]   4/14/2025  9:30 AM [facility] [place]   7/29/2025  9:40 AM [facility] [place]

## 2025-04-11 ENCOUNTER — HOSPITAL ENCOUNTER (OUTPATIENT)
Dept: RADIOLOGY | Facility: MEDICAL CENTER | Age: 75
End: 2025-04-11
Payer: MEDICARE

## 2025-04-11 DIAGNOSIS — C22.8 PRIMARY MALIGNANT NEOPLASM OF LIVER (HCC): ICD-10-CM

## 2025-04-11 DIAGNOSIS — Z98.890 HISTORY OF ABLATION OF NEOPLASM OF LIVER: ICD-10-CM

## 2025-04-11 PROCEDURE — 700117 HCHG RX CONTRAST REV CODE 255

## 2025-04-11 PROCEDURE — 71260 CT THORAX DX C+: CPT

## 2025-04-11 RX ADMIN — IOHEXOL 75 ML: 350 INJECTION, SOLUTION INTRAVENOUS at 12:52

## 2025-04-14 ENCOUNTER — OFFICE VISIT (OUTPATIENT)
Dept: URGENT CARE | Facility: PHYSICIAN GROUP | Age: 75
End: 2025-04-14
Payer: MEDICARE

## 2025-04-14 VITALS
RESPIRATION RATE: 18 BRPM | HEART RATE: 64 BPM | DIASTOLIC BLOOD PRESSURE: 70 MMHG | HEIGHT: 68 IN | TEMPERATURE: 97.9 F | SYSTOLIC BLOOD PRESSURE: 102 MMHG | BODY MASS INDEX: 30.55 KG/M2 | WEIGHT: 201.6 LBS | OXYGEN SATURATION: 98 %

## 2025-04-14 DIAGNOSIS — Z48.02 ENCOUNTER FOR STAPLE REMOVAL: ICD-10-CM

## 2025-04-14 PROCEDURE — 15853 REMOVAL SUTR/STAPL XREQ ANES: CPT

## 2025-04-14 PROCEDURE — 3074F SYST BP LT 130 MM HG: CPT

## 2025-04-14 PROCEDURE — 99212 OFFICE O/P EST SF 10 MIN: CPT | Mod: 25

## 2025-04-14 PROCEDURE — 3078F DIAST BP <80 MM HG: CPT

## 2025-04-14 ASSESSMENT — FIBROSIS 4 INDEX: FIB4 SCORE: 10

## 2025-04-14 NOTE — PROGRESS NOTES
Chief Complaint   Patient presents with    Suture / Staple Removal     6 staples,x10 days       HISTORY OF PRESENT ILLNESS: Patient is a pleasant 74 y.o. male who presents to urgent care today patient comes in today with staple removal, they were placed 10 days ago after a fall to the back of his head.  Nuys any issues related.    Patient Active Problem List    Diagnosis Date Noted    Nausea 10/03/2024    Hiatal hernia with GERD 10/03/2024    Stage 3a chronic kidney disease 10/03/2024    Secondary hyperaldosteronism (HCC) 02/20/2024    Risk for falls 10/18/2023    GI bleed 04/24/2023    Leukocytosis 02/21/2023    Vitamin A deficiency 02/21/2023    History of colonic polyps 02/21/2023    Liver cell carcinoma (HCC) 02/21/2023    Iron deficiency anemia secondary to blood loss (chronic) 02/21/2023    Gout 02/21/2023    Hypertension associated with type 2 diabetes mellitus (HCC) 02/21/2023    Ascites 02/21/2023    Neuropathy 05/19/2022    Abdominal pain 08/03/2021    Portal hypertension (HCC) 08/03/2021    Alcoholic cirrhosis of liver without ascites (HCC) 08/03/2021    Primary malignant neoplasm of liver (HCC) 11/06/2020    Esophageal varices (HCC) 06/01/2020    Hypertension secondary to endocrine disorders 06/01/2020    Other diseases of stomach and duodenum 11/12/2019    Liver tumor 07/09/2018    Secondary thrombocytopenia 03/20/2018    Obesity (BMI 30-39.9) 12/13/2017    Other hemorrhoids 11/06/2017    Dvrtclos of lg int w/o perforation or abscess w/o bleeding 11/06/2017    Benign neoplasm of sigmoid colon 11/06/2017    Seasonal allergies 05/04/2016    History of basal cell carcinoma (BCC) 11/23/2015    CLL (chronic lymphocytic leukemia) (HCC) 07/17/2014    Type 2 diabetes mellitus without complication, without long-term current use of insulin (HCC) 03/24/2014    Alcoholism in remission (HCC) 03/24/2014       Allergies:Sagebrush    Current Outpatient Medications Ordered in Epic   Medication Sig Dispense Refill     Naloxone (NARCAN) 4 MG/0.1ML Liquid One spray in one nostril for overdose and call 911. 1 Each 0    ondansetron (ZOFRAN ODT) 4 MG TABLET DISPERSIBLE Take 1 Tablet by mouth every 6 hours as needed for Nausea/Vomiting. 10 Tablet 0    ondansetron (ZOFRAN) 4 MG Tab tablet Take 1 Tablet by mouth every four hours as needed for Nausea/Vomiting for up to 90 days. 90 Tablet 2    Cholecalciferol (VITAMIN D-3 PO) Take 2,000 Units by mouth every day.      metFORMIN ER (GLUCOPHAGE XR) 500 MG TABLET SR 24 HR Take 2 Tablets by mouth 2 times a day. 400 Tablet 3    pantoprazole (PROTONIX) 20 MG tablet Take 1 Tablet by mouth every day. 100 Tablet 3    spironolactone (ALDACTONE) 50 MG Tab Take 2 Tablets by mouth every day. 200 Tablet 3    furosemide (LASIX) 20 MG Tab Take 2 Tablets by mouth every day. 200 Tablet 3    atezolizumab (TECENTRIQ) 1200 MG/20ML Solution injection 1,200 mg by Intrathecal Infusion route see administration instructions. Infusion every 3 weeks      diphenhydrAMINE (BENADRYL ALLERGY) 25 MG capsule Take 25 mg by mouth every 6 hours as needed.      Bevacizumab 1.25 MG/0.05ML Solution Prefilled Syringe 1.25 mcg by Subcutaneous Infusion route see administration instructions. Infusion every 3 weeks      propranolol (INDERAL) 10 MG Tab Take 1 Tablet by mouth every day at 6 PM. 100 Tablet 3    fluticasone (FLONASE) 50 MCG/ACT nasal spray Administer 1 Spray into affected nostril(S) 2 times a day as needed.      therapeutic multivitamin-minerals (THERAGRAN-M) Tab Take 1 Tablet by mouth every day.       No current [facility]-ordered facility-administered medications on file.       Past Medical History:   Diagnosis Date    Anemia     Arthritis 08/04/2020    Thumbs    Cancer (HCC) 2004    Prostate - did brachytherapy at [facility] (HCC) 2018    Liver    Cirrhosis (HCC)     CLL (chronic lymphocytic leukemia) (HCC) 2014    CMV (cytomegalovirus infection) (HCC) 1990    Treated for 6 weeks    Diabetes (HCC)     oral  "medication    Heart burn     takes pantoprazole daily    Hypertension secondary to endocrine disorders 2014    Previously Managed with losartan 25 mg and HCTZ 12.5 mg. Stopped both medications because blood pressure readings were low, /82 today and has been low at home since stopping medications about 5 days ago. Last reading at home 117/72     Hypotension 2022    Patient has known hypertension and has been on losartan 50 mg long-term.  He notes recent episode about 10 days ago where blood pressure dropped extremely low and he fainted.  He reports that blood pressure on home cuff was 45/30, and slowly increased.  Additionally, he notes that he has had these episodes on a yearly basis for maybe 6 to 7 years or blood pressure goes extremely well and he passes    Indigestion     Liver cancer (HCC)     Liver tumor     Plantar fasciitis of right foot 2019    Rosacea     Snoring     no sleep study       Social History     Tobacco Use    Smoking status: Never     Passive exposure: Current    Smokeless tobacco: Former     Types: Snuff, Chew     Quit date: 3/29/2015   Vaping Use    Vaping status: Never Used   Substance Use Topics    Alcohol use: Not Currently     Alcohol/week: 7.2 oz     Types: 12 Shots of liquor per week    Drug use: Yes     Types: Marijuana, Inhaled, Oral     Comment: \"Marijuana Use\" daily, last smoked 3/2       Family Status   Relation Name Status    Fa   at age 81        bladder ca    Bro  Alive    Mo   at age 90        dementia    Sis  Alive    Sis  Alive    Sis   at age 90    Son 11.14 Alive    Neg Hx  (Not Specified)   No partnership data on file     Family History   Problem Relation Age of Onset    Cancer Father 81        Bladder    Cancer Brother         Prostate & CLL (s/p 8-10 years ago)    Hyperlipidemia Sister     Lung Disease Neg Hx     Diabetes Neg Hx     Heart Disease Neg Hx     Hypertension Neg Hx     Stroke Neg Hx     Alcohol/Drug Neg Hx  " "      Review of Systems   Skin:         Staples to the scalp on the back of the head       Exam:  /70   Pulse 64   Temp 36.6 °C (97.9 °F) (Temporal)   Resp 18   Ht 1.727 m (5' 8\")   Wt 91.4 kg (201 lb 9.6 oz)   SpO2 98%   Physical Exam  Vitals reviewed.   Constitutional:       Appearance: Normal appearance.   HENT:      Head: Normocephalic.      Right Ear: Tympanic membrane is not injected or erythematous.      Left Ear: Tympanic membrane is not injected or erythematous.      Mouth/Throat:      Mouth: Mucous membranes are moist.      Pharynx: Oropharynx is clear. No oropharyngeal exudate.      Tonsils: No tonsillar exudate. 0 on the right. 0 on the left.   Eyes:      General:         Right eye: No discharge.         Left eye: No discharge.      Extraocular Movements: Extraocular movements intact.      Conjunctiva/sclera: Conjunctivae normal.      Pupils: Pupils are equal, round, and reactive to light.   Cardiovascular:      Rate and Rhythm: Normal rate and regular rhythm.      Pulses: Normal pulses.      Heart sounds: Normal heart sounds. No murmur heard.  Pulmonary:      Effort: Pulmonary effort is normal. No respiratory distress.      Breath sounds: Normal breath sounds.   Musculoskeletal:         General: Normal range of motion.      Cervical back: Normal range of motion and neck supple.   Skin:     General: Skin is warm and dry.      Capillary Refill: Capillary refill takes less than 2 seconds.      Findings: No bruising or rash.      Comments: Noted dry scab to the back of the scalp on the head, area does not appear infected, no major redness, swelling or discharge   Neurological:      General: No focal deficit present.      Mental Status: He is alert.      Motor: No weakness.   Psychiatric:         Mood and Affect: Mood normal.         Behavior: Behavior normal.             Assessment/Plan:  1. Encounter for staple removal    Total of 6 staples removed.  Patient tolerated with ease.  No major " discharge noted, no major swelling.  Advised of ongoing wound management.    Supportive care, differential diagnoses, and indications for immediate follow-up discussed with patient.   Pathogenesis of diagnosis discussed including typical length and natural progression.   Instructed to return to clinic or nearest emergency department for any change in condition, further concerns, or worsening of symptoms.  Patient states understanding of the plan of care and discharge instructions.  Instructed to make an appointment, for follow up, with primary care provider.    Please note that this dictation was created using voice recognition software. I have made every reasonable attempt to correct obvious errors, but I expect that there are errors of grammar and possibly content that I did not discover before finalizing the note.      Leila FUENTES

## 2025-04-14 NOTE — PROCEDURES
Suture Removal    Date/Time: 4/14/2025 9:55 AM    Performed by: XAVI De La Paz  Authorized by: XAVI De La Paz  Body area: head/neck  Location details: scalp  Wound Appearance: clean  Staples Removed: 6  Post-removal: antibiotic ointment applied  Facility: sutures placed in this facility  Patient tolerance: patient tolerated the procedure well with no immediate complications

## 2025-04-22 ENCOUNTER — HOSPITAL ENCOUNTER (OUTPATIENT)
Dept: LAB | Facility: MEDICAL CENTER | Age: 75
End: 2025-04-22
Attending: INTERNAL MEDICINE
Payer: MEDICARE

## 2025-04-22 DIAGNOSIS — C22.8 PRIMARY MALIGNANT NEOPLASM OF LIVER (HCC): ICD-10-CM

## 2025-04-22 DIAGNOSIS — Z98.890 HISTORY OF ABLATION OF NEOPLASM OF LIVER: ICD-10-CM

## 2025-04-22 LAB
ALBUMIN SERPL BCP-MCNC: 4.2 G/DL (ref 3.2–4.9)
ALBUMIN/GLOB SERPL: 1.6 G/DL
ALP SERPL-CCNC: 144 U/L (ref 30–99)
ALT SERPL-CCNC: 29 U/L (ref 2–50)
ANION GAP SERPL CALC-SCNC: 14 MMOL/L (ref 7–16)
APPEARANCE UR: CLEAR
AST SERPL-CCNC: 46 U/L (ref 12–45)
BACTERIA #/AREA URNS HPF: ABNORMAL /HPF
BASOPHILS # BLD AUTO: 0.8 % (ref 0–1.8)
BASOPHILS # BLD: 0.04 K/UL (ref 0–0.12)
BILIRUB SERPL-MCNC: 1.3 MG/DL (ref 0.1–1.5)
BILIRUB UR QL STRIP.AUTO: ABNORMAL
BUN SERPL-MCNC: 30 MG/DL (ref 8–22)
CALCIUM ALBUM COR SERPL-MCNC: 9.5 MG/DL (ref 8.5–10.5)
CALCIUM SERPL-MCNC: 9.7 MG/DL (ref 8.5–10.5)
CASTS URNS QL MICRO: ABNORMAL /LPF (ref 0–2)
CHLORIDE SERPL-SCNC: 105 MMOL/L (ref 96–112)
CO2 SERPL-SCNC: 20 MMOL/L (ref 20–33)
COLOR UR: ABNORMAL
CREAT SERPL-MCNC: 1.17 MG/DL (ref 0.5–1.4)
EOSINOPHIL # BLD AUTO: 0.09 K/UL (ref 0–0.51)
EOSINOPHIL NFR BLD: 1.9 % (ref 0–6.9)
EPITHELIAL CELLS 1715: ABNORMAL /HPF (ref 0–5)
ERYTHROCYTE [DISTWIDTH] IN BLOOD BY AUTOMATED COUNT: 64.1 FL (ref 35.9–50)
GFR SERPLBLD CREATININE-BSD FMLA CKD-EPI: 65 ML/MIN/1.73 M 2
GLOBULIN SER CALC-MCNC: 2.7 G/DL (ref 1.9–3.5)
GLUCOSE SERPL-MCNC: 142 MG/DL (ref 65–99)
GLUCOSE UR STRIP.AUTO-MCNC: NEGATIVE MG/DL
HCT VFR BLD AUTO: 45 % (ref 42–52)
HGB BLD-MCNC: 15.2 G/DL (ref 14–18)
IMM GRANULOCYTES # BLD AUTO: 0.02 K/UL (ref 0–0.11)
IMM GRANULOCYTES NFR BLD AUTO: 0.4 % (ref 0–0.9)
KETONES UR STRIP.AUTO-MCNC: ABNORMAL MG/DL
LEUKOCYTE ESTERASE UR QL STRIP.AUTO: ABNORMAL
LYMPHOCYTES # BLD AUTO: 2.04 K/UL (ref 1–4.8)
LYMPHOCYTES NFR BLD: 42.5 % (ref 22–41)
MCH RBC QN AUTO: 35.4 PG (ref 27–33)
MCHC RBC AUTO-ENTMCNC: 33.8 G/DL (ref 32.3–36.5)
MCV RBC AUTO: 104.9 FL (ref 81.4–97.8)
MICRO URNS: ABNORMAL
MONOCYTES # BLD AUTO: 0.5 K/UL (ref 0–0.85)
MONOCYTES NFR BLD AUTO: 10.4 % (ref 0–13.4)
NEUTROPHILS # BLD AUTO: 2.11 K/UL (ref 1.82–7.42)
NEUTROPHILS NFR BLD: 44 % (ref 44–72)
NITRITE UR QL STRIP.AUTO: NEGATIVE
NRBC # BLD AUTO: 0 K/UL
NRBC BLD-RTO: 0 /100 WBC (ref 0–0.2)
PH UR STRIP.AUTO: 5.5 [PH] (ref 5–8)
PLATELET # BLD AUTO: 68 K/UL (ref 164–446)
PLATELETS.RETICULATED NFR BLD AUTO: 2.2 % (ref 0.6–13.1)
PMV BLD AUTO: 9.4 FL (ref 9–12.9)
POTASSIUM SERPL-SCNC: 4.7 MMOL/L (ref 3.6–5.5)
PROT SERPL-MCNC: 6.9 G/DL (ref 6–8.2)
PROT UR QL STRIP: NEGATIVE MG/DL
RBC # BLD AUTO: 4.29 M/UL (ref 4.7–6.1)
RBC # URNS HPF: ABNORMAL /HPF (ref 0–2)
RBC UR QL AUTO: NEGATIVE
SODIUM SERPL-SCNC: 139 MMOL/L (ref 135–145)
SP GR UR STRIP.AUTO: 1.03
TSH SERPL-ACNC: 3.68 UIU/ML (ref 0.38–5.33)
UROBILINOGEN UR STRIP.AUTO-MCNC: 1 EU/DL
WBC # BLD AUTO: 4.8 K/UL (ref 4.8–10.8)
WBC #/AREA URNS HPF: ABNORMAL /HPF

## 2025-04-22 PROCEDURE — 84443 ASSAY THYROID STIM HORMONE: CPT

## 2025-04-22 PROCEDURE — 83951 ONCOPROTEIN DCP: CPT

## 2025-04-22 PROCEDURE — 80053 COMPREHEN METABOLIC PANEL: CPT

## 2025-04-22 PROCEDURE — 36415 COLL VENOUS BLD VENIPUNCTURE: CPT

## 2025-04-22 PROCEDURE — 82105 ALPHA-FETOPROTEIN SERUM: CPT

## 2025-04-22 PROCEDURE — 81001 URINALYSIS AUTO W/SCOPE: CPT

## 2025-04-22 PROCEDURE — 85055 RETICULATED PLATELET ASSAY: CPT

## 2025-04-22 PROCEDURE — 85025 COMPLETE CBC W/AUTO DIFF WBC: CPT

## 2025-04-24 LAB
ACARBOXYPROTHROMBIN SERPL-MCNC: 11.9 NG/ML (ref 0–7.4)
AFP-TM SERPL-MCNC: 6 NG/ML (ref 0–9)

## 2025-04-29 ENCOUNTER — TELEPHONE (OUTPATIENT)
Facility: MEDICAL CENTER | Age: 75
End: 2025-04-29
Payer: MEDICARE

## 2025-04-29 NOTE — PROGRESS NOTES
Subjective:   4/30/2025 11:15 AM  Primary care physician: XAVI Ayon  Referring Provider: Alfonzo Servin M.D.   Medical Oncologist: Alfonzo Servin M.D.     Chief Complaint:   Chief Complaint   Patient presents with    Follow-Up     WOUND CHECK  F/U LIVER  DR G=> TACE        Diagnosis:   1. Primary malignant neoplasm of liver (HCC)        2. Benign neoplasm of sigmoid colon        3. History of ablation of neoplasm of liver        4. CLL (chronic lymphocytic leukemia) (HCC)        5. Type 2 diabetes mellitus without complication, without long-term current use of insulin (HCC)        6. Wound healing, delayed          History of presenting illness:    Sohail Duvall is a pleasant 74 y.o. male with history including alcoholic cirrhosis, portal hypertension with multiple variceal ligations, Primary malignant neoplasm of liver HCC with ablation 4B lesion (Aureliopoulos) Y90 SIRT in 2017 TACE 2018 s/p Rituxan/bendamustine  Y90 2023 TACE 2021, CLL, hiatal hernia, stage 3 kidney disease, gout, obesity, Type 2 diabetes, hepatoma, and prostate cancer  s/p brachytherapy. Referred by Dr Servin for concern liver cell carcinoma.     MR ABDOMEN on 12/23/24 noted hepatic cirrhosis with multiple enhancing lesions again seen.  Lesion in segment 3 appears enlarged compared to prior exam.  Other lesions in the RIGHT and LEFT lobes appear unchanged.  No new lesion demonstrated. Splenomegaly, increased from prior. LEFT adrenal adenoma again seen. LR-4: probably HCC     Note - patient was scheduled for ablation in 2022, but no showed for appointments with Dr Senior.     He is here today for evaluation what appears to be LI-RADS 4 lesion on the left lateral segment segment 3.  Based on imaging it has grown slightly from his last MRI from June 2024.  I reviewed his recent MRI from December 2024 and I am having difficulty seeing the exact lesion that they  are talking about.  In any event, he does show significant areas of cirrhosis throughout his liver.  He is between a Darian A and a darian B patient.  His platelet count is in the 60s but he is suffering from chronic renal insufficiency as well.  His GFR is 49.  With a creatinine of 1.48.     In any event, I did an ablation of the patient back in 2017 and fortunately this ablation cavity shows no sign of recurrence.  He is here with his wife to discuss next steps.     The patient did have cirrhosis related to alcohol use and he says he is not drinking anymore and that he does not smoke anymore.  He is not on any blood thinners.  He actually looks like he lost a little weight but is still obese.     He is on immunotherapies with Dr. Horn I did complete his therapy for his CLL.  I have had to review his old records new records and his imaging.     Update 2/25/2  US ABDOMEN on 2/13/25 noted hyperechoic left lobe 1.9 cm liver mass 4.9 cm from skin.     DCP on 2/14/25 was 10.2 elevated. AFP on 2/14/25 was 5, WNL. CA 19-9 on 2/12/25 was 16.1 WNL.     He is here today for follow-up status post Histotripsy ultrasound.  It is visible and its about 4.9 cm from the abdominal wall.  They are seeing it in the supine position.  So the patient is a candidate for Histotripsy.  We did spend time discussing the difference between a Histotripsy and an ablation using thermal energy when he has had in the past.  He has elected to proceed with a Histotripsy.  The tumor itself is 1.9 cm in segment 3 of the liver.  I reviewed imaging for second time during this visit.     Update 3/18/25  On 3/3/25 he completed laparoscopic microwave thermal ablation of segment 3 lesion, by Dr Senior. He was discharged the same day     He is here today for follow up from recent surgery joined by his wife. Overall he appears to be doing well, tolerating a diet, and back to normal daily activities. He denies pain and states the surgical wounds do not  bother him.     Update 4/8/25  MR ABDOMEN on 4/2/25 noted liver pattern remains very difficult. Extensive nodularity remains with increasing nodularity and enhancing fibrosis especially in the left lobe since 12/23/2024. Segment 3 lesion slightly larger measuring 2.1 cm with minimal peripheral enhancement but less internal enhancement which may be a posttreatment configuration. Possible slight growth 2.2 cm segment 2 lesion near falciform ligament but cannot confirm early enhancement today with some peripheral enhancement on sequential subtraction images. Stable tiny 3 mm enhancing focus superiorly near falciform ligament. Stable 2.1 cm enhancing focus within right lobe and caudate lobe without internal enhancement. Small enhancing foci in the right lobe seen previously are not visible today. Stable left adrenal nodule and splenomegaly.     He is here today accompanied by his wife for post op follow up and discussion regarding imaging results. Overall he is doing well at home, is back to eating a regular diet, and is having regular formed BMs. Denies new or concerning symptoms such as fever, chills, abdominal pain, nausea, or vomiting. Extensive discussion by Dr. Lima regarding imaging results and next steps to further investigate presence of viable disease.    Update 4/30/25  DCP on 4/22/25 was 11.9 - elevated    CT CHEST on 4/10/25 noted no new lung nodule or chest adenopathy. Stable 5 mm minor fissure nodularity since 6/3/2022.    He is here today for follow up from prior surgery.  He is joined by his wife. He reports overall he is doing well, and states he has no current complaints of abdominal pain, nausea, vomiting, fever, chills. He states the prior surgical incision wound at middle of abdomen has been unchanged for several weeks, and it has had some scant serous seeping, but no bleeding. He states he has only treated it with soap and water, and kept a non-stick dressing on it. He is here for wound care  assessment.    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 Stopped both medications because blood pressure readings were low, /82 today and has been low at home since stopping medications about 5 days ago. Last reading at home 117/72     Hypotension 05/19/2022    Patient has known hypertension and has been on losartan 50 mg long-term.  He notes recent episode about 10 days ago where blood pressure dropped extremely low and he fainted.  He reports that blood pressure on home cuff was 45/30, and slowly increased.  Additionally, he notes that he has had these episodes on a yearly basis for maybe 6 to 7 years or blood pressure goes extremely well and he passes    Indigestion     Liver cancer (HCC)     Liver tumor     Plantar fasciitis of right foot 08/12/2019    Rosacea     Snoring     no sleep study     Past Surgical History:   Procedure Laterality Date    NH LAP,ABLAT 1+ LIVER TUMOR(S),RADIOFREQ N/A 3/3/2025    Procedure: LAPAROSCOPIC MICROWAVE THERMAL ABLATION LIVER TUMORS;  Surgeon: Feliberto Senior M.D.;  Location: SURGERY [facility];  Service: General    NH UPPER GI ENDOSCOPY,DIAGNOSIS N/A 4/25/2023    Procedure: GASTROSCOPY;  Surgeon: Mir Rodriguez M.D.;  Location: SURGERY SAME DAY [facility];  Service: Gastroenterology    WOUND EXPLORATION ORTHO Right 8/5/2020    Procedure: EXPLORATION, WOUND- THUMB;  Surgeon: Yumiko Griffiths M.D.;  Location: SURGERY SAME DAY [facility];  Service: Orthopedics    TENDON REPAIR  8/5/2020     Procedure: REPAIR, TENDON- EXTENSOR;  Surgeon: Yumiko Griffiths M.D.;  Location: SURGERY SAME DAY [facility];  Service: Orthopedics    HEPATIC ABLATION LAPAROSCOPIC  6/28/2018    Procedure: HEPATIC ABLATION LAPAROSCOPIC. SEGMENT 4B, HEPATOMA, REPAIR OF INCARCERATED UMBILICAL HERNIA;  Surgeon: Feliberto Burgos M.D.;  Location: SURGERY [facility];  Service: General    BRACHY THERAPY       Allergies   Allergen Reactions    [place] Unspecified     Runny nose, itchy eyes     Outpatient Encounter Medications as of 4/30/2025   Medication Sig Dispense Refill    Naloxone (NARCAN) 4 MG/0.1ML Liquid One spray in one nostril for overdose and call 911. 1 Each 0    ondansetron (ZOFRAN ODT) 4 MG TABLET DISPERSIBLE Take 1 Tablet by mouth every 6 hours as needed for Nausea/Vomiting. 10 Tablet 0    ondansetron (ZOFRAN) 4 MG Tab tablet Take 1 Tablet by mouth every four hours as needed for Nausea/Vomiting for up to 90 days. 90 Tablet 2    Cholecalciferol (VITAMIN D-3 PO) Take 2,000 Units by mouth every day.      metFORMIN ER (GLUCOPHAGE XR) 500 MG TABLET SR 24 HR Take 2 Tablets by mouth 2 times a day. 400 Tablet 3    pantoprazole (PROTONIX) 20 MG tablet Take 1 Tablet by mouth every day. 100 Tablet 3    spironolactone (ALDACTONE) 50 MG Tab Take 2 Tablets by mouth every day. 200 Tablet 3    furosemide (LASIX) 20 MG Tab Take 2 Tablets by mouth every day. 200 Tablet 3    atezolizumab (TECENTRIQ) 1200 MG/20ML Solution injection 1,200 mg by Intrathecal Infusion route see administration instructions. Infusion every 3 weeks      diphenhydrAMINE (BENADRYL ALLERGY) 25 MG capsule Take 25 mg by mouth every 6 hours as needed.      Bevacizumab 1.25 MG/0.05ML Solution Prefilled Syringe 1.25 mcg by Subcutaneous Infusion route see administration instructions. Infusion every 3 weeks      propranolol (INDERAL) 10 MG Tab Take 1 Tablet by mouth every day at 6 PM. 100 Tablet 3    fluticasone (FLONASE) 50 MCG/ACT nasal spray Administer 1  "Spray into affected nostril(S) 2 times a day as needed.      therapeutic multivitamin-minerals (THERAGRAN-M) Tab Take 1 Tablet by mouth every day.       No facility-administered encounter medications on file as of 4/30/2025.     Social History     Socioeconomic History    Marital status:      Spouse name: Not on file    Number of children: 1    Years of education: Not on file    Highest education level: Not on file   Occupational History    Occupation: fertilizer sales   Tobacco Use    Smoking status: Never     Passive exposure: Current    Smokeless tobacco: Former     Types: Snuff, Chew     Quit date: 3/29/2015   Vaping Use    Vaping status: Never Used   Substance and Sexual Activity    Alcohol use: Not Currently     Alcohol/week: 7.2 oz     Types: 12 Shots of liquor per week    Drug use: Yes     Types: Marijuana, Inhaled, Oral     Comment: \"Marijuana Use\" daily, last smoked 3/2    Sexual activity: Never   Other Topics Concern    Not on file   Social History Narrative    No known exposure to asbestos, dyes, or chemicals, however he was exposed to pesticides.  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  1 child, alive and well.  He also has a step-child.      Social Drivers of Health     Financial Resource Strain: Not on file   Food Insecurity: Not on file   Transportation Needs: Not on file   Physical Activity: Not on file   Stress: Not on file   Social Connections: Not on file   Intimate Partner Violence: Not on file   Housing Stability: Not on file      Social History     Tobacco Use   Smoking Status Never    Passive exposure: Current   Smokeless Tobacco Former    Types: Snuff, Chew    Quit date: 3/29/2015     Social History     Substance and Sexual Activity   Alcohol Use Not Currently    Alcohol/week: 7.2 oz    Types: 12 Shots of liquor per week     Social History     Substance and Sexual Activity   Drug Use Yes    Types: Marijuana, " "Inhaled, Oral    Comment: \"Marijuana Use\" daily, last smoked 3/2      Family History   Problem Relation Age of Onset    Cancer Father 81        Bladder    Cancer Brother         Prostate & CLL (s/p 8-10 years ago)    Hyperlipidemia Sister     Lung Disease Neg Hx     Diabetes Neg Hx     Heart Disease Neg Hx     Hypertension Neg Hx     Stroke Neg Hx     Alcohol/Drug Neg Hx      Review of Systems   Constitutional:  Negative for chills, fever, malaise/fatigue and weight loss.   Respiratory:  Negative for cough and shortness of breath.    Cardiovascular:  Negative for chest pain and leg swelling.   Gastrointestinal:  Negative for abdominal pain, constipation, diarrhea, nausea and vomiting.        Objective:   /54 (BP Location: Right arm, Patient Position: Sitting, BP Cuff Size: Adult)   Pulse (!) 58   Temp 36.6 °C (97.8 °F) (Temporal)   Ht 1.727 m (5' 8\")   Wt 90.9 kg (200 lb 4.6 oz)   SpO2 97%   BMI 30.45 kg/m²     Physical Exam  Constitutional:       Appearance: He is obese. He is not ill-appearing.   HENT:      Head: Normocephalic and atraumatic.      Nose: Nose normal.      Mouth/Throat:      Pharynx: Oropharynx is clear.   Cardiovascular:      Rate and Rhythm: Normal rate.   Pulmonary:      Effort: Pulmonary effort is normal. No respiratory distress.   Abdominal:      Palpations: Abdomen is soft.      Tenderness: There is no abdominal tenderness.      Comments: Prior laparoscopy incisions. One residual incision at middle of abdomen with thick scab, and defined edges, approx 15 mm wide   Skin:     General: Skin is warm and dry.   Neurological:      Mental Status: He is alert and oriented to person, place, and time.   Psychiatric:         Mood and Affect: Mood normal.         Behavior: Behavior normal.         Labs   Latest Reference Range & Units 04/22/25 11:32   WBC 4.8 - 10.8 K/uL 4.8   RBC 4.70 - 6.10 M/uL 4.29 (L)   Hemoglobin 14.0 - 18.0 g/dL 15.2   Hematocrit 42.0 - 52.0 % 45.0   MCV 81.4 - 97.8 fL " 104.9 (H)   MCH 27.0 - 33.0 pg 35.4 (H)   MCHC 32.3 - 36.5 g/dL 33.8   RDW 35.9 - 50.0 fL 64.1 (H)   Platelet Count 164 - 446 K/uL 68 (L)   MPV 9.0 - 12.9 fL 9.4   (L): Data is abnormally low  (H): Data is abnormally high      Latest Reference Range & Units 04/22/25 11:32   Sodium 135 - 145 mmol/L 139   Potassium 3.6 - 5.5 mmol/L 4.7   Chloride 96 - 112 mmol/L 105   Co2 20 - 33 mmol/L 20   Anion Gap 7.0 - 16.0  14.0   Glucose 65 - 99 mg/dL 142 (H)   Bun 8 - 22 mg/dL 30 (H)   Creatinine 0.50 - 1.40 mg/dL 1.17   GFR (CKD-EPI) >60 mL/min/1.73 m 2 65   Calcium 8.5 - 10.5 mg/dL 9.7   Correct Calcium 8.5 - 10.5 mg/dL 9.5   AST(SGOT) 12 - 45 U/L 46 (H)   ALT(SGPT) 2 - 50 U/L 29   Alkaline Phosphatase 30 - 99 U/L 144 (H)   Total Bilirubin 0.1 - 1.5 mg/dL 1.3   Albumin 3.2 - 4.9 g/dL 4.2   Total Protein 6.0 - 8.2 g/dL 6.9   Globulin 1.9 - 3.5 g/dL 2.7   A-G Ratio g/dL 1.6   Urobilinogen, Urine <=1.0 EU/dL 1.0   (H): Data is abnormally high         Latest Reference Range & Units 02/12/25 10:33 02/14/25 11:45 04/22/25 11:32   Alpha Fetoprotein 0 - 9 ng/mL  5 6   Ca 19-9 0.0 - 35.0 U/mL 16.1         Component  Ref Range & Units (hover) 7 d ago  (4/22/25) 2 mo ago  (2/14/25) 1 yr ago  (7/6/23) 2 yr ago  (9/12/22) 2 yr ago  (6/2/22) 3 yr ago  (2/18/22) 3 yr ago  (10/18/21)   Jordon-gamma-carboxy Prothrombin 11.9 High  10.2 High  CM 2.1 CM 2.7 CM          Imaging  CT CHEST (4/10/25)  IMPRESSION:     1. No new lung nodule or chest adenopathy. Stable 5 mm minor fissure nodularity since 6/3/2022.     2. Stable 4.1 cm enlargement of the ascending aorta.     3. 4.5 cm low-density nodularity in the lateral segment left lobe anteriorly and slightly laterally is more well-defined than on recent 4/2/2025 MRI. Indeterminate significance.    MR ABDOMEN (4/2/25)  IMPRESSION:  1. Liver pattern remains very difficult. Extensive nodularity remains with increasing nodularity and enhancing fibrosis especially in the left lobe since 12/23/2024.  2.  Segment 3 lesion slightly larger measuring 2.1 cm with minimal peripheral enhancement but less internal enhancement. This may be a posttreatment configuration.  3. Possible slight growth 2.2 cm segment 2 lesion near falciform ligament but cannot confirm early enhancement today. Some peripheral enhancement on sequential subtraction images.  4. Stable tiny 3 mm enhancing focus superiorly near falciform ligament.  5. Stable 2.1 cm enhancing focus within right lobe and caudate lobe without internal enhancement.  6. The small enhancing foci in the right lobe seen previously are not visible today.  7. Stable left adrenal nodule. Splenomegaly.     US ABDOMEN (2/13/25)  FINDINGS:  Lesion 1:  Patient Position: Supine  Lesion (location): Left lobe segment 3  Size: 1.9 cm L x 1.7 cm TRV x 1.7 cm AP  Echogenicity: Hyperechoic  Measurement from muscle/subcutaneous fat interface to center of lesion  (?2.5 cm) 3.5 cm  Measurement from skin line to posterior lesion border : 4.9 cm  Is the lesion visible in the sagittal plane throughout a respiratory cycle (during quiet breathing)? Yes  Is the lesion visible with ?45 degree transducer angle (relative to perpendicular) with minimal transducer pressure? Yes  Is there bone, bowel, or lung in the acoustic pathway? No.     Cirrhotic changes of the liver partially visualized.     IMPRESSION:     Hyperechoic left lobe liver mass as described.     MR ABDOMEN (12/23/24)  IMPRESSION:  1.  Hepatic cirrhosis with multiple enhancing lesions again seen.  Lesion in segment 3 appears enlarged compared to prior exam.  Other lesions in the RIGHT and LEFT lobes appear unchanged.  No new lesion demonstrated.  2.  Splenomegaly, increased from prior.  3.  LEFT adrenal adenoma again seen.     LR-4: probably HCC     Pathology  N/A     Procedures  SURGERY (3/3/25)  PROCEDURE:  Laparoscopic microwave thermal ablation of segment 3 lesion,   measuring 1.9 x 1.5 cm using a 140 flowers at 2 minutes giving us a 3.4  x 4 cm   ablation cavity.     Left hepatic lobe Y90 (8/22/23) - Dr Norris  Ablation 4B lesion - Nadeen  Y90 SIRT 2017   TACE 2018   Y90 2023  TACE 2021    Diagnosis:     1. Primary malignant neoplasm of liver (HCC)        2. Benign neoplasm of sigmoid colon        3. History of ablation of neoplasm of liver        4. CLL (chronic lymphocytic leukemia) (HCC)        5. Type 2 diabetes mellitus without complication, without long-term current use of insulin (HCC)        6. Wound healing, delayed          Medical Decision Making:  Today's Assessment / Status / Plan:     Overall, Sohail Duvall appears to be recovering well from recent surgery. The patient is tolerating a regular diet and is back to basic daily activities.     Surgical pain appears to be controlled, and he denies N/V. The surgical incisions appear to be healing as expected, except the single incision at center of abdomen. Remaining scab was removed, and wound bed treated with topical silver-nitrate therapy. Patient tolerated well. New non-stock dressing applied, wound care instructions provided. Additional wound supplies provided.    We discussed the overall plan to likely proceed with TACE treatment - per prior discussion with Dr Senior. Plan to revisit planned further procedures when patient returns for wound check next week.    The patient is scheduled for next follow up visit in 1 week.    Carter KAUFMAN NP, have entered, reviewed and confirmed the above diagnoses related to this patient on this date of service, as per the time and date noted at top of this note.

## 2025-04-29 NOTE — TELEPHONE ENCOUNTER
Phone Number Called: [phone]    Wound 03/03/25 Incision Abdomen Bilateral Lap sites x 3: Dermabond (Active)     Discharged on 03/03/2025.   Surgical procedure 03/03/2025 LAPAROSCOPIC MICROWAVE THERMAL ABLATION LIVER TUMORS.    Message: This RN contacted the patient to schedule a follow-up with  to discuss tumor markers and chest CT. The patient advised this RN his LUQ surgical wound started leaking and has slight redness he's attempted to care for it on his own by applying a bandage and Vaseline. The patient denies n/v, fever, chills, pus and foul-smelling discharge. This RN advised the patient to apply gauze with tape on the edges and to avoid applying any product to the incision. The patient was also scheduled for a follow-up with APRN. The patient was instructed to seek medical care if he develops fever, chills, n/v & foul-smelling discharge.     Future Appointments   Date Time Provider Department Center   4/30/2025 11:30 AM XAVI Castañeda [facility] None   7/29/2025  9:40 AM XAVI Ayon [facility] EULA Marquez

## 2025-04-30 ENCOUNTER — OFFICE VISIT (OUTPATIENT)
Facility: MEDICAL CENTER | Age: 75
End: 2025-04-30
Payer: MEDICARE

## 2025-04-30 VITALS
HEIGHT: 68 IN | TEMPERATURE: 97.8 F | HEART RATE: 58 BPM | SYSTOLIC BLOOD PRESSURE: 116 MMHG | OXYGEN SATURATION: 97 % | WEIGHT: 200.29 LBS | BODY MASS INDEX: 30.36 KG/M2 | DIASTOLIC BLOOD PRESSURE: 54 MMHG

## 2025-04-30 DIAGNOSIS — C91.10 CLL (CHRONIC LYMPHOCYTIC LEUKEMIA) (HCC): ICD-10-CM

## 2025-04-30 DIAGNOSIS — K76.6 PORTAL HYPERTENSION (HCC): ICD-10-CM

## 2025-04-30 DIAGNOSIS — D12.5 BENIGN NEOPLASM OF SIGMOID COLON: ICD-10-CM

## 2025-04-30 DIAGNOSIS — Z98.890 HISTORY OF ABLATION OF NEOPLASM OF LIVER: ICD-10-CM

## 2025-04-30 DIAGNOSIS — T14.8XXD WOUND HEALING, DELAYED: ICD-10-CM

## 2025-04-30 DIAGNOSIS — E11.9 TYPE 2 DIABETES MELLITUS WITHOUT COMPLICATION, WITHOUT LONG-TERM CURRENT USE OF INSULIN (HCC): ICD-10-CM

## 2025-04-30 DIAGNOSIS — C22.8 PRIMARY MALIGNANT NEOPLASM OF LIVER (HCC): ICD-10-CM

## 2025-04-30 DIAGNOSIS — K70.30 ALCOHOLIC CIRRHOSIS OF LIVER WITHOUT ASCITES (HCC): ICD-10-CM

## 2025-04-30 ASSESSMENT — ENCOUNTER SYMPTOMS
FEVER: 0
DIARRHEA: 0
SHORTNESS OF BREATH: 0
WEIGHT LOSS: 0
VOMITING: 0
CHILLS: 0
COUGH: 0
NAUSEA: 0
ABDOMINAL PAIN: 0
CONSTIPATION: 0

## 2025-04-30 ASSESSMENT — FIBROSIS 4 INDEX: FIB4 SCORE: 9.3

## 2025-05-02 NOTE — PROGRESS NOTES
Subjective:   5/6/2025 10:39 AM  Primary care physician: XAVI Ayon  Referring Provider: Alfonzo Servin M.D.   Medical Oncologist: Alfonzo Servin M.D.     Chief Complaint:   Chief Complaint   Patient presents with    Post-op     WOUND CHECK #2  F/U LIVER  DR G=> TACE        Diagnosis:   1. Primary malignant neoplasm of liver (HCC)        2. Benign neoplasm of sigmoid colon        3. History of ablation of neoplasm of liver        4. CLL (chronic lymphocytic leukemia) (HCC)        5. Type 2 diabetes mellitus without complication, without long-term current use of insulin (HCC)        6. Wound healing, delayed          History of presenting illness:    Sohail Duvall is a pleasant 74 y.o. male with history including alcoholic cirrhosis, portal hypertension with multiple variceal ligations, Primary malignant neoplasm of liver HCC with ablation 4B lesion ([place]) Y90 SIRT in 2017 TACE 2018 s/p Rituxan/bendamustine  Y90 2023 TACE 2021, CLL, hiatal hernia, stage 3 kidney disease, gout, obesity, Type 2 diabetes, hepatoma, and prostate cancer  s/p brachytherapy. Referred by Dr Servin for concern liver cell carcinoma.     MR ABDOMEN on 12/23/24 noted hepatic cirrhosis with multiple enhancing lesions again seen.  Lesion in segment 3 appears enlarged compared to prior exam.  Other lesions in the RIGHT and LEFT lobes appear unchanged.  No new lesion demonstrated. Splenomegaly, increased from prior. LEFT adrenal adenoma again seen. LR-4: probably HCC     Note - patient was scheduled for ablation in 2022, but no showed for appointments with Dr Senior.     He is here today for evaluation what appears to be LI-RADS 4 lesion on the left lateral segment segment 3.  Based on imaging it has grown slightly from his last MRI from June 2024.  I reviewed his recent MRI from December 2024 and I am having difficulty seeing the exact lesion that they  are talking about.  In any event, he does show significant areas of cirrhosis throughout his liver.  He is between a Darian A and a darian B patient.  His platelet count is in the 60s but he is suffering from chronic renal insufficiency as well.  His GFR is 49.  With a creatinine of 1.48.     In any event, I did an ablation of the patient back in 2017 and fortunately this ablation cavity shows no sign of recurrence.  He is here with his wife to discuss next steps.     The patient did have cirrhosis related to alcohol use and he says he is not drinking anymore and that he does not smoke anymore.  He is not on any blood thinners.  He actually looks like he lost a little weight but is still obese.     He is on immunotherapies with Dr. Horn I did complete his therapy for his CLL.  I have had to review his old records new records and his imaging.     Update 2/25/2  US ABDOMEN on 2/13/25 noted hyperechoic left lobe 1.9 cm liver mass 4.9 cm from skin.     DCP on 2/14/25 was 10.2 elevated. AFP on 2/14/25 was 5, WNL. CA 19-9 on 2/12/25 was 16.1 WNL.     He is here today for follow-up status post Histotripsy ultrasound.  It is visible and its about 4.9 cm from the abdominal wall.  They are seeing it in the supine position.  So the patient is a candidate for Histotripsy.  We did spend time discussing the difference between a Histotripsy and an ablation using thermal energy when he has had in the past.  He has elected to proceed with a Histotripsy.  The tumor itself is 1.9 cm in segment 3 of the liver.  I reviewed imaging for second time during this visit.     Update 3/18/25  On 3/3/25 he completed laparoscopic microwave thermal ablation of segment 3 lesion, by Dr Senior. He was discharged the same day     He is here today for follow up from recent surgery joined by his wife. Overall he appears to be doing well, tolerating a diet, and back to normal daily activities. He denies pain and states the surgical wounds do not  bother him.     Update 4/8/25  MR ABDOMEN on 4/2/25 noted liver pattern remains very difficult. Extensive nodularity remains with increasing nodularity and enhancing fibrosis especially in the left lobe since 12/23/2024. Segment 3 lesion slightly larger measuring 2.1 cm with minimal peripheral enhancement but less internal enhancement which may be a posttreatment configuration. Possible slight growth 2.2 cm segment 2 lesion near falciform ligament but cannot confirm early enhancement today with some peripheral enhancement on sequential subtraction images. Stable tiny 3 mm enhancing focus superiorly near falciform ligament. Stable 2.1 cm enhancing focus within right lobe and caudate lobe without internal enhancement. Small enhancing foci in the right lobe seen previously are not visible today. Stable left adrenal nodule and splenomegaly.     He is here today accompanied by his wife for post op follow up and discussion regarding imaging results. Overall he is doing well at home, is back to eating a regular diet, and is having regular formed BMs. Denies new or concerning symptoms such as fever, chills, abdominal pain, nausea, or vomiting. Extensive discussion by Dr. Lima regarding imaging results and next steps to further investigate presence of viable disease.     Update 4/30/25  DCP on 4/22/25 was 11.9 - elevated     CT CHEST on 4/10/25 noted no new lung nodule or chest adenopathy. Stable 5 mm minor fissure nodularity since 6/3/2022.     He is here today for follow up from prior surgery.  He is joined by his wife. He reports overall he is doing well, and states he has no current complaints of abdominal pain, nausea, vomiting, fever, chills. He states the prior surgical incision wound at middle of abdomen has been unchanged for several weeks, and it has had some scant serous seeping, but no bleeding. He states he has only treated it with soap and water, and kept a non-stick dressing on it. He is here for wound care  assessment.    Update 5/6/25  He is here today for follow up regarding delayed wound healing. He states he is doing well, and denies fever or chills. He says the wound treated last week is healing, but has noted the wound on left side is more more red/irritated vs previously. Family also here.    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 Stopped both medications because blood pressure readings were low, /82 today and has been low at home since stopping medications about 5 days ago. Last reading at home 117/72     Hypotension 05/19/2022    Patient has known hypertension and has been on losartan 50 mg long-term.  He notes recent episode about 10 days ago where blood pressure dropped extremely low and he fainted.  He reports that blood pressure on home cuff was 45/30, and slowly increased.  Additionally, he notes that he has had these episodes on a yearly basis for maybe 6 to 7 years or blood pressure goes extremely well and he passes    Indigestion     Liver cancer (HCC)     Liver tumor     Plantar fasciitis of right foot 08/12/2019    Rosacea     Snoring     no sleep study     Past Surgical History:   Procedure Laterality Date    SD LAP,ABLAT 1+ LIVER TUMOR(S),RADIOFREQ N/A 3/3/2025    Procedure: LAPAROSCOPIC MICROWAVE THERMAL ABLATION LIVER TUMORS;  Surgeon: Feliberto Senior M.D.;  Location: SURGERY [facility];  Service: General    SD UPPER GI ENDOSCOPY,DIAGNOSIS N/A 4/25/2023    Procedure: GASTROSCOPY;  Surgeon: Mir Rodriguez M.D.;   Location: SURGERY SAME DAY [facility];  Service: Gastroenterology    WOUND EXPLORATION ORTHO Right 8/5/2020    Procedure: EXPLORATION, WOUND- THUMB;  Surgeon: Yumiko Griffiths M.D.;  Location: SURGERY SAME DAY [facility];  Service: Orthopedics    TENDON REPAIR  8/5/2020    Procedure: REPAIR, TENDON- EXTENSOR;  Surgeon: Yumiko Griffiths M.D.;  Location: SURGERY SAME DAY [facility] OR[facility];  Service: Orthopedics    HEPATIC ABLATION LAPAROSCOPIC  6/28/2018    Procedure: HEPATIC ABLATION LAPAROSCOPIC. SEGMENT 4B, HEPATOMA, REPAIR OF INCARCERATED UMBILICAL HERNIA;  Surgeon: Feliberto Burgos M.D.;  Location: SURGERY [facility];  Service: General    BRACHY THERAPY       Allergies   Allergen Reactions    [place] Unspecified     Runny nose, itchy eyes     Outpatient Encounter Medications as of 5/6/2025   Medication Sig Dispense Refill    Naloxone (NARCAN) 4 MG/0.1ML Liquid One spray in one nostril for overdose and call 911. 1 Each 0    ondansetron (ZOFRAN ODT) 4 MG TABLET DISPERSIBLE Take 1 Tablet by mouth every 6 hours as needed for Nausea/Vomiting. 10 Tablet 0    ondansetron (ZOFRAN) 4 MG Tab tablet Take 1 Tablet by mouth every four hours as needed for Nausea/Vomiting for up to 90 days. 90 Tablet 2    Cholecalciferol (VITAMIN D-3 PO) Take 2,000 Units by mouth every day.      metFORMIN ER (GLUCOPHAGE XR) 500 MG TABLET SR 24 HR Take 2 Tablets by mouth 2 times a day. 400 Tablet 3    pantoprazole (PROTONIX) 20 MG tablet Take 1 Tablet by mouth every day. 100 Tablet 3    spironolactone (ALDACTONE) 50 MG Tab Take 2 Tablets by mouth every day. 200 Tablet 3    furosemide (LASIX) 20 MG Tab Take 2 Tablets by mouth every day. 200 Tablet 3    atezolizumab (TECENTRIQ) 1200 MG/20ML Solution injection 1,200 mg by Intrathecal Infusion route see administration instructions. Infusion every 3 weeks      diphenhydrAMINE (BENADRYL ALLERGY) 25 MG capsule Take 25 mg by mouth every 6 hours as needed.      Bevacizumab 1.25  "MG/0.05ML Solution Prefilled Syringe 1.25 mcg by Subcutaneous Infusion route see administration instructions. Infusion every 3 weeks      propranolol (INDERAL) 10 MG Tab Take 1 Tablet by mouth every day at 6 PM. 100 Tablet 3    fluticasone (FLONASE) 50 MCG/ACT nasal spray Administer 1 Spray into affected nostril(S) 2 times a day as needed.      therapeutic multivitamin-minerals (THERAGRAN-M) Tab Take 1 Tablet by mouth every day.       No facility-administered encounter medications on file as of 5/6/2025.     Social History     Socioeconomic History    Marital status:      Spouse name: Not on file    Number of children: 1    Years of education: Not on file    Highest education level: Not on file   Occupational History    Occupation: fertilizer sales   Tobacco Use    Smoking status: Never     Passive exposure: Current    Smokeless tobacco: Former     Types: Snuff, Chew     Quit date: 3/29/2015   Vaping Use    Vaping status: Never Used   Substance and Sexual Activity    Alcohol use: Not Currently     Alcohol/week: 7.2 oz     Types: 12 Shots of liquor per week    Drug use: Yes     Types: Marijuana, Inhaled, Oral     Comment: \"Marijuana Use\" daily, last smoked 3/2    Sexual activity: Never   Other Topics Concern    Not on file   Social History Narrative    No known exposure to asbestos, dyes, or chemicals, however he was exposed to pesticides.  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  1 child, alive and well.  He also has a step-child.      Social Drivers of Health     Financial Resource Strain: Not on file   Food Insecurity: Not on file   Transportation Needs: Not on file   Physical Activity: Not on file   Stress: Not on file   Social Connections: Not on file   Intimate Partner Violence: Not on file   Housing Stability: Not on file      Social History     Tobacco Use   Smoking Status Never    Passive exposure: Current   Smokeless Tobacco Former " "   Types: Snuff, Chew    Quit date: 3/29/2015     Social History     Substance and Sexual Activity   Alcohol Use Not Currently    Alcohol/week: 7.2 oz    Types: 12 Shots of liquor per week     Social History     Substance and Sexual Activity   Drug Use Yes    Types: Marijuana, Inhaled, Oral    Comment: \"Marijuana Use\" daily, last smoked 3/2      Family History   Problem Relation Age of Onset    Cancer Father 81        Bladder    Cancer Brother         Prostate & CLL (s/p 8-10 years ago)    Hyperlipidemia Sister     Lung Disease Neg Hx     Diabetes Neg Hx     Heart Disease Neg Hx     Hypertension Neg Hx     Stroke Neg Hx     Alcohol/Drug Neg Hx        Review of Systems   Constitutional:  Negative for chills, fever, malaise/fatigue and weight loss.   Respiratory:  Negative for cough and shortness of breath.    Cardiovascular:  Negative for chest pain and leg swelling.   Gastrointestinal:  Negative for abdominal pain, diarrhea, nausea and vomiting.        \"The one spot on left side is more red\"        Objective:   /60 (BP Location: Left arm, Patient Position: Sitting, BP Cuff Size: Adult)   Pulse (!) 59   Temp 36.4 °C (97.6 °F) (Temporal)   Ht 1.727 m (5' 8\")   Wt 91.3 kg (201 lb 6.2 oz)   SpO2 98%   BMI 30.62 kg/m²     Physical Exam  Vitals reviewed.   Constitutional:       General: He is not in acute distress.     Appearance: He is obese.   HENT:      Head: Normocephalic and atraumatic.      Nose: Nose normal.      Mouth/Throat:      Pharynx: Oropharynx is clear.   Eyes:      Conjunctiva/sclera: Conjunctivae normal.   Cardiovascular:      Rate and Rhythm: Normal rate.   Pulmonary:      Effort: Pulmonary effort is normal. No respiratory distress.   Abdominal:      General: There is distension.      Tenderness: There is no abdominal tenderness. There is no guarding.      Comments: Surgical wound on anterior abdomen that was treated last week appears partially healed, and is non-tender  Other surgical wound " on left side of abdomen is slightly more red, without discharge, non-tender   Skin:     General: Skin is dry.   Neurological:      Mental Status: He is alert and oriented to person, place, and time.   Psychiatric:         Mood and Affect: Mood normal.         Behavior: Behavior normal.         Labs    Latest Reference Range & Units 04/22/25 11:32   WBC 4.8 - 10.8 K/uL 4.8   RBC 4.70 - 6.10 M/uL 4.29 (L)   Hemoglobin 14.0 - 18.0 g/dL 15.2   Hematocrit 42.0 - 52.0 % 45.0   MCV 81.4 - 97.8 fL 104.9 (H)   MCH 27.0 - 33.0 pg 35.4 (H)   MCHC 32.3 - 36.5 g/dL 33.8   RDW 35.9 - 50.0 fL 64.1 (H)   Platelet Count 164 - 446 K/uL 68 (L)   MPV 9.0 - 12.9 fL 9.4   (L): Data is abnormally low  (H): Data is abnormally high       Latest Reference Range & Units 04/22/25 11:32   Sodium 135 - 145 mmol/L 139   Potassium 3.6 - 5.5 mmol/L 4.7   Chloride 96 - 112 mmol/L 105   Co2 20 - 33 mmol/L 20   Anion Gap 7.0 - 16.0  14.0   Glucose 65 - 99 mg/dL 142 (H)   Bun 8 - 22 mg/dL 30 (H)   Creatinine 0.50 - 1.40 mg/dL 1.17   GFR (CKD-EPI) >60 mL/min/1.73 m 2 65   Calcium 8.5 - 10.5 mg/dL 9.7   Correct Calcium 8.5 - 10.5 mg/dL 9.5   AST(SGOT) 12 - 45 U/L 46 (H)   ALT(SGPT) 2 - 50 U/L 29   Alkaline Phosphatase 30 - 99 U/L 144 (H)   Total Bilirubin 0.1 - 1.5 mg/dL 1.3   Albumin 3.2 - 4.9 g/dL 4.2   Total Protein 6.0 - 8.2 g/dL 6.9   Globulin 1.9 - 3.5 g/dL 2.7   A-G Ratio g/dL 1.6   Urobilinogen, Urine <=1.0 EU/dL 1.0   (H): Data is abnormally high          Latest Reference Range & Units 02/12/25 10:33 02/14/25 11:45 04/22/25 11:32   Alpha Fetoprotein 0 - 9 ng/mL   5 6   Ca 19-9 0.0 - 35.0 U/mL 16.1           Component  Ref Range & Units (hover) 7 d ago  (4/22/25) 2 mo ago  (2/14/25) 1 yr ago  (7/6/23) 2 yr ago  (9/12/22) 2 yr ago  (6/2/22) 3 yr ago  (2/18/22) 3 yr ago  (10/18/21)   Jordon-gamma-carboxy Prothrombin 11.9 High  10.2 High  CM 2.1 CM 2.7 CM               Imaging  CT CHEST (4/10/25)  IMPRESSION:     1. No new lung nodule or chest  adenopathy. Stable 5 mm minor fissure nodularity since 6/3/2022.     2. Stable 4.1 cm enlargement of the ascending aorta.     3. 4.5 cm low-density nodularity in the lateral segment left lobe anteriorly and slightly laterally is more well-defined than on recent 4/2/2025 MRI. Indeterminate significance.     MR ABDOMEN (4/2/25)  IMPRESSION:  1. Liver pattern remains very difficult. Extensive nodularity remains with increasing nodularity and enhancing fibrosis especially in the left lobe since 12/23/2024.  2. Segment 3 lesion slightly larger measuring 2.1 cm with minimal peripheral enhancement but less internal enhancement. This may be a posttreatment configuration.  3. Possible slight growth 2.2 cm segment 2 lesion near falciform ligament but cannot confirm early enhancement today. Some peripheral enhancement on sequential subtraction images.  4. Stable tiny 3 mm enhancing focus superiorly near falciform ligament.  5. Stable 2.1 cm enhancing focus within right lobe and caudate lobe without internal enhancement.  6. The small enhancing foci in the right lobe seen previously are not visible today.  7. Stable left adrenal nodule. Splenomegaly.     US ABDOMEN (2/13/25)  FINDINGS:  Lesion 1:  Patient Position: Supine  Lesion (location): Left lobe segment 3  Size: 1.9 cm L x 1.7 cm TRV x 1.7 cm AP  Echogenicity: Hyperechoic  Measurement from muscle/subcutaneous fat interface to center of lesion  (?2.5 cm) 3.5 cm  Measurement from skin line to posterior lesion border : 4.9 cm  Is the lesion visible in the sagittal plane throughout a respiratory cycle (during quiet breathing)? Yes  Is the lesion visible with ?45 degree transducer angle (relative to perpendicular) with minimal transducer pressure? Yes  Is there bone, bowel, or lung in the acoustic pathway? No.     Cirrhotic changes of the liver partially visualized.     IMPRESSION:     Hyperechoic left lobe liver mass as described.     MR ABDOMEN (12/23/24)  IMPRESSION:  1.   Hepatic cirrhosis with multiple enhancing lesions again seen.  Lesion in segment 3 appears enlarged compared to prior exam.  Other lesions in the RIGHT and LEFT lobes appear unchanged.  No new lesion demonstrated.  2.  Splenomegaly, increased from prior.  3.  LEFT adrenal adenoma again seen.     LR-4: probably HCC     Pathology  N/A     Procedures  SURGERY (3/3/25)  PROCEDURE:  Laparoscopic microwave thermal ablation of segment 3 lesion,   measuring 1.9 x 1.5 cm using a 140 flowers at 2 minutes giving us a 3.4 x 4 cm   ablation cavity.     Left hepatic lobe Y90 (8/22/23) - Dr Norris  Ablation 4B lesion - Nadeen  Y90 SIRT 2017   TACE 2018   Y90 2023  TACE 2021    Diagnosis:     1. Primary malignant neoplasm of liver (HCC)        2. Benign neoplasm of sigmoid colon        3. History of ablation of neoplasm of liver        4. CLL (chronic lymphocytic leukemia) (HCC)        5. Type 2 diabetes mellitus without complication, without long-term current use of insulin (HCC)        6. Wound healing, delayed            Medical Decision Making:  Today's Assessment / Status / Plan:     Overall, Sohail Duvall appears to be recovering well from recent surgery. The patient is tolerating a regular diet and is back to basic daily activities.     Surgical pain appears to be controlled, and he denies N/V. The surgical incisions appear to be healing as expected. The previously treated site appears to be healing. The site at left of abdomen will be monitored. Wound care instructions provided.     The patient was also seen and assessed by Dr Senior.    We discussed the plans to proceed with TACE procedure by IR, this was previously ordered by Dr Senior. Patient advised to hear from IR and proceed per their schedule.    The patient will be seen back after TACE procedure and repeat CT scan.    Carter KAUFMAN NP, have entered, reviewed and confirmed the above diagnoses related to this patient on this date of service, as  per the time and date noted at top of this note.

## 2025-05-06 ENCOUNTER — OFFICE VISIT (OUTPATIENT)
Facility: MEDICAL CENTER | Age: 75
End: 2025-05-06
Payer: MEDICARE

## 2025-05-06 VITALS
DIASTOLIC BLOOD PRESSURE: 60 MMHG | HEIGHT: 68 IN | SYSTOLIC BLOOD PRESSURE: 102 MMHG | BODY MASS INDEX: 30.52 KG/M2 | WEIGHT: 201.39 LBS | TEMPERATURE: 97.6 F | OXYGEN SATURATION: 98 % | HEART RATE: 59 BPM

## 2025-05-06 DIAGNOSIS — D12.5 BENIGN NEOPLASM OF SIGMOID COLON: ICD-10-CM

## 2025-05-06 DIAGNOSIS — T14.8XXD WOUND HEALING, DELAYED: ICD-10-CM

## 2025-05-06 DIAGNOSIS — Z98.890 HISTORY OF ABLATION OF NEOPLASM OF LIVER: ICD-10-CM

## 2025-05-06 DIAGNOSIS — C91.10 CLL (CHRONIC LYMPHOCYTIC LEUKEMIA) (HCC): ICD-10-CM

## 2025-05-06 DIAGNOSIS — C22.8 PRIMARY MALIGNANT NEOPLASM OF LIVER (HCC): ICD-10-CM

## 2025-05-06 DIAGNOSIS — E11.9 TYPE 2 DIABETES MELLITUS WITHOUT COMPLICATION, WITHOUT LONG-TERM CURRENT USE OF INSULIN (HCC): ICD-10-CM

## 2025-05-06 PROCEDURE — 3078F DIAST BP <80 MM HG: CPT | Performed by: NURSE PRACTITIONER

## 2025-05-06 PROCEDURE — 99024 POSTOP FOLLOW-UP VISIT: CPT | Performed by: NURSE PRACTITIONER

## 2025-05-06 PROCEDURE — 3074F SYST BP LT 130 MM HG: CPT | Performed by: NURSE PRACTITIONER

## 2025-05-06 ASSESSMENT — ENCOUNTER SYMPTOMS
ABDOMINAL PAIN: 0
WEIGHT LOSS: 0
SHORTNESS OF BREATH: 0
VOMITING: 0
COUGH: 0
FEVER: 0
DIARRHEA: 0
CHILLS: 0
NAUSEA: 0

## 2025-05-06 ASSESSMENT — FIBROSIS 4 INDEX: FIB4 SCORE: 9.3

## 2025-05-12 DIAGNOSIS — K76.6 PORTAL HYPERTENSION (HCC): ICD-10-CM

## 2025-05-12 NOTE — TELEPHONE ENCOUNTER
Received request via: Pharmacy    Was the patient seen in the last year in this department? Yes    Does the patient have an active prescription (recently filled or refills available) for medication(s) requested? No    [email or web address]     Does the patient have half-way Plus and need 100-day supply? (This applies to ALL medications) Patient does not have SCP

## 2025-05-13 RX ORDER — PROPRANOLOL HYDROCHLORIDE 10 MG/1
10 TABLET ORAL EVERY EVENING
Qty: 100 TABLET | Refills: 3 | Status: SHIPPED | OUTPATIENT
Start: 2025-05-13

## 2025-05-14 ENCOUNTER — HOSPITAL ENCOUNTER (OUTPATIENT)
Dept: LAB | Facility: MEDICAL CENTER | Age: 75
End: 2025-05-14
Attending: INTERNAL MEDICINE
Payer: MEDICARE

## 2025-05-14 LAB
ALBUMIN SERPL BCP-MCNC: 4.3 G/DL (ref 3.2–4.9)
ALBUMIN/GLOB SERPL: 1.7 G/DL
ALP SERPL-CCNC: 138 U/L (ref 30–99)
ALT SERPL-CCNC: 29 U/L (ref 2–50)
ANION GAP SERPL CALC-SCNC: 11 MMOL/L (ref 7–16)
APPEARANCE UR: CLEAR
AST SERPL-CCNC: 44 U/L (ref 12–45)
BACTERIA #/AREA URNS HPF: ABNORMAL /HPF
BASOPHILS # BLD AUTO: 0.7 % (ref 0–1.8)
BASOPHILS # BLD: 0.05 K/UL (ref 0–0.12)
BILIRUB SERPL-MCNC: 2.1 MG/DL (ref 0.1–1.5)
BILIRUB UR QL STRIP.AUTO: ABNORMAL
BUN SERPL-MCNC: 31 MG/DL (ref 8–22)
CALCIUM ALBUM COR SERPL-MCNC: 9.7 MG/DL (ref 8.5–10.5)
CALCIUM SERPL-MCNC: 9.9 MG/DL (ref 8.5–10.5)
CASTS URNS QL MICRO: ABNORMAL /LPF (ref 0–2)
CHLORIDE SERPL-SCNC: 101 MMOL/L (ref 96–112)
CO2 SERPL-SCNC: 24 MMOL/L (ref 20–33)
COLOR UR: ABNORMAL
CREAT SERPL-MCNC: 1.33 MG/DL (ref 0.5–1.4)
EOSINOPHIL # BLD AUTO: 0.07 K/UL (ref 0–0.51)
EOSINOPHIL NFR BLD: 1 % (ref 0–6.9)
EPITHELIAL CELLS 1715: ABNORMAL /HPF (ref 0–5)
ERYTHROCYTE [DISTWIDTH] IN BLOOD BY AUTOMATED COUNT: 63 FL (ref 35.9–50)
GFR SERPLBLD CREATININE-BSD FMLA CKD-EPI: 56 ML/MIN/1.73 M 2
GLOBULIN SER CALC-MCNC: 2.6 G/DL (ref 1.9–3.5)
GLUCOSE SERPL-MCNC: 234 MG/DL (ref 65–99)
GLUCOSE UR STRIP.AUTO-MCNC: 100 MG/DL
HCT VFR BLD AUTO: 44.6 % (ref 42–52)
HGB BLD-MCNC: 15.2 G/DL (ref 14–18)
IMM GRANULOCYTES # BLD AUTO: 0.02 K/UL (ref 0–0.11)
IMM GRANULOCYTES NFR BLD AUTO: 0.3 % (ref 0–0.9)
KETONES UR STRIP.AUTO-MCNC: ABNORMAL MG/DL
LEUKOCYTE ESTERASE UR QL STRIP.AUTO: ABNORMAL
LYMPHOCYTES # BLD AUTO: 2.72 K/UL (ref 1–4.8)
LYMPHOCYTES NFR BLD: 38.5 % (ref 22–41)
MCH RBC QN AUTO: 35.2 PG (ref 27–33)
MCHC RBC AUTO-ENTMCNC: 34.1 G/DL (ref 32.3–36.5)
MCV RBC AUTO: 103.2 FL (ref 81.4–97.8)
MICRO URNS: ABNORMAL
MONOCYTES # BLD AUTO: 0.66 K/UL (ref 0–0.85)
MONOCYTES NFR BLD AUTO: 9.3 % (ref 0–13.4)
NEUTROPHILS # BLD AUTO: 3.55 K/UL (ref 1.82–7.42)
NEUTROPHILS NFR BLD: 50.2 % (ref 44–72)
NITRITE UR QL STRIP.AUTO: NEGATIVE
NRBC # BLD AUTO: 0 K/UL
NRBC BLD-RTO: 0 /100 WBC (ref 0–0.2)
PH UR STRIP.AUTO: 6.5 [PH] (ref 5–8)
PLATELET # BLD AUTO: 79 K/UL (ref 164–446)
PLATELETS.RETICULATED NFR BLD AUTO: 2.6 % (ref 0.6–13.1)
PMV BLD AUTO: 9.8 FL (ref 9–12.9)
POTASSIUM SERPL-SCNC: 5.6 MMOL/L (ref 3.6–5.5)
PROT SERPL-MCNC: 6.9 G/DL (ref 6–8.2)
PROT UR QL STRIP: NEGATIVE MG/DL
RBC # BLD AUTO: 4.32 M/UL (ref 4.7–6.1)
RBC # URNS HPF: ABNORMAL /HPF (ref 0–2)
RBC UR QL AUTO: NEGATIVE
SODIUM SERPL-SCNC: 136 MMOL/L (ref 135–145)
SP GR UR STRIP.AUTO: 1.02
TSH SERPL-ACNC: 2.36 UIU/ML (ref 0.38–5.33)
UROBILINOGEN UR STRIP.AUTO-MCNC: 4 EU/DL
WBC # BLD AUTO: 7.1 K/UL (ref 4.8–10.8)
WBC #/AREA URNS HPF: ABNORMAL /HPF

## 2025-05-14 PROCEDURE — 85025 COMPLETE CBC W/AUTO DIFF WBC: CPT

## 2025-05-14 PROCEDURE — 85055 RETICULATED PLATELET ASSAY: CPT

## 2025-05-14 PROCEDURE — 80053 COMPREHEN METABOLIC PANEL: CPT

## 2025-05-14 PROCEDURE — 84443 ASSAY THYROID STIM HORMONE: CPT

## 2025-05-14 PROCEDURE — 36415 COLL VENOUS BLD VENIPUNCTURE: CPT

## 2025-05-14 PROCEDURE — 81001 URINALYSIS AUTO W/SCOPE: CPT

## 2025-05-23 ENCOUNTER — OFFICE VISIT (OUTPATIENT)
Dept: URGENT CARE | Facility: PHYSICIAN GROUP | Age: 75
End: 2025-05-23
Payer: MEDICARE

## 2025-05-23 VITALS
OXYGEN SATURATION: 96 % | BODY MASS INDEX: 29.21 KG/M2 | WEIGHT: 192.7 LBS | RESPIRATION RATE: 16 BRPM | HEART RATE: 58 BPM | DIASTOLIC BLOOD PRESSURE: 60 MMHG | TEMPERATURE: 97.6 F | SYSTOLIC BLOOD PRESSURE: 130 MMHG | HEIGHT: 68 IN

## 2025-05-23 DIAGNOSIS — J01.90 ACUTE BACTERIAL RHINOSINUSITIS: Primary | ICD-10-CM

## 2025-05-23 DIAGNOSIS — R05.1 ACUTE COUGH: ICD-10-CM

## 2025-05-23 DIAGNOSIS — H61.23 BILATERAL IMPACTED CERUMEN: ICD-10-CM

## 2025-05-23 DIAGNOSIS — B96.89 ACUTE BACTERIAL RHINOSINUSITIS: Primary | ICD-10-CM

## 2025-05-23 PROCEDURE — 3075F SYST BP GE 130 - 139MM HG: CPT | Performed by: NURSE PRACTITIONER

## 2025-05-23 PROCEDURE — 1126F AMNT PAIN NOTED NONE PRSNT: CPT | Performed by: NURSE PRACTITIONER

## 2025-05-23 PROCEDURE — 3078F DIAST BP <80 MM HG: CPT | Performed by: NURSE PRACTITIONER

## 2025-05-23 PROCEDURE — 99214 OFFICE O/P EST MOD 30 MIN: CPT | Performed by: NURSE PRACTITIONER

## 2025-05-23 RX ORDER — BENZONATATE 100 MG/1
100 CAPSULE ORAL 3 TIMES DAILY PRN
Qty: 60 CAPSULE | Refills: 0 | Status: SHIPPED | OUTPATIENT
Start: 2025-05-23

## 2025-05-23 ASSESSMENT — PAIN SCALES - GENERAL: PAINLEVEL_OUTOF10: NO PAIN

## 2025-05-23 ASSESSMENT — FIBROSIS 4 INDEX: FIB4 SCORE: 7.65

## 2025-05-23 NOTE — PROGRESS NOTES
"Sohail Duvall is a 74 y.o. male who presents for Pharyngitis (X 1 week )      HPI  This is a new problem. Sohail Duvall is a 74 y.o. patient who presents to urgent care with c/o: Sore throat for over a week.  He reports that his sore throat is going deeper into his chest causing acute cough.  His cough is moist and occasionally productive.  He has thick nasal drainage.  He reports the mild dull persistent headache.  His hearing is a little less than normal.  Denies fever, chills.  He is taking over-the-counter cough medicine with some mild relief of his discomfort.  No other aggravating leaving factors.  Hx of seasonal allergies. Takes Benadryl everyday. Has used some OTC zyrtec on some days.     ROS See HPI    Allergies:     Allergies[1]    PMSFS Hx:  Past Medical History[2]  Past Surgical History[3]  Family History   Problem Relation Age of Onset    Cancer Father 81        Bladder    Cancer Brother         Prostate & CLL (s/p 8-10 years ago)    Hyperlipidemia Sister     Lung Disease Neg Hx     Diabetes Neg Hx     Heart Disease Neg Hx     Hypertension Neg Hx     Stroke Neg Hx     Alcohol/Drug Neg Hx      Social History     Tobacco Use    Smoking status: Never     Passive exposure: Current    Smokeless tobacco: Former     Types: Snuff, Chew     Quit date: 3/29/2015   Substance Use Topics    Alcohol use: Not Currently     Alcohol/week: 7.2 oz     Types: 12 Shots of liquor per week         Problems:   Problem List[4]    Medications:   Medications Ordered Prior to Encounter[5]     Objective:     /60 (BP Location: Left arm, Patient Position: Sitting, BP Cuff Size: Adult)   Pulse (!) 58   Temp 36.4 °C (97.6 °F) (Temporal)   Resp 16   Ht 1.727 m (5' 8\")   Wt 87.4 kg (192 lb 11.2 oz)   SpO2 96%   BMI 29.30 kg/m²     Physical Exam  Nursing note reviewed.   Constitutional:       General: He is not in acute distress.     Appearance: Normal appearance. He is well-developed and well-groomed. He " is not toxic-appearing.   HENT:      Head: Normocephalic and atraumatic.      Right Ear: Tympanic membrane, ear canal and external ear normal. There is impacted cerumen.      Left Ear: Tympanic membrane, ear canal and external ear normal. There is impacted cerumen.      Nose: Nose normal.      Mouth/Throat:      Mouth: Mucous membranes are moist.      Pharynx: Oropharyngeal exudate (PND) and posterior oropharyngeal erythema present.   Eyes:      General:         Right eye: No discharge.         Left eye: No discharge.      Conjunctiva/sclera: Conjunctivae normal.      Pupils: Pupils are equal, round, and reactive to light.   Cardiovascular:      Rate and Rhythm: Normal rate and regular rhythm.      Pulses: Normal pulses.      Heart sounds: Normal heart sounds.   Pulmonary:      Effort: Pulmonary effort is normal. No accessory muscle usage.      Breath sounds: Normal breath sounds and air entry.   Musculoskeletal:      Cervical back: Neck supple.   Lymphadenopathy:      Cervical: No cervical adenopathy.      Upper Body:      Right upper body: No supraclavicular adenopathy.      Left upper body: No supraclavicular adenopathy.   Skin:     General: Skin is warm and dry.      Capillary Refill: Capillary refill takes less than 2 seconds.   Neurological:      Mental Status: He is alert and oriented to person, place, and time.   Psychiatric:         Mood and Affect: Mood normal.         Behavior: Behavior normal.       Cerumen Removal Procedure:  I have discussed the potential risks of this procedure including but not limited to mild discomfort with a sensation of ear fullness and wetness, dizziness, ear canal inflammation, ear canal abrasion with bleeding, and/or ear drum perforation.Patient is aware and consents to the procedure.  Cerumen removed easily with flushingjustinaettarenay.  Curettage was done by provider  Pt tolerated well. No adverse effects.       Assessment /Associated Orders:      1. Acute bacterial  rhinosinusitis  amoxicillin-clavulanate (AUGMENTIN) 875-125 MG Tab      2. Bilateral impacted cerumen        3. Acute cough  benzonatate (TESSALON) 100 MG Cap          Medical Decision Making:    Sohail Duvall is a very pleasant 74 y.o. male who is clinically stable at today's acute urgent care visit. Presents with acute problem/ concern today.    No acute distress is noted at the time of the visit.  VSS. Appropriate for outpatient care at this time.     Educated in proper administration of  prescription medication(s) ordered today including safety, possible SE, risks, benefits, rationale and alternatives to therapy.   Stay well hydrated    Stop benadryl  OTC non-drowsy antihistamine of choice. Follow manufactures dosing and safety guidelines.    Cool mist humidifier at night prn   Resume all prior  RX medications. Take as prescribed.   Through shared decision making a discussion of the Dx and DDx, management options (risks,benefits, and alternatives to planned treatment), natural progression, supportive care and indications for immediate follow-up discussed. Expressed understanding and the treatment plan was agreed upon.    Questions were encouraged and answered     Follow Up:   Return to urgent care prn if new or worsening sx or if there is no improvement in condition prn.    Educated in Red flags and indications to immediately call 911 or present to the Emergency Department.       Time I spent evaluating Sohail Duvall in urgent care today was 32  minutes. This time includes preparing for visit, reviewing any pertinent notes or test results, exam, obtaining HPI, interpretation of lab tests,counseling/education, medication management ( RX and/ or OTC)  and documentation as indicated above.Time does not include separately billable procedures if noted .       Please note that this dictation was created using voice recognition software. I have worked with consultants from the vendor as well as  technical experts from [facility] to optimize the interface. I have made every reasonable attempt to correct obvious errors, but I expect that there are errors of grammar and possibly content that I did not discover before finalizing the note.  This note was electronically signed by provider           [1]   Allergies  Allergen Reactions    [place] Unspecified     Runny nose, itchy eyes   [2]   Past Medical History:  Diagnosis Date    Anemia     Arthritis 08/04/2020    Thumbs    Cancer (HCC) 2004    Prostate - did brachytherapy at 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 Stopped both medications because blood pressure readings were low, /82 today and has been low at home since stopping medications about 5 days ago. Last reading at home 117/72     Hypotension 05/19/2022    Patient has known hypertension and has been on losartan 50 mg long-term.  He notes recent episode about 10 days ago where blood pressure dropped extremely low and he fainted.  He reports that blood pressure on home cuff was 45/30, and slowly increased.  Additionally, he notes that he has had these episodes on a yearly basis for maybe 6 to 7 years or blood pressure goes extremely well and he passes    Indigestion     Liver cancer (HCC)     Liver tumor     Plantar fasciitis of right foot 08/12/2019    Rosacea     Snoring     no sleep study   [3]   Past Surgical History:  Procedure Laterality Date    CA LAP,ABLAT 1+ LIVER TUMOR(S),RADIOFREQ N/A 3/3/2025    Procedure: LAPAROSCOPIC MICROWAVE THERMAL ABLATION LIVER TUMORS;  Surgeon: Feliberto Senior M.D.;  Location: SURGERY [facility];  Service:  General    SD UPPER GI ENDOSCOPY,DIAGNOSIS N/A 4/25/2023    Procedure: GASTROSCOPY;  Surgeon: Mir Rodriguez M.D.;  Location: SURGERY SAME DAY [facility];  Service: Gastroenterology    WOUND EXPLORATION ORTHO Right 8/5/2020    Procedure: EXPLORATION, WOUND- THUMB;  Surgeon: Yumiko Griffiths M.D.;  Location: SURGERY SAME DAY [facility];  Service: Orthopedics    TENDON REPAIR  8/5/2020    Procedure: REPAIR, TENDON- EXTENSOR;  Surgeon: Yumiko Griffiths M.D.;  Location: SURGERY SAME DAY [facility];  Service: Orthopedics    HEPATIC ABLATION LAPAROSCOPIC  6/28/2018    Procedure: HEPATIC ABLATION LAPAROSCOPIC. 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    Primary malignant neoplasm of liver (HCC)    Abdominal pain    Portal hypertension (HCC)    Alcoholic cirrhosis of liver without ascites (HCC)    Neuropathy    Leukocytosis    Vitamin A deficiency    History of colonic polyps    Other hemorrhoids    Other diseases of stomach and duodenum    Liver cell carcinoma (HCC)    Iron deficiency anemia secondary to blood loss (chronic)    Gout    Hypertension associated with type 2 diabetes mellitus (HCC)    Dvrtclos of lg int w/o perforation or abscess w/o bleeding    Benign neoplasm of sigmoid colon    Ascites    GI bleed    Risk for falls    Secondary hyperaldosteronism (HCC)    Nausea    Hiatal hernia with GERD    Stage 3a chronic kidney disease   [5]   Current  Outpatient Medications on File Prior to Visit   Medication Sig Dispense Refill    propranolol (INDERAL) 10 MG Tab TAKE ONE TABLET BY MOUTH EVERY DAY AT 6PM 100 Tablet 3    Naloxone (NARCAN) 4 MG/0.1ML Liquid One spray in one nostril for overdose and call 911. 1 Each 0    ondansetron (ZOFRAN ODT) 4 MG TABLET DISPERSIBLE Take 1 Tablet by mouth every 6 hours as needed for Nausea/Vomiting. 10 Tablet 0    ondansetron (ZOFRAN) 4 MG Tab tablet Take 1 Tablet by mouth every four hours as needed for Nausea/Vomiting for up to 90 days. 90 Tablet 2    Cholecalciferol (VITAMIN D-3 PO) Take 2,000 Units by mouth every day.      metFORMIN ER (GLUCOPHAGE XR) 500 MG TABLET SR 24 HR Take 2 Tablets by mouth 2 times a day. 400 Tablet 3    pantoprazole (PROTONIX) 20 MG tablet Take 1 Tablet by mouth every day. 100 Tablet 3    spironolactone (ALDACTONE) 50 MG Tab Take 2 Tablets by mouth every day. 200 Tablet 3    furosemide (LASIX) 20 MG Tab Take 2 Tablets by mouth every day. 200 Tablet 3    atezolizumab (TECENTRIQ) 1200 MG/20ML Solution injection 1,200 mg by Intrathecal Infusion route see administration instructions. Infusion every 3 weeks      diphenhydrAMINE (BENADRYL ALLERGY) 25 MG capsule Take 25 mg by mouth every 6 hours as needed.      Bevacizumab 1.25 MG/0.05ML Solution Prefilled Syringe 1.25 mcg by Subcutaneous Infusion route see administration instructions. Infusion every 3 weeks      fluticasone (FLONASE) 50 MCG/ACT nasal spray Administer 1 Spray into affected nostril(S) 2 times a day as needed.      therapeutic multivitamin-minerals (THERAGRAN-M) Tab Take 1 Tablet by mouth every day.       No current facility-administered medications on file prior to visit.

## 2025-05-30 ENCOUNTER — HOSPITAL ENCOUNTER (OUTPATIENT)
Facility: MEDICAL CENTER | Age: 75
End: 2025-05-30
Attending: RADIOLOGY | Admitting: RADIOLOGY
Payer: MEDICARE

## 2025-05-30 ENCOUNTER — HOSPITAL ENCOUNTER (OUTPATIENT)
Dept: RADIOLOGY | Facility: MEDICAL CENTER | Age: 75
End: 2025-05-30
Attending: SURGERY
Payer: MEDICARE

## 2025-05-30 DIAGNOSIS — K76.6 PORTAL HYPERTENSION (HCC): ICD-10-CM

## 2025-05-30 DIAGNOSIS — C22.8 PRIMARY MALIGNANT NEOPLASM OF LIVER (HCC): ICD-10-CM

## 2025-05-30 DIAGNOSIS — E11.9 TYPE 2 DIABETES MELLITUS WITHOUT COMPLICATION, WITHOUT LONG-TERM CURRENT USE OF INSULIN (HCC): ICD-10-CM

## 2025-05-30 DIAGNOSIS — K70.30 ALCOHOLIC CIRRHOSIS OF LIVER WITHOUT ASCITES (HCC): ICD-10-CM

## 2025-05-30 ASSESSMENT — LIFESTYLE VARIABLES: SUBSTANCE_ABUSE: 0

## 2025-05-30 ASSESSMENT — ENCOUNTER SYMPTOMS
SORE THROAT: 1
SENSORY CHANGE: 0
SINUS PAIN: 1
DIAPHORESIS: 0
FEVER: 0
CONSTITUTIONAL NEGATIVE: 1
WEIGHT LOSS: 0
WEAKNESS: 0
FOCAL WEAKNESS: 0
GASTROINTESTINAL NEGATIVE: 1
RESPIRATORY NEGATIVE: 1
CARDIOVASCULAR NEGATIVE: 1
SPEECH CHANGE: 0
CHILLS: 0

## 2025-05-30 NOTE — PROGRESS NOTES
"Interventional Oncology Service Consultation       Telephone Appointment Visit   This telephone visit was initiated by the provider and the patient verbally consented.    Reason for Call:  New Symptom/ Concern: left liver mass    Sohail Duvall is a 74 y.o. male seen in clinic for evaluation and possible intervention for unresectable recurrent hepatocellular carcinoma.     Interventional radiologist: Joe Norris MD   PCP: Lashaun FUENTES  Surgical oncologist: Feliberto Senior MD  Radiation oncologist: Quique Keita MD  Medical oncologist: Alfonzo Servin MD     HPI:     was last evaluated in our practice in April 2021. He has a history of CLL and alcoholic cirrhosis. Imaging revealed hepatomas in 4A and 4B and he underwent the following interventions at [facility]:  6/28/18 laparoscopic ablation 4B lesion ([facility])  8/28/19 Hepatic angiogram \"mapping\" ([facility])  9/7/18 Y90 SIRT left lobe 0.7 gb ( 19 mCi)  ([facility]na)  12/14/18 YUNIER TACE caudate lesion ([facility]na)  1/6/21 YUNIER TACE ([facility])  8/16/23 hepatic angiogram \"mapping\"/ lung shunt study 0.94% ([facility]), [facility], [facility]  8/22/23 Y90 TARE seg 4A/4B, 160 Gy, [facility], [facility]  3/23/25 laparoscopic ablation seg 3 lesion ([facility])     We subsequently referred him to radiation oncology and medical oncology after poor treatment effect with liver directed therapy in 2021.      Interval history 8/1/23:  Mr. Duvall has been on systemic therapy with Atezolizumab and bevacizumab. In April 2023 he developed blood in his stool (black stools), heartburn, dysphagia. It is similar to his prior symptoms in 2018. He presented to ED and had an EGD. He was noted to have esophagitis but no active bleeding. He was also noted to have hypertensive gastropathy. Avastin was subsequently discontinued. His GI specialist has moved and he does not currently have a GI doctor. He now has disease progression in the liver and returns for consideration of catheter " directed therapy. Today, he denies acute complaints. He denies any significant changes in his health since his last IR procedures. He tolerated his prior therapies well. 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 He is accompanied by a support person to today's consultation.     Interval history 10/13/23: Mr. Duvall underwent uncomplicated Y90 TARE to the seg 4A/4B hepatoma as well as the dome lesion. He tolerated the procedure well. He had an episode of left flank pain and microscopic hematuria and had a CT on 9/13/23 that was unremarkable. He is seen today to review post procedure imaging and lab results. No updated tumor markers are available. Our initial plan was to selectively target 4A/4B and treat the remaining right liver disease with lobar Y90 TARE, however we obtained imaging to evaluate treatment effect prior to proceeding. He has been off systemic therapy for his IR procedures. Today, he feels well with no complaints.      Interval history 2/2/24: He is seen today for review of surveillance imaging and lab tests. He is back on Avastin therapy with his next infusion appointment next week. He had lost about 10 pounds but has regained 6 pounds, does not think he has done anything different. He has seen his MRI results on [facility].     Interval history 5/30/25:  presents with left hepatic lobe HCC. He has had the above interventions and we declined to repeat Y90 TARE after his last appointment due to concern for hepatic radiation dose limits in the setting of his cirrhosis. He has been on Avastin therapy but discontinued it at the beginning of the year in anticipation of a March  laparoscopic ablation of a segment 3 HCC. There was a lesion in the left lobe that could not be identified and ablated in the OR.  has been referred to interventional radiology for evaluation and management of the left liver mass with locoregional therapy. Today, he reports no significant change in  "his overall health. He reports a recent sinus infection and delayed healing of his laparoscopic incision sites; denies ascites, jaundice, melena, abdominal pain. He lives in [place] and is unaccompanied by a support person to today's consultation.      Past Medical History[1]  Past Surgical History[2]    Operative report 3/3/25 at [facility] ([facility]):  FINDINGS:  1.  Cirrhosis, portal hypertension, minimal ascites and a 2 cm hepatoma in   segment 3 of the liver.  2.  We could not identify the secondary lesion that was enhancing measured 1.5   cm in the left lobe of the liver, thus we only did a single ablation.     CASE CLASS:  Clean.     PROCEDURE:  Laparoscopic microwave thermal ablation of segment 3 lesion,   measuring 1.9 x 1.5 cm using a 140 flowers at 2 minutes giving us a 3.4 x 4 cm   ablation cavity.  Social History     Socioeconomic History    Marital status:      Spouse name: Not on file    Number of children: 1    Years of education: Not on file    Highest education level: Not on file   Occupational History    Occupation: fertilizer sales   Tobacco Use    Smoking status: Never     Passive exposure: Current    Smokeless tobacco: Former     Types: Snuff, Chew     Quit date: 3/29/2015   Vaping Use    Vaping status: Never Used   Substance and Sexual Activity    Alcohol use: Not Currently     Alcohol/week: 7.2 oz     Types: 12 Shots of liquor per week    Drug use: Yes     Types: Marijuana, Inhaled, Oral     Comment: \"Marijuana Use\" daily, last smoked 3/2    Sexual activity: Never   Other Topics Concern    Not on file   Social History Narrative    No known exposure to asbestos, dyes, or chemicals, however he was exposed to pesticides.  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  1 child, alive and well.  He also has a step-child.      Social Drivers of Health     Financial Resource Strain: Not on file   Food Insecurity: Not on file "   Transportation Needs: Not on file   Physical Activity: Not on file   Stress: Not on file   Social Connections: Not on file   Intimate Partner Violence: Not on file   Housing Stability: Not on file     Family History   Problem Relation Age of Onset    Cancer Father 81        Bladder    Cancer Brother         Prostate & CLL (s/p 8-10 years ago)    Hyperlipidemia Sister     Lung Disease Neg Hx     Diabetes Neg Hx     Heart Disease Neg Hx     Hypertension Neg Hx     Stroke Neg Hx     Alcohol/Drug Neg Hx        Review of Systems   Constitutional: Negative.  Negative for chills, diaphoresis, fever, malaise/fatigue and weight loss.   HENT:  Positive for congestion, sinus pain and sore throat.    Respiratory: Negative.     Cardiovascular: Negative.    Gastrointestinal: Negative.    Skin:         Delayed surgical site healing   Neurological:  Negative for sensory change, speech change, focal weakness and weakness.   Psychiatric/Behavioral:  Negative for substance abuse.      A comprehensive 14-point review of systems was negative except as described above.     Labs:    Latest Reference Range & Units Most Recent   WBC 4.8 - 10.8 K/uL 7.1  5/14/25 10:54   RBC 4.70 - 6.10 M/uL 4.32 (L)  5/14/25 10:54   Hemoglobin 14.0 - 18.0 g/dL 15.2  5/14/25 10:54   Hematocrit 42.0 - 52.0 % 44.6  5/14/25 10:54   MCV 81.4 - 97.8 fL 103.2 (H)  5/14/25 10:54   MCH 27.0 - 33.0 pg 35.2 (H)  5/14/25 10:54   MCHC 32.3 - 36.5 g/dL 34.1  5/14/25 10:54   RDW 35.9 - 50.0 fL 63.0 (H)  5/14/25 10:54   Platelet Count 164 - 446 K/uL 79 (L)  5/14/25 10:54   MPV 9.0 - 12.9 fL 9.8  5/14/25 10:54   Neutrophils-Polys 44.00 - 72.00 % 50.20  5/14/25 10:54   Neutrophils (Absolute) 1.82 - 7.42 K/uL 3.55  5/14/25 10:54   Lymphocytes 22.00 - 41.00 % 38.50  5/14/25 10:54   Lymphs (Absolute) 1.00 - 4.80 K/uL 2.72  5/14/25 10:54   Monocytes 0.00 - 13.40 % 9.30  5/14/25 10:54   Monos (Absolute) 0.00 - 0.85 K/uL 0.66  5/14/25 10:54   Eosinophils 0.00 - 6.90 %  1.00  5/14/25 10:54   Eos (Absolute) 0.00 - 0.51 K/uL 0.07  5/14/25 10:54   Basophils 0.00 - 1.80 % 0.70  5/14/25 10:54   Baso (Absolute) 0.00 - 0.12 K/uL 0.05  5/14/25 10:54   Immature Granulocytes 0.00 - 0.90 % 0.30  5/14/25 10:54   Immature Granulocytes (abs) 0.00 - 0.11 K/uL 0.02  5/14/25 10:54   Nucleated RBC 0.00 - 0.20 /100 WBC 0.00  5/14/25 10:54   NRBC (Absolute) K/uL 0.00  5/14/25 10:54   Plt Estimation  Decreased  1/22/25 10:28   Imm. Plt Fraction 0.6 - 13.1 % 2.6  5/14/25 10:54   RBC Morphology  Present  1/22/25 10:28   Anisocytosis  1+  11/15/24 10:17   Macrocytosis  2+ !  11/15/24 10:17   Poikilocytosis  1+  1/22/25 10:28   Ovalocytes  1+  1/22/25 10:28   Smudge Cells  Moderate  1/22/25 10:28   Peripheral Smear Review  see below  1/22/25 10:28   Manual Diff Status  PERFORMED  1/22/25 10:28   Comment  See Comment  11/15/24 10:17   Sodium 135 - 145 mmol/L 136  5/14/25 10:54   Potassium 3.6 - 5.5 mmol/L 5.6 (H)  5/14/25 10:54   Chloride 96 - 112 mmol/L 101  5/14/25 10:54   Co2 20 - 33 mmol/L 24  5/14/25 10:54   Anion Gap 7.0 - 16.0  11.0  5/14/25 10:54   Glucose 65 - 99 mg/dL 234 (H)  5/14/25 10:54   Bun 8 - 22 mg/dL 31 (H)  5/14/25 10:54   Creatinine 0.50 - 1.40 mg/dL 1.33  5/14/25 10:54   GFR (CKD-EPI) >60 mL/min/1.73 m 2 56 !  5/14/25 10:54   Calcium 8.5 - 10.5 mg/dL 9.9  5/14/25 10:54   Correct Calcium 8.5 - 10.5 mg/dL 9.7  5/14/25 10:54   AST(SGOT) 12 - 45 U/L 44  5/14/25 10:54   ALT(SGPT) 2 - 50 U/L 29  5/14/25 10:54   Alkaline Phosphatase 30 - 99 U/L 138 (H)  5/14/25 10:54   Total Bilirubin 0.1 - 1.5 mg/dL 2.1 (H)  5/14/25 10:54   Direct Bilirubin 0.1 - 0.5 mg/dL 0.4  11/14/23 10:58   Indirect Bilirubin 0.0 - 1.0 mg/dL 1.0  11/14/23 10:58   Albumin 3.2 - 4.9 g/dL 4.3  5/14/25 10:54   Total Protein 6.0 - 8.2 g/dL 6.9  5/14/25 10:54   Globulin 1.9 - 3.5 g/dL 2.6  5/14/25 10:54   A-G Ratio g/dL 1.7  5/14/25 10:54   Iron 50 - 180 ug/dL 110  11/14/23 10:58   Total Iron Binding 250 - 450 ug/dL 485  (H)  11/14/23 10:58   % Saturation 15 - 55 % 23  11/14/23 10:58   Unsat Iron Binding 110 - 370 ug/dL 375 (H)  11/14/23 10:58   Glycohemoglobin <=5.8 % 6.2 !  1/20/25 10:22   Estim. Avg Glu mg/dL 126  6/11/24 09:30   Fasting Status  Fasting  6/11/24 09:30   Cholesterol,Tot 100 - 199 mg/dL 131  6/11/24 09:30   Triglycerides 0 - 149 mg/dL 122  6/11/24 09:30   HDL >=40 mg/dL 51  6/11/24 09:30   LDL <100 mg/dL 56  6/11/24 09:30   Micro Alb Creat Ratio 0 - 30 mg/g see below  6/11/24 09:29   Creatinine, Urine mg/dL 169.74  6/11/24 09:29   Microalbumin, Urine Random mg/dL <1.2  6/11/24 09:29   Urobilinogen, Urine <=1.0 EU/dL 4.0 !  5/14/25 10:54   POC Glucose, Blood 65 - 99 mg/dL 131 (H)  3/3/25 08:50   INR 0.87 - 1.13  1.10  4/3/25 18:10   PT 12.0 - 14.6 sec 14.2  4/3/25 18:10   APTT 24.7 - 36.0 sec 25.8  4/3/25 18:10   Jordon-gamma-carboxy Prothrombin 0.0 - 7.4 ng/mL 11.9 (H)  4/22/25 11:32   Color  Dark Yellow  5/14/25 10:54   Character  Clear  5/14/25 10:54   Specific Gravity <1.035  1.025  5/14/25 10:54   Ph 5.0 - 8.0  6.5  5/14/25 10:54   Glucose Negative mg/dL 100 !  5/14/25 10:54   Ketones Negative mg/dL Trace !  5/14/25 10:54   Bilirubin Negative  Small !  5/14/25 10:54   Occult Blood Negative  Negative  5/14/25 10:54   Protein Negative mg/dL Negative  5/14/25 10:54   Nitrite Negative  Negative  5/14/25 10:54   Leukocyte Esterase Negative  Small !  5/14/25 10:54   Micro Urine Req  Microscopic  5/14/25 10:54   WBC /hpf 6-10 !  5/14/25 10:54   RBC 0 - 2 /hpf 0-2  5/14/25 10:54   Epithelial Cells 0 - 5 /hpf 0-2  5/14/25 10:54   Bacteria None /hpf None Seen  5/14/25 10:54   Mucous Threads /hpf Few  4/30/24 13:08   Amorphous Crystal /hpf Present  10/4/24 12:37   Hyaline Cast /lpf 0-2  10/4/24 12:37   Urine Casts 0 - 2 /lpf 0-2  5/14/25 10:54   POC Color Negative  yellow  9/13/23 15:01   POC Appearance Negative  clear  9/13/23 15:01   POC Specific Gravity <1.005 - >1.030  1.030  9/13/23 15:01   POC Urine PH 5.0 - 8.0   5.5  9/13/23 15:01   POC Glucose Negative mg/dL negative  9/13/23 15:01   POC Ketones Negative mg/dL negative  9/13/23 15:01   POC Protein Negative mg/dL negative  9/13/23 15:01   POC Nitrites Negative  negative  9/13/23 15:01   POC Leukocyte Esterase Negative  negative  9/13/23 15:01   POC Blood Negative  trace-intact  9/13/23 15:01   POC Bilirubin Negative mg/dL negative  9/13/23 15:01   POC Urobiligen Negative (0.2) mg/dL 0.2  9/13/23 15:01   AFP L3% 0.0 - 9.9 % 8.5  7/6/23 12:30   Alpha Fetoprotein 0 - 9 ng/mL 6  4/22/25 11:32   Ca 19-9 0.0 - 35.0 U/mL 16.1  2/12/25 10:33   Cortisol 0.0 - 23.0 ug/dL 8.9  5/24/24 11:41   Ferritin 22.0 - 322.0 ng/mL 32.1  11/14/23 10:58   TSH 0.380 - 5.330 uIU/mL 2.360  5/14/25 10:54   Free T-4 0.93 - 1.70 ng/dL 1.23  10/30/23 10:44   Acth 7.2 - 63.3 pg/mL 12.1  5/24/24 11:42   Dhea 0.630 - 4.700 ng/mL 0.690  5/24/24 11:41   Testosterone,Total 175 - 781 ng/dL 442  5/24/24 11:41   Significant Indicator  NEG  9/13/23 15:00   Site  -  9/13/23 15:00   Source  UR  9/13/23 15:00   ABO Grouping Only  A  3/3/25 10:55   Rh Grouping Only  POS  3/3/25 10:55   Antibody Screen-Cod  NEG  3/3/25 10:55   Holding Tube - Bb  DONE  3/3/25 10:40   Product Type  P4  3/3/25 10:07   Component P  P4                  Plts, Pheresis      [id number]   released     03/03/25   14:45    3/3/25 10:07   (L): Data is abnormally low  (H): Data is abnormally high    Pathology:  None available for review     Radiology:   CT thorax 4/11/25 at [facility]:  1. No new lung nodule or chest adenopathy. Stable 5 mm minor fissure nodularity since 6/3/2022.  2. Stable 4.1 cm enlargement of the ascending aorta.  3. 4.5 cm low-density nodularity in the lateral segment left lobe anteriorly and slightly laterally is more well-defined than on recent 4/2/2025 MRI. Indeterminate significance.    MRI abdomen 4/2/25 at [facility]:  1. Liver pattern remains very difficult. Extensive nodularity remains with increasing nodularity and  enhancing fibrosis especially in the left lobe since 12/23/2024.  2. Segment 3 lesion slightly larger measuring 2.1 cm with minimal peripheral enhancement but less internal enhancement. This may be a posttreatment configuration.  3. Possible slight growth 2.2 cm segment 2 lesion near falciform ligament but cannot confirm early enhancement today. Some peripheral enhancement on sequential subtraction images.  4. Stable tiny 3 mm enhancing focus superiorly near falciform ligament.  5. Stable 2.1 cm enhancing focus within right lobe and caudate lobe without internal enhancement.  6. The small enhancing foci in the right lobe seen previously are not visible today.  7. Stable left adrenal nodule. Splenomegaly.      [facility]:  Hyperechoic left lobe liver mass as described.     MRI abdomen 12/23/24 at [facility]:  1.  Hepatic cirrhosis with multiple enhancing lesions again seen.  Lesion in segment 3 appears enlarged compared to prior exam.  Other lesions in the RIGHT and LEFT lobes appear unchanged.  No new lesion demonstrated.  2.  Splenomegaly, increased from prior.  3.  LEFT adrenal adenoma again seen.     LR-4: probably HCC    MRI abdomen 6/24/24 at [facility]:  1.  Hepatic cirrhosis with posttreatment changes primarily in the LEFT lobe.  2.  Small hypervascular lesions in the RIGHT lobe and medial segment LEFT lobe are less apparent than on prior exam indicating improvement.  Lesion more peripherally in the medial segment LEFT lobe no longer shows enhancement indicating response to   therapy.  3.  New enhancing lesion in the lateral segment LEFT lobe likely HCC.  4.  Splenomegaly again noted.     LR-4: probably HCC  MRI abdomen 1/22/24 at [facility]:  1. Stable treated lesions within both hepatic lobes, consistent with complete response to treatment.  2. Stable multifocal subcentimeter arterial foci of enhancement within both hepatic lobes, which are suspicious for early HCC.  3. Stable additional chronic  findings, as above.     Comparison of MRI post treatment, pretreatment, and Y90 targets:          MRI abdomen 10/2/23 at [facility]:  1. Positive partial response to treatment. There are several lesions which are stable in size, but now do not demonstrate enhancement. However, there are several small foci of arterial enhancement within both hepatic lobes, which are suspicious for   residual multifocal HCC. As detailed above.  2. Cholelithiasis, choledocholithiasis, severe cirrhosis, splenomegaly, small pancreatic tail cyst, left adrenal nodule, small simple left renal cyst are again noted.        LR-5: definitely HCC     CT renal colic 9/13/23 at [facility]:  1.  No evidence of nephroureterolithiasis or obstructive uropathy.  2.  Cirrhosis.  3.  Hepatosplenomegaly.  4.  Cholelithiasis.  5.  Atherosclerosis with coronary artery disease.     Post Y90 TARE  calculations, [facility]:         Interventional radiology procedure 8/22/23 at [facility] (Y90 seg 4A/B), [facility]:  Impression:  1. Successful radioembolization of segments 4A/4B.  2. Patient will return to interventional radiology clinic in 4-6 weeks for follow-up four-phase liver MRI.     Nuclear medicine Bremsstrahlung study 8/22/23 at [facility]:  The prescribed dose was 150 gray. Delivered dose after residual was measured at 160. This represents  106.8% of the prescribed dose and  98.2% of the drawn dose. Bremsstrahlung images obtained after the Y-90 radioembolization, confirm proper delivery to   the targeted region. There is no uptake outside the planned treatment area. NOTE: The patient will be followed by interventional radiology and oncology.     Lung shunt calculations (CAROLIN Report):      Interventional radiology procedure 8/16/23 at [facility] (pre Y90 mapping):  1. Y90 mapping hepatic arteriogram.  2. Dense tumor vascularity arising from the middle hepatic artery distribution.  3. 4.3 mCi technetium 99 MAA for nuclear medicine lung shunt study.     Nuclear medicine lung  shunt study 8/16/23 at [facility]:  FINDINGS:  Total hepatopulmonary shunt percentage was 3.8%. No evidence of extrahepatic radiotracer deposition is identified.     IMPRESSION:     Quantitative lung scan as described.     MRI abdomen 7/13/23 at [facility]:  1.  There are morphologic changes of cirrhosis with splenomegaly and collateral vessels consistent with portal hypertension.  2.  Interval disease progression with at least 2 and possibly 3 arterial phase enhancing lesions with washout and restricted diffusion the largest in the medial segment left lobe which is slightly increased in size consistent with multifocal HCC. LI-RADS   5:  3.  Several other subcentimeter arterial phase enhancing areas are indeterminate for HCC as these could be areas of altered arterial perfusion. LR 3.  4.  Additional right and left lobe hepatic lesions only visible on delayed imaging possibly treated HCC.  5.  There is cholelithiasis.  6.  Stable pancreatic side branch IPMN's.  7.  Stable left adrenal myelolipoma.      LR-5: definitely HCC     USD liver 4/25/23 at [facility]:  Unremarkable liver Doppler sonogram.     USD RUQ 4/25/23 at [facility]:  1.  There is cholelithiasis no sonographic evidence of acute cholecystitis  2.  Heterogeneous liver with known hepatic masses better visualized on CT performed earlier on the same day.     CT AP 4/24/23 at [facility]:  1.  No acute inflammatory process in the abdomen or pelvis.  2.  Small amount of stool in the colon.  3.  Cirrhosis and portal hypertension, including splenomegaly. No ascites.  4.  Three out of five hepatic lesions are visualized on today's CT entire stable to slightly smaller in size compared to prior MRI. No new hepatic lesions.  5.  No new metastatic disease in the abdomen or pelvis.  6.  Cholelithiasis.  7.  Colonic diverticulosis.     MRI abdomen 1/6/23 at [facility]:  1.  Cirrhosis and portal hypertension, including splenomegaly and a few portosystemic collaterals.  2.  Only one 1.9 cm HCC  in hepatic segment 4 shows arterial enhancement and washout on today's study. LI-RADS 5. This HCC is stable in size since prior CT.  3.  Four additional lesions in both right and left hepatic lobes are all hypointense on the delayed phase but show no arterial enhancement. They may represent treated HCCs. Absence of arterial enhancement suggests that they contain no viable tumor.  4.  No new arterially enhancing liver lesions.  5.  Cholelithiasis.  6.  Stable pancreatic side branch IPMNs. They do not require further follow-up.  7.  Stable left adrenal myelolipoma.     CT abdomen 10/3/22 at [facility]:  1.  Ablation cavity segment 4 of the liver is again identified.  2.  Other arterial phase enhancing liver lesions seen on the prior CT are poorly visualized on the current exam which is performed without arterial phase enhancement. These lesions are likely present on the current exam but are difficult to visualize on the portal venous phase. Largest superiorly in the right lobe appears to be present with some washout as expected on these portal venous phase images. This lesion is similar in size to the prior exam.  3.  Nodular liver surface consistent with cirrhosis.  4.  Splenomegaly and enlarged portal vein.  5.  Recanalized umbilical vein.  6.  Cholelithiasis.     CT chest 6/3/22 at [facility]:  1.  No metastatic disease in the chest.  2.  A 4.1 x 4 cm ascending aortic aneurysm.  3.  Abdomen is reported separately.     CT liver mass protocol 6/3/22 at [facility]:  1.  More than 8 bilobar hepatic lesions. Three dominant lesions all measure greater than 2 cm and demonstrate classic arterial hyperenhancement and washout, consistent with HCCs.  2.  The vast majority of the smaller arterially hyperenhancing lesions have increased in size, and some of these lesions are new. While they do not definitively demonstrate washout, they are highly concerning for hepatomas as well. LR-4 and LR-5.  3.  Treated lesion in the caudate lobe shows  no viable tumor.  4.  Cirrhosis and splenomegaly. No ascites.  5.  Cholelithiasis.  6.  Colonic diverticulosis.  7.  Stable left adrenal adenoma.     MRI abdomen 5/10/22 at [facility]:  1.  There is severe respiratory motion artifact on the current exam which was not present on the prior exam. Images better most severely affected are the arterial phase and portal venous phase images. Therefore direct comparison of these lesions which   were best defined on the arterial phase images on the prior exam is difficult on the current exam. Most of the measurements on the current exam required measurement of the mass is on the delayed images or portal venous phase images.  2.  The new lesion seen at the anterior edge of segment 5 on the prior MRI does appear to have enlarged significantly on the current exam. Current measurement is 1.9 x 1.8 cm based on the washout portion of the mass. Arterial phase measurement on the prior exam was 1.2 x 1.3 cm.  3.  Other masses throughout the liver appears similar to the prior exam.  4.  Small masses measuring less than 1 cm on the arterial phase images described in the prior exam are poorly visualized on the current exam due to the severe respiratory motion artifact. However, these lesions are probably unchanged.  5.  Splenomegaly is again noted. Spleen measures 16.8 cm.  6.  No change in left adrenal mass likely representing adenoma.  7.  Portal veins and hepatic veins are within normal limits.  8.  No ascites or adenopathy identified.      LR-5: definitely HCC     MRI abdomen 12/8/21 at [facility]:  1. New 1.3 cm HCC in segment 5.  2. HCC's described on the prior MRI study are either stable or show minimal subthreshold growth. They measure up to 2.1 cm.  3. Approximately 5 additional subcentimeter arterially enhancing lesions in the right and left hepatic lobes, without definitive correlates on any other phases. Some are more conspicuous, and some may be new since prior study. LR-3.  4.  Previously treated lesions in the caudate lobe and left hepatic lobe show no viable tumor. LR-treated.  5. Cirrhosis and splenomegaly.  6. Cholelithiasis.  7. Benign left adrenal adenoma. It does not require follow-up.  8. Colonic diverticulosis.     Nuc Med bone study 9/20/21 at [facility]:  No scintigraphic evidence of active osseous metastatic disease.     CT chest 8/12/21 at [facility]:  1.  5 mm nodule along the right minor fissure, new from the 2018 exam. Follow-up per oncology protocol.  2.  Morphologic characteristics of cirrhosis with arterially enhancing foci in the partially visualized liver, consistent with known multifocal hepatocellular carcinoma  3.  Cholelithiasis  4.  Left adrenal adenoma, previously characterized.  5.  Splenomegaly     Fleischner Society pulmonary nodule recommendations:  Not Applicable     MRI abdomen 6/9/21 at [facility]:  1.  Morphologic characteristics of cirrhosis with evidence of portal hypertension including splenomegaly, trace ascites, and varices.  2.  Observation in the medial left hepatic lobe demonstrates interval enlargement, subthreshold.LR-5  3.  Observation bordering segments 2 and 3 is stable. LR-5  4.  Observation in segment 2 is stable. LR-3  5.  Observation in the hepatic dome has increased in size from the prior exam, subthreshold growth. LR-5  6.  Caudate lobe lesion remains avascular status post TACE. LR-treated nonviable  7.  Ablation cavity in the left hepatic lobe remains avascular. LR-treated nonviable  8.  Benign left adrenal adenoma.  9.  Cholelithiasis.     CT abdomen 3/19/21 at [facility]:  Enhancing lesion at the hepatic dome anteriorly is likely not significantly changed, worrisome for hepatocellular carcinoma.     Enhancing lesion within segment 4 of the liver is also likely not significantly changed, also worrisome for hepatocellular carcinoma.     Small 6 mm enhancing focus within the segment 4 of the liver is not significantly changed.     Enhancing 6 mm focus  within the left lobe of the liver is stable.     6.5 mm enhancing focus near the intrahepatic IVC was not definitively seen previously.     Posttreatment changes are again noted in the caudate lobe and in the anterior right lobe of the liver.     Findings of cirrhosis and portal hypertension.     Left adrenal adenoma.     Colonic diverticulosis.     Cholelithiasis     Atherosclerotic plaque.     Interventional Radiology procedure 1/6/21 at [facility]:  1.  DEBTACE procedure with total dose doxorubicin 150 mg administered as 100 mu Oncozene beads.  2.  The patient is returned to the same day surgery reese for post procedure observation.     MRI abdomen 11/18/2020 at [facility]:  1. Previously treated lesions show no viable tumor. LR-treated.  2. Corresponding to the lesion detected on the ultrasound, there is an 8 mm lesion at the junction of anterior and medial hepatic segments. It is new since prior MRI, and meets criteria for HCC (LR-5).  3. A 1.6 cm segment 4 lesion seen on the prior MR study is stable in size. It shows mild arterial enhancement and mild washout, and likely represents an HCC which is stable since prior study. LR-5.  4. No additional lesions are detected on today's study.  5. Cirrhosis and portal hypertension. No ascites.  6. Cholelithiasis.  7. Stable 1 cm cystic lesion in the pancreatic tail, likely a side branch IPMN.  8. Colonic diverticulosis.         MRI abdomen 3/11/20 at [facility]:   1.  There are morphologic changes of the liver consistent with cirrhosis.  2.  There are stable posttreatment changes in the caudate lobe and superior medial segment left lobe and segment 2. LR treated  3.  Stable 10 mm focus of arterial enhancement without washout or restricted diffusion, likely in segment 8. LR 3.  4.  There is a 9 mm arterial enhancing focus inferiorly in segment 8 or possibly 4a without washout or diffusion. LR 3  5.  There is a questionable 6 mm arterial focus of enhancement at the junction of  segment 8/5 without washout or restricted diffusion. LR 3  6.  Stable splenomegaly, possibly related to cirrhosis versus underlying CLL.  7.  Stable cholelithiasis without biliary dilatation.  8.  No change in 9 mm pancreatic tail simple appearing cyst.  9.  No change in left adrenal gland adenoma.        LR-3: intermediate probability and LR treated lesions     MRI abdomen on July 1, 2019 at [facility]:  1.  Stable wedge-shaped treated lesion in the anterior medial hepatic segment.  2.  Stable treated lesion in the caudate lobe, with unchanged linear enhancement in its periphery.  3.  Segment 2 lesion described on the prior study is not seen on today's study. No focal arterial enhancement in segment 2.  4.  New 1 cm arterially enhancing focus in segment 8 has no correlate on other images. It is indeterminate. LR-3.  5.  Cirrhosis and portal hypertension, with postembolization changes in the left hepatic lobe. Splenomegaly. No ascites.  6.  Cholelithiasis.  7.  Stable left adrenal lesion, likely adenoma.  8.  Stable pancreatic tail cyst.    MRI abdomen on January 29, 2019 at [facility]:  1.  No new arterial enhancing mass is identified.  2.  Previously described mass in the caudate lobe is now avascular with peripheral enhancement, consistent with recent chemoembolization. LR-treated  3.  Ablation cavity in the left hepatic lobe remains avascular.  4.  8 mm arterial enhancing observation in segment 4A is not as pronounced as on the prior exam and grossly stable. LR-3  5.  Stable peripheral enhancing lesion in segment 2. LR-treated  6.  Previously described rim-enhancing observation in the superior aspect of segment 2 is not visualized on the current exam, likely related to differences in technique.  7.  Morphologic characteristics of cirrhosis and evidence of portal hypertension including splenomegaly  8.  Cholelithiasis.  9.  Left adrenal adenoma  10.  Stable 8 mm pancreatic tail cyst         Interventional radiology  procedure December 14, 2018, at [facility]:  1.  DEBTACE procedure with total dose doxorubicin 100 mg administered as 100 mu Oncozene beads. (Caudate lobe)  2.  The patient is returned to the same day surgery reese for post procedure observation.       MRI abdomen November 10, 2018 at [facility]:  1.  Morphologic characteristics of cirrhosis with evidence of portal hypertension including trace ascites, splenomegaly, and portal venous dilatation.  2.  Interval enlargement in the mass in segment 4 adjacent to the caudate lobe. LR-5  3.  Previously described hyper enhancing masses in the left hepatic lobe demonstrate rim enhancement, consistent with recent Y 90 treatment. LR-treated.  4.  Ablation cavity in the left hepatic lobe is a vascular  5.  8 mm arterial enhancing focus in segment 4A is better defined than on the prior exam secondary to decreased motion. Size is not significantly changed.LR-3  6.  Stable 9 mm cystic mass in the pancreatic tail.  7.  Stable benign left adrenal adenoma  8.  Cholelithiasis. Dependent hypointense foci in the common bile duct may represent tiny nonobstructing calculi.       Interventional radiology procedure Y90 SIRT left lobe September 7, 2018, at [facility]:  1.  Technically successful Y90 radioembolization of the left hepatic lobe. The patient will followup in approximately 10 days for laboratory and clinical evaluation and 4-6 weeks for imaging evaluation.  2.  Immediately following the procedure, the patient was transported to nuclear medicine for SPECT imaging which demonstrates Bremsstrahlung emission from the left of the liver. There is no definite evidence of extrahepatic deposition of radioembolic   Material.     Nuclear medicine bremsstrahlung study on September 7, 2018 at [facility]:  The prescribed dose was  0.68 gb ( 18.4 mCi). The drawn dose was 0.78 gb (21mCi). Delivered dose after residual was measured at 0.7 gb ( 19 mCi). This represents  103% of the prescribed dose and  90% of the  drawn dose. Bremsstrahlung images obtained   after the Y-90 radioembolization, confirm proper delivery to the targeted region. There is no uptake outside the planned treatment area. NOTE: The patient will be followed by interventional radiology and oncology.       Interventional radiology procedure August 8, 2018 at [facility]:  1.  Superior mesenteric arteriogram replaced common hepatic artery.  2.  Celiac arteriogram demonstrate no evidence of common hepatic arterial anatomy.  3.  Common hepatic arteriogram demonstrate no evidence of variant hepatic arterial anatomy.  4.  Selective catheterization and embolization of a small branch originating from the proximal aspect of the right hepatic artery which appeared to supply either a small subsegmental region of the right hepatic lobe or a small amount of the hepatic   flexure of the colon.  5.  Proper hepatic arteriogram demonstrating 2 small branches originating from the proximal aspect of the right hepatic artery one of which appear to represent either a small subsegmental hepatic artery versus hepatic flexure colon branch and a small   cystic artery.  6.  Intra-arterial administration of 4.4 mCi technetium 99m labeled MAA into the hepatic artery demonstrated a hepatic pulmonary shunt fraction of 4%.        CT abdomen with and without August 8, 2018 at [facility]:  1.  Cirrhosis  2.  Interval ablation of a segment 4A hepatic nodule without evidence of residual tumor in that field  3.  Multiple additional hepatic nodules and masses as described above, mostly unchanged in size however the dominant central RIGHT hepatic mass has increased in size. These are likely hepatomas.  4.  Splenomegaly and enlarged portal vein, in keeping with the patient's known portal hypertension  5.  Cholelithiasis     Nuclear medicine quantitative lung study August 28, 2018 at [facility]:  Total hepatopulmonary shunt percentage was 4%. No evidence of extrahepatic radiotracer deposition is identified.      MRI abdomen on June 8, 2018 at [facility]:  Examination is limited by patient motion.  2.1 x 1.9 cm arterial hyperenhancing lesion within the left lobe of the liver appears compatible with hepatocellular carcinoma. LR-5    Hyperenhancing lesion within segment 4 of the liver bordering the caudate lobe measures 3 x 2 cm and is increased in size compared to prior. This lesion is compatible with hepatocellular carcinoma. LR-5    10 mm hyperenhancing lesion within segment 4 may be slightly decreased or unchanged in size compared to prior. LR-3    3 other small hyperenhancing lesions are seen within the right lobe of the liver measuring up to 1 cm. LR-3    Heterogeneous wedge-shaped area of delayed hypo-enhancement in the anterior liver is likely related to variable perfusion.    Findings of cirrhosis and portal hypertension.    Mildly prominent lymph nodes in the cardiophrenic fat are not significantly changed.    9 mm cystic lesion in the pancreatic tail could represent a small side branch IPMN.    Cholelithiasis. No biliary ductal dilatation is seen.    Subcarinal lymphadenopathy is partially imaged.    2 cm left adrenal nodule is unchanged and likely an adrenal adenoma.    Trace free fluid in the abdomen.    Findings discussed with Dr. Senior         CT thorax April 17, 2018 at [facility]:  1.  Mediastinal lymphadenopathy with markedly enlarged subcarinal lymph node could be due to metastatic disease or lymphoma. Reactive lymph nodes or lymphadenopathy due to granulomatous disease seems less likely.  2.  Coarse coronary artery calcifications.  3.  Cirrhosis with hypervascular hepatic lesions and splenomegaly.  4.  Cholelithiasis.     Nuclear medicine bone study April 17, 2018 at [facility]:  No scintigraphic evidence of bony metastatic disease     Portal pressure gradient April 4, 2018 at [facility]:  1.  Hepatic venography showing a patent right hepatic vein.  2.  Free and wedged right hepatic vein wedge pressures rendering  "a mean Hepatic Venous Pressure Gradient (HVPG) of 9.67 mmHg. This is consistent with portal hypertension.  (HVPG >= 10mmHg considered \"clinically significant\" portal HTN**  3.  Transjugular Liver biopsy was not performed at this time..  **Reference: Hepatic Venous Pressure Gradient in 2010:Optimal Measurement is Jose. Anya MCINTYRE, Wanda RODRIGUEZ. HEPATOLOGY, Vol. 51, No. 6, 2010     CT abdomen December 6, 2017 at [facility]:  1.  2.4 cm arterial enhancing mass in segment 4 of the liver demonstrates washout. LR-5.  2.  1.3 cm arterial enhancing lesion in the hepatic dome is similar to the recent MRI. No definite washout or capsule appreciated. LR-3  3.  Morphologic characteristics of cirrhosis with evidence of portal hypertension including splenomegaly.  4.  Cholelithiasis.  5.  Stable left adrenal nodule, likely a benign adenoma.    Current Medications[3]    Allergies[4]    Physical Exam  Neurological:      Mental Status: He is alert and oriented to person, place, and time.   Psychiatric:         Mood and Affect: Mood normal.         Cognition and Memory: Memory normal.         Judgment: Judgment normal.       Impression:   1. Unresectable multifocal hepatocellular carcinoma status post multiple interventions.  2. 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  3. Cirrhosis.  4. Portal hypertension.  5. Thrombocytopenia.  6. Splenomegaly.  7. Chronic lymphocytic leukemia.   8. Diabetes mellitus.  9. Hyperbilirubinemia.  10. History of alcohol use.    Plan:   Joe Norris MD has reviewed 's history and imaging studies, examined the patient, and discussed treatment options.  is a candidate for palliative transarterial chemoembolization of unresectable left HCC. We discussed the method of the procedure at length including angiography and embolization as well as the expected clinical course with the possibility of post-embolization syndrome nausea and pain and hospitalization. We " explained the difference in palliative and curative procedures. We discussed the possibility of tumor recurrence and development of future tumors. We explained that cirrhosis is a serious disease and can lead to hepatic decompensation, and if there is evidence of deterioration in liver function we would not be able to perform a procedure due to risk of liver failure. We additionally discussed the risks, including bleeding and infection, damage to the arteries or adjacent tissue, reaction to any medications given during the procedure, potential side effects of contrast administration including renal damage, non-target embolization, radiation-induced ulcers or liver disease in the setting of radioembolization, liver failure, and death. There is a risk the procedure will not be effective. We discussed alternatives to the procedure including surveillance with no intervention and resuming systemic therapy. The patient verbalizes understanding of risks, benefits, and alternatives to IR intervention and elects to proceed. All questions were answered. Written pre- and post- procedure care instructions will be sent to him via [employer]. We explained that the patient will need to have ongoing surveillance after the procedure, which will be managed by the treating team, and that future treatment depends on multiple factors including lab studies, imaging, and performance status. We contacted [facility] and asked that the Avastin be held until after the TACE.       Total Call Time Spent (mins): 22    GINA Grewal with Joe Norris MD  Interventional Oncology Service  [facility]   [address] [place] [address] ([id number])  JACOB Claire [id number] [phone]                [1]   Past Medical History:  Diagnosis Date    Anemia     Arthritis 08/04/2020    Thumbs    Cancer (HCC) 2004    Prostate - did brachytherapy at [facility] (HCC) 2018    Liver    Cirrhosis (HCC)     CLL (chronic lymphocytic leukemia)  (HCC) 2014    CMV (cytomegalovirus infection) (HCC) 1990    Treated for 6 weeks    Diabetes (HCC)     oral medication    Heart burn     takes pantoprazole daily    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 Stopped both medications because blood pressure readings were low, /82 today and has been low at home since stopping medications about 5 days ago. Last reading at home 117/72     Hypotension 05/19/2022    Patient has known hypertension and has been on losartan 50 mg long-term.  He notes recent episode about 10 days ago where blood pressure dropped extremely low and he fainted.  He reports that blood pressure on home cuff was 45/30, and slowly increased.  Additionally, he notes that he has had these episodes on a yearly basis for maybe 6 to 7 years or blood pressure goes extremely well and he passes    Indigestion     Liver cancer (HCC)     Liver tumor     Plantar fasciitis of right foot 08/12/2019    Rosacea     Snoring     no sleep study   [2]   Past Surgical History:  Procedure Laterality Date    VT LAP,ABLAT 1+ LIVER TUMOR(S),RADIOFREQ N/A 3/3/2025    Procedure: LAPAROSCOPIC MICROWAVE THERMAL ABLATION LIVER TUMORS;  Surgeon: Feliberto Senior M.D.;  Location: SURGERY [facility];  Service: General    VT UPPER GI ENDOSCOPY,DIAGNOSIS N/A 4/25/2023    Procedure: GASTROSCOPY;  Surgeon: Mir Rodriguez M.D.;  Location: SURGERY SAME DAY [facility];  Service: Gastroenterology    WOUND EXPLORATION ORTHO Right 8/5/2020    Procedure: EXPLORATION, WOUND- THUMB;  Surgeon: Yumiko Griffiths M.D.;  Location: SURGERY SAME DAY [facility];  Service: Orthopedics    TENDON REPAIR  8/5/2020    Procedure: REPAIR, TENDON- EXTENSOR;  Surgeon: Yumiko Griffiths M.D.;  Location: SURGERY SAME DAY [facility];  Service: Orthopedics    HEPATIC ABLATION LAPAROSCOPIC  6/28/2018    Procedure: HEPATIC ABLATION LAPAROSCOPIC. SEGMENT 4B, HEPATOMA, REPAIR  OF INCARCERATED UMBILICAL HERNIA;  Surgeon: Feliberto Burgos M.D.;  Location: SURGERY [facility];  Service: General    BRACHY THERAPY     [3]   Current Outpatient Medications   Medication Sig Dispense Refill    amoxicillin-clavulanate (AUGMENTIN) 875-125 MG Tab Take 1 Tablet by mouth 2 times a day for 7 days. 14 Tablet 0    benzonatate (TESSALON) 100 MG Cap Take 1 Capsule by mouth 3 times a day as needed for Cough. 60 Capsule 0    propranolol (INDERAL) 10 MG Tab TAKE ONE TABLET BY MOUTH EVERY DAY AT 6PM 100 Tablet 3    Naloxone (NARCAN) 4 MG/0.1ML Liquid One spray in one nostril for overdose and call 911. 1 Each 0    ondansetron (ZOFRAN ODT) 4 MG TABLET DISPERSIBLE Take 1 Tablet by mouth every 6 hours as needed for Nausea/Vomiting. 10 Tablet 0    Cholecalciferol (VITAMIN D-3 PO) Take 2,000 Units by mouth every day.      metFORMIN ER (GLUCOPHAGE XR) 500 MG TABLET SR 24 HR Take 2 Tablets by mouth 2 times a day. 400 Tablet 3    pantoprazole (PROTONIX) 20 MG tablet Take 1 Tablet by mouth every day. 100 Tablet 3    spironolactone (ALDACTONE) 50 MG Tab Take 2 Tablets by mouth every day. 200 Tablet 3    furosemide (LASIX) 20 MG Tab Take 2 Tablets by mouth every day. 200 Tablet 3    atezolizumab (TECENTRIQ) 1200 MG/20ML Solution injection 1,200 mg by Intrathecal Infusion route see administration instructions. Infusion every 3 weeks      diphenhydrAMINE (BENADRYL ALLERGY) 25 MG capsule Take 25 mg by mouth every 6 hours as needed.      Bevacizumab 1.25 MG/0.05ML Solution Prefilled Syringe 1.25 mcg by Subcutaneous Infusion route see administration instructions. Infusion every 3 weeks      fluticasone (FLONASE) 50 MCG/ACT nasal spray Administer 1 Spray into affected nostril(S) 2 times a day as needed.      therapeutic multivitamin-minerals (THERAGRAN-M) Tab Take 1 Tablet by mouth every day.       No current facility-administered medications for this visit.   [4]   Allergies  Allergen Reactions    [place]  Unspecified     Runny nose, itchy eyes

## 2025-06-04 ENCOUNTER — HOSPITAL ENCOUNTER (OUTPATIENT)
Dept: LAB | Facility: MEDICAL CENTER | Age: 75
End: 2025-06-04
Attending: INTERNAL MEDICINE
Payer: MEDICARE

## 2025-06-04 LAB
ANISOCYTOSIS BLD QL SMEAR: ABNORMAL
BASOPHILS # BLD AUTO: 0.9 % (ref 0–1.8)
BASOPHILS # BLD: 0.06 K/UL (ref 0–0.12)
COMMENT 1642: NORMAL
EOSINOPHIL # BLD AUTO: 0.1 K/UL (ref 0–0.51)
EOSINOPHIL NFR BLD: 1.4 % (ref 0–6.9)
ERYTHROCYTE [DISTWIDTH] IN BLOOD BY AUTOMATED COUNT: 72 FL (ref 35.9–50)
HCT VFR BLD AUTO: 42.5 % (ref 42–52)
HGB BLD-MCNC: 14.3 G/DL (ref 14–18)
IMM GRANULOCYTES # BLD AUTO: 0.03 K/UL (ref 0–0.11)
IMM GRANULOCYTES NFR BLD AUTO: 0.4 % (ref 0–0.9)
LYMPHOCYTES # BLD AUTO: 3.42 K/UL (ref 1–4.8)
LYMPHOCYTES NFR BLD: 48.5 % (ref 22–41)
MACROCYTES BLD QL SMEAR: ABNORMAL
MCH RBC QN AUTO: 36.3 PG (ref 27–33)
MCHC RBC AUTO-ENTMCNC: 33.6 G/DL (ref 32.3–36.5)
MCV RBC AUTO: 107.9 FL (ref 81.4–97.8)
MICROCYTES BLD QL SMEAR: ABNORMAL
MONOCYTES # BLD AUTO: 0.48 K/UL (ref 0–0.85)
MONOCYTES NFR BLD AUTO: 6.8 % (ref 0–13.4)
MORPHOLOGY BLD-IMP: NORMAL
NEUTROPHILS # BLD AUTO: 2.96 K/UL (ref 1.82–7.42)
NEUTROPHILS NFR BLD: 42 % (ref 44–72)
NRBC # BLD AUTO: 0 K/UL
NRBC BLD-RTO: 0 /100 WBC (ref 0–0.2)
OVALOCYTES BLD QL SMEAR: NORMAL
PLATELET # BLD AUTO: 90 K/UL (ref 164–446)
PLATELET BLD QL SMEAR: NORMAL
PLATELETS.RETICULATED NFR BLD AUTO: 2.4 % (ref 0.6–13.1)
PMV BLD AUTO: 10 FL (ref 9–12.9)
POIKILOCYTOSIS BLD QL SMEAR: NORMAL
RBC # BLD AUTO: 3.94 M/UL (ref 4.7–6.1)
RBC BLD AUTO: PRESENT
WBC # BLD AUTO: 7.1 K/UL (ref 4.8–10.8)

## 2025-06-04 PROCEDURE — 84443 ASSAY THYROID STIM HORMONE: CPT

## 2025-06-04 PROCEDURE — 85055 RETICULATED PLATELET ASSAY: CPT

## 2025-06-04 PROCEDURE — 85025 COMPLETE CBC W/AUTO DIFF WBC: CPT

## 2025-06-04 PROCEDURE — 80053 COMPREHEN METABOLIC PANEL: CPT

## 2025-06-04 PROCEDURE — 36415 COLL VENOUS BLD VENIPUNCTURE: CPT

## 2025-06-05 ENCOUNTER — HOSPITAL ENCOUNTER (OUTPATIENT)
Facility: MEDICAL CENTER | Age: 75
End: 2025-06-05
Attending: INTERNAL MEDICINE
Payer: MEDICARE

## 2025-06-05 LAB
ALBUMIN SERPL BCP-MCNC: 4.1 G/DL (ref 3.2–4.9)
ALBUMIN/GLOB SERPL: 1.7 G/DL
ALP SERPL-CCNC: 181 U/L (ref 30–99)
ALT SERPL-CCNC: 37 U/L (ref 2–50)
ANION GAP SERPL CALC-SCNC: 13 MMOL/L (ref 7–16)
AST SERPL-CCNC: 50 U/L (ref 12–45)
BILIRUB SERPL-MCNC: 1.5 MG/DL (ref 0.1–1.5)
BUN SERPL-MCNC: 44 MG/DL (ref 8–22)
CALCIUM ALBUM COR SERPL-MCNC: 9.5 MG/DL (ref 8.5–10.5)
CALCIUM SERPL-MCNC: 9.6 MG/DL (ref 8.5–10.5)
CHLORIDE SERPL-SCNC: 98 MMOL/L (ref 96–112)
CO2 SERPL-SCNC: 22 MMOL/L (ref 20–33)
CORTIS SERPL-MCNC: 22.4 UG/DL (ref 0–23)
CREAT SERPL-MCNC: 1.94 MG/DL (ref 0.5–1.4)
GFR SERPLBLD CREATININE-BSD FMLA CKD-EPI: 36 ML/MIN/1.73 M 2
GLOBULIN SER CALC-MCNC: 2.4 G/DL (ref 1.9–3.5)
GLUCOSE SERPL-MCNC: 246 MG/DL (ref 65–99)
POTASSIUM SERPL-SCNC: 5.9 MMOL/L (ref 3.6–5.5)
PROT SERPL-MCNC: 6.5 G/DL (ref 6–8.2)
SODIUM SERPL-SCNC: 133 MMOL/L (ref 135–145)
TSH SERPL-ACNC: 1.93 UIU/ML (ref 0.38–5.33)

## 2025-06-05 PROCEDURE — 82533 TOTAL CORTISOL: CPT

## 2025-06-09 ENCOUNTER — APPOINTMENT (OUTPATIENT)
Dept: ADMISSIONS | Facility: MEDICAL CENTER | Age: 75
End: 2025-06-09
Attending: RADIOLOGY
Payer: MEDICARE

## 2025-06-10 ENCOUNTER — APPOINTMENT (OUTPATIENT)
Dept: RADIOLOGY | Facility: MEDICAL CENTER | Age: 75
End: 2025-06-10
Attending: EMERGENCY MEDICINE
Payer: MEDICARE

## 2025-06-10 ENCOUNTER — HOSPITAL ENCOUNTER (EMERGENCY)
Facility: MEDICAL CENTER | Age: 75
End: 2025-06-10
Attending: EMERGENCY MEDICINE
Payer: MEDICARE

## 2025-06-10 VITALS
HEART RATE: 54 BPM | BODY MASS INDEX: 28.04 KG/M2 | TEMPERATURE: 97 F | OXYGEN SATURATION: 97 % | WEIGHT: 185 LBS | RESPIRATION RATE: 18 BRPM | SYSTOLIC BLOOD PRESSURE: 142 MMHG | HEIGHT: 68 IN | DIASTOLIC BLOOD PRESSURE: 79 MMHG

## 2025-06-10 DIAGNOSIS — H11.31 SUBCONJUNCTIVAL HEMORRHAGE OF RIGHT EYE: ICD-10-CM

## 2025-06-10 DIAGNOSIS — S09.90XA CLOSED HEAD INJURY, INITIAL ENCOUNTER: Primary | ICD-10-CM

## 2025-06-10 DIAGNOSIS — S16.1XXA STRAIN OF NECK MUSCLE, INITIAL ENCOUNTER: ICD-10-CM

## 2025-06-10 DIAGNOSIS — S00.83XA CONTUSION OF FACE, INITIAL ENCOUNTER: ICD-10-CM

## 2025-06-10 PROCEDURE — 72125 CT NECK SPINE W/O DYE: CPT

## 2025-06-10 PROCEDURE — 70450 CT HEAD/BRAIN W/O DYE: CPT

## 2025-06-10 PROCEDURE — 99284 EMERGENCY DEPT VISIT MOD MDM: CPT

## 2025-06-10 PROCEDURE — 70486 CT MAXILLOFACIAL W/O DYE: CPT

## 2025-06-10 PROCEDURE — 700111 HCHG RX REV CODE 636 W/ 250 OVERRIDE (IP): Performed by: EMERGENCY MEDICINE

## 2025-06-10 RX ORDER — ONDANSETRON 2 MG/ML
4 INJECTION INTRAMUSCULAR; INTRAVENOUS ONCE
Status: DISCONTINUED | OUTPATIENT
Start: 2025-06-10 | End: 2025-06-10

## 2025-06-10 RX ORDER — ONDANSETRON 4 MG/1
4 TABLET, ORALLY DISINTEGRATING ORAL ONCE
Status: COMPLETED | OUTPATIENT
Start: 2025-06-10 | End: 2025-06-10

## 2025-06-10 RX ADMIN — ONDANSETRON 4 MG: 4 TABLET, ORALLY DISINTEGRATING ORAL at 14:36

## 2025-06-10 ASSESSMENT — FIBROSIS 4 INDEX: FIB4 SCORE: 6.76

## 2025-06-10 NOTE — ED PROVIDER NOTES
ED Provider Note    CHIEF COMPLAINT  Chief Complaint   Patient presents with    GLF     Pt sustained a MGLF last night around 1830. +HS, -LOC, -thinners, pt presents with a hematoma to his right eye       EXTERNAL RECORDS REVIEWED  Reviewed previous ER encounters baseline laboratory studies as well as medication list    HPI/ROS  LIMITATION TO HISTORY   None  OUTSIDE HISTORIAN(S):  Wife provided additional history    Sohail Duvall is a 74 y.o. male who presents for ongoing face head and neck pain after ground-level fall yesterday afternoon.  He sustained a mechanical ground-level fall falling forward.  He did sustain some abrasions to his hands and anterior knee but has no pain to the upper or lower extremities.  He denies any blunt or penetrating trauma to the chest abdomen or pelvis.  He struck his head above his right eye.  He is not on any oral anticoagulants.  He has no report of loss of consciousness or focal numbness weakness tingling to the arms legs or face.  His wife was quite concerned because he developed a significant black eye and has had pain around the bony socket of his eye.  He denies any other symptoms at this time.    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    SURGICAL HISTORY   has a past surgical history that includes brachy therapy; hepatic ablation laparoscopic (6/28/2018); wound exploration ortho (Right, 8/5/2020); tendon repair (8/5/2020); upper gi endoscopy,diagnosis (N/A, 4/25/2023); and lap,ablat 1+ liver tumor(s),radiofreq (N/A, 3/3/2025).    FAMILY  "HISTORY  Family History   Problem Relation Age of Onset    Cancer Father 81        Bladder    Cancer Brother         Prostate & CLL (s/p 8-10 years ago)    Hyperlipidemia Sister     Lung Disease Neg Hx     Diabetes Neg Hx     Heart Disease Neg Hx     Hypertension Neg Hx     Stroke Neg Hx     Alcohol/Drug Neg Hx        SOCIAL HISTORY  Social History     Tobacco Use    Smoking status: Never     Passive exposure: Current    Smokeless tobacco: Former     Types: Snuff, Chew     Quit date: 3/29/2015   Vaping Use    Vaping status: Never Used   Substance and Sexual Activity    Alcohol use: Not Currently     Alcohol/week: 7.2 oz     Types: 12 Shots of liquor per week    Drug use: Yes     Types: Marijuana, Inhaled, Oral     Comment: \"Marijuana Use\" daily, last smoked 3/2    Sexual activity: Never    former alcohol abuse    CURRENT MEDICATIONS  Home Medications       Reviewed by Edith Sanon R.N. (Registered Nurse) on 06/10/25 at 1405  Med List Status: Partial     Medication Last Dose Status   atezolizumab (TECENTRIQ) 1200 MG/20ML Solution injection  Active   benzonatate (TESSALON) 100 MG Cap  Active   Bevacizumab 1.25 MG/0.05ML Solution Prefilled Syringe  Active   Cholecalciferol (VITAMIN D-3 PO)  Active   diphenhydrAMINE (BENADRYL ALLERGY) 25 MG capsule  Active   fluticasone (FLONASE) 50 MCG/ACT nasal spray  Active   furosemide (LASIX) 20 MG Tab  Active   metFORMIN ER (GLUCOPHAGE XR) 500 MG TABLET SR 24 HR  Active   Naloxone (NARCAN) 4 MG/0.1ML Liquid  Active   ondansetron (ZOFRAN ODT) 4 MG TABLET DISPERSIBLE  Active   pantoprazole (PROTONIX) 20 MG tablet  Active   propranolol (INDERAL) 10 MG Tab  Active   spironolactone (ALDACTONE) 50 MG Tab  Active   therapeutic multivitamin-minerals (THERAGRAN-M) Tab  Active                  Audit from Redirected Encounters    **Home medications have not yet been reviewed for this encounter**         ALLERGIES  Allergies[1]    PHYSICAL EXAM  VITAL SIGNS: BP (!) 142/79   " "Pulse (!) 54   Temp 36.1 °C (97 °F) (Temporal)   Resp 18   Ht 1.727 m (5' 8\")   Wt 83.9 kg (185 lb)   SpO2 97%   BMI 28.13 kg/m²    Pulse ox interpretation: I interpret this pulse ox as normal.  Constitutional: Alert and oriented x 3, no acute distress  HEENT: No hemotympanum negative Russell sign, significant periorbital ecchymosis circumferentially around the right eye.  I can retract the right eye and there is a subconjunctival hemorrhage.  Pupil is normal shaped without any evidence of globe rupture.  Superficial abrasion noted to the bridge of the nose superficial abrasion noted to the right cheek, pupils are equal round reactive to light extraocular movements are intact. The nares is clear, external ears are normal, mouth shows moist mucous membranes normal dentition for age  Neck: Supple, no JVD no tracheal deviation  Cardiovascular: Regular rate and rhythm no murmur rub or gallop 2+ pulses peripherally x4  Thorax & Lungs: No respiratory distress, no wheezes rales or rhonchi, No chest tenderness.   GI: Soft nontender nondistended positive bowel sounds, no peritoneal signs  Skin: Warm dry no acute rash or lesion  Musculoskeletal: Moving all extremities with full range and 5 of 5 strength no acute  deformity superficial abrasion noted to the anterior right knee without bony abnormality  Neurologic: Cranial nerves III through XII are grossly intact no sensory deficit no cerebellar dysfunction   Psychiatric: Appropriate affect for situation at this time          EKG/LABS  Laboratory studies were considered but not performed  RADIOLOGY/PROCEDURES   I have independently interpreted the diagnostic imaging associated with this visit and am waiting the final reading from the radiologist.   My preliminary interpretation is as follows: No acute intracranial hemorrhage skull fracture spine fracture or facial fracture    Radiologist interpretation:  CT-MAXILLOFACIAL W/O PLUS RECONS   Final Result      1.  " Subcutaneous laceration in the anterior frontal region.   2.  No evidence for acute facial bone fracture.      CT-CSPINE WITHOUT PLUS RECONS   Final Result      1.  No evidence of acute cervical spine fracture and/or subluxation.   2.  Moderate osteophytosis at the C4-5 level with mild osteophytosis at the C3-4 and C5-6 levels.      CT-HEAD W/O   Final Result      1.  Cerebral atrophy.      2.  Otherwise, Head CT without contrast within normal limits. No evidence of acute cerebral infarction, hemorrhage or mass lesion.                   COURSE & MEDICAL DECISION MAKING    ASSESSMENT, COURSE AND PLAN  Care Narrative:     This is a very pleasant 74-year-old gentleman accompanied by his wife.  In triage she was immediately identified as a code TBI triage candidate.  I immediately evaluated the patient.  It was quite clear that the patient sustained head neck and facial injury.  I considered but did not feel that additional imaging of the chest abdomen pelvis upper or lower extremities was indicated.  Laboratory studies were not clinically indicated.  CT scan of the head face and cervical spine do not demonstrate any acute process such as intracranial hemorrhage skull fracture facial fracture or spinal fracture.  He does have some superficial tissue injuries that are nonsuturable and DJD of the neck but he is neurologically intact.  All of his wounds were gently cleaned.  I retracted the right eyelid multiple times to confirm the globe was intact and the pupil was reactive which it was.  We cleaned all of his wounds and will place an eye patch simply for comfort.  I counseled the patient to Anastacia essentially take Tylenol for pain and to return as needed for new or worsening symptoms.          ADDITIONAL PROBLEMS MANAGED      DISPOSITION AND DISCUSSIONS  I have discussed management of the patient with the following physicians and GAL's: None    Discussion of management with other QHP or appropriate source(s):  None    Escalation of care considered, and ultimately not performed: Considered additional imaging and laboratory studies    Barriers to care at this time, including but not limited to: None.     Decision tools and prescription drugs considered including, but not limited to: None.    FINAL DIAGNOSIS  1. Closed head injury, initial encounter        2. Contusion of face, initial encounter        3. Subconjunctival hemorrhage of right eye        4. Strain of neck muscle, initial encounter               Electronically signed by: Tyrell Hernandez M.D., 6/10/2025 2:34 PM           [1]   Allergies  Allergen Reactions    [place] Unspecified     Runny nose, itchy eyes

## 2025-06-10 NOTE — ED NOTES
Discharge instructions provided and reviewed with patient. Patient to follow with PCP as needed. Discussed to return to ER if symptoms worsen. Patient verbalized understanding. Pt to lobby via WC with spouse.    No

## 2025-06-10 NOTE — ED NOTES
Chief Complaint   Patient presents with    GLF     Pt sustained a MGLF last night around 1830. +HS, -LOC, -thinners, pt presents with a hematoma to his right eye     Vitals:    06/10/25 1339   BP: (!) 141/74   Pulse: 60   Resp: 16   Temp: 36.1 °C (97 °F)   SpO2: 97%

## 2025-06-11 ENCOUNTER — PRE-ADMISSION TESTING (OUTPATIENT)
Dept: ADMISSIONS | Facility: MEDICAL CENTER | Age: 75
End: 2025-06-11
Attending: RADIOLOGY
Payer: MEDICARE

## 2025-06-11 NOTE — OR NURSING
Preadmit: Telephone preadmit completed with patient scheduled for procedure on 06/30/25. Pre-procedure instructions, fasting guidelines, and check in location reviewed with patient. Patient medication list and history reviewed and updated. Patient aware to hold metformin on procedure day and 1 day after, then restart at usual dose. Patient verbalized understanding of all instructions.

## 2025-06-25 ENCOUNTER — PRE-ADMISSION TESTING (OUTPATIENT)
Dept: ADMISSIONS | Facility: MEDICAL CENTER | Age: 75
End: 2025-06-25
Attending: RADIOLOGY
Payer: MEDICARE

## 2025-06-25 ENCOUNTER — TELEPHONE (OUTPATIENT)
Dept: RADIOLOGY | Facility: MEDICAL CENTER | Age: 75
End: 2025-06-25
Payer: MEDICARE

## 2025-06-25 DIAGNOSIS — Z01.810 PRE-OPERATIVE CARDIOVASCULAR EXAMINATION: Primary | ICD-10-CM

## 2025-06-25 DIAGNOSIS — Z01.812 PRE-OPERATIVE LABORATORY EXAMINATION: ICD-10-CM

## 2025-06-25 LAB
ALBUMIN SERPL BCP-MCNC: 4.1 G/DL (ref 3.2–4.9)
ALBUMIN/GLOB SERPL: 1.6 G/DL
ALP SERPL-CCNC: 177 U/L (ref 30–99)
ALT SERPL-CCNC: 30 U/L (ref 2–50)
ANION GAP SERPL CALC-SCNC: 12 MMOL/L (ref 7–16)
ANISOCYTOSIS BLD QL SMEAR: ABNORMAL
AST SERPL-CCNC: 50 U/L (ref 12–45)
BASOPHILS # BLD AUTO: 3.5 % (ref 0–1.8)
BASOPHILS # BLD: 0.19 K/UL (ref 0–0.12)
BILIRUB SERPL-MCNC: 1.4 MG/DL (ref 0.1–1.5)
BUN SERPL-MCNC: 29 MG/DL (ref 8–22)
CALCIUM ALBUM COR SERPL-MCNC: 9.8 MG/DL (ref 8.5–10.5)
CALCIUM SERPL-MCNC: 9.9 MG/DL (ref 8.5–10.5)
CHLORIDE SERPL-SCNC: 102 MMOL/L (ref 96–112)
CO2 SERPL-SCNC: 23 MMOL/L (ref 20–33)
CREAT SERPL-MCNC: 1.31 MG/DL (ref 0.5–1.4)
EOSINOPHIL # BLD AUTO: 0.1 K/UL (ref 0–0.51)
EOSINOPHIL NFR BLD: 1.8 % (ref 0–6.9)
ERYTHROCYTE [DISTWIDTH] IN BLOOD BY AUTOMATED COUNT: 66 FL (ref 35.9–50)
GFR SERPLBLD CREATININE-BSD FMLA CKD-EPI: 57 ML/MIN/1.73 M 2
GLOBULIN SER CALC-MCNC: 2.5 G/DL (ref 1.9–3.5)
GLUCOSE SERPL-MCNC: 178 MG/DL (ref 65–99)
HCT VFR BLD AUTO: 43.1 % (ref 42–52)
HGB BLD-MCNC: 14.2 G/DL (ref 14–18)
INR PPP: 1.13 (ref 0.87–1.13)
LYMPHOCYTES # BLD AUTO: 1.5 K/UL (ref 1–4.8)
LYMPHOCYTES NFR BLD: 27.8 % (ref 22–41)
MACROCYTES BLD QL SMEAR: ABNORMAL
MANUAL DIFF BLD: NORMAL
MCH RBC QN AUTO: 36 PG (ref 27–33)
MCHC RBC AUTO-ENTMCNC: 32.9 G/DL (ref 32.3–36.5)
MCV RBC AUTO: 109.4 FL (ref 81.4–97.8)
MONOCYTES # BLD AUTO: 0.1 K/UL (ref 0–0.85)
MONOCYTES NFR BLD AUTO: 1.7 % (ref 0–13.4)
MORPHOLOGY BLD-IMP: NORMAL
NEUTROPHILS # BLD AUTO: 3.52 K/UL (ref 1.82–7.42)
NEUTROPHILS NFR BLD: 65.2 % (ref 44–72)
NRBC # BLD AUTO: 0 K/UL
NRBC BLD-RTO: 0 /100 WBC (ref 0–0.2)
OVALOCYTES BLD QL SMEAR: NORMAL
PLATELET # BLD AUTO: 78 K/UL (ref 164–446)
PLATELET BLD QL SMEAR: NORMAL
PLATELETS.RETICULATED NFR BLD AUTO: 2.4 % (ref 0.6–13.1)
PMV BLD AUTO: 9.2 FL (ref 9–12.9)
POIKILOCYTOSIS BLD QL SMEAR: NORMAL
POTASSIUM SERPL-SCNC: 6.2 MMOL/L (ref 3.6–5.5)
PROT SERPL-MCNC: 6.6 G/DL (ref 6–8.2)
PROTHROMBIN TIME: 14.5 SEC (ref 12–14.6)
RBC # BLD AUTO: 3.94 M/UL (ref 4.7–6.1)
RBC BLD AUTO: PRESENT
SODIUM SERPL-SCNC: 137 MMOL/L (ref 135–145)
WBC # BLD AUTO: 5.4 K/UL (ref 4.8–10.8)

## 2025-06-25 PROCEDURE — 85007 BL SMEAR W/DIFF WBC COUNT: CPT

## 2025-06-25 PROCEDURE — 80053 COMPREHEN METABOLIC PANEL: CPT

## 2025-06-25 PROCEDURE — 85027 COMPLETE CBC AUTOMATED: CPT

## 2025-06-25 PROCEDURE — 36415 COLL VENOUS BLD VENIPUNCTURE: CPT

## 2025-06-25 PROCEDURE — 85610 PROTHROMBIN TIME: CPT

## 2025-06-25 PROCEDURE — 85055 RETICULATED PLATELET ASSAY: CPT

## 2025-06-25 NOTE — TELEPHONE ENCOUNTER
Pre procedure labs drawn today and K+ noted to be 6.2, prior 5.9. Pt contacted and states his potassium has been monitored by his doctors but acknowledges this is a higher number than his usual trend. Does not see a nephrologist. Denies chest pain and palpitations. Discussed the possible medical problems related to elevated potassium and that at this point I recommend he present to ED for evaluation. Should he develop cardiac symptoms he should call an ambulance. Pt acknowledges advice and intends to go to ED. Asked if it can be tomorrow and advised pt that he should be evaluated today. Will repeat CMP lab prior to planned procedure next week to confirm correction.

## 2025-06-26 ENCOUNTER — TELEPHONE (OUTPATIENT)
Dept: RADIOLOGY | Facility: MEDICAL CENTER | Age: 75
End: 2025-06-26
Payer: MEDICARE

## 2025-06-26 NOTE — TELEPHONE ENCOUNTER
Pt LM for APRN stating one of his specialists is managing his hyperkalemia but is canceling elective TACE procedure on 6/30. Call returned, no answer. LM w/direct contact information at [phone].     Pt returned call, 6 minutes of discussion. He states he was hyperkalemic in the past and has CMP every 3 weeks. He has missed immunotherapy due to elevated potassium before. Pt states he is not concerned about this test result. He did not go to ER for evaluation as discussed yesterday but wishes to cancel planned TACE procedure as it is likely it would be canceled if electrolyte abnormality is not corrected by 6/30/25. He thinks it is from either his medications and/ or his fruit intake. He has elected to stop his spironolactone. He has a message to his PCP via [facility] [facility]; advised pt that [facility] messages are not urgent and he may not get a response today. He continues to deny chest pain and palpitations, agrees to go to ER for cardiac symptoms. Otherwise he intends to pursue outpatient management for hyperkalemia. He will contact us once it is stable to r/s [facility] TACE.

## 2025-06-30 ENCOUNTER — APPOINTMENT (OUTPATIENT)
Dept: RADIOLOGY | Facility: MEDICAL CENTER | Age: 75
End: 2025-06-30
Attending: RADIOLOGY
Payer: MEDICARE

## 2025-07-16 ENCOUNTER — HOSPITAL ENCOUNTER (OUTPATIENT)
Dept: LAB | Facility: MEDICAL CENTER | Age: 75
End: 2025-07-16
Attending: INTERNAL MEDICINE
Payer: MEDICARE

## 2025-07-16 LAB
ALBUMIN SERPL BCP-MCNC: 4.1 G/DL (ref 3.2–4.9)
ALBUMIN/GLOB SERPL: 1.8 G/DL
ALP SERPL-CCNC: 144 U/L (ref 30–99)
ALT SERPL-CCNC: 26 U/L (ref 2–50)
ANION GAP SERPL CALC-SCNC: 12 MMOL/L (ref 7–16)
ANISOCYTOSIS BLD QL SMEAR: ABNORMAL
APPEARANCE UR: CLEAR
AST SERPL-CCNC: 46 U/L (ref 12–45)
BACTERIA #/AREA URNS HPF: ABNORMAL /HPF
BASOPHILS # BLD AUTO: 0.8 % (ref 0–1.8)
BASOPHILS # BLD: 0.04 K/UL (ref 0–0.12)
BILIRUB SERPL-MCNC: 1.6 MG/DL (ref 0.1–1.5)
BILIRUB UR QL STRIP.AUTO: ABNORMAL
BUN SERPL-MCNC: 20 MG/DL (ref 8–22)
CALCIUM ALBUM COR SERPL-MCNC: 9.4 MG/DL (ref 8.5–10.5)
CALCIUM SERPL-MCNC: 9.5 MG/DL (ref 8.5–10.5)
CASTS URNS QL MICRO: ABNORMAL /LPF (ref 0–2)
CHLORIDE SERPL-SCNC: 103 MMOL/L (ref 96–112)
CO2 SERPL-SCNC: 22 MMOL/L (ref 20–33)
COLOR UR: ABNORMAL
COMMENT NL1176: NORMAL
CREAT SERPL-MCNC: 1.03 MG/DL (ref 0.5–1.4)
EOSINOPHIL # BLD AUTO: 0.24 K/UL (ref 0–0.51)
EOSINOPHIL NFR BLD: 4.7 % (ref 0–6.9)
EPITHELIAL CELLS 1715: ABNORMAL /HPF (ref 0–5)
ERYTHROCYTE [DISTWIDTH] IN BLOOD BY AUTOMATED COUNT: 60.1 FL (ref 35.9–50)
GFR SERPLBLD CREATININE-BSD FMLA CKD-EPI: 76 ML/MIN/1.73 M 2
GLOBULIN SER CALC-MCNC: 2.3 G/DL (ref 1.9–3.5)
GLUCOSE SERPL-MCNC: 212 MG/DL (ref 65–99)
GLUCOSE UR STRIP.AUTO-MCNC: NEGATIVE MG/DL
HCT VFR BLD AUTO: 41.3 % (ref 42–52)
HGB BLD-MCNC: 13.5 G/DL (ref 14–18)
KETONES UR STRIP.AUTO-MCNC: ABNORMAL MG/DL
LEUKOCYTE ESTERASE UR QL STRIP.AUTO: ABNORMAL
LYMPHOCYTES # BLD AUTO: 2.42 K/UL (ref 1–4.8)
LYMPHOCYTES NFR BLD: 48.4 % (ref 22–41)
MANUAL DIFF BLD: NORMAL
MCH RBC QN AUTO: 35 PG (ref 27–33)
MCHC RBC AUTO-ENTMCNC: 32.7 G/DL (ref 32.3–36.5)
MCV RBC AUTO: 107 FL (ref 81.4–97.8)
MICRO URNS: ABNORMAL
MONOCYTES # BLD AUTO: 0.2 K/UL (ref 0–0.85)
MONOCYTES NFR BLD AUTO: 4 % (ref 0–13.4)
MORPHOLOGY BLD-IMP: NORMAL
NEUTROPHILS # BLD AUTO: 2.11 K/UL (ref 1.82–7.42)
NEUTROPHILS NFR BLD: 42.1 % (ref 44–72)
NITRITE UR QL STRIP.AUTO: NEGATIVE
NRBC # BLD AUTO: 0 K/UL
NRBC BLD-RTO: 0 /100 WBC (ref 0–0.2)
PH UR STRIP.AUTO: 5.5 [PH] (ref 5–8)
PLATELET # BLD AUTO: 64 K/UL (ref 164–446)
PLATELET BLD QL SMEAR: NORMAL
PLATELETS.RETICULATED NFR BLD AUTO: 2.5 % (ref 0.6–13.1)
PMV BLD AUTO: 9.7 FL (ref 9–12.9)
POIKILOCYTOSIS BLD QL SMEAR: NORMAL
POTASSIUM SERPL-SCNC: 5.4 MMOL/L (ref 3.6–5.5)
PROT SERPL-MCNC: 6.4 G/DL (ref 6–8.2)
PROT UR QL STRIP: 30 MG/DL
RBC # BLD AUTO: 3.86 M/UL (ref 4.7–6.1)
RBC # URNS HPF: ABNORMAL /HPF (ref 0–2)
RBC BLD AUTO: PRESENT
RBC UR QL AUTO: NEGATIVE
SMUDGE CELLS BLD QL SMEAR: NORMAL
SODIUM SERPL-SCNC: 137 MMOL/L (ref 135–145)
SP GR UR STRIP.AUTO: 1.03
STOMATOCYTES BLD QL SMEAR: NORMAL
TSH SERPL-ACNC: 3.1 UIU/ML (ref 0.38–5.33)
UROBILINOGEN UR STRIP.AUTO-MCNC: 1 EU/DL
WBC # BLD AUTO: 5 K/UL (ref 4.8–10.8)
WBC #/AREA URNS HPF: ABNORMAL /HPF

## 2025-07-16 PROCEDURE — 84443 ASSAY THYROID STIM HORMONE: CPT

## 2025-07-16 PROCEDURE — 85027 COMPLETE CBC AUTOMATED: CPT

## 2025-07-16 PROCEDURE — 80053 COMPREHEN METABOLIC PANEL: CPT

## 2025-07-16 PROCEDURE — 85055 RETICULATED PLATELET ASSAY: CPT

## 2025-07-16 PROCEDURE — 81001 URINALYSIS AUTO W/SCOPE: CPT

## 2025-07-16 PROCEDURE — 36415 COLL VENOUS BLD VENIPUNCTURE: CPT

## 2025-07-16 PROCEDURE — 85007 BL SMEAR W/DIFF WBC COUNT: CPT

## 2025-07-29 ENCOUNTER — HOSPITAL ENCOUNTER (OUTPATIENT)
Facility: MEDICAL CENTER | Age: 75
End: 2025-07-29
Payer: MEDICARE

## 2025-07-29 ENCOUNTER — OFFICE VISIT (OUTPATIENT)
Dept: MEDICAL GROUP | Facility: PHYSICIAN GROUP | Age: 75
End: 2025-07-29
Payer: MEDICARE

## 2025-07-29 VITALS
TEMPERATURE: 97.2 F | BODY MASS INDEX: 28.95 KG/M2 | DIASTOLIC BLOOD PRESSURE: 78 MMHG | SYSTOLIC BLOOD PRESSURE: 122 MMHG | OXYGEN SATURATION: 97 % | HEIGHT: 68 IN | RESPIRATION RATE: 16 BRPM | HEART RATE: 60 BPM | WEIGHT: 191 LBS

## 2025-07-29 DIAGNOSIS — K70.30 ALCOHOLIC CIRRHOSIS OF LIVER WITHOUT ASCITES (HCC): ICD-10-CM

## 2025-07-29 DIAGNOSIS — D69.59 SECONDARY THROMBOCYTOPENIA: ICD-10-CM

## 2025-07-29 DIAGNOSIS — K44.9 HIATAL HERNIA WITH GERD: ICD-10-CM

## 2025-07-29 DIAGNOSIS — E11.9 TYPE 2 DIABETES MELLITUS WITHOUT COMPLICATION, WITHOUT LONG-TERM CURRENT USE OF INSULIN (HCC): Primary | ICD-10-CM

## 2025-07-29 DIAGNOSIS — M54.6 CHRONIC LEFT-SIDED THORACIC BACK PAIN: ICD-10-CM

## 2025-07-29 DIAGNOSIS — G89.29 CHRONIC LEFT-SIDED THORACIC BACK PAIN: ICD-10-CM

## 2025-07-29 DIAGNOSIS — C91.10 CLL (CHRONIC LYMPHOCYTIC LEUKEMIA) (HCC): ICD-10-CM

## 2025-07-29 DIAGNOSIS — E11.9 TYPE 2 DIABETES MELLITUS WITHOUT COMPLICATION, WITHOUT LONG-TERM CURRENT USE OF INSULIN (HCC): ICD-10-CM

## 2025-07-29 DIAGNOSIS — K21.9 HIATAL HERNIA WITH GERD: ICD-10-CM

## 2025-07-29 DIAGNOSIS — K76.6 PORTAL HYPERTENSION (HCC): ICD-10-CM

## 2025-07-29 DIAGNOSIS — C22.0 LIVER CELL CARCINOMA (HCC): ICD-10-CM

## 2025-07-29 DIAGNOSIS — E87.5 HYPERKALEMIA: ICD-10-CM

## 2025-07-29 LAB
CREAT UR-MCNC: 87.5 MG/DL
HBA1C MFR BLD: 5.7 % (ref ?–5.8)
MICROALBUMIN UR-MCNC: <1.2 MG/DL
MICROALBUMIN/CREAT UR: NORMAL MG/G (ref 0–30)
POCT INT CON NEG: NEGATIVE
POCT INT CON POS: POSITIVE

## 2025-07-29 PROCEDURE — 82570 ASSAY OF URINE CREATININE: CPT

## 2025-07-29 PROCEDURE — 82043 UR ALBUMIN QUANTITATIVE: CPT

## 2025-07-29 RX ORDER — SPIRONOLACTONE 50 MG/1
100 TABLET, FILM COATED ORAL DAILY
Qty: 200 TABLET | Refills: 3 | Status: SHIPPED | OUTPATIENT
Start: 2025-07-29

## 2025-07-29 RX ORDER — FUROSEMIDE 20 MG/1
40 TABLET ORAL DAILY
Qty: 200 TABLET | Refills: 3 | Status: SHIPPED | OUTPATIENT
Start: 2025-07-29

## 2025-07-29 RX ORDER — ONDANSETRON 4 MG/1
4 TABLET, ORALLY DISINTEGRATING ORAL EVERY 6 HOURS PRN
Qty: 10 TABLET | Refills: 11 | Status: SHIPPED | OUTPATIENT
Start: 2025-07-29

## 2025-07-29 ASSESSMENT — ENCOUNTER SYMPTOMS
BACK PAIN: 1
FALLS: 1
MYALGIAS: 1
SENSORY CHANGE: 1

## 2025-07-29 ASSESSMENT — FIBROSIS 4 INDEX: FIB4 SCORE: 10.43

## 2025-07-29 NOTE — ASSESSMENT & PLAN NOTE
Chronic, intermittent elevated potassium. Consider spironolactone dose reduction to 50 mg daily instead of 100 mg daily, encouraged discussion with hepatology for further consideration.   Getting labs every 3 weeks for evaluation.

## 2025-07-29 NOTE — ASSESSMENT & PLAN NOTE
Chronic, stable. Seeing oncology for surveillance.  Continue healthy lifestyle recommendations. Getting immunotherapy

## 2025-07-29 NOTE — PROGRESS NOTES
Verbal consent was acquired by the patient to use [employer] ambient listening note generation during this visit     Subjective:     CC: The primary encounter diagnosis was Type 2 diabetes mellitus without complication, without long-term current use of insulin (HCC). Diagnoses of Portal hypertension (HCC), Alcoholic cirrhosis of liver without ascites (HCC), CLL (chronic lymphocytic leukemia) (HCC), Hyperkalemia, Chronic left-sided thoracic back pain, Hiatal hernia with GERD, Liver cell carcinoma (HCC), and Secondary thrombocytopenia were also pertinent to this visit.    HPI:   Sohail presents today with    History of Present Illness  The patient presents for evaluation of back pain, elevated potassium, diabetes, cirrhosis, and acid reflux.    Back Pain  - Persistent back pain, likely due to a fall from a ladder 30 years ago  - Pain located at the top of his left kidney and worsens with age  - Suspects a cracked vertebra and is considering chiropractic treatment or acupuncture  - Tried tramadol and Tylenol without relief  - Oxycodone provided relief but is no longer available  - Has not taken muscle relaxers    Falls and Neuropathy  - Experienced three falls, two with head injuries requiring ER visits  - Falls attributed to numbness in his feet and carelessness  - Advised to use a cane after his last fall on 06/10/2025  - Reports neuropathy in his feet and decreased sensation in his fingers    Diabetes  - Managing diabetes with metformin  - A1c 5.7    Elevated Potassium  - Elevated potassium levels have prevented chemotherapy  - Taking furosemide for three weeks and added spironolactone last week  - Seeking alternative medication  - Rejected from immunotherapy due to elevated potassium  - Consuming two packs of watermelon daily, concerned about impact on potassium  - Advised to drink plenty of water before blood tests  - Taking spironolactone and furosemide since 2018 or 2019    Cirrhosis  - Under hepatologist  care for cirrhosis, upcoming appointment  - Seeing oncologist for liver condition and CLL  - No immunotherapy since 03/2025 due to bleeding concerns with Avastin  - Lab tests every three weeks, new blood test next week    Acid Reflux  - Experiences acid reflux  - Takes pantoprazole, Zantac, and ondansetron twice daily  - Has 50 pills of ondansetron left  - Tried omeprazole, exacerbated indigestion    Interested in COVID-19 booster vaccine    Social History:  Diet: Consumes two packs of watermelon daily    Current Medications[1]    Problem   Hyperkalemia   Hiatal Hernia With Gerd   Liver Cell Carcinoma (Hcc)   Alcoholic Cirrhosis of Liver Without Ascites (Hcc)   Cll (Chronic Lymphocytic Leukemia) (Hcc)    Diagnosed in June 2014, was treated by Dr. Lang, then followed by Dr. Glass every 4 months.   Followed by oncology. Next appt to establish with new oncologist later this month.          Type 2 Diabetes Mellitus Without Complication, Without Long-Term Current Use of Insulin (Hcc)    This is a stable chronic condition.  Current medications:  Insulin:   Biguanide: Metformin ER 1000 mg twice daily.  GLP1-RA:    SGLT-2i:      DPP4-I:   TZD:   Jamil:  Sulfonyluria:     Last A1c: 5.7% 7/29/25; 6.2% 1/20/25;   6.0% 6/11/24 ;  6.7% 2/20/24; 5.5% 5/5/23  Last Microalb/Cr ratio: 6/3/24  Fasting sugars:  Last diabetic foot exam: 10/3/24  Last retinal eye exam: 3/8/24 with opthalmology  ACEi/ARB?   Statin?   Aspirin?   Concomitant HTN? Propranolol 10 mg daily; spironolactone 50 mg daily; furosemide 20 mg daily  Nightly foot checks?             ROS: negative except for as documented below, in HPI, Assessment and Plan  Review of Systems   Musculoskeletal:  Positive for back pain, falls (due to LE numbness/instability 6/10/25) and myalgias.   Neurological:  Positive for sensory change (numbness to LE).   All other systems reviewed and are negative.      Objective:     Exam:  /78 (BP Location: Left arm, Patient Position:  "Sitting)   Pulse 60   Temp 36.2 °C (97.2 °F) (Temporal)   Resp 16   Ht 1.727 m (5' 8\")   Wt 86.6 kg (191 lb)   SpO2 97%   BMI 29.04 kg/m²  Body mass index is 29.04 kg/m².    Physical Exam  Vitals reviewed.   Constitutional:       General: He is not in acute distress.     Appearance: Normal appearance. He is not ill-appearing.   HENT:      Head: Normocephalic and atraumatic.   Cardiovascular:      Rate and Rhythm: Normal rate and regular rhythm.      Pulses: Normal pulses.      Heart sounds: Normal heart sounds.   Pulmonary:      Effort: Pulmonary effort is normal. No respiratory distress.      Breath sounds: Normal breath sounds.   Abdominal:      General: Abdomen is protuberant. Bowel sounds are normal.   Musculoskeletal:      Thoracic back: Spasms and tenderness present.      Right lower le+ Pitting Edema present.      Left lower le+ Pitting Edema present.   Skin:     General: Skin is warm and dry.      Findings: No rash.   Neurological:      General: No focal deficit present.      Mental Status: He is alert and oriented to person, place, and time.   Psychiatric:         Mood and Affect: Mood normal.         Behavior: Behavior normal.         Labs:    Latest Reference Range & Units 25 10:50 25 10:38   WBC 4.8 - 10.8 K/uL 7.1    RBC 4.70 - 6.10 M/uL 3.94 (L)    Hemoglobin 14.0 - 18.0 g/dL 14.3    Hematocrit 42.0 - 52.0 % 42.5    MCV 81.4 - 97.8 fL 107.9 (H)    MCH 27.0 - 33.0 pg 36.3 (H)    MCHC 32.3 - 36.5 g/dL 33.6    RDW 35.9 - 50.0 fL 72.0 (H)    Platelet Count 164 - 446 K/uL 90 (L)    MPV 9.0 - 12.9 fL 10.0    Neutrophils-Polys 44.00 - 72.00 % 42.00 (L)    Neutrophils (Absolute) 1.82 - 7.42 K/uL 2.96    Lymphocytes 22.00 - 41.00 % 48.50 (H)    Lymphs (Absolute) 1.00 - 4.80 K/uL 3.42    Monocytes 0.00 - 13.40 % 6.80    Monos (Absolute) 0.00 - 0.85 K/uL 0.48    Eosinophils 0.00 - 6.90 % 1.40    Eos (Absolute) 0.00 - 0.51 K/uL 0.10    Basophils 0.00 - 1.80 % 0.90    Baso (Absolute) 0.00 " - 0.12 K/uL 0.06    Immature Granulocytes 0.00 - 0.90 % 0.40    Immature Granulocytes (abs) 0.00 - 0.11 K/uL 0.03    Nucleated RBC 0.00 - 0.20 /100 WBC 0.00    NRBC (Absolute) K/uL 0.00    Plt Estimation  Decreased    Imm. Plt Fraction 0.6 - 13.1 % 2.4    RBC Morphology  Present    Anisocytosis  2+ !    Macrocytosis  1+    Microcytosis  1+    Poikilocytosis  1+    Ovalocytes  2+    Comments-Diff  see below    Peripheral Smear Review  see below    Sodium 135 - 145 mmol/L 133 (L)    Potassium 3.6 - 5.5 mmol/L 5.9 (H)    Chloride 96 - 112 mmol/L 98    Co2 20 - 33 mmol/L 22    Anion Gap 7.0 - 16.0  13.0    Glucose 65 - 99 mg/dL 246 (H)    Bun 8 - 22 mg/dL 44 (H)    Creatinine 0.50 - 1.40 mg/dL 1.94 (H)    GFR (CKD-EPI) >60 mL/min/1.73 m 2 36 !    Calcium 8.5 - 10.5 mg/dL 9.6    Correct Calcium 8.5 - 10.5 mg/dL 9.5    AST(SGOT) 12 - 45 U/L 50 (H)    ALT(SGPT) 2 - 50 U/L 37    Alkaline Phosphatase 30 - 99 U/L 181 (H)    Total Bilirubin 0.1 - 1.5 mg/dL 1.5    Albumin 3.2 - 4.9 g/dL 4.1    Total Protein 6.0 - 8.2 g/dL 6.5    Globulin 1.9 - 3.5 g/dL 2.4    A-G Ratio g/dL 1.7    Cortisol 0.0 - 23.0 ug/dL  22.4   TSH 0.380 - 5.330 uIU/mL 1.930    (L): Data is abnormally low  (H): Data is abnormally high  !: Data is abnormal    Assessment & Plan:     74 y.o. male with the following -     Assessment & Plan  1. Back pain: Chronic.  - Likely muscular due to spasms.  - Recommend massage therapy.  - Prescribe tizanidine for muscle spasms as needed.    2. Hyperkalemia: Chronic.  - Likely due to furosemide, balanced by spironolactone.  - Current regimen is gold standard for cirrhosis and fluid management.  - Discuss reducing spironolactone dosage with gastroenterologist if high potassium persists.  - Provide refills for spironolactone and furosemide.    3. Type 2 diabetes mellitus: Well-controlled.  - A1c 5.7.  - Continue current medication regimen.  - Continue monitoring blood sugar levels.    4. Cirrhosis: Stable.  - Maintain  current medication regimen.  - Add lipid panel to next lab work.  - Conduct urine protein test today.  - Regular follow-ups with hepatologist and oncologist.    5. Gastroesophageal reflux disease: Stable.  - Pantoprazole effective.  - Ondansetron can cause heart rhythm abnormalities.  - Provide prescription refill for ondansetron.  - Continue monitoring symptoms and medication effectiveness.    6. Health maintenance.  - Due for COVID-19 and shingles vaccines.  - Recommend vaccines, especially given immunotherapy risks.  - Advise staying hydrated and regular health check-ups.    Follow-up  - 12/03/2025.    Problem List Items Addressed This Visit       Type 2 diabetes mellitus without complication, without long-term current use of insulin (HCC) - Primary    Chronic, stable. Continue metformin ER 1000 mg BID.  Uacr, A1c today.  Continue healthy lifestyle recommendations.         Relevant Orders    POCT Hemoglobin A1C (Completed)    Lipid Profile    MICROALBUMIN CREAT RATIO URINE    CLL (chronic lymphocytic leukemia) (HCC)    Chronic, stable. Seeing oncology for surveillance.  Continue healthy lifestyle recommendations. Getting immunotherapy         Secondary thrombocytopenia    Relevant Medications    ondansetron (ZOFRAN ODT) 4 MG TABLET DISPERSIBLE    Portal hypertension (HCC)    Relevant Medications    spironolactone (ALDACTONE) 50 MG Tab    furosemide (LASIX) 20 MG Tab    Alcoholic cirrhosis of liver without ascites (HCC)    Chronic, ongoing. Seeing GI for ongoing surveillance. Continue furosemide 40 mg daily, spironolactone 100 mg daily. Continue propranolol 10 mg nightly          Liver cell carcinoma (HCC)    Seeing oncology for surveillance, management, and treatment of this.          Relevant Medications    ondansetron (ZOFRAN ODT) 4 MG TABLET DISPERSIBLE    Hiatal hernia with GERD    Chronic, stable. Continue pantoprazole 20 mg daily         Relevant Medications    ondansetron (ZOFRAN ODT) 4 MG TABLET  DISPERSIBLE    Hyperkalemia    Chronic, intermittent elevated potassium. Consider spironolactone dose reduction to 50 mg daily instead of 100 mg daily, encouraged discussion with hepatology for further consideration.   Getting labs every 3 weeks for evaluation.          Other Visit Diagnoses         Chronic left-sided thoracic back pain        Relevant Medications    tizanidine (ZANAFLEX) 4 MG Tab          Patient was educated in proper administration of medication(s) ordered today including safety, possible SE, risks, benefits, rationale and alternatives to therapy.   Supportive care, differential diagnoses, and indications for immediate follow-up discussed with patient.    Pathogenesis of diagnosis discussed including typical length and natural progression.    Instructed to return to clinic or nearest emergency department for any change in condition, further concerns, or worsening of symptoms.  Patient states understanding of the plan of care and discharge instructions.    Return in about 4 months (around 11/29/2025), or if symptoms worsen or fail to improve.    Please note that this dictation was created using voice recognition software. I have made every reasonable attempt to correct obvious errors, but I expect that there are errors of grammar and possibly content that I did not discover before finalizing the note.             [1]   Current Outpatient Medications   Medication Sig Dispense Refill    spironolactone (ALDACTONE) 50 MG Tab Take 2 Tablets by mouth every day. 200 Tablet 3    furosemide (LASIX) 20 MG Tab Take 2 Tablets by mouth every day. 200 Tablet 3    tizanidine (ZANAFLEX) 4 MG Tab Take 1 Tablet by mouth every 6 hours as needed (muscle spasm). 30 Tablet 3    ondansetron (ZOFRAN ODT) 4 MG TABLET DISPERSIBLE Take 1 Tablet by mouth every 6 hours as needed for Nausea/Vomiting. 10 Tablet 11    benzonatate (TESSALON) 100 MG Cap Take 1 Capsule by mouth 3 times a day as needed for Cough. 60 Capsule 0     propranolol (INDERAL) 10 MG Tab TAKE ONE TABLET BY MOUTH EVERY DAY AT 6PM 100 Tablet 3    Naloxone (NARCAN) 4 MG/0.1ML Liquid One spray in one nostril for overdose and call 911. 1 Each 0    Cholecalciferol (VITAMIN D-3 PO) Take 2,000 Units by mouth every day.      metFORMIN ER (GLUCOPHAGE XR) 500 MG TABLET SR 24 HR Take 2 Tablets by mouth 2 times a day. 400 Tablet 3    pantoprazole (PROTONIX) 20 MG tablet Take 1 Tablet by mouth every day. 100 Tablet 3    atezolizumab (TECENTRIQ) 1200 MG/20ML Solution injection 1,200 mg by Intrathecal Infusion route see administration instructions. Infusion every 3 weeks      diphenhydrAMINE (BENADRYL ALLERGY) 25 MG capsule Take 25 mg by mouth every 6 hours as needed.      Bevacizumab 1.25 MG/0.05ML Solution Prefilled Syringe 1.25 mcg by Subcutaneous Infusion route see administration instructions. Infusion every 3 weeks      fluticasone (FLONASE) 50 MCG/ACT nasal spray Administer 1 Spray into affected nostril(S) 2 times a day as needed.      therapeutic multivitamin-minerals (THERAGRAN-M) Tab Take 1 Tablet by mouth every day.       No current facility-administered medications for this visit.

## 2025-07-29 NOTE — ASSESSMENT & PLAN NOTE
Chronic, stable. Continue metformin ER 1000 mg BID.  Uacr, A1c today.  Continue healthy lifestyle recommendations.

## 2025-07-29 NOTE — ASSESSMENT & PLAN NOTE
Chronic, ongoing. Seeing GI for ongoing surveillance. Continue furosemide 40 mg daily, spironolactone 100 mg daily. Continue propranolol 10 mg nightly

## 2025-08-06 ENCOUNTER — HOSPITAL ENCOUNTER (OUTPATIENT)
Dept: LAB | Facility: MEDICAL CENTER | Age: 75
End: 2025-08-06
Attending: INTERNAL MEDICINE
Payer: MEDICARE

## 2025-08-06 DIAGNOSIS — E11.9 TYPE 2 DIABETES MELLITUS WITHOUT COMPLICATION, WITHOUT LONG-TERM CURRENT USE OF INSULIN (HCC): ICD-10-CM

## 2025-08-06 LAB
ALBUMIN SERPL BCP-MCNC: 4.2 G/DL (ref 3.2–4.9)
ALBUMIN/GLOB SERPL: 2 G/DL
ALP SERPL-CCNC: 129 U/L (ref 30–99)
ALT SERPL-CCNC: 26 U/L (ref 2–50)
ANION GAP SERPL CALC-SCNC: 13 MMOL/L (ref 7–16)
APPEARANCE UR: CLEAR
AST SERPL-CCNC: 42 U/L (ref 12–45)
BACTERIA #/AREA URNS HPF: ABNORMAL /HPF
BASOPHILS # BLD AUTO: 0.8 % (ref 0–1.8)
BASOPHILS # BLD: 0.04 K/UL (ref 0–0.12)
BILIRUB SERPL-MCNC: 1.5 MG/DL (ref 0.1–1.5)
BILIRUB UR QL STRIP.AUTO: ABNORMAL
BUN SERPL-MCNC: 22 MG/DL (ref 8–22)
CALCIUM ALBUM COR SERPL-MCNC: 9.4 MG/DL (ref 8.5–10.5)
CALCIUM SERPL-MCNC: 9.6 MG/DL (ref 8.5–10.5)
CASTS URNS QL MICRO: ABNORMAL /LPF (ref 0–2)
CHLORIDE SERPL-SCNC: 104 MMOL/L (ref 96–112)
CHOLEST SERPL-MCNC: 143 MG/DL (ref 100–199)
CO2 SERPL-SCNC: 22 MMOL/L (ref 20–33)
COLOR UR: ABNORMAL
CREAT SERPL-MCNC: 1.09 MG/DL (ref 0.5–1.4)
EOSINOPHIL # BLD AUTO: 0.08 K/UL (ref 0–0.51)
EOSINOPHIL NFR BLD: 1.6 % (ref 0–6.9)
EPITHELIAL CELLS 1715: ABNORMAL /HPF (ref 0–5)
ERYTHROCYTE [DISTWIDTH] IN BLOOD BY AUTOMATED COUNT: 57.9 FL (ref 35.9–50)
GFR SERPLBLD CREATININE-BSD FMLA CKD-EPI: 71 ML/MIN/1.73 M 2
GLOBULIN SER CALC-MCNC: 2.1 G/DL (ref 1.9–3.5)
GLUCOSE SERPL-MCNC: 155 MG/DL (ref 65–99)
GLUCOSE UR STRIP.AUTO-MCNC: NEGATIVE MG/DL
HCT VFR BLD AUTO: 39.8 % (ref 42–52)
HDLC SERPL-MCNC: 66 MG/DL
HGB BLD-MCNC: 12.7 G/DL (ref 14–18)
IMM GRANULOCYTES # BLD AUTO: 0.02 K/UL (ref 0–0.11)
IMM GRANULOCYTES NFR BLD AUTO: 0.4 % (ref 0–0.9)
KETONES UR STRIP.AUTO-MCNC: ABNORMAL MG/DL
LDLC SERPL CALC-MCNC: 58 MG/DL
LEUKOCYTE ESTERASE UR QL STRIP.AUTO: ABNORMAL
LYMPHOCYTES # BLD AUTO: 2.19 K/UL (ref 1–4.8)
LYMPHOCYTES NFR BLD: 44 % (ref 22–41)
MCH RBC QN AUTO: 34.4 PG (ref 27–33)
MCHC RBC AUTO-ENTMCNC: 31.9 G/DL (ref 32.3–36.5)
MCV RBC AUTO: 107.9 FL (ref 81.4–97.8)
MICRO URNS: ABNORMAL
MONOCYTES # BLD AUTO: 0.4 K/UL (ref 0–0.85)
MONOCYTES NFR BLD AUTO: 8 % (ref 0–13.4)
NEUTROPHILS # BLD AUTO: 2.25 K/UL (ref 1.82–7.42)
NEUTROPHILS NFR BLD: 45.2 % (ref 44–72)
NITRITE UR QL STRIP.AUTO: NEGATIVE
NRBC # BLD AUTO: 0 K/UL
NRBC BLD-RTO: 0 /100 WBC (ref 0–0.2)
PH UR STRIP.AUTO: 7 [PH] (ref 5–8)
PLATELET # BLD AUTO: 61 K/UL (ref 164–446)
PLATELETS.RETICULATED NFR BLD AUTO: 4 % (ref 0.6–13.1)
PMV BLD AUTO: 10.7 FL (ref 9–12.9)
POTASSIUM SERPL-SCNC: 5.2 MMOL/L (ref 3.6–5.5)
PROT SERPL-MCNC: 6.3 G/DL (ref 6–8.2)
PROT UR QL STRIP: 30 MG/DL
RBC # BLD AUTO: 3.69 M/UL (ref 4.7–6.1)
RBC # URNS HPF: ABNORMAL /HPF (ref 0–2)
RBC UR QL AUTO: NEGATIVE
SODIUM SERPL-SCNC: 139 MMOL/L (ref 135–145)
SP GR UR STRIP.AUTO: 1.03
TRIGL SERPL-MCNC: 93 MG/DL (ref 0–149)
TSH SERPL-ACNC: 2.66 UIU/ML (ref 0.38–5.33)
UROBILINOGEN UR STRIP.AUTO-MCNC: 1 EU/DL
WBC # BLD AUTO: 5 K/UL (ref 4.8–10.8)
WBC #/AREA URNS HPF: ABNORMAL /HPF

## 2025-08-06 PROCEDURE — 36415 COLL VENOUS BLD VENIPUNCTURE: CPT

## 2025-08-06 PROCEDURE — 80053 COMPREHEN METABOLIC PANEL: CPT

## 2025-08-06 PROCEDURE — 84443 ASSAY THYROID STIM HORMONE: CPT

## 2025-08-06 PROCEDURE — 85055 RETICULATED PLATELET ASSAY: CPT

## 2025-08-06 PROCEDURE — 81001 URINALYSIS AUTO W/SCOPE: CPT

## 2025-08-06 PROCEDURE — 85025 COMPLETE CBC W/AUTO DIFF WBC: CPT

## 2025-08-06 PROCEDURE — 80061 LIPID PANEL: CPT

## 2025-08-07 DIAGNOSIS — C91.10 CLL (CHRONIC LYMPHOCYTIC LEUKEMIA) (HCC): ICD-10-CM

## 2025-08-07 DIAGNOSIS — R11.0 NAUSEA: ICD-10-CM

## 2025-08-08 RX ORDER — ONDANSETRON 4 MG/1
TABLET, FILM COATED ORAL
Qty: 90 TABLET | Refills: 2 | Status: SHIPPED | OUTPATIENT
Start: 2025-08-08

## 2025-08-18 ENCOUNTER — APPOINTMENT (OUTPATIENT)
Dept: ADMISSIONS | Facility: MEDICAL CENTER | Age: 75
End: 2025-08-18
Attending: RADIOLOGY
Payer: MEDICARE

## 2025-08-20 ENCOUNTER — PRE-ADMISSION TESTING (OUTPATIENT)
Dept: ADMISSIONS | Facility: MEDICAL CENTER | Age: 75
End: 2025-08-20
Attending: RADIOLOGY
Payer: MEDICARE

## 2025-08-20 RX ORDER — OXYCODONE AND ACETAMINOPHEN 5; 325 MG/1; MG/1
1 TABLET ORAL EVERY 4 HOURS PRN
COMMUNITY

## (undated) DEVICE — KIT CUSTOM PROCEDURE SINGLE FOR ENDO  (15/CA)

## (undated) DEVICE — SUTURE GENERAL

## (undated) DEVICE — SET LEADWIRE 5 LEAD BEDSIDE DISPOSABLE ECG (1SET OF 5/EA)

## (undated) DEVICE — PACK LAP CHOLE OR - (2EA/CA)

## (undated) DEVICE — WATER IRRIGATION STERILE 1000ML (12EA/CA)

## (undated) DEVICE — CANISTER SUCTION 3000ML MECHANICAL FILTER AUTO SHUTOFF MEDI-VAC NONSTERILE LF DISP  (40EA/CA)

## (undated) DEVICE — GLOVE BIOGEL PI INDICATOR SZ 6.0 SURGICAL PF LF -(200PR/CA)

## (undated) DEVICE — SUCTION INSTRUMENT YANKAUER BULBOUS TIP W/O VENT (50EA/CA)

## (undated) DEVICE — SENSOR OXIMETER ADULT SPO2 RD SET (20EA/BX)

## (undated) DEVICE — ELECTRODE DUAL RETURN W/ CORD - (50/PK)

## (undated) DEVICE — SLEEVE VASO DVT COMPRESSION CALF MED - (10PR/CA)

## (undated) DEVICE — FILM CASSETTE ENDO

## (undated) DEVICE — ELECTRODE 850 FOAM ADHESIVE - HYDROGEL RADIOTRNSPRNT (50/PK)

## (undated) DEVICE — TUBE CONNECTING SUCTION - CLEAR PLASTIC STERILE 72 IN (50EA/CA)

## (undated) DEVICE — DRAPESURG STERI-DRAPE LONG - (10/BX 4BX/CA)

## (undated) DEVICE — DERMABOND ADVANCED - (12EA/BX)

## (undated) DEVICE — KIT ANESTHESIA W/CIRCUIT & 3/LT BAG W/FILTER (20EA/CA)

## (undated) DEVICE — NEPTUNE 4 PORT MANIFOLD - (20/PK)

## (undated) DEVICE — PROTECTOR ULNA NERVE - (36PR/CA)

## (undated) DEVICE — MASK ANESTHESIA ADULT  - (100/CA)

## (undated) DEVICE — TUBE CONNECT SUCTION CLEAR 120 X 1/4" (50EA/CA)"

## (undated) DEVICE — SPONGE GAUZESTER. 2X2 4-PL - (2/PK 50PK/BX 30BX/CS)

## (undated) DEVICE — PADDING CAST 4 IN STERILE - 4 X 4 YDS (24/CA)

## (undated) DEVICE — SENSOR SPO2 NEO LNCS ADHESIVE (20/BX) SEE USER NOTES

## (undated) DEVICE — CANISTER SUCTION 3000ML MECHANICAL FILTER AUTO SHUTOFF MEDI-VAC NONSTERILE LF DISP (40EA/CA)

## (undated) DEVICE — GLOVE BIOGEL PI INDICATOR SZ 7.5 SURGICAL PF LF -(50/BX 4BX/CA)

## (undated) DEVICE — GLOVE BIOGEL PI INDICATOR SZ 8.0 SURGICAL PF LF -(50/BX 4BX/CA)

## (undated) DEVICE — GOWN SURGICAL X-LARGE ULTRA - FILM-REINFORCED (20/CA)

## (undated) DEVICE — CHLORAPREP 26 ML APPLICATOR - ORANGE TINT(25/CA)

## (undated) DEVICE — TUBE E-T HI-LO CUFF 7.5MM (10EA/PK)

## (undated) DEVICE — BLANKET WARMING LOWER BODY - (10/CA) INACTIVE USE #8585

## (undated) DEVICE — GLOVE BIOGEL SZ 6.5 SURGICAL PF LTX (50PR/BX 4BX/CA)

## (undated) DEVICE — TOWEL STOP TIMEOUT SAFETY FLAG (40EA/CA)

## (undated) DEVICE — SET EXTENSION WITH 2 PORTS (48EA/CA) ***PART #2C8610 IS A SUBSTITUTE*****

## (undated) DEVICE — LACTATED RINGERS INJ 1000 ML - (14EA/CA 60CA/PF)

## (undated) DEVICE — SUTURE 0 VICRYL PLUS UR-6 - 27 INCH (36/BX)

## (undated) DEVICE — TROCAR LAPSCP 100MM 12MM NTHRD - (6/BX)

## (undated) DEVICE — CANISTER SUCTION RIGID RED 1500CC (40EA/CA)

## (undated) DEVICE — GOWN WARMING STANDARD FLEX - (30/CA)

## (undated) DEVICE — GLOVE BIOGEL SZ 8 SURGICAL PF LTX - (50PR/BX 4BX/CA)

## (undated) DEVICE — SODIUM CHL IRRIGATION 0.9% 1000ML (12EA/CA)

## (undated) DEVICE — DRAPE IOBAN II INCISE 23X17 - (10EA/BX 4BX/CA)

## (undated) DEVICE — SET SUCTION/IRRIGATION WITH DISPOSABLE TIP (6/CA )PART #0250-070-520 IS A SUB

## (undated) DEVICE — MASK, LARYNGEAL AIRWAY #5

## (undated) DEVICE — SYRINGE SAFETY 10 ML 18 GA X 1 1/2 BLUNT LL (100/BX 4BX/CA)

## (undated) DEVICE — HEAD HOLDER JUNIOR/ADULT

## (undated) DEVICE — GLOVE SZ 7.5 BIOGEL PI MICRO - PF LF (50PR/BX)

## (undated) DEVICE — TUBING CLEARLINK DUO-VENT - C-FLO (48EA/CA)

## (undated) DEVICE — TROCAR Z THREAD11MM OPTICAL - NON BLADED(6/BX)

## (undated) DEVICE — STAPLER SKIN DISP - (6/BX 10BX/CA) VISISTAT

## (undated) DEVICE — GOWN SURGICAL XX-LARGE - (28EA/CA) SIRUS NON REINFORCED

## (undated) DEVICE — GLOVE BIOGEL PI ORTHO SZ 7.5 PF LF (40PR/BX)

## (undated) DEVICE — COVER LIGHT HANDLE ALC PLUS DISP (18EA/BX)

## (undated) DEVICE — BANDAGE ELASTIC 3 X 5 YDS - STERILE VELCRO (25/CA)LATEX

## (undated) DEVICE — TROCAR 5X100 NON BLADED Z-TH - READ KII (6/BX)

## (undated) DEVICE — BOVIE BLADE COATED - (50/PK)

## (undated) DEVICE — GLOVE BIOGEL INDICATOR SZ 6.5 SURGICAL PF LTX - (50PR/BX 4BX/CA)

## (undated) DEVICE — GLOVE SZ 7 BIOGEL PI MICRO - PF LF (50PR/BX 4BX/CA)

## (undated) DEVICE — BITE BLOCK, DISP.

## (undated) DEVICE — KIT  I.V. START (100EA/CA)

## (undated) DEVICE — SLEEVE, VASO, THIGH, MED

## (undated) DEVICE — GLOVE BIOGEL PI INDICATOR SZ 7.0 SURGICAL PF LF - (50/BX 4BX/CA)

## (undated) DEVICE — PROBE SOLERO MICROWAVE APPLICATOR 29CM

## (undated) DEVICE — SODIUM CHL. IRRIGATION 0.9% 3000ML (4EA/CA 65CA/PF)

## (undated) DEVICE — SUTURE 4-0 MONOCRYL PLUS PS-2 - 27 INCH (36/BX)

## (undated) DEVICE — CATHETER IV 20 GA X 1-1/4 ---SURG.& SDS ONLY--- (50EA/BX)

## (undated) DEVICE — KIT ROOM DECONTAMINATION

## (undated) DEVICE — WATER IRRIG. STER. 1500 ML - (9/CA)

## (undated) DEVICE — BLADE SURGICAL CLIPPER - (50EA/CA)

## (undated) DEVICE — DRESSING TRANSPARENT FILM TEGADERM 2.375 X 2.75"  (100EA/BX)"

## (undated) DEVICE — PACK LOWER EXTREMITY - (2/CA)

## (undated) DEVICE — GOWN SURGEONS X-LARGE - DISP. (30/CA)

## (undated) DEVICE — CANNULA O2 COMFORT SOFT EAR ADULT 7 FT TUBING (50/CA)

## (undated) DEVICE — Device

## (undated) DEVICE — BLANKET WARMING UPPER BODY - (10/CA)